# Patient Record
Sex: MALE | Race: WHITE | NOT HISPANIC OR LATINO | Employment: OTHER | URBAN - METROPOLITAN AREA
[De-identification: names, ages, dates, MRNs, and addresses within clinical notes are randomized per-mention and may not be internally consistent; named-entity substitution may affect disease eponyms.]

---

## 2017-03-21 ENCOUNTER — OFFICE VISIT (OUTPATIENT)
Dept: FAMILY MEDICINE | Facility: CLINIC | Age: 57
End: 2017-03-21
Payer: COMMERCIAL

## 2017-03-21 VITALS
WEIGHT: 164.7 LBS | OXYGEN SATURATION: 100 % | SYSTOLIC BLOOD PRESSURE: 164 MMHG | TEMPERATURE: 97.7 F | BODY MASS INDEX: 25.85 KG/M2 | HEART RATE: 70 BPM | HEIGHT: 67 IN | DIASTOLIC BLOOD PRESSURE: 100 MMHG

## 2017-03-21 DIAGNOSIS — J30.2 SEASONAL ALLERGIC RHINITIS, UNSPECIFIED ALLERGIC RHINITIS TRIGGER: ICD-10-CM

## 2017-03-21 DIAGNOSIS — I10 HTN, GOAL BELOW 140/90: ICD-10-CM

## 2017-03-21 DIAGNOSIS — Z12.11 SCREENING FOR COLON CANCER: ICD-10-CM

## 2017-03-21 DIAGNOSIS — Z00.01 ENCOUNTER FOR ROUTINE ADULT HEALTH EXAMINATION WITH ABNORMAL FINDINGS: Primary | ICD-10-CM

## 2017-03-21 DIAGNOSIS — Z13.6 CARDIOVASCULAR SCREENING; LDL GOAL LESS THAN 160: ICD-10-CM

## 2017-03-21 DIAGNOSIS — F41.0 ANXIETY ATTACK: ICD-10-CM

## 2017-03-21 DIAGNOSIS — Z90.5 SINGLE KIDNEY: ICD-10-CM

## 2017-03-21 LAB
ANION GAP SERPL CALCULATED.3IONS-SCNC: 8 MMOL/L (ref 3–14)
BUN SERPL-MCNC: 20 MG/DL (ref 7–30)
CALCIUM SERPL-MCNC: 9.3 MG/DL (ref 8.5–10.1)
CHLORIDE SERPL-SCNC: 104 MMOL/L (ref 94–109)
CHOLEST SERPL-MCNC: 175 MG/DL
CO2 SERPL-SCNC: 25 MMOL/L (ref 20–32)
CREAT SERPL-MCNC: 0.88 MG/DL (ref 0.66–1.25)
GFR SERPL CREATININE-BSD FRML MDRD: 90 ML/MIN/1.7M2
GLUCOSE SERPL-MCNC: 102 MG/DL (ref 70–99)
HDLC SERPL-MCNC: 68 MG/DL
LDLC SERPL CALC-MCNC: 95 MG/DL
NONHDLC SERPL-MCNC: 107 MG/DL
POTASSIUM SERPL-SCNC: 5 MMOL/L (ref 3.4–5.3)
SODIUM SERPL-SCNC: 137 MMOL/L (ref 133–144)
TRIGL SERPL-MCNC: 62 MG/DL

## 2017-03-21 PROCEDURE — 80048 BASIC METABOLIC PNL TOTAL CA: CPT | Performed by: PHYSICIAN ASSISTANT

## 2017-03-21 PROCEDURE — 99396 PREV VISIT EST AGE 40-64: CPT | Performed by: PHYSICIAN ASSISTANT

## 2017-03-21 PROCEDURE — 36415 COLL VENOUS BLD VENIPUNCTURE: CPT | Performed by: PHYSICIAN ASSISTANT

## 2017-03-21 PROCEDURE — 80061 LIPID PANEL: CPT | Performed by: PHYSICIAN ASSISTANT

## 2017-03-21 RX ORDER — TRAZODONE HYDROCHLORIDE 50 MG/1
50-200 TABLET, FILM COATED ORAL AT BEDTIME
Qty: 90 TABLET | COMMUNITY
Start: 2017-03-21 | End: 2017-09-05

## 2017-03-21 RX ORDER — LISINOPRIL 10 MG/1
10 TABLET ORAL DAILY
Qty: 90 TABLET | Refills: 0 | Status: SHIPPED | OUTPATIENT
Start: 2017-03-21 | End: 2017-05-02

## 2017-03-21 NOTE — PROGRESS NOTES
"  SUBJECTIVE:     CC: Suresh Powers is an 56 year old male who presents for preventative health visit.     Healthy Habits:    Do you get at least three servings of calcium containing foods daily (dairy, green leafy vegetables, etc.)? No    Amount of exercise or daily activities, outside of work: 3 hour(s) per day    Problems taking medications regularly No    Medication side effects: No    Have you had an eye exam in the past two years? yes    Do you see a dentist twice per year? no    Do you have sleep apnea, excessive snoring or daytime drowsiness?no    -Patient presents today for annual physical  -He had left his job, and lost his insurance, was not taking blood pressure medication  -He denies   -He is on trazodone for \"anxiety and depression and help with sleep\"      Today's PHQ-2 Score:   PHQ-2 ( 1999 [employer]) 3/21/2017   Q1: Little interest or pleasure in doing things 0   Q2: Feeling down, depressed or hopeless 0   PHQ-2 Score 0       Abuse: Current or Past(Physical, Sexual or Emotional)- No  Do you feel safe in your environment - Yes    Social History   Substance Use Topics     Smoking status: Current Every Day Smoker     Packs/day: 1.00     Years: 0.00     Smokeless tobacco: Not on file     Alcohol use No     The patient does not drink >3 drinks per day nor >7 drinks per week.    Last PSA:   PSA   Date Value Ref Range Status   12/19/2015 1.08 0 - 4 ug/L Final       Recent Labs   Lab Test  12/19/15   0907   CHOL  177   HDL  73   LDL  94   TRIG  52   NHDL  104       Reviewed orders with patient. Reviewed health maintenance and updated orders accordingly - Yes    Reviewed and updated as needed this visit by clinical staff  Tobacco  Allergies  Med Hx  Surg Hx  Fam Hx  Soc Hx        Reviewed and updated as needed this visit by Provider            ROS:  C: NEGATIVE for fever, chills, change in weight  I: NEGATIVE for worrisome rashes, moles or lesions  E: NEGATIVE for vision changes or irritation  ENT: " "persistent runny nose  R: NEGATIVE for significant cough or SOB  CV: uncontrolled blood pressure  GI: NEGATIVE for nausea, abdominal pain, heartburn, or change in bowel habits   male: negative for dysuria, hematuria, decreased urinary stream, erectile dysfunction, urethral discharge  M: NEGATIVE for significant arthralgias or myalgia  N: NEGATIVE for weakness, dizziness or paresthesias  P: NEGATIVE for changes in mood or affect    Problem list, Medication list, Allergies, and Medical/Social/Surgical histories reviewed in [facility] and updated as appropriate.  OBJECTIVE:     BP (!) 164/100  Pulse 70  Temp 97.7  F (36.5  C) (Oral)  Ht 5' 7\" (1.702 m)  Wt 164 lb 11.2 oz (74.7 kg)  SpO2 100%  BMI 25.8 kg/m2  EXAM:  GENERAL: healthy, alert and no distress  EYES: Eyes grossly normal to inspection, PERRL and conjunctivae and sclerae normal  HENT: normal cephalic/atraumatic, ear canals and TM's normal, nasal mucosa edematous , rhinorrhea white, oropharynx clear and oral mucous membranes moist  NECK: no adenopathy, no asymmetry, masses, or scars and thyroid normal to palpation  RESP: lungs clear to auscultation - no rales, rhonchi or wheezes  CV: regular rates and rhythm, normal S1 S2, no S3 or S4 and no murmur, click or rub  ABDOMEN: soft, nontender, no hepatosplenomegaly, no masses and bowel sounds normal   (female): normal female external genitalia, normal urethral meatus, vaginal mucosa, normal cervix/adnexa/uterus without masses or discharge  MS: no gross musculoskeletal defects noted, no edema  PSYCH: mentation appears normal, affect normal/bright    ASSESSMENT/PLAN:     1. Encounter for routine adult health examination with abnormal findings  57yo male for annual physical. Needs updating to HM and lab work. He feels well overall, now getting insurance back.   - LIPID REFLEX TO DIRECT LDL PANEL  - traZODone (DESYREL) 50 MG tablet; Take 1-4 tablets ( mg) by mouth At Bedtime  Dispense: 90 tablet    2. HTN, goal " "below 140/90  Uncontrolled. Has been off of medication for 6 months. Reviewed that this is especially important given single kidney. He will recheck a few times at our pharmacy over the next few weeks and in 3 months.   - Basic metabolic panel  - lisinopril (PRINIVIL/ZESTRIL) 10 MG tablet; Take 1 tablet (10 mg) by mouth daily  Dispense: 90 tablet; Refill: 0    3. Single kidney  Donated to sister (left) in 1990. Poor BP control. Updating labs.   - Basic metabolic panel  - lisinopril (PRINIVIL/ZESTRIL) 10 MG tablet; Take 1 tablet (10 mg) by mouth daily  Dispense: 90 tablet; Refill: 0    4. CARDIOVASCULAR SCREENING; LDL GOAL LESS THAN 160  - LIPID REFLEX TO DIRECT LDL PANEL    5. Anxiety attack  Sees outside psych    6. Seasonal allergic rhinitis, unspecified allergic rhinitis trigger  Recommend trial of otc antihistamines or nasal steroid.     7. Screening for colon cancer  - GASTROENTEROLOGY ADULT REF PROCEDURE ONLY    COUNSELING:  Reviewed preventive health counseling, as reflected in patient instructions       Regular exercise       Healthy diet/nutrition       Colon cancer screening       Prostate cancer screening         reports that he has quit smoking. He smoked 1.00 pack per day for 0.00 years. He quit smokeless tobacco use about 13 months ago.    Estimated body mass index is 22.21 kg/(m^2) as calculated from the following:    Height as of 3/24/16: 5' 8\" (1.727 m).    Weight as of 3/24/16: 146 lb 1.6 oz (66.3 kg).       Counseling Resources:  ATP IV Guidelines  Pooled Cohorts Equation Calculator  FRAX Risk Assessment  ICSI Preventive Guidelines  Dietary Guidelines for Americans, 2010  USDA's MyPlate  ASA Prophylaxis  Lung CA Screening    Sharif Mcdonald PA-C  [facility] [place]  "

## 2017-03-21 NOTE — NURSING NOTE
"Chief Complaint   Patient presents with     Physical       Initial BP (!) 164/100  Pulse 70  Temp 97.7  F (36.5  C) (Oral)  Ht 5' 7\" (1.702 m)  Wt 164 lb 11.2 oz (74.7 kg)  SpO2 100%  BMI 25.8 kg/m2 Estimated body mass index is 25.8 kg/(m^2) as calculated from the following:    Height as of this encounter: 5' 7\" (1.702 m).    Weight as of this encounter: 164 lb 11.2 oz (74.7 kg).  Medication Reconciliation: complete   Rajeev Goddard CMA        "

## 2017-03-21 NOTE — MR AVS SNAPSHOT
After Visit Summary   3/21/2017    Suresh Powers    MRN: [id number]           Patient Information     Date Of Birth          1960        Visit Information        Provider Department      3/21/2017 9:00 AM Sharif Mcdonald PA-C [facility]        Today's Diagnoses     Encounter for routine adult health examination with abnormal findings    -  1    HTN, goal below 140/90        Single kidney        CARDIOVASCULAR SCREENING; LDL GOAL LESS THAN 160        Anxiety attack        Seasonal allergic rhinitis, unspecified allergic rhinitis trigger          Care Instructions      Preventive Health Recommendations  Male Ages 50 - 64    Yearly exam:             See your health care provider every year in order to  o   Review health changes.   o   Discuss preventive care.    o   Review your medicines if your doctor has prescribed any.     Have a cholesterol test every 5 years, or more frequently if you are at risk for high cholesterol/heart disease.     Have a diabetes test (fasting glucose) every three years. If you are at risk for diabetes, you should have this test more often.     Have a colonoscopy at age 50, or have a yearly FIT test (stool test). These exams will check for colon cancer.      Talk with your health care provider about whether or not a prostate cancer screening test (PSA) is right for you.    You should be tested each year for STDs (sexually transmitted diseases), if you re at risk.     Shots: Get a flu shot each year. Get a tetanus shot every 10 years.     Nutrition:    Eat at least 5 servings of fruits and vegetables daily.     Eat whole-grain bread, whole-wheat pasta and brown rice instead of white grains and rice.     Talk to your provider about Calcium and Vitamin D.     Lifestyle    Exercise for at least 150 minutes a week (30 minutes a day, 5 days a week). This will help you control your weight and prevent disease.     Limit alcohol to one drink per day.     No  "smoking.     Wear sunscreen to prevent skin cancer.     See your dentist every six months for an exam and cleaning.     See your eye doctor every 1 to 2 years.          Follow-ups after your visit        Who to contact     If you have questions or need follow up information about today's clinic visit or your schedule please contact [facility] directly at [phone].  Normal or non-critical lab and imaging results will be communicated to you by MyChart, letter or phone within 4 business days after the clinic has received the results. If you do not hear from us within 7 days, please contact the clinic through [email or web address] or phone. If you have a critical or abnormal lab result, we will notify you by phone as soon as possible.  Submit refill requests through [employer] or call your pharmacy and they will forward the refill request to us. Please allow 3 business days for your refill to be completed.          Additional Information About Your Visit        [email or web address] Information     [employer] lets you send messages to your doctor, view your test results, renew your prescriptions, schedule appointments and more. To sign up, go to [email or web address] . Click on \"Log in\" on the left side of the screen, which will take you to the Welcome page. Then click on \"Sign up Now\" on the right side of the page.     You will be asked to enter the access code listed below, as well as some personal information. Please follow the directions to create your username and password.     Your access code is: [email or web address]  Expires: 2017  9:25 AM     Your access code will  in 90 days. If you need help or a new code, please call your [place] clinic or [phone].        Care EveryWhere ID     This is your Care EveryWhere ID. This could be used by other organizations to access your [place] medical records  [id number]        Your Vitals Were     Pulse Temperature Height Pulse Oximetry BMI (Body Mass Index)       70 97.7  F " "(36.5  C) (Oral) 5' 7\" (1.702 m) 100% 25.8 kg/m2        Blood Pressure from Last 3 Encounters:   03/21/17 (!) 164/100   03/24/16 103/55   03/16/16 134/66    Weight from Last 3 Encounters:   03/21/17 164 lb 11.2 oz (74.7 kg)   03/24/16 146 lb 1.6 oz (66.3 kg)   03/16/16 149 lb 4.8 oz (67.7 kg)              We Performed the Following     Basic metabolic panel     LIPID REFLEX TO DIRECT LDL PANEL          Today's Medication Changes          These changes are accurate as of: 3/21/17  9:28 AM.  If you have any questions, ask your nurse or doctor.               These medicines have changed or have updated prescriptions.        Dose/Directions    lisinopril 10 MG tablet   Commonly known as:  PRINIVIL/ZESTRIL   This may have changed:  Another medication with the same name was removed. Continue taking this medication, and follow the directions you see here.   Used for:  HTN, goal below 140/90, Single kidney   Changed by:  Sharif Mcdonald PA-C        Dose:  10 mg   Take 1 tablet (10 mg) by mouth daily   Quantity:  90 tablet   Refills:  0            Where to get your medicines      These medications were sent to [facility][id number] - [address] [address]     Phone:  [phone]     lisinopril 10 MG tablet                Primary Care Provider Office Phone # Fax #    [facility] [phone] [address] [facility] [address]        Thank you!     Thank you for choosing [facility]  for your care. Our goal is always to provide you with excellent care. Hearing back from our patients is one way we can continue to improve our services. Please take a few minutes to complete the written survey that you may receive in the mail after your visit with us. Thank you!             Your Updated Medication List - Protect others around you: Learn how to safely use, store and throw away your medicines at " www.disposemymeds.org.          This list is accurate as of: 3/21/17  9:28 AM.  Always use your most recent med list.                   Brand Name Dispense Instructions for use    lisinopril 10 MG tablet    PRINIVIL/ZESTRIL    90 tablet    Take 1 tablet (10 mg) by mouth daily       * TRAZODONE HCL PO      Take  mg by mouth nightly as needed       * traZODone 50 MG tablet    DESYREL    90 tablet    Take 1-4 tablets ( mg) by mouth At Bedtime       TYLENOL PO      Take 200 mg by mouth       * Notice:  This list has 2 medication(s) that are the same as other medications prescribed for you. Read the directions carefully, and ask your doctor or other care provider to review them with you.

## 2017-03-27 ENCOUNTER — TELEPHONE (OUTPATIENT)
Dept: PHARMACY | Facility: OTHER | Age: 57
End: 2017-03-27

## 2017-03-27 ENCOUNTER — ALLIED HEALTH/NURSE VISIT (OUTPATIENT)
Dept: FAMILY MEDICINE | Facility: CLINIC | Age: 57
End: 2017-03-27
Payer: COMMERCIAL

## 2017-03-27 ENCOUNTER — TELEPHONE (OUTPATIENT)
Dept: FAMILY MEDICINE | Facility: CLINIC | Age: 57
End: 2017-03-27

## 2017-03-27 VITALS — SYSTOLIC BLOOD PRESSURE: 152 MMHG | DIASTOLIC BLOOD PRESSURE: 98 MMHG

## 2017-03-27 DIAGNOSIS — Z01.30 BP CHECK: Primary | ICD-10-CM

## 2017-03-27 PROCEDURE — 99207 ZZC NO CHARGE NURSE ONLY: CPT | Performed by: PHYSICIAN ASSISTANT

## 2017-03-27 NOTE — MR AVS SNAPSHOT
After Visit Summary   3/27/2017    Suresh Powers    MRN: [id number]           Patient Information     Date Of Birth          1960        Visit Information        Provider Department      3/27/2017 11:13 AM Shraif Mcdonald PA-C [facility]        Today's Diagnoses     BP check    -  1       Follow-ups after your visit        Your next 10 appointments already scheduled     May 02, 2017  9:00 AM CDT   Office Visit with Justine Cantu [facility] ([facility])    [address] [facility] [address] [phone]           Bring a current list of meds and any records pertaining to this visit.  For Physicals, please bring immunization records and any forms needing to be filled out.  Please arrive 10 minutes early to complete paperwork.            Jun 20, 2017  8:00 AM CDT   LAB with  LAB   [facility] ([facility])    [address] [facility] [address] [phone]           Patient must bring picture ID.  Patient should be prepared to give a urine specimen  Please do not eat 10-12 hours before your appointment if you are coming in fasting for labs on lipids, cholesterol, or glucose (sugar).  Pregnant women should follow their Care Team instructions. Water with medications is okay. Do not drink coffee or other fluids.   If you have concerns about taking  your medications, please ask at office or if scheduling via [employer], send a message by clicking on Secure Messaging, Message Your Care Team.              Who to contact     If you have questions or need follow up information about today's clinic visit or your schedule please contact [facility] directly at [phone].  Normal or non-critical lab and imaging results will be communicated to you by MyChart, letter or phone within 4 business days after the clinic has received the results. If you do not hear from us  "within 7 days, please contact the clinic through [employer] or phone. If you have a critical or abnormal lab result, we will notify you by phone as soon as possible.  Submit refill requests through [employer] or call your pharmacy and they will forward the refill request to us. Please allow 3 business days for your refill to be completed.          Additional Information About Your Visit        [employer] Information     [employer] lets you send messages to your doctor, view your test results, renew your prescriptions, schedule appointments and more. To sign up, go to [email or web address] . Click on \"Log in\" on the left side of the screen, which will take you to the Welcome page. Then click on \"Sign up Now\" on the right side of the page.     You will be asked to enter the access code listed below, as well as some personal information. Please follow the directions to create your username and password.     Your access code is: [email or web address]  Expires: 2017  9:25 AM     Your access code will  in 90 days. If you need help or a new code, please call your [place] clinic or [phone].        Care EveryWhere ID     This is your Care EveryWhere ID. This could be used by other organizations to access your [place] medical records  [id number]         Blood Pressure from Last 3 Encounters:   17 (!) 152/98   17 (!) 164/100   16 103/55    Weight from Last 3 Encounters:   17 164 lb 11.2 oz (74.7 kg)   16 146 lb 1.6 oz (66.3 kg)   16 149 lb 4.8 oz (67.7 kg)              Today, you had the following     No orders found for display       Primary Care Provider Office Phone # Fax #    Sharif Mcdonald PA-C [phone]       [facility] [address]  [facility] [address]        Thank you!     Thank you for choosing [facility]  for your care. Our goal is always to provide you with excellent care. Hearing back from our patients is one way we can " continue to improve our services. Please take a few minutes to complete the written survey that you may receive in the mail after your visit with us. Thank you!             Your Updated Medication List - Protect others around you: Learn how to safely use, store and throw away your medicines at www.disposemymeds.org.          This list is accurate as of: 3/27/17 11:59 PM.  Always use your most recent med list.                   Brand Name Dispense Instructions for use    lisinopril 10 MG tablet    PRINIVIL/ZESTRIL    90 tablet    Take 1 tablet (10 mg) by mouth daily       traZODone 50 MG tablet    DESYREL    90 tablet    Take 1-4 tablets ( mg) by mouth At Bedtime       TYLENOL PO      Take 200 mg by mouth

## 2017-03-27 NOTE — TELEPHONE ENCOUNTER
MTM referral from: [facility]    MTM referral outreach attempt #1 on March 27, 2017 at 12:18 PM      Outcome: Left Message    Slime Mckinnon MTM Coordinator

## 2017-03-27 NOTE — Clinical Note
Please call patient and thank him for following up on BP. Lets have him move up to 20mg (2 tabs of lisinopril daily) and recheck at the pharmacy again in one week. If not controlled at that time will need to change medication.

## 2017-03-27 NOTE — Clinical Note
Routing message to PCP for review -BP checked at pharmacy and noted to be above goal. Recommended patient follow-up with PCP.

## 2017-03-27 NOTE — PROGRESS NOTES
Suresh Powers is enrolled/participating in the retail pharmacy Blood Pressure Goals Achievement Program (BPGAP).  Suresh Powers was evaluated at [facility] on March 27, 2017 at which time his blood pressure was:    BP Readings from Last 3 Encounters:   03/27/17 (!) 152/98   03/21/17 (!) 164/100   03/24/16 103/55     Reviewed lifestyle modifications for blood pressure control and reduction: including making healthy food choices, managing weight, getting regular exercise, smoking cessation, reducing alcohol consumption, monitoring blood pressure regularly.     Suresh Powers is not experiencing symptoms.    Follow-Up: BP is not at goal of < 140/90mmHg (patient 18+ years of age with or without diabetes), Recommended follow-up with PCP.  Routing to PCP for further review.    Completed by: Lucas    Thank You   Adam [place] [facility]

## 2017-04-07 ENCOUNTER — ALLIED HEALTH/NURSE VISIT (OUTPATIENT)
Dept: FAMILY MEDICINE | Facility: CLINIC | Age: 57
End: 2017-04-07
Payer: COMMERCIAL

## 2017-04-07 VITALS — SYSTOLIC BLOOD PRESSURE: 158 MMHG | DIASTOLIC BLOOD PRESSURE: 103 MMHG

## 2017-04-07 DIAGNOSIS — I10 HTN, GOAL BELOW 140/90: Primary | ICD-10-CM

## 2017-04-07 PROCEDURE — 99207 ZZC NO CHARGE NURSE ONLY: CPT | Performed by: PHYSICIAN ASSISTANT

## 2017-04-07 NOTE — MR AVS SNAPSHOT
After Visit Summary   4/7/2017    Suresh Powers    MRN: [id number]           Patient Information     Date Of Birth          1960        Visit Information        Provider Department      4/7/2017 11:32 AM Sharif Mcdonald PA-C [facility]        Today's Diagnoses     HTN, goal below 140/90    -  1       Follow-ups after your visit        Your next 10 appointments already scheduled     May 02, 2017  9:00 AM CDT   Office Visit with Justine Cantu [facility] ([facility])    [address] [facility] [address] [phone]           Bring a current list of meds and any records pertaining to this visit.  For Physicals, please bring immunization records and any forms needing to be filled out.  Please arrive 10 minutes early to complete paperwork.            Jun 20, 2017  8:00 AM CDT   LAB with  LAB   [facility] ([facility])    [address] [facility] [address] [phone]           Patient must bring picture ID.  Patient should be prepared to give a urine specimen  Please do not eat 10-12 hours before your appointment if you are coming in fasting for labs on lipids, cholesterol, or glucose (sugar).  Pregnant women should follow their Care Team instructions. Water with medications is okay. Do not drink coffee or other fluids.   If you have concerns about taking  your medications, please ask at office or if scheduling via [employer], send a message by clicking on Secure Messaging, Message Your Care Team.              Who to contact     If you have questions or need follow up information about today's clinic visit or your schedule please contact [facility] directly at [phone].  Normal or non-critical lab and imaging results will be communicated to you by MyChart, letter or phone within 4 business days after the clinic has received the results. If you do not  "hear from us within 7 days, please contact the clinic through [employer] or phone. If you have a critical or abnormal lab result, we will notify you by phone as soon as possible.  Submit refill requests through [employer] or call your pharmacy and they will forward the refill request to us. Please allow 3 business days for your refill to be completed.          Additional Information About Your Visit        [employer] Information     [employer] lets you send messages to your doctor, view your test results, renew your prescriptions, schedule appointments and more. To sign up, go to [email or web address] . Click on \"Log in\" on the left side of the screen, which will take you to the Welcome page. Then click on \"Sign up Now\" on the right side of the page.     You will be asked to enter the access code listed below, as well as some personal information. Please follow the directions to create your username and password.     Your access code is: [email or web address]  Expires: 2017  9:25 AM     Your access code will  in 90 days. If you need help or a new code, please call your [place] clinic or [phone].        Care EveryWhere ID     This is your Care EveryWhere ID. This could be used by other organizations to access your [place] medical records  [id number]         Blood Pressure from Last 3 Encounters:   17 (!) 158/103   17 (!) 152/98   17 (!) 164/100    Weight from Last 3 Encounters:   17 164 lb 11.2 oz (74.7 kg)   16 146 lb 1.6 oz (66.3 kg)   16 149 lb 4.8 oz (67.7 kg)              Today, you had the following     No orders found for display       Primary Care Provider Office Phone # Fax #    Sharif Mcdonald PA-C [phone]       [facility] [address]  [facility] [address]        Thank you!     Thank you for choosing [facility]  for your care. Our goal is always to provide you with excellent care. Hearing back from our patients " is one way we can continue to improve our services. Please take a few minutes to complete the written survey that you may receive in the mail after your visit with us. Thank you!             Your Updated Medication List - Protect others around you: Learn how to safely use, store and throw away your medicines at www.disposemymeds.org.          This list is accurate as of: 4/7/17 11:59 PM.  Always use your most recent med list.                   Brand Name Dispense Instructions for use    lisinopril 10 MG tablet    PRINIVIL/ZESTRIL    90 tablet    Take 1 tablet (10 mg) by mouth daily       traZODone 50 MG tablet    DESYREL    90 tablet    Take 1-4 tablets ( mg) by mouth At Bedtime       TYLENOL PO      Take 200 mg by mouth

## 2017-04-07 NOTE — PROGRESS NOTES
Suresh Powers is enrolled/participating in the retail pharmacy Blood Pressure Goals Achievement Program (BPGAP).  Suresh Powers was evaluated at [facility] on April 7, 2017 at which time his blood pressure was:    BP Readings from Last 3 Encounters:   04/07/17 (!) 158/103   03/27/17 (!) 152/98   03/21/17 (!) 164/100     Reviewed lifestyle modifications for blood pressure control and reduction: including making healthy food choices, managing weight, getting regular exercise, smoking cessation, reducing alcohol consumption, monitoring blood pressure regularly.     Suresh Powers is not experiencing symptoms.    Follow-Up: BP is not at goal of < 140/90mmHg (patient 18+ years of age with or without diabetes), Recommended follow-up with PCP.  Routing to PCP for further review.    Completed by: Janis Chiang, Jamse Hathaway Pharmacist    Patient already referred to MTM pharmacist

## 2017-04-10 ENCOUNTER — TELEPHONE (OUTPATIENT)
Dept: FAMILY MEDICINE | Facility: CLINIC | Age: 57
End: 2017-04-10

## 2017-04-10 DIAGNOSIS — I10 HTN, GOAL BELOW 140/90: Primary | ICD-10-CM

## 2017-04-10 RX ORDER — LISINOPRIL AND HYDROCHLOROTHIAZIDE 12.5; 2 MG/1; MG/1
1 TABLET ORAL DAILY
Qty: 30 TABLET | Refills: 0 | Status: SHIPPED | OUTPATIENT
Start: 2017-04-10 | End: 2017-05-02

## 2017-04-10 NOTE — TELEPHONE ENCOUNTER
Pt calls.  He was in to the pharmacy on 4/7/17 for his bp.      He is running low on his medication of lisinopril.  Right now he is taking 20 mg tabs of lisinopril.  When he got it filled initially, they would only fill it for 30 days and not 90.      Do you want to increase the dose since his bp is still running high?  Can we please send in a new prescription?       He denies headaches and dizziness.      Pt can be reached on his home phone, ok to jaqueline.      Will forward to Primo Mcdonald for advisal.

## 2017-04-20 ENCOUNTER — ALLIED HEALTH/NURSE VISIT (OUTPATIENT)
Dept: FAMILY MEDICINE | Facility: CLINIC | Age: 57
End: 2017-04-20
Payer: COMMERCIAL

## 2017-04-20 VITALS — SYSTOLIC BLOOD PRESSURE: 138 MMHG | DIASTOLIC BLOOD PRESSURE: 84 MMHG

## 2017-04-20 DIAGNOSIS — Z01.30 BP CHECK: Primary | ICD-10-CM

## 2017-04-20 PROCEDURE — 99207 ZZC NO CHARGE NURSE ONLY: CPT | Performed by: PHYSICIAN ASSISTANT

## 2017-04-20 NOTE — MR AVS SNAPSHOT
After Visit Summary   4/20/2017    Suresh Powers    MRN: [id number]           Patient Information     Date Of Birth          1960        Visit Information        Provider Department      4/20/2017 8:20 AM Sharif Mcdonald PA-C [facility]        Today's Diagnoses     BP check    -  1       Follow-ups after your visit        Your next 10 appointments already scheduled     May 02, 2017  9:00 AM CDT   Office Visit with Justine Cantu [facility] ([facility])    [address] [address] [phone]           Bring a current list of meds and any records pertaining to this visit.  For Physicals, please bring immunization records and any forms needing to be filled out.  Please arrive 10 minutes early to complete paperwork.            Jun 20, 2017  8:00 AM CDT   LAB with  LAB   [facility] ([facility])    [address] [address] [phone]           Patient must bring picture ID.  Patient should be prepared to give a urine specimen  Please do not eat 10-12 hours before your appointment if you are coming in fasting for labs on lipids, cholesterol, or glucose (sugar).  Pregnant women should follow their Care Team instructions. Water with medications is okay. Do not drink coffee or other fluids.   If you have concerns about taking  your medications, please ask at office or if scheduling via [employer], send a message by clicking on Secure Messaging, Message Your Care Team.              Who to contact     If you have questions or need follow up information about today's clinic visit or your schedule please contact [facility] directly at [phone].  Normal or non-critical lab and imaging results will be communicated to you by MyChart, letter or phone within 4 business days after the clinic has received the results. If you do not hear from us  "within 7 days, please contact the clinic through [employer] or phone. If you have a critical or abnormal lab result, we will notify you by phone as soon as possible.  Submit refill requests through [employer] or call your pharmacy and they will forward the refill request to us. Please allow 3 business days for your refill to be completed.          Additional Information About Your Visit        [employer] Information     [employer] lets you send messages to your doctor, view your test results, renew your prescriptions, schedule appointments and more. To sign up, go to [email or web address] . Click on \"Log in\" on the left side of the screen, which will take you to the Welcome page. Then click on \"Sign up Now\" on the right side of the page.     You will be asked to enter the access code listed below, as well as some personal information. Please follow the directions to create your username and password.     Your access code is: [email or web address]  Expires: 2017  9:25 AM     Your access code will  in 90 days. If you need help or a new code, please call your [facility] clinic or [phone].        Care EveryWhere ID     This is your Care EveryWhere ID. This could be used by other organizations to access your [facility] medical records  [id number]         Blood Pressure from Last 3 Encounters:   17 138/84   17 (!) 158/103   17 (!) 152/98    Weight from Last 3 Encounters:   17 164 lb 11.2 oz (74.7 kg)   16 146 lb 1.6 oz (66.3 kg)   16 149 lb 4.8 oz (67.7 kg)              Today, you had the following     No orders found for display       Primary Care Provider Office Phone # Fax #    Sharif Mcdonald PA-C [phone]       [facility] [address]  [facility] [address]        Thank you!     Thank you for choosing [facility]  for your care. Our goal is always to provide you with excellent care. Hearing back from our patients is one way we can " continue to improve our services. Please take a few minutes to complete the written survey that you may receive in the mail after your visit with us. Thank you!             Your Updated Medication List - Protect others around you: Learn how to safely use, store and throw away your medicines at www.disposemymeds.org.          This list is accurate as of: 4/20/17  8:22 AM.  Always use your most recent med list.                   Brand Name Dispense Instructions for use    lisinopril 10 MG tablet    PRINIVIL/ZESTRIL    90 tablet    Take 1 tablet (10 mg) by mouth daily       lisinopril-hydrochlorothiazide 20-12.5 MG per tablet    PRINZIDE/ZESTORETIC    30 tablet    Take 1 tablet by mouth daily       traZODone 50 MG tablet    DESYREL    90 tablet    Take 1-4 tablets ( mg) by mouth At Bedtime       TYLENOL PO      Take 200 mg by mouth

## 2017-04-20 NOTE — PROGRESS NOTES
Suresh Powers is enrolled/participating in the retail pharmacy Blood Pressure Goals Achievement Program (BPGAP).  Suresh Powers was evaluated at [facility] on April 20, 2017 at which time his blood pressure was:    BP Readings from Last 3 Encounters:   04/20/17 138/84   04/07/17 (!) 158/103   03/27/17 (!) 152/98     Reviewed lifestyle modifications for blood pressure control and reduction: including making healthy food choices, managing weight, getting regular exercise, smoking cessation, reducing alcohol consumption, monitoring blood pressure regularly.     Suresh Powers is not experiencing symptoms.    Follow-Up: BP is at goal of < 140/90mmHg (patient 18+ years of age with or without diabetes).  Recommended follow-up at pharmacy in 6 months.     Completed by: Lucas    Thank You   [facility]

## 2017-05-02 ENCOUNTER — OFFICE VISIT (OUTPATIENT)
Dept: PHARMACY | Facility: CLINIC | Age: 57
End: 2017-05-02
Payer: COMMERCIAL

## 2017-05-02 VITALS
DIASTOLIC BLOOD PRESSURE: 82 MMHG | SYSTOLIC BLOOD PRESSURE: 127 MMHG | WEIGHT: 166 LBS | HEART RATE: 74 BPM | BODY MASS INDEX: 26 KG/M2

## 2017-05-02 DIAGNOSIS — M79.671 PAIN IN BOTH FEET: ICD-10-CM

## 2017-05-02 DIAGNOSIS — F32.A DEPRESSION, UNSPECIFIED DEPRESSION TYPE: ICD-10-CM

## 2017-05-02 DIAGNOSIS — E63.9 NUTRITIONAL DEFICIENCY: ICD-10-CM

## 2017-05-02 DIAGNOSIS — F41.0 ANXIETY ATTACK: Primary | ICD-10-CM

## 2017-05-02 DIAGNOSIS — F17.200 TOBACCO USE DISORDER: ICD-10-CM

## 2017-05-02 DIAGNOSIS — M79.672 PAIN IN BOTH FEET: ICD-10-CM

## 2017-05-02 DIAGNOSIS — I10 HTN, GOAL BELOW 140/90: ICD-10-CM

## 2017-05-02 DIAGNOSIS — G47.9 SLEEP DISORDER: ICD-10-CM

## 2017-05-02 PROCEDURE — 99605 MTMS BY PHARM NP 15 MIN: CPT | Performed by: PHARMACIST

## 2017-05-02 PROCEDURE — 99607 MTMS BY PHARM ADDL 15 MIN: CPT | Performed by: PHARMACIST

## 2017-05-02 RX ORDER — GINSENG 100 MG
100 CAPSULE ORAL DAILY
COMMUNITY
End: 2023-01-20

## 2017-05-02 RX ORDER — LISINOPRIL AND HYDROCHLOROTHIAZIDE 12.5; 2 MG/1; MG/1
1 TABLET ORAL DAILY
Qty: 30 TABLET | Refills: 1 | Status: SHIPPED | OUTPATIENT
Start: 2017-05-02 | End: 2017-06-06

## 2017-05-02 RX ORDER — ESCITALOPRAM OXALATE 10 MG/1
10 TABLET ORAL DAILY
Qty: 30 TABLET | Refills: 1 | Status: SHIPPED | OUTPATIENT
Start: 2017-05-02 | End: 2017-06-06

## 2017-05-02 ASSESSMENT — ANXIETY QUESTIONNAIRES
7. FEELING AFRAID AS IF SOMETHING AWFUL MIGHT HAPPEN: SEVERAL DAYS
GAD7 TOTAL SCORE: 13
6. BECOMING EASILY ANNOYED OR IRRITABLE: NOT AT ALL
IF YOU CHECKED OFF ANY PROBLEMS ON THIS QUESTIONNAIRE, HOW DIFFICULT HAVE THESE PROBLEMS MADE IT FOR YOU TO DO YOUR WORK, TAKE CARE OF THINGS AT HOME, OR GET ALONG WITH OTHER PEOPLE: NOT DIFFICULT AT ALL
3. WORRYING TOO MUCH ABOUT DIFFERENT THINGS: NEARLY EVERY DAY
2. NOT BEING ABLE TO STOP OR CONTROL WORRYING: NEARLY EVERY DAY
1. FEELING NERVOUS, ANXIOUS, OR ON EDGE: NEARLY EVERY DAY
5. BEING SO RESTLESS THAT IT IS HARD TO SIT STILL: NOT AT ALL

## 2017-05-02 ASSESSMENT — PATIENT HEALTH QUESTIONNAIRE - PHQ9: 5. POOR APPETITE OR OVEREATING: NEARLY EVERY DAY
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
## 2017-05-02 NOTE — MR AVS SNAPSHOT
After Visit Summary   5/2/2017    Suresh Powers    MRN: [id number]           Patient Information     Date Of Birth          1960        Visit Information        Provider Department      5/2/2017 9:00 AM Justine Cantu, [facility] MTM        Today's Diagnoses     Anxiety attack    -  1    HTN, goal below 140/90        Depression, unspecified depression type          Care Instructions    Recommendations from today's MTM visit:                                                    MTM (medication therapy management) is a service provided by a clinical pharmacist designed to help you get the most of out of your medicines.     Tobacco cessation: use deep breathing and stop using nicotine in e-cigarette    Pain:  Can take Tylenol/acetaminophen 2 tablets up to three times a day    High Blood Pressure:  Great job!  Due to get lab appointment in the next 2 weeks to check kidneys.    Anxiety: Start escitalopram/Lexapro 10mg once daily in the morning.    Next MTM/pharmacist visit: 1  Month with Primo Leija in 3 months or as needed    To schedule another MTM appointment, please call the clinic directly or you may call the MTM scheduling line at [phone] or toll-free at [phone].     My Clinical Pharmacist's contact information:                                                      It was a pleasure seeing you today!  Please feel free to contact me with any questions or concerns you have.      Justine Cantu, PharmD [facility]  Medication Therapy Management Practitioner   #[phone]    You may receive a survey about the MTM services you received.  I would appreciate your feedback to help me serve you better in the future. Please fill it out and return it when you can. Your comments will be anonymous.      My healthcare goals:                                                      Goals for patients with hypertension (high blood pressure):  Your blood pressure should be  less than <140/90.   Today, yours was   BP Readings from Last 1 Encounters:   05/02/17 127/82   .  Things you can do to help lower your blood pressure:  1) Take your medications as directed  2) Regular exercise- Aim for at least 30 minutes of daily physical activity.  3) Weight loss if overweight- losing as little as 10 lbs. can reduce blood pressure  4) Avoid excess dietary sodium (salt).  You should not consume more than 1500mg of sodium per day.  Following this restriction can lower your blood pressure as much as adding another medication.  -Foods high in sodium include: ham, granados, deli meats, canned soups and vegetables, frozen meals, gravy, chips, cheeses, fast food, bread.  -1 teaspoon of table salt is about 2300mg of sodium.  5) Avoid alcohol- if you drink, please do so in moderation (no more than 1 drink per day for women or 2 drinks per day for men).  6) Quit smoking- Tobacco injures vessel walls and speeds up the process of hardening of the arteries, making it harder to control your blood pressure.  7) Look for ways to reduce stress in your life.  8)  Try and get plenty of sleep.  9) Consider the DASH diet.  This diet can lower your blood pressure as much as adding another medication if followed correctly.   a. The DASH diet is high in fruits and vegetables, whole grains, fat-free or low-fat milk products, fish and poultry, beans, seeds and nuts.  b. The DASH diet is low in salt, sodium, sugar, sweets, high-sugar drinks, red meat, saturated fat and trans fats.  c. For more information on the DASH diet, visit the National Heart, Lung and Blood [place] at http://www.nhlbi.nih.gov                  Follow-ups after your visit        Your next 10 appointments already scheduled     May 16, 2017  8:00 AM CDT   LAB with  LAB   [facility] ([facility]    [address] [address] [phone]           Patient must bring picture ID.  Patient should be  "prepared to give a urine specimen  Please do not eat 10-12 hours before your appointment if you are coming in fasting for labs on lipids, cholesterol, or glucose (sugar).  Pregnant women should follow their Care Team instructions. Water with medications is okay. Do not drink coffee or other fluids.   If you have concerns about taking  your medications, please ask at office or if scheduling via [employer], send a message by clicking on Secure Messaging, Message Your Care Team.            Jun 06, 2017  9:00 AM CDT   SHORT with Sharif Mcdonald PA-C   [facility] ([facility]    [address] [facility] [address] [phone]              Future tests that were ordered for you today     Open Future Orders        Priority Expected Expires Ordered    Basic metabolic panel Routine  7/31/2017 5/2/2017            Who to contact     If you have questions or need follow up information about today's clinic visit or your schedule please contact [facility] directly at [phone].  Normal or non-critical lab and imaging results will be communicated to you by MyChart, letter or phone within 4 business days after the clinic has received the results. If you do not hear from us within 7 days, please contact the clinic through [employer] or phone. If you have a critical or abnormal lab result, we will notify you by phone as soon as possible.  Submit refill requests through [employer] or call your pharmacy and they will forward the refill request to us. Please allow 3 business days for your refill to be completed.          Additional Information About Your Visit        [employer] Information     [employer] lets you send messages to your doctor, view your test results, renew your prescriptions, schedule appointments and more. To sign up, go to [email or web address] . Click on \"Log in\" on the left side of the screen, which will take you to the Welcome page. Then click on \"Sign up " "Now\" on the right side of the page.     You will be asked to enter the access code listed below, as well as some personal information. Please follow the directions to create your username and password.     Your access code is: [email or web address]  Expires: 2017  9:25 AM     Your access code will  in 90 days. If you need help or a new code, please call your [place] clinic or [phone].        Care EveryWhere ID     This is your Care EveryWhere ID. This could be used by other organizations to access your [place] medical records  [id number]        Your Vitals Were     Pulse BMI (Body Mass Index)                74 26 kg/m2           Blood Pressure from Last 3 Encounters:   17 127/82   17 138/84   17 (!) 158/103    Weight from Last 3 Encounters:   17 166 lb (75.3 kg)   17 164 lb 11.2 oz (74.7 kg)   16 146 lb 1.6 oz (66.3 kg)                 Today's Medication Changes          These changes are accurate as of: 17  9:47 AM.  If you have any questions, ask your nurse or doctor.               Start taking these medicines.        Dose/Directions    escitalopram 10 MG tablet   Commonly known as:  LEXAPRO   Used for:  Anxiety attack, Depression, unspecified depression type        Dose:  10 mg   Take 1 tablet (10 mg) by mouth daily   Quantity:  30 tablet   Refills:  1            Where to get your medicines      These medications were sent to [id number] [address] [address]     Phone:  [phone]     escitalopram 10 MG tablet    lisinopril-hydrochlorothiazide 20-12.5 MG per tablet                Primary Care Provider Office Phone # Fax #    Sharif Mcdonald PA-C [phone]       [facility] [address]  [facility] [address]        Thank you!     Thank you for choosing [facility]  for your care. Our goal is always to provide you with excellent care. Hearing " back from our patients is one way we can continue to improve our services. Please take a few minutes to complete the written survey that you may receive in the mail after your visit with us. Thank you!             Your Updated Medication List - Protect others around you: Learn how to safely use, store and throw away your medicines at www.disposemymeds.org.          This list is accurate as of: 5/2/17  9:47 AM.  Always use your most recent med list.                   Brand Name Dispense Instructions for use    escitalopram 10 MG tablet    LEXAPRO    30 tablet    Take 1 tablet (10 mg) by mouth daily       lisinopril-hydrochlorothiazide 20-12.5 MG per tablet    PRINZIDE/ZESTORETIC    30 tablet    Take 1 tablet by mouth daily       MULTIVITAMIN ADULT PO      Take 1 tablet by mouth daily       traZODone 50 MG tablet    DESYREL    90 tablet    Take 1-4 tablets ( mg) by mouth At Bedtime       TYLENOL PO      Take 1,000 mg by mouth every 8 hours as needed       zinc 50 MG Tabs      Take 100 mg by mouth daily

## 2017-05-02 NOTE — PATIENT INSTRUCTIONS
Recommendations from today's MTM visit:                                                    MTM (medication therapy management) is a service provided by a clinical pharmacist designed to help you get the most of out of your medicines.     Tobacco cessation: use deep breathing and stop using nicotine in e-cigarette    Pain:  Can take Tylenol/acetaminophen 2 tablets up to three times a day    High Blood Pressure:  Great job!  Due to get lab appointment in the next 2 weeks to check kidneys.    Anxiety: Start escitalopram/Lexapro 10mg once daily in the morning.    Next MTM/pharmacist visit: 1  Month with Primo Leija in 3 months or as needed    To schedule another MTM appointment, please call the clinic directly or you may call the MTM scheduling line at [phone] or toll-free at [phone].     My Clinical Pharmacist's contact information:                                                      It was a pleasure seeing you today!  Please feel free to contact me with any questions or concerns you have.      Justine Cantu, PharmD [facility]  Medication Therapy Management Practitioner   #[phone]    You may receive a survey about the MTM services you received.  I would appreciate your feedback to help me serve you better in the future. Please fill it out and return it when you can. Your comments will be anonymous.      My healthcare goals:                                                      Goals for patients with hypertension (high blood pressure):  Your blood pressure should be less than <140/90.   Today, yours was   BP Readings from Last 1 Encounters:   05/02/17 127/82   .  Things you can do to help lower your blood pressure:  1) Take your medications as directed  2) Regular exercise- Aim for at least 30 minutes of daily physical activity.  3) Weight loss if overweight- losing as little as 10 lbs. can reduce blood pressure  4) Avoid excess dietary sodium (salt).  You should not consume more than 1500mg of  sodium per day.  Following this restriction can lower your blood pressure as much as adding another medication.  -Foods high in sodium include: ham, granados, deli meats, canned soups and vegetables, frozen meals, gravy, chips, cheeses, fast food, bread.  -1 teaspoon of table salt is about 2300mg of sodium.  5) Avoid alcohol- if you drink, please do so in moderation (no more than 1 drink per day for women or 2 drinks per day for men).  6) Quit smoking- Tobacco injures vessel walls and speeds up the process of hardening of the arteries, making it harder to control your blood pressure.  7) Look for ways to reduce stress in your life.  8)  Try and get plenty of sleep.  9) Consider the DASH diet.  This diet can lower your blood pressure as much as adding another medication if followed correctly.   a. The DASH diet is high in fruits and vegetables, whole grains, fat-free or low-fat milk products, fish and poultry, beans, seeds and nuts.  b. The DASH diet is low in salt, sodium, sugar, sweets, high-sugar drinks, red meat, saturated fat and trans fats.  c. For more information on the DASH diet, visit the National Heart, Lung and [employer] [place] at http://www.nhlbi.nih.gov

## 2017-05-03 ASSESSMENT — ANXIETY QUESTIONNAIRES: GAD7 TOTAL SCORE: 13

## 2017-05-03 ASSESSMENT — PATIENT HEALTH QUESTIONNAIRE - PHQ9: SUM OF ALL RESPONSES TO PHQ QUESTIONS 1-9: 4

## 2017-05-06 ENCOUNTER — OFFICE VISIT (OUTPATIENT)
Dept: URGENT CARE | Facility: URGENT CARE | Age: 57
End: 2017-05-06
Payer: COMMERCIAL

## 2017-05-06 ENCOUNTER — RADIANT APPOINTMENT (OUTPATIENT)
Dept: GENERAL RADIOLOGY | Facility: CLINIC | Age: 57
End: 2017-05-06
Attending: INTERNAL MEDICINE
Payer: COMMERCIAL

## 2017-05-06 VITALS
SYSTOLIC BLOOD PRESSURE: 112 MMHG | TEMPERATURE: 98.7 F | DIASTOLIC BLOOD PRESSURE: 70 MMHG | OXYGEN SATURATION: 95 % | HEIGHT: 67 IN | HEART RATE: 100 BPM | WEIGHT: 165 LBS | BODY MASS INDEX: 25.9 KG/M2

## 2017-05-06 DIAGNOSIS — J18.9 PNEUMONIA OF LEFT LOWER LOBE DUE TO INFECTIOUS ORGANISM: Primary | ICD-10-CM

## 2017-05-06 DIAGNOSIS — R06.02 SHORTNESS OF BREATH: ICD-10-CM

## 2017-05-06 PROCEDURE — 71020 XR CHEST 2 VW: CPT

## 2017-05-06 PROCEDURE — 99214 OFFICE O/P EST MOD 30 MIN: CPT | Performed by: INTERNAL MEDICINE

## 2017-05-06 PROCEDURE — 93000 ELECTROCARDIOGRAM COMPLETE: CPT | Performed by: INTERNAL MEDICINE

## 2017-05-06 RX ORDER — IPRATROPIUM BROMIDE AND ALBUTEROL SULFATE 2.5; .5 MG/3ML; MG/3ML
1 SOLUTION RESPIRATORY (INHALATION) ONCE
Qty: 1 VIAL | Refills: 0
Start: 2017-05-06 | End: 2017-06-06

## 2017-05-06 RX ORDER — ALBUTEROL SULFATE 0.83 MG/ML
1 SOLUTION RESPIRATORY (INHALATION) EVERY 4 HOURS PRN
Qty: 25 VIAL | Refills: 1 | Status: SHIPPED | OUTPATIENT
Start: 2017-05-06 | End: 2017-05-24

## 2017-05-06 RX ORDER — CEFDINIR 300 MG/1
300 CAPSULE ORAL 2 TIMES DAILY
Qty: 20 CAPSULE | Refills: 0 | Status: SHIPPED | OUTPATIENT
Start: 2017-05-06 | End: 2017-06-06

## 2017-05-06 ASSESSMENT — ENCOUNTER SYMPTOMS
COUGH: 1
WHEEZING: 1
RHINORRHEA: 1
SORE THROAT: 1
SHORTNESS OF BREATH: 1
CHILLS: 1

## 2017-05-06 ASSESSMENT — COPD QUESTIONNAIRES: COPD: 0

## 2017-05-06 NOTE — PROGRESS NOTES
"Cough   This is a new problem. The current episode started yesterday. The problem occurs every few hours. The problem has been rapidly worsening. The cough is productive of sputum. There has been no fever. Associated symptoms include chills, rhinorrhea, sore throat (mild), shortness of breath and wheezing. Pertinent negatives include no chest pain. His past medical history does not include COPD or asthma.       Past Medical History:   Diagnosis Date     Anxiety     sees psych for trazodone     Foot pain      Hypertension      Mold exposure        Review of Systems   Constitutional: Positive for chills.   HENT: Positive for rhinorrhea and sore throat (mild).    Respiratory: Positive for cough, shortness of breath and wheezing.    Cardiovascular: Negative for chest pain.       /70 (BP Location: Right arm, Patient Position: Chair, Cuff Size: Adult Regular)  Pulse 100  Temp 98.7  F (37.1  C) (Oral)  Ht 5' 7\" (1.702 m)  Wt 165 lb (74.8 kg)  SpO2 95%  BMI 25.84 kg/m2      Physical Exam   Constitutional: He is oriented to person, place, and time. No distress.   HENT:   Mouth/Throat: Oropharynx is clear and moist. No oropharyngeal exudate.   Marked nasal congestion   Cardiovascular: Normal rate, regular rhythm and normal heart sounds.    Pulmonary/Chest: Effort normal. No respiratory distress. He has wheezes. He has no rales.   Lymphadenopathy:     He has no cervical adenopathy.   Neurological: He is alert and oriented to person, place, and time. GCS score is 15.   Skin: No rash noted.   Vitals reviewed.        ICD-10-CM    1. Pneumonia of left lower lobe due to infectious organism J18.9 cefdinir (OMNICEF) 300 MG capsule     albuterol (2.5 MG/3ML) 0.083% neb solution   2. Shortness of breath R06.02 XR Chest 2 Views     EKG 12-lead complete w/read - Clinics     ipratropium - albuterol 0.5 mg/2.5 mg/3 mL (DUONEB) 0.5-2.5 (3) MG/3ML neb solution     albuterol (2.5 MG/3ML) 0.083% neb solution       Patient " Instructions   Call doctor if your symptoms persist/worsens, or if you develop new symptoms or side effects from the medication/s.    Follow up with your regular doctor in 1 week.

## 2017-05-06 NOTE — NURSING NOTE
"Suresh Powers;   Chief Complaint   Patient presents with     Cough     onset yesterday, trouble breathing      Urgent Care     Initial /70 (BP Location: Right arm, Patient Position: Chair, Cuff Size: Adult Regular)  Pulse 100  Temp 98.7  F (37.1  C) (Oral)  Ht 5' 7\" (1.702 m)  Wt 165 lb (74.8 kg)  SpO2 95%  BMI 25.84 kg/m2 Estimated body mass index is 25.84 kg/(m^2) as calculated from the following:    Height as of this encounter: 5' 7\" (1.702 m).    Weight as of this encounter: 165 lb (74.8 kg)..  BP completed using cuff size regular.  Portia Wall R.N.  "

## 2017-05-06 NOTE — PATIENT INSTRUCTIONS
Call doctor if your symptoms persist/worsens, or if you develop new symptoms or side effects from the medication/s.    Follow up with your regular doctor in 1 week.

## 2017-05-06 NOTE — MR AVS SNAPSHOT
After Visit Summary   5/6/2017    Suresh Powers    MRN: [id number]           Patient Information     Date Of Birth          1960        Visit Information        Provider Department      5/6/2017 9:00 AM Luis Alberto Hernandez [facility]        Today's Diagnoses     Pneumonia of left lower lobe due to infectious organism    -  1    Shortness of breath          Care Instructions    Call doctor if your symptoms persist/worsens, or if you develop new symptoms or side effects from the medication/s.    Follow up with your regular doctor in 1 week.          Follow-ups after your visit        Your next 10 appointments already scheduled     May 16, 2017  8:00 AM CDT   LAB with  LAB   [facility]    [address] [address] [phone]           Patient must bring picture ID.  Patient should be prepared to give a urine specimen  Please do not eat 10-12 hours before your appointment if you are coming in fasting for labs on lipids, cholesterol, or glucose (sugar).  Pregnant women should follow their Care Team instructions. Water with medications is okay. Do not drink coffee or other fluids.   If you have concerns about taking  your medications, please ask at office or if scheduling via [employer], send a message by clicking on Secure Messaging, Message Your Care Team.            Jun 06, 2017  9:00 AM CDT   SHORT with Sharif Mcdonald PA-C   [facility]    [address] [facility] [address] [phone]              Who to contact     If you have questions or need follow up information about today's clinic visit or your schedule please contact [facility] directly at [phone].  Normal or non-critical lab and imaging results will be communicated to you by MyChart, letter or phone within 4 business days after the clinic has received the  "results. If you do not hear from us within 7 days, please contact the clinic through [employer] or phone. If you have a critical or abnormal lab result, we will notify you by phone as soon as possible.  Submit refill requests through [employer] or call your pharmacy and they will forward the refill request to us. Please allow 3 business days for your refill to be completed.          Additional Information About Your Visit        [employer] Information     [employer] lets you send messages to your doctor, view your test results, renew your prescriptions, schedule appointments and more. To sign up, go to [email or web address] . Click on \"Log in\" on the left side of the screen, which will take you to the Welcome page. Then click on \"Sign up Now\" on the right side of the page.     You will be asked to enter the access code listed below, as well as some personal information. Please follow the directions to create your username and password.     Your access code is: [email or web address]  Expires: 2017  9:25 AM     Your access code will  in 90 days. If you need help or a new code, please call your [facility] clinic or [phone].        Care EveryWhere ID     This is your Care EveryWhere ID. This could be used by other organizations to access your [facility] medical records  [id number]        Your Vitals Were     Pulse Temperature Height Pulse Oximetry BMI (Body Mass Index)       100 98.7  F (37.1  C) (Oral) 5' 7\" (1.702 m) 95% 25.84 kg/m2        Blood Pressure from Last 3 Encounters:   17 112/70   17 127/82   17 138/84    Weight from Last 3 Encounters:   17 165 lb (74.8 kg)   17 166 lb (75.3 kg)   17 164 lb 11.2 oz (74.7 kg)              We Performed the Following     EKG 12-lead complete w/read - Clinics          Today's Medication Changes          These changes are accurate as of: 17 10:30 AM.  If you have any questions, ask your nurse or doctor.               Start taking these " medicines.        Dose/Directions    albuterol (2.5 MG/3ML) 0.083% neb solution   Used for:  Pneumonia of left lower lobe due to infectious organism, Shortness of breath   Started by:  Luis Alberto Hernandez MD        Dose:  1 vial   Take 1 vial (2.5 mg) by nebulization every 4 hours as needed for shortness of breath / dyspnea or wheezing   Quantity:  25 vial   Refills:  1       cefdinir 300 MG capsule   Commonly known as:  OMNICEF   Used for:  Pneumonia of left lower lobe due to infectious organism   Started by:  Luis Alberto Hernandez MD        Dose:  300 mg   Take 1 capsule (300 mg) by mouth 2 times daily   Quantity:  20 capsule   Refills:  0       ipratropium - albuterol 0.5 mg/2.5 mg/3 mL 0.5-2.5 (3) MG/3ML neb solution   Commonly known as:  DUONEB   Used for:  Shortness of breath   Started by:  Luis Alberto Hernandez MD        Dose:  1 vial   Take 1 vial (3 mLs) by nebulization once for 1 dose In clinic now   Quantity:  1 vial   Refills:  0            Where to get your medicines      These medications were sent to [facility] [place] - [address]   [address]  [address], [place] [address]     Phone:  [phone]     albuterol (2.5 MG/3ML) 0.083% neb solution    cefdinir 300 MG capsule         Some of these will need a paper prescription and others can be bought over the counter.  Ask your nurse if you have questions.     You don't need a prescription for these medications     ipratropium - albuterol 0.5 mg/2.5 mg/3 mL 0.5-2.5 (3) MG/3ML neb solution                Primary Care Provider Office Phone # Fax #    Sharif Mcdonald PA-C [phone]       [facility] [address]  [facility] [address]        Thank you!     Thank you for choosing [facility]  for your care. Our goal is always to provide you with excellent care. Hearing back from our patients is one way we can continue  to improve our services. Please take a few minutes to complete the written survey that you may receive in the mail after your visit with us. Thank you!             Your Updated Medication List - Protect others around you: Learn how to safely use, store and throw away your medicines at www.disposemymeds.org.          This list is accurate as of: 5/6/17 10:30 AM.  Always use your most recent med list.                   Brand Name Dispense Instructions for use    albuterol (2.5 MG/3ML) 0.083% neb solution     25 vial    Take 1 vial (2.5 mg) by nebulization every 4 hours as needed for shortness of breath / dyspnea or wheezing       cefdinir 300 MG capsule    OMNICEF    20 capsule    Take 1 capsule (300 mg) by mouth 2 times daily       escitalopram 10 MG tablet    LEXAPRO    30 tablet    Take 1 tablet (10 mg) by mouth daily       ipratropium - albuterol 0.5 mg/2.5 mg/3 mL 0.5-2.5 (3) MG/3ML neb solution    DUONEB    1 vial    Take 1 vial (3 mLs) by nebulization once for 1 dose In clinic now       lisinopril-hydrochlorothiazide 20-12.5 MG per tablet    PRINZIDE/ZESTORETIC    30 tablet    Take 1 tablet by mouth daily       MULTIVITAMIN ADULT PO      Take 1 tablet by mouth daily       traZODone 50 MG tablet    DESYREL    90 tablet    Take 1-4 tablets ( mg) by mouth At Bedtime       TYLENOL PO      Take 1,000 mg by mouth every 8 hours as needed       VITAMIN C PO          zinc 50 MG Tabs      Take 100 mg by mouth daily

## 2017-05-10 DIAGNOSIS — R05.9 COUGH: Primary | ICD-10-CM

## 2017-05-10 RX ORDER — CODEINE PHOSPHATE AND GUAIFENESIN 10; 100 MG/5ML; MG/5ML
1 SOLUTION ORAL EVERY 4 HOURS PRN
Qty: 120 ML | Refills: 0 | Status: CANCELLED | OUTPATIENT
Start: 2017-05-10

## 2017-05-10 NOTE — TELEPHONE ENCOUNTER
Patient was seen at [facility] on Saturday and diagnosed with pneumonia.  Started on Cefdinir.  Is using a nebulizer every 4hrs with some relief.  Cough is occasionally productive.  States that overall he feels better, but the cough is increased today.  Has tried DayQuil and Robitussin, cough drops.  Has a follow-up appointment on Friday.  Requesting RX for cough prior to appointment.  Order pending.  Please call patient at [phone] (T) when this has been completed.  ELIAN Saavedra RN

## 2017-05-10 NOTE — TELEPHONE ENCOUNTER
Patient is calling back to ask about the cough medication.   He is advised of message below. He will continue with home   Remedies and over the counter medications. Appointment Friday.  Using nebulizer which seems to help.  Xochitl Richardson RN  Triage Nurse

## 2017-05-11 ENCOUNTER — OFFICE VISIT (OUTPATIENT)
Dept: FAMILY MEDICINE | Facility: CLINIC | Age: 57
End: 2017-05-11
Payer: COMMERCIAL

## 2017-05-11 VITALS
HEART RATE: 85 BPM | TEMPERATURE: 97.7 F | BODY MASS INDEX: 24.77 KG/M2 | DIASTOLIC BLOOD PRESSURE: 82 MMHG | OXYGEN SATURATION: 97 % | HEIGHT: 67 IN | WEIGHT: 157.8 LBS | SYSTOLIC BLOOD PRESSURE: 126 MMHG

## 2017-05-11 DIAGNOSIS — J18.9 CAP (COMMUNITY ACQUIRED PNEUMONIA): Primary | ICD-10-CM

## 2017-05-11 DIAGNOSIS — R09.89 RHONCHI: ICD-10-CM

## 2017-05-11 DIAGNOSIS — R05.9 COUGH: ICD-10-CM

## 2017-05-11 DIAGNOSIS — I10 HTN, GOAL BELOW 140/90: ICD-10-CM

## 2017-05-11 PROCEDURE — 99213 OFFICE O/P EST LOW 20 MIN: CPT | Performed by: PHYSICIAN ASSISTANT

## 2017-05-11 PROCEDURE — 80048 BASIC METABOLIC PNL TOTAL CA: CPT | Performed by: PHYSICIAN ASSISTANT

## 2017-05-11 PROCEDURE — 36415 COLL VENOUS BLD VENIPUNCTURE: CPT | Performed by: PHYSICIAN ASSISTANT

## 2017-05-11 RX ORDER — BENZONATATE 200 MG/1
200 CAPSULE ORAL 3 TIMES DAILY PRN
Qty: 21 CAPSULE | Refills: 0 | Status: SHIPPED | OUTPATIENT
Start: 2017-05-11 | End: 2017-06-06

## 2017-05-11 NOTE — MR AVS SNAPSHOT
After Visit Summary   5/11/2017    Suresh Powers    MRN: [id number]           Patient Information     Date Of Birth          1960        Visit Information        Provider Department      5/11/2017 1:00 PM Sharif Mcdonald PA-C [facility]        Today's Diagnoses     CAP (community acquired pneumonia)    -  1    Rhonchi        Cough        HTN, goal below 140/90           Follow-ups after your visit        Your next 10 appointments already scheduled     May 16, 2017  8:00 AM CDT   LAB with  LAB   [facility]    [address] [address] [phone]           Patient must bring picture ID.  Patient should be prepared to give a urine specimen  Please do not eat 10-12 hours before your appointment if you are coming in fasting for labs on lipids, cholesterol, or glucose (sugar).  Pregnant women should follow their Care Team instructions. Water with medications is okay. Do not drink coffee or other fluids.   If you have concerns about taking  your medications, please ask at office or if scheduling via [employer], send a message by clicking on Secure Messaging, Message Your Care Team.            Jun 06, 2017  9:00 AM CDT   SHORT with Sharif Mcdonald PA-C   [facility]    [address] [address] [phone]              Who to contact     If you have questions or need follow up information about today's clinic visit or your schedule please contact [facility] directly at [phone].  Normal or non-critical lab and imaging results will be communicated to you by MyChart, letter or phone within 4 business days after the clinic has received the results. If you do not hear from us within 7 days, please contact the clinic through MyChart or phone. If you have a critical or abnormal lab result, we will notify you by phone as soon as  "possible.  Submit refill requests through [employer] or call your pharmacy and they will forward the refill request to us. Please allow 3 business days for your refill to be completed.          Additional Information About Your Visit        [employer] Information     [employer] lets you send messages to your doctor, view your test results, renew your prescriptions, schedule appointments and more. To sign up, go to [email or web address] . Click on \"Log in\" on the left side of the screen, which will take you to the Welcome page. Then click on \"Sign up Now\" on the right side of the page.     You will be asked to enter the access code listed below, as well as some personal information. Please follow the directions to create your username and password.     Your access code is: [email or web address]  Expires: 2017  9:25 AM     Your access code will  in 90 days. If you need help or a new code, please call your [facility] clinic or [phone].        Care EveryWhere ID     This is your Care EveryWhere ID. This could be used by other organizations to access your [facility] medical records  [id number]        Your Vitals Were     Pulse Temperature Height Pulse Oximetry BMI (Body Mass Index)       85 97.7  F (36.5  C) (Oral) 5' 7\" (1.702 m) 97% 24.71 kg/m2        Blood Pressure from Last 3 Encounters:   17 126/82   17 112/70   17 127/82    Weight from Last 3 Encounters:   17 157 lb 12.8 oz (71.6 kg)   17 165 lb (74.8 kg)   17 166 lb (75.3 kg)              We Performed the Following     Basic metabolic panel          Today's Medication Changes          These changes are accurate as of: 17  1:26 PM.  If you have any questions, ask your nurse or doctor.               Start taking these medicines.        Dose/Directions    benzonatate 200 MG capsule   Commonly known as:  TESSALON   Used for:  Cough   Started by:  Sharif Mcdonald PA-C        Dose:  200 mg   Take 1 capsule (200 mg) by mouth " 3 times daily as needed for cough   Quantity:  21 capsule   Refills:  0            Where to get your medicines      These medications were sent to [id number] - [place], [place] - [address], [facility] [address]     Phone:  [phone]     benzonatate 200 MG capsule                Primary Care Provider Office Phone # Fax #    Sharif Mcdonald PA-C [phone]       [facility] [address]  [facility] [address]        Thank you!     Thank you for choosing [facility]  for your care. Our goal is always to provide you with excellent care. Hearing back from our patients is one way we can continue to improve our services. Please take a few minutes to complete the written survey that you may receive in the mail after your visit with us. Thank you!             Your Updated Medication List - Protect others around you: Learn how to safely use, store and throw away your medicines at www.disposemymeds.org.          This list is accurate as of: 5/11/17  1:26 PM.  Always use your most recent med list.                   Brand Name Dispense Instructions for use    albuterol (2.5 MG/3ML) 0.083% neb solution     25 vial    Take 1 vial (2.5 mg) by nebulization every 4 hours as needed for shortness of breath / dyspnea or wheezing       benzonatate 200 MG capsule    TESSALON    21 capsule    Take 1 capsule (200 mg) by mouth 3 times daily as needed for cough       cefdinir 300 MG capsule    OMNICEF    20 capsule    Take 1 capsule (300 mg) by mouth 2 times daily       escitalopram 10 MG tablet    LEXAPRO    30 tablet    Take 1 tablet (10 mg) by mouth daily       ipratropium - albuterol 0.5 mg/2.5 mg/3 mL 0.5-2.5 (3) MG/3ML neb solution    DUONEB    1 vial    Take 1 vial (3 mLs) by nebulization once for 1 dose In clinic now       lisinopril-hydrochlorothiazide 20-12.5 MG per tablet    PRINZIDE/ZESTORETIC    30 tablet    Take 1 tablet by mouth daily        MULTIVITAMIN ADULT PO      Take 1 tablet by mouth daily       traZODone 50 MG tablet    DESYREL    90 tablet    Take 1-4 tablets ( mg) by mouth At Bedtime       TYLENOL PO      Take 1,000 mg by mouth every 8 hours as needed       VITAMIN C PO          zinc 50 MG Tabs      Take 100 mg by mouth daily

## 2017-05-11 NOTE — NURSING NOTE
"Chief Complaint   Patient presents with     ER F/U       Initial /82  Pulse 85  Temp 97.7  F (36.5  C) (Oral)  Ht 5' 7\" (1.702 m)  Wt 157 lb 12.8 oz (71.6 kg)  SpO2 97%  BMI 24.71 kg/m2 Estimated body mass index is 24.71 kg/(m^2) as calculated from the following:    Height as of this encounter: 5' 7\" (1.702 m).    Weight as of this encounter: 157 lb 12.8 oz (71.6 kg).  Medication Reconciliation: complete   Rajeev Goddard CMA        "

## 2017-05-11 NOTE — PROGRESS NOTES
SUBJECTIVE:                                                    Suresh Powers is a 56 year old male who presents to clinic today for the following health issues:    ED/UC Followup:    Facility:  [facility]  Date of visit: 5/6/17  Reason for visit: Shortness of breathe  Current Status: Is feeling somewhat better, breathing better but any exertion  He gets lightheaded and is coughing more     -Patient presents for follow up to UC visit for pneumonia in the LLL  -He was started on cefdinir  -He is steadily improving, feeling better but still having some excessive cough  -denies fevers or chills, does feel run down a bit still   -too much exertion will cause him to feel a bit overwhelmed  -there is some mild shortness of breath  -coughing spasms will occur mostly with exertion  -using albuterol q 4-6 hours  -has not taken any other medications otc      Problem list and histories reviewed & adjusted, as indicated.  Additional history: as documented    Patient Active Problem List   Diagnosis     Exotropia     Benign essential hypertension     Tobacco use disorder     Sleep disorder     Unilateral inguinal hernia without obstruction or gangrene     Closed fracture of proximal end of left tibia     Fracture of left clavicle     Depression     Slow transit constipation     Urinary retention     Iron deficiency anemia     Clostridium difficile diarrhea     Anxiety attack     HTN, goal below 140/90     Single kidney     CARDIOVASCULAR SCREENING; LDL GOAL LESS THAN 160     Past Surgical History:   Procedure Laterality Date     foot crush injury       kidney donation       OPEN REDUCTION INTERNAL FIXATION CLAVICLE Left 2/4/2016    Procedure: OPEN REDUCTION INTERNAL FIXATION CLAVICLE;  Surgeon: Rakesh Hui MD;  Location:  OR     OPEN REDUCTION INTERNAL FIXATION TIBIAL PLATEAU Left 2/4/2016    Procedure: OPEN REDUCTION INTERNAL FIXATION TIBIAL PLATEAU;  Surgeon: Rakesh Hui MD;  Location:  OR       Social  "History   Substance Use Topics     Smoking status: Former Smoker     Packs/day: 1.00     Years: 0.00     Smokeless tobacco: Former User     Quit date: 2/2/2016     Alcohol use No     Family History   Problem Relation Age of Onset     Breast Cancer Mother      Type 2 Diabetes Mother      Skin Cancer Father      CEREBROVASCULAR DISEASE Father            Reviewed and updated as needed this visit by clinical staff  Tobacco  Allergies  Med Hx  Surg Hx  Fam Hx  Soc Hx      Reviewed and updated as needed this visit by Provider         ROS:  Constitutional, HEENT, cardiovascular, pulmonary, gi and gu systems are negative, except as otherwise noted.    OBJECTIVE:                                                    /82  Pulse 85  Temp 97.7  F (36.5  C) (Oral)  Ht 5' 7\" (1.702 m)  Wt 157 lb 12.8 oz (71.6 kg)  SpO2 97%  BMI 24.71 kg/m2  Body mass index is 24.71 kg/(m^2).  GENERAL: healthy, alert and no distress  EYES: Eyes grossly normal to inspection, PERRL and conjunctivae and sclerae normal  HENT: ear canals and TM's normal, nose and mouth without ulcers or lesions  RESP: expiratory wheeze in b/l lower lungs fields with excess rhonchi, mostly cleared with cough  CV: regular rates and rhythm  MS: no peripheral edema    Diagnostic Test Results:  none      ASSESSMENT/PLAN:                                                    1. CAP (community acquired pneumonia)  2. Rhonchi  3. Cough  Persistent symptoms since UC visit last Saturday, steadily improving. There are still rhonchi and wheeze to the lower bases today. Vitals look good. Satting well. I would like him to finish the current course of omnicef and will start rx below. Push fluids, can try mucolytic as well. Follow up with changes or if not improving but did review expected course of healing for a CAP. Patient understands and is agreeable to the care plan.  - benzonatate (TESSALON) 200 MG capsule; Take 1 capsule (200 mg) by mouth 3 times daily as needed for " cough  Dispense: 21 capsule; Refill: 0    Sharif Mcdonald PA-C  [facility]

## 2017-05-12 LAB
ANION GAP SERPL CALCULATED.3IONS-SCNC: 6 MMOL/L (ref 3–14)
BUN SERPL-MCNC: 28 MG/DL (ref 7–30)
CALCIUM SERPL-MCNC: 9.1 MG/DL (ref 8.5–10.1)
CHLORIDE SERPL-SCNC: 105 MMOL/L (ref 94–109)
CO2 SERPL-SCNC: 24 MMOL/L (ref 20–32)
CREAT SERPL-MCNC: 1.18 MG/DL (ref 0.66–1.25)
GFR SERPL CREATININE-BSD FRML MDRD: 64 ML/MIN/1.7M2
GLUCOSE SERPL-MCNC: 101 MG/DL (ref 70–99)
POTASSIUM SERPL-SCNC: 5.3 MMOL/L (ref 3.4–5.3)
SODIUM SERPL-SCNC: 135 MMOL/L (ref 133–144)

## 2017-05-24 DIAGNOSIS — J18.9 PNEUMONIA OF LEFT LOWER LOBE DUE TO INFECTIOUS ORGANISM: ICD-10-CM

## 2017-05-24 DIAGNOSIS — R06.02 SHORTNESS OF BREATH: ICD-10-CM

## 2017-05-24 NOTE — TELEPHONE ENCOUNTER
Albuteral 0.083% nebs  Last Written Prescription Date: 5/6/17  Last Fill Quantity: 25 vials, # refills: 1    Last Office Visit with G, [facility] or [facility] prescribing provider:  5/11/17   Future Office Visit:    Next 5 appointments (look out 90 days)     Jun 06, 2017  9:00 AM CDT   SHORT with Sharif Mcdonald PA-C   [facility] ([address] [address] [phone]                   Date of Last Asthma Action Plan Letter:   There are no preventive care reminders to display for this patient.   Asthma Control Test: No flowsheet data found.    Date of Last Spirometry Test:   No results found for this or any previous visit.      Liz Baca,   [facility]

## 2017-05-25 RX ORDER — ALBUTEROL SULFATE 0.83 MG/ML
1 SOLUTION RESPIRATORY (INHALATION) EVERY 4 HOURS PRN
Qty: 25 VIAL | Refills: 1 | Status: SHIPPED | OUTPATIENT
Start: 2017-05-25 | End: 2019-03-08

## 2017-05-26 ENCOUNTER — ALLIED HEALTH/NURSE VISIT (OUTPATIENT)
Dept: FAMILY MEDICINE | Facility: CLINIC | Age: 57
End: 2017-05-26
Payer: COMMERCIAL

## 2017-05-26 ENCOUNTER — TELEPHONE (OUTPATIENT)
Dept: FAMILY MEDICINE | Facility: CLINIC | Age: 57
End: 2017-05-26

## 2017-05-26 VITALS — SYSTOLIC BLOOD PRESSURE: 136 MMHG | DIASTOLIC BLOOD PRESSURE: 84 MMHG

## 2017-05-26 DIAGNOSIS — I10 BENIGN ESSENTIAL HYPERTENSION: Primary | ICD-10-CM

## 2017-05-26 PROCEDURE — 99207 ZZC NO CHARGE NURSE ONLY: CPT | Performed by: PHYSICIAN ASSISTANT

## 2017-05-26 NOTE — MR AVS SNAPSHOT
"              After Visit Summary   5/26/2017    Suresh Powers    MRN: [id number]           Patient Information     Date Of Birth          1960        Visit Information        Provider Department      5/26/2017 12:04 PM Sharif Mcdonald PA-C [facility]        Today's Diagnoses     Benign essential hypertension    -  1       Follow-ups after your visit        Your next 10 appointments already scheduled     Jun 06, 2017  9:00 AM CDT   SHORT with Sharif Mcdonald PA-C   [facility] ([facility]    [address] [facility] [address] [phone]              Who to contact     If you have questions or need follow up information about today's clinic visit or your schedule please contact [facility] directly at [phone].  Normal or non-critical lab and imaging results will be communicated to you by MyChart, letter or phone within 4 business days after the clinic has received the results. If you do not hear from us within 7 days, please contact the clinic through MyChart or phone. If you have a critical or abnormal lab result, we will notify you by phone as soon as possible.  Submit refill requests through [employer] or call your pharmacy and they will forward the refill request to us. Please allow 3 business days for your refill to be completed.          Additional Information About Your Visit        MyChart Information     [employer] lets you send messages to your doctor, view your test results, renew your prescriptions, schedule appointments and more. To sign up, go to [email or web address] . Click on \"Log in\" on the left side of the screen, which will take you to the Welcome page. Then click on \"Sign up Now\" on the right side of the page.     You will be asked to enter the access code listed below, as well as some personal information. Please follow the directions to create your username and password.     Your access code is: " [email or web address]  Expires: 2017  9:25 AM     Your access code will  in 90 days. If you need help or a new code, please call your [facility] or [phone].        Care EveryWhere ID     This is your Care EveryWhere ID. This could be used by other organizations to access your [place] medical records  [id number]         Blood Pressure from Last 3 Encounters:   17 136/84   17 126/82   17 112/70    Weight from Last 3 Encounters:   17 157 lb 12.8 oz (71.6 kg)   17 165 lb (74.8 kg)   17 166 lb (75.3 kg)              Today, you had the following     No orders found for display       Primary Care Provider Office Phone # Fax #    Sharif Mcdonald PA-C [phone]       [facility] [address]  [facility] [address]        Thank you!     Thank you for choosing [facility]  for your care. Our goal is always to provide you with excellent care. Hearing back from our patients is one way we can continue to improve our services. Please take a few minutes to complete the written survey that you may receive in the mail after your visit with us. Thank you!             Your Updated Medication List - Protect others around you: Learn how to safely use, store and throw away your medicines at www.disposemymeds.org.          This list is accurate as of: 17 12:05 PM.  Always use your most recent med list.                   Brand Name Dispense Instructions for use    albuterol (2.5 MG/3ML) 0.083% neb solution     25 vial    Take 1 vial (2.5 mg) by nebulization every 4 hours as needed for shortness of breath / dyspnea or wheezing       benzonatate 200 MG capsule    TESSALON    21 capsule    Take 1 capsule (200 mg) by mouth 3 times daily as needed for cough       cefdinir 300 MG capsule    OMNICEF    20 capsule    Take 1 capsule (300 mg) by mouth 2 times daily       escitalopram 10 MG tablet    LEXAPRO    30 tablet    Take 1 tablet (10  mg) by mouth daily       ipratropium - albuterol 0.5 mg/2.5 mg/3 mL 0.5-2.5 (3) MG/3ML neb solution    DUONEB    1 vial    Take 1 vial (3 mLs) by nebulization once for 1 dose In clinic now       lisinopril-hydrochlorothiazide 20-12.5 MG per tablet    PRINZIDE/ZESTORETIC    30 tablet    Take 1 tablet by mouth daily       MULTIVITAMIN ADULT PO      Take 1 tablet by mouth daily       traZODone 50 MG tablet    DESYREL    90 tablet    Take 1-4 tablets ( mg) by mouth At Bedtime       TYLENOL PO      Take 1,000 mg by mouth every 8 hours as needed       VITAMIN C PO          zinc 50 MG Tabs      Take 100 mg by mouth daily

## 2017-05-26 NOTE — LETTER
May 26, 2017      Suresh Powers  [address] [place] [address]        Dear Lidya Suresh Powers,    Our records show that you are currently due for colon cancer screening either with a colonoscopy or a stool test (FIT Test).   Please call our clinic at [phone] to have these orders placed and we can assist you in scheduling this appointment.    If you have already had this completed please contact our clinic so we can update your chart.     If you have further questions or concerns please feel free to contact us via [employer] or by calling our clinic at [phone].    Sincerely,     [facility]

## 2017-05-26 NOTE — TELEPHONE ENCOUNTER
Panel Management Review      Patient has the following on his problem list: None      Composite cancer screening  Chart review shows that this patient is due/due soon for the following Colonoscopy  Summary:    Patient is due/failing the following:   COLONOSCOPY    Action needed:   Patient needs office visit for colon.    Type of outreach:    Sent letter.    Questions for provider review:    None                                                                                                                                    Rajeev Goddard [facility]       Chart routed to Care Team .

## 2017-05-26 NOTE — PROGRESS NOTES
Suresh Powers is enrolled/participating in the retail pharmacy Blood Pressure Goals Achievement Program (BPGAP).  Suresh Powers was evaluated at [facility] on May 26, 2017 at which time his blood pressure was:    BP Readings from Last 3 Encounters:   05/26/17 136/84   05/11/17 126/82   05/06/17 112/70     Reviewed lifestyle modifications for blood pressure control and reduction: including making healthy food choices, managing weight, getting regular exercise, smoking cessation, reducing alcohol consumption, monitoring blood pressure regularly.     Suresh Powers is not experiencing symptoms.    Follow-Up: BP is at goal of < 140/90mmHg (patient 18+ years of age with or without diabetes).  Recommended follow-up at pharmacy in 6 months.     Recommendation to Provider:    Completed by: Batool Kenny, PharmD  [facility]

## 2017-06-06 ENCOUNTER — OFFICE VISIT (OUTPATIENT)
Dept: FAMILY MEDICINE | Facility: CLINIC | Age: 57
End: 2017-06-06
Payer: COMMERCIAL

## 2017-06-06 VITALS
HEART RATE: 66 BPM | TEMPERATURE: 97.4 F | HEIGHT: 67 IN | BODY MASS INDEX: 24.66 KG/M2 | WEIGHT: 157.1 LBS | DIASTOLIC BLOOD PRESSURE: 84 MMHG | SYSTOLIC BLOOD PRESSURE: 132 MMHG | OXYGEN SATURATION: 100 %

## 2017-06-06 DIAGNOSIS — I10 HTN, GOAL BELOW 140/90: ICD-10-CM

## 2017-06-06 DIAGNOSIS — Z12.11 SCREENING FOR COLON CANCER: Primary | ICD-10-CM

## 2017-06-06 DIAGNOSIS — Z23 NEED FOR VACCINATION: ICD-10-CM

## 2017-06-06 DIAGNOSIS — F41.0 ANXIETY ATTACK: ICD-10-CM

## 2017-06-06 DIAGNOSIS — F32.A DEPRESSION, UNSPECIFIED DEPRESSION TYPE: ICD-10-CM

## 2017-06-06 PROCEDURE — 90471 IMMUNIZATION ADMIN: CPT | Performed by: PHYSICIAN ASSISTANT

## 2017-06-06 PROCEDURE — 90715 TDAP VACCINE 7 YRS/> IM: CPT | Performed by: PHYSICIAN ASSISTANT

## 2017-06-06 PROCEDURE — 99214 OFFICE O/P EST MOD 30 MIN: CPT | Mod: 25 | Performed by: PHYSICIAN ASSISTANT

## 2017-06-06 RX ORDER — LISINOPRIL AND HYDROCHLOROTHIAZIDE 12.5; 2 MG/1; MG/1
1 TABLET ORAL DAILY
Qty: 90 TABLET | Refills: 1 | Status: SHIPPED | OUTPATIENT
Start: 2017-06-06 | End: 2017-09-05 | Stop reason: DRUGHIGH

## 2017-06-06 RX ORDER — ESCITALOPRAM OXALATE 20 MG/1
20 TABLET ORAL DAILY
Qty: 90 TABLET | Refills: 1 | Status: SHIPPED | OUTPATIENT
Start: 2017-06-06 | End: 2017-12-15

## 2017-06-06 NOTE — NURSING NOTE
Screening Questionnaire for Adult Immunization    Are you sick today?   No   Do you have allergies to medications, food, a vaccine component or latex?   No   Have you ever had a serious reaction after receiving a vaccination?   No   Do you have a long-term health problem with heart disease, lung disease, asthma, kidney disease, metabolic disease (e.g. diabetes), anemia, or other blood disorder?   No   Do you have cancer, leukemia, HIV/AIDS, or any other immune system problem?   No   In the past 3 months, have you taken medications that affect  your immune system, such as prednisone, other steroids, or anticancer drugs; drugs for the treatment of rheumatoid arthritis, Crohn s disease, or psoriasis; or have you had radiation treatments?   No   Have you had a seizure, or a brain or other nervous system problem?   No   During the past year, have you received a transfusion of blood or blood     products, or been given immune (gamma) globulin or antiviral drug?   No   For women: Are you pregnant or is there a chance you could become        pregnant during the next month?   No   Have you received any vaccinations in the past 4 weeks?   No     Immunization questionnaire answers were all negative.      MNVFC doesn't apply on this patient    Per orders of Primo Mcdonald, injection of Adacel given by Rajeev Goddard. Patient instructed to remain in clinic for 20 minutes afterwards, and to report any adverse reaction to me immediately.       Screening performed by Rajeev Goddard on 6/6/2017 at 9:37 AM.

## 2017-06-06 NOTE — PROGRESS NOTES
"  SUBJECTIVE:                                                    Suresh Powers is a 56 year old male who presents to clinic today for the following health issues:    Hypertension Follow-up      Outpatient blood pressures are being checked at pharmacy.  Results are 136/84.    Low Salt Diet: not monitoring salt       Amount of exercise or physical activity: 6-7 days/week for an average of 15-30 minutes    Problems taking medications regularly: No    Medication side effects: none    Diet: regular (no restrictions)    -Patient presents to follow up on HTN  -He has been taking medication regularly, tolerating without side effects  -denies any headaches or chest pain  -he is not monitoring at home  -continues to improve on diet; increased veggies, lower red meat  -not monitoring salt intake  -no true exercise      Medication Followup of escitalopram    Taking Medication as prescribed: yes    Side Effects:  None    Medication Helping Symptoms:  yes     -Patient also would like to review recent Rx onset with lexapro  -Taking this for anxiety, has had for years; as along as he can remember   -also notes some underlying \"ocd\"  -He started this back in early May   -Had been on zoloft previously, getting too many GI side effects   -has tried paxil, prozac also (had less SE on paxil)  -He is getting very rare side effects  -Notes he is feeling better, but curious on if he could go up    Problem list and histories reviewed & adjusted, as indicated.  Additional history: as documented    Patient Active Problem List   Diagnosis     Exotropia     Benign essential hypertension     Tobacco use disorder     Sleep disorder     Unilateral inguinal hernia without obstruction or gangrene     Closed fracture of proximal end of left tibia     Fracture of left clavicle     Depression     Slow transit constipation     Urinary retention     Iron deficiency anemia     Clostridium difficile diarrhea     Anxiety attack     HTN, goal below 140/90 " "    Single kidney     CARDIOVASCULAR SCREENING; LDL GOAL LESS THAN 160     Past Surgical History:   Procedure Laterality Date     foot crush injury       kidney donation       OPEN REDUCTION INTERNAL FIXATION CLAVICLE Left 2/4/2016    Procedure: OPEN REDUCTION INTERNAL FIXATION CLAVICLE;  Surgeon: Rakesh Hui MD;  Location:  OR     OPEN REDUCTION INTERNAL FIXATION TIBIAL PLATEAU Left 2/4/2016    Procedure: OPEN REDUCTION INTERNAL FIXATION TIBIAL PLATEAU;  Surgeon: Rakesh Hui MD;  Location:  OR       Social History   Substance Use Topics     Smoking status: Former Smoker     Packs/day: 1.00     Years: 0.00     Smokeless tobacco: Former User     Quit date: 2/2/2016     Alcohol use No     Family History   Problem Relation Age of Onset     Breast Cancer Mother      Type 2 Diabetes Mother      Skin Cancer Father      CEREBROVASCULAR DISEASE Father            Reviewed and updated as needed this visit by clinical staff  Tobacco  Allergies  Med Hx  Surg Hx  Fam Hx  Soc Hx      Reviewed and updated as needed this visit by Provider         ROS:  Constitutional, HEENT, cardiovascular, pulmonary, gi and gu systems are negative, except as otherwise noted.    OBJECTIVE:                                                    /84  Pulse 66  Temp 97.4  F (36.3  C) (Oral)  Ht 5' 7\" (1.702 m)  Wt 157 lb 1.6 oz (71.3 kg)  SpO2 100%  BMI 24.61 kg/m2  Body mass index is 24.61 kg/(m^2).  GENERAL: healthy, alert and no distress  RESP: lungs clear to auscultation - no rales, rhonchi or wheezes  CV: regular rates and rhythm, normal S1 S2, no S3 or S4 and no murmur, click or rub  PSYCH: mentation appears normal, affect normal/bright    Diagnostic Test Results:  FIT screen     ASSESSMENT/PLAN:                                                    1. Anxiety attack  2. Depression, unspecified depression type  Improved on 10mg, limited SE. Trial moving to 20mg. Follow up in 3-6 months.  - escitalopram (LEXAPRO) 20 MG " tablet; Take 1 tablet (20 mg) by mouth daily  Dispense: 90 tablet; Refill: 1    3. HTN, goal below 140/90  Controlled on 2nd and third checks. Refilling today. Follow up in 6 months.  - lisinopril-hydrochlorothiazide (PRINZIDE/ZESTORETIC) 20-12.5 MG per tablet; Take 1 tablet by mouth daily  Dispense: 90 tablet; Refill: 1    4. Screening for colon cancer  Overdue.   - Fecal colorectal cancer screen FIT; Future  5. Need for vaccination  TDap    Sharif Mcdonald PA-C  [facility]

## 2017-06-06 NOTE — MR AVS SNAPSHOT
"              After Visit Summary   6/6/2017    Suresh Powers    MRN: [id number]           Patient Information     Date Of Birth          1960        Visit Information        Provider Department      6/6/2017 9:00 AM Sharif Mcdonald PA-C [facility]        Today's Diagnoses     Screening for colon cancer    -  1    Anxiety attack        Depression, unspecified depression type        HTN, goal below 140/90           Follow-ups after your visit        Future tests that were ordered for you today     Open Future Orders        Priority Expected Expires Ordered    Fecal colorectal cancer screen FIT Routine 6/27/2017 8/29/2017 6/6/2017            Who to contact     If you have questions or need follow up information about today's clinic visit or your schedule please contact [facility] directly at [phone].  Normal or non-critical lab and imaging results will be communicated to you by MyChart, letter or phone within 4 business days after the clinic has received the results. If you do not hear from us within 7 days, please contact the clinic through MyChart or phone. If you have a critical or abnormal lab result, we will notify you by phone as soon as possible.  Submit refill requests through "[employer]" or call your pharmacy and they will forward the refill request to us. Please allow 3 business days for your refill to be completed.          Additional Information About Your Visit        MyChart Information     "[employer]" lets you send messages to your doctor, view your test results, renew your prescriptions, schedule appointments and more. To sign up, go to [email or web address]" . Click on \"Log in\" on the left side of the screen, which will take you to the Welcome page. Then click on \"Sign up Now\" on the right side of the page.     You will be asked to enter the access code listed below, as well as some personal information. Please follow the directions to create your username and " "password.     Your access code is: [email or web address]  Expires: 2017  9:25 AM     Your access code will  in 90 days. If you need help or a new code, please call your [facility] or [phone].        Care EveryWhere ID     This is your Care EveryWhere ID. This could be used by other organizations to access your [place] medical records  [id number]        Your Vitals Were     Pulse Temperature Height Pulse Oximetry BMI (Body Mass Index)       66 97.4  F (36.3  C) (Oral) 5' 7\" (1.702 m) 100% 24.61 kg/m2        Blood Pressure from Last 3 Encounters:   17 132/84   17 136/84   17 126/82    Weight from Last 3 Encounters:   17 157 lb 1.6 oz (71.3 kg)   17 157 lb 12.8 oz (71.6 kg)   17 165 lb (74.8 kg)                 Today's Medication Changes          These changes are accurate as of: 17  9:25 AM.  If you have any questions, ask your nurse or doctor.               These medicines have changed or have updated prescriptions.        Dose/Directions    escitalopram 20 MG tablet   Commonly known as:  LEXAPRO   This may have changed:    - medication strength  - how much to take   Used for:  Anxiety attack, Depression, unspecified depression type   Changed by:  Sharif Mcdonald PA-C        Dose:  20 mg   Take 1 tablet (20 mg) by mouth daily   Quantity:  90 tablet   Refills:  1         Stop taking these medicines if you haven't already. Please contact your care team if you have questions.     benzonatate 200 MG capsule   Commonly known as:  TESSALON   Stopped by:  Sharif Mcdonald PA-C           ipratropium - albuterol 0.5 mg/2.5 mg/3 mL 0.5-2.5 (3) MG/3ML neb solution   Commonly known as:  DUONEB   Stopped by:  Sharif Mcdonald PA-C                Where to get your medicines      These medications were sent to [id number] [address] [address]     Phone:  [phone]     escitalopram 20 MG tablet "    lisinopril-hydrochlorothiazide 20-12.5 MG per tablet                Primary Care Provider Office Phone # Fax #    Sharif Mcdonald PA-C [phone]       [facility] [address]  [facility] [address]        Thank you!     Thank you for choosing [facility]  for your care. Our goal is always to provide you with excellent care. Hearing back from our patients is one way we can continue to improve our services. Please take a few minutes to complete the written survey that you may receive in the mail after your visit with us. Thank you!             Your Updated Medication List - Protect others around you: Learn how to safely use, store and throw away your medicines at www.disposemymeds.org.          This list is accurate as of: 6/6/17  9:25 AM.  Always use your most recent med list.                   Brand Name Dispense Instructions for use    albuterol (2.5 MG/3ML) 0.083% neb solution     25 vial    Take 1 vial (2.5 mg) by nebulization every 4 hours as needed for shortness of breath / dyspnea or wheezing       escitalopram 20 MG tablet    LEXAPRO    90 tablet    Take 1 tablet (20 mg) by mouth daily       lisinopril-hydrochlorothiazide 20-12.5 MG per tablet    PRINZIDE/ZESTORETIC    90 tablet    Take 1 tablet by mouth daily       MULTIVITAMIN ADULT PO      Take 1 tablet by mouth daily       traZODone 50 MG tablet    DESYREL    90 tablet    Take 1-4 tablets ( mg) by mouth At Bedtime       TYLENOL PO      Take 1,000 mg by mouth every 8 hours as needed       VITAMIN C PO          zinc 50 MG Tabs      Take 100 mg by mouth daily

## 2017-06-06 NOTE — NURSING NOTE
"Chief Complaint   Patient presents with     Hypertension     Recheck Medication       Initial /88  Pulse 66  Temp 97.4  F (36.3  C) (Oral)  Ht 5' 7\" (1.702 m)  Wt 157 lb 1.6 oz (71.3 kg)  SpO2 100%  BMI 24.61 kg/m2 Estimated body mass index is 24.61 kg/(m^2) as calculated from the following:    Height as of this encounter: 5' 7\" (1.702 m).    Weight as of this encounter: 157 lb 1.6 oz (71.3 kg).  Medication Reconciliation: complete   Rajeev Goddard CMA        "

## 2017-06-08 ASSESSMENT — PATIENT HEALTH QUESTIONNAIRE - PHQ9: 5. POOR APPETITE OR OVEREATING: NEARLY EVERY DAY

## 2017-06-08 ASSESSMENT — ANXIETY QUESTIONNAIRES
5. BEING SO RESTLESS THAT IT IS HARD TO SIT STILL: NOT AT ALL
GAD7 TOTAL SCORE: 15
7. FEELING AFRAID AS IF SOMETHING AWFUL MIGHT HAPPEN: MORE THAN HALF THE DAYS
2. NOT BEING ABLE TO STOP OR CONTROL WORRYING: NEARLY EVERY DAY
6. BECOMING EASILY ANNOYED OR IRRITABLE: SEVERAL DAYS
1. FEELING NERVOUS, ANXIOUS, OR ON EDGE: NEARLY EVERY DAY
3. WORRYING TOO MUCH ABOUT DIFFERENT THINGS: NEARLY EVERY DAY

## 2017-06-09 ASSESSMENT — ANXIETY QUESTIONNAIRES: GAD7 TOTAL SCORE: 15

## 2017-06-09 ASSESSMENT — PATIENT HEALTH QUESTIONNAIRE - PHQ9: SUM OF ALL RESPONSES TO PHQ QUESTIONS 1-9: 5

## 2017-07-13 ENCOUNTER — OFFICE VISIT (OUTPATIENT)
Dept: FAMILY MEDICINE | Facility: CLINIC | Age: 57
End: 2017-07-13
Payer: COMMERCIAL

## 2017-07-13 VITALS
SYSTOLIC BLOOD PRESSURE: 120 MMHG | DIASTOLIC BLOOD PRESSURE: 78 MMHG | HEART RATE: 78 BPM | TEMPERATURE: 97.9 F | BODY MASS INDEX: 23.92 KG/M2 | HEIGHT: 67 IN | RESPIRATION RATE: 18 BRPM | WEIGHT: 152.4 LBS | OXYGEN SATURATION: 98 %

## 2017-07-13 DIAGNOSIS — J06.9 UPPER RESPIRATORY TRACT INFECTION, UNSPECIFIED TYPE: Primary | ICD-10-CM

## 2017-07-13 PROCEDURE — 94640 AIRWAY INHALATION TREATMENT: CPT | Performed by: NURSE PRACTITIONER

## 2017-07-13 PROCEDURE — 99213 OFFICE O/P EST LOW 20 MIN: CPT | Mod: 25 | Performed by: NURSE PRACTITIONER

## 2017-07-13 RX ORDER — FLUTICASONE PROPIONATE 50 MCG
2 SPRAY, SUSPENSION (ML) NASAL DAILY
Qty: 16 G | Refills: 3 | Status: SHIPPED | OUTPATIENT
Start: 2017-07-13 | End: 2017-09-05

## 2017-07-13 NOTE — PROGRESS NOTES
SUBJECTIVE:                                                    Suresh Powers is a 56 year old male who presents to clinic today for the following health issues:      RESPIRATORY SYMPTOMS      Duration: x3 days    Description  nasal congestion, rhinorrhea, cough, wheezing, fatigue/malaise, hoarse voice and has been having a hard time catching his breath.    Severity: moderate    Accompanying signs and symptoms: None    History (predisposing factors):  Has had pneumonia in the past.(5/6/17)    Precipitating or alleviating factors: None    Therapies tried and outcome:  rest and fluids nasal spray/wash - little relief.    + congestion.  Chest congestion.  No fever.  No GI symptoms.  Normal intake.  +wheezing.    No smoking.  No hx of allergies.  No known exposure.    Problem list and histories reviewed & adjusted, as indicated.  Additional history: as documented    Patient Active Problem List   Diagnosis     Exotropia     Benign essential hypertension     Tobacco use disorder     Sleep disorder     Unilateral inguinal hernia without obstruction or gangrene     Closed fracture of proximal end of left tibia     Fracture of left clavicle     Depression     Slow transit constipation     Urinary retention     Iron deficiency anemia     Clostridium difficile diarrhea     Anxiety attack     HTN, goal below 140/90     Single kidney     CARDIOVASCULAR SCREENING; LDL GOAL LESS THAN 160     Past Surgical History:   Procedure Laterality Date     foot crush injury       kidney donation       OPEN REDUCTION INTERNAL FIXATION CLAVICLE Left 2/4/2016    Procedure: OPEN REDUCTION INTERNAL FIXATION CLAVICLE;  Surgeon: Rakesh Hui MD;  Location:  OR     OPEN REDUCTION INTERNAL FIXATION TIBIAL PLATEAU Left 2/4/2016    Procedure: OPEN REDUCTION INTERNAL FIXATION TIBIAL PLATEAU;  Surgeon: Rakesh Hui MD;  Location:  OR       Social History   Substance Use Topics     Smoking status: Former Smoker     Packs/day: 1.00      "Years: 0.00     Smokeless tobacco: Former User     Quit date: 2/2/2016     Alcohol use No     Family History   Problem Relation Age of Onset     Breast Cancer Mother      Type 2 Diabetes Mother      Skin Cancer Father      CEREBROVASCULAR DISEASE Father            Reviewed and updated as needed this visit by clinical staff       Reviewed and updated as needed this visit by Provider         ROS:  SEE HPI.    OBJECTIVE:     /78 (BP Location: Right arm, Patient Position: Chair, Cuff Size: Adult Regular)  Pulse 78  Temp 97.9  F (36.6  C) (Oral)  Resp 18  Ht 5' 7\" (1.702 m)  Wt 152 lb 6.4 oz (69.1 kg)  SpO2 98%  BMI 23.87 kg/m2  Body mass index is 23.87 kg/(m^2).  GENERAL: healthy, alert and no distress  EYES: Eyes grossly normal to inspection  HENT: ear canals and TM's normal, nose and mouth without ulcers or lesions  NECK: no adenopathy, no asymmetry, masses, or scars and thyroid normal to palpation  RESP: Mildly coarse lung sounds R side lungs, upper lobe mild wheeze bilaterally.  Lung sounds clear following duoneb in clinic.  CV: regular rate and rhythm, normal S1 S2, no S3 or S4, no murmur, click or rub, no peripheral edema and peripheral pulses strong  PSYCH: mentation appears normal, affect normal/bright    Diagnostic Test Results:  none     ASSESSMENT/PLAN:   1. Upper respiratory tract infection, unspecified type  56 y.o. Male, recent onset URI symptoms.  Very congested.  VSS.  Discussed symptomatic care, use albuterol nebs at home.  Mucinex, fluids, humidification.  Return to clinic if symptoms persist or worsen.    Pt agrees with plan and verbalized understanding.  - fluticasone (FLONASE) 50 MCG/ACT spray; Spray 2 sprays into both nostrils daily  Dispense: 16 g; Refill: 3  - ALBUTEROL/IPRATROPIUM 3ML NEB - .001  - INHALATION/NEBULIZER TREATMENT, INITIAL    LUIS Connolly Ra, CNP  [facility] [place]  "

## 2017-07-13 NOTE — PATIENT INSTRUCTIONS
Use your albuterol nebs 3 times daily.    Start mucinex as well, avoid mucinex-D.    Start the nasal steroid to help with your congestion.    Steamy showers, rest, fluids.

## 2017-07-13 NOTE — NURSING NOTE
The following nebulizer treatment was given:     MEDICATION: Albuterol Sulfate 2.5mg/mL/Ipratropium Bromide 0.02%   : Green Clean  LOT #: [id number]  EXPIRATION DATE:  10/2017  NDC # 7365-0030-71  Tayla Trujillo MA

## 2017-07-13 NOTE — NURSING NOTE
"Chief Complaint   Patient presents with     URI       Initial /78 (BP Location: Right arm, Patient Position: Chair, Cuff Size: Adult Regular)  Pulse 78  Temp 97.9  F (36.6  C) (Oral)  Resp 18  Ht 5' 7\" (1.702 m)  Wt 152 lb 6.4 oz (69.1 kg)  SpO2 98%  BMI 23.87 kg/m2 Estimated body mass index is 23.87 kg/(m^2) as calculated from the following:    Height as of this encounter: 5' 7\" (1.702 m).    Weight as of this encounter: 152 lb 6.4 oz (69.1 kg).  Medication Reconciliation: complete   Fabien Morales Student MA  "

## 2017-07-13 NOTE — MR AVS SNAPSHOT
"              After Visit Summary   7/13/2017    Suresh Powers    MRN: [id number]           Patient Information     Date Of Birth          1960        Visit Information        Provider Department      7/13/2017 9:20 AM Carmen Martinez Ra, APRN CNP [facility]        Today's Diagnoses     Upper respiratory tract infection, unspecified type    -  1      Care Instructions    Use your albuterol nebs 3 times daily.    Start mucinex as well, avoid mucinex-D.    Start the nasal steroid to help with your congestion.    Steamy showers, rest, fluids.              Follow-ups after your visit        Who to contact     If you have questions or need follow up information about today's clinic visit or your schedule please contact [facility] directly at [phone].  Normal or non-critical lab and imaging results will be communicated to you by [email or web address], letter or phone within 4 business days after the clinic has received the results. If you do not hear from us within 7 days, please contact the clinic through [email or web address] or phone. If you have a critical or abnormal lab result, we will notify you by phone as soon as possible.  Submit refill requests through rVue or call your pharmacy and they will forward the refill request to us. Please allow 3 business days for your refill to be completed.          Additional Information About Your Visit        [email or web address] Information     rVue lets you send messages to your doctor, view your test results, renew your prescriptions, schedule appointments and more. To sign up, go to [email or web address] . Click on \"Log in\" on the left side of the screen, which will take you to the Welcome page. Then click on \"Sign up Now\" on the right side of the page.     You will be asked to enter the access code listed below, as well as some personal information. Please follow the directions to create your username and password.     Your access code is: [email or web address]  Expires: " "10/11/2017 10:25 AM     Your access code will  in 90 days. If you need help or a new code, please call your [place] clinic or [phone].        Care EveryWhere ID     This is your Care EveryWhere ID. This could be used by other organizations to access your [place] medical records  [id number]        Your Vitals Were     Pulse Temperature Respirations Height Pulse Oximetry BMI (Body Mass Index)    78 97.9  F (36.6  C) (Oral) 18 5' 7\" (1.702 m) 98% 23.87 kg/m2       Blood Pressure from Last 3 Encounters:   17 120/78   17 132/84   17 136/84    Weight from Last 3 Encounters:   17 152 lb 6.4 oz (69.1 kg)   17 157 lb 1.6 oz (71.3 kg)   17 157 lb 12.8 oz (71.6 kg)              Today, you had the following     No orders found for display         Today's Medication Changes          These changes are accurate as of: 17 10:25 AM.  If you have any questions, ask your nurse or doctor.               Start taking these medicines.        Dose/Directions    fluticasone 50 MCG/ACT spray   Commonly known as:  FLONASE   Used for:  Upper respiratory tract infection, unspecified type   Started by:  Carmen Martinez Ra, LUIS CNP        Dose:  2 spray   Spray 2 sprays into both nostrils daily   Quantity:  16 g   Refills:  3            Where to get your medicines      These medications were sent to [id number] [address] [address]     Phone:  [phone]     fluticasone 50 MCG/ACT spray                Primary Care Provider Office Phone # Fax #    Sharif Mcdonald PA-C [phone]       [facility] [address]  [facility] [address]        Equal Access to Services     [facility] SORIN AH: Hadii vinita tobias hadasho Soomaali, waaxda luqadaha, qaybta kaalmada adeegyada, tramaine hazel. [facility] [phone].    ATENCIÓN: Si jojola scott, tiene a regalado disposición servicios " pamella de asistencia lingüística. Sera arias [phone].    We comply with applicable federal civil rights laws and [place] laws. We do not discriminate on the basis of race, color, national origin, age, disability sex, sexual orientation or gender identity.            Thank you!     Thank you for choosing [facility] [place]  for your care. Our goal is always to provide you with excellent care. Hearing back from our patients is one way we can continue to improve our services. Please take a few minutes to complete the written survey that you may receive in the mail after your visit with us. Thank you!             Your Updated Medication List - Protect others around you: Learn how to safely use, store and throw away your medicines at www.disposemymeds.org.          This list is accurate as of: 7/13/17 10:25 AM.  Always use your most recent med list.                   Brand Name Dispense Instructions for use Diagnosis    albuterol (2.5 MG/3ML) 0.083% neb solution     25 vial    Take 1 vial (2.5 mg) by nebulization every 4 hours as needed for shortness of breath / dyspnea or wheezing    Pneumonia of left lower lobe due to infectious organism (H), Shortness of breath       escitalopram 20 MG tablet    LEXAPRO    90 tablet    Take 1 tablet (20 mg) by mouth daily    Anxiety attack, Depression, unspecified depression type       fluticasone 50 MCG/ACT spray    FLONASE    16 g    Spray 2 sprays into both nostrils daily    Upper respiratory tract infection, unspecified type       lisinopril-hydrochlorothiazide 20-12.5 MG per tablet    PRINZIDE/ZESTORETIC    90 tablet    Take 1 tablet by mouth daily    HTN, goal below 140/90       MULTIVITAMIN ADULT PO      Take 1 tablet by mouth daily        traZODone 50 MG tablet    DESYREL    90 tablet    Take 1-4 tablets ( mg) by mouth At Bedtime    Encounter for routine adult health examination with abnormal findings       TYLENOL PO      Take 1,000 mg by mouth every 8  hours as needed        VITAMIN C PO           zinc 50 MG Tabs      Take 100 mg by mouth daily

## 2017-07-25 ENCOUNTER — DOCUMENTATION ONLY (OUTPATIENT)
Dept: FAMILY MEDICINE | Facility: CLINIC | Age: 57
End: 2017-07-25

## 2017-07-25 NOTE — PROGRESS NOTES
Panel Management Review      Patient has the following on his problem list: None      Composite cancer screening  Chart review shows that this patient is due/due soon for the following Colonoscopy  Summary:    Patient is due/failing the following:   COLONOSCOPY    Action needed:   Patient needs office visit for colon.    Type of outreach:    Phone, left message for patient to call back.     Questions for provider review:    None                                                                                                                                    Rajeev Goddard [facility]       Chart routed to Care Team .

## 2017-08-20 PROCEDURE — 82274 ASSAY TEST FOR BLOOD FECAL: CPT | Performed by: PHYSICIAN ASSISTANT

## 2017-08-23 DIAGNOSIS — Z12.11 SCREENING FOR COLON CANCER: ICD-10-CM

## 2017-08-23 LAB — HEMOCCULT STL QL IA: NEGATIVE

## 2017-09-05 ENCOUNTER — OFFICE VISIT (OUTPATIENT)
Dept: PHARMACY | Facility: CLINIC | Age: 57
End: 2017-09-05
Payer: COMMERCIAL

## 2017-09-05 VITALS
BODY MASS INDEX: 24.67 KG/M2 | SYSTOLIC BLOOD PRESSURE: 148 MMHG | DIASTOLIC BLOOD PRESSURE: 84 MMHG | HEART RATE: 56 BPM | WEIGHT: 157.5 LBS

## 2017-09-05 DIAGNOSIS — I10 BENIGN ESSENTIAL HYPERTENSION: ICD-10-CM

## 2017-09-05 DIAGNOSIS — F41.0 ANXIETY ATTACK: ICD-10-CM

## 2017-09-05 DIAGNOSIS — M79.672 PAIN IN BOTH FEET: ICD-10-CM

## 2017-09-05 DIAGNOSIS — G47.9 SLEEP DISORDER: Primary | ICD-10-CM

## 2017-09-05 DIAGNOSIS — M79.671 PAIN IN BOTH FEET: ICD-10-CM

## 2017-09-05 DIAGNOSIS — J06.9 UPPER RESPIRATORY TRACT INFECTION, UNSPECIFIED TYPE: ICD-10-CM

## 2017-09-05 DIAGNOSIS — F32.A DEPRESSION, UNSPECIFIED DEPRESSION TYPE: ICD-10-CM

## 2017-09-05 PROCEDURE — 99607 MTMS BY PHARM ADDL 15 MIN: CPT | Performed by: PHARMACIST

## 2017-09-05 PROCEDURE — 99606 MTMS BY PHARM EST 15 MIN: CPT | Performed by: PHARMACIST

## 2017-09-05 RX ORDER — TRAZODONE HYDROCHLORIDE 50 MG/1
50-200 TABLET, FILM COATED ORAL AT BEDTIME
Qty: 90 TABLET | Refills: 5 | Status: SHIPPED | OUTPATIENT
Start: 2017-09-05 | End: 2018-05-10

## 2017-09-05 RX ORDER — LISINOPRIL AND HYDROCHLOROTHIAZIDE 20; 25 MG/1; MG/1
1 TABLET ORAL DAILY
Qty: 90 TABLET | Refills: 1 | Status: SHIPPED | OUTPATIENT
Start: 2017-09-05 | End: 2018-01-02 | Stop reason: DRUGHIGH

## 2017-09-05 RX ORDER — FLUTICASONE PROPIONATE 50 MCG
2 SPRAY, SUSPENSION (ML) NASAL DAILY
Qty: 16 G | Refills: 11 | Status: SHIPPED | OUTPATIENT
Start: 2017-09-05 | End: 2018-08-16

## 2017-09-05 ASSESSMENT — ANXIETY QUESTIONNAIRES
GAD7 TOTAL SCORE: 10
7. FEELING AFRAID AS IF SOMETHING AWFUL MIGHT HAPPEN: SEVERAL DAYS
5. BEING SO RESTLESS THAT IT IS HARD TO SIT STILL: NOT AT ALL
2. NOT BEING ABLE TO STOP OR CONTROL WORRYING: NEARLY EVERY DAY
IF YOU CHECKED OFF ANY PROBLEMS ON THIS QUESTIONNAIRE, HOW DIFFICULT HAVE THESE PROBLEMS MADE IT FOR YOU TO DO YOUR WORK, TAKE CARE OF THINGS AT HOME, OR GET ALONG WITH OTHER PEOPLE: SOMEWHAT DIFFICULT
3. WORRYING TOO MUCH ABOUT DIFFERENT THINGS: NEARLY EVERY DAY
6. BECOMING EASILY ANNOYED OR IRRITABLE: NOT AT ALL
1. FEELING NERVOUS, ANXIOUS, OR ON EDGE: MORE THAN HALF THE DAYS

## 2017-09-05 ASSESSMENT — PATIENT HEALTH QUESTIONNAIRE - PHQ9
5. POOR APPETITE OR OVEREATING: SEVERAL DAYS
SUM OF ALL RESPONSES TO PHQ QUESTIONS 1-9: 5

## 2017-09-05 NOTE — PROGRESS NOTES
SUBJECTIVE/OBJECTIVE:                Suresh Powers is a 57 year old male coming in for a follow-up visit for Medication Therapy Management.  He was referred to me from PATRICIA Wooten.       Chief Complaint: Follow up from our visit on 5/2/2017.  Trazodone refill  Personal Healthcare Goals: He would like better control of anxiety  Tobacco: using e-cigarette once per day without nicotine in it; he feels better with not smoking  Alcohol: not currently using    Medication Adherence: no issues reported    Hypertension: Current medications include lisinopril/HCTZ 20mg-12.5mg once daily.  Patient does not self-monitor BP.  Patient reports no current medication side effects.      Chronic pain: Current medication is acetaminophen 1000 mg every morning.  It really helps him in the morning for foot pain. No side effects.  He has history of being disabled from work accident.    Insomnia/Anxiety/Depression: Current medications include: escitalporam 20mg QD, trazodone 50-100mg HS, once in awhile takes up to 4 tablets or 200mg at night. He feels the trazodone is helpful to fall asleep and anxiety. Side effects: no side effects.  He feels the medication is helpful for symptoms but would like to add something else.  He has tried counseling in the past but he felt it was also with someone right out of school and not a good fit for him.  Failed medications: He was taking sertraline in the past but he had diarrhea and it was stopped. Failed Prozac and stopped Paxil not sure why stopped but worked. He was seeing psychiatry but lost to follow-up.     No side effects reported  PHQ-9 SCORE 5/2/2017 6/8/2017 9/5/2017   Total Score 4 5 5     SPIKE-7 SCORE 5/2/2017 6/8/2017 9/5/2017   Total Score 13 15 10     Allergic rhinitis: Current medications include fluticasone nasal spray 2 spray(s) once daily. Primary symptoms are nasal congestion, post-nasal drip and runny nose. Pt feels that current therapy is effective.     He generally does get the  influenza vaccine annually.    Current labs include:  BP Readings from Last 3 Encounters:   07/13/17 120/78   06/06/17 132/84   05/26/17 136/84     Today's Vitals: /84  Pulse 56  Wt 157 lb 8 oz (71.4 kg)  BMI 24.67 kg/m2    Lab Results   Component Value Date    CHOL 175 03/21/2017     Lab Results   Component Value Date    TRIG 62 03/21/2017     Lab Results   Component Value Date    HDL 68 03/21/2017     Lab Results   Component Value Date    LDL 95 03/21/2017       Liver Function Studies -   Recent Labs   Lab Test  12/19/15   0907   PROTTOTAL  7.6   ALBUMIN  3.9   BILITOTAL  0.5   ALKPHOS  117   AST  18   ALT  32       Last Basic Metabolic Panel:  Lab Results   Component Value Date     05/11/2017      Lab Results   Component Value Date    POTASSIUM 5.3 05/11/2017     Lab Results   Component Value Date    CHLORIDE 105 05/11/2017     Lab Results   Component Value Date    BUN 28 05/11/2017     Lab Results   Component Value Date    CR 1.18 05/11/2017     GFR Estimate   Date Value Ref Range Status   05/11/2017 64 >60 mL/min/1.7m2 Final     Comment:     Non  GFR Calc   03/21/2017 90 >60 mL/min/1.7m2 Final     Comment:     Non  GFR Calc   02/04/2016 >90  Non  GFR Calc   >60 mL/min/1.7m2 Final     GFR Estimate If Black   Date Value Ref Range Status   05/11/2017 77 >60 mL/min/1.7m2 Final     Comment:      GFR Calc   03/21/2017 >90   GFR Calc   >60 mL/min/1.7m2 Final   02/04/2016 >90   GFR Calc   >60 mL/min/1.7m2 Final       Most Recent Immunizations   Administered Date(s) Administered     TDAP Vaccine (Adacel) 06/06/2017       ASSESSMENT:              Current medications were reviewed today as discussed above.      Medication Adherence: no issues identified    Hypertension: Needs Improvement. Patient is not meeting BP goal of < 140/90mmHg.  Pt would benefit from the following changes - increasing the dose of  HCTZ    Chronic pain: stable    Insomnia/Anxiety/Depression: Needs improvement.  Pt continues to have anxiety symptoms although improved.  Discussed potential options of adding buspirone versus counseling.  He may do well with Lamonte Santos, suggested a trial of this first    Allergic rhinitis: Needs improvement.  Pt would benefit from decreasing medication -  fluticasone nasal spray 1 spray(s) once daily.    Vaccinations:  Needs improvement.  Pt would benefit from getting influenza vaccination this fall    PLAN:                  Refilled trazodone and fluticasone nasal spray, pt to try 1 puff each nostril.    Vaccines:  Pt to get influenza vaccine/flu shot this fall.    High Blood Pressure:  Increase hydrochlorothiazide in lisinopril/HCTZ    Anxiety/Depression:  Mental Health Referral given, pt to see Lamonte Santos  Future consideration:  Buspar/buspirone we can possibly add to escitalopram    Next MTM/pharmacist visit: 2 months, See Primo Mcdonald or Carmen Evans 1 month    I spent 40 minutes with this patient today  . All changes were made via collaborative practice agreement with Carmen Martinez Ra. A copy of the visit note was provided to the patient's primary care provider.     The patient was given a summary of these recommendations as an after visit summary.    Justine Cantu, PharmD [facility]  Medication Therapy Management Practitioner   #[phone]

## 2017-09-05 NOTE — PATIENT INSTRUCTIONS
Recommendations from today's MTM visit:                                                      Refilled trazodone and fluticasone nasal spray (can try 1 puff each nostril) for you today.    Get your influenza vaccine/flu shot this fall.    Anxiety/Depression:  See if you can see Jimbo Keyes at [facility]    Buspar/buspirone we can possibly add to escitalopram    Next MTM/pharmacist visit: 2 months, See Primo Mcdonald 1 months.    To schedule another MTM appointment, please call the clinic directly or you may call the MTM scheduling line at [phone] or toll-free at [phone].     My Clinical Pharmacist's contact information:                                                      It was a pleasure seeing you today!  Please feel free to contact me with any questions or concerns you have.      Justine Cantu, PharmD [facility]  Medication Therapy Management Practitioner   #[phone]    You may receive a survey about the MTM services you received.  I would appreciate your feedback to help me serve you better in the future. Please fill it out and return it when you can. Your comments will be anonymous.      My healthcare goals:

## 2017-09-05 NOTE — MR AVS SNAPSHOT
After Visit Summary   9/5/2017    Suresh Powers    MRN: [id number]           Patient Information     Date Of Birth          1960        Visit Information        Provider Department      9/5/2017 9:30 AM Justine Cantu, [facility] [place]        Today's Diagnoses     Sleep disorder    -  1    Depression, unspecified depression type        Anxiety attack        Upper respiratory tract infection, unspecified type        Benign essential hypertension          Care Instructions    Recommendations from today's MTM visit:                                                      Refilled trazodone and fluticasone nasal spray (can try 1 puff each nostril) for you today.    Get your influenza vaccine/flu shot this fall.    Anxiety/Depression:  See if you can see Jimbo Keyes at [facility]    Buspar/buspirone we can possibly add to escitalopram    Next MTM/pharmacist visit: 2 months, See Primo Mcdonald 1 months.    To schedule another MTM appointment, please call the clinic directly or you may call the MTM scheduling line at [phone] or toll-free at [phone].     My Clinical Pharmacist's contact information:                                                      It was a pleasure seeing you today!  Please feel free to contact me with any questions or concerns you have.      Justine Cantu, PharmD [facility]  Medication Therapy Management Practitioner   #[phone]    You may receive a survey about the MT services you received.  I would appreciate your feedback to help me serve you better in the future. Please fill it out and return it when you can. Your comments will be anonymous.      My healthcare goals:                                                                      Follow-ups after your visit        Additional Services     MENTAL HEALTH REFERRAL       Your provider has referred you to: [facility]: [facility]ing (Individual/Couples/Family) - [facility]  "[facility] [phone]   *Patient will be contacted by [facility]'s scheduling partner, [facility]), to schedule an appointment.  Patients may also call P to schedule.    All scheduling is subject to the client's specific insurance plan & benefits, provider/location availability, and provider clinical specialities.  Please arrive 15 minutes early for your first appointment and bring your completed paperwork.    Please be aware that coverage of these services is subject to the terms and limitations of your health insurance plan.  Call member services at your health plan with any benefit or coverage questions.                  Who to contact     If you have questions or need follow up information about today's clinic visit or your schedule please contact [facility] directly at [phone].  Normal or non-critical lab and imaging results will be communicated to you by MyChart, letter or phone within 4 business days after the clinic has received the results. If you do not hear from us within 7 days, please contact the clinic through MyChart or phone. If you have a critical or abnormal lab result, we will notify you by phone as soon as possible.  Submit refill requests through PhoneFusion or call your pharmacy and they will forward the refill request to us. Please allow 3 business days for your refill to be completed.          Additional Information About Your Visit        [facility] Information     PhoneFusion lets you send messages to your doctor, view your test results, renew your prescriptions, schedule appointments and more. To sign up, go to [email or web address] . Click on \"Log in\" on the left side of the screen, which will take you to the Welcome page. Then click on \"Sign up Now\" on the right side of the page.     You will be asked to enter the access code listed below, as well as some personal information. Please follow the directions to create your username and " password.     Your access code is: [email or web address]  Expires: 10/11/2017 10:25 AM     Your access code will  in 90 days. If you need help or a new code, please call your [facility] or [phone].        Care EveryWhere ID     This is your Care EveryWhere ID. This could be used by other organizations to access your [place] medical records  [id number]        Your Vitals Were     Pulse BMI (Body Mass Index)                56 24.67 kg/m2           Blood Pressure from Last 3 Encounters:   17 148/84   17 120/78   17 132/84    Weight from Last 3 Encounters:   17 157 lb 8 oz (71.4 kg)   17 152 lb 6.4 oz (69.1 kg)   17 157 lb 1.6 oz (71.3 kg)              We Performed the Following     MENTAL HEALTH REFERRAL          Today's Medication Changes          These changes are accurate as of: 17 10:11 AM.  If you have any questions, ask your nurse or doctor.               Start taking these medicines.        Dose/Directions    lisinopril-hydrochlorothiazide 20-25 MG per tablet   Commonly known as:  PRINZIDE/ZESTORETIC   Used for:  Benign essential hypertension   Replaces:  lisinopril-hydrochlorothiazide 20-12.5 MG per tablet        Dose:  1 tablet   Take 1 tablet by mouth daily   Quantity:  90 tablet   Refills:  1         Stop taking these medicines if you haven't already. Please contact your care team if you have questions.     lisinopril-hydrochlorothiazide 20-12.5 MG per tablet   Commonly known as:  PRINZIDE/ZESTORETIC   Replaced by:  lisinopril-hydrochlorothiazide 20-25 MG per tablet                Where to get your medicines      These medications were sent to [id number] [address] [address]     Phone:  [phone]     fluticasone 50 MCG/ACT spray    lisinopril-hydrochlorothiazide 20-25 MG per tablet    traZODone 50 MG tablet                Primary Care Provider Office Phone # Fax #    Sharif Mcdonald,  YAMILETH [phone]       [address]  [facility] [address]        Equal Access to Services     SANJANA ROWELL : Hadii vinita ku elaine Sorjali, waaxda luqadaha, qaybta kaalmada adethomas, tramaine hazel. So [facility] [phone].    ATENCIÓN: Si habla español, tiene a regalado disposición servicios gratuitos de asistencia lingüística. Llame al [phone].    We comply with applicable federal civil rights laws and [place] laws. We do not discriminate on the basis of race, color, national origin, age, disability sex, sexual orientation or gender identity.            Thank you!     Thank you for choosing [facility]  for your care. Our goal is always to provide you with excellent care. Hearing back from our patients is one way we can continue to improve our services. Please take a few minutes to complete the written survey that you may receive in the mail after your visit with us. Thank you!             Your Updated Medication List - Protect others around you: Learn how to safely use, store and throw away your medicines at www.disposemymeds.org.          This list is accurate as of: 9/5/17 10:11 AM.  Always use your most recent med list.                   Brand Name Dispense Instructions for use Diagnosis    albuterol (2.5 MG/3ML) 0.083% neb solution     25 vial    Take 1 vial (2.5 mg) by nebulization every 4 hours as needed for shortness of breath / dyspnea or wheezing    Pneumonia of left lower lobe due to infectious organism (H), Shortness of breath       escitalopram 20 MG tablet    LEXAPRO    90 tablet    Take 1 tablet (20 mg) by mouth daily    Anxiety attack, Depression, unspecified depression type       fluticasone 50 MCG/ACT spray    FLONASE    16 g    Spray 2 sprays into both nostrils daily    Upper respiratory tract infection, unspecified type       lisinopril-hydrochlorothiazide 20-25 MG per tablet    PRINZIDE/ZESTORETIC    90 tablet    Take 1 tablet by  mouth daily    Benign essential hypertension       MULTIVITAMIN ADULT PO      Take 1 tablet by mouth daily        traZODone 50 MG tablet    DESYREL    90 tablet    Take 1-4 tablets ( mg) by mouth At Bedtime    Sleep disorder       TYLENOL PO      Take 1,000 mg by mouth every 8 hours as needed        VITAMIN C PO      Take by mouth daily        zinc 50 MG Tabs      Take 100 mg by mouth daily

## 2017-09-06 ASSESSMENT — ANXIETY QUESTIONNAIRES: GAD7 TOTAL SCORE: 10

## 2017-10-04 ENCOUNTER — OFFICE VISIT (OUTPATIENT)
Dept: FAMILY MEDICINE | Facility: CLINIC | Age: 57
End: 2017-10-04
Payer: COMMERCIAL

## 2017-10-04 VITALS
DIASTOLIC BLOOD PRESSURE: 82 MMHG | SYSTOLIC BLOOD PRESSURE: 132 MMHG | BODY MASS INDEX: 25.09 KG/M2 | TEMPERATURE: 97.8 F | WEIGHT: 160.2 LBS | HEART RATE: 64 BPM | OXYGEN SATURATION: 100 %

## 2017-10-04 DIAGNOSIS — E87.5 HYPERKALEMIA: ICD-10-CM

## 2017-10-04 DIAGNOSIS — F33.0 MILD EPISODE OF RECURRENT MAJOR DEPRESSIVE DISORDER (H): ICD-10-CM

## 2017-10-04 DIAGNOSIS — I10 ESSENTIAL HYPERTENSION: Primary | ICD-10-CM

## 2017-10-04 PROCEDURE — 80048 BASIC METABOLIC PNL TOTAL CA: CPT | Performed by: NURSE PRACTITIONER

## 2017-10-04 PROCEDURE — 99213 OFFICE O/P EST LOW 20 MIN: CPT | Performed by: NURSE PRACTITIONER

## 2017-10-04 PROCEDURE — 36415 COLL VENOUS BLD VENIPUNCTURE: CPT | Performed by: NURSE PRACTITIONER

## 2017-10-04 ASSESSMENT — ANXIETY QUESTIONNAIRES
2. NOT BEING ABLE TO STOP OR CONTROL WORRYING: NEARLY EVERY DAY
6. BECOMING EASILY ANNOYED OR IRRITABLE: SEVERAL DAYS
IF YOU CHECKED OFF ANY PROBLEMS ON THIS QUESTIONNAIRE, HOW DIFFICULT HAVE THESE PROBLEMS MADE IT FOR YOU TO DO YOUR WORK, TAKE CARE OF THINGS AT HOME, OR GET ALONG WITH OTHER PEOPLE: SOMEWHAT DIFFICULT
7. FEELING AFRAID AS IF SOMETHING AWFUL MIGHT HAPPEN: NEARLY EVERY DAY
5. BEING SO RESTLESS THAT IT IS HARD TO SIT STILL: SEVERAL DAYS
1. FEELING NERVOUS, ANXIOUS, OR ON EDGE: NEARLY EVERY DAY
GAD7 TOTAL SCORE: 17
3. WORRYING TOO MUCH ABOUT DIFFERENT THINGS: NEARLY EVERY DAY

## 2017-10-04 ASSESSMENT — PATIENT HEALTH QUESTIONNAIRE - PHQ9
5. POOR APPETITE OR OVEREATING: NEARLY EVERY DAY
SUM OF ALL RESPONSES TO PHQ QUESTIONS 1-9: 5

## 2017-10-04 NOTE — MR AVS SNAPSHOT
After Visit Summary   10/4/2017    Suresh Powers    MRN: [id number]           Patient Information     Date Of Birth          1960        Visit Information        Provider Department      10/4/2017 9:00 AM Carmen Martinez Ra, APRN CNP [facility]        Today's Diagnoses     Depression    -  1    Essential hypertension          Care Instructions    Continue with the medications as you are now.  I will let you know your lab results.    Let me know if you need any further help regarding anxiety.          Follow-ups after your visit        Your next 10 appointments already scheduled     Oct 26, 2017 11:00 AM CDT   New Visit with Kelly Carpenter [facility])    [address] [facility] [address]   [phone]            Nov 02, 2017  9:00 AM CDT   Return Visit with Kelly Carpenter [facility]    [address] [facility] [address]   [phone]            Dec 05, 2017  9:00 AM CST   SHORT with Justine Cantu [facility] ([facility])    [address] [facility] [address] [phone]              Who to contact     If you have questions or need follow up information about today's clinic visit or your schedule please contact [facility] directly at [phone].  Normal or non-critical lab and imaging results will be communicated to you by MyChart, letter or phone within 4 business days after the clinic has received the results. If you do not hear from us within 7 days, please contact the clinic through MyChart or phone. If you have a critical or abnormal lab result, we will notify you by phone as soon as possible.  Submit refill requests through [employer] or call your pharmacy and they will forward the refill request to us. Please allow 3 business days for your refill to be completed.           "Additional Information About Your Visit        MyChart Information     Sell My Timeshare NOW lets you send messages to your doctor, view your test results, renew your prescriptions, schedule appointments and more. To sign up, go to [email or web address] . Click on \"Log in\" on the left side of the screen, which will take you to the Welcome page. Then click on \"Sign up Now\" on the right side of the page.     You will be asked to enter the access code listed below, as well as some personal information. Please follow the directions to create your username and password.     Your access code is: [email or web address]  Expires: 10/11/2017 10:25 AM     Your access code will  in 90 days. If you need help or a new code, please call your [facility] clinic or [phone].        Care EveryWhere ID     This is your Care EveryWhere ID. This could be used by other organizations to access your [place] medical records  [id number]        Your Vitals Were     Pulse Temperature Pulse Oximetry BMI (Body Mass Index)          64 97.8  F (36.6  C) (Oral) 100% 25.09 kg/m2         Blood Pressure from Last 3 Encounters:   10/04/17 132/82   17 148/84   17 120/78    Weight from Last 3 Encounters:   10/04/17 160 lb 3.2 oz (72.7 kg)   17 157 lb 8 oz (71.4 kg)   17 152 lb 6.4 oz (69.1 kg)              We Performed the Following     Basic metabolic panel     DEPRESSION ACTION PLAN (DAP)        Primary Care Provider Office Phone # Fax #    LUIS Connolly Ra [facility] [phone] [address] [facility] [address]        Equal Access to Services     SEJAL ROWELL : Hadsay Roger, wanorbertoda luanitha, qalazta kaalmada dar, tramaine hazel. So [facility] [phone].    ATENCIÓN: Si habla español, tiene a regalado disposición servicios gratuitos de asistencia lingüística. Llame al [phone].    We comply with applicable federal civil rights laws and [place] laws. We do not " discriminate on the basis of race, color, national origin, age, disability, sex, sexual orientation, or gender identity.            Thank you!     Thank you for choosing [facility]  for your care. Our goal is always to provide you with excellent care. Hearing back from our patients is one way we can continue to improve our services. Please take a few minutes to complete the written survey that you may receive in the mail after your visit with us. Thank you!             Your Updated Medication List - Protect others around you: Learn how to safely use, store and throw away your medicines at www.disposemymeds.org.          This list is accurate as of: 10/4/17  9:38 AM.  Always use your most recent med list.                   Brand Name Dispense Instructions for use Diagnosis    albuterol (2.5 MG/3ML) 0.083% neb solution     25 vial    Take 1 vial (2.5 mg) by nebulization every 4 hours as needed for shortness of breath / dyspnea or wheezing    Pneumonia of left lower lobe due to infectious organism (H), Shortness of breath       escitalopram 20 MG tablet    LEXAPRO    90 tablet    Take 1 tablet (20 mg) by mouth daily    Anxiety attack, Depression, unspecified depression type       fluticasone 50 MCG/ACT spray    FLONASE    16 g    Spray 2 sprays into both nostrils daily    Upper respiratory tract infection, unspecified type       lisinopril-hydrochlorothiazide 20-25 MG per tablet    PRINZIDE/ZESTORETIC    90 tablet    Take 1 tablet by mouth daily    Benign essential hypertension       MULTIVITAMIN ADULT PO      Take 1 tablet by mouth daily        traZODone 50 MG tablet    DESYREL    90 tablet    Take 1-4 tablets ( mg) by mouth At Bedtime    Sleep disorder       TYLENOL PO      Take 1,000 mg by mouth every 8 hours as needed        VITAMIN C PO      Take by mouth daily        zinc 50 MG Tabs      Take 100 mg by mouth daily

## 2017-10-04 NOTE — LETTER
My Depression Action Plan  Name: Suresh Powers   Date of Birth 1960  Date: 10/4/2017    My doctor: Carmen Martinez Ra   My clinic: [address] [address] [phone]          GREEN    ZONE   Good Control    What it looks like:     Things are going generally well. You have normal up s and down s. You may even feel depressed from time to time, but bad moods usually last less than a day.   What you need to do:  1. Continue to care for yourself (see self care plan)  2. Check your depression survival kit and update it as needed  3. Follow your physician s recommendations including any medication.  4. Do not stop taking medication unless you consult with your physician first.           YELLOW         ZONE Getting Worse    What it looks like:     Depression is starting to interfere with your life.     It may be hard to get out of bed; you may be starting to isolate yourself from others.    Symptoms of depression are starting to last most all day and this has happened for several days.     You may have suicidal thoughts but they are not constant.   What you need to do:     1. Call your care team, your response to treatment will improve if you keep your care team informed of your progress. Yellow periods are signs an adjustment may need to be made.     2. Continue your self-care, even if you have to fake it!    3. Talk to someone in your support network    4. Open up your depression survival kit           RED    ZONE Medical Alert - Get Help    What it looks like:     Depression is seriously interfering with your life.     You may experience these or other symptoms: You can t get out of bed most days, can t work or engage in other necessary activities, you have trouble taking care of basic hygiene, or basic responsibilities, thoughts of suicide or death that will not go away, self-injurious behavior.     What you need to do:  1. Call your care team and  request a same-day appointment. If they are not available (weekends or after hours) call your local crisis line, emergency room or 911.      Electronically signed by: Jesica Jett, October 4, 2017    Depression Self Care Plan / Survival Kit    Self-Care for Depression  Here s the deal. Your body and mind are really not as separate as most people think.  What you do and think affects how you feel and how you feel influences what you do and think. This means if you do things that people who feel good do, it will help you feel better.  Sometimes this is all it takes.  There is also a place for medication and therapy depending on how severe your depression is, so be sure to consult with your medical provider and/ or Behavioral Health Consultant if your symptoms are worsening or not improving.     In order to better manage my stress, I will:    Exercise  Get some form of exercise, every day. This will help reduce pain and release endorphins, the  feel good  chemicals in your brain. This is almost as good as taking antidepressants!  This is not the same as joining a gym and then never going! (they count on that by the way ) It can be as simple as just going for a walk or doing some gardening, anything that will get you moving.      Hygiene   Maintain good hygiene (Get out of bed in the morning, Make your bed, Brush your teeth, Take a shower, and Get dressed like you were going to work, even if you are unemployed).  If your clothes don't fit try to get ones that do.    Diet  I will strive to eat foods that are good for me, drink plenty of water, and avoid excessive sugar, caffeine, alcohol, and other mood-altering substances.  Some foods that are helpful in depression are: complex carbohydrates, B vitamins, flaxseed, fish or fish oil, fresh fruits and vegetables.    Psychotherapy  I agree to participate in Individual Therapy (if recommended).    Medication  If prescribed medications, I agree to take them.  Missing  doses can result in serious side effects.  I understand that drinking alcohol, or other illicit drug use, may cause potential side effects.  I will not stop my medication abruptly without first discussing it with my provider.    Staying Connected With Others  I will stay in touch with my friends, family members, and my primary care provider/team.    Use your imagination  Be creative.  We all have a creative side; it doesn t matter if it s oil painting, sand castles, or mud pies! This will also kick up the endorphins.    Witness Beauty  (AKA stop and smell the roses) Take a look outside, even in mid-winter. Notice colors, textures. Watch the squirrels and birds.     Service to others  Be of service to others.  There is always someone else in need.  By helping others we can  get out of ourselves  and remember the really important things.  This also provides opportunities for practicing all the other parts of the program.    Humor  Laugh and be silly!  Adjust your TV habits for less news and crime-drama and more comedy.    Control your stress  Try breathing deep, massage therapy, biofeedback, and meditation. Find time to relax each day.     My support system    Clinic Contact:  Phone number:    Contact 1:  Phone number:    Contact 2:  Phone number:    [facility]/:  Phone number:    Therapist:  Phone number:    Local crisis center:    Phone number:    Other community support:  Phone number:

## 2017-10-04 NOTE — PROGRESS NOTES
SUBJECTIVE:   Suresh Powers is a 57 year old male who presents to clinic today for the following health issues:      Hypertension Follow-up      Outpatient blood pressures are being checked at no current readings.  .    Low Salt Diet: no added salt        Amount of exercise or physical activity: None    Problems taking medications regularly: No    Medication side effects: none  Diet: regular (no restrictions)    Pt presents for follow up.  Recent visit with MTM, bp was elevated.  Increased strength of hctz.  He has tolerated the dose change well, no side effects.  He denies any chest pain, palpitations, sob.  He is not active due to previous injuries, foot pain with activity.    Reports increase in anxiety, has appt scheduled with therapist.  Hopeful that this will help as opposed to med change.    Problem list and histories reviewed & adjusted, as indicated.  Additional history: as documented    Patient Active Problem List   Diagnosis     Exotropia     Benign essential hypertension     Tobacco use disorder     Sleep disorder     Unilateral inguinal hernia without obstruction or gangrene     Closed fracture of proximal end of left tibia     Fracture of left clavicle     Depression     Slow transit constipation     Urinary retention     Iron deficiency anemia     Clostridium difficile diarrhea     Anxiety attack     HTN, goal below 140/90     Single kidney     CARDIOVASCULAR SCREENING; LDL GOAL LESS THAN 160     Past Surgical History:   Procedure Laterality Date     foot crush injury       kidney donation       OPEN REDUCTION INTERNAL FIXATION CLAVICLE Left 2/4/2016    Procedure: OPEN REDUCTION INTERNAL FIXATION CLAVICLE;  Surgeon: Rakesh Hui MD;  Location:  OR     OPEN REDUCTION INTERNAL FIXATION TIBIAL PLATEAU Left 2/4/2016    Procedure: OPEN REDUCTION INTERNAL FIXATION TIBIAL PLATEAU;  Surgeon: Rakesh Hui MD;  Location:  OR       Social History   Substance Use Topics     Smoking status:  Former Smoker     Packs/day: 1.00     Years: 0.00     Smokeless tobacco: Former User     Quit date: 2/2/2016     Alcohol use No     Family History   Problem Relation Age of Onset     Breast Cancer Mother      Type 2 Diabetes Mother      Skin Cancer Father      CEREBROVASCULAR DISEASE Father              Reviewed and updated as needed this visit by clinical staffTobacco  Allergies  Meds  Med Hx  Surg Hx  Fam Hx  Soc Hx      Reviewed and updated as needed this visit by Provider         ROS:  SEE HPI.    OBJECTIVE:     /82  Pulse 64  Temp 97.8  F (36.6  C) (Oral)  Wt 160 lb 3.2 oz (72.7 kg)  SpO2 100%  BMI 25.09 kg/m2  Body mass index is 25.09 kg/(m^2).  GENERAL: healthy, alert and no distress  RESP: lungs clear to auscultation - no rales, rhonchi or wheezes  CV: regular rate and rhythm, normal S1 S2, no S3 or S4, no murmur, click or rub, no peripheral edema and peripheral pulses strong  ABDOMEN: soft, nontender, no hepatosplenomegaly, no masses and bowel sounds normal  PSYCH: mentation appears normal, affect normal/bright    Diagnostic Test Results:  none     ASSESSMENT/PLAN:   1. Essential hypertension  Improved with med change.  Repeat bmp today.  Continue with current regimen.  - Basic metabolic panel    2. Depression  Starting counseling in addition to current med.  Update me if further intervention is needed.  Pt agrees with plan and verbalized understanding.    Carmen Martinez, APRN CNP  [facility]

## 2017-10-04 NOTE — PATIENT INSTRUCTIONS
Continue with the medications as you are now.  I will let you know your lab results.    Let me know if you need any further help regarding anxiety.

## 2017-10-05 LAB
ANION GAP SERPL CALCULATED.3IONS-SCNC: 9 MMOL/L (ref 3–14)
BUN SERPL-MCNC: 23 MG/DL (ref 7–30)
CALCIUM SERPL-MCNC: 9.2 MG/DL (ref 8.5–10.1)
CHLORIDE SERPL-SCNC: 102 MMOL/L (ref 94–109)
CO2 SERPL-SCNC: 23 MMOL/L (ref 20–32)
CREAT SERPL-MCNC: 0.93 MG/DL (ref 0.66–1.25)
GFR SERPL CREATININE-BSD FRML MDRD: 84 ML/MIN/1.7M2
GLUCOSE SERPL-MCNC: 92 MG/DL (ref 70–99)
POTASSIUM SERPL-SCNC: 5.5 MMOL/L (ref 3.4–5.3)
SODIUM SERPL-SCNC: 134 MMOL/L (ref 133–144)

## 2017-10-05 ASSESSMENT — ANXIETY QUESTIONNAIRES: GAD7 TOTAL SCORE: 17

## 2017-10-11 DIAGNOSIS — E87.5 HYPERKALEMIA: ICD-10-CM

## 2017-10-11 PROCEDURE — 36415 COLL VENOUS BLD VENIPUNCTURE: CPT | Performed by: NURSE PRACTITIONER

## 2017-10-11 PROCEDURE — 80048 BASIC METABOLIC PNL TOTAL CA: CPT | Performed by: NURSE PRACTITIONER

## 2017-10-12 LAB
ANION GAP SERPL CALCULATED.3IONS-SCNC: 9 MMOL/L (ref 3–14)
BUN SERPL-MCNC: 27 MG/DL (ref 7–30)
CALCIUM SERPL-MCNC: 9.2 MG/DL (ref 8.5–10.1)
CHLORIDE SERPL-SCNC: 104 MMOL/L (ref 94–109)
CO2 SERPL-SCNC: 23 MMOL/L (ref 20–32)
CREAT SERPL-MCNC: 1.16 MG/DL (ref 0.66–1.25)
GFR SERPL CREATININE-BSD FRML MDRD: 65 ML/MIN/1.7M2
GLUCOSE SERPL-MCNC: 109 MG/DL (ref 70–99)
POTASSIUM SERPL-SCNC: 5.2 MMOL/L (ref 3.4–5.3)
SODIUM SERPL-SCNC: 136 MMOL/L (ref 133–144)

## 2017-10-13 ENCOUNTER — TELEPHONE (OUTPATIENT)
Dept: FAMILY MEDICINE | Facility: CLINIC | Age: 57
End: 2017-10-13

## 2017-10-13 NOTE — TELEPHONE ENCOUNTER
Called patient to let him know.  Left message at his contact number.    Xochitl Richardson, RN  Triage Nurse

## 2017-10-13 NOTE — TELEPHONE ENCOUNTER
Patient is calling to ask how the labs this week turned out.  Appears K is down to normal at 5.2, he is notified.  Anything else he should do?    Xochitl Richardson, RN  Triage Nurse

## 2017-10-26 ENCOUNTER — OFFICE VISIT (OUTPATIENT)
Dept: PSYCHOLOGY | Facility: CLINIC | Age: 57
End: 2017-10-26
Attending: PHYSICIAN ASSISTANT
Payer: COMMERCIAL

## 2017-10-26 DIAGNOSIS — F33.0 MILD EPISODE OF RECURRENT MAJOR DEPRESSIVE DISORDER (H): Primary | ICD-10-CM

## 2017-10-26 DIAGNOSIS — F41.1 GENERALIZED ANXIETY DISORDER: ICD-10-CM

## 2017-10-26 DIAGNOSIS — F42.9 OCD (OBSESSIVE COMPULSIVE DISORDER): ICD-10-CM

## 2017-10-26 PROCEDURE — 90791 PSYCH DIAGNOSTIC EVALUATION: CPT | Performed by: PSYCHOLOGIST

## 2017-10-26 ASSESSMENT — ANXIETY QUESTIONNAIRES
3. WORRYING TOO MUCH ABOUT DIFFERENT THINGS: NEARLY EVERY DAY
GAD7 TOTAL SCORE: 17
5. BEING SO RESTLESS THAT IT IS HARD TO SIT STILL: NOT AT ALL
2. NOT BEING ABLE TO STOP OR CONTROL WORRYING: NEARLY EVERY DAY
7. FEELING AFRAID AS IF SOMETHING AWFUL MIGHT HAPPEN: NEARLY EVERY DAY
6. BECOMING EASILY ANNOYED OR IRRITABLE: MORE THAN HALF THE DAYS
1. FEELING NERVOUS, ANXIOUS, OR ON EDGE: NEARLY EVERY DAY

## 2017-10-26 ASSESSMENT — PATIENT HEALTH QUESTIONNAIRE - PHQ9
5. POOR APPETITE OR OVEREATING: NEARLY EVERY DAY
SUM OF ALL RESPONSES TO PHQ QUESTIONS 1-9: 13

## 2017-10-26 NOTE — MR AVS SNAPSHOT
"                  MRN:[id number]                      After Visit Summary   10/26/2017    Suresh Powers    MRN: [id number]           Visit Information        Provider Department      10/26/2017 11:00 AM Kelly Carpenter LP [facility] Generic      Your next 10 appointments already scheduled     Nov 02, 2017  9:00 AM CDT   Return Visit with Kelly Carpenter LP   [facility] ([facility])    [address]  [facility] [address]   [phone]            Dec 05, 2017  9:00 AM CST   SHORT with Justine Cantu [facility] ([facility])    [address] [facility] [address] [phone]              Care Instructions                                                 Suresh Powers     SAFETY PLAN:  Step 1: Warning signs / cues (Thoughts, images, mood, situation, behavior) that a crisis may be developing:    Thoughts: \"I can't do this anymore\" and \"I just want this (the anxiety , OCD) to end\"    Images: none    Thinking Processes: ruminations (can't stop thinking about my problems): ., racing thoughts, intrusive thoughts (bothersome, unwanted thoughts that come out of nowhere): . and highly critical and negative thoughts: . racing thoughts    Mood: worsening depression, hopelessness, helplessness and intense worry, agitation    Behaviors: isolating/withdrawing     Situations: relationship problems historicaly, feels ok now. Pain, financial stress, anniversary of deaths  Step 2: Coping strategies - Things I can do to take my mind off of my problems without contacting another person (relaxation technique, physical activity):    Distress Tolerance Strategies:  relaxation activities: ., listen to positive and upbeat music: ., watch a funny movie: . and pray    Physical Activities: going to [employer], talking with support system    Focus on helpful thoughts:  remind myself of what is important to me: " "family  Step 3: People and social settings that provide distraction:   Name:  Darlyn Bower  Phone: [phone]    Antonia Sher  [phone]       movie theater, coffee shop and Evangel[employer]   Step 4: Remind myself of people and things that are important to me and worth living for:  family      Step 5: When I am in crisis, I can ask these people to help me use my safety plan:   See #3  Step 6: Making the environment safe:     be around others  Step 7: Professionals or agencies I can contact during a crisis:    [facility] Daytime and After Hours Crisis Number: [phone]    Suicide Prevention Lifeline: 7-501-394-TALK (8207)    Text for Life: Text the word  LIFE  to 215[address].  Local Crisis Services:  [facility]    Call 911 or go to my nearest emergency department.   I helped develop this safety plan and agree to use it when needed.  I have been given a copy of this plan.      Client signature _________________________________________________________________  Today s date:  10/26/2017  Adapted from Safety Plan Template 2008 Mayra Telles and Trino Lorenz is reprinted with the express permission of the authors.  No portion of the Safety Plan Template may be reproduced without the express, written permission.  You can contact the authors at [email or web address] or [email or web address].               MyChart Information     Unight lets you send messages to your doctor, view your test results, renew your prescriptions, schedule appointments and more. To sign up, go to [email or web address] . Click on \"Log in\" on the left side of the screen, which will take you to the Welcome page. Then click on \"Sign up Now\" on the right side of the page.     You will be asked to enter the access code listed below, as well as some personal information. Please follow the directions to create your username and password.     Your access code is: [email or web address]  Expires: 2018 12:12 PM     Your access code will  in 90 " days. If you need help or a new code, please call your [facility] clinic or [phone].        Care EveryWhere ID     This is your Care EveryWhere ID. This could be used by other organizations to access your [facility] medical records  [id number]        Equal Access to Services     SANJANA ROWELL : Marylou Roger, waramos keating, qanathan kaalmaviet dodge, tramaine hazel. So [facility] [phone].    ATENCIÓN: Si habla español, tiene a regalado disposición servicios gratuitos de asistencia lingüística. Llame al [phone].    We comply with applicable federal civil rights laws and [place] laws. We do not discriminate on the basis of race, color, national origin, age, disability, sex, sexual orientation, or gender identity.

## 2017-10-26 NOTE — PROGRESS NOTES
"                                                                                                                                                                        Adult Intake Structured Interview  Standard Diagnostic Assessment      CLIENT'S NAME: Suresh Powers  MRN:   [id number]  :   1960  ACCT. NUMBER: [id number]  DATE OF SERVICE: 10/26/17      Identifying Information:  Client is a 57 year old, , single male. Client was referred for counseling by Justine Cantu at .  Client is currently unemployed;  has permanent partial disabilty. Client attended the session alone.       Client's Statement of Presenting Concern:  Client reports the reason for seeking therapy at this time as help with mood and OCD symptoms.  Client stated that his symptoms have resulted in the following functional impairments: management of the household and or completion of tasks and worry.      History of Presenting Concern:  Client reports that these problem(s) began: was having symptoms as early as young adulthood. Did counseling at the [facility]. Client has attempted to resolve these concerns in the past through medicaitons and counseling. Client reports that other professional(s) are involved in providing support / services. Has a health educator.      Social History:  Client reported he grew up in [place] and  [place], [place]. They were the second born of 2 children. This was an intact family and parents remained so until their respective deaths. Client reported that his childhood was \"good\". Client described his current relationships with family of origin as \"good\".    Client reported a history of 2 committed relationships or 1 marriage for that ended in divorce after 1 year. Client has been partnered / significant other for 2 years. Client reported having 1 child. Client identified some stable and meaningful social connections. Client reported that he has not been involved with the legal system.  Client's highest " education level was high school graduate. Client did not identify any learning problems. There are no ethnic, cultural or Zoroastrianism factors that may be relevant for therapy. Client identified his preferred language to be English. Client reported he does not need the assistance of an  or other support involved in therapy. Modifications will not be used to assist communication in therapy. Client did not serve in the .     Client reports family history includes Breast Cancer in his mother; CEREBROVASCULAR DISEASE in his father; Skin Cancer in his father; Type 2 Diabetes in his mother.    Mental Health History:  Client depression for mom, dad, sister, uncle daughter. mom with schizoaffective disorder.  Client previously received the following mental health diagnosis: Anxiety and Depression.  Client has received the following mental health services in the past: counseling and medication(s) from physician / PCP. (Dr. Stevan Hatch Hospitalizations: None.  Client is not currently receiving any mental health services.      Chemical Health History:  Client mom abused alcohol. Client has not received chemical dependency treatment in the past. Client is not currently receiving any chemical dependency treatment. Client reports no problems as a result of their drinking / drug use.      Client Reports:  Client denies using alcohol.  Client denies using tobacco.  Client denies using marijuana.  Client reports using caffeine 6 times per day and drinks 1 at a time. Client started using caffeine at age unknown.  Client denies using street drugs.  Client denies the non-medical use of prescription or over the counter drugs.    CAGE: None of the patient's responses to the CAGE screening were positive / Negative CAGE score   Based on the negative Cage-Aid score and clinical interview there  are not indications of drug or alcohol abuse.    Discussed the general effects of drugs and alcohol on health and well-being.  Therapist gave client printed information about the effects of chemical use on his health and well being.      Significant Losses / Trauma / Abuse / Neglect Issues:  Death of parents   Work accidents  Loss of physical health    Issues of possible neglect are not present.      Medical Issues:  Client has had a physical exam to rule out medical causes for current symptoms. Date of last physical exam was within the past year. Client was encouraged to follow up with PCP if symptoms were to develop. The client has a [facility] Primary Care Provider, who is named Carmen Martinez Ra.. The client reports not having a psychiatrist. Client reports the following current medical concerns: see below. The client reports the presence of chronic or episodic pain in the form of foot pain. The pain level is moderate and has a frequency of ongoing.. There are not significant nutritional concerns.     Client reports current meds as:   Current Outpatient Prescriptions   Medication Sig     traZODone (DESYREL) 50 MG tablet Take 1-4 tablets ( mg) by mouth At Bedtime     fluticasone (FLONASE) 50 MCG/ACT spray Spray 2 sprays into both nostrils daily     lisinopril-hydrochlorothiazide (PRINZIDE/ZESTORETIC) 20-25 MG per tablet Take 1 tablet by mouth daily     escitalopram (LEXAPRO) 20 MG tablet Take 1 tablet (20 mg) by mouth daily     albuterol (2.5 MG/3ML) 0.083% neb solution Take 1 vial (2.5 mg) by nebulization every 4 hours as needed for shortness of breath / dyspnea or wheezing     Ascorbic Acid (VITAMIN C PO) Take by mouth daily      zinc 50 MG TABS Take 100 mg by mouth daily     Multiple Vitamins-Minerals (MULTIVITAMIN ADULT PO) Take 1 tablet by mouth daily     Acetaminophen (TYLENOL PO) Take 1,000 mg by mouth every 8 hours as needed      No current facility-administered medications for this visit.        Client Allergies:  Allergies   Allergen Reactions     Sertraline Diarrhea         Medical History:  Past Medical History:    Diagnosis Date     Anxiety     sees psych for trazodone     Foot pain      Hypertension      Mold exposure          Medication Adherence:  Client reports taking prescribed medications as prescribed.    Client was provided recommendation to follow-up with prescribing physician.    Mental Status Assessment:  Appearance:   Appropriate   Eye Contact:   Good   Psychomotor Behavior: Normal   Attitude:   Cooperative   Orientation:   All  Speech   Rate / Production: Normal    Volume:  Normal   Mood:    Anxious  Depressed  Normal  Affect:    Appropriate   Thought Content:  Clear   Thought Form:  Coherent  Logical   Insight:    Good       Review of Symptoms:  Depression: Interest Guilt Energy Concentration Appetite Ruminations Irritability  Janet:  No symptoms  Psychosis: No symptoms  Anxiety: Worries  Panic:  No symptoms  Post Traumatic Stress Disorder: No symptoms  Obsessive Compulsive Disorder: Checking car functions, afraid to drive in winter  Eating Disorder: No symptoms  Oppositional Defiant Disorder: No symptoms  ADD / ADHD: No symptoms  Conduct Disorder: No symptoms      Safety Assessment:    History of Safety Concerns:   Client reported a history of suicidal ideation.  Onset: 1983 and 1998 each time took pills and alcohol after discourse  with a significant other. .  Client identified the following triggers to suicidal ideation: break up with significant others.  Client denied a history of homicidal ideation.    Client denied a history of self-injurious ideation and behaviors.    Client denied a history of personal safety concerns.    Client denied a history of assaultive behaviors.        Current Safety Concerns:  Client reports current suicidal ideation.  Onset: intermmittent, frequency: 1-3 times a week duration: passing, intensity: low.  Client denies intention to act on suicidal thoughts.  Client denies having a suicide plan.  .  Client identifies triggers to suicidal ideation as: worry about future.      "  Client denies current homicidal ideation and behaviors.  Client denies current self-injurious ideation and behaviors.    Client denies current concerns for personal safety.      Client reports there are no firearms in the house.     Plan for Safety and Risk Management:  Suresh Powers     SAFETY PLAN:  Step 1: Warning signs / cues (Thoughts, images, mood, situation, behavior) that a crisis may be developing:    Thoughts: \"I can't do this anymore\" and \"I just want this (the anxiety , OCD) to end\"    Images: none    Thinking Processes: ruminations (can't stop thinking about my problems): ., racing thoughts, intrusive thoughts (bothersome, unwanted thoughts that come out of nowhere): . and highly critical and negative thoughts: . racing thoughts    Mood: worsening depression, hopelessness, helplessness and intense worry, agitation    Behaviors: isolating/withdrawing     Situations: relationship problems historicaly, feels ok now. Pain, financial stress, anniversary of deaths  Step 2: Coping strategies - Things I can do to take my mind off of my problems without contacting another person (relaxation technique, physical activity):    Distress Tolerance Strategies:  relaxation activities: ., listen to positive and upbeat music: ., watch a funny movie: . and pray    Physical Activities: going to [employer], talking with support system    Focus on helpful thoughts:  remind myself of what is important to me: family  Step 3: People and social settings that provide distraction:   Name:  Darlyn Bower  Phone: [phone]    Antonia Sher  [phone]   movie theater, coffee shop and [employer]   Step 4: Remind myself of people and things that are important to me and worth living for:  family  Step 5: When I am in crisis, I can ask these people to help me use my safety plan:   See #3  Step 6: Making the environment safe:     be around others  Step 7: Professionals or agencies I can contact during a crisis:    [facility] " [facility] Daytime and After Hours Crisis Number: [phone]    Suicide Prevention Lifeline: 4-737-414-TALK (3638)    Text for Life: Text the word  LIFE  to 76[address].  Local Crisis Services:  [facility]    Call 911 or go to my nearest emergency department.   I helped develop this safety plan and agree to use it when needed.  I have been given a copy of this plan.      Client signature _________________________________________________________________  Today s date:  10/26/2017  Adapted from Safety Plan Template 2008 Mayra Telles and Trino Lorenz is reprinted with the express permission of the authors.  No portion of the Safety Plan Template may be reproduced without the express, written permission.  You can contact the authors at [email or web address] or [email or web address].          Client's Strengths and Limitations:  Client identified the following strengths or resources that will help him succeed in counseling: commitment to health and well being. Client identified the following supports: family. Things that may interfere with the client's success in counseling include: none.      Diagnostic Criteria:   - Restlessness or feeling keyed up or on edge.    - Being easily fatigued.    - Difficulty concentrating or mind going blank.    - Irritability.    - Muscle tension.    - Depressed mood. Note: In children and adolescents, can be irritable mood.     - Diminished interest or pleasure in all, or almost all, activities.    - Fatigue or loss of energy.    - Feelings of worthlessness or  guilt.    - Diminished ability to think or concentrate, or indecisiveness.     (1) recurrent and persistent thoughts, impulses, or images that are experienced, at some time during the disturbance, as intrusive and inappropriate and that cause marked anxiety or distress     (2) the thoughts, impulses, or images are not simply excessive worries about real-life problems     (3) the client attempts to ignore or suppress such thoughts,  impulses, or images, or to neutralize them with some other thought or action     (4) the client recognizes that the obsessional thoughts, impulses, or images are a product of his or her own mind (not imposed from without as in thought insertion)     (1) repetitive behaviors (e.g., hand washing, ordering, checking) or mental acts (e.g., praying, counting, repeating words silently) that the person feels driven to perform in response to an obsession, or according to rules that must be applied rigidly     (2) the behaviors or mental acts are aimed at preventing or reducing distress or preventing some dreaded event or situation; however, these behaviors or mental acts either are not connected in a realistic way with what they are designed to neutralize or prevent or are clearly excessive   At some point during the course of the disorder, the person has recognized that the obsessions or compulsions are excessive or unreasonable  The obsessions or compulsions cause marked distress, are time consuming (take more than 1 hour a day), or significantly interfere with the person's normal routine, occupational (or academic) functioning, or usual social activities or relationships.   The content of the obsessions or compulsions are not restricted to another Axis I Disorder (e.g., preoccupation with food in the presence of an Eating Disorders; hair pulling in the presence of Trichotillomania; concern with appearance in the presence of Body Dysmorphic Disorder; preoccupation with drugs in the presence of a Substance Use Disorder; preoccupation with having a serious illness in the presence of Hypochondriasis; preoccupation with sexual urges or fantasies in the presence of a Paraphilia; or guilty ruminations in the presence of Major Depressive Disorder).   The disturbance is not due to the direct physiological effects of a substance (e.g., a drug of abuse, a medication) or a general medical condition      Functional Status:  Client's  symptoms have caused and are causing reduced functional status in the following areas: daily life and worry tracts      DSM5 Diagnoses: (Sustained by DSM5 Criteria Listed Above)  Diagnoses: MDD, SPIKE, OCD  Psychosocial & Contextual Factors: unemployed  WHODAS 2.0 (12 item)            This questionnaire asks about difficulties due to health conditions. Health conditions  include  disease or illnesses, other health problems that may be short or long lasting,  injuries, mental health or emotional problems, and problems with alcohol or drugs.                     Think back over the past 30 days and answer these questions, thinking about how much  difficulty you had doing the following activities. For each question, please [place] only  one response.    S1 Standing for long periods such as 30 minutes? Severe =       4   S2 Taking care of household responsibilities? Moderate =   3   S3 Learning a new task, for example, learning how to get to a new place? Severe =       4   S4 How much of a problem do you have joining community activities (for example, festivals, [employer] or other activities) in the same way as anyone else can? Severe =       4   S5 How much have you been emotionally affected by your health problems? Severe =       4     In the past 30 days, how much difficulty did you have in:   S6 Concentrating on doing something for ten minutes? Severe =       4   S7 Walking a long distance such as a kilometer (or equivalent)? Severe =       4   S8 Washing your whole body? Moderate =   3   S9 Getting dressed? None =         1   S10 Dealing with people you do not know? Moderate =   3   S11 Maintaining a friendship? None =         1   S12 Your day to day work? Severe =       4     H1 Overall, in the past 30 days, how many days were these difficulties present? Record number of days 30   H2 In the past 30 days, for how many days were you totally unable to carry out your usual activities or work because of any health  condition? Record number of days  5   H3 In the past 30 days, not counting the days that you were totally unable, for how many days did you cut back or reduce your usual activities or work because of any health condition? Record number of days 25     Attendance Agreement:  Client has signed Attendance Agreement:Yes      Collaboration:  The client is receiving treatment / structured support from the following professional(s) / service and treatment. Collaboration will be initiated with: primary care physician.      Preliminary Treatment Plan:  The client reports no currently identified Restorationist, ethnic or cultural issues relevant to therapy.     services are not indicated.    Modifications to assist communication are not indicated.    The concerns identified by the client will be addressed in therapy.    Initial Treatment will focus on: Depressed Mood - .  Anxiety - ..OCD.    As a preliminary treatment goal, client will experience a reduction in depressed mood, will develop more effective coping skills to manage depressive symptoms, will develop healthy cognitive patterns and beliefs, will increase ability to function adaptively and will continue to take medications as prescribed / participate in supportive activities and services  and will experience a reduction in anxiety, will develop more effective coping skills to manage anxiety symptoms, will develop healthy cognitive patterns and beliefs and will increase ability to function adaptively.    The focus of initial interventions will be to alleviate anxiety, increase ability to function adaptively, increase coping skills, increase self esteem, teach CBT skills, teach DBT skills, teach mindfulness skills, teach problem-solving skills, teach relaxation strategies and teach stress mangement techniques.    Referral to another professional/service is not indicated at this time.    A Release of Information is not needed at this time.    Report to child /  adult protection services was NA.    Client will have access to their [facility]' medical record.    Kelly Carpenter LP  October 26, 2017

## 2017-10-26 NOTE — PATIENT INSTRUCTIONS
"Suresh Powers     SAFETY PLAN:  Step 1: Warning signs / cues (Thoughts, images, mood, situation, behavior) that a crisis may be developing:    Thoughts: \"I can't do this anymore\" and \"I just want this (the anxiety , OCD) to end\"    Images: none    Thinking Processes: ruminations (can't stop thinking about my problems): ., racing thoughts, intrusive thoughts (bothersome, unwanted thoughts that come out of nowhere): . and highly critical and negative thoughts: . racing thoughts    Mood: worsening depression, hopelessness, helplessness and intense worry, agitation    Behaviors: isolating/withdrawing     Situations: relationship problems historicaly, feels ok now. Pain, financial stress, anniversary of deaths  Step 2: Coping strategies - Things I can do to take my mind off of my problems without contacting another person (relaxation technique, physical activity):    Distress Tolerance Strategies:  relaxation activities: ., listen to positive and upbeat music: ., watch a funny movie: . and pray    Physical Activities: going to [employer], talking with support system    Focus on helpful thoughts:  remind myself of what is important to me: family  Step 3: People and social settings that provide distraction:   Name:  Darlyn Bower  Phone: [phone]    Antonia Sher  [phone]       movie theater, coffee shop and [employer]   Step 4: Remind myself of people and things that are important to me and worth living for:  family      Step 5: When I am in crisis, I can ask these people to help me use my safety plan:   See #3  Step 6: Making the environment safe:     be around others  Step 7: Professionals or agencies I can contact during a crisis:    [facility] Daytime and After Hours Crisis Number: 521-517-5130    Suicide Prevention Lifeline: 7-160-432-TALK (5363)    Text for Life: Text the word  LIFE  to 82263.  Local Crisis Services:  [facility]    Call 911 or go to my nearest " emergency department.   I helped develop this safety plan and agree to use it when needed.  I have been given a copy of this plan.      Client signature _________________________________________________________________  Today s date:  10/26/2017  Adapted from Safety Plan Template 2008 Mayra Telles and Trino Lorenz is reprinted with the express permission of the authors.  No portion of the Safety Plan Template may be reproduced without the express, written permission.  You can contact the authors at [email or web address] or [email or web address].

## 2017-10-27 ASSESSMENT — ANXIETY QUESTIONNAIRES: GAD7 TOTAL SCORE: 17

## 2017-11-02 ENCOUNTER — OFFICE VISIT (OUTPATIENT)
Dept: PSYCHOLOGY | Facility: CLINIC | Age: 57
End: 2017-11-02
Payer: COMMERCIAL

## 2017-11-02 DIAGNOSIS — F33.1 MODERATE EPISODE OF RECURRENT MAJOR DEPRESSIVE DISORDER (H): ICD-10-CM

## 2017-11-02 DIAGNOSIS — F41.1 GENERALIZED ANXIETY DISORDER: ICD-10-CM

## 2017-11-02 DIAGNOSIS — F42.9 OBSESSIVE-COMPULSIVE DISORDER, UNSPECIFIED TYPE: Primary | ICD-10-CM

## 2017-11-02 PROCEDURE — 90834 PSYTX W PT 45 MINUTES: CPT | Performed by: PSYCHOLOGIST

## 2017-11-02 ASSESSMENT — PATIENT HEALTH QUESTIONNAIRE - PHQ9
SUM OF ALL RESPONSES TO PHQ QUESTIONS 1-9: 15
5. POOR APPETITE OR OVEREATING: NEARLY EVERY DAY

## 2017-11-02 ASSESSMENT — ANXIETY QUESTIONNAIRES
7. FEELING AFRAID AS IF SOMETHING AWFUL MIGHT HAPPEN: NEARLY EVERY DAY
1. FEELING NERVOUS, ANXIOUS, OR ON EDGE: NEARLY EVERY DAY
GAD7 TOTAL SCORE: 17
5. BEING SO RESTLESS THAT IT IS HARD TO SIT STILL: NOT AT ALL
6. BECOMING EASILY ANNOYED OR IRRITABLE: MORE THAN HALF THE DAYS
2. NOT BEING ABLE TO STOP OR CONTROL WORRYING: NEARLY EVERY DAY
3. WORRYING TOO MUCH ABOUT DIFFERENT THINGS: NEARLY EVERY DAY

## 2017-11-02 NOTE — MR AVS SNAPSHOT
"                  MRN:[id number]                      After Visit Summary   2017    Suresh Powers    MRN: [id number]           Visit Information        Provider Department      2017 9:00 AM Kelly Carpenter LP [facility] [place] 2] Generic      Your next 10 appointments already scheduled     Dec 05, 2017  9:00 AM CST   SHORT with Justine Cantu [facility] ([facility])    [address] [place] [address]   [phone]              MyChart Information     [facility] lets you send messages to your doctor, view your test results, renew your prescriptions, schedule appointments and more. To sign up, go to [email or web address] . Click on \"Log in\" on the left side of the screen, which will take you to the Welcome page. Then click on \"Sign up Now\" on the right side of the page.     You will be asked to enter the access code listed below, as well as some personal information. Please follow the directions to create your username and password.     Your access code is: [email or web address]  Expires: 2018 12:12 PM     Your access code will  in 90 days. If you need help or a new code, please call your [place] clinic or [phone].        Care EveryWhere ID     This is your Care EveryWhere ID. This could be used by other organizations to access your [place] medical records  [id number]        Equal Access to Services     SANJANA ROWELL AH: Hadii vinita pereirao Somonisha, waaxda luqadaha, qaybta kaalmada adeegyada, tramaine hazel. So [facility] [phone].    ATENCIÓN: Si habla español, tiene a regalado disposición servicios gratuitos de asistencia lingüística. Llame al [phone].    We comply with applicable federal civil rights laws and [place] laws. We do not discriminate on the basis of race, color, national origin, age, disability, sex, sexual orientation, or gender identity.            "

## 2017-11-02 NOTE — PROGRESS NOTES
"                                             Progress Note    Client Name: Suresh Powers  Date: 11-02-17         Service Type: Individual      Session Start Time: 9:05  Session End Time: 9:55      Session Length: 45 min   Session #: 2     Attendees: Client    Treatment Plan Last Reviewed: today   (90  =  Feb 2018)  PHQ-9 / SPIKE-7 : see flow sheets     DATA      Progress Since Last Session (Related to Symptoms / Goals / Homework):   Symptoms: Stable but with anxiety , and depressed mood    Homework: Completed in session      Episode of Care Goals: Satisfactory progress - ACTION (Actively working towards change); Intervened by reinforcing change plan / affirming steps taken     Current / Ongoing Stressors and Concerns:   Had a break down at work year ago last September     Treatment Objective(s) Addressed in This Session:   use at least 4 coping skills for anxiety management in the next 16 weeks       Intervention:   Gathered additional hx.  Began to look at mood triggers.  Client able to share the story about his break down at work.        ASSESSMENT: Current Emotional / Mental Status (status of significant symptoms):   Risk status (Self / Other harm or suicidal ideation)   Client gathered addotoanl info   Client denies current or recent suicidal ideation or behaviors.   Client denies current or recent homicidal ideation or behaviors.   Client denies current or recent self injurious behavior or ideation.   Client denies other safety concerns.   A safety and risk management plan has not been developed at this time, however client was given the after-hours number / 911 should there be a change in any of these risk factors.   SAFETY PLAN:  Step 1: Warning signs / cues (Thoughts, images, mood, situation, behavior) that a crisis may be developing:    Thoughts: \"I can't do this anymore\" and \"I just want this (the anxiety , OCD) to end\"    Images: none    Thinking Processes: ruminations (can't stop thinking about my " problems): ., racing thoughts, intrusive thoughts (bothersome, unwanted thoughts that come out of nowhere): . and highly critical and negative thoughts: . racing thoughts    Mood: worsening depression, hopelessness, helplessness and intense worry, agitation    Behaviors: isolating/withdrawing     Situations: relationship problems historicaly, feels ok now. Pain, financial stress, anniversary of deaths  Step 2: Coping strategies - Things I can do to take my mind off of my problems without contacting another person (relaxation technique, physical activity):    Distress Tolerance Strategies:  relaxation activities: ., listen to positive and upbeat music: ., watch a funny movie: . and pray    Physical Activities: going to [employer], talking with support system    Focus on helpful thoughts:  remind myself of what is important to me: family  Step 3: People and social settings that provide distraction:   Name:  Darlyn Bower  Phone: [phone]    Antonia Sher  [phone]   movie theater, coffee shop and [employer]   Step 4: Remind myself of people and things that are important to me and worth living for:  family  Step 5: When I am in crisis, I can ask these people to help me use my safety plan:   See #3  Step 6: Making the environment safe:     be around others  Step 7: Professionals or agencies I can contact during a crisis:    [facility] Daytime and After Hours Crisis Number: 371-319-3685    Suicide Prevention Lifeline: 2-793-716-TALK (82)    Text for Life: Text the word  LIFE  to 84859.  Local Crisis Services:  [facility]    Call 911 or go to my nearest emergency department.   I helped develop this safety plan and agree to use it when needed.  I have been given a copy of this plan     Appearance:   Appropriate    Eye Contact:   Good    Psychomotor Behavior: Normal    Attitude:   Cooperative    Orientation:   All   Speech    Rate / Production: Normal     Volume:  Normal    Mood:    Anxious  Depressed  Irritable   Normal   Affect:    Appropriate    Thought Content:  Clear    Thought Form:  Coherent  Logical    Insight:    Good      Medication Review:   No changes to current psychiatric medication(s)     Medication Compliance:   Yes     Changes in Health Issues:   None reported     Chemical Use Review:   Substance Use: Chemical use reviewed, no active concerns identified      Tobacco Use: No current tobacco use.       Collateral Reports Completed:   Routed note to PCP as needed.    PLAN: (Client Tasks / Therapist Tasks / Other)    Use safety plan as needed.  look at one resource from [employer]AM Carpenetr [employer]                                                         ________________________________________________________________________    Treatment Plan    Client's Name: Suresh Powers  YOB: 1960    Date: 11-02-17    DSM-V Diagnoses: OCD, SPIKE , MDD    Psychosocial / Contextual Factors: unemployed  WHODAS:see dx    Referral / Collaboration:  Referral to another professional/service is not indicated at this time..    Anticipated number of session or this episode of care: 12        As a preliminary treatment goal, client will experience a reduction in depressed mood, will develop more effective coping skills to manage depressive symptoms, will develop healthy cognitive patterns and beliefs, will increase ability to function adaptively and will continue to take medications as prescribed / participate in supportive activities and services  and will experience a reduction in anxiety, will develop more effective coping skills to manage anxiety symptoms, will develop healthy cognitive patterns and beliefs and will increase ability to function adaptively.    The focus of initial interventions will be to alleviate anxiety, increase ability to function adaptively, increase coping skills, increase self esteem, teach CBT skills, teach DBT skills, teach mindfulness skills, teach problem-solving skills, teach relaxation  strategies and teach stress mangement techniques.        MeasurableTreatment Goal(s) related to diagnosis / functional impairment(s)  Goal 1: Client will be abl e to name core anxiety triggers.    I will know I've met my goal when .  moves towards recovery reduce worry by 50%.    Objective #A (Client Action)    Client will use at least 4 coping skills for anxiety management in the next 16 weeks.  Status: New - Date:  11-02-17.     Intervention(s)  Therapist will teach components / skill to address rumminaions.        Goal 2: Client will process loss, discover core becky and needs that bring meaning and empowernment    I will know I've met my goal when.  understand brain health and what happened to him ; have new skills so that it wont happen again.    Objective #A (Client Action)    Status: 11-02-17.       Intervention(s)  Therapist will psycho education, greif work, skills training for anixty reduction.      Client has reviewed and agreed to the above plan.      Kelly Carpenter LP  November 2, 2017

## 2017-11-03 ASSESSMENT — ANXIETY QUESTIONNAIRES: GAD7 TOTAL SCORE: 17

## 2017-12-05 ENCOUNTER — OFFICE VISIT (OUTPATIENT)
Dept: PHARMACY | Facility: CLINIC | Age: 57
End: 2017-12-05
Payer: COMMERCIAL

## 2017-12-05 VITALS
DIASTOLIC BLOOD PRESSURE: 77 MMHG | SYSTOLIC BLOOD PRESSURE: 135 MMHG | WEIGHT: 160.3 LBS | HEART RATE: 60 BPM | BODY MASS INDEX: 25.11 KG/M2

## 2017-12-05 DIAGNOSIS — G47.9 SLEEP DISORDER: ICD-10-CM

## 2017-12-05 DIAGNOSIS — Z71.85 VACCINE COUNSELING: ICD-10-CM

## 2017-12-05 DIAGNOSIS — F41.1 GENERALIZED ANXIETY DISORDER: ICD-10-CM

## 2017-12-05 DIAGNOSIS — I10 HTN, GOAL BELOW 140/90: ICD-10-CM

## 2017-12-05 DIAGNOSIS — F33.1 MODERATE EPISODE OF RECURRENT MAJOR DEPRESSIVE DISORDER (H): Primary | ICD-10-CM

## 2017-12-05 PROCEDURE — 99607 MTMS BY PHARM ADDL 15 MIN: CPT | Performed by: PHARMACIST

## 2017-12-05 PROCEDURE — 99606 MTMS BY PHARM EST 15 MIN: CPT | Performed by: PHARMACIST

## 2017-12-05 RX ORDER — BUSPIRONE HYDROCHLORIDE 5 MG/1
TABLET ORAL
Qty: 150 TABLET | Refills: 0 | Status: SHIPPED | OUTPATIENT
Start: 2017-12-05 | End: 2018-01-02 | Stop reason: DRUGHIGH

## 2017-12-05 RX ORDER — BUSPIRONE HYDROCHLORIDE 5 MG/1
TABLET ORAL
Qty: 150 TABLET | Refills: 0 | Status: SHIPPED | OUTPATIENT
Start: 2017-12-05 | End: 2017-12-05

## 2017-12-05 NOTE — MR AVS SNAPSHOT
After Visit Summary   12/5/2017    Suresh Powers    MRN: [id number]           Patient Information     Date Of Birth          1960        Visit Information        Provider Department      12/5/2017 9:00 AM Justine Cantu, [facility] MTM        Today's Diagnoses     Moderate episode of recurrent major depressive disorder (H)    -  1    Generalized anxiety disorder          Care Instructions    Recommendations from today's MTM visit:                                                      Anxiety/depression:    Start at 5 mg twice daily for 3 days, then 7.5 mg (1.5 tabs) twice daily for 3 days, then 10 mg (2 tabs) twice daily for 3 days, then 12.5 mg (2.5 tabs) twice daily for 3 days, then 15 mg (3 tabs) twice daily and stay at that dose      Get flu vaccine at pharmacy today    Next MTM/pharmacist visit: 1 month    To schedule another MTM appointment, please call the clinic directly or you may call the MTM scheduling line at [phone] or toll-free at [phone].     My Clinical Pharmacist's contact information:                                                      It was a pleasure seeing you today!  Please feel free to contact me with any questions or concerns you have.      Justine Cantu, PharmD [facility]  Medication Therapy Management Practitioner   #[phone]    You may receive a survey about the MT services you received.  I would appreciate your feedback to help me serve you better in the future. Please fill it out and return it when you can. Your comments will be anonymous.                    Follow-ups after your visit        Your next 10 appointments already scheduled     Dec 08, 2017 10:00 AM CST   Return Visit with Kelly Carpenter LP   [facility]    [address] [facility] [address]   [phone]            Dec 14, 2017  9:00 AM CST   Return Visit with Kelly Carpenter LP   [facility] ([facility])    [address]  [facility] [address]   [phone]            Dec 28, 2017  9:00 AM CST   Return Visit with Kelly Carpenter LP   [facility] ([facility])    [address]  [facility] [address]   [phone]            2018  9:00 AM CST   SHORT with Justine Cantu [facility] ([facility])    [address] [facility] [address] [phone]              Who to contact     If you have questions or need follow up information about today's clinic visit or your schedule please contact [facility] directly at [phone].  Normal or non-critical lab and imaging results will be communicated to you by MyChart, letter or phone within 4 business days after the clinic has received the results. If you do not hear from us within 7 days, please contact the clinic through MyChart or phone. If you have a critical or abnormal lab result, we will notify you by phone as soon as possible.  Submit refill requests through [employer] or call your pharmacy and they will forward the refill request to us. Please allow 3 business days for your refill to be completed.          Additional Information About Your Visit        MyChart Information     [employer] lets you send messages to your doctor, view your test results, renew your prescriptions, schedule appointments and more. To sign up, go to [email or web address] . Click on \"Log in\" on the left side of the screen, which will take you to the Welcome page. Then click on \"Sign up Now\" on the right side of the page.     You will be asked to enter the access code listed below, as well as some personal information. Please follow the directions to create your username and password.     Your access code is: [email or web address]  Expires: 2018 11:12 AM     Your access code will  in 90 days. If you need help or a new code, please " call your [place] clinic or [phone].        Care EveryWhere ID     This is your Care EveryWhere ID. This could be used by other organizations to access your [place] medical records  [id number]        Your Vitals Were     Pulse BMI (Body Mass Index)                60 25.11 kg/m2           Blood Pressure from Last 3 Encounters:   12/05/17 135/77   10/04/17 132/82   09/05/17 148/84    Weight from Last 3 Encounters:   12/05/17 160 lb 4.8 oz (72.7 kg)   10/04/17 160 lb 3.2 oz (72.7 kg)   09/05/17 157 lb 8 oz (71.4 kg)              Today, you had the following     No orders found for display         Today's Medication Changes          These changes are accurate as of: 12/5/17  9:38 AM.  If you have any questions, ask your nurse or doctor.               Start taking these medicines.        Dose/Directions    busPIRone 5 MG tablet   Commonly known as:  BUSPAR   Used for:  Moderate episode of recurrent major depressive disorder (H), Generalized anxiety disorder   Started by:  Justine Cantu [facility]        Start at 5 mg twice daily for 3 days, increase by 2.5mg every 3 days until you get to 15 mg (3 tabs) twice daily and stay at that dose   Quantity:  150 tablet   Refills:  0            Where to get your medicines      These medications were sent to [id number] [address] [address]     Phone:  [phone]     busPIRone 5 MG tablet                Primary Care Provider Office Phone # Fax #    LUIS Connolly [facility] [phone]       [address]  [facility] [address]        Equal Access to Services     [facility] SORIN AH: Hadii vinita pereirao Somonisha, waaxda luqadaha, qaybta kaalmada adeegyada, tramaine hazel. So [facility] [phone].    ATENCIÓN: Si habla español, tiene a regalado disposición servicios gratuitos de asistencia lingüística. Llame al [phone].    We comply with applicable federal civil rights  laws and [place] laws. We do not discriminate on the basis of race, color, national origin, age, disability, sex, sexual orientation, or gender identity.            Thank you!     Thank you for choosing [facility]  for your care. Our goal is always to provide you with excellent care. Hearing back from our patients is one way we can continue to improve our services. Please take a few minutes to complete the written survey that you may receive in the mail after your visit with us. Thank you!             Your Updated Medication List - Protect others around you: Learn how to safely use, store and throw away your medicines at www.disposemymeds.org.          This list is accurate as of: 12/5/17  9:38 AM.  Always use your most recent med list.                   Brand Name Dispense Instructions for use Diagnosis    albuterol (2.5 MG/3ML) 0.083% neb solution     25 vial    Take 1 vial (2.5 mg) by nebulization every 4 hours as needed for shortness of breath / dyspnea or wheezing    Pneumonia of left lower lobe due to infectious organism (H), Shortness of breath       busPIRone 5 MG tablet    BUSPAR    150 tablet    Start at 5 mg twice daily for 3 days, increase by 2.5mg every 3 days until you get to 15 mg (3 tabs) twice daily and stay at that dose    Moderate episode of recurrent major depressive disorder (H), Generalized anxiety disorder       escitalopram 20 MG tablet    LEXAPRO    90 tablet    Take 1 tablet (20 mg) by mouth daily    Anxiety attack, Depression, unspecified depression type       fluticasone 50 MCG/ACT spray    FLONASE    16 g    Spray 2 sprays into both nostrils daily    Upper respiratory tract infection, unspecified type       lisinopril-hydrochlorothiazide 20-25 MG per tablet    PRINZIDE/ZESTORETIC    90 tablet    Take 1 tablet by mouth daily    Benign essential hypertension       MULTIVITAMIN ADULT PO      Take 1 tablet by mouth daily        traZODone 50 MG tablet    DESYREL    90  tablet    Take 1-4 tablets ( mg) by mouth At Bedtime    Sleep disorder       TYLENOL PO      Take 1,000 mg by mouth every 8 hours as needed        VITAMIN C PO      Take by mouth daily        zinc 50 MG Tabs      Take 100 mg by mouth daily

## 2017-12-05 NOTE — PATIENT INSTRUCTIONS
Recommendations from today's MTM visit:                                                      Anxiety/depression:    Start buspirone/Buspar at 5 mg twice daily for 3 days, then 7.5 mg (1.5 tabs) twice daily for 3 days, then 10 mg (2 tabs) twice daily for 3 days, then 12.5 mg (2.5 tabs) twice daily for 3 days, then 15 mg (3 tabs) twice daily and stay at that dose    Get flu vaccine at pharmacy today    Next MTM/pharmacist visit: 1 month    To schedule another MTM appointment, please call the clinic directly or you may call the MTM scheduling line at [phone] or toll-free at [phone].     My Clinical Pharmacist's contact information:                                                      It was a pleasure seeing you today!  Please feel free to contact me with any questions or concerns you have.      Justine Cantu, PharmD [facility]  Medication Therapy Management Practitioner   #[phone]    You may receive a survey about the MTM services you received.  I would appreciate your feedback to help me serve you better in the future. Please fill it out and return it when you can. Your comments will be anonymous.

## 2017-12-05 NOTE — PROGRESS NOTES
SUBJECTIVE/OBJECTIVE:                Suresh Powers is a 57 year old male coming in for a follow-up visit for Medication Therapy Management.  He was referred to me from Carmen Martinez CNP.     Chief Complaint: Follow up from our visit on 9/5/2017.  Anxiety/Depression and HTN    Tobacco: No tobacco use  Alcohol: not currently using    Medication Adherence: no issues reported, he sets out medications the night before and this works well, he takes all medications in the AM      Hypertension: Current medications include lisinopril/HCTZ 20mg-25mg once daily.  Patient does not self-monitor BP.  Patient reports no current medication side effects.  He has one kidney, he donated one kidney.    Potassium   Date Value Ref Range Status   10/11/2017 5.2 3.4 - 5.3 mmol/L Final   ]    Insomnia/Anxiety/Depression: Current medications include: escitalporam 20mg QD, trazodone 50-150mg HS, usually 1-3 at night.  He feels the trazodone is helpful to fall asleep and anxiety. Side effects: no side effects.  Seeing Renetta Carpenter LP and finding this very helpful and a good fit. He is hoping this can help with his OCD.  He has 3 appointments with her this month, the first one this Friday.    He feels the medications are helpful for symptoms but would like to add something else.      Failed medications: He was taking sertraline in the past but he had diarrhea and it was stopped. Failed Prozac and stopped Paxil not sure why stopped but worked. He was seeing psychiatry but lost to follow-up.   No thoughts of suicide or harming himself  No changes in anxiety or depression since last month.  PHQ-9 SCORE 10/4/2017 10/26/2017 11/2/2017   Total Score 5 13 15     SPIKE-7 SCORE 10/4/2017 10/26/2017 11/2/2017   Total Score 17 17 17       Current labs include:  BP Readings from Last 3 Encounters:   10/04/17 132/82   09/05/17 148/84   07/13/17 120/78     Today's Vitals: /77  Pulse 60  Wt 160 lb 4.8 oz (72.7 kg)  BMI 25.11 kg/m2    Lab Results    Component Value Date    CHOL 175 03/21/2017     Lab Results   Component Value Date    TRIG 62 03/21/2017     Lab Results   Component Value Date    HDL 68 03/21/2017     Lab Results   Component Value Date    LDL 95 03/21/2017       Liver Function Studies -   Recent Labs   Lab Test  12/19/15   0907   PROTTOTAL  7.6   ALBUMIN  3.9   BILITOTAL  0.5   ALKPHOS  117   AST  18   ALT  32       Last Basic Metabolic Panel:  Lab Results   Component Value Date     10/11/2017      Lab Results   Component Value Date    POTASSIUM 5.2 10/11/2017     Lab Results   Component Value Date    CHLORIDE 104 10/11/2017     Lab Results   Component Value Date    BUN 27 10/11/2017     Lab Results   Component Value Date    CR 1.16 10/11/2017     GFR Estimate   Date Value Ref Range Status   10/11/2017 65 >60 mL/min/1.7m2 Final     Comment:     Non  GFR Calc   10/04/2017 84 >60 mL/min/1.7m2 Final     Comment:     Non  GFR Calc   05/11/2017 64 >60 mL/min/1.7m2 Final     Comment:     Non  GFR Calc     GFR Estimate If Black   Date Value Ref Range Status   10/11/2017 78 >60 mL/min/1.7m2 Final     Comment:      GFR Calc   10/04/2017 >90 >60 mL/min/1.7m2 Final     Comment:      GFR Calc   05/11/2017 77 >60 mL/min/1.7m2 Final     Comment:      GFR Calc     No results found for: TSH]    Most Recent Immunizations   Administered Date(s) Administered     TDAP Vaccine (Adacel) 06/06/2017       ASSESSMENT:              Current medications were reviewed today as discussed above.      Medication Adherence: no issues identified--discussed pt taking medications twice daily and he is okay with trying this    Hypertension: Stable. Patient is meeting BP goal of < 140/90mmHg.   Pt would benefit from the following changes - continued BP monitoring and discussed with new goals may need to aim for <130/80 and give consideration to increasing HCTZ or changing to  chlorthalidone next visit    Insomnia/Anxiety/Depression:  Needs improvement.  Pt would benefit from addition of buspirone for augmentation for depression and anxiety.  He would benefit from continue to see [facility].    Vaccinations: Needs improvement.  Pt would benefit from influenza vaccine per ACIP guidelines.     PLAN:                  Anxiety/depression:    Start buspirone/Buspar at 5 mg twice daily for 3 days, then 7.5 mg (1.5 tabs) twice daily for 3 days, then 10 mg (2 tabs) twice daily for 3 days, then 12.5 mg (2.5 tabs) twice daily for 3 days, then 15 mg (3 tabs) twice daily and stay at that dose    Pt to get flu vaccine at pharmacy today    Next MTM/pharmacist visit: 1 month    I spent 30 minutes with this patient today. All changes were made via collaborative practice agreement with Carmen Martinez Ra. A copy of the visit note was provided to the patient's primary care provider.     The patient was given a summary of these recommendations as an after visit summary.    Justine Cantu, PharmD [facility]  Medication Therapy Management Practitioner   #[phone]

## 2017-12-08 ENCOUNTER — OFFICE VISIT (OUTPATIENT)
Dept: PSYCHOLOGY | Facility: CLINIC | Age: 57
End: 2017-12-08
Payer: COMMERCIAL

## 2017-12-08 DIAGNOSIS — F33.1 MODERATE EPISODE OF RECURRENT MAJOR DEPRESSIVE DISORDER (H): Primary | ICD-10-CM

## 2017-12-08 DIAGNOSIS — F41.1 GENERALIZED ANXIETY DISORDER: ICD-10-CM

## 2017-12-08 DIAGNOSIS — F17.200 TOBACCO USE DISORDER: ICD-10-CM

## 2017-12-08 PROCEDURE — 90834 PSYTX W PT 45 MINUTES: CPT | Performed by: PSYCHOLOGIST

## 2017-12-08 ASSESSMENT — PATIENT HEALTH QUESTIONNAIRE - PHQ9
SUM OF ALL RESPONSES TO PHQ QUESTIONS 1-9: 12
5. POOR APPETITE OR OVEREATING: NEARLY EVERY DAY

## 2017-12-08 ASSESSMENT — ANXIETY QUESTIONNAIRES
7. FEELING AFRAID AS IF SOMETHING AWFUL MIGHT HAPPEN: NEARLY EVERY DAY
5. BEING SO RESTLESS THAT IT IS HARD TO SIT STILL: NOT AT ALL
2. NOT BEING ABLE TO STOP OR CONTROL WORRYING: NEARLY EVERY DAY
1. FEELING NERVOUS, ANXIOUS, OR ON EDGE: NEARLY EVERY DAY
6. BECOMING EASILY ANNOYED OR IRRITABLE: MORE THAN HALF THE DAYS
IF YOU CHECKED OFF ANY PROBLEMS ON THIS QUESTIONNAIRE, HOW DIFFICULT HAVE THESE PROBLEMS MADE IT FOR YOU TO DO YOUR WORK, TAKE CARE OF THINGS AT HOME, OR GET ALONG WITH OTHER PEOPLE: VERY DIFFICULT
3. WORRYING TOO MUCH ABOUT DIFFERENT THINGS: NEARLY EVERY DAY
GAD7 TOTAL SCORE: 17

## 2017-12-08 NOTE — MR AVS SNAPSHOT
"                  MRN:[id number]                      After Visit Summary   2017    Suresh Powers    MRN: [id number]           Visit Information        Provider Department      2017 10:00 AM Kelly Carpenter LP [facility] Generic      Your next 10 appointments already scheduled     Dec 14, 2017  9:00 AM CST   Return Visit with Kelly CarpenterLAURA   [facility] ([facility]    [address] [facility] [address]   [phone]            Dec 28, 2017  9:00 AM CST   Return Visit with Kelly CarpenterLAURA   [facility] ([facility])    [address] [facility] [address]   [phone]            2018  9:00 AM CST   SHORT with Justine Cantu, [facility] ([facility])    [address] [place] [address] [phone]              MyChart Information     [facility] lets you send messages to your doctor, view your test results, renew your prescriptions, schedule appointments and more. To sign up, go to [email or web address] . Click on \"Log in\" on the left side of the screen, which will take you to the Welcome page. Then click on \"Sign up Now\" on the right side of the page.     You will be asked to enter the access code listed below, as well as some personal information. Please follow the directions to create your username and password.     Your access code is: [email or web address]  Expires: 2018 11:12 AM     Your access code will  in 90 days. If you need help or a new code, please call your [place] clinic or [phone].        Care EveryWhere ID     This is your Care EveryWhere ID. This could be used by other organizations to access your [place] medical records  [id number]        Equal Access to Services     SANJANA ROWELL AH: Marylou Roger, wanorbertoda luqadaha, qaybta kaaldebora dodge, tramaine parra " elvira silva ah. So [facility] [place] [phone].    ATENCIÓN: Si habla español, tiene a regalado disposición servicios gratuitos de asistencia lingüística. Llame al [phone].    We comply with applicable federal civil rights laws and [place] laws. We do not discriminate on the basis of race, color, national origin, age, disability, sex, sexual orientation, or gender identity.

## 2017-12-08 NOTE — PROGRESS NOTES
Progress Note    Client Name: Suresh Powers  Date: 12-08-17         Service Type: Individual      Session Start Time: 10   Session End Time: 10:50 am      Session Length: 37-52 min   Session #: 2     Attendees: Client    Treatment Plan Last Reviewed:   PHQ-9 / SPIKE-7 : see flow sheets     DATA      Progress Since Last Session (Related to Symptoms / Goals / Homework):   Symptoms: Stable    Homework: Completed in session      Episode of Care Goals: Satisfactory progress - ACTION (Actively working towards change); Intervened by reinforcing change plan / affirming steps taken     Current / Ongoing Stressors and Concerns:   anxiety     Treatment Objective(s) Addressed in This Session:   learn and demonstrate 3 assertiveness skill(s)  Increase interest, engagement, and pleasure in doing things  Identify negative self-talk and behaviors: challenge core beliefs, myths, and actions  identify 4 strategies for managing Obsessive thoughts and improving mood  identify how the use of substances contributes to the avoidance of feeling associated with the loss       Intervention:   Gathered additional info   Explored daily routine. Showers , prays, (karely is the one thing that keeps me going)   Discussed interference techniques:  Auditory, saying the rosary.    Taught TIP skills    Has desire to do good and serve. Suggested reading.     ASSESSMENT: Current Emotional / Mental Status (status of significant symptoms):   Risk status (Self / Other harm or suicidal ideation)   Client denies current fears or concerns for personal safety.   Client denies current or recent suicidal ideation or behaviors.   Client denies current or recent homicidal ideation or behaviors.   Client denies current or recent self injurious behavior or ideation.   Client denies other safety concerns.   A safety and risk management plan has not been developed at this time, however client was given the after-hours number  "/ 911 should there be a change in any of these risk factors.     Appearance:   Appropriate    Eye Contact:   Good    Psychomotor Behavior: Normal    Attitude:   Cooperative    Orientation:   All   Speech    Rate / Production: Normal     Volume:  Normal    Mood:    Anxious  Depressed  Normal   Affect:    Appropriate  Worrisome    Thought Content:  Clear    Thought Form:  Coherent  Logical    Insight:    Good      Medication Review:   Changes to psychiatric medications, see updated Medication List in EPIC.      Medication Compliance:   Yes     Changes in Health Issues:   None reported     Chemical Use Review:   Substance Use: Chemical use reviewed, no active concerns identified      Tobacco Use: No current tobacco use.       Collateral Reports Completed:   Routed note to PCP    PLAN: (Client Tasks / Therapist Tasks / Other)  TIP skills  Rishi Saez book for career concerns      Kelly Carpenter, LAURA                                                         ________________________________________________________________________    Treatment Plan    Client's Name: Suresh Powers  YOB: 1960    Date: 12-08-17    DSM-V Diagnoses: SPIKE / MDD  Psychosocial / Contextual Factors: let job due to anxety  WHODAS: see dx    Referral / Collaboration:  Referral to another professional/service is not indicated at this time..    Anticipated number of session or this episode of care: 10      MeasurableTreatment Goal(s) related to diagnosis / functional impairment(s)  Goal 1: Client will employ coping skills daily to manage mood disruption (including mood disruption/ sadness realted to career disruption).    I will know I've met my goal when \" keep stable emotions and ability to function daily.    Objective #A (Client Action)   Client will report using skills daily including spiritual practices and accessing support.     Intervention(s)   Therapist will reinforce and teach 9-12 coping skills. Employ early intervention.     Goal " "2: Client will process loss and move towards articulating a strong self worth and identity.    I will know I've met my goal when \"  Client reports a better sense of self worth; has more good days than bad.   feeling useful at home and find daily meaning and ( needed) purpose in being with his family.   Client will identify how thoughts contributes to the feeling of powerlessness and sadness associated with the loss.         Client has reviewed and agreed to the above plan.      Kelly Carpenter LP  December 8, 2017  "

## 2017-12-09 ASSESSMENT — ANXIETY QUESTIONNAIRES: GAD7 TOTAL SCORE: 17

## 2017-12-14 ENCOUNTER — OFFICE VISIT (OUTPATIENT)
Dept: PSYCHOLOGY | Facility: CLINIC | Age: 57
End: 2017-12-14
Payer: COMMERCIAL

## 2017-12-14 DIAGNOSIS — F33.1 MODERATE EPISODE OF RECURRENT MAJOR DEPRESSIVE DISORDER (H): Primary | ICD-10-CM

## 2017-12-14 DIAGNOSIS — F41.1 GENERALIZED ANXIETY DISORDER: ICD-10-CM

## 2017-12-14 DIAGNOSIS — F42.9 OBSESSIVE-COMPULSIVE DISORDER, UNSPECIFIED TYPE: ICD-10-CM

## 2017-12-14 PROCEDURE — 90834 PSYTX W PT 45 MINUTES: CPT | Performed by: PSYCHOLOGIST

## 2017-12-14 NOTE — MR AVS SNAPSHOT
"                  MRN:[id number]                      After Visit Summary   2017    Suresh Powers    MRN: [id number]           Visit Information        Provider Department      2017 9:00 AM Kelly Carpenter LP [facility] Generic      Your next 10 appointments already scheduled     Dec 28, 2017  9:00 AM CST   Return Visit with Kelly Carpenter LP   [facility] ([facility]    [address]  [facility] [id number] [phone]            2018  9:00 AM CST   SHORT with Justine Cantu [facility] ([facility])    [address] [facility] [address] [phone]              MyChart Information     [employer] lets you send messages to your doctor, view your test results, renew your prescriptions, schedule appointments and more. To sign up, go to [email or web address] . Click on \"Log in\" on the left side of the screen, which will take you to the Welcome page. Then click on \"Sign up Now\" on the right side of the page.     You will be asked to enter the access code listed below, as well as some personal information. Please follow the directions to create your username and password.     Your access code is: [email or web address]  Expires: 2018 11:12 AM     Your access code will  in 90 days. If you need help or a new code, please call your [place] clinic or [phone].        Care EveryWhere ID     This is your Care EveryWhere ID. This could be used by other organizations to access your [place] medical records  [id number]        Equal Access to Services     SANJANA ROWELL : Hadii vinita Roger, waaxda lucristinaadaha, qaybta kaaldbeora dodge, tramaine hazel. So [facility] [phone].    ATENCIÓN: Si habla español, tiene a regalado disposición servicios gratuitos de asistencia lingüística. Llame al [phone].    We comply with applicable federal " civil rights laws and [place] laws. We do not discriminate on the basis of race, color, national origin, age, disability, sex, sexual orientation, or gender identity.

## 2017-12-14 NOTE — PROGRESS NOTES
"                                             Progress Note    Client Name: Suresh Powers  Date: 12-14-17         Service Type: Individual      Session Start Time: 9  Session End Time: 9 ;50 am      Session Length: 37-52 min   Session #: 4     Attendees: Client    Treatment Plan Last Reviewed:   PHQ-9 / SPIKE-7 : see flow sheets     DATA      Progress Since Last Session (Related to Symptoms / Goals / Homework):   Symptoms: Stable but with anxiety and OCD    Homework: Completed in session      Episode of Care Goals: Satisfactory progress - ACTION (Actively working towards change); Intervened by reinforcing change plan / affirming steps taken     Current / Ongoing Stressors and Concerns:   Anxiety, OCD     Treatment Objective(s) Addressed in This Session:   learn and demonstrate 3 assertiveness skill(s)  Increase interest, engagement, and pleasure in doing things  Identify negative self-talk and behaviors: challenge core beliefs, myths, and actions  identify 4 strategies for managing Obsessive thoughts and improving mood  identify how the use of substances contributes to the avoidance of feeling associated with the loss       Intervention:     Taught breath work. Client did well, got to an 8 of 10  onrelaxation scale    Reviewed triggers,   Lookes back to 5th grade.  \"My confidence and self esteem are just shot\".    Processed emotions     ASSESSMENT: Current Emotional / Mental Status (status of significant symptoms):   Risk status (Self / Other harm or suicidal ideation)   Client denies current fears or concerns for personal safety.   Client denies current or recent suicidal ideation or behaviors.   Client denies current or recent homicidal ideation or behaviors.   Client denies current or recent self injurious behavior or ideation.   Client denies other safety concerns.   A safety and risk management plan has not been developed at this time, however client was given the after-hours number / 911 should there be a change " "in any of these risk factors.     Appearance:   Appropriate    Eye Contact:   Good    Psychomotor Behavior: Normal    Attitude:   Cooperative    Orientation:   All   Speech    Rate / Production: Normal     Volume:  Normal    Mood:    Anxious  Depressed  Normal   Affect:    Appropriate  Worrisome    Thought Content:  Clear    Thought Form:  Coherent  Logical    Insight:    Good      Medication Review:   Changes to psychiatric medications, see updated Medication List in EPIC.      Medication Compliance:   Yes     Changes in Health Issues:   None reported     Chemical Use Review:   Substance Use: Chemical use reviewed, no active concerns identified      Tobacco Use: No current tobacco use.       Collateral Reports Completed:   Routed note to PCP    PLAN: (Client Tasks / Therapist Tasks / Other)    Use breath work  TIP skills  Rishi Saez book for career concerns      Kelly LAURA Carpenter                                                         ________________________________________________________________________    Treatment Plan    Client's Name: Suresh Powers  YOB: 1960    Date: 12-08-17    DSM-V Diagnoses: SPIKE / MDD  Psychosocial / Contextual Factors: let job due to anxety  WHODAS: see dx    Referral / Collaboration:  Referral to another professional/service is not indicated at this time..    Anticipated number of session or this episode of care: 10      MeasurableTreatment Goal(s) related to diagnosis / functional impairment(s)  Goal 1: Client will employ coping skills daily to manage mood disruption (including mood disruption/ sadness realted to career disruption).    I will know I've met my goal when \" keep stable emotions and ability to function daily.    Objective #A (Client Action)   Client will report using skills daily including spiritual practices and accessing support.     Intervention(s)   Therapist will reinforce and teach 9-12 coping skills. Employ early intervention.     Goal 2: Client " "will process loss and move towards articulating a strong self worth and identity.    I will know I've met my goal when \"  Client reports a better sense of self worth; has more good days than bad.   feeling useful at home and find daily meaning and ( needed) purpose in being with his family.   Client will identify how thoughts contributes to the feeling of powerlessness and sadness associated with the loss.         Client has reviewed and agreed to the above plan.      Kelly Carpenter LP  December 8, 2017  "

## 2017-12-15 DIAGNOSIS — F32.A DEPRESSION, UNSPECIFIED DEPRESSION TYPE: ICD-10-CM

## 2017-12-15 DIAGNOSIS — F41.0 ANXIETY ATTACK: ICD-10-CM

## 2017-12-15 RX ORDER — ESCITALOPRAM OXALATE 20 MG/1
TABLET ORAL
Qty: 90 TABLET | Refills: 1 | Status: SHIPPED | OUTPATIENT
Start: 2017-12-15 | End: 2018-01-02

## 2017-12-15 ASSESSMENT — ANXIETY QUESTIONNAIRES
GAD7 TOTAL SCORE: 16
IF YOU CHECKED OFF ANY PROBLEMS ON THIS QUESTIONNAIRE, HOW DIFFICULT HAVE THESE PROBLEMS MADE IT FOR YOU TO DO YOUR WORK, TAKE CARE OF THINGS AT HOME, OR GET ALONG WITH OTHER PEOPLE: EXTREMELY DIFFICULT
2. NOT BEING ABLE TO STOP OR CONTROL WORRYING: NEARLY EVERY DAY
5. BEING SO RESTLESS THAT IT IS HARD TO SIT STILL: NOT AT ALL
3. WORRYING TOO MUCH ABOUT DIFFERENT THINGS: NEARLY EVERY DAY
6. BECOMING EASILY ANNOYED OR IRRITABLE: SEVERAL DAYS
1. FEELING NERVOUS, ANXIOUS, OR ON EDGE: NEARLY EVERY DAY
7. FEELING AFRAID AS IF SOMETHING AWFUL MIGHT HAPPEN: NEARLY EVERY DAY

## 2017-12-15 ASSESSMENT — PATIENT HEALTH QUESTIONNAIRE - PHQ9
SUM OF ALL RESPONSES TO PHQ QUESTIONS 1-9: 9
5. POOR APPETITE OR OVEREATING: NEARLY EVERY DAY

## 2017-12-15 NOTE — TELEPHONE ENCOUNTER
Requested Prescriptions   Pending Prescriptions Disp Refills     escitalopram (LEXAPRO) 20 MG tablet [Pharmacy Med Name: Escitalopram Oxalate Oral Tablet 20 MG] 30 tablet 0     Sig: TAKE 1 TABLET BY MOUTH ONE TIME DAILY    SSRIs Protocol Failed    12/15/2017  2:29 PM       Failed - PHQ-9 score less than 5 in past 6 months    Please review PHQ-9 score.          Passed - Medication is NOT Bupropion    If the medication is Bupropion (Wellbutrin), and the patient is taking for smoking cessation; OK to refill.         Passed - Patient is age 18 or older       Passed - Recent (6 mo) or future visit with authorizing provider's specialty    Patient had office visit in the last 6 months or has a visit in the next 30 days with authorizing provider.  See chart review.             Updated questionnaires today, he is seeing Justine in January for a follow up, does want you as his primary, ok to refill today?  He does have anxiety, but feels improved with counseling and medication. Working his way up on the Buspar. Please sign if ok.  Xochitl Richardson RN  Triage Nurse

## 2017-12-16 ASSESSMENT — ANXIETY QUESTIONNAIRES: GAD7 TOTAL SCORE: 16

## 2017-12-28 ENCOUNTER — OFFICE VISIT (OUTPATIENT)
Dept: PSYCHOLOGY | Facility: CLINIC | Age: 57
End: 2017-12-28
Payer: COMMERCIAL

## 2017-12-28 DIAGNOSIS — F41.1 GENERALIZED ANXIETY DISORDER: ICD-10-CM

## 2017-12-28 DIAGNOSIS — F33.1 MODERATE EPISODE OF RECURRENT MAJOR DEPRESSIVE DISORDER (H): Primary | ICD-10-CM

## 2017-12-28 PROCEDURE — 90834 PSYTX W PT 45 MINUTES: CPT | Performed by: PSYCHOLOGIST

## 2017-12-28 NOTE — PROGRESS NOTES
"                                             Progress Note    Client Name: Suresh Powers  Date: 12-28-17         Service Type: Individual      Session Start Time: 9  Session End Time: 9:50 am      Session Length: 37-52 min   Session #: 5     Attendees: Client    Treatment Plan Last Reviewed:   PHQ-9 / SPIKE-7 : see flow sheets     DATA      Progress Since Last Session (Related to Symptoms / Goals / Homework):   Symptoms: Stable but with anxiety and OCD    Homework: Completed in session      Episode of Care Goals: Satisfactory progress - ACTION (Actively working towards change); Intervened by reinforcing change plan / affirming steps taken     Current / Ongoing Stressors and Concerns:   Anxiety, OCD     Treatment Objective(s) Addressed in This Session:   learn and demonstrate 3 assertiveness skill(s)  Increase interest, engagement, and pleasure in doing things  Identify negative self-talk and behaviors: challenge core beliefs, myths, and actions  identify 4 strategies for managing Obsessive thoughts and improving mood  identify how the use of (food) substances contributes to the avoidance of feeling associated with the loss       Intervention:  Client reports that he had some sadness over the holidays.    Reviewed losses: mom gone 13 + years, dad for 7 years.   Reviewed mothers anxiety patterns.    Finds the trazedone useful so he can sleep.  (7-8 hours)  Likes the new medication- \"I think its doing something, I like it\".    No side effects reported.     Has been scared to take new meds - due to past hx of side effects.      Introduced action steps around 2 fears.  Introduced 2 distraction skills 2 reinterpretation skills.   Processed emotions     ASSESSMENT: Current Emotional / Mental Status (status of significant symptoms):   Risk status (Self / Other harm or suicidal ideation)   Client denies current fears or concerns for personal safety.   Client denies current or recent suicidal ideation or behaviors.   Client " denies current or recent homicidal ideation or behaviors.   Client denies current or recent self injurious behavior or ideation.   Client denies other safety concerns.   A safety and risk management plan has not been developed at this time, however client was given the after-hours number / 911 should there be a change in any of these risk factors.     Appearance:   Appropriate    Eye Contact:   Good    Psychomotor Behavior: Normal    Attitude:   Cooperative    Orientation:   All   Speech    Rate / Production: Normal     Volume:  Normal    Mood:    Anxious  Depressed  Normal   Affect:    Appropriate  Worrisome    Thought Content:  Clear    Thought Form:  Coherent  Logical    Insight:    Good      Medication Review:   Changes to psychiatric medications, see updated Medication List in EPIC.      Medication Compliance:   Yes     Changes in Health Issues:   None reported     Chemical Use Review:   Substance Use: Chemical use reviewed, no active concerns identified      Tobacco Use: No current tobacco use.       Collateral Reports Completed:   Routed note to PCP    PLAN: (Client Tasks / Therapist Tasks / Other  2 distraction skills and 2 reinterpretation skills  past hw  Use breath work  TIP skills  Rishi Saez book for career concerns      Kelly Carpenter LP                                                         ________________________________________________________________________    Treatment Plan    Client's Name: Suresh Powers  YOB: 1960    Date: 12-08-17    DSM-V Diagnoses: SPIKE / MDD  Psychosocial / Contextual Factors: let job due to anxety  WHODAS: see dx    Referral / Collaboration:  Referral to another professional/service is not indicated at this time..    Anticipated number of session or this episode of care: 10      MeasurableTreatment Goal(s) related to diagnosis / functional impairment(s)  Goal 1: Client will employ coping skills daily to manage mood disruption (including mood disruption/  "sadness realted to career disruption).    I will know I've met my goal when \" keep stable emotions and ability to function daily.    Objective #A (Client Action)   Client will report using skills daily including spiritual practices and accessing support.     Intervention(s)   Therapist will reinforce and teach 9-12 coping skills. Employ early intervention.     Goal 2: Client will process loss and move towards articulating a strong self worth and identity.    I will know I've met my goal when \"  Client reports a better sense of self worth; has more good days than bad.   feeling useful at home and find daily meaning and ( needed) purpose in being with his family.   Client will identify how thoughts contributes to the feeling of powerlessness and sadness associated with the loss.         Client has reviewed and agreed to the above plan.      Kelly Carpenter LP  December 8, 2017  "

## 2017-12-28 NOTE — Clinical Note
"Likes the new medication- \"I  Think its doing something, I lie it\".   No side effects reported. "

## 2017-12-28 NOTE — MR AVS SNAPSHOT
"                  MRN:[id number]                      After Visit Summary   2017    Suresh Powers    MRN: [id number]           Visit Information        Provider Department      2017 9:00 AM Kelly Carpenter LP [facility] Generic      Your next 10 appointments already scheduled     2018  9:00 AM CST   SHORT with Justine Cantu, [facility] ([facility])    [address] [place] [address]   [phone]            2018 10:00 AM CST   Return Visit with Kelly Carpenter LP   [facility] ([facility])    [address] [place] [address] [phone]            Feb 15, 2018 10:00 AM CST   Return Visit with Kelly Carpenter LP   [facility] ([facility])    [address] [place] [address] [phone]            2018 10:00 AM CST   Return Visit with Kelly Carpenter LP   [facility] ([facility])    [address] [place] [address] [phone]              MyChart Information     Savedailyt lets you send messages to your doctor, view your test results, renew your prescriptions, schedule appointments and more. To sign up, go to [email or web address] . Click on \"Log in\" on the left side of the screen, which will take you to the Welcome page. Then click on \"Sign up Now\" on the right side of the page.     You will be asked to enter the access code listed below, as well as some personal information. Please follow the directions to create your username and password.     Your access code is: [email or web address]  Expires: 2018 11:12 AM     Your access code will  in 90 days. If you need help or a new code, please call your [place] clinic or [phone].        Care EveryWhere ID     This is your Care EveryWhere ID. This could be used by other organizations to access " your [place] medical records  [id number]        Equal Access to Services     SANJANA ROWELL : Marylou Roger, carmen keating, murray dodge, tramaine hazel. So [facility] [phone].    ATENCIÓN: Si habla español, tiene a regalado disposición servicios gratuitos de asistencia lingüística. Llame al [phone].    We comply with applicable federal civil rights laws and [place] laws. We do not discriminate on the basis of race, color, national origin, age, disability, sex, sexual orientation, or gender identity.

## 2018-01-02 ENCOUNTER — OFFICE VISIT (OUTPATIENT)
Dept: PHARMACY | Facility: CLINIC | Age: 58
End: 2018-01-02
Payer: COMMERCIAL

## 2018-01-02 VITALS
SYSTOLIC BLOOD PRESSURE: 152 MMHG | HEART RATE: 62 BPM | WEIGHT: 165.1 LBS | DIASTOLIC BLOOD PRESSURE: 94 MMHG | BODY MASS INDEX: 25.86 KG/M2

## 2018-01-02 DIAGNOSIS — F32.A DEPRESSION, UNSPECIFIED DEPRESSION TYPE: ICD-10-CM

## 2018-01-02 DIAGNOSIS — I10 BENIGN ESSENTIAL HYPERTENSION: ICD-10-CM

## 2018-01-02 DIAGNOSIS — F41.0 ANXIETY ATTACK: Primary | ICD-10-CM

## 2018-01-02 PROCEDURE — 99606 MTMS BY PHARM EST 15 MIN: CPT | Performed by: PHARMACIST

## 2018-01-02 PROCEDURE — 99607 MTMS BY PHARM ADDL 15 MIN: CPT | Performed by: PHARMACIST

## 2018-01-02 RX ORDER — BUSPIRONE HYDROCHLORIDE 15 MG/1
15 TABLET ORAL 2 TIMES DAILY
Qty: 60 TABLET | Refills: 2 | Status: SHIPPED | OUTPATIENT
Start: 2018-01-02 | End: 2018-02-27

## 2018-01-02 RX ORDER — ESCITALOPRAM OXALATE 20 MG/1
TABLET ORAL
Qty: 30 TABLET | Refills: 5 | Status: SHIPPED | OUTPATIENT
Start: 2018-01-02 | End: 2018-05-10

## 2018-01-02 RX ORDER — CHLORTHALIDONE 25 MG/1
25 TABLET ORAL DAILY
Qty: 30 TABLET | Refills: 5 | Status: SHIPPED | OUTPATIENT
Start: 2018-01-02 | End: 2018-05-10

## 2018-01-02 RX ORDER — LISINOPRIL 20 MG/1
20 TABLET ORAL DAILY
Qty: 30 TABLET | Refills: 5 | Status: SHIPPED | OUTPATIENT
Start: 2018-01-02 | End: 2018-02-06 | Stop reason: SINTOL

## 2018-01-02 ASSESSMENT — ANXIETY QUESTIONNAIRES
GAD7 TOTAL SCORE: 16
2. NOT BEING ABLE TO STOP OR CONTROL WORRYING: NEARLY EVERY DAY
1. FEELING NERVOUS, ANXIOUS, OR ON EDGE: NEARLY EVERY DAY
7. FEELING AFRAID AS IF SOMETHING AWFUL MIGHT HAPPEN: NEARLY EVERY DAY
3. WORRYING TOO MUCH ABOUT DIFFERENT THINGS: NEARLY EVERY DAY
5. BEING SO RESTLESS THAT IT IS HARD TO SIT STILL: NOT AT ALL
6. BECOMING EASILY ANNOYED OR IRRITABLE: SEVERAL DAYS
IF YOU CHECKED OFF ANY PROBLEMS ON THIS QUESTIONNAIRE, HOW DIFFICULT HAVE THESE PROBLEMS MADE IT FOR YOU TO DO YOUR WORK, TAKE CARE OF THINGS AT HOME, OR GET ALONG WITH OTHER PEOPLE: SOMEWHAT DIFFICULT

## 2018-01-02 ASSESSMENT — PATIENT HEALTH QUESTIONNAIRE - PHQ9
5. POOR APPETITE OR OVEREATING: NEARLY EVERY DAY
SUM OF ALL RESPONSES TO PHQ QUESTIONS 1-9: 10

## 2018-01-02 NOTE — MR AVS SNAPSHOT
After Visit Summary   1/2/2018    Suresh Powers    MRN: [id number]           Patient Information     Date Of Birth          1960        Visit Information        Provider Department      1/2/2018 9:00 AM Justine Cantu, [facility] MTM        Today's Diagnoses     Moderate episode of recurrent major depressive disorder (H)    -  1    Anxiety attack        Depression, unspecified depression type        Benign essential hypertension          Care Instructions    Recommendations from today's MTM visit:                                                      Anxiety/Depression:  Continue on escitalopram and buspirone.  Change to buspirone 15mg twice daily    High Blood Pressure:  1) Stop lisinopril/hydrochlorothiazide  2) Take lisinopril 20mg at bedtime  3) Take chlorthalidone 25mg in the morning.  4)  Get labs checked in 1 month    Next MTM/pharmacist visit: 1 month    To schedule another MTM appointment, please call the clinic directly or you may call the MTM scheduling line at [phone] or toll-free at [phone].     My Clinical Pharmacist's contact information:                                                      It was a pleasure seeing you today!  Please feel free to contact me with any questions or concerns you have.      Justine Cantu, PharmD [facility]  Medication Therapy Management Practitioner   #[phone]    You may receive a survey about the MTM services you received.  I would appreciate your feedback to help me serve you better in the future. Please fill it out and return it when you can. Your comments will be anonymous.                  Follow-ups after your visit        Your next 10 appointments already scheduled     Feb 02, 2018 10:00 AM CST   LAB with  LAB   [facility] ([facility])    [address] [facility] [address] [phone]           Please do not eat 10-12 hours before your appointment if you are  coming in fasting for labs on lipids, cholesterol, or glucose (sugar). This does not apply to pregnant women. Water, hot tea and black coffee (with nothing added) are okay. Do not drink other fluids, diet soda or chew gum.            Feb 06, 2018 11:00 AM CST   SHORT with Justine Cantu [facility] ([facility])    [address] [facility] [address]   [phone]            Feb 08, 2018 10:00 AM CST   Return Visit with Kelly Carpenter LP   [facility] ([facility])    [address] [address] [address]   [phone]            Feb 15, 2018 10:00 AM CST   Return Visit with Kelly Carpenter LP   [facility] ([facility])    [address] [address] [address]   [phone]            Feb 22, 2018 10:00 AM CST   Return Visit with Kelly Carpenter LP   [facility] ([facility])    [address] [address] [address]   [phone]              Future tests that were ordered for you today     Open Future Orders        Priority Expected Expires Ordered    Basic metabolic panel Routine  4/2/2018 1/2/2018            Who to contact     If you have questions or need follow up information about today's clinic visit or your schedule please contact [facility] directly at [phone].  Normal or non-critical lab and imaging results will be communicated to you by MyChart, letter or phone within 4 business days after the clinic has received the results. If you do not hear from us within 7 days, please contact the clinic through nothingGrinderhart or phone. If you have a critical or abnormal lab result, we will notify you by phone as soon as possible.  Submit refill requests through [employer] or call your pharmacy and they will forward the refill request to us. Please allow 3 business days for your refill to be completed.          Additional  "Information About Your Visit        MyChart Information     Check-Cap lets you send messages to your doctor, view your test results, renew your prescriptions, schedule appointments and more. To sign up, go to [email or web address] . Click on \"Log in\" on the left side of the screen, which will take you to the Welcome page. Then click on \"Sign up Now\" on the right side of the page.     You will be asked to enter the access code listed below, as well as some personal information. Please follow the directions to create your username and password.     Your access code is: [email or web address]  Expires: 2018 11:12 AM     Your access code will  in 90 days. If you need help or a new code, please call your [place] clinic or [phone].        Care EveryWhere ID     This is your Care EveryWhere ID. This could be used by other organizations to access your [place] medical records  [id number]        Your Vitals Were     Pulse BMI (Body Mass Index)                62 25.86 kg/m2           Blood Pressure from Last 3 Encounters:   18 (!) 152/94   17 135/77   10/04/17 132/82    Weight from Last 3 Encounters:   18 165 lb 1.6 oz (74.9 kg)   17 160 lb 4.8 oz (72.7 kg)   10/04/17 160 lb 3.2 oz (72.7 kg)                 Today's Medication Changes          These changes are accurate as of: 18  9:35 AM.  If you have any questions, ask your nurse or doctor.               Start taking these medicines.        Dose/Directions    chlorthalidone 25 MG tablet   Commonly known as:  HYGROTON   Used for:  Benign essential hypertension        Dose:  25 mg   Take 1 tablet (25 mg) by mouth daily   Quantity:  30 tablet   Refills:  5       lisinopril 20 MG tablet   Commonly known as:  PRINIVIL/ZESTRIL   Used for:  Benign essential hypertension        Dose:  20 mg   Take 1 tablet (20 mg) by mouth daily   Quantity:  30 tablet   Refills:  5         These medicines have changed or have updated prescriptions.        " Dose/Directions    busPIRone 15 MG tablet   Commonly known as:  BUSPAR   This may have changed:    - medication strength  - how much to take  - how to take this  - when to take this  - additional instructions   Used for:  Moderate episode of recurrent major depressive disorder (H), Anxiety attack, Depression, unspecified depression type        Dose:  15 mg   Take 1 tablet (15 mg) by mouth 2 times daily   Quantity:  60 tablet   Refills:  2       escitalopram 20 MG tablet   Commonly known as:  LEXAPRO   This may have changed:  See the new instructions.   Used for:  Anxiety attack, Depression, unspecified depression type        TAKE 1 TABLET BY MOUTH ONE TIME DAILY   Quantity:  30 tablet   Refills:  5         Stop taking these medicines if you haven't already. Please contact your care team if you have questions.     lisinopril-hydrochlorothiazide 20-25 MG per tablet   Commonly known as:  PRINZIDE/ZESTORETIC                Where to get your medicines      These medications were sent to [id number] [address] [address]     Phone:  [phone]     busPIRone 15 MG tablet    chlorthalidone 25 MG tablet    escitalopram 20 MG tablet    lisinopril 20 MG tablet                Primary Care Provider Office Phone # Fax #    Carmen Luis Alfredo Martinez, APRN [facility] [phone] [address]  [facility] [address]        Equal Access to Services     [facility] Hospital[facility] AH: Hadii vinita ku hadasho Soomaali, waaxda luqadaha, qaybta kaalmada adeegyada, waxmiranda krishnan haydavid silva . So [facility] [phone].    ATENCIÓN: Si habla español, tiene a regalado disposición servicios gratuitos de asistencia lingüística. Llame al [phone].    We comply with applicable federal civil rights laws and [place] laws. We do not discriminate on the basis of race, color, national origin, age, disability, sex, sexual orientation, or gender identity.            Thank you!     Thank  you for choosing [facility]  for your care. Our goal is always to provide you with excellent care. Hearing back from our patients is one way we can continue to improve our services. Please take a few minutes to complete the written survey that you may receive in the mail after your visit with us. Thank you!             Your Updated Medication List - Protect others around you: Learn how to safely use, store and throw away your medicines at www.disposemymeds.org.          This list is accurate as of: 1/2/18  9:35 AM.  Always use your most recent med list.                   Brand Name Dispense Instructions for use Diagnosis    albuterol (2.5 MG/3ML) 0.083% neb solution     25 vial    Take 1 vial (2.5 mg) by nebulization every 4 hours as needed for shortness of breath / dyspnea or wheezing    Pneumonia of left lower lobe due to infectious organism (H), Shortness of breath       busPIRone 15 MG tablet    BUSPAR    60 tablet    Take 1 tablet (15 mg) by mouth 2 times daily    Moderate episode of recurrent major depressive disorder (H), Anxiety attack, Depression, unspecified depression type       chlorthalidone 25 MG tablet    HYGROTON    30 tablet    Take 1 tablet (25 mg) by mouth daily    Benign essential hypertension       escitalopram 20 MG tablet    LEXAPRO    30 tablet    TAKE 1 TABLET BY MOUTH ONE TIME DAILY    Anxiety attack, Depression, unspecified depression type       fluticasone 50 MCG/ACT spray    FLONASE    16 g    Spray 2 sprays into both nostrils daily    Upper respiratory tract infection, unspecified type       lisinopril 20 MG tablet    PRINIVIL/ZESTRIL    30 tablet    Take 1 tablet (20 mg) by mouth daily    Benign essential hypertension       MULTIVITAMIN ADULT PO      Take 1 tablet by mouth daily        traZODone 50 MG tablet    DESYREL    90 tablet    Take 1-4 tablets ( mg) by mouth At Bedtime    Sleep disorder       TYLENOL PO      Take 1,000 mg by mouth every 8 hours as  needed        VITAMIN C PO      Take by mouth daily        zinc 50 MG Tabs      Take 100 mg by mouth daily

## 2018-01-02 NOTE — PATIENT INSTRUCTIONS
Recommendations from today's MTM visit:                                                      Anxiety/Depression:  Continue on escitalopram and buspirone.  Change to buspirone 15mg twice daily    High Blood Pressure:  1) Stop lisinopril/hydrochlorothiazide  2) Take lisinopril 20mg at bedtime  3) Take chlorthalidone 25mg in the morning.  4)  Get labs checked in 1 month    Next MTM/pharmacist visit: 1 month    To schedule another MTM appointment, please call the clinic directly or you may call the MTM scheduling line at [phone] or toll-free at [phone].     My Clinical Pharmacist's contact information:                                                      It was a pleasure seeing you today!  Please feel free to contact me with any questions or concerns you have.      Justine Cantu, PharmD [facility]  Medication Therapy Management Practitioner   #[phone]    You may receive a survey about the MTM services you received.  I would appreciate your feedback to help me serve you better in the future. Please fill it out and return it when you can. Your comments will be anonymous.

## 2018-01-02 NOTE — PROGRESS NOTES
SUBJECTIVE/OBJECTIVE:                Suresh Powers is a 57 year old male coming in for a follow-up visit for Medication Therapy Management.  He was referred to me from Dr. Martinez.       Chief Complaint: Follow up from our visit on 12/5/2017.  Question on Lexapro, he would like refill--pharmacy only gave 1 refill (should have been 5), and recheck on blood pressure    Tobacco: No tobacco use  Alcohol: not currently using    Medication Adherence: no issues reported    Hypertension: Current medications include lisinopril/HCTZ 20mg-25mg once daily.  Patient does not self-monitor BP.  Patient reports no current medication side effects.  He has one kidney, he donated one kidney.    Potassium   Date Value Ref Range Status   10/11/2017 5.2 3.4 - 5.3 mmol/L Final   ]    Insomnia/Anxiety/Depression: Current medications include: escitalporam 20mg QD, buspirone 15mg BID (he has been titrating up on this and on this current dose for 1 week), trazodone 50-150mg HS, usually 1-3 at night.  He feels the trazodone is helpful to fall asleep and anxiety. Side effects: no side effects.  Seeing psychology-Kelly Carpenter LP   Next appointment is February.  He finds the buspirone is helping.  He is concerned with OCD, wants to make sure he is on medications for this.  Failed medications: He was taking sertraline in the past but he had diarrhea and it was stopped. Failed Prozac and stopped Paxil not sure why stopped but worked, side effects to sertraline. He was seeing psychiatry but lost to follow-up.     PHQ-9 SCORE 12/8/2017 12/15/2017 1/2/2018   Total Score 12 9 10     SPIKE-7 SCORE 12/8/2017 12/15/2017 1/2/2018   Total Score 17 16 16       Current labs include:BP Readings from Last 3 Encounters:   12/05/17 135/77   10/04/17 132/82   09/05/17 148/84     Today's Vitals: BP (!) 166/98  Pulse 67  Wt 165 lb 1.6 oz (74.9 kg)  BMI 25.86 kg/m2  No results found for: A1C.  Lab Results   Component Value Date    CHOL 175 03/21/2017     Lab  Results   Component Value Date    TRIG 62 03/21/2017     Lab Results   Component Value Date    HDL 68 03/21/2017     Lab Results   Component Value Date    LDL 95 03/21/2017       Liver Function Studies -   Recent Labs   Lab Test  12/19/15   0907   PROTTOTAL  7.6   ALBUMIN  3.9   BILITOTAL  0.5   ALKPHOS  117   AST  18   ALT  32       Last Basic Metabolic Panel:  Lab Results   Component Value Date     10/11/2017      Lab Results   Component Value Date    POTASSIUM 5.2 10/11/2017     Lab Results   Component Value Date    CHLORIDE 104 10/11/2017     Lab Results   Component Value Date    BUN 27 10/11/2017     Lab Results   Component Value Date    CR 1.16 10/11/2017     GFR Estimate   Date Value Ref Range Status   10/11/2017 65 >60 mL/min/1.7m2 Final     Comment:     Non  GFR Calc   10/04/2017 84 >60 mL/min/1.7m2 Final     Comment:     Non  GFR Calc   05/11/2017 64 >60 mL/min/1.7m2 Final     Comment:     Non  GFR Calc     GFR Estimate If Black   Date Value Ref Range Status   10/11/2017 78 >60 mL/min/1.7m2 Final     Comment:      GFR Calc   10/04/2017 >90 >60 mL/min/1.7m2 Final     Comment:      GFR Calc   05/11/2017 77 >60 mL/min/1.7m2 Final     Comment:      GFR Calc     No results found for: TSH]    Most Recent Immunizations   Administered Date(s) Administered     Influenza Vaccine IM 3yrs+ 4 Valent IIV4 12/05/2017     TDAP Vaccine (Adacel) 06/06/2017       ASSESSMENT:              Current medications were reviewed today as discussed above.      Medication Adherence: no issues identified    Anxiety/Depression: Needs improvement.  Discussed excitalopram has off-label use for OCD.  Pt would benefit from being on buspirone longer for efficacy at current dose, did not seen indication for OCD on this.  Pt would benefit from continuing to see psychologist.  Discussed option of having him see psychiatrist.    Hypertension:  Needs Improvement. Patient is not meeting BP goal of < 130/80mmHg.  Pt would benefit from the following changes - change hydrochlorothiazide to chlorthalidone due to history of elevated potassium and may be more effective. Per chronotherapy trials, change lisinopril to evening administration.  Due to check BMP in 1 month.     PLAN:                  Anxiety/Depression:  Continue on escitalopram and buspirone.  Change to buspirone 15mg twice daily. Refills given.    High Blood Pressure:  1) Stop lisinopril/hydrochlorothiazide  2) Take lisinopril 20mg at bedtime  3) Take chlorthalidone 25mg in the morning.  4)  Get BMP checked in 1 month    Next MTM/pharmacist visit: 1 month    I spent 30 minutes with this patient today. All changes were made via collaborative practice agreement with Carmen Martinez Ra. A copy of the visit note was provided to the patient's primary care provider.     The patient was given a summary of these recommendations as an after visit summary.    Justine Cantu, PharmD [facility]  Medication Therapy Management Practitioner   #[phone]

## 2018-01-03 ASSESSMENT — ANXIETY QUESTIONNAIRES: GAD7 TOTAL SCORE: 16

## 2018-02-02 DIAGNOSIS — I10 BENIGN ESSENTIAL HYPERTENSION: ICD-10-CM

## 2018-02-02 PROCEDURE — 36415 COLL VENOUS BLD VENIPUNCTURE: CPT | Performed by: NURSE PRACTITIONER

## 2018-02-02 PROCEDURE — 80048 BASIC METABOLIC PNL TOTAL CA: CPT | Performed by: NURSE PRACTITIONER

## 2018-02-04 LAB
ANION GAP SERPL CALCULATED.3IONS-SCNC: 6 MMOL/L (ref 3–14)
BUN SERPL-MCNC: 31 MG/DL (ref 7–30)
CALCIUM SERPL-MCNC: 9 MG/DL (ref 8.5–10.1)
CHLORIDE SERPL-SCNC: 107 MMOL/L (ref 94–109)
CO2 SERPL-SCNC: 25 MMOL/L (ref 20–32)
CREAT SERPL-MCNC: 1.36 MG/DL (ref 0.66–1.25)
GFR SERPL CREATININE-BSD FRML MDRD: 54 ML/MIN/1.7M2
GLUCOSE SERPL-MCNC: 103 MG/DL (ref 70–99)
POTASSIUM SERPL-SCNC: 5.5 MMOL/L (ref 3.4–5.3)
SODIUM SERPL-SCNC: 138 MMOL/L (ref 133–144)

## 2018-02-06 ENCOUNTER — OFFICE VISIT (OUTPATIENT)
Dept: PHARMACY | Facility: CLINIC | Age: 58
End: 2018-02-06
Payer: COMMERCIAL

## 2018-02-06 VITALS
HEART RATE: 69 BPM | DIASTOLIC BLOOD PRESSURE: 82 MMHG | SYSTOLIC BLOOD PRESSURE: 124 MMHG | WEIGHT: 161.4 LBS | BODY MASS INDEX: 25.28 KG/M2

## 2018-02-06 DIAGNOSIS — I10 HTN, GOAL BELOW 140/90: Primary | ICD-10-CM

## 2018-02-06 DIAGNOSIS — F41.0 ANXIETY ATTACK: ICD-10-CM

## 2018-02-06 DIAGNOSIS — F32.A DEPRESSION, UNSPECIFIED DEPRESSION TYPE: ICD-10-CM

## 2018-02-06 DIAGNOSIS — G47.9 SLEEP DISORDER: ICD-10-CM

## 2018-02-06 PROCEDURE — 99607 MTMS BY PHARM ADDL 15 MIN: CPT | Performed by: PHARMACIST

## 2018-02-06 PROCEDURE — 99606 MTMS BY PHARM EST 15 MIN: CPT | Performed by: PHARMACIST

## 2018-02-06 RX ORDER — AMLODIPINE BESYLATE 5 MG/1
5 TABLET ORAL DAILY
Qty: 30 TABLET | Refills: 2 | Status: SHIPPED | OUTPATIENT
Start: 2018-02-06 | End: 2018-05-10

## 2018-02-06 NOTE — PROGRESS NOTES
SUBJECTIVE/OBJECTIVE:                Suresh Powers is a 57 year old male coming in for a follow-up visit for Medication Therapy Management.  He was referred to me from Carmen Martinez CNP.     Chief Complaint: Follow up from our visit on 1/2/2018.  HTN    Tobacco: No tobacco use  Alcohol: not currently using  Exercise:  Can not due to disability due to feet    Medication Adherence: no issues reported generally, missed one day of once of lisinopril.    Hypertension: Current medications include chlorthalidone 25mg QD and  lisinopril 20mg once daily at bedtime.  Patient does not self-monitor BP.  Patient reports no current medication side effects.  He has one kidney, he donated one kidney. He does not avoid salt, eats canned vegetables for example.    Potassium   Date Value Ref Range Status   10/11/2017 5.2 3.4 - 5.3 mmol/L Final   ]    Insomnia/Anxiety/Depression: Current medications include: escitalporam 20mg QD, buspirone 15mg BID, trazodone 50-150mg HS, usually 1-3 at night.  He feels the trazodone is helpful to fall asleep and anxiety. Side effects: no side effects.  Seeing psychology-Kelly Carpenter LP   Next appointment is February 2/8.  He wants to wait to do PHQ-9 and SPIKE scores then.  Piece of mind with disabilities, get got SS disability recently.  He finds the buspirone is helping.  He is concerned with OCD and will work with Kelly on this. Failed medications: He was taking sertraline in the past but he had diarrhea and it was stopped. Failed Prozac and stopped Paxil not sure why stopped but worked, side effects to sertraline. He was seeing psychiatry but lost to follow-up.     PHQ-9 SCORE 12/8/2017 12/15/2017 1/2/2018   Total Score 12 9 10     SPIKE-7 SCORE 12/8/2017 12/15/2017 1/2/2018   Total Score 17 16 16     Current labs include:  BP Readings from Last 3 Encounters:   01/02/18 (!) 152/94   12/05/17 135/77   10/04/17 132/82     Today's Vitals: /82  Pulse 69  Wt 161 lb 6.4 oz (73.2 kg)  BMI 25.28  kg/m2  Lab Results   Component Value Date    CHOL 175 03/21/2017     Lab Results   Component Value Date    TRIG 62 03/21/2017     Lab Results   Component Value Date    HDL 68 03/21/2017     Lab Results   Component Value Date    LDL 95 03/21/2017       Liver Function Studies -   Recent Labs   Lab Test  12/19/15   0907   PROTTOTAL  7.6   ALBUMIN  3.9   BILITOTAL  0.5   ALKPHOS  117   AST  18   ALT  32     Last Basic Metabolic Panel:  Lab Results   Component Value Date     02/02/2018      Lab Results   Component Value Date    POTASSIUM 5.5 02/02/2018     Lab Results   Component Value Date    CHLORIDE 107 02/02/2018     Lab Results   Component Value Date    BUN 31 02/02/2018     Lab Results   Component Value Date    CR 1.36 02/02/2018     GFR Estimate   Date Value Ref Range Status   02/02/2018 54 (L) >60 mL/min/1.7m2 Final     Comment:     Non  GFR Calc   10/11/2017 65 >60 mL/min/1.7m2 Final     Comment:     Non  GFR Calc   10/04/2017 84 >60 mL/min/1.7m2 Final     Comment:     Non  GFR Calc     GFR Estimate If Black   Date Value Ref Range Status   02/02/2018 65 >60 mL/min/1.7m2 Final     Comment:      GFR Calc   10/11/2017 78 >60 mL/min/1.7m2 Final     Comment:      GFR Calc   10/04/2017 >90 >60 mL/min/1.7m2 Final     Comment:      GFR Calc     No results found for: TSH]    Most Recent Immunizations   Administered Date(s) Administered     Influenza Vaccine IM 3yrs+ 4 Valent IIV4 12/05/2017     TDAP Vaccine (Adacel) 06/06/2017       ASSESSMENT:              Current medications were reviewed today as discussed above.      Medication Adherence: no issues identified    Hypertension: Needs Improvement. Patient is close to meeting BP goal of < 130/80mmHg.  Pt would benefit from the following changes - addition of amlodipine and discontinuation of lisinopril due to elevated potassium.    Insomnia/Anxiety/Depression: improving.   Pt would benefit from continuing to work with therapist, he has 3 appointments coming up in February.     PLAN:                  Stop the lisinopril.     Start amlodipine 5mg at night once daily.    Recheck BMP labs in 2-3 weeks.    Information given on salt and DASH diet.    Next MTM/pharmacist visit: 3 weeks    I spent 30 minutes with this patient today. All changes were made via collaborative practice agreement with Carmen Martinez Ra. A copy of the visit note was provided to the patient's primary care provider.     The patient was given a summary of these recommendations as an after visit summary.    Justine Cantu, PharmD [facility]  Medication Therapy Management Practitioner   #[phone]

## 2018-02-06 NOTE — PATIENT INSTRUCTIONS
Recommendations from today's MTM visit:                                                      Stop the lisinopril.  Making your potassium too high.    Start amlodipine 5mg at night once daily.    Next MTM/pharmacist visit: 3 weeks    To schedule another MTM appointment, please call the clinic directly or you may call the MTM scheduling line at [phone] or toll-free at [phone].     My Clinical Pharmacist's contact information:                                                      It was a pleasure seeing you today!  Please feel free to contact me with any questions or concerns you have.      Justine Cantu, PharmD [facility]  Medication Therapy Management Practitioner   #[phone]    You may receive a survey about the MTM services you received.  I would appreciate your feedback to help me serve you better in the future. Please fill it out and return it when you can. Your comments will be anonymous.      My healthcare goals:                                                        Goals for patients with hypertension (high blood pressure):  Your blood pressure should be less than <130/80.   Today, yours was   BP Readings from Last 1 Encounters:   02/06/18 124/82   .  Things you can do to help lower your blood pressure:  1) Take your medications as directed  2) Regular exercise- Aim for at least 30 minutes of daily physical activity.  3) Weight loss if overweight- losing as little as 10 lbs. can reduce blood pressure  4) Avoid excess dietary sodium (salt).  You should not consume more than 1500mg of sodium per day.  Following this restriction can lower your blood pressure as much as adding another medication.  -Foods high in sodium include: ham, granados, deli meats, canned soups and vegetables, frozen meals, gravy, chips, cheeses, fast food, bread.  -1 teaspoon of table salt is about 2300mg of sodium.  5) Avoid alcohol- if you drink, please do so in moderation (no more than 1 drink per day for women or 2 drinks  per day for men).  6) Quit smoking- Tobacco injures vessel walls and speeds up the process of hardening of the arteries, making it harder to control your blood pressure.  7) Look for ways to reduce stress in your life.  8)  Try and get plenty of sleep.  9) Consider the DASH diet.  This diet can lower your blood pressure as much as adding another medication if followed correctly.   a. The DASH diet is high in fruits and vegetables, whole grains, fat-free or low-fat milk products, fish and poultry, beans, seeds and nuts.  b. The DASH diet is low in salt, sodium, sugar, sweets, high-sugar drinks, red meat, saturated fat and trans fats.  c. For more information on the DASH diet, visit the National Heart, Lung and Blood [place] at http://www.nhlbi.nih.gov

## 2018-02-06 NOTE — MR AVS SNAPSHOT
After Visit Summary   2/6/2018    Suresh Powers    MRN: [id number]           Patient Information     Date Of Birth          1960        Visit Information        Provider Department      2/6/2018 11:00 AM Justine Cantu, [facility] MTM        Today's Diagnoses     HTN, goal below 140/90    -  1      Care Instructions    Recommendations from today's MTM visit:                                                      Stop the lisinopril.  Making your potassium too high.    Start amlodipine 5mg at night once daily.    Next MTM/pharmacist visit: **    To schedule another MTM appointment, please call the clinic directly or you may call the MTM scheduling line at [phone] or toll-free at [phone].     My Clinical Pharmacist's contact information:                                                      It was a pleasure seeing you today!  Please feel free to contact me with any questions or concerns you have.      Justine Cantu, PharmD [facility]  Medication Therapy Management Practitioner   #[phone]    You may receive a survey about the MTM services you received.  I would appreciate your feedback to help me serve you better in the future. Please fill it out and return it when you can. Your comments will be anonymous.      My healthcare goals:                                                        Goals for patients with hypertension (high blood pressure):  Your blood pressure should be less than <130/80.   Today, yours was   BP Readings from Last 1 Encounters:   02/06/18 124/82   .  Things you can do to help lower your blood pressure:  1) Take your medications as directed  2) Regular exercise- Aim for at least 30 minutes of daily physical activity.  3) Weight loss if overweight- losing as little as 10 lbs. can reduce blood pressure  4) Avoid excess dietary sodium (salt).  You should not consume more than 1500mg of sodium per day.  Following this restriction can lower your  blood pressure as much as adding another medication.  -Foods high in sodium include: ham, granados, deli meats, canned soups and vegetables, frozen meals, gravy, chips, cheeses, fast food, bread.  -1 teaspoon of table salt is about 2300mg of sodium.  5) Avoid alcohol- if you drink, please do so in moderation (no more than 1 drink per day for women or 2 drinks per day for men).  6) Quit smoking- Tobacco injures vessel walls and speeds up the process of hardening of the arteries, making it harder to control your blood pressure.  7) Look for ways to reduce stress in your life.  8)  Try and get plenty of sleep.  9) Consider the DASH diet.  This diet can lower your blood pressure as much as adding another medication if followed correctly.   a. The DASH diet is high in fruits and vegetables, whole grains, fat-free or low-fat milk products, fish and poultry, beans, seeds and nuts.  b. The DASH diet is low in salt, sodium, sugar, sweets, high-sugar drinks, red meat, saturated fat and trans fats.  c. For more information on the DASH diet, visit the National Heart, Lung and Blood [place] at http://www.nhlbi.nih.gov                  Follow-ups after your visit        Your next 10 appointments already scheduled     Feb 08, 2018 10:00 AM CST   Return Visit with Kelly Carpenter LP   [facility]    [address] [address]   [phone]            Feb 15, 2018 10:00 AM CST   Return Visit with Kelly Carpenter LP   [facility] ([facility])    [address] [address]   [phone]            Feb 22, 2018 10:00 AM CST   Return Visit with Kelly Carpenter LP   [facility] ([facility])    [address] [address]   [phone]            Feb 23, 2018  8:30 AM CST   LAB with [facility] ([facility])    [address]  "[facility] [address] [phone]           Please do not eat 10-12 hours before your appointment if you are coming in fasting for labs on lipids, cholesterol, or glucose (sugar). This does not apply to pregnant women. Water, hot tea and black coffee (with nothing added) are okay. Do not drink other fluids, diet soda or chew gum.            Feb 27, 2018  8:30 AM CST   SHORT with Justine Cantu [facility] ([facility])    [address] [address] [phone]              Future tests that were ordered for you today     Open Future Orders        Priority Expected Expires Ordered    Basic metabolic panel Routine  5/7/2018 2/6/2018            Who to contact     If you have questions or need follow up information about today's clinic visit or your schedule please contact [facility] directly at [phone].  Normal or non-critical lab and imaging results will be communicated to you by ProjectSpeakerhart, letter or phone within 4 business days after the clinic has received the results. If you do not hear from us within 7 days, please contact the clinic through YogiPlayt or phone. If you have a critical or abnormal lab result, we will notify you by phone as soon as possible.  Submit refill requests through in2apps or call your pharmacy and they will forward the refill request to us. Please allow 3 business days for your refill to be completed.          Additional Information About Your Visit        in2apps Information     in2apps lets you send messages to your doctor, view your test results, renew your prescriptions, schedule appointments and more. To sign up, go to [email or web address] . Click on \"Log in\" on the left side of the screen, which will take you to the Welcome page. Then click on \"Sign up Now\" on the right side of the page.     You will be asked to enter the access code listed below, as well as some personal " information. Please follow the directions to create your username and password.     Your access code is: [email or web address]  Expires: 2018 11:30 AM     Your access code will  in 90 days. If you need help or a new code, please call your [facility] clinic or [phone].        Care EveryWhere ID     This is your Care EveryWhere ID. This could be used by other organizations to access your [facility] medical records  [id number]        Your Vitals Were     Pulse BMI (Body Mass Index)                69 25.28 kg/m2           Blood Pressure from Last 3 Encounters:   18 124/82   18 (!) 152/94   17 135/77    Weight from Last 3 Encounters:   18 161 lb 6.4 oz (73.2 kg)   18 165 lb 1.6 oz (74.9 kg)   17 160 lb 4.8 oz (72.7 kg)                 Today's Medication Changes          These changes are accurate as of 18 11:30 AM.  If you have any questions, ask your nurse or doctor.               Start taking these medicines.        Dose/Directions    amLODIPine 5 MG tablet   Commonly known as:  NORVASC   Used for:  HTN, goal below 140/90        Dose:  5 mg   Take 1 tablet (5 mg) by mouth daily   Quantity:  30 tablet   Refills:  2         Stop taking these medicines if you haven't already. Please contact your care team if you have questions.     lisinopril 20 MG tablet   Commonly known as:  PRINIVIL/ZESTRIL                Where to get your medicines      These medications were sent to [facility] #[id number] [place] [address]     Phone:  [phone]     amLODIPine 5 MG tablet                Primary Care Provider Office Phone # Fax #    LUIS Connolly [facility] [phone] [address]  [facility] [address]        Equal Access to Services     [facility] SORIN AH: Hadii vinita tobias hadasho Soomaali, waaxda luqadaha, qaybta kaalmada adeegyada, tramaine hazel. [facility] [phone].    ATENCIÓN: Si  brian chavez, tiene a regalado disposición servicios gratuitos de asistencia lingüística. Sera arias [phone].    We comply with applicable federal civil rights laws and [place] laws. We do not discriminate on the basis of race, color, national origin, age, disability, sex, sexual orientation, or gender identity.            Thank you!     Thank you for choosing [facility]  for your care. Our goal is always to provide you with excellent care. Hearing back from our patients is one way we can continue to improve our services. Please take a few minutes to complete the written survey that you may receive in the mail after your visit with us. Thank you!             Your Updated Medication List - Protect others around you: Learn how to safely use, store and throw away your medicines at www.disposemymeds.org.          This list is accurate as of 2/6/18 11:30 AM.  Always use your most recent med list.                   Brand Name Dispense Instructions for use Diagnosis    albuterol (2.5 MG/3ML) 0.083% neb solution     25 vial    Take 1 vial (2.5 mg) by nebulization every 4 hours as needed for shortness of breath / dyspnea or wheezing    Pneumonia of left lower lobe due to infectious organism (H), Shortness of breath       amLODIPine 5 MG tablet    NORVASC    30 tablet    Take 1 tablet (5 mg) by mouth daily    HTN, goal below 140/90       busPIRone 15 MG tablet    BUSPAR    60 tablet    Take 1 tablet (15 mg) by mouth 2 times daily    Anxiety attack, Depression, unspecified depression type       chlorthalidone 25 MG tablet    HYGROTON    30 tablet    Take 1 tablet (25 mg) by mouth daily    Benign essential hypertension       escitalopram 20 MG tablet    LEXAPRO    30 tablet    TAKE 1 TABLET BY MOUTH ONE TIME DAILY    Anxiety attack, Depression, unspecified depression type       fluticasone 50 MCG/ACT spray    FLONASE    16 g    Spray 2 sprays into both nostrils daily    Upper respiratory tract infection,  unspecified type       MULTIVITAMIN ADULT PO      Take 1 tablet by mouth daily        traZODone 50 MG tablet    DESYREL    90 tablet    Take 1-4 tablets ( mg) by mouth At Bedtime    Sleep disorder       TYLENOL PO      Take 1,000 mg by mouth every 8 hours as needed        VITAMIN C PO      Take by mouth daily        zinc 50 MG Tabs      Take 100 mg by mouth daily

## 2018-02-08 ENCOUNTER — OFFICE VISIT (OUTPATIENT)
Dept: PSYCHOLOGY | Facility: CLINIC | Age: 58
End: 2018-02-08
Payer: COMMERCIAL

## 2018-02-08 DIAGNOSIS — F41.1 GENERALIZED ANXIETY DISORDER: Primary | ICD-10-CM

## 2018-02-08 DIAGNOSIS — F42.9 OBSESSIVE-COMPULSIVE DISORDER, UNSPECIFIED TYPE: ICD-10-CM

## 2018-02-08 DIAGNOSIS — F33.1 MODERATE EPISODE OF RECURRENT MAJOR DEPRESSIVE DISORDER (H): ICD-10-CM

## 2018-02-08 PROCEDURE — 90834 PSYTX W PT 45 MINUTES: CPT | Performed by: PSYCHOLOGIST

## 2018-02-08 ASSESSMENT — ANXIETY QUESTIONNAIRES
2. NOT BEING ABLE TO STOP OR CONTROL WORRYING: NEARLY EVERY DAY
GAD7 TOTAL SCORE: 17
3. WORRYING TOO MUCH ABOUT DIFFERENT THINGS: NEARLY EVERY DAY
5. BEING SO RESTLESS THAT IT IS HARD TO SIT STILL: NOT AT ALL
6. BECOMING EASILY ANNOYED OR IRRITABLE: MORE THAN HALF THE DAYS
1. FEELING NERVOUS, ANXIOUS, OR ON EDGE: NEARLY EVERY DAY
7. FEELING AFRAID AS IF SOMETHING AWFUL MIGHT HAPPEN: NEARLY EVERY DAY

## 2018-02-08 ASSESSMENT — PATIENT HEALTH QUESTIONNAIRE - PHQ9: 5. POOR APPETITE OR OVEREATING: NEARLY EVERY DAY

## 2018-02-08 NOTE — MR AVS SNAPSHOT
"                  MRN:[id number]                      After Visit Summary   2/8/2018    Suresh Powers    MRN: [id number]           Visit Information        Provider Department      2/8/2018 10:00 AM Kelly Carpenter LP [facility] Generic      Your next 10 appointments already scheduled     Feb 15, 2018 10:00 AM CST   Return Visit with Kelly LAURA Carpenter   [facility] ([facility])    [address] [facility] [address]   [phone]            Feb 22, 2018 10:00 AM CST   Return Visit with Kelly CarpenterLAURA   [facility] ([facility])    [address] [facility] [address]   [phone]            Feb 23, 2018  8:30 AM CST   LAB with  LAB   [facility]    [address] [facility] [address] [phone]           Please do not eat 10-12 hours before your appointment if you are coming in fasting for labs on lipids, cholesterol, or glucose (sugar). This does not apply to pregnant women. Water, hot tea and black coffee (with nothing added) are okay. Do not drink other fluids, diet soda or chew gum.            Feb 27, 2018  8:30 AM CST   SHORT with Justine Cantu, [facility] ([facility])    [address] [facility] [address] [phone]              MyChart Information     [employer] lets you send messages to your doctor, view your test results, renew your prescriptions, schedule appointments and more. To sign up, go to [email or web address] . Click on \"Log in\" on the left side of the screen, which will take you to the Welcome page. Then click on \"Sign up Now\" on the right side of the page.     You will be asked to enter the access code listed below, as well as some personal information. Please follow the directions to create your username and password.     Your access code is: " [email or web address]  Expires: 2018 11:30 AM     Your access code will  in 90 days. If you need help or a new code, please call your [facility] clinic or [phone].        Care EveryWhere ID     This is your Care EveryWhere ID. This could be used by other organizations to access your [facility] medical records  [id number]        Equal Access to Services     SANJANA ROWELL : Hadii vinita Roger, waaxda lucristinaadaha, qaybta kaalmada adethomas, tramaine silva . So [facility] [phone].    ATENCIÓN: Si habla español, tiene a regalado disposición servicios gratuitos de asistencia lingüística. Llame al [phone].    We comply with applicable federal civil rights laws and [place] laws. We do not discriminate on the basis of race, color, national origin, age, disability, sex, sexual orientation, or gender identity.

## 2018-02-08 NOTE — PROGRESS NOTES
"                                             Progress Note    Client Name: Suresh Powers  Date: 2-8-18         Service Type: Individual      Session Start Time: 10  Session End Time: 10:50 am      Session Length: 37-52 min   Session #: 6     Attendees: Client    Treatment Plan Last Reviewed: today  (90 days = April 2018)  PHQ-9 / SPIKE-7 : see flow sheets     DATA      Progress Since Last Session (Related to Symptoms / Goals / Homework):   Symptoms: Stable but with depression, anxiety , irritabilty and OCD.  Disrupted concentration, feeling bad about self  And fear that something awful might happen. Finds the trazedone useful so he can sleep.  (7-8 hours)    Homework: Completed in session      Episode of Care Goals: Satisfactory progress - ACTION (Actively working towards change); Intervened by reinforcing change plan / affirming steps taken     Current / Ongoing Stressors and Concerns:   Day to day anxiety,   OCD- ruminations about  the future /     losses: mom gone 13 + years, dad for 7 years-  mother had  anxiety.     Treatment Objective(s) Addressed in This Session:   learn and demonstrate self advocacy   Increase interest, engagement, and pleasure in doing things  Identify negative self-talk and behaviors: challenge core beliefs, myths, and actions  identify 4 strategies for managing obsessive thoughts and improving mood  identify how the use of (food) substances contributes to the avoidance of feeling associated with the loss       Intervention:  Client reports that he has had a med change.    Reports some good news in that he got approved for SSDI.   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  Did advocate for self, and [place] [facility]. Worried about affordable health care.  Shares that \"its been so helpful coming here\".     Is upset that he " has made mistakes.    Processed emotions     ASSESSMENT: Current Emotional / Mental Status (status of significant symptoms):   Risk status (Self / Other harm or suicidal ideation)   Client denies current fears or concerns for personal safety.   Client denies current or recent suicidal ideation or behaviors.   Client denies current or recent homicidal ideation or behaviors.   Client denies current or recent self injurious behavior or ideation.   Client denies other safety concerns.   A safety and risk management plan has not been developed at this time, however client was given the after-hours number / 911 should there be a change in any of these risk factors.     Appearance:   Appropriate    Eye Contact:   Good    Psychomotor Behavior: Normal    Attitude:   Cooperative    Orientation:   All   Speech    Rate / Production: Normal     Volume:  Normal    Mood:    Anxious  Depressed  Normal   Affect:    Appropriate  Worrisome    Thought Content:  Clear    Thought Form:  Coherent  Logical    Insight:    Good      Medication Review:   Changes to psychiatric medications, see updated Medication List in EPIC.      Medication Compliance:   Yes     Changes in Health Issues:   None reported     Chemical Use Review:   Substance Use: Chemical use reviewed, no active concerns identified      Tobacco Use: No current tobacco use.       Collateral Reports Completed:   Routed note to PCP as needed     PLAN: (Client Tasks / Therapist Tasks / Other  Advocate for self- ask questions from [facility]  And [place] [facility] ( sister as resource)  Conduct mindful activity at least once a day.  Past hw  2 distraction skills and 2 reinterpretation skills  past hw  Use breath work  TIP skills  Rishi Saez book for career concerns      Kelly Carpenter LP                                                         ________________________________________________________________________    Treatment Plan    Client's Name: Suresh Powers  YOB: 1960    Date:  "12-08-17    DSM-V Diagnoses: SPIKE / MDD  Psychosocial / Contextual Factors: left job due to anxiety  WHODAS: see dx    Referral / Collaboration:  Referral to another professional/service is not indicated at this time.    Anticipated number of session or this episode of care: 10      MeasurableTreatment Goal(s) related to diagnosis / functional impairment(s)  Goal 1: Client will employ coping skills daily to manage mood disruption (including mood disruption/ sadness realted to career disruption).    I will know I've met my goal when \" keep stable emotions and ability to function daily.    Objective #A (Client Action)   Client will report using skills daily including spiritual practices and accessing support.   Update 2-08-18   New medication. Plan is the do mindful activity daily, use CBT skills daily.    Intervention(s)   Therapist will reinforce and teach 9-12 coping skills. Employ early intervention.     Goal 2: Client will process loss and move towards articulating a strong self worth and identity.    I will know I've met my goal when \"  Client reports a better sense of self worth; has more good days than bad.   feeling useful at home and find daily meaning and ( needed) purpose in being with his family.   Client will identify how thoughts contributes to the feeling of powerlessness and sadness associated with the loss.   Update 2-08-18   New medication. Plan is the do mindful activity daily, use positive affirmations skills daily.      Client has reviewed and agreed to the above plan.      Kelly Carpenter LP  December 8, 2017  "

## 2018-02-09 ASSESSMENT — ANXIETY QUESTIONNAIRES: GAD7 TOTAL SCORE: 17

## 2018-02-09 ASSESSMENT — PATIENT HEALTH QUESTIONNAIRE - PHQ9: SUM OF ALL RESPONSES TO PHQ QUESTIONS 1-9: 12

## 2018-02-15 ENCOUNTER — OFFICE VISIT (OUTPATIENT)
Dept: PSYCHOLOGY | Facility: CLINIC | Age: 58
End: 2018-02-15
Payer: COMMERCIAL

## 2018-02-15 DIAGNOSIS — F41.1 GENERALIZED ANXIETY DISORDER: Primary | ICD-10-CM

## 2018-02-15 DIAGNOSIS — F42.9 OBSESSIVE-COMPULSIVE DISORDER, UNSPECIFIED TYPE: ICD-10-CM

## 2018-02-15 DIAGNOSIS — F33.1 MODERATE EPISODE OF RECURRENT MAJOR DEPRESSIVE DISORDER (H): ICD-10-CM

## 2018-02-15 PROCEDURE — 90834 PSYTX W PT 45 MINUTES: CPT | Performed by: PSYCHOLOGIST

## 2018-02-15 NOTE — MR AVS SNAPSHOT
"                  MRN:[id number]                      After Visit Summary   2/15/2018    Suresh Powers    MRN: [id number]           Visit Information        Provider Department      2/15/2018 10:00 AM Kelly Carpenter LP [facility] Generic      Your next 10 appointments already scheduled     Feb 22, 2018 10:00 AM CST   Return Visit with Kelly Carpenter LP   [facility] ([facility])    [address] [address]   [phone]            Feb 23, 2018  8:30 AM CST   LAB with  LAB   [facility] ([facility])    [address] [address] [phone]           Please do not eat 10-12 hours before your appointment if you are coming in fasting for labs on lipids, cholesterol, or glucose (sugar). This does not apply to pregnant women. Water, hot tea and black coffee (with nothing added) are okay. Do not drink other fluids, diet soda or chew gum.            Feb 27, 2018  8:30 AM CST   SHORT with Justine Cantu [facility] ([facility])    [address] [address] [phone]            Mar 16, 2018  1:00 PM CDT   Return Visit with Kelly Carpenter LP   [facility] ([facility])    [address] [facility] [address]   [phone]            Mar 29, 2018  9:00 AM CDT   Return Visit with Kelly Carpenter LP   [facility] ([facility])    [address] [facility] [address]   [phone]              [employer] Information     [employer] lets you send messages to your doctor, view your test results, renew your prescriptions, schedule appointments and more. To sign up, go to [email or web address] . Click on \"Log in\" on the left side of the screen, which will take you to the Welcome page. Then click on \"Sign up Now\" on the right side of the page. "     You will be asked to enter the access code listed below, as well as some personal information. Please follow the directions to create your username and password.     Your access code is: [email or web address]  Expires: 2018 11:30 AM     Your access code will  in 90 days. If you need help or a new code, please call your [place] clinic or [phone].        Care EveryWhere ID     This is your Care EveryWhere ID. This could be used by other organizations to access your [place] medical records  [id number]        Equal Access to Services     [facility] HospitalJARRED : Hadsay Roger, carmen keating, murray dodge, tramaine silva . So [facility] [phone].    ATENCIÓN: Si habla español, tiene a regalado disposición servicios gratuitos de asistencia lingüística. Llame al [phone].    We comply with applicable federal civil rights laws and [place] laws. We do not discriminate on the basis of race, color, national origin, age, disability, sex, sexual orientation, or gender identity.

## 2018-02-15 NOTE — PROGRESS NOTES
"                                             Progress Note    Client Name: Suresh Powers  Date: 2-15-18         Service Type: Individual      Session Start Time: 10:05  Session End Time: 10:55 am      Session Length: 37-52 min   Session #: 7     Attendees: Client    Treatment Plan Last Reviewed: today  (90 days = April 2018)  PHQ-9 / SPIKE-7 : see flow sheets     DATA      Progress Since Last Session (Related to Symptoms / Goals / Homework):   Symptoms: Stable but with depression, anxiety , irritabilty and OCD.  Disrupted concentration, feeling bad about self  And fear that something awful might happen. Finds the trazedone useful so he can sleep.  (7-8 hours)    Homework: Completed in session      Episode of Care Goals: Satisfactory progress - ACTION (Actively working towards change); Intervened by reinforcing change plan / affirming steps taken     Current / Ongoing Stressors and Concerns:  Day to day anxiety,  OCD- ruminations about  the future /   losses: mom gone 13 + years, dad for 7 years-  mother had  Anxiety.  (physical health 1998- cart collapsed on him at work , both feet crushed)   (2016 another accident at work - broke tibia and clavical)    Donated a kidney to his sister 1990.     Treatment Objective(s) Addressed in This Session:   learn and demonstrate self advocacy   Increase interest, engagement, and pleasure in doing things  Identify negative self-talk and behaviors: challenge core beliefs, myths, and actions  identify 4 strategies for managing obsessive thoughts and improving mood  identify how the use of (food) substances contributes to the avoidance of feeling associated with the loss       Intervention:    Client reports has been very worried about keeping health insurance.   Had a phone call - and has learned that he will be \"under the threshold\".  \"I feel more secure, my soul needed that\". Has a sense of peace.   Is letting go of some of the upset that he has made mistakes.    Read the Bible " daily. Is going to [employer] weekly.  Pain has been worse in the winter and with aging. Is using a cane.    Did watch [employer] movie  - discussed  differenet  kinds of brain lock.   Client reports less ruminations.    Processed emotions     ASSESSMENT: Current Emotional / Mental Status (status of significant symptoms):   Risk status (Self / Other harm or suicidal ideation)   Client denies current fears or concerns for personal safety.   Client denies current or recent suicidal ideation or behaviors.   Client denies current or recent homicidal ideation or behaviors.   Client denies current or recent self injurious behavior or ideation.   Client denies other safety concerns.   A safety and risk management plan has not been developed at this time, however client was given the after-hours number / 911 should there be a change in any of these risk factors.     Appearance:   Appropriate    Eye Contact:   Good    Psychomotor Behavior: Normal    Attitude:   Cooperative    Orientation:   All   Speech    Rate / Production: Normal     Volume:  Normal    Mood:    Anxious  Depressed  Normal   Affect:    Appropriate  Worrisome    Thought Content:  Clear    Thought Form:  Coherent  Logical    Insight:    Good      Medication Review:   Changes to psychiatric medications, see updated Medication List in EPIC.      Medication Compliance:   Yes     Changes in Health Issues:   None reported     Chemical Use Review:   Substance Use: Chemical use reviewed, no active concerns identified      Tobacco Use: No current tobacco use.       Collateral Reports Completed:   Routed note to PCP as needed     PLAN: (Client Tasks / Therapist Tasks / Other  Advocate for self- ask questions from [facility]  And [place] [facility] ( sister as resource)  Conduct mindful activity at least once a day.  Past hw  2 distraction skills and 2 reinterpretation skills  past hw  Use breath work  TIP skills  Rishi Saez book for career concerns      Kelly Carpenter LP                          "                                ________________________________________________________________________    Treatment Plan    Client's Name: Suresh Powers  YOB: 1960    Date: 12-08-17    DSM-V Diagnoses: SPIKE / MDD  Psychosocial / Contextual Factors: left job due to anxiety  WHODAS: see dx    Referral / Collaboration:  Referral to another professional/service is not indicated at this time.    Anticipated number of session or this episode of care: 10      MeasurableTreatment Goal(s) related to diagnosis / functional impairment(s)  Goal 1: Client will employ coping skills daily to manage mood disruption (including mood disruption/ sadness realted to career disruption).    I will know I've met my goal when \" keep stable emotions and ability to function daily.    Objective #A (Client Action)   Client will report using skills daily including spiritual practices and accessing support.   Update 2-08-18   New medication. Plan is the do mindful activity daily, use CBT skills daily.    Intervention(s)   Therapist will reinforce and teach 9-12 coping skills. Employ early intervention.     Goal 2: Client will process loss and move towards articulating a strong self worth and identity.    I will know I've met my goal when \"  Client reports a better sense of self worth; has more good days than bad.   feeling useful at home and find daily meaning and ( needed) purpose in being with his family.   Client will identify how thoughts contributes to the feeling of powerlessness and sadness associated with the loss.   Update 2-08-18   New medication. Plan is the do mindful activity daily, use positive affirmations skills daily.      Client has reviewed and agreed to the above plan.      Kelly Carpenter LP  December 8, 2017  "

## 2018-02-22 ENCOUNTER — OFFICE VISIT (OUTPATIENT)
Dept: PSYCHOLOGY | Facility: CLINIC | Age: 58
End: 2018-02-22
Payer: COMMERCIAL

## 2018-02-22 DIAGNOSIS — F33.1 MODERATE EPISODE OF RECURRENT MAJOR DEPRESSIVE DISORDER (H): ICD-10-CM

## 2018-02-22 DIAGNOSIS — F42.9 OBSESSIVE-COMPULSIVE DISORDER, UNSPECIFIED TYPE: ICD-10-CM

## 2018-02-22 DIAGNOSIS — F41.1 GENERALIZED ANXIETY DISORDER: Primary | ICD-10-CM

## 2018-02-22 PROCEDURE — 90834 PSYTX W PT 45 MINUTES: CPT | Performed by: PSYCHOLOGIST

## 2018-02-22 NOTE — MR AVS SNAPSHOT
"                  MRN:[id number]                      After Visit Summary   2/22/2018    Suresh Powers    MRN: [id number]           Visit Information        Provider Department      2/22/2018 10:00 AM Kelly Carpenter LP [facility] Generic      Your next 10 appointments already scheduled     Feb 23, 2018  9:00 AM CST   LAB with  LAB   [facility]    [address] [address]   [phone]           Please do not eat 10-12 hours before your appointment if you are coming in fasting for labs on lipids, cholesterol, or glucose (sugar). This does not apply to pregnant women. Water, hot tea and black coffee (with nothing added) are okay. Do not drink other fluids, diet soda or chew gum.            Feb 27, 2018  8:30 AM CST   SHORT with Justine Cantu, [facility] ([facility])    [address] [address] [phone]            Mar 16, 2018  1:00 PM CDT   Return Visit with Kelly Carpenter LP   [facility] ([facility])    [address] [address]   [phone]            Mar 29, 2018  9:00 AM CDT   Return Visit with Kelly Carpenter LP   [facility] ([facility])    [address] [address]   [phone]              MyChart Information     [employer] lets you send messages to your doctor, view your test results, renew your prescriptions, schedule appointments and more. To sign up, go to [email or web address] . Click on \"Log in\" on the left side of the screen, which will take you to the Welcome page. Then click on \"Sign up Now\" on the right side of the page.     You will be asked to enter the access code listed below, as well as some personal information. Please follow the directions to create your username and password.     Your access code is: " [email or web address]  Expires: 2018 11:30 AM     Your access code will  in 90 days. If you need help or a new code, please call your [place] clinic or [phone].        Care EveryWhere ID     This is your Care EveryWhere ID. This could be used by other organizations to access your [place] medical records  [id number]        Equal Access to Services     SANJANA ROWELL : Hadii vinita Roger, waaxda lucristinaadaha, qaybta kaalmada adethomas, tramaine silva . So [facility] [phone].    ATENCIÓN: Si habla español, tiene a regalado disposición servicios gratuitos de asistencia lingüística. Llame al [phone].    We comply with applicable federal civil rights laws and [place] laws. We do not discriminate on the basis of race, color, national origin, age, disability, sex, sexual orientation, or gender identity.

## 2018-02-23 DIAGNOSIS — I10 HTN, GOAL BELOW 140/90: ICD-10-CM

## 2018-02-23 PROCEDURE — 36415 COLL VENOUS BLD VENIPUNCTURE: CPT | Performed by: NURSE PRACTITIONER

## 2018-02-23 PROCEDURE — 80048 BASIC METABOLIC PNL TOTAL CA: CPT | Performed by: NURSE PRACTITIONER

## 2018-02-25 LAB
ANION GAP SERPL CALCULATED.3IONS-SCNC: 9 MMOL/L (ref 3–14)
BUN SERPL-MCNC: 22 MG/DL (ref 7–30)
CALCIUM SERPL-MCNC: 8.9 MG/DL (ref 8.5–10.1)
CHLORIDE SERPL-SCNC: 105 MMOL/L (ref 94–109)
CO2 SERPL-SCNC: 25 MMOL/L (ref 20–32)
CREAT SERPL-MCNC: 1.01 MG/DL (ref 0.66–1.25)
GFR SERPL CREATININE-BSD FRML MDRD: 76 ML/MIN/1.7M2
GLUCOSE SERPL-MCNC: 89 MG/DL (ref 70–99)
POTASSIUM SERPL-SCNC: 4.9 MMOL/L (ref 3.4–5.3)
SODIUM SERPL-SCNC: 139 MMOL/L (ref 133–144)

## 2018-02-27 ENCOUNTER — OFFICE VISIT (OUTPATIENT)
Dept: PHARMACY | Facility: CLINIC | Age: 58
End: 2018-02-27
Payer: COMMERCIAL

## 2018-02-27 VITALS
WEIGHT: 160.9 LBS | HEART RATE: 68 BPM | BODY MASS INDEX: 25.2 KG/M2 | DIASTOLIC BLOOD PRESSURE: 79 MMHG | SYSTOLIC BLOOD PRESSURE: 127 MMHG

## 2018-02-27 DIAGNOSIS — J30.2 SEASONAL ALLERGIC RHINITIS, UNSPECIFIED CHRONICITY, UNSPECIFIED TRIGGER: ICD-10-CM

## 2018-02-27 DIAGNOSIS — D50.9 IRON DEFICIENCY ANEMIA, UNSPECIFIED IRON DEFICIENCY ANEMIA TYPE: Primary | ICD-10-CM

## 2018-02-27 DIAGNOSIS — G47.9 SLEEP DISORDER: ICD-10-CM

## 2018-02-27 DIAGNOSIS — F32.A DEPRESSION, UNSPECIFIED DEPRESSION TYPE: ICD-10-CM

## 2018-02-27 DIAGNOSIS — R21 RASH OF HANDS: ICD-10-CM

## 2018-02-27 DIAGNOSIS — F41.0 ANXIETY ATTACK: ICD-10-CM

## 2018-02-27 DIAGNOSIS — I10 HTN, GOAL BELOW 140/90: ICD-10-CM

## 2018-02-27 DIAGNOSIS — R68.89 COLD FEELING: ICD-10-CM

## 2018-02-27 PROCEDURE — 99606 MTMS BY PHARM EST 15 MIN: CPT | Performed by: PHARMACIST

## 2018-02-27 PROCEDURE — 99607 MTMS BY PHARM ADDL 15 MIN: CPT | Performed by: PHARMACIST

## 2018-02-27 RX ORDER — BUSPIRONE HYDROCHLORIDE 15 MG/1
15 TABLET ORAL 2 TIMES DAILY
Qty: 60 TABLET | Refills: 1 | Status: SHIPPED | OUTPATIENT
Start: 2018-02-27 | End: 2018-05-10

## 2018-02-27 NOTE — PATIENT INSTRUCTIONS
Recommendations from today's MTM visit:                                                      Hand rash:  CeraVe or Eucerin cream--thicker cream or see Carmen for review of rash.    Great job on high blood pressure.    Thyroid added to next labs    Next MTM/pharmacist visit: See Carmen Martinez NP in April or as a physical with PATRICIA Wooten    To schedule another MTM appointment, please call the clinic directly or you may call the MTM scheduling line at [phone] or toll-free at [phone].     My Clinical Pharmacist's contact information:                                                      It was a pleasure seeing you today!  Please feel free to contact me with any questions or concerns you have.      Justine Cantu, PharmD [facility]  Medication Therapy Management Practitioner   #[phone]    You may receive a survey about the MTM services you received.  I would appreciate your feedback to help me serve you better in the future. Please fill it out and return it when you can. Your comments will be anonymous.

## 2018-02-27 NOTE — MR AVS SNAPSHOT
After Visit Summary   2/27/2018    Suresh Powers    MRN: [id number]           Patient Information     Date Of Birth          1960        Visit Information        Provider Department      2/27/2018 8:30 AM Justine Cantu, [facility] MTM        Today's Diagnoses     Iron deficiency anemia, unspecified iron deficiency anemia type    -  1    Anxiety attack        Depression, unspecified depression type        Cold feeling          Care Instructions    Recommendations from today's MTM visit:                                                      Hand rash:  CeraVe or Eucerin cream--thicker cream or see Carmen for review of rash.    Great job on high blood pressure.    Thyroid added to next labs    Next MTM/pharmacist visit: See Carmen Martinez NP in April or as a physical with PATRICIA Wooten    To schedule another MTM appointment, please call the clinic directly or you may call the MTM scheduling line at [phone] or toll-free at [phone].     My Clinical Pharmacist's contact information:                                                      It was a pleasure seeing you today!  Please feel free to contact me with any questions or concerns you have.      Justine Cantu, PharmD [facility]  Medication Therapy Management Practitioner   #[phone]    You may receive a survey about the MTM services you received.  I would appreciate your feedback to help me serve you better in the future. Please fill it out and return it when you can. Your comments will be anonymous.                    Follow-ups after your visit        Your next 10 appointments already scheduled     Mar 16, 2018  1:00 PM CDT   Return Visit with Kelly Carpenter LP   [facility] ([facility])    [address] [facility] [address]   [phone]            Mar 29, 2018  9:00 AM CDT   Return Visit with Kelly Carpenter LP   [facility] ([facility]  "[place]) [address]  [facility] [address] [phone]              Future tests that were ordered for you today     Open Future Orders        Priority Expected Expires Ordered    CBC with platelets Routine  2018            Who to contact     If you have questions or need follow up information about today's clinic visit or your schedule please contact [facility] directly at [phone].  Normal or non-critical lab and imaging results will be communicated to you by KAYAKhart, letter or phone within 4 business days after the clinic has received the results. If you do not hear from us within 7 days, please contact the clinic through NanoDynamicst or phone. If you have a critical or abnormal lab result, we will notify you by phone as soon as possible.  Submit refill requests through iLive or call your pharmacy and they will forward the refill request to us. Please allow 3 business days for your refill to be completed.          Additional Information About Your Visit        KAYAKharRebelle Bridal Information     iLive lets you send messages to your doctor, view your test results, renew your prescriptions, schedule appointments and more. To sign up, go to [email or web address] . Click on \"Log in\" on the left side of the screen, which will take you to the Welcome page. Then click on \"Sign up Now\" on the right side of the page.     You will be asked to enter the access code listed below, as well as some personal information. Please follow the directions to create your username and password.     Your access code is: [email or web address]  Expires: 2018 11:30 AM     Your access code will  in 90 days. If you need help or a new code, please call your [place] clinic or [phone].        Care EveryWhere ID     This is your Care EveryWhere ID. This could be used by other organizations to access your [place] medical records  [id number]        Your Vitals Were     Pulse BMI (Body Mass Index) "                68 25.2 kg/m2           Blood Pressure from Last 3 Encounters:   02/27/18 127/79   02/06/18 124/82   01/02/18 (!) 152/94    Weight from Last 3 Encounters:   02/27/18 160 lb 14.4 oz (73 kg)   02/06/18 161 lb 6.4 oz (73.2 kg)   01/02/18 165 lb 1.6 oz (74.9 kg)              We Performed the Following     TSH with free T4 reflex          Where to get your medicines      These medications were sent to [id number] - [place], [place] - [id number]  [address], [facility] [address]     Phone:  [phone]     busPIRone 15 MG tablet          Primary Care Provider Office Phone # Fax #    Carmen Lobato [facility] [phone] [address]  [facility] [address]        Equal Access to Services     [facility]: Hadii aad ku hadasho Soomaali, waaxda luqadaha, qaybta kaalmada adeegyada, waxay courtneyin haydavid silva . So [facility] [phone].    ATENCIÓN: Si habla español, tiene a regalado disposición servicios gratuitos de asistencia lingüística. Llame al [phone].    We comply with applicable federal civil rights laws and [place] laws. We do not discriminate on the basis of race, color, national origin, age, disability, sex, sexual orientation, or gender identity.            Thank you!     Thank you for choosing [facility]  for your care. Our goal is always to provide you with excellent care. Hearing back from our patients is one way we can continue to improve our services. Please take a few minutes to complete the written survey that you may receive in the mail after your visit with us. Thank you!             Your Updated Medication List - Protect others around you: Learn how to safely use, store and throw away your medicines at www.disposemymeds.org.          This list is accurate as of 2/27/18  9:01 AM.  Always use your most recent med list.                   Brand Name Dispense Instructions for use Diagnosis    albuterol (2.5 MG/3ML)  0.083% neb solution     25 vial    Take 1 vial (2.5 mg) by nebulization every 4 hours as needed for shortness of breath / dyspnea or wheezing    Pneumonia of left lower lobe due to infectious organism (H), Shortness of breath       amLODIPine 5 MG tablet    NORVASC    30 tablet    Take 1 tablet (5 mg) by mouth daily    HTN, goal below 140/90       busPIRone 15 MG tablet    BUSPAR    60 tablet    Take 1 tablet (15 mg) by mouth 2 times daily    Anxiety attack, Depression, unspecified depression type       chlorthalidone 25 MG tablet    HYGROTON    30 tablet    Take 1 tablet (25 mg) by mouth daily    Benign essential hypertension       escitalopram 20 MG tablet    LEXAPRO    30 tablet    TAKE 1 TABLET BY MOUTH ONE TIME DAILY    Anxiety attack, Depression, unspecified depression type       fluticasone 50 MCG/ACT spray    FLONASE    16 g    Spray 2 sprays into both nostrils daily    Upper respiratory tract infection, unspecified type       MULTIVITAMIN ADULT PO      Take 1 tablet by mouth daily        traZODone 50 MG tablet    DESYREL    90 tablet    Take 1-4 tablets ( mg) by mouth At Bedtime    Sleep disorder       TYLENOL PO      Take 1,000 mg by mouth every 8 hours as needed        VITAMIN C PO      Take 500 mg by mouth daily        zinc 50 MG Tabs      Take 100 mg by mouth daily

## 2018-02-27 NOTE — PROGRESS NOTES
SUBJECTIVE/OBJECTIVE:                Suresh Powers is a 57 year old male coming in for a follow-up visit for Medication Therapy Management.  He was referred to me from Carmen Martinez.     Chief Complaint: Follow up from our visit on 2/6/2018. 1) Hypertension 2) he would like to check his thyroid level    Tobacco: No tobacco use  Alcohol: not currently using    Medication Adherence/Access:  no issues reported    Hypertension: Current medications include chlorthalidone 25mg QD and amlodipine 5mg HS. He had elevated potassium with lisinoprilx2.  Patient does not self-monitor BP.  Patient reports no current medication side effects.  He has one kidney, he donated one kidney. He continues to not avoid salt, eats canned vegetables for example.    Potassium   Date Value Ref Range Status   10/11/2017 5.2 3.4 - 5.3 mmol/L Final   ]    Insomnia/Anxiety/Depression: Current medications include: escitalporam 20mg QD, buspirone 15mg BID, trazodone 50-150mg HS, usually 1-3 at night lately 2 per night.  He feels the trazodone is helpful to fall asleep and anxiety. Side effects: no side effects.  Seeing psychology-Kelly Carpenter LP and will continue to see her   Piece of mind with disabilities, get got SS disability recently and insurance for prescriptions worked out.  He finds the buspirone is helping.  He is concerned with OCD and will work with Kelly on this. Failed medications: He was taking sertraline in the past but he had diarrhea and it was stopped. Failed Prozac and stopped Paxil not sure why stopped but worked, side effects to sertraline. He was seeing psychiatry but lost to follow-up.     PHQ-9 SCORE 12/15/2017 1/2/2018 2/8/2018   Total Score 9 10 12     SPIKE-7 SCORE 12/15/2017 1/2/2018 2/8/2018   Total Score 16 16 17     Allergic rhinitis: Current medications include fluticasone nasal spray 2 spray(s) once daily. Primary symptoms are nasal congestion. Pt feels that current therapy is effective.     Hand rash:  He reports  using lotion for his hand rash and dry skin and hydrocortisone which is not helpful. He feels it is from washing his hands in dry weather, he has bleeding in his knuckles at times.  He has history of OCD    History of anemia:  Current medication is none. Pt is wondering about thyroid level since he is always cold and has dry skin. Patient is not having the following symptoms: hypothyroidism -  myalgias, muscle cramps, weakness, fatigue, weight gain and constipation.   Hemoglobin   Date Value Ref Range Status   03/11/2016 13.0 (A) 13.3 - 17.7 gm/dL Final   02/25/2016 10.8 (A) 13.3 - 17.7 gm/dL Final   ]    Current labs include:  Today's Vitals: /79  Pulse 68  Wt 160 lb 14.4 oz (73 kg)  BMI 25.2 kg/m2  BP Readings from Last 3 Encounters:   02/06/18 124/82   01/02/18 (!) 152/94   12/05/17 135/77     Lab Results   Component Value Date    CHOL 175 03/21/2017     Lab Results   Component Value Date    TRIG 62 03/21/2017     Lab Results   Component Value Date    HDL 68 03/21/2017     Lab Results   Component Value Date    LDL 95 03/21/2017       Liver Function Studies -   Recent Labs   Lab Test  12/19/15   0907   PROTTOTAL  7.6   ALBUMIN  3.9   BILITOTAL  0.5   ALKPHOS  117   AST  18   ALT  32         Last Basic Metabolic Panel:  Lab Results   Component Value Date     02/23/2018      Lab Results   Component Value Date    POTASSIUM 4.9 02/23/2018     Lab Results   Component Value Date    CHLORIDE 105 02/23/2018     Lab Results   Component Value Date    BUN 22 02/23/2018     Lab Results   Component Value Date    CR 1.01 02/23/2018     GFR Estimate   Date Value Ref Range Status   02/23/2018 76 >60 mL/min/1.7m2 Final     Comment:     Non  GFR Calc   02/02/2018 54 (L) >60 mL/min/1.7m2 Final     Comment:     Non  GFR Calc   10/11/2017 65 >60 mL/min/1.7m2 Final     Comment:     Non  GFR Calc     GFR Estimate If Black   Date Value Ref Range Status   02/23/2018 >90 >60  mL/min/1.7m2 Final     Comment:      GFR Calc   02/02/2018 65 >60 mL/min/1.7m2 Final     Comment:      GFR Calc   10/11/2017 78 >60 mL/min/1.7m2 Final     Comment:      GFR Calc       Most Recent Immunizations   Administered Date(s) Administered     Influenza Vaccine IM 3yrs+ 4 Valent IIV4 12/05/2017     TDAP Vaccine (Adacel) 06/06/2017       ASSESSMENT:              Current medications were reviewed today as discussed above.      Medication Adherence: excellent, no issues identified    Hypertension: Stable. Patient is meeting BP goal of < 130/80mmHg.    Insomnia/Anxiety/Depression: stable.  Pt would benefit from continuing to work with psychology-Kelly Carpenter LP.  He feels the medications are working.  He would benefit from Follow-up in a few months    Allergic rhinitis: stable    Hand rash:  Needs improvement.  Pt has rash and dry skin where he washes forearm and hands.  Suggest a thicker cream at night before bed.    History of anemia/cold feeling:  Needs improvement.  Pt request thyroid lab.  Will pend order for patient to have done a next physical if appropriate.  With history of anemia, will pend CBC for next physical.     PLAN:                  Hand rash:  CeraVe or Eucerin cream--thicker cream or pt to see Carmen for review of rash.    Hypertension:  Continue current blood pressure regimen, at goal!    Labs:  Placed CBC order for history of anemia.  Thyroid labs added to next labs if appropriate to be drawn at next physical (pt request).      Next MTM/pharmacist visit: 1 year or as needed.  See Carmen Martinez CNP in April or as a physical with PATRICIA Wooten (pt feels more comfortable with male to do physical)    I spent 30 minutes with this patient today. All changes were made via collaborative practice agreement with Carmen Martinez Ra. A copy of the visit note was provided to the patient's primary care provider.     The patient was given a summary of these  recommendations as an after visit summary.    Justine Cantu, PharmD [facility]  Medication Therapy Management Practitioner   #[phone]

## 2018-03-01 ENCOUNTER — OFFICE VISIT (OUTPATIENT)
Dept: FAMILY MEDICINE | Facility: CLINIC | Age: 58
End: 2018-03-01
Payer: COMMERCIAL

## 2018-03-01 VITALS
HEART RATE: 69 BPM | TEMPERATURE: 97.6 F | HEIGHT: 68 IN | WEIGHT: 159.7 LBS | RESPIRATION RATE: 19 BRPM | BODY MASS INDEX: 24.2 KG/M2 | OXYGEN SATURATION: 100 % | DIASTOLIC BLOOD PRESSURE: 82 MMHG | SYSTOLIC BLOOD PRESSURE: 123 MMHG

## 2018-03-01 DIAGNOSIS — R21 RASH AND NONSPECIFIC SKIN ERUPTION: Primary | ICD-10-CM

## 2018-03-01 PROCEDURE — 99213 OFFICE O/P EST LOW 20 MIN: CPT | Performed by: PHYSICIAN ASSISTANT

## 2018-03-01 NOTE — PATIENT INSTRUCTIONS
Let's have you stop taking amlodipine for the next week and follow up at that point.    You can try taking cetirizine, fexofenadine or loratadine for the itching.    Call us sooner with any significant change or question.

## 2018-03-01 NOTE — PROGRESS NOTES
"  SUBJECTIVE:   Suresh Powers is a 57 year old male who presents to clinic today for the following health issues:    Rash      Duration: 2-3 weeks     Description  Location: lower arms  Itching: moderate     Intensity:  moderate    Accompanying signs and symptoms: spreading up arms, red     History (similar episodes/previous evaluation): chapped hands in the winter    Precipitating or alleviating factors:  New exposures:  None  Recent travel: no      Therapies tried and outcome: Eucerin, not very effective     -Patient presents with a new 2-3 week hx of rash to the upper extremities  -It is itchy intermittently, and when itchy he notes it is \"very itchy\"  -he thinks this followed an episode of severely chapped hands  -denies any pain or fevers  -there is no rash anywhere else  -he has tried otc hydrocortisone, eucerin; not very helpful  -he denies any new soaps, shampoos, detergents, foods  -no recent travel  -did start amlodipine right around the time of the onset, otherwise no other new medications      Problem list and histories reviewed & adjusted, as indicated.  Additional history: as documented    Patient Active Problem List   Diagnosis     Exotropia     Benign essential hypertension     Tobacco use disorder     Sleep disorder     Unilateral inguinal hernia without obstruction or gangrene     Closed fracture of proximal end of left tibia     Fracture of left clavicle     Depression     Slow transit constipation     Urinary retention     Iron deficiency anemia     Clostridium difficile diarrhea     Anxiety attack     HTN, goal below 140/90     Single kidney     CARDIOVASCULAR SCREENING; LDL GOAL LESS THAN 160     OCD (obsessive compulsive disorder)     Generalized anxiety disorder     Moderate episode of recurrent major depressive disorder (H)     Past Surgical History:   Procedure Laterality Date     foot crush injury       kidney donation       OPEN REDUCTION INTERNAL FIXATION CLAVICLE Left 2/4/2016    " "Procedure: OPEN REDUCTION INTERNAL FIXATION CLAVICLE;  Surgeon: Rakesh Hui MD;  Location:  OR     OPEN REDUCTION INTERNAL FIXATION TIBIAL PLATEAU Left 2/4/2016    Procedure: OPEN REDUCTION INTERNAL FIXATION TIBIAL PLATEAU;  Surgeon: Rakesh Hui MD;  Location:  OR       Social History   Substance Use Topics     Smoking status: Former Smoker     Packs/day: 1.00     Years: 0.00     Smokeless tobacco: Former User     Quit date: 2/2/2016     Alcohol use No     Family History   Problem Relation Age of Onset     Breast Cancer Mother      Type 2 Diabetes Mother      Skin Cancer Father      CEREBROVASCULAR DISEASE Father            Reviewed and updated as needed this visit by clinical staff       Reviewed and updated as needed this visit by Provider         ROS:  Constitutional, HEENT, cardiovascular, pulmonary, gi and gu systems are negative, except as otherwise noted.    OBJECTIVE:     /82 (BP Location: Right arm, Patient Position: Chair, Cuff Size: Adult Regular)  Pulse 69  Temp 97.6  F (36.4  C) (Tympanic)  Resp 19  Ht 5' 7.5\" (1.715 m)  Wt 159 lb 11.2 oz (72.4 kg)  SpO2 100%  BMI 24.64 kg/m2  Body mass index is 24.64 kg/(m^2).  GENERAL: healthy, alert and no distress  MS: mildly edematous distal upper extremities bilaterally  SKIN: the distal forearms/wrist bilaterally appear slightly puffy, and erythematous with distinct border. There are just a few scattered papules. The skin is not warm and there is no bleeding or discharge. Radial pulses are intact. The rash has moved to the mid-forearm    Diagnostic Test Results:  none     ASSESSMENT/PLAN:   1. Rash and nonspecific skin eruption  This does not appear infectious, rather more allergic. No close contacts with similar symptoms. There is very little new exposure risk but the rash did start soon after the onset of his amlodipine. Today we'll have him stop the amlodipine, begin antihistamine for symptomatic control and follow up in one week " for bp and rash check with primary care provider. Sooner if worsening or questions.    Sharif Mcdonald PA-C  [facility]

## 2018-03-01 NOTE — MR AVS SNAPSHOT
After Visit Summary   3/1/2018    Suresh Powers    MRN: [id number]           Patient Information     Date Of Birth          1960        Visit Information        Provider Department      3/1/2018 10:00 AM Sharif Mcdonald PA-C [facility]        Today's Diagnoses     Rash and nonspecific skin eruption    -  1      Care Instructions    Let's have you stop taking amlodipine for the next week and follow up at that point.    You can try taking cetirizine, fexofenadine or loratadine for the itching.    Call us sooner with any significant change or question.          Follow-ups after your visit        Your next 10 appointments already scheduled     Mar 16, 2018  1:00 PM CDT   Return Visit with Kelly Carpenter LP   [facility])    [id number] [place] [address] [phone]            Mar 29, 2018  9:00 AM CDT   Return Visit with Kelly Carpenter LP   [facility]    [address] [facility] [address] [phone]              Who to contact     If you have questions or need follow up information about today's clinic visit or your schedule please contact [facility] directly at [phone].  Normal or non-critical lab and imaging results will be communicated to you by Kim[employer]Â®hart, letter or phone within 4 business days after the clinic has received the results. If you do not hear from us within 7 days, please contact the clinic through MyChart or phone. If you have a critical or abnormal lab result, we will notify you by phone as soon as possible.  Submit refill requests through Newsblur or call your pharmacy and they will forward the refill request to us. Please allow 3 business days for your refill to be completed.          Additional Information About Your Visit        KimLink Auto DetailingÂ®harTimetric Information     Newsblur lets you send messages to your doctor, view your test  "results, renew your prescriptions, schedule appointments and more. To sign up, go to [email or web address] . Click on \"Log in\" on the left side of the screen, which will take you to the Welcome page. Then click on \"Sign up Now\" on the right side of the page.     You will be asked to enter the access code listed below, as well as some personal information. Please follow the directions to create your username and password.     Your access code is: [email or web address]  Expires: 2018 11:30 AM     Your access code will  in 90 days. If you need help or a new code, please call your [place] clinic or [phone].        Care EveryWhere ID     This is your Care EveryWhere ID. This could be used by other organizations to access your [place] medical records  [id number]        Your Vitals Were     Pulse Temperature Respirations Height Pulse Oximetry BMI (Body Mass Index)    69 97.6  F (36.4  C) (Tympanic) 19 5' 7.5\" (1.715 m) 100% 24.64 kg/m2       Blood Pressure from Last 3 Encounters:   18 123/82   18 127/79   18 124/82    Weight from Last 3 Encounters:   18 159 lb 11.2 oz (72.4 kg)   18 160 lb 14.4 oz (73 kg)   18 161 lb 6.4 oz (73.2 kg)              Today, you had the following     No orders found for display       Primary Care Provider Office Phone # Fax #    LUIS Connolly Ra [facility] [phone] [address] [facility] [address]        Equal Access to Services     SEJAL ROWELL AH: Hadii vinita Roger, waaxda luqadaha, qaybta kaalmada fidelyada, tramaine hazel. So [facility] [phone].    ATENCIÓN: Si habla español, tiene a regalado disposición servicios gratuitos de asistencia lingüística. Llame al [phone].    We comply with applicable federal civil rights laws and [place] laws. We do not discriminate on the basis of race, color, national origin, age, disability, sex, sexual orientation, or gender identity.          "   Thank you!     Thank you for choosing [facility]  for your care. Our goal is always to provide you with excellent care. Hearing back from our patients is one way we can continue to improve our services. Please take a few minutes to complete the written survey that you may receive in the mail after your visit with us. Thank you!             Your Updated Medication List - Protect others around you: Learn how to safely use, store and throw away your medicines at www.disposemymeds.org.          This list is accurate as of 3/1/18 10:27 AM.  Always use your most recent med list.                   Brand Name Dispense Instructions for use Diagnosis    albuterol (2.5 MG/3ML) 0.083% neb solution     25 vial    Take 1 vial (2.5 mg) by nebulization every 4 hours as needed for shortness of breath / dyspnea or wheezing    Pneumonia of left lower lobe due to infectious organism (H), Shortness of breath       amLODIPine 5 MG tablet    NORVASC    30 tablet    Take 1 tablet (5 mg) by mouth daily    HTN, goal below 140/90       busPIRone 15 MG tablet    BUSPAR    60 tablet    Take 1 tablet (15 mg) by mouth 2 times daily    Anxiety attack, Depression, unspecified depression type       chlorthalidone 25 MG tablet    HYGROTON    30 tablet    Take 1 tablet (25 mg) by mouth daily    Benign essential hypertension       escitalopram 20 MG tablet    LEXAPRO    30 tablet    TAKE 1 TABLET BY MOUTH ONE TIME DAILY    Anxiety attack, Depression, unspecified depression type       fluticasone 50 MCG/ACT spray    FLONASE    16 g    Spray 2 sprays into both nostrils daily    Upper respiratory tract infection, unspecified type       MULTIVITAMIN ADULT PO      Take 1 tablet by mouth daily        traZODone 50 MG tablet    DESYREL    90 tablet    Take 1-4 tablets ( mg) by mouth At Bedtime    Sleep disorder       TYLENOL PO      Take 1,000 mg by mouth every 8 hours as needed        VITAMIN C PO      Take 500 mg by mouth daily         zinc 50 MG Tabs      Take 100 mg by mouth daily

## 2018-03-09 ENCOUNTER — OFFICE VISIT (OUTPATIENT)
Dept: FAMILY MEDICINE | Facility: CLINIC | Age: 58
End: 2018-03-09
Payer: COMMERCIAL

## 2018-03-09 VITALS
DIASTOLIC BLOOD PRESSURE: 86 MMHG | TEMPERATURE: 98 F | SYSTOLIC BLOOD PRESSURE: 138 MMHG | BODY MASS INDEX: 24.37 KG/M2 | HEART RATE: 70 BPM | OXYGEN SATURATION: 99 % | WEIGHT: 157.9 LBS

## 2018-03-09 DIAGNOSIS — R21 RASH: Primary | ICD-10-CM

## 2018-03-09 PROCEDURE — 99213 OFFICE O/P EST LOW 20 MIN: CPT | Performed by: NURSE PRACTITIONER

## 2018-03-09 RX ORDER — HYDROCORTISONE VALERATE 2 MG/G
OINTMENT TOPICAL
Qty: 45 G | Refills: 0 | Status: SHIPPED | OUTPATIENT
Start: 2018-03-09 | End: 2018-08-16

## 2018-03-09 NOTE — PATIENT INSTRUCTIONS
Restart your blood pressure medication.  For about 1 week start the chlorthalidone, if doing well then add the amlodipine.    Start using the ointment I sent over for you 2 times daily.  You can use this with the eucerin.    If symptoms are not improving please follow up and let me know.  We will need to have you seen by dermatology.

## 2018-03-09 NOTE — MR AVS SNAPSHOT
After Visit Summary   3/9/2018    Suresh Powers    MRN: [id number]           Patient Information     Date Of Birth          1960        Visit Information        Provider Department      3/9/2018 9:00 AM Carmen Martinez Ra, APRN CNP [facility]        Today's Diagnoses     Rash    -  1      Care Instructions    Restart your blood pressure medication.  For about 1 week start the chlorthalidone, if doing well then add the amlodipine.    Start using the ointment I sent over for you 2 times daily.  You can use this with the eucerin.    If symptoms are not improving please follow up and let me know.  We will need to have you seen by dermatology.          Follow-ups after your visit        Your next 10 appointments already scheduled     Mar 16, 2018  1:00 PM CDT   Return Visit with Kelly Carpenter LP   [facility])    [id number] [place] [address] [phone]            Mar 29, 2018  9:00 AM CDT   Return Visit with Kelly Carpenter LP   [facility]    [address] [facility] [address] [phone]              Who to contact     If you have questions or need follow up information about today's clinic visit or your schedule please contact [facility] directly at [phone].  Normal or non-critical lab and imaging results will be communicated to you by MyChart, letter or phone within 4 business days after the clinic has received the results. If you do not hear from us within 7 days, please contact the clinic through MyChart or phone. If you have a critical or abnormal lab result, we will notify you by phone as soon as possible.  Submit refill requests through [employer] or call your pharmacy and they will forward the refill request to us. Please allow 3 business days for your refill to be completed.          Additional Information About Your Visit       "  MyChart Information     [employer] lets you send messages to your doctor, view your test results, renew your prescriptions, schedule appointments and more. To sign up, go to [email or web address] . Click on \"Log in\" on the left side of the screen, which will take you to the Welcome page. Then click on \"Sign up Now\" on the right side of the page.     You will be asked to enter the access code listed below, as well as some personal information. Please follow the directions to create your username and password.     Your access code is: [email or web address]  Expires: 2018 11:30 AM     Your access code will  in 90 days. If you need help or a new code, please call your [place] clinic or [phone].        Care EveryWhere ID     This is your Care EveryWhere ID. This could be used by other organizations to access your [place] medical records  [id number]        Your Vitals Were     Pulse Temperature Pulse Oximetry BMI (Body Mass Index)          70 98  F (36.7  C) (Oral) 99% 24.37 kg/m2         Blood Pressure from Last 3 Encounters:   18 138/86   18 123/82   18 127/79    Weight from Last 3 Encounters:   18 157 lb 14.4 oz (71.6 kg)   18 159 lb 11.2 oz (72.4 kg)   18 160 lb 14.4 oz (73 kg)              Today, you had the following     No orders found for display         Today's Medication Changes          These changes are accurate as of 3/9/18  9:44 AM.  If you have any questions, ask your nurse or doctor.               Start taking these medicines.        Dose/Directions    hydrocortisone valerate 0.2 % ointment   Commonly known as:  WEST-HANY   Used for:  Rash   Started by:  Carmen Martinez Ra, LUIS CNP        Apply sparingly to affected area three times daily for 14 days.   Quantity:  45 g   Refills:  0            Where to get your medicines      These medications were sent to [id number], [address] [address]     " Phone:  [phone]     hydrocortisone valerate 0.2 % ointment                Primary Care Provider Office Phone # Fax #    LUIS Connolly Ra [facility] [phone] [address]  [facility] [address]        Equal Access to Services     SANJANA ROWELL : Hadii aad ku hadasho Soomaali, waaxda luqadaha, qaybta kaalmada adeegyada, waxay idiin hayaan adeeg khjennsh lava . So [facility] [phone].    ATENCIÓN: Si habla español, tiene a regalado disposición servicios gratuitos de asistencia lingüística. Yayoame al [phone].    We comply with applicable federal civil rights laws and [place] laws. We do not discriminate on the basis of race, color, national origin, age, disability, sex, sexual orientation, or gender identity.            Thank you!     Thank you for choosing [facility]  for your care. Our goal is always to provide you with excellent care. Hearing back from our patients is one way we can continue to improve our services. Please take a few minutes to complete the written survey that you may receive in the mail after your visit with us. Thank you!             Your Updated Medication List - Protect others around you: Learn how to safely use, store and throw away your medicines at www.disposemymeds.org.          This list is accurate as of 3/9/18  9:44 AM.  Always use your most recent med list.                   Brand Name Dispense Instructions for use Diagnosis    albuterol (2.5 MG/3ML) 0.083% neb solution     25 vial    Take 1 vial (2.5 mg) by nebulization every 4 hours as needed for shortness of breath / dyspnea or wheezing    Pneumonia of left lower lobe due to infectious organism (H), Shortness of breath       amLODIPine 5 MG tablet    NORVASC    30 tablet    Take 1 tablet (5 mg) by mouth daily    HTN, goal below 140/90       busPIRone 15 MG tablet    BUSPAR    60 tablet    Take 1 tablet (15 mg) by mouth 2 times daily    Anxiety attack, Depression, unspecified depression  type       chlorthalidone 25 MG tablet    HYGROTON    30 tablet    Take 1 tablet (25 mg) by mouth daily    Benign essential hypertension       escitalopram 20 MG tablet    LEXAPRO    30 tablet    TAKE 1 TABLET BY MOUTH ONE TIME DAILY    Anxiety attack, Depression, unspecified depression type       fluticasone 50 MCG/ACT spray    FLONASE    16 g    Spray 2 sprays into both nostrils daily    Upper respiratory tract infection, unspecified type       hydrocortisone valerate 0.2 % ointment    WEST-HANY    45 g    Apply sparingly to affected area three times daily for 14 days.    Rash       MULTIVITAMIN ADULT PO      Take 1 tablet by mouth daily        traZODone 50 MG tablet    DESYREL    90 tablet    Take 1-4 tablets ( mg) by mouth At Bedtime    Sleep disorder       TYLENOL PO      Take 1,000 mg by mouth every 8 hours as needed        VITAMIN C PO      Take 500 mg by mouth daily        zinc 50 MG Tabs      Take 100 mg by mouth daily

## 2018-03-09 NOTE — PROGRESS NOTES
SUBJECTIVE:   Suresh Powers is a 57 year old male who presents to clinic today for the following health issues:      Rash follow up      Duration: 1 month approximately    Description  Location: [place]  Itching: moderate    Intensity:  moderate    Accompanying signs and symptoms: None    History (similar episodes/previous evaluation): None    Precipitating or alleviating factors:  New exposures:  None  Recent travel: no      Therapies tried and outcome: Eucerin cream without relief    Pt presents with requests for follow up.  Symptoms x1 month.  Visit 3/1/18.  Concern for possible allergic reaction causing the rash.  Stopped chlorthalidone and amlodipine at that time.  Does not feel that this affected the rash.  Has been applying eucerin to hands and forearms.  Maybe a very slight improvement was seen.  Rash has not spread.  No worsening.  Intermittently pruritic, mild.  Otherwise feels well.  No fevers.  No n/v.  Weight is stable.  No cough, respiratory symptoms.  No GI symptoms.    Problem list and histories reviewed & adjusted, as indicated.  Additional history: as documented    Patient Active Problem List   Diagnosis     Exotropia     Benign essential hypertension     Tobacco use disorder     Sleep disorder     Unilateral inguinal hernia without obstruction or gangrene     Closed fracture of proximal end of left tibia     Fracture of left clavicle     Depression     Slow transit constipation     Urinary retention     Iron deficiency anemia     Clostridium difficile diarrhea     Anxiety attack     HTN, goal below 140/90     Single kidney     CARDIOVASCULAR SCREENING; LDL GOAL LESS THAN 160     OCD (obsessive compulsive disorder)     Generalized anxiety disorder     Moderate episode of recurrent major depressive disorder (H)     Past Surgical History:   Procedure Laterality Date     foot crush injury       kidney donation       OPEN REDUCTION INTERNAL FIXATION CLAVICLE Left 2/4/2016    Procedure: OPEN REDUCTION  INTERNAL FIXATION CLAVICLE;  Surgeon: Rakesh Hui MD;  Location:  OR     OPEN REDUCTION INTERNAL FIXATION TIBIAL PLATEAU Left 2/4/2016    Procedure: OPEN REDUCTION INTERNAL FIXATION TIBIAL PLATEAU;  Surgeon: Rakesh Hui MD;  Location:  OR       Social History   Substance Use Topics     Smoking status: Former Smoker     Packs/day: 1.00     Years: 0.00     Smokeless tobacco: Former User     Quit date: 2/2/2016     Alcohol use No     Family History   Problem Relation Age of Onset     Breast Cancer Mother      Type 2 Diabetes Mother      Skin Cancer Father      CEREBROVASCULAR DISEASE Father            Reviewed and updated as needed this visit by clinical staff  Tobacco  Allergies  Meds  Med Hx  Surg Hx  Fam Hx  Soc Hx      Reviewed and updated as needed this visit by Provider         ROS:  SEE HPI.    OBJECTIVE:     /86  Pulse 70  Temp 98  F (36.7  C) (Oral)  Wt 157 lb 14.4 oz (71.6 kg)  SpO2 99%  BMI 24.37 kg/m2  Body mass index is 24.37 kg/(m^2).  GENERAL: healthy, alert and no distress  RESP: lungs clear to auscultation - no rales, rhonchi or wheezes  CV: regular rates and rhythm and normal S1 S2, no S3 or S4  SKIN: Hands scaly, dry.  Forearms with scattered erythematous papules, no pustules.    PSYCH: mentation appears normal, affect normal/bright    Diagnostic Test Results:  none     ASSESSMENT/PLAN:   1. Rash  Discussed options with pt.  We can trial a steroid to see if that helps with the rash.  He will restart his bp meds one at a time watching carefully for reaction.  He will let me know early next week if symptoms do not start improving.  If not, he will need to see derm.  He has no additional symptoms today that are concerning for underlying cause.  Pt agrees with plan and verbalized understanding.  - hydrocortisone valerate (WEST-HANY) 0.2 % ointment; Apply sparingly to affected area three times daily for 14 days.  Dispense: 45 g; Refill: 0    LUIS Connolly Ra  [facility] [place]

## 2018-03-16 ENCOUNTER — OFFICE VISIT (OUTPATIENT)
Dept: PSYCHOLOGY | Facility: CLINIC | Age: 58
End: 2018-03-16
Payer: COMMERCIAL

## 2018-03-16 DIAGNOSIS — F41.1 GENERALIZED ANXIETY DISORDER: ICD-10-CM

## 2018-03-16 DIAGNOSIS — F33.1 MODERATE EPISODE OF RECURRENT MAJOR DEPRESSIVE DISORDER (H): Primary | ICD-10-CM

## 2018-03-16 PROCEDURE — 90834 PSYTX W PT 45 MINUTES: CPT | Performed by: PSYCHOLOGIST

## 2018-03-16 ASSESSMENT — ANXIETY QUESTIONNAIRES
7. FEELING AFRAID AS IF SOMETHING AWFUL MIGHT HAPPEN: NEARLY EVERY DAY
2. NOT BEING ABLE TO STOP OR CONTROL WORRYING: NEARLY EVERY DAY
3. WORRYING TOO MUCH ABOUT DIFFERENT THINGS: NEARLY EVERY DAY
GAD7 TOTAL SCORE: 16
6. BECOMING EASILY ANNOYED OR IRRITABLE: SEVERAL DAYS
5. BEING SO RESTLESS THAT IT IS HARD TO SIT STILL: NOT AT ALL
1. FEELING NERVOUS, ANXIOUS, OR ON EDGE: NEARLY EVERY DAY

## 2018-03-16 ASSESSMENT — PATIENT HEALTH QUESTIONNAIRE - PHQ9: 5. POOR APPETITE OR OVEREATING: NEARLY EVERY DAY

## 2018-03-16 NOTE — MR AVS SNAPSHOT
"                  MRN:[id number]                      After Visit Summary   3/16/2018    Suresh Powers    MRN: [id number]           Visit Information        Provider Department      3/16/2018 1:00 PM Pauline KellyLAURA [facility] Generic      Your next 10 appointments already scheduled     Mar 29, 2018  9:00 AM CDT   Return Visit with Kelly Carpenter LAURA   [facility] ([facility])    [address]  [facility] [address] [phone]            2018  1:30 PM CDT   Return Visit with Kelly Carpenter LP   [facility] ([facility])    [address] [facility] [address] [phone]              MyChart Information     [employer] lets you send messages to your doctor, view your test results, renew your prescriptions, schedule appointments and more. To sign up, go to [email or web address] . Click on \"Log in\" on the left side of the screen, which will take you to the Welcome page. Then click on \"Sign up Now\" on the right side of the page.     You will be asked to enter the access code listed below, as well as some personal information. Please follow the directions to create your username and password.     Your access code is: [email or web address]  Expires: 2018 12:30 PM     Your access code will  in 90 days. If you need help or a new code, please call your [place] clinic or [phone].        Care EveryWhere ID     This is your Care EveryWhere ID. This could be used by other organizations to access your [place] medical records  [id number]        Equal Access to Services     SANJANA ROWELL : Hadii vinita henry Somonisha, waaxda luqadaha, qaybta kaalmada tramaine oddge. So [facility] [phone].    ATENCIÓN: Si habla español, tiene a regalado disposición servicios gratuitos de asistencia lingüística. Llame al [phone].    We comply with applicable federal civil " rights laws and [place] laws. We do not discriminate on the basis of race, color, national origin, age, disability, sex, sexual orientation, or gender identity.

## 2018-03-16 NOTE — PROGRESS NOTES
"                                             Progress Note    Client Name: Suresh Powers  Date: 3-16-18         Service Type: Individual      Session Start Time: 1:05  Session End Time: 1:55 am      Session Length: 38-52 min   Session #: 9     Attendees: Client    Treatment Plan Last Reviewed: today  (90 days = April 2018)  PHQ-9 / SPIKE-7 : see flow sheets     DATA      Progress Since Last Session (Related to Symptoms / Goals / Homework):   Symptoms: Stable but with depression, anxiety , irritabilty and OCD.  Disrupted concentration, feeling bad about self  And fear that something awful might happen. Finds the trazedone useful so he can sleep.  (7-8 hours)   Pain has been worse in the winter and with aging. Is using a cane.    Homework: Completed in session      Episode of Care Goals: Satisfactory progress - ACTION (Actively working towards change); Intervened by reinforcing change plan / affirming steps taken.  Read the Bible daily. Is going to [employer] weekly. Does feel better after, mass and Bible study, or devotional book- \"does feel God\"       Current / Ongoing Stressors and Concerns:  Daily anxiety, with OCD tendencies  OCD- ruminations about  the future /   losses: mom gone 13 + years, dad for 7 years-  mother had  Anxiety.  (physical health 1998- cart collapsed on him at work , both feet crushed)   (2016 another accident at work - broke tibia and clavical)    Donated a kidney to his sister 1990.     Treatment Objective(s) Addressed in This Session:   learn and demonstrate self advocacy   Increase interest, engagement, and pleasure in doing things  Identify negative self-talk and behaviors: challenge core beliefs, myths, and actions  identify 4 strategies for managing obsessive thoughts and improving mood  identify how the use of (food) substances contributes to the avoidance of feeling associated with the loss    explored meaning and purpose / volunteering / contribution to community ideas. "      Intervention:    Is feel better over all.  Did receive his 1st  monthly deposit. The financial fears are settling done; this is real.    Looked at extended family stories. Mother  Dx of schizo affective disorder and had ECT. Her brother was at a facility in [place].  Dad passed in 2010. Sister inpatient for depression after his death. ( her partner had passed within those few years as well)  Sister that  he  gave his kidney to- Has had SI historically. Shares the conversation he had with her.  Processed fears and emotions.     ASSESSMENT: Current Emotional / Mental Status (status of significant symptoms):   Risk status (Self / Other harm or suicidal ideation)   Client denies current fears or concerns for personal safety.   Client denies current or recent suicidal ideation or behaviors.   Client denies current or recent homicidal ideation or behaviors.   Client denies current or recent self injurious behavior or ideation.   Client denies other safety concerns.   A safety and risk management plan has not been developed at this time, however client was given the after-hours number / 911 should there be a change in any of these risk factors.     Appearance:   Appropriate    Eye Contact:   Good    Psychomotor Behavior: Normal    Attitude:   Cooperative    Orientation:   All   Speech    Rate / Production: Normal     Volume:  Normal    Mood:    Anxious  Depressed  Normal   Affect:    Appropriate  Worrisome    Thought Content:  Clear    Thought Form:  Coherent  Logical    Insight:    Good      Medication Review:   Changes to psychiatric medications, see updated Medication List in EPIC.      Medication Compliance:   Yes     Changes in Health Issues:   None reported     Chemical Use Review:   Substance Use: Chemical use reviewed, no active concerns identified      Tobacco Use: No current tobacco use.       Collateral Reports Completed:   Routed note to PCP as needed     PLAN: (Client Tasks / Therapist Tasks / Other  Use  "brandy totter exercise as discussed to help balance fears  Past hw  Review session notes and the threshold continuum (fears)   Consider volunteering at a \"sunshine club\"  written encouragement.  Past hw  Advocate for self- ask questions from [facility]  And [place] [facility] ( sister as resource)  Conduct mindful activity at least once a day.  Past hw  2 distraction skills and 2 reinterpretation skills  past hw  Use breath work  TIP skills  Rishi Saez book for career concerns      Kelly Carpenter LP                                                         ________________________________________________________________________    Treatment Plan    Client's Name: Suresh Powers  YOB: 1960    Date: 12-08-17    DSM-V Diagnoses: SPIKE / MDD  Psychosocial / Contextual Factors: left job due to anxiety  WHODAS: see dx    Referral / Collaboration:  Referral to another professional/service is not indicated at this time.    Anticipated number of session or this episode of care: 10      MeasurableTreatment Goal(s) related to diagnosis / functional impairment(s)  Goal 1: Client will employ coping skills daily to manage mood disruption (including mood disruption/ sadness realted to career disruption).    I will know I've met my goal when \" keep stable emotions and ability to function daily.    Objective #A (Client Action)   Client will report using skills daily including spiritual practices and accessing support.   Update 2-08-18   New medication. Plan is the do mindful activity daily, use CBT skills daily.    Intervention(s)   Therapist will reinforce and teach 9-12 coping skills. Employ early intervention.     Goal 2: Client will process loss and move towards articulating a strong self worth and identity.    I will know I've met my goal when \"  Client reports a better sense of self worth; has more good days than bad.   feeling useful at home and find daily meaning and ( needed) purpose in being with his family.   Client will identify " how thoughts contributes to the feeling of powerlessness and sadness associated with the loss.   Update 2-08-18   New medication. Plan is the do mindful activity daily, use positive affirmations skills daily.      Client has reviewed and agreed to the above plan.      Kelly Carpenter LP  December 8, 2017

## 2018-03-17 ASSESSMENT — PATIENT HEALTH QUESTIONNAIRE - PHQ9: SUM OF ALL RESPONSES TO PHQ QUESTIONS 1-9: 8

## 2018-03-17 ASSESSMENT — ANXIETY QUESTIONNAIRES: GAD7 TOTAL SCORE: 16

## 2018-03-29 ENCOUNTER — OFFICE VISIT (OUTPATIENT)
Dept: PSYCHOLOGY | Facility: CLINIC | Age: 58
End: 2018-03-29
Payer: COMMERCIAL

## 2018-03-29 DIAGNOSIS — F33.1 MODERATE EPISODE OF RECURRENT MAJOR DEPRESSIVE DISORDER (H): ICD-10-CM

## 2018-03-29 DIAGNOSIS — F41.1 GENERALIZED ANXIETY DISORDER: Primary | ICD-10-CM

## 2018-03-29 DIAGNOSIS — F42.9 OBSESSIVE-COMPULSIVE DISORDER, UNSPECIFIED TYPE: ICD-10-CM

## 2018-03-29 PROCEDURE — 90834 PSYTX W PT 45 MINUTES: CPT | Performed by: PSYCHOLOGIST

## 2018-03-29 ASSESSMENT — ANXIETY QUESTIONNAIRES
2. NOT BEING ABLE TO STOP OR CONTROL WORRYING: NEARLY EVERY DAY
GAD7 TOTAL SCORE: 16
6. BECOMING EASILY ANNOYED OR IRRITABLE: SEVERAL DAYS
5. BEING SO RESTLESS THAT IT IS HARD TO SIT STILL: NOT AT ALL
7. FEELING AFRAID AS IF SOMETHING AWFUL MIGHT HAPPEN: NEARLY EVERY DAY
1. FEELING NERVOUS, ANXIOUS, OR ON EDGE: NEARLY EVERY DAY
IF YOU CHECKED OFF ANY PROBLEMS ON THIS QUESTIONNAIRE, HOW DIFFICULT HAVE THESE PROBLEMS MADE IT FOR YOU TO DO YOUR WORK, TAKE CARE OF THINGS AT HOME, OR GET ALONG WITH OTHER PEOPLE: SOMEWHAT DIFFICULT
3. WORRYING TOO MUCH ABOUT DIFFERENT THINGS: NEARLY EVERY DAY

## 2018-03-29 ASSESSMENT — PATIENT HEALTH QUESTIONNAIRE - PHQ9: 5. POOR APPETITE OR OVEREATING: NEARLY EVERY DAY

## 2018-03-29 NOTE — MR AVS SNAPSHOT
"                  MRN:[id number]                      After Visit Summary   3/29/2018    Suresh Powers    MRN: [id number]           Visit Information        Provider Department      3/29/2018 9:00 AM Kelly Carpenter LP [facility] Generic      Your next 10 appointments already scheduled     2018  1:30 PM CDT   Return Visit with Kelly CarpenterLAURA   [facility] ([place] [address] [facility] [address]   [phone]            May 01, 2018 12:30 PM CDT   Return Visit with Kelly Carpenter LAURA   [facility] ([address] [address]   [phone]            May 15, 2018 10:30 AM CDT   Return Visit with Kelly Carpenter LAURA   [facility] ([address] [address]   [phone]              MyChart Information     [employer] lets you send messages to your doctor, view your test results, renew your prescriptions, schedule appointments and more. To sign up, go to [email or web address] . Click on \"Log in\" on the left side of the screen, which will take you to the Welcome page. Then click on \"Sign up Now\" on the right side of the page.     You will be asked to enter the access code listed below, as well as some personal information. Please follow the directions to create your username and password.     Your access code is: [email or web address]  Expires: 2018 12:30 PM     Your access code will  in 90 days. If you need help or a new code, please call your [place] clinic or [phone].        Care EveryWhere ID     This is your Care EveryWhere ID. This could be used by other organizations to access your [place] medical records  [id number]        Equal Access to Services     SANJANA ROWELL AH: Marylou Roger, carmen keating, qaybdirk dodge, tramaine felix " la'david ah. So [facility] [phone].    ATENCIÓN: Si habla español, tiene a regalado disposición servicios gratuitos de asistencia lingüística. Llame al [phone].    We comply with applicable federal civil rights laws and [place] laws. We do not discriminate on the basis of race, color, national origin, age, disability, sex, sexual orientation, or gender identity.

## 2018-03-29 NOTE — PROGRESS NOTES
"                                             Progress Note    Client Name: Suresh Powers  Date: 3-29-18         Service Type: Individual      Session Start Time: 9:00  Session End Time: 9:50 am      Session Length: 38-52 min   Session #: 10     Attendees: Client    Treatment Plan Last Reviewed: today  (90 days = April 2018)  PHQ-9 / SPIKE-7 : see flow sheets     DATA      Progress Since Last Session (Related to Symptoms / Goals / Homework):   Symptoms: Stable but with depression, anxiety , irritabilty and OCD.  Disrupted concentration, feeling bad about self  And fear that something awful might happen. Finds the trazedone useful so he can sleep.  (7-8 hours)   Pain has been worse in the winter and with aging. Is using a cane.    Homework: Completed in session      Episode of Care Goals: Satisfactory progress - ACTION (Actively working towards change); Intervened by reinforcing change plan / affirming steps taken.  Read the Bible daily. Is going to [employer] weekly. Does feel better after, mass and Bible study, or devotional book- \"does feel God\"       Current / Ongoing Stressors and Concerns:  Daily anxiety, with OCD tendencies  OCD- ruminations about  the future /   losses: mom gone 13 + years, dad for 7 years-  mother had  Anxiety.  (physical health 1998- cart collapsed on him at work , both feet crushed)   (2016 another accident at work - broke tibia and clavical)    Donated a kidney to his sister 1990.  Mother  Dx of schizo affective disorder and had ECT. Her brother was at a facility in [place].  Dad passed in 2010. Sister inpatient for depression after his death.     Treatment Objective(s) Addressed in This Session:   learn and demonstrate self advocacy   Increase interest, engagement, and pleasure in doing things  Identify negative self-talk and behaviors: challenge core beliefs, myths, and actions  identify 4 strategies for managing obsessive thoughts and improving mood  identify how the use of (food) substances " "contributes to the avoidance of feeling associated with the loss    explored meaning and purpose / volunteering / contribution to community ideas.      Intervention:    Fears something bad would happen to his daughter , sister or friend Darlyn.  tries to use his karely, tries to be happy for today.    Explored his karely. Worked with his language\"  \"God has a plan for everything\".    Added an intention:  \"I will find a way to handle it\".     Action step-  Breath work and intentional statement.  Discussed Pain vs suffering.  How to help ease the suffering. Client has many (8)ideas.     Processed fears and emotions.     ASSESSMENT: Current Emotional / Mental Status (status of significant symptoms):   Risk status (Self / Other harm or suicidal ideation)   Client denies current fears or concerns for personal safety.   Client denies current or recent suicidal ideation or behaviors.   Client denies current or recent homicidal ideation or behaviors.   Client denies current or recent self injurious behavior or ideation.   Client denies other safety concerns.   A safety and risk management plan has not been developed at this time, however client was given the after-hours number / 911 should there be a change in any of these risk factors.     Appearance:   Appropriate    Eye Contact:   Good    Psychomotor Behavior: Normal    Attitude:   Cooperative    Orientation:   All   Speech    Rate / Production: Normal     Volume:  Normal    Mood:    Anxious  Depressed  Normal   Affect:    Appropriate  Worrisome    Thought Content:  Clear    Thought Form:  Coherent  Logical    Insight:    Good      Medication Review:   Changes to psychiatric medications, see updated Medication List in EPIC.      Medication Compliance:   Yes     Changes in Health Issues:   None reported     Chemical Use Review:   Substance Use: Chemical use reviewed, no active concerns identified      Tobacco Use: No current tobacco use.       Collateral Reports " "Completed:   Routed note to PCP as needed     PLAN: (Client Tasks / Therapist Tasks / Other  Return to using breath wok. Add Intentions  Past hw  Use brandy totter exercise as discussed to help balance fears  Past hw  Review session notes and the threshold continuum (fears)   Consider volunteering at a \"sunshine club\"  written encouragement.  Past hw  Advocate for self- ask questions from [facility]  [facility] [place] [facility] ( sister as resource)  Conduct mindful activity at least once a day.  Past hw  2 distraction skills and 2 reinterpretation skills  past hw  Use breath work  TIP skills  Rishi Saez book for career concerns      Kelly Carpenter LP                                                         ________________________________________________________________________    Treatment Plan    Client's Name: Suresh Powers  YOB: 1960    Date: 12-08-17    DSM-V Diagnoses: SPIKE / MDD  Psychosocial / Contextual Factors: left job due to anxiety  WHODAS: see dx    Referral / Collaboration:  Referral to another professional/service is not indicated at this time.    Anticipated number of session or this episode of care: 10      MeasurableTreatment Goal(s) related to diagnosis / functional impairment(s)  Goal 1: Client will employ coping skills daily to manage mood disruption (including mood disruption/ sadness realted to career disruption).    I will know I've met my goal when \" keep stable emotions and ability to function daily.    Objective #A (Client Action)   Client will report using skills daily including spiritual practices and accessing support.   Update 2-08-18   New medication. Plan is the do mindful activity daily, use CBT skills daily.    Intervention(s)   Therapist will reinforce and teach 9-12 coping skills. Employ early intervention.     Goal 2: Client will process loss and move towards articulating a strong self worth and identity.    I will know I've met my goal when \"  Client reports a better sense of self " worth; has more good days than bad.   feeling useful at home and find daily meaning and ( needed) purpose in being with his family.   Client will identify how thoughts contributes to the feeling of powerlessness and sadness associated with the loss.   Update 2-08-18   New medication. Plan is the do mindful activity daily, use positive affirmations skills daily.      Client has reviewed and agreed to the above plan.      Kelly Carpenter LP  December 8, 2017

## 2018-03-30 ASSESSMENT — PATIENT HEALTH QUESTIONNAIRE - PHQ9: SUM OF ALL RESPONSES TO PHQ QUESTIONS 1-9: 9

## 2018-03-30 ASSESSMENT — ANXIETY QUESTIONNAIRES: GAD7 TOTAL SCORE: 16

## 2018-04-16 ENCOUNTER — OFFICE VISIT (OUTPATIENT)
Dept: PSYCHOLOGY | Facility: CLINIC | Age: 58
End: 2018-04-16
Payer: COMMERCIAL

## 2018-04-16 DIAGNOSIS — F33.1 MODERATE EPISODE OF RECURRENT MAJOR DEPRESSIVE DISORDER (H): Primary | ICD-10-CM

## 2018-04-16 DIAGNOSIS — F42.9 OBSESSIVE-COMPULSIVE DISORDER, UNSPECIFIED TYPE: ICD-10-CM

## 2018-04-16 DIAGNOSIS — F41.1 GENERALIZED ANXIETY DISORDER: ICD-10-CM

## 2018-04-16 PROCEDURE — 90834 PSYTX W PT 45 MINUTES: CPT | Performed by: PSYCHOLOGIST

## 2018-04-16 ASSESSMENT — ANXIETY QUESTIONNAIRES
6. BECOMING EASILY ANNOYED OR IRRITABLE: SEVERAL DAYS
5. BEING SO RESTLESS THAT IT IS HARD TO SIT STILL: NOT AT ALL
7. FEELING AFRAID AS IF SOMETHING AWFUL MIGHT HAPPEN: MORE THAN HALF THE DAYS
1. FEELING NERVOUS, ANXIOUS, OR ON EDGE: NEARLY EVERY DAY
GAD7 TOTAL SCORE: 15
2. NOT BEING ABLE TO STOP OR CONTROL WORRYING: NEARLY EVERY DAY
3. WORRYING TOO MUCH ABOUT DIFFERENT THINGS: NEARLY EVERY DAY

## 2018-04-16 ASSESSMENT — PATIENT HEALTH QUESTIONNAIRE - PHQ9: 5. POOR APPETITE OR OVEREATING: NEARLY EVERY DAY

## 2018-04-16 NOTE — MR AVS SNAPSHOT
After Visit Summary   4/16/2018    Suresh Powers    MRN: [id number]           Patient Information     Date Of Birth          1960        Visit Information        Provider Department      4/16/2018 1:30 PM Kelly Carpenter LP [facility]      Today's Diagnoses     Moderate episode of recurrent major depressive disorder (H)    -  1    Generalized anxiety disorder        Obsessive-compulsive disorder, unspecified type           Follow-ups after your visit        Your next 10 appointments already scheduled     May 01, 2018 12:30 PM CDT   Return Visit with Kelly Carpenter LP   [address] [address]   [phone]            May 15, 2018 10:30 AM CDT   Return Visit with Kelly Carpenter LP   [facility] ([address] [id number] [phone]            Jun 04, 2018 11:30 AM CDT   Return Visit with Kelly Carpenter LP   [facility] ([address] [id number] [phone]              Who to contact     If you have questions or need follow up information about today's clinic visit or your schedule please contact [facility] directly at [phone].  Normal or non-critical lab and imaging results will be communicated to you by MyChart, letter or phone within 4 business days after the clinic has received the results. If you do not hear from us within 7 days, please contact the clinic through MyChart or phone. If you have a critical or abnormal lab result, we will notify you by phone as soon as possible.  Submit refill requests through [employer] or call your pharmacy and they will forward the refill request to us. Please allow 3 business days for your refill to be completed.          Additional Information About Your Visit       "  MyChart Information     [employer] lets you send messages to your doctor, view your test results, renew your prescriptions, schedule appointments and more. To sign up, go to [email or web address] . Click on \"Log in\" on the left side of the screen, which will take you to the Welcome page. Then click on \"Sign up Now\" on the right side of the page.     You will be asked to enter the access code listed below, as well as some personal information. Please follow the directions to create your username and password.     Your access code is: [email or web address]  Expires: 2018 12:30 PM     Your access code will  in 90 days. If you need help or a new code, please call your [place] clinic or [phone].        Care EveryWhere ID     This is your Care EveryWhere ID. This could be used by other organizations to access your [place] medical records  [id number]         Blood Pressure from Last 3 Encounters:   18 138/86   18 123/82   18 127/79    Weight from Last 3 Encounters:   18 71.6 kg (157 lb 14.4 oz)   18 72.4 kg (159 lb 11.2 oz)   18 73 kg (160 lb 14.4 oz)              Today, you had the following     No orders found for display       Primary Care Provider Office Phone # Fax #    Carmen LUIS Bragg [facility] [phone]       [address] [facility] [address]        Equal Access to Services     SEJAL ROWELL : Hadii aad ku hadasho Soomaali, waaxda luqadaha, qaybta kaalmada adeegyada, tramaine silva . So [facility] [phone].    ATENCIÓN: Si habla español, tiene a regalado disposición servicios gratuitos de asistencia lingüística. Llame al [phone].    We comply with applicable federal civil rights laws and [place] laws. We do not discriminate on the basis of race, color, national origin, age, disability, sex, sexual orientation, or gender identity.            Thank you!     Thank you for choosing [facility]  for " your care. Our goal is always to provide you with excellent care. Hearing back from our patients is one way we can continue to improve our services. Please take a few minutes to complete the written survey that you may receive in the mail after your visit with us. Thank you!             Your Updated Medication List - Protect others around you: Learn how to safely use, store and throw away your medicines at www.disposemymeds.org.          This list is accurate as of 4/16/18  2:26 PM.  Always use your most recent med list.                   Brand Name Dispense Instructions for use Diagnosis    albuterol (2.5 MG/3ML) 0.083% neb solution     25 vial    Take 1 vial (2.5 mg) by nebulization every 4 hours as needed for shortness of breath / dyspnea or wheezing    Pneumonia of left lower lobe due to infectious organism (H), Shortness of breath       amLODIPine 5 MG tablet    NORVASC    30 tablet    Take 1 tablet (5 mg) by mouth daily    HTN, goal below 140/90       busPIRone 15 MG tablet    BUSPAR    60 tablet    Take 1 tablet (15 mg) by mouth 2 times daily    Anxiety attack, Depression, unspecified depression type       chlorthalidone 25 MG tablet    HYGROTON    30 tablet    Take 1 tablet (25 mg) by mouth daily    Benign essential hypertension       escitalopram 20 MG tablet    LEXAPRO    30 tablet    TAKE 1 TABLET BY MOUTH ONE TIME DAILY    Anxiety attack, Depression, unspecified depression type       fluticasone 50 MCG/ACT spray    FLONASE    16 g    Spray 2 sprays into both nostrils daily    Upper respiratory tract infection, unspecified type       hydrocortisone valerate 0.2 % ointment    WEST-HANY    45 g    Apply sparingly to affected area three times daily for 14 days.    Rash       MULTIVITAMIN ADULT PO      Take 1 tablet by mouth daily        traZODone 50 MG tablet    DESYREL    90 tablet    Take 1-4 tablets ( mg) by mouth At Bedtime    Sleep disorder       TYLENOL PO      Take 1,000 mg by mouth every 8  hours as needed        VITAMIN C PO      Take 500 mg by mouth daily        zinc 50 MG Tabs      Take 100 mg by mouth daily

## 2018-04-16 NOTE — PROGRESS NOTES
"                                             Progress Note    Client Name: Suresh Powers  Date: 4-16-18         Service Type: Individual      Session Start Time: 1:40  Session End Time: 2:30 pm      Session Length: 38-52 min   Session #: 11     Attendees: Client    Treatment Plan Last Reviewed: today  (90 days = April 2018)  PHQ-9 / SPIKE-7 : see flow sheets     DATA      Progress Since Last Session (Related to Symptoms / Goals / Homework):   Symptoms: Stable but with depression, anxiety , irritabilty and OCD.  Disrupted concentration, feeling bad about self  And fear that something awful might happen. Finds the trazedone useful so he can sleep.  (7-8 hours)   Pain has been worse in the winter and with aging. Is using a cane.    Homework: Completed in session      Episode of Care Goals: Satisfactory progress - ACTION (Actively working towards change); Intervened by reinforcing change plan / affirming steps taken.  Read the Bible daily. Is going to [employer] weekly. Does feel better after, mass and Bible study, or devotional book- \"does feel God\"       Current / Ongoing Stressors and Concerns:  Daily anxiety, with OCD tendencies  OCD- ruminations about  the future /   losses: mom gone 13 + years, dad for 7 years-  mother had  Anxiety.  (physical health 1998- cart collapsed on him at work , both feet crushed)   (2016 another accident at work - broke tibia and clavical)    Donated a kidney to his sister 1990.  Mother  Dx of schizo affective disorder and had ECT. Her brother was at a facility in [place].  Dad passed in 2010. Sister inpatient for depression after his death.     Treatment Objective(s) Addressed in This Session:   learn and demonstrate self advocacy   Increase interest, engagement, and pleasure in doing things  Identify negative self-talk and behaviors: challenge core beliefs, myths, and actions  identify 4 strategies for managing obsessive thoughts and improving mood  identify how the use of (food) substances " "contributes to the avoidance of feeling associated with the loss    explored meaning and purpose / volunteering / contribution to community ideas.      Intervention:    Reviewed homework around re- languaging:  God has a plan for everything\".    Added an intention:  \"I will find a way to handle it\".       Concerns today around step mom sarah Hinds who is 92 years old.  Worried that she did answer her phone- he cant get info- ( hippa).  Has some solace that she has shared that she isn't afraid to die and has a God connection.     Explored the nature of the worry pattern, and having no info for a period of time.  3 techniques offered.   Explored the nature of attachment / letting go and who will be there for him when he dies.    Processed fears and emotions.     ASSESSMENT: Current Emotional / Mental Status (status of significant symptoms):   Risk status (Self / Other harm or suicidal ideation)   Client denies current fears or concerns for personal safety.   Client denies current or recent suicidal ideation or behaviors.   Client denies current or recent homicidal ideation or behaviors.   Client denies current or recent self injurious behavior or ideation.   Client denies other safety concerns.   A safety and risk management plan has not been developed at this time, however client was given the after-hours number / 911 should there be a change in any of these risk factors.     Appearance:   Appropriate    Eye Contact:   Good    Psychomotor Behavior: Normal    Attitude:   Cooperative    Orientation:   All   Speech    Rate / Production: Normal     Volume:  Normal    Mood:    Anxious  Depressed  Normal   Affect:    Appropriate  Worrisome    Thought Content:  Clear    Thought Form:  Coherent  Logical    Insight:    Good      Medication Review:   Changes to psychiatric medications, see updated Medication List in EPIC.      Medication Compliance:   Yes     Changes in Health Issues:   None reported     Chemical Use " "Review:   Substance Use: Chemical use reviewed, no active concerns identified      Tobacco Use: No current tobacco use.       Collateral Reports Completed:   Routed note to PCP as needed     PLAN: (Client Tasks / Therapist Tasks / Other  Use new internal language around  judging self and behaviors  Past hw  Return to using breath wok. Add Intentions  Past hw  Use brandy totter exercise as discussed to help balance fears  Past hw  Review session notes and the threshold continuum (fears)   Consider volunteering at a \"sunshine club\"  written encouragement.  Past hw  Advocate for self- ask questions from [facility]  [facility] [place] [facility] ( sister as resource)  Conduct mindful activity at least once a day.  Past hw  2 distraction skills and 2 reinterpretation skills  past hw  Use breath work  TIP skills  Rishi Saez book for career concerns      Kelly Carpenter LP                                                         ________________________________________________________________________    Treatment Plan    Client's Name: Suresh Powers  YOB: 1960    Date: 12-08-17    DSM-V Diagnoses: SPIKE / MDD  Psychosocial / Contextual Factors: left job due to anxiety  WHODAS: see dx    Referral / Collaboration:  Referral to another professional/service is not indicated at this time.    Anticipated number of session or this episode of care: 10      MeasurableTreatment Goal(s) related to diagnosis / functional impairment(s)  Goal 1: Client will employ coping skills daily to manage mood disruption (including mood disruption/ sadness realted to career disruption).    I will know I've met my goal when \" keep stable emotions and ability to function daily.    Objective #A (Client Action)   Client will report using skills daily including spiritual practices and accessing support.   Update 2-08-18   New medication. Plan is the do mindful activity daily, use CBT skills daily.    Intervention(s)   Therapist will reinforce and teach 9-12 coping " "skills. Employ early intervention.     Goal 2: Client will process loss and move towards articulating a strong self worth and identity.    I will know I've met my goal when \"  Client reports a better sense of self worth; has more good days than bad.   feeling useful at home and find daily meaning and ( needed) purpose in being with his family.   Client will identify how thoughts contributes to the feeling of powerlessness and sadness associated with the loss.   Update 2-08-18   New medication. Plan is the do mindful activity daily, use positive affirmations skills daily.      Client has reviewed and agreed to the above plan.      Kelly Carpenter LP  December 8, 2017  "

## 2018-04-17 ASSESSMENT — ANXIETY QUESTIONNAIRES: GAD7 TOTAL SCORE: 15

## 2018-04-17 ASSESSMENT — PATIENT HEALTH QUESTIONNAIRE - PHQ9: SUM OF ALL RESPONSES TO PHQ QUESTIONS 1-9: 7

## 2018-05-01 ENCOUNTER — OFFICE VISIT (OUTPATIENT)
Dept: PSYCHOLOGY | Facility: CLINIC | Age: 58
End: 2018-05-01
Payer: COMMERCIAL

## 2018-05-01 DIAGNOSIS — G47.9 SLEEP DISORDER: ICD-10-CM

## 2018-05-01 DIAGNOSIS — F41.1 GENERALIZED ANXIETY DISORDER: ICD-10-CM

## 2018-05-01 DIAGNOSIS — F42.9 OBSESSIVE-COMPULSIVE DISORDER, UNSPECIFIED TYPE: ICD-10-CM

## 2018-05-01 DIAGNOSIS — F33.1 MODERATE EPISODE OF RECURRENT MAJOR DEPRESSIVE DISORDER (H): Primary | ICD-10-CM

## 2018-05-01 PROCEDURE — 90834 PSYTX W PT 45 MINUTES: CPT | Performed by: PSYCHOLOGIST

## 2018-05-01 ASSESSMENT — ANXIETY QUESTIONNAIRES
5. BEING SO RESTLESS THAT IT IS HARD TO SIT STILL: NOT AT ALL
3. WORRYING TOO MUCH ABOUT DIFFERENT THINGS: NEARLY EVERY DAY
GAD7 TOTAL SCORE: 14
6. BECOMING EASILY ANNOYED OR IRRITABLE: SEVERAL DAYS
1. FEELING NERVOUS, ANXIOUS, OR ON EDGE: NEARLY EVERY DAY
2. NOT BEING ABLE TO STOP OR CONTROL WORRYING: NEARLY EVERY DAY
7. FEELING AFRAID AS IF SOMETHING AWFUL MIGHT HAPPEN: SEVERAL DAYS

## 2018-05-01 ASSESSMENT — PATIENT HEALTH QUESTIONNAIRE - PHQ9: 5. POOR APPETITE OR OVEREATING: NEARLY EVERY DAY

## 2018-05-01 NOTE — Clinical Note
I met with Suresh today. He is requesting a referral to psychiatry to see if a different med could give more relief for his OCD.  What do you think? Could you initiate that  ( I believe it needs to come from a PCP) Kelly

## 2018-05-01 NOTE — PROGRESS NOTES
"                                             Progress Note    Client Name: Suresh Powers  Date: 5-1-18         Service Type: Individual      Session Start Time: 12:35  Session End Time: 1:25 pm      Session Length: 38-52 min   Session #: 12     Attendees: Client    Treatment Plan Last Reviewed: today  (90 days = April 2018)  PHQ-9 / SPIKE-7 : see flow sheets     DATA      Progress Since Last Session (Related to Symptoms / Goals / Homework):   Symptoms: Stable but with depression, anxiety , irritabilty and OCD.  Disrupted concentration, feeling bad about self  And fear that something awful might happen. Finds the trazedone useful so he can sleep.  (7-8 hours)   Pain has been worse in the winter and with aging. Is using a cane.    Homework: Completed in session      Episode of Care Goals: Satisfactory progress - ACTION (Actively working towards change); Intervened by reinforcing change plan / affirming steps taken.  Read the Bible daily. Is going to [place] weekly. Does feel better after, mass and Bible study, or devotional book- \"does feel God\"       Current / Ongoing Stressors and Concerns:  Daily anxiety, with OCD tendencies  OCD- ruminations about  the future /   losses: mom gone 13 + years, dad for 7 years-  mother had  Anxiety.  (physical health 1998- cart collapsed on him at work , both feet crushed)   (2016 another accident at work - broke tibia and clavical)    Donated a kidney to his sister 1990.  Mother  Dx of schizo affective disorder and had ECT. Her brother was at a facility in [place].  Dad passed in 2010. Sister inpatient for depression after his death.     Treatment Objective(s) Addressed in This Session:   Address OCD  behaviors  learn and demonstrate self advocacy   Increase interest, engagement, and pleasure in doing things  Identify negative self-talk and behaviors: challenge core beliefs, myths, and actions  identify 4 strategies for managing obsessive thoughts and improving mood  identify how the use " "of (food) substances contributes to the avoidance of feeling associated with the loss    explored meaning and purpose / volunteering / contribution to community ideas.      Intervention:    Reviewed OCD resources:     \"Stop Obsessing\" by Kingsley Miller  \"Talking back to OCD \" by Estuardo Downing     Explored postponing, changing rituals, slowing down rituals. Rating distress.     1) Pushes cancel button at [employer] multiple times.  Waits to be alone and no one is behind me.  Won't use a credit card. Uses only a check card.     2) Checking behavior for his car: 15-20 min ritual  Low tires, trunk closed, dome lite off, windows up. Seat belt is straight down. Has to set the key down carefully.    3) does backward and forward with dates. (sequences    4) had to check his work ( would drive back there 30 min away  )    5) washes hands - thinks about germs    Discussed behavioral and log work- would prefer to have a medication change to that he has a chance at success with the behavioral intervention.    Processed fears and emotions.     ASSESSMENT: Current Emotional / Mental Status (status of significant symptoms):   Risk status (Self / Other harm or suicidal ideation)   Client denies current fears or concerns for personal safety.   Client denies current or recent suicidal ideation or behaviors.   Client denies current or recent homicidal ideation or behaviors.   Client denies current or recent self injurious behavior or ideation.   Client denies other safety concerns.   A safety and risk management plan has not been developed at this time, however client was given the after-hours number / 911 should there be a change in any of these risk factors.     Appearance:   Appropriate    Eye Contact:   Good    Psychomotor Behavior: Normal    Attitude:   Cooperative    Orientation:   All   Speech    Rate / Production: Normal     Volume:  Normal    Mood:    Anxious  Depressed  Normal   Affect:    Appropriate  Worrisome    Thought " "Content:  Clear    Thought Form:  Coherent  Logical    Insight:    Good      Medication Review:   Changes to psychiatric medications, see updated Medication List in EPIC.      Medication Compliance:   Yes     Changes in Health Issues:   None reported     Chemical Use Review:   Substance Use: Chemical use reviewed, no active concerns identified      Tobacco Use: No current tobacco use.       Collateral Reports Completed:   Routed note to PCP as needed     PLAN: (Client Tasks / Therapist Tasks / Other  Make an appointment with PCP   Get the  STOP Obsessing.  Pas hw  Use new internal language around  judging self and behaviors  Past hw  Return to using breath wok. Add Intentions  Past hw  Use brandy totter exercise as discussed to help balance fears  Past hw  Review session notes and the threshold continuum (fears)   Consider volunteering at a \"sunshine club\"  written encouragement.  Past hw  Advocate for self- ask questions from [facility] [place] [facility] ( sister as resource)  Conduct mindful activity at least once a day.  Past hw  2 distraction skills and 2 reinterpretation skills  past hw  Use breath work  TIP skills  Rishi Saez book for career concerns      Kelly Carpenter LP                                                         ________________________________________________________________________    Treatment Plan    Client's Name: Suresh Powers  YOB: 1960    Date: 12-08-17    DSM-V Diagnoses: SPIKE / MDD  Psychosocial / Contextual Factors: left job due to anxiety  WHODAS: see dx    Referral / Collaboration:  Referral to another professional/service is not indicated at this time.    Anticipated number of session or this episode of care: 10      MeasurableTreatment Goal(s) related to diagnosis / functional impairment(s)  Goal 1: Client will employ coping skills daily to manage mood disruption (including mood disruption/ sadness realted to career disruption).    I will know I've met my goal when \" keep " "stable emotions and ability to function daily.    Objective #A (Client Action)   Client will report using skills daily including spiritual practices and accessing support.   Update 2-08-18   New medication. Plan is the do mindful activity daily, use CBT skills daily.    Intervention(s)   Therapist will reinforce and teach 9-12 coping skills. Employ early intervention.     Goal 2: Client will process loss and move towards articulating a strong self worth and identity.    I will know I've met my goal when \"  Client reports a better sense of self worth; has more good days than bad.   feeling useful at home and find daily meaning and ( needed) purpose in being with his family.   Client will identify how thoughts contributes to the feeling of powerlessness and sadness associated with the loss.   Update 2-08-18   New medication. Plan is the do mindful activity daily, use positive affirmations skills daily.      Client has reviewed and agreed to the above plan.      Kelly Carpenter LP  December 8, 2017  "

## 2018-05-01 NOTE — MR AVS SNAPSHOT
After Visit Summary   5/1/2018    Suresh Powers    MRN: [id number]           Patient Information     Date Of Birth          1960        Visit Information        Provider Department      5/1/2018 12:30 PM Kelly Carpenter LP [facility]      Today's Diagnoses     Moderate episode of recurrent major depressive disorder (H)    -  1    Obsessive-compulsive disorder, unspecified type        Generalized anxiety disorder        Sleep disorder           Follow-ups after your visit        Your next 10 appointments already scheduled     May 15, 2018 10:30 AM CDT   Return Visit with Kelly Carpenter LP   [address] [address]   [phone]            Jun 04, 2018 11:30 AM CDT   Return Visit with Kelly Carpenter LP   [facility] ([address] [id number] [phone]            Jun 26, 2018 10:30 AM CDT   Return Visit with Kelly Carpenter LP   [address] [id number] [phone]              Who to contact     If you have questions or need follow up information about today's clinic visit or your schedule please contact [facility] directly at [phone].  Normal or non-critical lab and imaging results will be communicated to you by MyChart, letter or phone within 4 business days after the clinic has received the results. If you do not hear from us within 7 days, please contact the clinic through MyChart or phone. If you have a critical or abnormal lab result, we will notify you by phone as soon as possible.  Submit refill requests through [employer] or call your pharmacy and they will forward the refill request to us. Please allow 3 business days for your refill to be completed.          Additional Information About  "Your Visit        MyChart Information     An[employer] lets you send messages to your doctor, view your test results, renew your prescriptions, schedule appointments and more. To sign up, go to [email or web address] . Click on \"Log in\" on the left side of the screen, which will take you to the Welcome page. Then click on \"Sign up Now\" on the right side of the page.     You will be asked to enter the access code listed below, as well as some personal information. Please follow the directions to create your username and password.     Your access code is: [email or web address]  Expires: 2018 12:30 PM     Your access code will  in 90 days. If you need help or a new code, please call your [place] clinic or [phone].        Care EveryWhere ID     This is your Care EveryWhere ID. This could be used by other organizations to access your [place] medical records  [id number]         Blood Pressure from Last 3 Encounters:   18 138/86   18 123/82   18 127/79    Weight from Last 3 Encounters:   18 71.6 kg (157 lb 14.4 oz)   18 72.4 kg (159 lb 11.2 oz)   18 73 kg (160 lb 14.4 oz)              Today, you had the following     No orders found for display       Primary Care Provider Office Phone # Fax #    Carmen LUIS [facility] [phone]       [address] [facility] [address]        Equal Access to Services     SEJAL ROWELL : Hadii vinita ku hadasho Soomaali, waaxda luqadaha, qaybta kaalmada adeegyada, tramaine silva . So [facility] [phone].    ATENCIÓN: Si habla español, tiene a regalado disposición servicios gratuitos de asistencia lingüística. Llame al [phone].    We comply with applicable federal civil rights laws and [place] laws. We do not discriminate on the basis of race, color, national origin, age, disability, sex, sexual orientation, or gender identity.            Thank you!     Thank you for choosing [facility]  for your care. Our goal is always to provide you with excellent care. Hearing back from our patients is one way we can continue to improve our services. Please take a few minutes to complete the written survey that you may receive in the mail after your visit with us. Thank you!             Your Updated Medication List - Protect others around you: Learn how to safely use, store and throw away your medicines at www.disposemymeds.org.          This list is accurate as of 5/1/18  1:28 PM.  Always use your most recent med list.                   Brand Name Dispense Instructions for use Diagnosis    albuterol (2.5 MG/3ML) 0.083% neb solution     25 vial    Take 1 vial (2.5 mg) by nebulization every 4 hours as needed for shortness of breath / dyspnea or wheezing    Pneumonia of left lower lobe due to infectious organism (H), Shortness of breath       amLODIPine 5 MG tablet    NORVASC    30 tablet    Take 1 tablet (5 mg) by mouth daily    HTN, goal below 140/90       busPIRone 15 MG tablet    BUSPAR    60 tablet    Take 1 tablet (15 mg) by mouth 2 times daily    Anxiety attack, Depression, unspecified depression type       chlorthalidone 25 MG tablet    HYGROTON    30 tablet    Take 1 tablet (25 mg) by mouth daily    Benign essential hypertension       escitalopram 20 MG tablet    LEXAPRO    30 tablet    TAKE 1 TABLET BY MOUTH ONE TIME DAILY    Anxiety attack, Depression, unspecified depression type       fluticasone 50 MCG/ACT spray    FLONASE    16 g    Spray 2 sprays into both nostrils daily    Upper respiratory tract infection, unspecified type       hydrocortisone valerate 0.2 % ointment    WEST-HANY    45 g    Apply sparingly to affected area three times daily for 14 days.    Rash       MULTIVITAMIN ADULT PO      Take 1 tablet by mouth daily        traZODone 50 MG tablet    DESYREL    90 tablet    Take 1-4 tablets ( mg) by mouth At Bedtime    Sleep disorder       TYLENOL PO      Take 1,000 mg by  mouth every 8 hours as needed        VITAMIN C PO      Take 500 mg by mouth daily        zinc 50 MG Tabs      Take 100 mg by mouth daily

## 2018-05-02 ASSESSMENT — PATIENT HEALTH QUESTIONNAIRE - PHQ9: SUM OF ALL RESPONSES TO PHQ QUESTIONS 1-9: 7

## 2018-05-02 ASSESSMENT — ANXIETY QUESTIONNAIRES: GAD7 TOTAL SCORE: 14

## 2018-05-05 ENCOUNTER — OFFICE VISIT (OUTPATIENT)
Dept: URGENT CARE | Facility: URGENT CARE | Age: 58
End: 2018-05-05
Payer: COMMERCIAL

## 2018-05-05 ENCOUNTER — RADIANT APPOINTMENT (OUTPATIENT)
Dept: GENERAL RADIOLOGY | Facility: CLINIC | Age: 58
End: 2018-05-05
Attending: PHYSICIAN ASSISTANT
Payer: COMMERCIAL

## 2018-05-05 VITALS
BODY MASS INDEX: 24.94 KG/M2 | WEIGHT: 161.6 LBS | RESPIRATION RATE: 20 BRPM | TEMPERATURE: 98.2 F | DIASTOLIC BLOOD PRESSURE: 80 MMHG | HEART RATE: 70 BPM | SYSTOLIC BLOOD PRESSURE: 134 MMHG | OXYGEN SATURATION: 97 %

## 2018-05-05 DIAGNOSIS — R06.2 WHEEZING: ICD-10-CM

## 2018-05-05 DIAGNOSIS — R06.2 WHEEZING: Primary | ICD-10-CM

## 2018-05-05 PROCEDURE — 71046 X-RAY EXAM CHEST 2 VIEWS: CPT

## 2018-05-05 PROCEDURE — 99214 OFFICE O/P EST MOD 30 MIN: CPT | Mod: 25 | Performed by: PHYSICIAN ASSISTANT

## 2018-05-05 PROCEDURE — 94640 AIRWAY INHALATION TREATMENT: CPT | Performed by: PHYSICIAN ASSISTANT

## 2018-05-05 RX ORDER — PREDNISONE 20 MG/1
40 TABLET ORAL DAILY
Qty: 10 TABLET | Refills: 0 | Status: SHIPPED | OUTPATIENT
Start: 2018-05-05 | End: 2018-05-10

## 2018-05-05 RX ORDER — ALBUTEROL SULFATE 90 UG/1
2 AEROSOL, METERED RESPIRATORY (INHALATION) EVERY 4 HOURS PRN
Qty: 1 INHALER | Refills: 1 | Status: SHIPPED | OUTPATIENT
Start: 2018-05-05 | End: 2019-03-08

## 2018-05-05 RX ORDER — IPRATROPIUM BROMIDE AND ALBUTEROL SULFATE 2.5; .5 MG/3ML; MG/3ML
1 SOLUTION RESPIRATORY (INHALATION) ONCE
Qty: 3 ML | Refills: 0
Start: 2018-05-05 | End: 2018-08-16

## 2018-05-05 RX ORDER — ALBUTEROL SULFATE 0.83 MG/ML
1 SOLUTION RESPIRATORY (INHALATION) EVERY 4 HOURS PRN
Qty: 50 VIAL | Refills: 1 | Status: SHIPPED | OUTPATIENT
Start: 2018-05-05 | End: 2019-03-08

## 2018-05-05 NOTE — MR AVS SNAPSHOT
After Visit Summary   5/5/2018    Suresh Powers    MRN: [id number]           Patient Information     Date Of Birth          1960        Visit Information        Provider Department      5/5/2018 10:10 AM Candelaria Hernandez PA-C [facility]        Today's Diagnoses     Wheezing    -  1       Follow-ups after your visit        Your next 10 appointments already scheduled     May 10, 2018 10:20 AM CDT   Office Visit with LUIS Connolly Ra, CNP   [facility] ([facility]    [address] [facility] [address] [phone]           Bring a current list of meds and any records pertaining to this visit. For Physicals, please bring immunization records and any forms needing to be filled out. Please arrive 10 minutes early to complete paperwork.            May 15, 2018 10:30 AM CDT   Return Visit with Kelly Carpenter LP   [address] [address] [phone]            Jun 04, 2018 11:30 AM CDT   Return Visit with Kelly Carpenter LP   [facility] ([address] [address] [phone]            Jun 26, 2018 10:30 AM CDT   Return Visit with Kelly Carpenter LP   [address] [id number] [phone]              Who to contact     If you have questions or need follow up information about today's clinic visit or your schedule please contact [facility] directly at [phone].  Normal or non-critical lab and imaging results will be communicated to you by MyChart, letter or phone within 4 business days after the clinic has received the results. If you do not hear from us within 7 days, please contact the clinic through MyChart or phone. If you have a critical or abnormal lab result, we will notify  "you by phone as soon as possible.  Submit refill requests through [employer] or call your pharmacy and they will forward the refill request to us. Please allow 3 business days for your refill to be completed.          Additional Information About Your Visit        [employer] Information     Easy Eye lets you send messages to your doctor, view your test results, renew your prescriptions, schedule appointments and more. To sign up, go to [email or web address] . Click on \"Log in\" on the left side of the screen, which will take you to the Welcome page. Then click on \"Sign up Now\" on the right side of the page.     You will be asked to enter the access code listed below, as well as some personal information. Please follow the directions to create your username and password.     Your access code is: [email or web address]  Expires: 2018 12:30 PM     Your access code will  in 90 days. If you need help or a new code, please call your [place] clinic or [phone].        Care EveryWhere ID     This is your Care EveryWhere ID. This could be used by other organizations to access your [place] medical records  [id number]        Your Vitals Were     Pulse Temperature Respirations Pulse Oximetry BMI (Body Mass Index)       70 98.2  F (36.8  C) (Oral) 20 97% 24.94 kg/m2        Blood Pressure from Last 3 Encounters:   18 134/80   18 138/86   18 123/82    Weight from Last 3 Encounters:   18 161 lb 9.6 oz (73.3 kg)   18 157 lb 14.4 oz (71.6 kg)   18 159 lb 11.2 oz (72.4 kg)              We Performed the Following     INHALATION/NEBULIZER TREATMENT, INITIAL          Today's Medication Changes          These changes are accurate as of 18  7:41 PM.  If you have any questions, ask your nurse or doctor.               Start taking these medicines.        Dose/Directions    ipratropium - albuterol 0.5 mg/2.5 mg/3 mL 0.5-2.5 (3) MG/3ML neb solution   Commonly known as:  DUONEB   Used for:  Wheezing "   Started by:  Candelaria Hernandez PA-C        Dose:  1 vial   Take 1 vial (3 mLs) by nebulization once for 1 dose   Quantity:  3 mL   Refills:  0       predniSONE 20 MG tablet   Commonly known as:  DELTASONE   Used for:  Wheezing   Started by:  Candelaria Hernandez PA-C        Dose:  40 mg   Take 2 tablets (40 mg) by mouth daily for 5 days   Quantity:  10 tablet   Refills:  0         These medicines have changed or have updated prescriptions.        Dose/Directions    * albuterol (2.5 MG/3ML) 0.083% neb solution   This may have changed:  Another medication with the same name was added. Make sure you understand how and when to take each.   Used for:  Pneumonia of left lower lobe due to infectious organism (H), Shortness of breath   Changed by:  Candelaria Hernandez PA-C        Dose:  1 vial   Take 1 vial (2.5 mg) by nebulization every 4 hours as needed for shortness of breath / dyspnea or wheezing   Quantity:  25 vial   Refills:  1       * albuterol (2.5 MG/3ML) 0.083% neb solution   This may have changed:  You were already taking a medication with the same name, and this prescription was added. Make sure you understand how and when to take each.   Used for:  Wheezing   Changed by:  Candelaria Hernandez PA-C        Dose:  1 vial   Take 1 vial (2.5 mg) by nebulization every 4 hours as needed for shortness of breath / dyspnea or wheezing   Quantity:  50 vial   Refills:  1       * albuterol 108 (90 Base) MCG/ACT Inhaler   Commonly known as:  PROAIR HFA/PROVENTIL HFA/VENTOLIN HFA   This may have changed:  You were already taking a medication with the same name, and this prescription was added. Make sure you understand how and when to take each.   Used for:  Wheezing   Changed by:  Candelaria Hernandez PA-C        Dose:  2 puff   Inhale 2 puffs into the lungs every 4 hours as needed   Quantity:  1 Inhaler   Refills:  1       * Notice:  This list has 3 medication(s) that are the same as other medications  prescribed for you. Read the directions carefully, and ask your doctor or other care provider to review them with you.         Where to get your medicines      These medications were sent to [id number] - [place], [place] - [address] [address] [address] [place] [address]     Phone:  [phone]     albuterol (2.5 MG/3ML) 0.083% neb solution    albuterol 108 (90 Base) MCG/ACT Inhaler    predniSONE 20 MG tablet         Some of these will need a paper prescription and others can be bought over the counter.  Ask your nurse if you have questions.     You don't need a prescription for these medications     ipratropium - albuterol 0.5 mg/2.5 mg/3 mL 0.5-2.5 (3) MG/3ML neb solution                Primary Care Provider Office Phone # Fax #    Carmen Martinez, LUIS [facility] [phone] [address]  [facility] [address]        Equal Access to Services     SANJANA ROWELL : Hadii aad ku hadasho Soomaali, waaxda luqadaha, qaybta kaalmada adeegyada, waxay idiin hayaan fidel silva . So [facility] [phone].    ATENCIÓN: Si habla español, tiene a regalado disposición servicios gratuitos de asistencia lingüística. Llame al [phone].    We comply with applicable federal civil rights laws and [place] laws. We do not discriminate on the basis of race, color, national origin, age, disability, sex, sexual orientation, or gender identity.            Thank you!     Thank you for choosing [facility]  for your care. Our goal is always to provide you with excellent care. Hearing back from our patients is one way we can continue to improve our services. Please take a few minutes to complete the written survey that you may receive in the mail after your visit with us. Thank you!             Your Updated Medication List - Protect others around you: Learn how to safely use, store and throw away your medicines at www.disposemymeds.org.          This list is accurate as of 5/5/18  7:41  PM.  Always use your most recent med list.                   Brand Name Dispense Instructions for use Diagnosis    * albuterol (2.5 MG/3ML) 0.083% neb solution     25 vial    Take 1 vial (2.5 mg) by nebulization every 4 hours as needed for shortness of breath / dyspnea or wheezing    Pneumonia of left lower lobe due to infectious organism (H), Shortness of breath       * albuterol (2.5 MG/3ML) 0.083% neb solution     50 vial    Take 1 vial (2.5 mg) by nebulization every 4 hours as needed for shortness of breath / dyspnea or wheezing    Wheezing       * albuterol 108 (90 Base) MCG/ACT Inhaler    PROAIR HFA/PROVENTIL HFA/VENTOLIN HFA    1 Inhaler    Inhale 2 puffs into the lungs every 4 hours as needed    Wheezing       amLODIPine 5 MG tablet    NORVASC    30 tablet    Take 1 tablet (5 mg) by mouth daily    HTN, goal below 140/90       busPIRone 15 MG tablet    BUSPAR    60 tablet    Take 1 tablet (15 mg) by mouth 2 times daily    Anxiety attack, Depression, unspecified depression type       chlorthalidone 25 MG tablet    HYGROTON    30 tablet    Take 1 tablet (25 mg) by mouth daily    Benign essential hypertension       escitalopram 20 MG tablet    LEXAPRO    30 tablet    TAKE 1 TABLET BY MOUTH ONE TIME DAILY    Anxiety attack, Depression, unspecified depression type       fluticasone 50 MCG/ACT spray    FLONASE    16 g    Spray 2 sprays into both nostrils daily    Upper respiratory tract infection, unspecified type       hydrocortisone valerate 0.2 % ointment    WEST-HANY    45 g    Apply sparingly to affected area three times daily for 14 days.    Rash       ipratropium - albuterol 0.5 mg/2.5 mg/3 mL 0.5-2.5 (3) MG/3ML neb solution    DUONEB    3 mL    Take 1 vial (3 mLs) by nebulization once for 1 dose    Wheezing       MULTIVITAMIN ADULT PO      Take 1 tablet by mouth daily        predniSONE 20 MG tablet    DELTASONE    10 tablet    Take 2 tablets (40 mg) by mouth daily for 5 days    Wheezing       traZODone 50  MG tablet    DESYREL    90 tablet    Take 1-4 tablets ( mg) by mouth At Bedtime    Sleep disorder       TYLENOL PO      Take 1,000 mg by mouth every 8 hours as needed        VITAMIN C PO      Take 500 mg by mouth daily        zinc 50 MG Tabs      Take 100 mg by mouth daily        * Notice:  This list has 3 medication(s) that are the same as other medications prescribed for you. Read the directions carefully, and ask your doctor or other care provider to review them with you.

## 2018-05-06 NOTE — PROGRESS NOTES
SUBJECTIVE:  Suresh Powers is a 57 year old male who presents to the clinic today with a chief complaint of cough  and wheezing. for 1 day(s).  His cough is described as persistent, nonproductive and wheezing.    The patient's symptoms are moderate and worsening.  Associated symptoms include very mild cold sx. The patient's symptoms are exacerbated by no particular triggers  Patient has been using Muconex  to improve symptoms.  Has neb machine at home but no solution. Hx of pneumonia and wants chest x-ray    Past Medical History:   Diagnosis Date     Anxiety     sees psych for trazodone     Foot pain      Hypertension      Mold exposure        Current Outpatient Prescriptions   Medication Sig Dispense Refill     Acetaminophen (TYLENOL PO) Take 1,000 mg by mouth every 8 hours as needed        albuterol (2.5 MG/3ML) 0.083% neb solution Take 1 vial (2.5 mg) by nebulization every 4 hours as needed for shortness of breath / dyspnea or wheezing 50 vial 1     albuterol (PROAIR HFA/PROVENTIL HFA/VENTOLIN HFA) 108 (90 Base) MCG/ACT Inhaler Inhale 2 puffs into the lungs every 4 hours as needed 1 Inhaler 1     amLODIPine (NORVASC) 5 MG tablet Take 1 tablet (5 mg) by mouth daily 30 tablet 2     Ascorbic Acid (VITAMIN C PO) Take 500 mg by mouth daily        busPIRone (BUSPAR) 15 MG tablet Take 1 tablet (15 mg) by mouth 2 times daily 60 tablet 1     chlorthalidone (HYGROTON) 25 MG tablet Take 1 tablet (25 mg) by mouth daily 30 tablet 5     escitalopram (LEXAPRO) 20 MG tablet TAKE 1 TABLET BY MOUTH ONE TIME DAILY 30 tablet 5     fluticasone (FLONASE) 50 MCG/ACT spray Spray 2 sprays into both nostrils daily 16 g 11     ipratropium - albuterol 0.5 mg/2.5 mg/3 mL (DUONEB) 0.5-2.5 (3) MG/3ML neb solution Take 1 vial (3 mLs) by nebulization once for 1 dose 3 mL 0     Multiple Vitamins-Minerals (MULTIVITAMIN ADULT PO) Take 1 tablet by mouth daily       predniSONE (DELTASONE) 20 MG tablet Take 2 tablets (40 mg) by mouth daily for 5 days  10 tablet 0     traZODone (DESYREL) 50 MG tablet Take 1-4 tablets ( mg) by mouth At Bedtime 90 tablet 5     zinc 50 MG TABS Take 100 mg by mouth daily       albuterol (2.5 MG/3ML) 0.083% neb solution Take 1 vial (2.5 mg) by nebulization every 4 hours as needed for shortness of breath / dyspnea or wheezing (Patient not taking: Reported on 5/5/2018) 25 vial 1     hydrocortisone valerate (WEST-HANY) 0.2 % ointment Apply sparingly to affected area three times daily for 14 days. (Patient not taking: Reported on 5/5/2018) 45 g 0       Social History   Substance Use Topics     Smoking status: Former Smoker     Packs/day: 1.00     Years: 0.00     Smokeless tobacco: Former User     Quit date: 2/2/2016     Alcohol use No       ROS  Review of systems negative except as stated above.    OBJECTIVE:  /80  Pulse 70  Temp 98.2  F (36.8  C) (Oral)  Resp 20  Wt 161 lb 9.6 oz (73.3 kg)  SpO2 97%  BMI 24.94 kg/m2  GENERAL APPEARANCE: healthy, alert and no distress  EYES: EOMI,  PERRL, conjunctiva clear  HENT: ear canals and TM's normal.  Nose and mouth without ulcers, erythema or lesions  NECK: supple, nontender, no lymphadenopathy  RESP: rhonchi throughout and expiratory wheezes throughout  CV: regular rates and rhythm, normal S1 S2, no murmur noted  SKIN: no suspicious lesions or rashes    NEB:  Duoneb in the clinic with some improvement of lung sounds.  Patient states that feels better.    Chest x-ray - no infiltrate noted.     assessment/plan:  (R06.2) Wheezing  (primary encounter diagnosis)  Comment:   Plan: ipratropium - albuterol 0.5 mg/2.5 mg/3 mL         (DUONEB) 0.5-2.5 (3) MG/3ML neb solution,         INHALATION/NEBULIZER TREATMENT, INITIAL, XR         Chest 2 Views, predniSONE (DELTASONE) 20 MG         tablet, albuterol (2.5 MG/3ML) 0.083% neb         solution, albuterol (PROAIR HFA/PROVENTIL         HFA/VENTOLIN HFA) 108 (90 Base) MCG/ACT Inhaler          Neb solution  For at home and inhaler given PRN .   Burst of Prednisone given.  Chest x-ray clear and no signs of infection.  RTC as needed if sx worsen/.

## 2018-05-10 ENCOUNTER — OFFICE VISIT (OUTPATIENT)
Dept: FAMILY MEDICINE | Facility: CLINIC | Age: 58
End: 2018-05-10
Payer: COMMERCIAL

## 2018-05-10 VITALS
BODY MASS INDEX: 25.57 KG/M2 | HEART RATE: 64 BPM | OXYGEN SATURATION: 99 % | RESPIRATION RATE: 18 BRPM | DIASTOLIC BLOOD PRESSURE: 80 MMHG | TEMPERATURE: 97.5 F | WEIGHT: 165.7 LBS | SYSTOLIC BLOOD PRESSURE: 136 MMHG

## 2018-05-10 DIAGNOSIS — I10 BENIGN ESSENTIAL HYPERTENSION: ICD-10-CM

## 2018-05-10 DIAGNOSIS — R06.2 WHEEZING: Primary | ICD-10-CM

## 2018-05-10 DIAGNOSIS — I10 HTN, GOAL BELOW 140/90: ICD-10-CM

## 2018-05-10 DIAGNOSIS — F41.0 ANXIETY ATTACK: ICD-10-CM

## 2018-05-10 DIAGNOSIS — F42.9 OBSESSIVE-COMPULSIVE DISORDER, UNSPECIFIED TYPE: ICD-10-CM

## 2018-05-10 DIAGNOSIS — G47.9 SLEEP DISORDER: ICD-10-CM

## 2018-05-10 DIAGNOSIS — F32.A DEPRESSION, UNSPECIFIED DEPRESSION TYPE: ICD-10-CM

## 2018-05-10 PROCEDURE — 99214 OFFICE O/P EST MOD 30 MIN: CPT | Performed by: NURSE PRACTITIONER

## 2018-05-10 RX ORDER — PREDNISONE 20 MG/1
20 TABLET ORAL DAILY
Qty: 5 TABLET | Refills: 0 | Status: SHIPPED | OUTPATIENT
Start: 2018-05-10 | End: 2018-08-16

## 2018-05-10 RX ORDER — BENZONATATE 200 MG/1
200 CAPSULE ORAL 3 TIMES DAILY PRN
Qty: 21 CAPSULE | Refills: 0 | Status: SHIPPED | OUTPATIENT
Start: 2018-05-10 | End: 2018-08-16

## 2018-05-10 RX ORDER — CHLORTHALIDONE 25 MG/1
25 TABLET ORAL DAILY
Qty: 30 TABLET | Refills: 5 | Status: SHIPPED | OUTPATIENT
Start: 2018-05-10 | End: 2018-08-16

## 2018-05-10 RX ORDER — ESCITALOPRAM OXALATE 20 MG/1
TABLET ORAL
Qty: 30 TABLET | Refills: 5 | Status: SHIPPED | OUTPATIENT
Start: 2018-05-10 | End: 2018-08-16

## 2018-05-10 RX ORDER — AMLODIPINE BESYLATE 5 MG/1
5 TABLET ORAL DAILY
Qty: 30 TABLET | Refills: 3 | Status: SHIPPED | OUTPATIENT
Start: 2018-05-10 | End: 2018-08-16

## 2018-05-10 RX ORDER — TRAZODONE HYDROCHLORIDE 50 MG/1
50-200 TABLET, FILM COATED ORAL AT BEDTIME
Qty: 90 TABLET | Refills: 5 | Status: ON HOLD | OUTPATIENT
Start: 2018-05-10 | End: 2022-09-06

## 2018-05-10 RX ORDER — BUSPIRONE HYDROCHLORIDE 15 MG/1
15 TABLET ORAL 2 TIMES DAILY
Qty: 60 TABLET | Refills: 1 | Status: SHIPPED | OUTPATIENT
Start: 2018-05-10 | End: 2018-08-04

## 2018-05-10 NOTE — PATIENT INSTRUCTIONS
Schedule with psychiatry as we discussed.    Use the prednisone once daily for the next 5 days.  You can use the tessalon as well for the cough.    See me in about 3 months for a recheck.

## 2018-05-10 NOTE — MR AVS SNAPSHOT
After Visit Summary   5/10/2018    Suresh Powers    MRN: [id number]           Patient Information     Date Of Birth          1960        Visit Information        Provider Department      5/10/2018 10:20 AM Carmen Martinez Ra, APRN [facility]        Today's Diagnoses     Wheezing    -  1    Obsessive-compulsive disorder, unspecified type        HTN, goal below 140/90        Anxiety attack        Depression, unspecified depression type        Benign essential hypertension        Sleep disorder          Care Instructions    Schedule with psychiatry as we discussed.    Use the prednisone once daily for the next 5 days.  You can use the tessalon as well for the cough.    See me in about 3 months for a recheck.          Follow-ups after your visit        Additional Services     MENTAL HEALTH REFERRAL  - Adult; Psychiatry and Medication Management; Psychiatry; [facility]: [facility] [phone].  Medication management & future refills will be returned to G PCP upon completion of evaluation; We ra...       All scheduling is subject to the client's specific insurance plan & benefits, provider/location availability, and provider clinical specialities.  Please arrive 15 minutes early for your first appointment and bring your completed paperwork.    Please be aware that coverage of these services is subject to the terms and limitations of your health insurance plan.  Call member services at your health plan with any benefit or coverage questions.                            Follow-up notes from your care team     Return in about 3 months (around 8/10/2018) for follow up.      Your next 10 appointments already scheduled     May 15, 2018 10:30 AM CDT   Return Visit with Kelly Carpenter LP   [facility] ([facility])    [address] [place] [address] [phone]            Jun 04, 2018 11:30 AM CDT   Return Visit with  "Kelly Carpenter LP   [facility] ([facility])     [facility] [address]   [phone]            2018 10:30 AM CDT   Return Visit with Kelly GuzamnLAURA agarwal   [facility] ([place] [address] [facility] [address]   [phone]            Aug 17, 2018 10:00 AM CDT   SHORT with LUIS Connolly Ra, CNP   [facility] ([facility])    [address] [facility] [address] [phone]              Who to contact     If you have questions or need follow up information about today's clinic visit or your schedule please contact [facility] directly at [phone].  Normal or non-critical lab and imaging results will be communicated to you by MyChart, letter or phone within 4 business days after the clinic has received the results. If you do not hear from us within 7 days, please contact the clinic through MyChart or phone. If you have a critical or abnormal lab result, we will notify you by phone as soon as possible.  Submit refill requests through [employer] or call your pharmacy and they will forward the refill request to us. Please allow 3 business days for your refill to be completed.          Additional Information About Your Visit        MyChart Information     [employer] lets you send messages to your doctor, view your test results, renew your prescriptions, schedule appointments and more. To sign up, go to [email or web address] . Click on \"Log in\" on the left side of the screen, which will take you to the Welcome page. Then click on \"Sign up Now\" on the right side of the page.     You will be asked to enter the access code listed below, as well as some personal information. Please follow the directions to create your username and password.     Your access code is: [email or web address]  Expires: 2018 10:50 AM     Your access code will  in 90 days. " If you need help or a new code, please call your [facility] clinic or [phone].        Care EveryWhere ID     This is your Care EveryWhere ID. This could be used by other organizations to access your [facility] medical records  [id number]        Your Vitals Were     Pulse Temperature Respirations Pulse Oximetry BMI (Body Mass Index)       64 97.5  F (36.4  C) (Oral) 18 99% 25.57 kg/m2        Blood Pressure from Last 3 Encounters:   05/10/18 136/80   05/05/18 134/80   03/09/18 138/86    Weight from Last 3 Encounters:   05/10/18 165 lb 11.2 oz (75.2 kg)   05/05/18 161 lb 9.6 oz (73.3 kg)   03/09/18 157 lb 14.4 oz (71.6 kg)              We Performed the Following     MENTAL HEALTH REFERRAL  - Adult; Psychiatry and Medication Management; Psychiatry; [facility]: [facility] Psychiatry Service [phone].  Medication management & future refills will be returned to G PCP upon completion of evaluation; We ra...          Today's Medication Changes          These changes are accurate as of 5/10/18 10:56 AM.  If you have any questions, ask your nurse or doctor.               Start taking these medicines.        Dose/Directions    benzonatate 200 MG capsule   Commonly known as:  TESSALON   Used for:  Wheezing   Started by:  Carmen Martinez Ra, APRN CNP        Dose:  200 mg   Take 1 capsule (200 mg) by mouth 3 times daily as needed for cough   Quantity:  21 capsule   Refills:  0         These medicines have changed or have updated prescriptions.        Dose/Directions    * predniSONE 20 MG tablet   Commonly known as:  DELTASONE   This may have changed:  Another medication with the same name was added. Make sure you understand how and when to take each.   Used for:  Wheezing   Changed by:  Carmen Martinez Ra, APRN CNP        Dose:  40 mg   Take 2 tablets (40 mg) by mouth daily for 5 days   Quantity:  10 tablet   Refills:  0       * predniSONE 20 MG tablet   Commonly known as:  DELTASONE   This may have changed:  You  were already taking a medication with the same name, and this prescription was added. Make sure you understand how and when to take each.   Used for:  Wheezing   Changed by:  Carmen Martinez Ra, APRN CNP        Dose:  20 mg   Take 1 tablet (20 mg) by mouth daily   Quantity:  5 tablet   Refills:  0       * Notice:  This list has 2 medication(s) that are the same as other medications prescribed for you. Read the directions carefully, and ask your doctor or other care provider to review them with you.         Where to get your medicines      These medications were sent to [id number] - [place], [place] - [address], [facility] [address]     Phone:  [phone]     amLODIPine 5 MG tablet    benzonatate 200 MG capsule    busPIRone 15 MG tablet    chlorthalidone 25 MG tablet    escitalopram 20 MG tablet    predniSONE 20 MG tablet    traZODone 50 MG tablet                Primary Care Provider Office Phone # Fax #    LUIS Connolly Ra, [phone]       [address]  [facility] [address]        Equal Access to Services     [facility] AH: Hadii aad ku hadasho Soomaali, waaxda luqadaha, qaybta kaalmada adeegyada, waxay idiin haydavid silva . So [facility] [phone].    ATENCIÓN: Si habla español, tiene a regalado disposición servicios gratuitos de asistencia lingüística. Llame al [phone].    We comply with applicable federal civil rights laws and [place] laws. We do not discriminate on the basis of race, color, national origin, age, disability, sex, sexual orientation, or gender identity.            Thank you!     Thank you for choosing [facility]  for your care. Our goal is always to provide you with excellent care. Hearing back from our patients is one way we can continue to improve our services. Please take a few minutes to complete the written survey that you may receive in the mail after your visit with us. Thank you!              Your Updated Medication List - Protect others around you: Learn how to safely use, store and throw away your medicines at www.disposemymeds.org.          This list is accurate as of 5/10/18 10:56 AM.  Always use your most recent med list.                   Brand Name Dispense Instructions for use Diagnosis    * albuterol (2.5 MG/3ML) 0.083% neb solution     25 vial    Take 1 vial (2.5 mg) by nebulization every 4 hours as needed for shortness of breath / dyspnea or wheezing    Pneumonia of left lower lobe due to infectious organism (H), Shortness of breath       * albuterol (2.5 MG/3ML) 0.083% neb solution     50 vial    Take 1 vial (2.5 mg) by nebulization every 4 hours as needed for shortness of breath / dyspnea or wheezing    Wheezing       * albuterol 108 (90 Base) MCG/ACT Inhaler    PROAIR HFA/PROVENTIL HFA/VENTOLIN HFA    1 Inhaler    Inhale 2 puffs into the lungs every 4 hours as needed    Wheezing       amLODIPine 5 MG tablet    NORVASC    30 tablet    Take 1 tablet (5 mg) by mouth daily    HTN, goal below 140/90       benzonatate 200 MG capsule    TESSALON    21 capsule    Take 1 capsule (200 mg) by mouth 3 times daily as needed for cough    Wheezing       busPIRone 15 MG tablet    BUSPAR    60 tablet    Take 1 tablet (15 mg) by mouth 2 times daily    Anxiety attack, Depression, unspecified depression type       chlorthalidone 25 MG tablet    HYGROTON    30 tablet    Take 1 tablet (25 mg) by mouth daily    Benign essential hypertension       escitalopram 20 MG tablet    LEXAPRO    30 tablet    TAKE 1 TABLET BY MOUTH ONE TIME DAILY    Anxiety attack, Depression, unspecified depression type       fluticasone 50 MCG/ACT spray    FLONASE    16 g    Spray 2 sprays into both nostrils daily    Upper respiratory tract infection, unspecified type       hydrocortisone valerate 0.2 % ointment    WEST-HANY    45 g    Apply sparingly to affected area three times daily for 14 days.    Rash       ipratropium - albuterol  0.5 mg/2.5 mg/3 mL 0.5-2.5 (3) MG/3ML neb solution    DUONEB    3 mL    Take 1 vial (3 mLs) by nebulization once for 1 dose    Wheezing       MULTIVITAMIN ADULT PO      Take 1 tablet by mouth daily        * predniSONE 20 MG tablet    DELTASONE    10 tablet    Take 2 tablets (40 mg) by mouth daily for 5 days    Wheezing       * predniSONE 20 MG tablet    DELTASONE    5 tablet    Take 1 tablet (20 mg) by mouth daily    Wheezing       traZODone 50 MG tablet    DESYREL    90 tablet    Take 1-4 tablets ( mg) by mouth At Bedtime    Sleep disorder       TYLENOL PO      Take 1,000 mg by mouth every 8 hours as needed        VITAMIN C PO      Take 500 mg by mouth daily        zinc 50 MG Tabs      Take 100 mg by mouth daily        * Notice:  This list has 5 medication(s) that are the same as other medications prescribed for you. Read the directions carefully, and ask your doctor or other care provider to review them with you.

## 2018-05-10 NOTE — PROGRESS NOTES
SUBJECTIVE:   Suresh Powers is a 57 year old male who presents to clinic today for the following health issues:      RESPIRATORY SYMPTOMS      Duration: x 1 week    Description  cough and wheezing    Severity: moderate    Accompanying signs and symptoms: None    History (predisposing factors):  none    Precipitating or alleviating factors: None    Therapies tried and outcome:  rest and fluids, albuterol, prednisone    Patient went to [facility] urgent care on Sat.  He was given neb treatments to use at home and 5 days of prednisone.  Last dose of prednisone yesterday.  He feels this was very helpful although he feels symptoms are slightly worsening without any more prednisone.  Normal cxr.  No fever.  No GI symptoms.  Staying hydrated.    Patient would like to discuss medications regarding OCD.  Continues with therapist.  He has had ocd since he was a child.  Tried paxil, zoloft, and prozac in the past.  He feels lexapro and trazodone and buspar help with other mood symptoms but is wondering about other options for ocd.  He previously saw psychiatry when needed, wonders about seeing them again.    Problem list and histories reviewed & adjusted, as indicated.  Additional history: as documented    Patient Active Problem List   Diagnosis     Exotropia     Benign essential hypertension     Tobacco use disorder     Sleep disorder     Unilateral inguinal hernia without obstruction or gangrene     Closed fracture of proximal end of left tibia     Fracture of left clavicle     Depression     Slow transit constipation     Urinary retention     Iron deficiency anemia     Clostridium difficile diarrhea     Anxiety attack     HTN, goal below 140/90     Single kidney     CARDIOVASCULAR SCREENING; LDL GOAL LESS THAN 160     OCD (obsessive compulsive disorder)     Generalized anxiety disorder     Moderate episode of recurrent major depressive disorder (H)     Past Surgical History:   Procedure Laterality Date     foot crush injury        kidney donation       OPEN REDUCTION INTERNAL FIXATION CLAVICLE Left 2/4/2016    Procedure: OPEN REDUCTION INTERNAL FIXATION CLAVICLE;  Surgeon: Rakesh Hui MD;  Location:  OR     OPEN REDUCTION INTERNAL FIXATION TIBIAL PLATEAU Left 2/4/2016    Procedure: OPEN REDUCTION INTERNAL FIXATION TIBIAL PLATEAU;  Surgeon: Rakesh Hui MD;  Location:  OR       Social History   Substance Use Topics     Smoking status: Former Smoker     Packs/day: 1.00     Years: 0.00     Smokeless tobacco: Former User     Quit date: 2/2/2016     Alcohol use No     Family History   Problem Relation Age of Onset     Breast Cancer Mother      Type 2 Diabetes Mother      Skin Cancer Father      CEREBROVASCULAR DISEASE Father            Reviewed and updated as needed this visit by clinical staff       Reviewed and updated as needed this visit by Provider         ROS:  SEE HPI.    OBJECTIVE:     /80  Pulse 64  Temp 97.5  F (36.4  C) (Oral)  Resp 18  Wt 165 lb 11.2 oz (75.2 kg)  SpO2 99%  BMI 25.57 kg/m2  Body mass index is 25.57 kg/(m^2).  GENERAL: healthy, alert and no distress  EYES: Eyes grossly normal to inspection  HENT: ear canals and TM's normal, nose and mouth without ulcers or lesions  NECK: no adenopathy, no asymmetry, masses, or scars and thyroid normal to palpation  RESP: lungs clear to auscultation - no rales, rhonchi or wheezes  CV: regular rates and rhythm, normal S1 S2, no S3 or S4 and no murmur, click or rub  PSYCH: mentation appears normal, affect normal/bright    Diagnostic Test Results:  none     ASSESSMENT/PLAN:   1. Wheezing  Improved with prednisone burst.  Will add 5 additional days at lower dose.  Also, has had good results with tessalon in the past.  Sent prescription.    - predniSONE (DELTASONE) 20 MG tablet; Take 1 tablet (20 mg) by mouth daily  Dispense: 5 tablet; Refill: 0  - benzonatate (TESSALON) 200 MG capsule; Take 1 capsule (200 mg) by mouth 3 times daily as needed for cough   Dispense: 21 capsule; Refill: 0    2. Obsessive-compulsive disorder, unspecified type  Discussed options.  Schedule with psych.  After he is stable can return here for med refills.  - MENTAL HEALTH REFERRAL  - Adult; Psychiatry and Medication Management; Psychiatry; [facility]: [facility] Psychiatry Service [phone].  Medication management & future refills will be returned to [facility] PCP upon completion of evaluation; We ra...    3. HTN, goal below 140/90  Stable.  Refilled.  - amLODIPine (NORVASC) 5 MG tablet; Take 1 tablet (5 mg) by mouth daily  Dispense: 30 tablet; Refill: 3    4. Anxiety attack  Stable.  Refilled.  - busPIRone (BUSPAR) 15 MG tablet; Take 1 tablet (15 mg) by mouth 2 times daily  Dispense: 60 tablet; Refill: 1  - escitalopram (LEXAPRO) 20 MG tablet; TAKE 1 TABLET BY MOUTH ONE TIME DAILY  Dispense: 30 tablet; Refill: 5    5. Depression, unspecified depression type  Stable.  Refilled.  - busPIRone (BUSPAR) 15 MG tablet; Take 1 tablet (15 mg) by mouth 2 times daily  Dispense: 60 tablet; Refill: 1  - escitalopram (LEXAPRO) 20 MG tablet; TAKE 1 TABLET BY MOUTH ONE TIME DAILY  Dispense: 30 tablet; Refill: 5    6. Benign essential hypertension  Stable.  Refilled.  - chlorthalidone (HYGROTON) 25 MG tablet; Take 1 tablet (25 mg) by mouth daily  Dispense: 30 tablet; Refill: 5    7. Sleep disorder  Stable, refilled.  - traZODone (DESYREL) 50 MG tablet; Take 1-4 tablets ( mg) by mouth At Bedtime  Dispense: 90 tablet; Refill: 5    LUIS Connolly Ra [facility]

## 2018-05-15 ENCOUNTER — OFFICE VISIT (OUTPATIENT)
Dept: PSYCHOLOGY | Facility: CLINIC | Age: 58
End: 2018-05-15
Payer: COMMERCIAL

## 2018-05-15 DIAGNOSIS — F42.9 OBSESSIVE-COMPULSIVE DISORDER, UNSPECIFIED TYPE: Primary | ICD-10-CM

## 2018-05-15 DIAGNOSIS — F41.1 GENERALIZED ANXIETY DISORDER: ICD-10-CM

## 2018-05-15 DIAGNOSIS — F33.1 MODERATE EPISODE OF RECURRENT MAJOR DEPRESSIVE DISORDER (H): ICD-10-CM

## 2018-05-15 PROCEDURE — 90834 PSYTX W PT 45 MINUTES: CPT | Performed by: PSYCHOLOGIST

## 2018-05-15 NOTE — MR AVS SNAPSHOT
After Visit Summary   5/15/2018    Suresh Powers    MRN: [id number]           Patient Information     Date Of Birth          1960        Visit Information        Provider Department      5/15/2018 10:30 AM Kelly Carpenter LP [facility] Generic      Today's Diagnoses     Obsessive-compulsive disorder, unspecified type    -  1    Moderate episode of recurrent major depressive disorder (H)        Generalized anxiety disorder           Follow-ups after your visit        Your next 10 appointments already scheduled     Jun 04, 2018 11:30 AM CDT   Return Visit with Kelly Carpenter LP   [facility] ([address] [address]   [phone]            Jun 26, 2018 10:30 AM CDT   Return Visit with Kelly Carpenter LP   [facility] ([address] [address] [phone]            Jul 19, 2018 10:00 AM CDT   Return Visit with Kelly Carpenter LP   [facility] ([facility])    [address] [facility] [address] [phone]            Aug 17, 2018 10:00 AM CDT   SHORT with LUIS Connolly Ra, CNP   [facility]    [address] [address] [phone]              Who to contact     If you have questions or need follow up information about today's clinic visit or your schedule please contact [facility] directly at [phone].  Normal or non-critical lab and imaging results will be communicated to you by MyChart, letter or phone within 4 business days after the clinic has received the results. If you do not hear from us within 7 days, please contact the clinic through MyChart or phone. If you have a critical or abnormal lab result, we will notify you by phone as soon as possible.  Submit refill requests  "through [facility] or call your pharmacy and they will forward the refill request to us. Please allow 3 business days for your refill to be completed.          Additional Information About Your Visit        [employer] Information     [facility] lets you send messages to your doctor, view your test results, renew your prescriptions, schedule appointments and more. To sign up, go to [email or web address] . Click on \"Log in\" on the left side of the screen, which will take you to the Welcome page. Then click on \"Sign up Now\" on the right side of the page.     You will be asked to enter the access code listed below, as well as some personal information. Please follow the directions to create your username and password.     Your access code is: [email or web address]  Expires: 2018 10:50 AM     Your access code will  in 90 days. If you need help or a new code, please call your [place] clinic or [phone].        Care EveryWhere ID     This is your Care EveryWhere ID. This could be used by other organizations to access your [place] medical records  [id number]         Blood Pressure from Last 3 Encounters:   05/10/18 136/80   18 134/80   18 138/86    Weight from Last 3 Encounters:   05/10/18 75.2 kg (165 lb 11.2 oz)   18 73.3 kg (161 lb 9.6 oz)   18 71.6 kg (157 lb 14.4 oz)              Today, you had the following     No orders found for display       Primary Care Provider Office Phone # Fax #    LUIS Connolly Ra [facility] [phone] [address] [address] [facility] [address]        Equal Access to Services     [facility] SORIN AH: Hadii vinita Roger, waaxda luqadaha, qaybta kaalmada adeaidayada, tramaine hazel. So [facility] [phone].    ATENCIÓN: Si habla español, tiene a regalado disposición servicios gratuitos de asistencia lingüística. Llame al [phone].    We comply with applicable federal civil rights laws and [place] laws. We do not discriminate " on the basis of race, color, national origin, age, disability, sex, sexual orientation, or gender identity.            Thank you!     Thank you for choosing [facility]  for your care. Our goal is always to provide you with excellent care. Hearing back from our patients is one way we can continue to improve our services. Please take a few minutes to complete the written survey that you may receive in the mail after your visit with us. Thank you!             Your Updated Medication List - Protect others around you: Learn how to safely use, store and throw away your medicines at www.disposemymeds.org.          This list is accurate as of 5/15/18 11:30 AM.  Always use your most recent med list.                   Brand Name Dispense Instructions for use Diagnosis    * albuterol (2.5 MG/3ML) 0.083% neb solution     25 vial    Take 1 vial (2.5 mg) by nebulization every 4 hours as needed for shortness of breath / dyspnea or wheezing    Pneumonia of left lower lobe due to infectious organism (H), Shortness of breath       * albuterol (2.5 MG/3ML) 0.083% neb solution     50 vial    Take 1 vial (2.5 mg) by nebulization every 4 hours as needed for shortness of breath / dyspnea or wheezing    Wheezing       * albuterol 108 (90 Base) MCG/ACT Inhaler    PROAIR HFA/PROVENTIL HFA/VENTOLIN HFA    1 Inhaler    Inhale 2 puffs into the lungs every 4 hours as needed    Wheezing       amLODIPine 5 MG tablet    NORVASC    30 tablet    Take 1 tablet (5 mg) by mouth daily    HTN, goal below 140/90       benzonatate 200 MG capsule    TESSALON    21 capsule    Take 1 capsule (200 mg) by mouth 3 times daily as needed for cough    Wheezing       busPIRone 15 MG tablet    BUSPAR    60 tablet    Take 1 tablet (15 mg) by mouth 2 times daily    Anxiety attack, Depression, unspecified depression type       chlorthalidone 25 MG tablet    HYGROTON    30 tablet    Take 1 tablet (25 mg) by mouth daily    Benign essential  hypertension       escitalopram 20 MG tablet    LEXAPRO    30 tablet    TAKE 1 TABLET BY MOUTH ONE TIME DAILY    Anxiety attack, Depression, unspecified depression type       fluticasone 50 MCG/ACT spray    FLONASE    16 g    Spray 2 sprays into both nostrils daily    Upper respiratory tract infection, unspecified type       hydrocortisone valerate 0.2 % ointment    WEST-HANY    45 g    Apply sparingly to affected area three times daily for 14 days.    Rash       ipratropium - albuterol 0.5 mg/2.5 mg/3 mL 0.5-2.5 (3) MG/3ML neb solution    DUONEB    3 mL    Take 1 vial (3 mLs) by nebulization once for 1 dose    Wheezing       MULTIVITAMIN ADULT PO      Take 1 tablet by mouth daily        predniSONE 20 MG tablet    DELTASONE    5 tablet    Take 1 tablet (20 mg) by mouth daily    Wheezing       traZODone 50 MG tablet    DESYREL    90 tablet    Take 1-4 tablets ( mg) by mouth At Bedtime    Sleep disorder       TYLENOL PO      Take 1,000 mg by mouth every 8 hours as needed        VITAMIN C PO      Take 500 mg by mouth daily        zinc 50 MG Tabs      Take 100 mg by mouth daily        * Notice:  This list has 3 medication(s) that are the same as other medications prescribed for you. Read the directions carefully, and ask your doctor or other care provider to review them with you.

## 2018-05-15 NOTE — PROGRESS NOTES
"                                             Progress Note    Client Name: Suresh Powers  Date: 5-15-18         Service Type: Individual      Session Start Time: 10:35  Session End Time: 11:25 pm      Session Length: 38-52 min   Session #: 13     Attendees: Client    Treatment Plan Last Reviewed: today  (90 days = April 2018)  PHQ-9 / SPIKE-7 : see flow sheets     DATA      Progress Since Last Session (Related to Symptoms / Goals / Homework):   Symptoms: Stable but with depression, anxiety , irritabilty and OCD.  Disrupted concentration, feeling bad about self  And fear that something awful might happen. Finds the trazedone useful so he can sleep.  (7-8 hours)  Pain for his feet can get up to a 7, just standing.    Homework: Completed in session   Discussed behavioral and log work- would prefer to have a medication change to that he has a chance at success with the behavioral intervention.      Episode of Care Goals: Satisfactory progress - ACTION (Actively working towards change); Intervened by reinforcing change plan / affirming steps taken.  Read the Bible daily. Is going to [employer] weekly. Does feel better after, [employer] and Bible study, or devotional book- \"does feel God\"       Current / Ongoing Stressors and Concerns:  Daily anxiety, with OCD tendencies  OCD- ruminations about  the future /   losses: mom gone 13 + years, dad for 7 years-  mother had  Anxiety.  (physical health 1998- cart collapsed on him at work , both feet crushed)   (2016 another accident at work - broke tibia and clavical)    Donated a kidney to his sister 1990.  Mother  Dx of schizo affective disorder and had ECT. Her brother was at a facility in [place].  Dad passed in 2010. Sister inpatient for depression after his death.     Treatment Objective(s) Addressed in This Session:   Address OCD  behaviors  learn and demonstrate self advocacy   Increase interest, engagement, and pleasure in doing things  Identify negative self-talk and behaviors: " "challenge core beliefs, myths, and actions  identify 4 strategies for managing obsessive thoughts and improving mood  identify how the use of (food) substances contributes to the avoidance of feeling associated with the loss    explored meaning and purpose / volunteering / contribution to community ideas.      Intervention:    Updated [place] plan.    \"I  think I am feeling better about myself\". Not worrying about failing so much.   Overall less negative self-talk. Biggest challenge is the OCD.     Did see his PCP. Has a referral but has not heard back so this writer advocated with a call.  Found out that [facility] is not taking new referrals  Advocated wit ha call to [facility]  To identify options near by.     Explored behaviors that help with improving mood.    Processed  emotions.     ASSESSMENT: Current Emotional / Mental Status (status of significant symptoms):   Risk status (Self / Other harm or suicidal ideation)   Client denies current fears or concerns for personal safety.   Client denies current or recent suicidal ideation or behaviors.   Client denies current or recent homicidal ideation or behaviors.   Client denies current or recent self injurious behavior or ideation.   Client denies other safety concerns.   A safety and risk management plan has not been developed at this time, however client was given the after-hours number / 911 should there be a change in any of these risk factors.     Appearance:   Appropriate    Eye Contact:   Good    Psychomotor Behavior: Normal    Attitude:   Cooperative    Orientation:   All   Speech    Rate / Production: Normal     Volume:  Normal    Mood:    Anxious  Depressed  Normal   Affect:    Appropriate  Worrisome    Thought Content:  Clear    Thought Form:  Coherent  Logical    Insight:    Good      Medication Review:   Changes to psychiatric medications, see updated Medication List in EPIC.      Medication Compliance:   Yes     Changes in Health Issues:   None " "reported     Chemical Use Review:   Substance Use: Chemical use reviewed, no active concerns identified      Tobacco Use: No current tobacco use.       Collateral Reports Completed:   Routed note to PCP as needed     PLAN: (Client Tasks / Therapist Tasks / Other  Make an appointment with PCP   Get the  STOP Obsessing.  Pas hw  Use new internal language around  judging self and behaviors  Past hw  Return to using breath wok. Add Intentions  Past hw  Use brandy totter exercise as discussed to help balance fears  Past hw  Review session notes and the threshold continuum (fears)   Consider volunteering at a \"sunshine club\"  written encouragement.  Past hw  Advocate for self- ask questions from [facility]  [facility] [place] [facility] ( sister as resource)  Conduct mindful activity at least once a day.  Past hw  2 distraction skills and 2 reinterpretation skills  past hw  Use breath work  TIP skills  Rishi Saez book for career concerns      Kelly Carpenter LP                                                         ________________________________________________________________________    Treatment Plan    Client's Name: Suresh Powers  YOB: 1960    Date: 12-08-17    DSM-V Diagnoses: SPIKE / MDD  Psychosocial / Contextual Factors: left job due to anxiety  WHODAS: see dx    Referral / Collaboration:  Referral to another professional/service is not indicated at this time.    Anticipated number of session or this episode of care: 10      MeasurableTreatment Goal(s) related to diagnosis / functional impairment(s)  Goal 1: Client will employ coping skills daily to manage mood disruption (including mood disruption/ sadness realted to career disruption).    I will know I've met my goal when \" keep stable emotions and ability to function daily.    Objective #A (Client Action)   Client will report using skills daily including spiritual practices and accessing support.   Update 2-08-18   New medication. Plan is the do mindful activity daily, " "use CBT skills daily.  Update 5-15 18  Getting to [employer], reading Bible, finding meaning - (ex helping  visiting 89 year old, shahnaz Santizo ) . Reading , attend to nutrition.   Intervention(s)   Therapist will reinforce and teach 9-12 coping skills. Employ early intervention.     Goal 2: Client will process loss and move towards articulating a strong self worth and identity.    I will know I've met my goal when \"  Client reports a better sense of self worth; has more good days than bad.   feeling useful at home and find daily meaning and ( needed) purpose in being with his family.   Client will identify how thoughts contributes to the feeling of powerlessness and sadness associated with the loss.   Update 2-08-18   New medication. Plan is the do mindful activity daily, use positive affirmations skills daily.  Update 5-15 18  Getting to [employer], reading Bible, finding meaning - (ex helping  visiting 89 year old, shahnaz Santizo ) . Reading , attend to nutrition.     Client has reviewed and agreed to the above plan.      Kelly Carpenter LP  December 8, 2017  "

## 2018-05-24 ENCOUNTER — ALLIED HEALTH/NURSE VISIT (OUTPATIENT)
Dept: FAMILY MEDICINE | Facility: CLINIC | Age: 58
End: 2018-05-24
Payer: COMMERCIAL

## 2018-05-24 VITALS — SYSTOLIC BLOOD PRESSURE: 128 MMHG | DIASTOLIC BLOOD PRESSURE: 72 MMHG

## 2018-05-24 DIAGNOSIS — Z01.30 BP CHECK: Primary | ICD-10-CM

## 2018-05-24 PROCEDURE — 99207 ZZC NO CHARGE NURSE ONLY: CPT | Performed by: NURSE PRACTITIONER

## 2018-05-24 NOTE — MR AVS SNAPSHOT
After Visit Summary   5/24/2018    Suresh Powers    MRN: [id number]           Patient Information     Date Of Birth          1960        Visit Information        Provider Department      5/24/2018 10:41 AM Carmen Martinez Ra, APRN CNP [facility]        Today's Diagnoses     BP check    -  1       Follow-ups after your visit        Your next 10 appointments already scheduled     Jun 04, 2018 11:30 AM CDT   Return Visit with Kelly Carpenter LP   [facility] ([address] [address]   [phone]            Jun 26, 2018 10:30 AM CDT   Return Visit with Kelly Carpenter LP   [facility] ([address] [address] [phone]            Jul 19, 2018 10:00 AM CDT   Return Visit with Kelly Carpenter LP   [facility] ([facility])    [address]  [facility] [address] [phone]            Aug 17, 2018 10:00 AM CDT   SHORT with LUIS Connolly Ra, CNP   [facility] ([facility])    [address] [facility] [address] [phone]              Who to contact     If you have questions or need follow up information about today's clinic visit or your schedule please contact [facility] directly at [phone].  Normal or non-critical lab and imaging results will be communicated to you by MyChart, letter or phone within 4 business days after the clinic has received the results. If you do not hear from us within 7 days, please contact the clinic through MyChart or phone. If you have a critical or abnormal lab result, we will notify you by phone as soon as possible.  Submit refill requests through [employer] or call your pharmacy and they will forward the refill request to us. Please allow 3 business days for your refill to be completed.          Additional  "Information About Your Visit        MyChart Information     [employer] lets you send messages to your doctor, view your test results, renew your prescriptions, schedule appointments and more. To sign up, go to [email or web address] . Click on \"Log in\" on the left side of the screen, which will take you to the Welcome page. Then click on \"Sign up Now\" on the right side of the page.     You will be asked to enter the access code listed below, as well as some personal information. Please follow the directions to create your username and password.     Your access code is: [email or web address]  Expires: 2018 10:50 AM     Your access code will  in 90 days. If you need help or a new code, please call your [place] clinic or [phone].        Care EveryWhere ID     This is your Care EveryWhere ID. This could be used by other organizations to access your [place] medical records  [id number]         Blood Pressure from Last 3 Encounters:   18 128/72   05/10/18 136/80   18 134/80    Weight from Last 3 Encounters:   05/10/18 165 lb 11.2 oz (75.2 kg)   18 161 lb 9.6 oz (73.3 kg)   18 157 lb 14.4 oz (71.6 kg)              Today, you had the following     No orders found for display       Primary Care Provider Office Phone # Fax #    Carmen LUIS [facility] [phone]       [address] [facility] [address]        Equal Access to Services     [facility]: Hadii aad ku hadasho Soomaali, waaxda luqadaha, qaybta kaalmada adeegbeverly, tramaine silva . So [facility] [phone].    ATENCIÓN: Si habla español, tiene a regalado disposición servicios gratuitos de asistencia lingüística. Llame al [phone].    We comply with applicable federal civil rights laws and [place] laws. We do not discriminate on the basis of race, color, national origin, age, disability, sex, sexual orientation, or gender identity.            Thank you!     Thank you for choosing [facility]  for your care. Our goal is always to provide you with excellent care. Hearing back from our patients is one way we can continue to improve our services. Please take a few minutes to complete the written survey that you may receive in the mail after your visit with us. Thank you!             Your Updated Medication List - Protect others around you: Learn how to safely use, store and throw away your medicines at www.disposemymeds.org.          This list is accurate as of 5/24/18 10:43 AM.  Always use your most recent med list.                   Brand Name Dispense Instructions for use Diagnosis    * albuterol (2.5 MG/3ML) 0.083% neb solution     25 vial    Take 1 vial (2.5 mg) by nebulization every 4 hours as needed for shortness of breath / dyspnea or wheezing    Pneumonia of left lower lobe due to infectious organism (H), Shortness of breath       * albuterol (2.5 MG/3ML) 0.083% neb solution     50 vial    Take 1 vial (2.5 mg) by nebulization every 4 hours as needed for shortness of breath / dyspnea or wheezing    Wheezing       * albuterol 108 (90 Base) MCG/ACT Inhaler    PROAIR HFA/PROVENTIL HFA/VENTOLIN HFA    1 Inhaler    Inhale 2 puffs into the lungs every 4 hours as needed    Wheezing       amLODIPine 5 MG tablet    NORVASC    30 tablet    Take 1 tablet (5 mg) by mouth daily    HTN, goal below 140/90       benzonatate 200 MG capsule    TESSALON    21 capsule    Take 1 capsule (200 mg) by mouth 3 times daily as needed for cough    Wheezing       busPIRone 15 MG tablet    BUSPAR    60 tablet    Take 1 tablet (15 mg) by mouth 2 times daily    Anxiety attack, Depression, unspecified depression type       chlorthalidone 25 MG tablet    HYGROTON    30 tablet    Take 1 tablet (25 mg) by mouth daily    Benign essential hypertension       escitalopram 20 MG tablet    LEXAPRO    30 tablet    TAKE 1 TABLET BY MOUTH ONE TIME DAILY    Anxiety attack, Depression, unspecified depression type        fluticasone 50 MCG/ACT spray    FLONASE    16 g    Spray 2 sprays into both nostrils daily    Upper respiratory tract infection, unspecified type       hydrocortisone valerate 0.2 % ointment    WEST-HANY    45 g    Apply sparingly to affected area three times daily for 14 days.    Rash       ipratropium - albuterol 0.5 mg/2.5 mg/3 mL 0.5-2.5 (3) MG/3ML neb solution    DUONEB    3 mL    Take 1 vial (3 mLs) by nebulization once for 1 dose    Wheezing       MULTIVITAMIN ADULT PO      Take 1 tablet by mouth daily        predniSONE 20 MG tablet    DELTASONE    5 tablet    Take 1 tablet (20 mg) by mouth daily    Wheezing       traZODone 50 MG tablet    DESYREL    90 tablet    Take 1-4 tablets ( mg) by mouth At Bedtime    Sleep disorder       TYLENOL PO      Take 1,000 mg by mouth every 8 hours as needed        VITAMIN C PO      Take 500 mg by mouth daily        zinc 50 MG Tabs      Take 100 mg by mouth daily        * Notice:  This list has 3 medication(s) that are the same as other medications prescribed for you. Read the directions carefully, and ask your doctor or other care provider to review them with you.

## 2018-06-04 ENCOUNTER — OFFICE VISIT (OUTPATIENT)
Dept: PSYCHOLOGY | Facility: CLINIC | Age: 58
End: 2018-06-04
Payer: COMMERCIAL

## 2018-06-04 DIAGNOSIS — F41.1 GENERALIZED ANXIETY DISORDER: ICD-10-CM

## 2018-06-04 DIAGNOSIS — F42.9 OBSESSIVE-COMPULSIVE DISORDER, UNSPECIFIED TYPE: ICD-10-CM

## 2018-06-04 DIAGNOSIS — F33.1 MODERATE EPISODE OF RECURRENT MAJOR DEPRESSIVE DISORDER (H): Primary | ICD-10-CM

## 2018-06-04 PROCEDURE — 90834 PSYTX W PT 45 MINUTES: CPT | Performed by: PSYCHOLOGIST

## 2018-06-04 ASSESSMENT — ANXIETY QUESTIONNAIRES
7. FEELING AFRAID AS IF SOMETHING AWFUL MIGHT HAPPEN: MORE THAN HALF THE DAYS
3. WORRYING TOO MUCH ABOUT DIFFERENT THINGS: NEARLY EVERY DAY
6. BECOMING EASILY ANNOYED OR IRRITABLE: SEVERAL DAYS
1. FEELING NERVOUS, ANXIOUS, OR ON EDGE: NEARLY EVERY DAY
GAD7 TOTAL SCORE: 15
5. BEING SO RESTLESS THAT IT IS HARD TO SIT STILL: NOT AT ALL
2. NOT BEING ABLE TO STOP OR CONTROL WORRYING: NEARLY EVERY DAY

## 2018-06-04 ASSESSMENT — PATIENT HEALTH QUESTIONNAIRE - PHQ9: 5. POOR APPETITE OR OVEREATING: NEARLY EVERY DAY

## 2018-06-04 NOTE — MR AVS SNAPSHOT
After Visit Summary   6/4/2018    Suresh Powers    MRN: [id number]           Patient Information     Date Of Birth          1960        Visit Information        Provider Department      6/4/2018 11:30 AM Kelly Carpenter LP [facility] Generic      Today's Diagnoses     Moderate episode of recurrent major depressive disorder (H)    -  1    Generalized anxiety disorder        Obsessive-compulsive disorder, unspecified type           Follow-ups after your visit        Your next 10 appointments already scheduled     Jun 26, 2018 10:30 AM CDT   Return Visit with Kelly Carpenter LP   [facility] ([address] [address]   [phone]            Jul 19, 2018 10:00 AM CDT   Return Visit with Kelly Carpenter LP   [facility] ([facility])    [address] [facility] [address] [phone]            Aug 07, 2018 10:30 AM CDT   Return Visit with Kelly Carpenter LP   [facility] ([address] [address]   [phone]            Aug 17, 2018 10:00 AM CDT   SHORT with LUIS Connolly Ra, CNP   [facility] ([facility])    [address] [facility] [address] [phone]              Who to contact     If you have questions or need follow up information about today's clinic visit or your schedule please contact [facility] directly at [phone].  Normal or non-critical lab and imaging results will be communicated to you by MyChart, letter or phone within 4 business days after the clinic has received the results. If you do not hear from us within 7 days, please contact the clinic through MyChart or phone. If you have a critical or abnormal lab result, we will notify you by phone as soon as possible.  Submit refill requests  "through [employer] or call your pharmacy and they will forward the refill request to us. Please allow 3 business days for your refill to be completed.          Additional Information About Your Visit        [employer] Information     uberVU lets you send messages to your doctor, view your test results, renew your prescriptions, schedule appointments and more. To sign up, go to [email or web address] . Click on \"Log in\" on the left side of the screen, which will take you to the Welcome page. Then click on \"Sign up Now\" on the right side of the page.     You will be asked to enter the access code listed below, as well as some personal information. Please follow the directions to create your username and password.     Your access code is: [email or web address]  Expires: 2018 10:50 AM     Your access code will  in 90 days. If you need help or a new code, please call your [facility] clinic or [phone].        Care EveryWhere ID     This is your Care EveryWhere ID. This could be used by other organizations to access your [facility] medical records  [id number]         Blood Pressure from Last 3 Encounters:   18 128/72   05/10/18 136/80   18 134/80    Weight from Last 3 Encounters:   05/10/18 75.2 kg (165 lb 11.2 oz)   18 73.3 kg (161 lb 9.6 oz)   18 71.6 kg (157 lb 14.4 oz)              Today, you had the following     No orders found for display       Primary Care Provider Office Phone # Fax #    LUIS Connolly Ra [facility] [phone] [address] [facility] [address]        Equal Access to Services     [facility] SORIN AH: Hadii vinita Roger, waaxda luqadaha, qaybta kaalmada adeaidayada, tramaine hazel. So [facility] [phone].    ATENCIÓN: Si habla español, tiene a regalado disposición servicios gratuitos de asistencia lingüística. Llame al [phone].    We comply with applicable federal civil rights laws and [place] laws. We do not discriminate " on the basis of race, color, national origin, age, disability, sex, sexual orientation, or gender identity.            Thank you!     Thank you for choosing [facility]  for your care. Our goal is always to provide you with excellent care. Hearing back from our patients is one way we can continue to improve our services. Please take a few minutes to complete the written survey that you may receive in the mail after your visit with us. Thank you!             Your Updated Medication List - Protect others around you: Learn how to safely use, store and throw away your medicines at www.disposemymeds.org.          This list is accurate as of 6/4/18 12:22 PM.  Always use your most recent med list.                   Brand Name Dispense Instructions for use Diagnosis    * albuterol (2.5 MG/3ML) 0.083% neb solution     25 vial    Take 1 vial (2.5 mg) by nebulization every 4 hours as needed for shortness of breath / dyspnea or wheezing    Pneumonia of left lower lobe due to infectious organism (H), Shortness of breath       * albuterol (2.5 MG/3ML) 0.083% neb solution     50 vial    Take 1 vial (2.5 mg) by nebulization every 4 hours as needed for shortness of breath / dyspnea or wheezing    Wheezing       * albuterol 108 (90 Base) MCG/ACT Inhaler    PROAIR HFA/PROVENTIL HFA/VENTOLIN HFA    1 Inhaler    Inhale 2 puffs into the lungs every 4 hours as needed    Wheezing       amLODIPine 5 MG tablet    NORVASC    30 tablet    Take 1 tablet (5 mg) by mouth daily    HTN, goal below 140/90       benzonatate 200 MG capsule    TESSALON    21 capsule    Take 1 capsule (200 mg) by mouth 3 times daily as needed for cough    Wheezing       busPIRone 15 MG tablet    BUSPAR    60 tablet    Take 1 tablet (15 mg) by mouth 2 times daily    Anxiety attack, Depression, unspecified depression type       chlorthalidone 25 MG tablet    HYGROTON    30 tablet    Take 1 tablet (25 mg) by mouth daily    Benign essential  hypertension       escitalopram 20 MG tablet    LEXAPRO    30 tablet    TAKE 1 TABLET BY MOUTH ONE TIME DAILY    Anxiety attack, Depression, unspecified depression type       fluticasone 50 MCG/ACT spray    FLONASE    16 g    Spray 2 sprays into both nostrils daily    Upper respiratory tract infection, unspecified type       hydrocortisone valerate 0.2 % ointment    WEST-HANY    45 g    Apply sparingly to affected area three times daily for 14 days.    Rash       ipratropium - albuterol 0.5 mg/2.5 mg/3 mL 0.5-2.5 (3) MG/3ML neb solution    DUONEB    3 mL    Take 1 vial (3 mLs) by nebulization once for 1 dose    Wheezing       MULTIVITAMIN ADULT PO      Take 1 tablet by mouth daily        predniSONE 20 MG tablet    DELTASONE    5 tablet    Take 1 tablet (20 mg) by mouth daily    Wheezing       traZODone 50 MG tablet    DESYREL    90 tablet    Take 1-4 tablets ( mg) by mouth At Bedtime    Sleep disorder       TYLENOL PO      Take 1,000 mg by mouth every 8 hours as needed        VITAMIN C PO      Take 500 mg by mouth daily        zinc 50 MG Tabs      Take 100 mg by mouth daily        * Notice:  This list has 3 medication(s) that are the same as other medications prescribed for you. Read the directions carefully, and ask your doctor or other care provider to review them with you.

## 2018-06-04 NOTE — PROGRESS NOTES
"                                             Progress Note    Client Name: Suresh Powers  Date: 6-04-18         Service Type: Individual      Session Start Time: 11:30  Session End Time: 12:20 pm      Session Length: 38-52 min   Session #: 14     Attendees: Client    Treatment Plan Last Reviewed: today  (90 days = April 2018)  PHQ-9 / SPIKE-7 : see flow sheets     DATA      Progress Since Last Session (Related to Symptoms / Goals / Homework):   Symptoms: Stable but with depression, anxiety , irritabilty and OCD.  Disrupted concentration, feeling bad about self  And fear that something awful might happen. Finds the trazedone useful so he can sleep.  (7-8 hours)  Pain for his feet can get up to a 7, just standing.    Homework: Completed in session   Discussed behavioral and log work- would prefer to have a medication change to that he has a chance at success with the behavioral intervention.      Episode of Care Goals: Satisfactory progress - ACTION (Actively working towards change); Intervened by reinforcing change plan / affirming steps taken.  Read the Bible daily. Is going to [employer] weekly. Does feel better after, [employer] and Bible study, or devotional book- \"does feel God\"       Current / Ongoing Stressors and Concerns:  \"Biggest challenge is the OCD\"   Daily anxiety, with OCD tendencies  OCD- ruminations about  the future   losses: mom gone 13 + years, dad for 7 years-  mother had  Anxiety.  (physical health 1998- cart collapsed on him at work , both feet crushed)   (2016 another accident at work - broke tibia and clavical)    Donated a kidney to his sister 1990.  Mother  Dx of schizo affective disorder and had ECT. Her brother was at a facility in [place].  Dad passed in 2010. Sister inpatient for depression after his death.     Treatment Objective(s) Addressed in This Session:   Address OCD  behaviors  learn and demonstrate self advocacy   Increase interest, engagement, and pleasure in doing things  Identify negative " "self-talk and behaviors: challenge core beliefs, myths, and actions  identify 4 strategies for managing obsessive thoughts and improving mood    explored meaning and purpose / volunteering / contribution to community ideas.      Intervention:    Client did  Stop by by [facility] in [place] and made  an appt for the 20th of this month with   a PA.  Alana Mcclelland RN, CNP    Reviewed anxiety hx. 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   Shares that he never saw himself as supervisory material.   Narrative work around the end of his work / career.    Processed  emotions.     ASSESSMENT: Current Emotional / Mental Status (status of significant symptoms):   Risk status (Self / Other harm or suicidal ideation)   Client denies current fears or concerns for personal safety.   Client denies current or recent suicidal ideation or behaviors.   Client denies current or recent homicidal ideation or behaviors.   Client denies current or recent self injurious behavior or ideation.   Client denies other safety concerns.   A safety and risk management plan has not been developed at this time, however client was given the after-hours number / 911 should there be a change in any of these risk factors.     Appearance:   Appropriate    Eye Contact:   Good    Psychomotor Behavior: Normal    Attitude:   Cooperative    Orientation:   All   Speech    Rate / Production: Normal     Volume:  Normal    Mood:    Anxious  Depressed  Normal   Affect:    Appropriate  Worrisome    Thought Content:  Clear    Thought Form:  Coherent  Logical    Insight:    Good      Medication Review:   Changes to psychiatric medications, see updated Medication List in EPIC.      Medication Compliance:   Yes     Changes in Health Issues:   None reported     Chemical Use Review:   Substance Use: Chemical use reviewed, no active concerns identified      Tobacco Use: No current tobacco use.       Collateral Reports " "Completed:   Routed note to PCP as needed     PLAN: (Client Tasks / Therapist Tasks / Other    Past hw  Make an appointment with PCP   Get the  STOP Obsessing.  Pas hw  Use new internal language around  judging self and behaviors  Past hw  Return to using breath wok. Add Intentions  Past hw  Use brandy totter exercise as discussed to help balance fears  Past hw  Review session notes and the threshold continuum (fears)   Consider volunteering at a \"sunshine club\"  written encouragement.  Past hw  Advocate for self- ask questions from [facility]  [facility] [place] [facility] ( sister as resource)  Conduct mindful activity at least once a day.  Past hw  2 distraction skills and 2 reinterpretation skills  past hw  Use breath work  TIP skills  Rishi Saez book for career concerns      Kelly Carpenter LP                                                         ________________________________________________________________________    Treatment Plan    Client's Name: Suresh Powers  YOB: 1960    Date: 12-08-17    DSM-V Diagnoses: SPIKE / MDD  Psychosocial / Contextual Factors: left job due to anxiety  WHODAS: see dx    Referral / Collaboration:  Referral to another professional/service is not indicated at this time.    Anticipated number of session or this episode of care: 10      MeasurableTreatment Goal(s) related to diagnosis / functional impairment(s)  Goal 1: Client will employ coping skills daily to manage mood disruption (including mood disruption/ sadness realted to career disruption).    I will know I've met my goal when \" keep stable emotions and ability to function daily.    Objective #A (Client Action)   Client will report using skills daily including spiritual practices and accessing support.   Update 2-08-18   New medication. Plan is the do mindful activity daily, use CBT skills daily.  Update 5-15 18  Getting to Religious, reading Bible, finding meaning - (ex helping  visiting 89 year old, shahnaz Santizo ) . Reading , attend to " "nutrition.   Intervention(s)   Therapist will reinforce and teach 9-12 coping skills. Employ early intervention.     Goal 2: Client will process loss and move towards articulating a strong self worth and identity.    I will know I've met my goal when \"  Client reports a better sense of self worth; has more good days than bad.   feeling useful at home and find daily meaning and ( needed) purpose in being with his family.   Client will identify how thoughts contributes to the feeling of powerlessness and sadness associated with the loss.   Update 2-08-18   New medication. Plan is the do mindful activity daily, use positive affirmations skills daily.  Update 5-15 18  Getting to Baptism, reading Bible, finding meaning - (ex helping  visiting 89 year old, hectorileana Darlyn ) . Reading , attend to nutrition.     Client has reviewed and agreed to the above plan.      Kelly Carpenter LP  December 8, 2017  "

## 2018-06-05 ASSESSMENT — PATIENT HEALTH QUESTIONNAIRE - PHQ9: SUM OF ALL RESPONSES TO PHQ QUESTIONS 1-9: 6

## 2018-06-05 ASSESSMENT — ANXIETY QUESTIONNAIRES: GAD7 TOTAL SCORE: 15

## 2018-06-20 ENCOUNTER — TRANSFERRED RECORDS (OUTPATIENT)
Dept: HEALTH INFORMATION MANAGEMENT | Facility: CLINIC | Age: 58
End: 2018-06-20

## 2018-06-26 ENCOUNTER — OFFICE VISIT (OUTPATIENT)
Dept: PSYCHOLOGY | Facility: CLINIC | Age: 58
End: 2018-06-26
Payer: COMMERCIAL

## 2018-06-26 DIAGNOSIS — F33.1 MODERATE EPISODE OF RECURRENT MAJOR DEPRESSIVE DISORDER (H): ICD-10-CM

## 2018-06-26 DIAGNOSIS — F42.9 OBSESSIVE-COMPULSIVE DISORDER, UNSPECIFIED TYPE: ICD-10-CM

## 2018-06-26 DIAGNOSIS — F41.1 GENERALIZED ANXIETY DISORDER: Primary | ICD-10-CM

## 2018-06-26 PROCEDURE — 90834 PSYTX W PT 45 MINUTES: CPT | Performed by: PSYCHOLOGIST

## 2018-06-26 NOTE — MR AVS SNAPSHOT
After Visit Summary   6/26/2018    Suresh Powers    MRN: [id number]           Patient Information     Date Of Birth          1960        Visit Information        Provider Department      6/26/2018 10:30 AM Kelly Carpenter LP [facility] Generic      Today's Diagnoses     Generalized anxiety disorder    -  1    Obsessive-compulsive disorder, unspecified type        Moderate episode of recurrent major depressive disorder (H)           Follow-ups after your visit        Your next 10 appointments already scheduled     Jul 19, 2018 10:00 AM CDT   Return Visit with Kelly Carpenter LP   [facility] ([facility])    [address] [facility] [address] [phone]            Aug 07, 2018 10:30 AM CDT   Return Visit with Kelly Carpenter LP   [facility] ([address] [address] [phone]            Aug 17, 2018 10:00 AM CDT   SHORT with LUIS Connolly Ra, CNP   [facility] ([facility])    [id number] [place] [address] [phone]            Aug 21, 2018 10:30 AM CDT   Return Visit with Kelly Carpenter LP   [facility] ([facility]    [address] [place] [address] [phone]              Who to contact     If you have questions or need follow up information about today's clinic visit or your schedule please contact [facility] directly at [phone].  Normal or non-critical lab and imaging results will be communicated to you by MyChart, letter or phone within 4 business days after the clinic has received the results. If you do not hear from us within 7 days, please contact the clinic through MyChart or phone. If you have a critical or abnormal lab result, we will notify you by phone as soon as possible.  Submit refill requests  through [employer] or call your pharmacy and they will forward the refill request to us. Please allow 3 business days for your refill to be completed.          Additional Information About Your Visit        Care EveryWhere ID     This is your Care EveryWhere ID. This could be used by other organizations to access your [facility] medical records  [id number]         Blood Pressure from Last 3 Encounters:   05/24/18 128/72   05/10/18 136/80   05/05/18 134/80    Weight from Last 3 Encounters:   05/10/18 75.2 kg (165 lb 11.2 oz)   05/05/18 73.3 kg (161 lb 9.6 oz)   03/09/18 71.6 kg (157 lb 14.4 oz)              Today, you had the following     No orders found for display       Primary Care Provider Office Phone # Fax #    Carmen Lobato Juan APRBOOKER [facility] [phone] [address] [facility] [address]        Equal Access to Services     SEJAL ROWELL : Hadii aad ku hadasho Soomaali, waaxda luqadaha, qaybta kaalmada adeegyada, waxay courtneyin hayrockn fidel freirearaharinder silva . So [facility] [phone].    ATENCIÓN: Si habla español, tiene a regalado disposición servicios gratuitos de asistencia lingüística. Llame al [phone].    We comply with applicable federal civil rights laws and [place] laws. We do not discriminate on the basis of race, color, national origin, age, disability, sex, sexual orientation, or gender identity.            Thank you!     Thank you for choosing [facility]  for your care. Our goal is always to provide you with excellent care. Hearing back from our patients is one way we can continue to improve our services. Please take a few minutes to complete the written survey that you may receive in the mail after your visit with us. Thank you!             Your Updated Medication List - Protect others around you: Learn how to safely use, store and throw away your medicines at www.disposemymeds.org.          This list is accurate as of 6/26/18 11:21 AM.  Always use your most  recent med list.                   Brand Name Dispense Instructions for use Diagnosis    * albuterol (2.5 MG/3ML) 0.083% neb solution     25 vial    Take 1 vial (2.5 mg) by nebulization every 4 hours as needed for shortness of breath / dyspnea or wheezing    Pneumonia of left lower lobe due to infectious organism (H), Shortness of breath       * albuterol (2.5 MG/3ML) 0.083% neb solution     50 vial    Take 1 vial (2.5 mg) by nebulization every 4 hours as needed for shortness of breath / dyspnea or wheezing    Wheezing       * albuterol 108 (90 Base) MCG/ACT Inhaler    PROAIR HFA/PROVENTIL HFA/VENTOLIN HFA    1 Inhaler    Inhale 2 puffs into the lungs every 4 hours as needed    Wheezing       amLODIPine 5 MG tablet    NORVASC    30 tablet    Take 1 tablet (5 mg) by mouth daily    HTN, goal below 140/90       benzonatate 200 MG capsule    TESSALON    21 capsule    Take 1 capsule (200 mg) by mouth 3 times daily as needed for cough    Wheezing       busPIRone 15 MG tablet    BUSPAR    60 tablet    Take 1 tablet (15 mg) by mouth 2 times daily    Anxiety attack, Depression, unspecified depression type       chlorthalidone 25 MG tablet    HYGROTON    30 tablet    Take 1 tablet (25 mg) by mouth daily    Benign essential hypertension       escitalopram 20 MG tablet    LEXAPRO    30 tablet    TAKE 1 TABLET BY MOUTH ONE TIME DAILY    Anxiety attack, Depression, unspecified depression type       fluticasone 50 MCG/ACT spray    FLONASE    16 g    Spray 2 sprays into both nostrils daily    Upper respiratory tract infection, unspecified type       hydrocortisone valerate 0.2 % ointment    WEST-HANY    45 g    Apply sparingly to affected area three times daily for 14 days.    Rash       ipratropium - albuterol 0.5 mg/2.5 mg/3 mL 0.5-2.5 (3) MG/3ML neb solution    DUONEB    3 mL    Take 1 vial (3 mLs) by nebulization once for 1 dose    Wheezing       MULTIVITAMIN ADULT PO      Take 1 tablet by mouth daily        predniSONE 20 MG  tablet    DELTASONE    5 tablet    Take 1 tablet (20 mg) by mouth daily    Wheezing       traZODone 50 MG tablet    DESYREL    90 tablet    Take 1-4 tablets ( mg) by mouth At Bedtime    Sleep disorder       TYLENOL PO      Take 1,000 mg by mouth every 8 hours as needed        VITAMIN C PO      Take 500 mg by mouth daily        zinc 50 MG Tabs      Take 100 mg by mouth daily        * Notice:  This list has 3 medication(s) that are the same as other medications prescribed for you. Read the directions carefully, and ask your doctor or other care provider to review them with you.

## 2018-06-26 NOTE — PROGRESS NOTES
"                                             Progress Note    Client Name: Suresh Powers  Date: 6-26-18         Service Type: Individual      Session Start Time: 10:30  Session End Time: 11:20 pm      Session Length: 38-52 min   Session #: 15     Attendees: Client    Treatment Plan Last Reviewed: today  (90 days = 8- 2018)  PHQ-9 / SPIKE-7 : see flow sheets     DATA      Progress Since Last Session (Related to Symptoms / Goals / Homework):   Symptoms: Stable but with depression, anxiety , irritabilty and OCD.  Disrupted concentration, feeling bad about self  And fear that something awful might happen. Finds the trazedone useful so he can sleep.  (7-8 hours)  Pain for his feet can get up to a 7, just standing.    Homework: Completed in session   Discussed behavioral and log work- would prefer to have a medication change to that he has a chance at success with the behavioral intervention.      Episode of Care Goals: Satisfactory progress - ACTION (Actively working towards change); Intervened by reinforcing change plan / affirming steps taken.  Read the Bible daily. Is going to [employer] weekly. Does feel better after, [employer] and Bible study, or devotional book- \"does feel God\"       Current / Ongoing Stressors and Concerns:  \"Biggest challenge is the OCD\"   Daily anxiety, with OCD tendencies  OCD- ruminations about  the future   losses: mom gone 13 + years, dad for 7 years-  mother had  Anxiety.  (physical health 1998- cart collapsed on him at work , both feet crushed)   (2016 another accident at work - broke tibia and clavical)    Donated a kidney to his sister 1990.  Mother  Dx of schizo affective disorder and had ECT. Her brother was at a facility in [place].  Dad passed in 2010. Sister inpatient for depression after his death.     Treatment Objective(s) Addressed in This Session:   Address OCD  behaviors  learn and demonstrate self advocacy   Increase interest, engagement, and pleasure in doing things  Identify negative " self-talk and behaviors: challenge core beliefs, myths, and actions  identify 4 strategies for managing obsessive thoughts and improving mood    explored meaning and purpose / volunteering / contribution to community ideas.      Intervention:    Client did  Stop by by [facility] in [place].  Alana Mcclelland RN, CNP  Did start on 25 mg Lamotrigine and will titrate to 100 mg. Has a follow up in 5 weeks.  Is hopeful that this could work.      Noticed that he has slight change with concentration and energy  to the good.  Has been informed about the side effects (Ponce Lopez Syndrome).    Reviewed stressors. Nervous about his daughters wedding in December (180 people) .  Especially the speech part.   Narrative work around his marriage that lasted 1 year ( reports that his wife had bi-polar) .     Continued narrative work around  the end of his work / career. Discussed the hurt and betrayal feelings.    Processed  emotions.     ASSESSMENT: Current Emotional / Mental Status (status of significant symptoms):   Risk status (Self / Other harm or suicidal ideation)   Client denies current fears or concerns for personal safety.   Client denies current or recent suicidal ideation or behaviors.   Client denies current or recent homicidal ideation or behaviors.   Client denies current or recent self injurious behavior or ideation.   Client denies other safety concerns.   A safety and risk management plan has not been developed at this time, however client was given the after-hours number / 911 should there be a change in any of these risk factors.     Appearance:   Appropriate    Eye Contact:   Good    Psychomotor Behavior: Normal    Attitude:   Cooperative    Orientation:   All   Speech    Rate / Production: Normal     Volume:  Normal    Mood:    Anxious  Depressed  Normal   Affect:    Appropriate  Worrisome    Thought Content:  Clear    Thought Form:  Coherent  Logical    Insight:    Good      Medication  "Review:   Changes to psychiatric medications, see updated Medication List in EPIC.      Medication Compliance:   Yes     Changes in Health Issues:   None reported     Chemical Use Review:   Substance Use: Chemical use reviewed, no active concerns identified      Tobacco Use: No current tobacco use.       Collateral Reports Completed:   Routed note to PCP as needed     PLAN: (Client Tasks / Therapist Tasks / Other  Monitor symptoms and side effects  Past hw  Make an appointment with PCP   Get the  STOP Obsessing.  Pas hw  Use new internal language around  judging self and behaviors  Past hw  Return to using breath wok. Add Intentions  Past hw  Use brandy totter exercise as discussed to help balance fears  Past hw  Review session notes and the threshold continuum (fears)   Consider volunteering at a \"sunshine club\"  written encouragement.  Past hw  Advocate for self- ask questions from [facility]  And [place] [facility] ( sister as resource)  Conduct mindful activity at least once a day.  Past hw  2 distraction skills and 2 reinterpretation skills  past hw  Use breath work  TIP skills  Rishi Saez book for career concerns      Kelly Carpenter LP                                                         ________________________________________________________________________    Treatment Plan    Client's Name: Suresh Powers  YOB: 1960    Date: 12-08-17    DSM-V Diagnoses: SPIKE / MDD  Psychosocial / Contextual Factors: left job due to anxiety  WHODAS: see dx    Referral / Collaboration:  Referral to another professional/service is not indicated at this time.    Anticipated number of session or this episode of care: 10      MeasurableTreatment Goal(s) related to diagnosis / functional impairment(s)  Goal 1: Client will employ coping skills daily to manage mood disruption (including mood disruption/ sadness realted to career disruption).    I will know I've met my goal when \" keep stable emotions and ability to function daily.  " "  Objective #A (Client Action)   Client will report using skills daily including spiritual practices and accessing support.   Update 2-08-18   New medication. Plan is the do mindful activity daily, use CBT skills daily.  Update 5-15 18  Getting to Conf[employer]ian[employer], reading Bible, finding meaning - (ex helping  visiting 89 year old, shahnaz Santizo ) . Reading , attend to nutrition.   Intervention(s)   Therapist will reinforce and teach 9-12 coping skills. Employ early intervention.     Goal 2: Client will process loss and move towards articulating a strong self worth and identity.    I will know I've met my goal when \"  Client reports a better sense of self worth; has more good days than bad.   feeling useful at home and find daily meaning and ( needed) purpose in being with his family.   Client will identify how thoughts contributes to the feeling of powerlessness and sadness associated with the loss.   Update 2-08-18   New medication. Plan is the do mindful activity daily, use positive affirmations skills daily.  Update 5-15 18  Getting to Confucianism, reading Bible, finding meaning - (ex helping  visiting 89 year old, shahnaz Santizo ) . Reading , attend to nutrition.     Client has reviewed and agreed to the above plan.      Kelly Carpenter LP  December 8, 2017  "

## 2018-06-29 RX ORDER — LAMOTRIGINE 25 MG/1
TABLET ORAL
Refills: 0 | COMMUNITY
Start: 2018-06-20 | End: 2018-08-16

## 2018-06-29 RX ORDER — LAMOTRIGINE 100 MG/1
TABLET ORAL
Refills: 1 | COMMUNITY
Start: 2018-06-20 | End: 2019-09-13 | Stop reason: DRUGHIGH

## 2018-07-19 ENCOUNTER — OFFICE VISIT (OUTPATIENT)
Dept: PSYCHOLOGY | Facility: CLINIC | Age: 58
End: 2018-07-19
Payer: COMMERCIAL

## 2018-07-19 DIAGNOSIS — F33.1 MODERATE EPISODE OF RECURRENT MAJOR DEPRESSIVE DISORDER (H): ICD-10-CM

## 2018-07-19 DIAGNOSIS — F42.9 OBSESSIVE-COMPULSIVE DISORDER, UNSPECIFIED TYPE: ICD-10-CM

## 2018-07-19 DIAGNOSIS — F41.1 GENERALIZED ANXIETY DISORDER: Primary | ICD-10-CM

## 2018-07-19 PROCEDURE — 90834 PSYTX W PT 45 MINUTES: CPT | Performed by: PSYCHOLOGIST

## 2018-07-19 ASSESSMENT — PATIENT HEALTH QUESTIONNAIRE - PHQ9: 5. POOR APPETITE OR OVEREATING: MORE THAN HALF THE DAYS

## 2018-07-19 ASSESSMENT — ANXIETY QUESTIONNAIRES
5. BEING SO RESTLESS THAT IT IS HARD TO SIT STILL: NOT AT ALL
7. FEELING AFRAID AS IF SOMETHING AWFUL MIGHT HAPPEN: NEARLY EVERY DAY
1. FEELING NERVOUS, ANXIOUS, OR ON EDGE: NEARLY EVERY DAY
6. BECOMING EASILY ANNOYED OR IRRITABLE: SEVERAL DAYS
3. WORRYING TOO MUCH ABOUT DIFFERENT THINGS: NEARLY EVERY DAY
GAD7 TOTAL SCORE: 15
2. NOT BEING ABLE TO STOP OR CONTROL WORRYING: NEARLY EVERY DAY
IF YOU CHECKED OFF ANY PROBLEMS ON THIS QUESTIONNAIRE, HOW DIFFICULT HAVE THESE PROBLEMS MADE IT FOR YOU TO DO YOUR WORK, TAKE CARE OF THINGS AT HOME, OR GET ALONG WITH OTHER PEOPLE: SOMEWHAT DIFFICULT

## 2018-07-19 NOTE — MR AVS SNAPSHOT
MRN:[id number]                      After Visit Summary   7/19/2018    Suresh Powers    MRN: [id number]           Visit Information        Provider Department      7/19/2018 10:00 AM Kelly Carpenter LP [facility] Generic      Your next 10 appointments already scheduled     Aug 07, 2018 10:30 AM CDT   Return Visit with Kelly Carpenter LP   [facility] ([place] [address] [facility] [address]   [phone]            Aug 17, 2018 10:00 AM CDT   [facility] LUIS Connolly Ra, C[facility]    [address] [facility] [id number] [phone]            Aug 21, 2018 10:30 AM CDT   Return Visit with Kelly Carpenter LP   [facility] ([address] [address]   [phone]            Sep 11, 2018 11:30 AM CDT   Return Visit with Kelly Carpenter LP   [facility] ([address] [address]   [phone]              Care EveryWhere ID     This is your Care EveryWhere ID. This could be used by other organizations to access your [facility] medical records  [id number]        Equal Access to Services     SANJANA ROWELL AH: Hadii aad ku hadasho Soomaali, waaxda luqadaha, qaybta kaalmada adeegyada, waxmiranda hazel. So [facility] [phone].    ATENCIÓN: Si habla español, tiene a regalado disposición servicios gratuitos de asistencia lingüística. Llame al [phone].    We comply with applicable federal civil rights laws and [place] laws. We do not discriminate on the basis of race, color, national origin, age, disability, sex, sexual orientation, or gender identity.

## 2018-07-19 NOTE — PROGRESS NOTES
"                                             Progress Note    Client Name: Suresh Powers  Date: 7-19-18         Service Type: Individual      Session Start Time: 10:00  Session End Time: 10:50 pm      Session Length: 38-52 min   Session #: 16     Attendees: Client    Treatment Plan Last Reviewed: today  (90 days = 8- 2018)  PHQ-9 / SPIKE-7 : see flow sheets     DATA      Progress Since Last Session (Related to Symptoms / Goals / Homework):   Symptoms: Stable but with  Depression 1-3 days,  Daily anxiety , irritabilty and OCD.     Disrupted concentration, feeling bad about self more than have the days  Daily fear that something awful might happen. Finds the trazedone useful so he can sleep.  (7-8 hours)  Pain for his feet can get up to a 7, just standing.    Homework: Completed in session   Discussed behavioral and log work- would prefer to have a medication change to that he has a chance at success with the behavioral intervention.      Episode of Care Goals: Satisfactory progress - ACTION (Actively working towards change); Intervened by reinforcing change plan / affirming steps taken.  Read the Bible daily. Is going to [employer] weekly. Does feel better after, [employer] and Bible study, or devotional book- \"does feel God\"       Current / Ongoing Stressors and Concerns:  \"Biggest challenge is the OCD\"   Daily anxiety, with OCD tendencies  OCD- ruminations about  the future   losses: mom gone 13 + years, dad for 7 years-  mother had  Anxiety.  (physical health 1998- cart collapsed on him at work , both feet crushed)   (2016 another accident at work - broke tibia and clavical)    Donated a kidney to his sister 1990.  Mother  Dx of schizo affective disorder and had ECT. Her brother was at a facility in [place].  Dad passed in 2010. Sister inpatient for depression after his death.     Treatment Objective(s) Addressed in This Session:   Address OCD  behaviors  learn and demonstrate self advocacy   Increase interest, engagement, and " "pleasure in doing things  Identify negative self-talk and behaviors: challenge core beliefs, myths, and actions  identify 4 strategies for managing obsessive thoughts and improving mood    explored meaning and purpose / volunteering / contribution to community ideas.      Intervention:    Client reports med compliance for 3 weeks now. Started  100 mg last nite.   Happy that there are no side effects.   \"seems to be helping\". \"Things don't  get me quit so affected\" more laid back.     Narrative work around core relationship transitions in his life.  Processed  emotions.     ASSESSMENT: Current Emotional / Mental Status (status of significant symptoms):   Risk status (Self / Other harm or suicidal ideation)   Client denies current fears or concerns for personal safety.   Client denies current or recent suicidal ideation or behaviors.   Client denies current or recent homicidal ideation or behaviors.   Client denies current or recent self injurious behavior or ideation.   Client denies other safety concerns.   A safety and risk management plan has not been developed at this time, however client was given the after-hours number / 911 should there be a change in any of these risk factors.     Appearance:   Appropriate    Eye Contact:   Good    Psychomotor Behavior: Normal    Attitude:   Cooperative    Orientation:   All   Speech    Rate / Production: Normal     Volume:  Normal    Mood:    Anxious  Depressed  Normal   Affect:    Appropriate  Worrisome    Thought Content:  Clear    Thought Form:  Coherent  Logical    Insight:    Good      Medication Review:   Changes to psychiatric medications, see updated Medication List in [facility].     [facility] in [place].  Alana Mcclelland RN, CNP     Medication Compliance:   Yes     Changes in Health Issues:   None reported     Chemical Use Review:   Substance Use: Chemical use reviewed, no active concerns identified      Tobacco Use: No current tobacco use.       Collateral " "Reports Completed:   Routed note to PCP as needed     PLAN: (Client Tasks / Therapist Tasks / Other  Monitor symptoms and side effects  Past hw  Make an appointment with PCP   Get the  STOP Obsessing.  Pas hw  Use new internal language around  judging self and behaviors  Past hw  Return to using breath wok. Add Intentions  Past hw  Use brandy totter exercise as discussed to help balance fears  Past hw  Review session notes and the threshold continuum (fears)   Consider volunteering at a \"sunshine club\"  written encouragement.  Past hw  Advocate for self- ask questions from [facility]  And [place] [facility] ( sister as resource)  Conduct mindful activity at least once a day.  Past hw  2 distraction skills and 2 reinterpretation skills  past hw  Use breath work  TIP skills  Rishi Saez book for career concerns      Kelly Carpenter LP                                                         ________________________________________________________________________    Treatment Plan    Client's Name: Suresh Powers  YOB: 1960    Date: 12-08-17    DSM-V Diagnoses: SPIKE / MDD  Psychosocial / Contextual Factors: left job due to anxiety  WHODAS: see dx    Referral / Collaboration:  Referral to another professional/service is not indicated at this time.    Anticipated number of session or this episode of care: 10      MeasurableTreatment Goal(s) related to diagnosis / functional impairment(s)  Goal 1: Client will employ coping skills daily to manage mood disruption (including mood disruption/ sadness realted to career disruption).    I will know I've met my goal when \" keep stable emotions and ability to function daily.    Objective #A (Client Action)   Client will report using skills daily including spiritual practices and accessing support.   Update 2-08-18   New medication. Plan is the do mindful activity daily, use CBT skills daily.  Update 5-15 18  Getting to Adventism, reading Bible, finding meaning - (ex helping  visiting 89 year " "old, shahnaz Santizo ) . Reading , attend to nutrition.   Intervention(s)   Therapist will reinforce and teach 9-12 coping skills. Employ early intervention.     Goal 2: Client will process loss and move towards articulating a strong self worth and identity.    I will know I've met my goal when \"  Client reports a better sense of self worth; has more good days than bad.   feeling useful at home and find daily meaning and ( needed) purpose in being with his family.   Client will identify how thoughts contributes to the feeling of powerlessness and sadness associated with the loss.   Update 2-08-18   New medication. Plan is the do mindful activity daily, use positive affirmations skills daily.  Update 5-15 18  Getting to [employer], reading Bible, finding meaning - (ex helping  visiting 89 year old, shahnaz Santizo ) . Reading , attend to nutrition.     Client has reviewed and agreed to the above plan.      Kelly Carpenter LP  December 8, 2017  "

## 2018-07-20 ASSESSMENT — PATIENT HEALTH QUESTIONNAIRE - PHQ9: SUM OF ALL RESPONSES TO PHQ QUESTIONS 1-9: 8

## 2018-07-20 ASSESSMENT — ANXIETY QUESTIONNAIRES: GAD7 TOTAL SCORE: 15

## 2018-08-04 DIAGNOSIS — F32.A DEPRESSION, UNSPECIFIED DEPRESSION TYPE: ICD-10-CM

## 2018-08-04 DIAGNOSIS — F41.0 ANXIETY ATTACK: ICD-10-CM

## 2018-08-04 NOTE — TELEPHONE ENCOUNTER
"Requested Prescriptions   Pending Prescriptions Disp Refills     busPIRone (BUSPAR) 15 MG tablet [Pharmacy Med Name: BusPIRone HCl Oral Tablet 15 MG]  Last Written Prescription Date:  5/10/18  Last Fill Quantity: 60,  # refills: 1   Last office visit: 5/10/2018 with prescribing provider:  5/10/18   Future Office Visit:   Next 5 appointments (look out 90 days)     Aug 07, 2018 10:30 AM CDT   Return Visit with Kelly Carpenter LP   [address] [address]   [phone]            Aug 16, 2018 10:20 AM CDT   SHORT with LUIS Connolly Ra, CNP   [address] [address]   [phone]            Aug 21, 2018 10:30 AM CDT   Return Visit with Kelly Carpenter LP   [address] [address]   [phone]            Sep 11, 2018 11:30 AM CDT   Return Visit with Kelly Carpenter LP   [facility] ([place] [address] [facility] [address]   [phone]                  60 tablet 0     Sig: Take 1 tablet (15 mg) by mouth 2 times daily    Atypical Antidepressants Protocol Failed    8/4/2018 11:05 AM       Failed - Patient has PHQ-9 score less than 5 in past 6 months.    Please review last PHQ-9 score.          Passed - Patient is age 18 or older       Passed - Recent (6 mo) or future (30 days) visit within the authorizing provider's specialty    Patient had office visit in the last 6 months or has a visit in the next 30 days with authorizing provider or within the authorizing provider's specialty.  See \"Patient Info\" tab in inbasket, or \"Choose Columns\" in Meds & Orders section of the refill encounter.              "

## 2018-08-06 RX ORDER — BUSPIRONE HYDROCHLORIDE 15 MG/1
TABLET ORAL
Qty: 60 TABLET | Refills: 0 | Status: SHIPPED | OUTPATIENT
Start: 2018-08-06 | End: 2021-06-17

## 2018-08-06 NOTE — TELEPHONE ENCOUNTER
I did refill this but he is now seeing psych through [facility].  They have made some med changes and should be managing all of this for the time being.  Once stabilized on med regimen I can take over again.  This prevents any incorrect prescribing because of med changes from different providers.  JAIME.

## 2018-08-06 NOTE — TELEPHONE ENCOUNTER
Patient calling that he is completely out and needs today.   Routing refill request to provider for review/approval because:  PHQ 9 and SPIKE 7 out of range for RN protocol    Jerri Jorgensen, RN

## 2018-08-06 NOTE — TELEPHONE ENCOUNTER
Called patient to notify of below. Patient not sure how long he will be going to [facility], but understands and will f/u with them until ready to sign off and come back to [facility] for the psych meds.     Susan CARRION, Triage RN

## 2018-08-07 ENCOUNTER — OFFICE VISIT (OUTPATIENT)
Dept: PSYCHOLOGY | Facility: CLINIC | Age: 58
End: 2018-08-07
Payer: COMMERCIAL

## 2018-08-07 DIAGNOSIS — F41.1 GENERALIZED ANXIETY DISORDER: ICD-10-CM

## 2018-08-07 DIAGNOSIS — F33.1 MODERATE EPISODE OF RECURRENT MAJOR DEPRESSIVE DISORDER (H): Primary | ICD-10-CM

## 2018-08-07 DIAGNOSIS — F42.9 OBSESSIVE-COMPULSIVE DISORDER, UNSPECIFIED TYPE: ICD-10-CM

## 2018-08-07 PROCEDURE — 90834 PSYTX W PT 45 MINUTES: CPT | Performed by: PSYCHOLOGIST

## 2018-08-07 ASSESSMENT — ANXIETY QUESTIONNAIRES
GAD7 TOTAL SCORE: 12
1. FEELING NERVOUS, ANXIOUS, OR ON EDGE: NEARLY EVERY DAY
7. FEELING AFRAID AS IF SOMETHING AWFUL MIGHT HAPPEN: SEVERAL DAYS
3. WORRYING TOO MUCH ABOUT DIFFERENT THINGS: NEARLY EVERY DAY
2. NOT BEING ABLE TO STOP OR CONTROL WORRYING: NEARLY EVERY DAY
6. BECOMING EASILY ANNOYED OR IRRITABLE: NOT AT ALL
5. BEING SO RESTLESS THAT IT IS HARD TO SIT STILL: NOT AT ALL

## 2018-08-07 ASSESSMENT — PATIENT HEALTH QUESTIONNAIRE - PHQ9: 5. POOR APPETITE OR OVEREATING: MORE THAN HALF THE DAYS

## 2018-08-07 NOTE — MR AVS SNAPSHOT
After Visit Summary   8/7/2018    Suresh Powers    MRN: [id number]           Patient Information     Date Of Birth          1960        Visit Information        Provider Department      8/7/2018 10:30 AM Kelly Carpenter LP [facility]      Today's Diagnoses     Moderate episode of recurrent major depressive disorder (H)    -  1    Generalized anxiety disorder        Obsessive-compulsive disorder, unspecified type           Follow-ups after your visit        Your next 10 appointments already scheduled     Aug 16, 2018 10:20 AM CDT   [facility]    [address] [facility] [address] [phone]            Aug 21, 2018 10:30 AM CDT   Return Visit with Kelly Carpenter LP   [address] [id number] [phone]            Sep 11, 2018 11:30 AM CDT   Return Visit with Kelly Carpenter LP   [address] [address] [phone]            Sep 25, 2018 10:30 AM CDT   Return Visit with Kelly Carpenter LP   [address] [address] [phone]              Who to contact     If you have questions or need follow up information about today's clinic visit or your schedule please contact [facility] directly at [phone].  Normal or non-critical lab and imaging results will be communicated to you by MyChart, letter or phone within 4 business days after the clinic has received the results. If you do not hear from us within 7 days, please contact the clinic through MyChart or phone. If you have a critical or abnormal lab result, we will notify you by phone as soon as possible.  Submit refill requests  through [employer] or call your pharmacy and they will forward the refill request to us. Please allow 3 business days for your refill to be completed.          Additional Information About Your Visit        Care EveryWhere ID     This is your Care EveryWhere ID. This could be used by other organizations to access your [place] medical records  [id number]         Blood Pressure from Last 3 Encounters:   05/24/18 128/72   05/10/18 136/80   05/05/18 134/80    Weight from Last 3 Encounters:   05/10/18 75.2 kg (165 lb 11.2 oz)   05/05/18 73.3 kg (161 lb 9.6 oz)   03/09/18 71.6 kg (157 lb 14.4 oz)              Today, you had the following     No orders found for display       Primary Care Provider Office Phone # Fax #    Carmen Lobato Juan APRBOOKER [facility] [phone] [address] [facility] [address]        Equal Access to Services     SEJAL ROWELL : Hadii aad ku hadasho Soomaali, waaxda luqadaha, qaybta kaalmada adeegyada, waxay courtneyin hayrockn fidel rfeirearaharinder silva . So [facility] [phone].    ATENCIÓN: Si habla español, tiene a regalado disposición servicios gratuitos de asistencia lingüística. Llame al [phone].    We comply with applicable federal civil rights laws and [place] laws. We do not discriminate on the basis of race, color, national origin, age, disability, sex, sexual orientation, or gender identity.            Thank you!     Thank you for choosing [facility]  for your care. Our goal is always to provide you with excellent care. Hearing back from our patients is one way we can continue to improve our services. Please take a few minutes to complete the written survey that you may receive in the mail after your visit with us. Thank you!             Your Updated Medication List - Protect others around you: Learn how to safely use, store and throw away your medicines at www.disposemymeds.org.          This list is accurate as of 8/7/18 11:32 AM.  Always use your most  recent med list.                   Brand Name Dispense Instructions for use Diagnosis    * albuterol (2.5 MG/3ML) 0.083% neb solution     25 vial    Take 1 vial (2.5 mg) by nebulization every 4 hours as needed for shortness of breath / dyspnea or wheezing    Pneumonia of left lower lobe due to infectious organism (H), Shortness of breath       * albuterol (2.5 MG/3ML) 0.083% neb solution     50 vial    Take 1 vial (2.5 mg) by nebulization every 4 hours as needed for shortness of breath / dyspnea or wheezing    Wheezing       * albuterol 108 (90 Base) MCG/ACT Inhaler    PROAIR HFA/PROVENTIL HFA/VENTOLIN HFA    1 Inhaler    Inhale 2 puffs into the lungs every 4 hours as needed    Wheezing       amLODIPine 5 MG tablet    NORVASC    30 tablet    Take 1 tablet (5 mg) by mouth daily    HTN, goal below 140/90       benzonatate 200 MG capsule    TESSALON    21 capsule    Take 1 capsule (200 mg) by mouth 3 times daily as needed for cough    Wheezing       busPIRone 15 MG tablet    BUSPAR    60 tablet    Take 1 tablet (15 mg) by mouth 2 times daily    Anxiety attack, Depression, unspecified depression type       chlorthalidone 25 MG tablet    HYGROTON    30 tablet    Take 1 tablet (25 mg) by mouth daily    Benign essential hypertension       escitalopram 20 MG tablet    LEXAPRO    30 tablet    TAKE 1 TABLET BY MOUTH ONE TIME DAILY    Anxiety attack, Depression, unspecified depression type       fluticasone 50 MCG/ACT spray    FLONASE    16 g    Spray 2 sprays into both nostrils daily    Upper respiratory tract infection, unspecified type       hydrocortisone valerate 0.2 % ointment    WEST-HANY    45 g    Apply sparingly to affected area three times daily for 14 days.    Rash       ipratropium - albuterol 0.5 mg/2.5 mg/3 mL 0.5-2.5 (3) MG/3ML neb solution    DUONEB    3 mL    Take 1 vial (3 mLs) by nebulization once for 1 dose    Wheezing       * lamoTRIgine 25 MG tablet    LaMICtal          * lamoTRIgine 100 MG tablet     LaMICtal          MULTIVITAMIN ADULT PO      Take 1 tablet by mouth daily        predniSONE 20 MG tablet    DELTASONE    5 tablet    Take 1 tablet (20 mg) by mouth daily    Wheezing       traZODone 50 MG tablet    DESYREL    90 tablet    Take 1-4 tablets ( mg) by mouth At Bedtime    Sleep disorder       TYLENOL PO      Take 1,000 mg by mouth every 8 hours as needed        VITAMIN C PO      Take 500 mg by mouth daily        zinc 50 MG Tabs      Take 100 mg by mouth daily        * Notice:  This list has 5 medication(s) that are the same as other medications prescribed for you. Read the directions carefully, and ask your doctor or other care provider to review them with you.

## 2018-08-07 NOTE — PROGRESS NOTES
"                                             Progress Note    Client Name: Suresh Powers  Date: 8-07-18         Service Type: Individual      Session Start Time: 10:40  Session End Time: 11:30 pm      Session Length: 38-52 min   Session #: 17     Attendees: Client    Treatment Plan Last Reviewed: today  (90 days = 8- 2018)  PHQ-9 / SPIKE-7 : see flow sheets     DATA      Progress Since Last Session (Related to Symptoms / Goals / Homework):   Symptoms: Stable but with  Depression 1-3 days,  Daily anxiety , irritabilty and OCD.     Disrupted concentration, feeling bad about self more than have the days  Daily fear that something awful might happen. Finds the trazedone useful so he can sleep.  (7-8 hours)  Pain for his feet can get up to a 7, just standing.    Homework: Completed in session   Discussed behavioral and log work- would prefer to have a medication change to that he has a chance at success with the behavioral intervention.      Episode of Care Goals: Satisfactory progress - ACTION (Actively working towards change); Intervened by reinforcing change plan / affirming steps taken.  Read the Bible daily. Is going to [email or web address] weekly. Does feel better after, [employer] and Bible study, or devotional book- \"does feel God\"       Current / Ongoing Stressors and Concerns:  \"Biggest challenge is the OCD\"   Daily anxiety, with OCD tendencies  OCD- ruminations about  the future   losses: mom gone 13 + years, dad for 7 years-  mother had  Anxiety.  (physical health 1998- cart collapsed on him at work , both feet crushed)   (2016 another accident at work - broke tibia and clavical)    Donated a kidney to his sister 1990.  Mother  Dx of schizo affective disorder and had ECT. Her brother was at a facility in [place].  Dad passed in 2010. Sister inpatient for depression after his death.     Treatment Objective(s) Addressed in This Session:   Address OCD  behaviors  learn and demonstrate self advocacy   Increase interest, engagement, and " "pleasure in doing things  Identify negative self-talk and behaviors: challenge core beliefs, myths, and actions  identify 4 strategies for managing obsessive thoughts and improving mood    explored meaning and purpose / volunteering / contribution to community ideas.      Intervention:    Client reports an increase in his dose of his mood stabilizer.  Lexapro is now at 1/2 dose.  Ongoing progress-   Not getting upset about things that don't go well.  \"I like taking it\". Feels greatful.    Narrative work around his daughter wedding and having to give a speech.  Problem solving around this.  Processed  emotions.     ASSESSMENT: Current Emotional / Mental Status (status of significant symptoms):   Risk status (Self / Other harm or suicidal ideation)   Client denies current fears or concerns for personal safety.   Client denies current or recent suicidal ideation or behaviors.   Client denies current or recent homicidal ideation or behaviors.   Client denies current or recent self injurious behavior or ideation.   Client denies other safety concerns.   A safety and risk management plan has not been developed at this time, however client was given the after-hours number / 911 should there be a change in any of these risk factors.     Appearance:   Appropriate    Eye Contact:   Good    Psychomotor Behavior: Normal    Attitude:   Cooperative    Orientation:   All   Speech    Rate / Production: Normal     Volume:  Normal    Mood:    Anxious  Depressed  Normal   Affect:    Appropriate  Worrisome    Thought Content:  Clear    Thought Form:  Coherent  Logical    Insight:    Good      Medication Review:   Changes to psychiatric medications, see updated Medication List in [facility].     [facility] in [place].  Alana Mcclelland RN, CNP     Medication Compliance:   Yes     Changes in Health Issues:   None reported     Chemical Use Review:   Substance Use: Chemical use reviewed, no active concerns identified      Tobacco Use: No " "current tobacco use.       Collateral Reports Completed:   Routed note to PCP as needed     PLAN: (Client Tasks / Therapist Tasks / Other  Problem solving around - preparing for speech at daughters wedding  Past hw  Monitor symptoms and side effects  Past hw  Make an appointment with PCP   Get the  STOP Obsessing.  Pas hw  Use new internal language around  judging self and behaviors  Past hw  Return to using breath wok. Add Intentions  Past hw  Use brandy totter exercise as discussed to help balance fears  Past hw  Review session notes and the threshold continuum (fears)   Consider volunteering at a \"sunshine club\"  written encouragement.  Past hw  Advocate for self- ask questions from [facility]  [facility] [place] [facility] ( sister as resource)  Conduct mindful activity at least once a day.  Past hw  2 distraction skills and 2 reinterpretation skills  past hw  Use breath work  TIP skills  Rishi Saez book for career concerns      Kelly Carpenter LP                                                         ________________________________________________________________________    Treatment Plan    Client's Name: Suresh Powers  YOB: 1960    Date: 12-08-17    DSM-V Diagnoses: SPIKE / MDD  Psychosocial / Contextual Factors: left job due to anxiety  WHODAS: see dx    Referral / Collaboration:  Referral to another professional/service is not indicated at this time.    Anticipated number of session or this episode of care: 10      MeasurableTreatment Goal(s) related to diagnosis / functional impairment(s)  Goal 1: Client will employ coping skills daily to manage mood disruption (including mood disruption/ sadness realted to career disruption).    I will know I've met my goal when \" keep stable emotions and ability to function daily.    Objective #A (Client Action)   Client will report using skills daily including spiritual practices and accessing support.   Update 2-08-18   New medication. Plan is the do mindful activity daily, use " "CBT skills daily.  Update 5-15 18  Getting to [employer], reading Bible, finding meaning - (ex helping  visiting 89 year old, shahnaz Santizo ) . Reading , attend to nutrition.   Update 8-7-18  Take meds everyday. Enact problem solving early in any given situation. Self care- Tenriism / read / connect socially.  Intervention(s)   Therapist will reinforce and teach 9-12 coping skills. Employ early intervention.     Goal 2: Client will process loss and move towards articulating a strong self worth and identity.    I will know I've met my goal when \"  Client reports a better sense of self worth; has more good days than bad.   feeling useful at home and find daily meaning and ( needed) purpose in being with his family.   Client will identify how thoughts contributes to the feeling of powerlessness and sadness associated with the loss.   Update 2-08-18   New medication. Plan is the do mindful activity daily, use positive affirmations skills daily.  Update 5-15 18  Getting to [employer], reading Bible, finding meaning - (ex helping  visiting 89 year old, shahnaz Santizo ) . Reading , attend to nutrition.   Update 8-7-18  Take meds everyday. Enact problem solving early in any given situation. Self care- Tenriism / read / connect socially.    Client has reviewed and agreed to the above plan.      Kelly Carpenter LP  December 8, 2017  "

## 2018-08-08 ASSESSMENT — PATIENT HEALTH QUESTIONNAIRE - PHQ9: SUM OF ALL RESPONSES TO PHQ QUESTIONS 1-9: 6

## 2018-08-08 ASSESSMENT — ANXIETY QUESTIONNAIRES: GAD7 TOTAL SCORE: 12

## 2018-08-16 ENCOUNTER — OFFICE VISIT (OUTPATIENT)
Dept: FAMILY MEDICINE | Facility: CLINIC | Age: 58
End: 2018-08-16
Payer: COMMERCIAL

## 2018-08-16 VITALS
WEIGHT: 167.1 LBS | TEMPERATURE: 96.9 F | OXYGEN SATURATION: 100 % | BODY MASS INDEX: 25.79 KG/M2 | RESPIRATION RATE: 14 BRPM | SYSTOLIC BLOOD PRESSURE: 136 MMHG | HEART RATE: 72 BPM | DIASTOLIC BLOOD PRESSURE: 96 MMHG

## 2018-08-16 DIAGNOSIS — F32.A DEPRESSION, UNSPECIFIED DEPRESSION TYPE: ICD-10-CM

## 2018-08-16 DIAGNOSIS — Z12.11 SPECIAL SCREENING FOR MALIGNANT NEOPLASMS, COLON: ICD-10-CM

## 2018-08-16 DIAGNOSIS — F41.0 ANXIETY ATTACK: ICD-10-CM

## 2018-08-16 DIAGNOSIS — I10 HTN, GOAL BELOW 140/90: Primary | ICD-10-CM

## 2018-08-16 DIAGNOSIS — J34.89 RHINORRHEA: ICD-10-CM

## 2018-08-16 PROCEDURE — 99214 OFFICE O/P EST MOD 30 MIN: CPT | Performed by: NURSE PRACTITIONER

## 2018-08-16 RX ORDER — CHLORTHALIDONE 25 MG/1
25 TABLET ORAL DAILY
Qty: 30 TABLET | Refills: 6 | Status: SHIPPED | OUTPATIENT
Start: 2018-08-16 | End: 2019-03-08

## 2018-08-16 RX ORDER — ESCITALOPRAM OXALATE 10 MG/1
TABLET ORAL
COMMUNITY
Start: 2018-08-16 | End: 2021-11-17

## 2018-08-16 RX ORDER — FLUTICASONE PROPIONATE 50 MCG
2 SPRAY, SUSPENSION (ML) NASAL DAILY
Qty: 16 G | Refills: 11 | Status: SHIPPED | OUTPATIENT
Start: 2018-08-16 | End: 2019-09-13

## 2018-08-16 RX ORDER — AMLODIPINE BESYLATE 10 MG/1
10 TABLET ORAL DAILY
Qty: 30 TABLET | Refills: 1 | Status: SHIPPED | OUTPATIENT
Start: 2018-08-16 | End: 2018-10-29

## 2018-08-16 NOTE — MR AVS SNAPSHOT
After Visit Summary   8/16/2018    Suresh Powers    MRN: [id number]           Patient Information     Date Of Birth          1960        Visit Information        Provider Department      8/16/2018 10:20 AM Carmen Martinez Ra, APRN [facility]        Today's Diagnoses     Special screening for malignant neoplasms, colon    -  1    HTN, goal below 140/90        Anxiety attack        Depression, unspecified depression type        Upper respiratory tract infection, unspecified type        Benign essential hypertension          Care Instructions    Increase the dose of your amlodipine to 10mg daily.  You can take this all at the same time.  The new tablets I sent in for you are 10mg tablets so you only need to take 1 daily.  Recheck your blood pressure with the nurse in about 2 weeks.            Follow-ups after your visit        Follow-up notes from your care team     Return in about 2 weeks (around 8/30/2018) for BP Recheck.      Your next 10 appointments already scheduled     Aug 21, 2018 10:30 AM CDT   Return Visit with Kelly Carpenter LP   [place] [address] [facility] [address]   [phone]            Sep 11, 2018 11:30 AM CDT   Return Visit with Kelly Carpenter LP   [place] [address] [facility] [address]   [phone]            Sep 25, 2018 10:30 AM CDT   Return Visit with Kelly Carpenter LP   [place] [address] [facility] [address]   [phone]              Future tests that were ordered for you today     Open Future Orders        Priority Expected Expires Ordered    Fecal colorectal cancer screen (FIT) Routine 9/6/2018 11/8/2018 8/16/2018            Who to contact     If you have questions or need follow up information about today's clinic visit or your schedule please contact  [facility] directly at [phone].  Normal or non-critical lab and imaging results will be communicated to you by MyChart, letter or phone within 4 business days after the clinic has received the results. If you do not hear from us within 7 days, please contact the clinic through MyChart or phone. If you have a critical or abnormal lab result, we will notify you by phone as soon as possible.  Submit refill requests through [employer] or call your pharmacy and they will forward the refill request to us. Please allow 3 business days for your refill to be completed.          Additional Information About Your Visit        Care EveryWhere ID     This is your Care EveryWhere ID. This could be used by other organizations to access your [place] medical records  [id number]        Your Vitals Were     Pulse Temperature Respirations Pulse Oximetry BMI (Body Mass Index)       72 96.9  F (36.1  C) (Tympanic) 14 100% 25.79 kg/m2        Blood Pressure from Last 3 Encounters:   08/16/18 (!) 136/96   05/24/18 128/72   05/10/18 136/80    Weight from Last 3 Encounters:   08/16/18 167 lb 1.6 oz (75.8 kg)   05/10/18 165 lb 11.2 oz (75.2 kg)   05/05/18 161 lb 9.6 oz (73.3 kg)                 Today's Medication Changes          These changes are accurate as of 8/16/18 10:52 AM.  If you have any questions, ask your nurse or doctor.               These medicines have changed or have updated prescriptions.        Dose/Directions    amLODIPine 10 MG tablet   Commonly known as:  NORVASC   This may have changed:    - medication strength  - how much to take   Used for:  HTN, goal below 140/90   Changed by:  Carmen Martinez Ra, APRN CNP        Dose:  10 mg   Take 1 tablet (10 mg) by mouth daily   Quantity:  30 tablet   Refills:  1       escitalopram 10 MG tablet   Commonly known as:  LEXAPRO   This may have changed:  medication strength   Used for:  Anxiety attack, Depression, unspecified depression type   Changed by:   Carmen Martinez Ra, APRN CNP        TAKE 1 TABLET BY MOUTH ONE TIME DAILY   Refills:  0       lamoTRIgine 100 MG tablet   Commonly known as:  LaMICtal   This may have changed:  Another medication with the same name was removed. Continue taking this medication, and follow the directions you see here.   Changed by:  Carmen Martinez Ra, APRN CNP        Refills:  1         Stop taking these medicines if you haven't already. Please contact your care team if you have questions.     benzonatate 200 MG capsule   Commonly known as:  TESSALON   Stopped by:  Carmen Martinez Ra, APRN CNP           hydrocortisone valerate 0.2 % ointment   Commonly known as:  WEST-HANY   Stopped by:  Carmen Martinez Ra, APRN CNP           ipratropium - albuterol 0.5 mg/2.5 mg/3 mL 0.5-2.5 (3) MG/3ML neb solution   Commonly known as:  DUONEB   Stopped by:  Carmen Martinez Ra, APRN CNP           predniSONE 20 MG tablet   Commonly known as:  DELTASONE   Stopped by:  Carmen Martinez Ra, APRN CNP                Where to get your medicines      These medications were sent to [id number] - [place] [id number]     Phone:  [phone]     amLODIPine 10 MG tablet    chlorthalidone 25 MG tablet    fluticasone 50 MCG/ACT spray                Primary Care Provider Office Phone # Fax #    LUIS Connolly Ra, [phone] [address]  [facility] [address]        Equal Access to Services     [facility] HospitalJARRED : Hadii aad ku hadasho Soomaali, waaxda luqadaha, qaybta kaalmada adeegyada, waxay idiin hayaan fidel silva . So [facility] [phone].    ATENCIÓN: Si jojola scott, tiene a regalado disposición servicios gratuitos de asistencia lingüística. Llame al [phone].    We comply with applicable federal civil rights laws and [place] laws. We do not discriminate on the basis of race, color, national origin, age, disability, sex, sexual orientation, or gender  identity.            Thank you!     Thank you for choosing [facility]  for your [facility]. Our goal is always to provide you with excellent care. Hearing back from our patients is one way we can continue to improve our services. Please take a few minutes to complete the written survey that you may receive in the mail after your visit with us. Thank you!             Your Updated Medication List - Protect others around you: Learn how to safely use, store and throw away your medicines at www.disposemymeds.org.          This list is accurate as of 8/16/18 10:52 AM.  Always use your most recent med list.                   Brand Name Dispense Instructions for use Diagnosis    * albuterol (2.5 MG/3ML) 0.083% neb solution     25 vial    Take 1 vial (2.5 mg) by nebulization every 4 hours as needed for shortness of breath / dyspnea or wheezing    Pneumonia of left lower lobe due to infectious organism (H), Shortness of breath       * albuterol (2.5 MG/3ML) 0.083% neb solution     50 vial    Take 1 vial (2.5 mg) by nebulization every 4 hours as needed for shortness of breath / dyspnea or wheezing    Wheezing       * albuterol 108 (90 Base) MCG/ACT inhaler    PROAIR HFA/PROVENTIL HFA/VENTOLIN HFA    1 Inhaler    Inhale 2 puffs into the lungs every 4 hours as needed    Wheezing       amLODIPine 10 MG tablet    NORVASC    30 tablet    Take 1 tablet (10 mg) by mouth daily    HTN, goal below 140/90       busPIRone 15 MG tablet    BUSPAR    60 tablet    Take 1 tablet (15 mg) by mouth 2 times daily    Anxiety attack, Depression, unspecified depression type       chlorthalidone 25 MG tablet    HYGROTON    30 tablet    Take 1 tablet (25 mg) by mouth daily    Benign essential hypertension       escitalopram 10 MG tablet    LEXAPRO     TAKE 1 TABLET BY MOUTH ONE TIME DAILY    Anxiety attack, Depression, unspecified depression type       fluticasone 50 MCG/ACT spray    FLONASE    16 g    Spray 2 sprays into both nostrils daily     Upper respiratory tract infection, unspecified type       lamoTRIgine 100 MG tablet    LaMICtal          MULTIVITAMIN ADULT PO      Take 1 tablet by mouth daily        traZODone 50 MG tablet    DESYREL    90 tablet    Take 1-4 tablets ( mg) by mouth At Bedtime    Sleep disorder       TYLENOL PO      Take 1,000 mg by mouth every 8 hours as needed        VITAMIN C PO      Take 500 mg by mouth daily        zinc 50 MG Tabs      Take 100 mg by mouth daily        * Notice:  This list has 3 medication(s) that are the same as other medications prescribed for you. Read the directions carefully, and ask your doctor or other care provider to review them with you.

## 2018-08-16 NOTE — PATIENT INSTRUCTIONS
Increase the dose of your amlodipine to 10mg daily.  You can take this all at the same time.  The new tablets I sent in for you are 10mg tablets so you only need to take 1 daily.  Recheck your blood pressure with the nurse in about 2 weeks.

## 2018-08-16 NOTE — PROGRESS NOTES
SUBJECTIVE:   Suresh Powers is a 58 year old male who presents to clinic today for the following health issues:      Hypertension Follow-up      Outpatient blood pressures are being checked at store.  Results are 140/90.    Low Salt Diet: no added salt      Amount of exercise or physical activity: 4-5 days/week for an average of 15-30 minutes    Problems taking medications regularly: No    Medication side effects: none    Diet: regular (no restrictions)    Pt presents for bp follow up.  Has been monitoring at home and bp does run higher/borderline.  He denies any chest pain, sob.  His activity level is unchanged.  He is compliant with his medications.    Continues to work with [employer].  Decrease in lexapro dosing and started lamictal with good results.      Problem list and histories reviewed & adjusted, as indicated.  Additional history: as documented    Patient Active Problem List   Diagnosis     Exotropia     Benign essential hypertension     Tobacco use disorder     Sleep disorder     Unilateral inguinal hernia without obstruction or gangrene     Closed fracture of proximal end of left tibia     Fracture of left clavicle     Depression     Slow transit constipation     Urinary retention     Iron deficiency anemia     Clostridium difficile diarrhea     Anxiety attack     HTN, goal below 140/90     Single kidney     CARDIOVASCULAR SCREENING; LDL GOAL LESS THAN 160     OCD (obsessive compulsive disorder)     Generalized anxiety disorder     Moderate episode of recurrent major depressive disorder (H)     Past Surgical History:   Procedure Laterality Date     foot crush injury       kidney donation       OPEN REDUCTION INTERNAL FIXATION CLAVICLE Left 2/4/2016    Procedure: OPEN REDUCTION INTERNAL FIXATION CLAVICLE;  Surgeon: Rakesh Hui MD;  Location:  OR     OPEN REDUCTION INTERNAL FIXATION TIBIAL PLATEAU Left 2/4/2016    Procedure: OPEN REDUCTION INTERNAL FIXATION TIBIAL PLATEAU;  Surgeon:  Rakesh Hui MD;  Location:  OR       Social History   Substance Use Topics     Smoking status: Former Smoker     Packs/day: 1.00     Years: 0.00     Smokeless tobacco: Former User     Quit date: 2/2/2016     Alcohol use No     Family History   Problem Relation Age of Onset     Breast Cancer Mother      Type 2 Diabetes Mother      Skin Cancer Father      Cerebrovascular Disease Father            Reviewed and updated as needed this visit by clinical staff       Reviewed and updated as needed this visit by Provider         ROS:  SEE HPI.    OBJECTIVE:     BP (!) 136/96 (BP Location: Right arm, Patient Position: Chair, Cuff Size: Adult Regular)  Pulse 72  Temp 96.9  F (36.1  C) (Tympanic)  Resp 14  Wt 167 lb 1.6 oz (75.8 kg)  SpO2 100%  BMI 25.79 kg/m2  Body mass index is 25.79 kg/(m^2).  GENERAL: healthy, alert and no distress  RESP: lungs clear to auscultation - no rales, rhonchi or wheezes  CV: regular rates and rhythm, normal S1 S2, no S3 or S4 and no murmur, click or rub  ABDOMEN: soft, nontender, no hepatosplenomegaly, no masses and bowel sounds normal  PSYCH: mentation appears normal, affect normal/bright    Diagnostic Test Results:  none     ASSESSMENT/PLAN:   1. HTN, goal below 140/90  Above goal, asymptomatic.  Refilled and increased dose of amlodipine.  Recheck in pharmacy in 2 weeks.  Pt agrees with plan and verbalized understanding.  - amLODIPine (NORVASC) 10 MG tablet; Take 1 tablet (10 mg) by mouth daily  Dispense: 30 tablet; Refill: 1  - chlorthalidone (HYGROTON) 25 MG tablet; Take 1 tablet (25 mg) by mouth daily  Dispense: 30 tablet; Refill: 6    2. Depression, unspecified depression type  Continue care with psych until stabilized, then return for refills here.  - escitalopram (LEXAPRO) 10 MG tablet; TAKE 1 TABLET BY MOUTH ONE TIME DAILY    3. Anxiety attack  See above.  - escitalopram (LEXAPRO) 10 MG tablet; TAKE 1 TABLET BY MOUTH ONE TIME DAILY    4. Rhinorrhea  Refilled.  - fluticasone  (FLONASE) 50 MCG/ACT spray; Spray 2 sprays into both nostrils daily  Dispense: 16 g; Refill: 11    5. Special screening for malignant neoplasms, colon  - Fecal colorectal cancer screen (FIT); Future    LUIS Connolly Ra [facility]

## 2018-08-21 ENCOUNTER — OFFICE VISIT (OUTPATIENT)
Dept: PSYCHOLOGY | Facility: CLINIC | Age: 58
End: 2018-08-21
Payer: COMMERCIAL

## 2018-08-21 DIAGNOSIS — F41.1 GENERALIZED ANXIETY DISORDER: ICD-10-CM

## 2018-08-21 DIAGNOSIS — F33.1 MODERATE EPISODE OF RECURRENT MAJOR DEPRESSIVE DISORDER (H): ICD-10-CM

## 2018-08-21 DIAGNOSIS — F42.9 OBSESSIVE-COMPULSIVE DISORDER, UNSPECIFIED TYPE: Primary | ICD-10-CM

## 2018-08-21 PROCEDURE — 90834 PSYTX W PT 45 MINUTES: CPT | Performed by: PSYCHOLOGIST

## 2018-08-21 NOTE — PROGRESS NOTES
"                                             Progress Note    Client Name: Suresh Powers  Date: 8-21-18         Service Type: Individual      Session Start Time: 10:40  Session End Time: 11:30 pm      Session Length: 38-52 min   Session #: 18     Attendees: Client    Treatment Plan Last Reviewed: today  (90 days = 8- 2018)  PHQ-9 / SPIKE-7 : see flow sheets     DATA      Progress Since Last Session (Related to Symptoms / Goals / Homework):   Symptoms: Stable but with  Depression 1-3 days,  Daily anxiety , irritabilty and OCD.     Disrupted concentration, feeling bad about self more than have the days  Daily fear that something awful might happen. Finds the trazedone useful so he can sleep.  (7-8 hours)  Pain for his feet can get up to a 7, just standing.    Homework: Completed in session   Discussed behavioral and log work- would prefer to have a medication change to that he has a chance at success with the behavioral intervention.      Episode of Care Goals: Satisfactory progress - ACTION (Actively working towards change); Intervened by reinforcing change plan / affirming steps taken.  Read the Bible daily. Is going to [employer] weekly. Does feel better after, [facility] and Bible study, or devotional book- \"does feel God\"       Current / Ongoing Stressors and Concerns:  \"Biggest challenge is the OCD\"   Daily anxiety, with OCD tendencies  OCD- ruminations about  the future   losses: mom gone 13 + years, dad for 7 years-  mother had  Anxiety.  (physical health 1998- cart collapsed on him at work , both feet crushed)   (2016 another accident at work - broke tibia and clavical)    Donated a kidney to his sister 1990.  Mother  Dx of schizo affective disorder and had ECT. Her brother was at a facility in [place].  Dad passed in 2010. Sister inpatient for depression after his death.     Treatment Objective(s) Addressed in This Session:   Address OCD  behaviors  learn and demonstrate self advocacy   Increase interest, engagement, and " pleasure in doing things  Identify negative self-talk and behaviors: challenge core beliefs, myths, and actions  identify 4 strategies for managing obsessive thoughts and improving mood    explored meaning and purpose / volunteering / contribution to community ideas.      Intervention:    Client reports still having a base of anxiety (anxiety every day) 14 on the SPIKE. PHQ9 is 6.  States that he is open to an increase and will bee seeing his provider soon.  Has been ruminating about his daughters wedding-  Discussed strategies about facing the task of having to give a speech.  Exercise - in chosing thoughts of huma.  Processed  emotions.     ASSESSMENT: Current Emotional / Mental Status (status of significant symptoms):   Risk status (Self / Other harm or suicidal ideation)   Client denies current fears or concerns for personal safety.   Client denies current or recent suicidal ideation or behaviors.   Client denies current or recent homicidal ideation or behaviors.   Client denies current or recent self injurious behavior or ideation.   Client denies other safety concerns.   A safety and risk management plan has not been developed at this time, however client was given the after-hours number / 911 should there be a change in any of these risk factors.     Appearance:   Appropriate    Eye Contact:   Good    Psychomotor Behavior: Normal    Attitude:   Cooperative    Orientation:   All   Speech    Rate / Production: Normal     Volume:  Normal    Mood:    Anxious  Depressed  Normal   Affect:    Appropriate  Worrisome    Thought Content:  Clear    Thought Form:  Coherent  Logical    Insight:    Good      Medication Review:   Changes to psychiatric medications, see updated Medication List in [facility].     [facility] in [place].  Alana Mcclelland RN, CNP     Medication Compliance:   Yes     Changes in Health Issues:   None reported     Chemical Use Review:   Substance Use: Chemical use reviewed, no active concerns  "identified      Tobacco Use: No current tobacco use.       Collateral Reports Completed:   Routed note to PCP as needed     PLAN: (Client Tasks / Therapist Tasks / Other  Problem solving around - preparing for speech at daughters wedding  Past hw  Monitor symptoms and side effects  Past hw  Make an appointment with PCP   Get the  STOP Obsessing.  Pas hw  Use new internal language around  judging self and behaviors  Past hw  Return to using breath wok. Add Intentions  Past hw  Use brandy totter exercise as discussed to help balance fears  Past hw  Review session notes and the threshold continuum (fears)   Consider volunteering at a \"sunshine club\"  written encouragement.  Past hw  Advocate for self- ask questions from [facility]  And [place] [facility] ( sister as resource)  Conduct mindful activity at least once a day.  Past hw  2 distraction skills and 2 reinterpretation skills  past hw  Use breath work  TIP skills  Rishi Saez book for career concerns      Kelly Carpenter LP                                                         ________________________________________________________________________    Treatment Plan    Client's Name: Suresh Powers  YOB: 1960    Date: 12-08-17    DSM-V Diagnoses: SPIKE / MDD  Psychosocial / Contextual Factors: left job due to anxiety  WHODAS: see dx    Referral / Collaboration:  Referral to another professional/service is not indicated at this time.    Anticipated number of session or this episode of care: 10      MeasurableTreatment Goal(s) related to diagnosis / functional impairment(s)  Goal 1: Client will employ coping skills daily to manage mood disruption (including mood disruption/ sadness realted to career disruption).    I will know I've met my goal when \" keep stable emotions and ability to function daily.    Objective #A (Client Action)   Client will report using skills daily including spiritual practices and accessing support.   Update 2-08-18   New medication. Plan is the " "do mindful activity daily, use CBT skills daily.  Update 5-15 18  Getting to Spiritism, reading Bible, finding meaning - (ex helping  visiting 89 year old, shahnaz Santizo ) . Reading , attend to nutrition.   Update 8-7-18  Take meds everyday. Enact problem solving early in any given situation. Self care- Spiritism / read / connect socially.  Intervention(s)   Therapist will reinforce and teach 9-12 coping skills. Employ early intervention.     Goal 2: Client will process loss and move towards articulating a strong self worth and identity.    I will know I've met my goal when \"  Client reports a better sense of self worth; has more good days than bad.   feeling useful at home and find daily meaning and ( needed) purpose in being with his family.   Client will identify how thoughts contributes to the feeling of powerlessness and sadness associated with the loss.   Update 2-08-18   New medication. Plan is the do mindful activity daily, use positive affirmations skills daily.  Update 5-15 18  Getting to Spiritism, reading Bible, finding meaning - (ex helping  visiting 89 year old, shahnaz Santizo ) . Reading , attend to nutrition.   Update 8-7-18  Take meds everyday. Enact problem solving early in any given situation. Self care- Spiritism / read / connect socially.    Client has reviewed and agreed to the above plan.      Kelly Carpenter LP  December 8, 2017  "

## 2018-08-30 ENCOUNTER — ALLIED HEALTH/NURSE VISIT (OUTPATIENT)
Dept: FAMILY MEDICINE | Facility: CLINIC | Age: 58
End: 2018-08-30
Payer: COMMERCIAL

## 2018-08-30 VITALS — SYSTOLIC BLOOD PRESSURE: 138 MMHG | DIASTOLIC BLOOD PRESSURE: 84 MMHG

## 2018-08-30 DIAGNOSIS — Z01.30 BP CHECK: Primary | ICD-10-CM

## 2018-08-30 PROCEDURE — 99207 ZZC NO CHARGE NURSE ONLY: CPT | Performed by: NURSE PRACTITIONER

## 2018-08-30 NOTE — PROGRESS NOTES
Suresh Powers is enrolled/participating in the retail pharmacy Blood Pressure Goals Achievement Program (BPGAP).  Suresh Powers was evaluated at [facility] on August 30, 2018 at which time his blood pressure was:    BP Readings from Last 3 Encounters:   08/29/18 138/84   08/16/18 (!) 136/96   05/24/18 128/72     Reviewed lifestyle modifications for blood pressure control and reduction: including making healthy food choices, managing weight, getting regular exercise, smoking cessation, reducing alcohol consumption, monitoring blood pressure regularly.     Suresh Powers is not experiencing symptoms.    Follow-Up: BP is at goal of < 140/90mmHg (patient 18+ years of age with or without diabetes).  Recommended follow-up at pharmacy in 6 months.     Recommendation to Provider: none    Suresh Powers was evaluated for enrollment into the PGEN study today.    Patient eligible for enrollment:  No  Patient interested in enrollment:  No    Completed by: Lucas Mcknight    Thank You   Adam Anderson  [facility]

## 2018-08-30 NOTE — MR AVS SNAPSHOT
After Visit Summary   8/30/2018    Suresh Powers    MRN: [id number]           Patient Information     Date Of Birth          1960        Visit Information        Provider Department      8/30/2018 8:49 AM Carmen Martinez Ra, APRN CNP [facility]        Today's Diagnoses     BP check    -  1       Follow-ups after your visit        Your next 10 appointments already scheduled     Sep 11, 2018 11:30 AM CDT   Return Visit with Kelly Carpenter LP   [facility] ([address] [address]   [phone]            Sep 25, 2018 10:30 AM CDT   Return Visit with Kelly Carpenter LP   [facility] ([address] [address]   [phone]            Oct 15, 2018  9:30 AM CDT   Return Visit with Kelly Carpenter LP   [facility] ([address] [address]   [phone]            Feb 21, 2019 10:20 AM CST   Office Visit with LUIS Connolly Ra, CNP   [facility]    [address] [address] [phone]           Bring a current list of meds and any records pertaining to this visit. For Physicals, please bring immunization records and any forms needing to be filled out. Please arrive 10 minutes early to complete paperwork.              Who to contact     If you have questions or need follow up information about today's clinic visit or your schedule please contact [facility] directly at [phone].  Normal or non-critical lab and imaging results will be communicated to you by MyChart, letter or phone within 4 business days after the clinic has received the results. If you do not hear from us within 7 days, please contact the clinic through MyChart or phone. If you have a critical or abnormal lab result, we will  notify you by phone as soon as possible.  Submit refill requests through [facility] or call your pharmacy and they will forward the refill request to us. Please allow 3 business days for your refill to be completed.          Additional Information About Your Visit        Care EveryWhere ID     This is your Care EveryWhere ID. This could be used by other organizations to access your [place] medical records  [id number]         Blood Pressure from Last 3 Encounters:   08/29/18 138/84   08/16/18 (!) 136/96   05/24/18 128/72    Weight from Last 3 Encounters:   08/16/18 167 lb 1.6 oz (75.8 kg)   05/10/18 165 lb 11.2 oz (75.2 kg)   05/05/18 161 lb 9.6 oz (73.3 kg)              Today, you had the following     No orders found for display       Primary Care Provider Office Phone # Fax #    Carmen LUIS [facility] [phone] [address] [facility] [address]        Equal Access to Services     [facility] : Hadii aad ku hadasho Soomaali, waaxda luqadaha, qaybta kaalmada adeegyada, waxay idiin hayaan fidel kharaharinder silva . So [facility] [phone].    ATENCIÓN: Si habla español, tiene a regalado disposición servicios gratuitos de asistencia lingüística. [facility] [phone].    We comply with applicable federal civil rights laws and [place] laws. We do not discriminate on the basis of race, color, national origin, age, disability, sex, sexual orientation, or gender identity.            Thank you!     Thank you for choosing [facility]  for your care. Our goal is always to provide you with excellent care. Hearing back from our patients is one way we can continue to improve our services. Please take a few minutes to complete the written survey that you may receive in the mail after your visit with us. Thank you!             Your Updated Medication List - Protect others around you: Learn how to safely use, store and throw away your medicines at www.disposemymeds.org.          This list is  accurate as of 8/30/18  8:54 AM.  Always use your most recent med list.                   Brand Name Dispense Instructions for use Diagnosis    * albuterol (2.5 MG/3ML) 0.083% neb solution     25 vial    Take 1 vial (2.5 mg) by nebulization every 4 hours as needed for shortness of breath / dyspnea or wheezing    Pneumonia of left lower lobe due to infectious organism (H), Shortness of breath       * albuterol (2.5 MG/3ML) 0.083% neb solution     50 vial    Take 1 vial (2.5 mg) by nebulization every 4 hours as needed for shortness of breath / dyspnea or wheezing    Wheezing       * albuterol 108 (90 Base) MCG/ACT inhaler    PROAIR HFA/PROVENTIL HFA/VENTOLIN HFA    1 Inhaler    Inhale 2 puffs into the lungs every 4 hours as needed    Wheezing       amLODIPine 10 MG tablet    NORVASC    30 tablet    Take 1 tablet (10 mg) by mouth daily    HTN, goal below 140/90       busPIRone 15 MG tablet    BUSPAR    60 tablet    Take 1 tablet (15 mg) by mouth 2 times daily    Anxiety attack, Depression, unspecified depression type       chlorthalidone 25 MG tablet    HYGROTON    30 tablet    Take 1 tablet (25 mg) by mouth daily    HTN, goal below 140/90       escitalopram 10 MG tablet    LEXAPRO     TAKE 1 TABLET BY MOUTH ONE TIME DAILY    Anxiety attack, Depression, unspecified depression type       fluticasone 50 MCG/ACT spray    FLONASE    16 g    Spray 2 sprays into both nostrils daily    Rhinorrhea       lamoTRIgine 100 MG tablet    LaMICtal          MULTIVITAMIN ADULT PO      Take 1 tablet by mouth daily        traZODone 50 MG tablet    DESYREL    90 tablet    Take 1-4 tablets ( mg) by mouth At Bedtime    Sleep disorder       TYLENOL PO      Take 1,000 mg by mouth every 8 hours as needed        VITAMIN C PO      Take 500 mg by mouth daily        zinc 50 MG Tabs      Take 100 mg by mouth daily        * Notice:  This list has 3 medication(s) that are the same as other medications prescribed for you. Read the directions  carefully, and ask your doctor or other care provider to review them with you.

## 2018-09-11 ENCOUNTER — OFFICE VISIT (OUTPATIENT)
Dept: PSYCHOLOGY | Facility: CLINIC | Age: 58
End: 2018-09-11
Payer: COMMERCIAL

## 2018-09-11 DIAGNOSIS — F33.1 MODERATE EPISODE OF RECURRENT MAJOR DEPRESSIVE DISORDER (H): ICD-10-CM

## 2018-09-11 DIAGNOSIS — F42.9 OBSESSIVE-COMPULSIVE DISORDER, UNSPECIFIED TYPE: Primary | ICD-10-CM

## 2018-09-11 DIAGNOSIS — F41.1 GENERALIZED ANXIETY DISORDER: ICD-10-CM

## 2018-09-11 PROCEDURE — 90834 PSYTX W PT 45 MINUTES: CPT | Performed by: PSYCHOLOGIST

## 2018-09-11 ASSESSMENT — ANXIETY QUESTIONNAIRES
3. WORRYING TOO MUCH ABOUT DIFFERENT THINGS: NEARLY EVERY DAY
6. BECOMING EASILY ANNOYED OR IRRITABLE: SEVERAL DAYS
5. BEING SO RESTLESS THAT IT IS HARD TO SIT STILL: NOT AT ALL
GAD7 TOTAL SCORE: 16
1. FEELING NERVOUS, ANXIOUS, OR ON EDGE: NEARLY EVERY DAY
7. FEELING AFRAID AS IF SOMETHING AWFUL MIGHT HAPPEN: NEARLY EVERY DAY
2. NOT BEING ABLE TO STOP OR CONTROL WORRYING: NEARLY EVERY DAY

## 2018-09-11 ASSESSMENT — PATIENT HEALTH QUESTIONNAIRE - PHQ9: 5. POOR APPETITE OR OVEREATING: NEARLY EVERY DAY

## 2018-09-11 NOTE — MR AVS SNAPSHOT
After Visit Summary   9/11/2018    Suresh Powers    MRN: [id number]           Patient Information     Date Of Birth          1960        Visit Information        Provider Department      9/11/2018 11:30 AM Kelly Carpenter LP [facility] Generic      Today's Diagnoses     Obsessive-compulsive disorder, unspecified type    -  1    Generalized anxiety disorder        Moderate episode of recurrent major depressive disorder (H)           Follow-ups after your visit        Your next 10 appointments already scheduled     Sep 25, 2018 10:30 AM CDT   Return Visit with Kelly Carpenter LP   [facility] ([address] [address]   [phone]            Oct 15, 2018 11:30 AM CDT   Return Visit with Kelly Carpenter LP   [facility] ([address] [address]   [phone]            Nov 08, 2018 10:00 AM CST   Return Visit with Kelly Carpenter LP   [facility] ([facility])    [address] [place] [address]   [phone]            Feb 21, 2019 10:20 AM CST   Office Visit with LUIS Connolly Ra, CNP   [facility] ([facility])    [address] [facility] [address] [phone]           Bring a current list of meds and any records pertaining to this visit. For Physicals, please bring immunization records and any forms needing to be filled out. Please arrive 10 minutes early to complete paperwork.              Who to contact     If you have questions or need follow up information about today's clinic visit or your schedule please contact [facility] directly at [phone].  Normal or non-critical lab and imaging results will be communicated to you by MyChart, letter or phone within 4 business days after the clinic has received  the results. If you do not hear from us within 7 days, please contact the clinic through [employer] or phone. If you have a critical or abnormal lab result, we will notify you by phone as soon as possible.  Submit refill requests through [employer] or call your pharmacy and they will forward the refill request to us. Please allow 3 business days for your refill to be completed.          Additional Information About Your Visit        Care EveryWhere ID     This is your Care EveryWhere ID. This could be used by other organizations to access your [place] medical records  [id number]         Blood Pressure from Last 3 Encounters:   08/29/18 138/84   08/16/18 (!) 136/96   05/24/18 128/72    Weight from Last 3 Encounters:   08/16/18 75.8 kg (167 lb 1.6 oz)   05/10/18 75.2 kg (165 lb 11.2 oz)   05/05/18 73.3 kg (161 lb 9.6 oz)              Today, you had the following     No orders found for display       Primary Care Provider Office Phone # Fax #    Carmen LUIS Bragg [facility] [phone]       [address] [facility] [address]        Equal Access to Services     [facility] : Hadii aad ku hadasho Soomaali, waaxda luqadaha, qaybta kaalmada adeegyada, tramaine hazel. So [facility] [phone].    ATENCIÓN: Si habla español, tiene a regalado disposición servicios gratuitos de asistencia lingüística. Sera al [phone].    We comply with applicable federal civil rights laws and [place] laws. We do not discriminate on the basis of race, color, national origin, age, disability, sex, sexual orientation, or gender identity.            Thank you!     Thank you for choosing [facility]  for your care. Our goal is always to provide you with excellent care. Hearing back from our patients is one way we can continue to improve our services. Please take a few minutes to complete the written survey that you may receive in the mail after your visit with us. Thank you!              Your Updated Medication List - Protect others around you: Learn how to safely use, store and throw away your medicines at www.disposemymeds.org.          This list is accurate as of 9/11/18 12:43 PM.  Always use your most recent med list.                   Brand Name Dispense Instructions for use Diagnosis    * albuterol (2.5 MG/3ML) 0.083% neb solution     25 vial    Take 1 vial (2.5 mg) by nebulization every 4 hours as needed for shortness of breath / dyspnea or wheezing    Pneumonia of left lower lobe due to infectious organism (H), Shortness of breath       * albuterol (2.5 MG/3ML) 0.083% neb solution     50 vial    Take 1 vial (2.5 mg) by nebulization every 4 hours as needed for shortness of breath / dyspnea or wheezing    Wheezing       * albuterol 108 (90 Base) MCG/ACT inhaler    PROAIR HFA/PROVENTIL HFA/VENTOLIN HFA    1 Inhaler    Inhale 2 puffs into the lungs every 4 hours as needed    Wheezing       amLODIPine 10 MG tablet    NORVASC    30 tablet    Take 1 tablet (10 mg) by mouth daily    HTN, goal below 140/90       busPIRone 15 MG tablet    BUSPAR    60 tablet    Take 1 tablet (15 mg) by mouth 2 times daily    Anxiety attack, Depression, unspecified depression type       chlorthalidone 25 MG tablet    HYGROTON    30 tablet    Take 1 tablet (25 mg) by mouth daily    HTN, goal below 140/90       escitalopram 10 MG tablet    LEXAPRO     TAKE 1 TABLET BY MOUTH ONE TIME DAILY    Anxiety attack, Depression, unspecified depression type       fluticasone 50 MCG/ACT spray    FLONASE    16 g    Spray 2 sprays into both nostrils daily    Rhinorrhea       lamoTRIgine 100 MG tablet    LaMICtal          MULTIVITAMIN ADULT PO      Take 1 tablet by mouth daily        traZODone 50 MG tablet    DESYREL    90 tablet    Take 1-4 tablets ( mg) by mouth At Bedtime    Sleep disorder       TYLENOL PO      Take 1,000 mg by mouth every 8 hours as needed        VITAMIN C PO      Take 500 mg by mouth daily        zinc  50 MG Tabs      Take 100 mg by mouth daily        * Notice:  This list has 3 medication(s) that are the same as other medications prescribed for you. Read the directions carefully, and ask your doctor or other care provider to review them with you.

## 2018-09-11 NOTE — PROGRESS NOTES
"                                             Progress Note    Client Name: Suresh Powers  Date: 9-11-18         Service Type: Individual      Session Start Time: 11:25  Session End Time: 12:10 pm      Session Length: 38-52 min   Session #: 19     Attendees: Client    Treatment Plan Last Reviewed: today  (90 days = 11- 2018)  PHQ-9 / SPIKE-7 : see flow sheets     DATA      Progress Since Last Session (Related to Symptoms / Goals / Homework):   Symptoms: Stable but with  Depression 1-3 days,  Daily anxiety, afriad something awful might happen and OCD.     Disrupted concentration, feeling bad about self more than have the days  Daily fear that something awful might happen. Finds the trazedone useful so he can sleep.  (7-8 hours)  Pain for his feet can get up to a 7, just standing.    Homework: Completed in session   Discussed behavioral and log work- would prefer to have a medication change to that he has a chance at success with the behavioral intervention.      Episode of Care Goals: Satisfactory progress - ACTION (Actively working towards change); Intervened by reinforcing change plan / affirming steps taken.  Read the Bible daily. Is going to [employer] weekly. Does feel better after, [employer] and Bible study, or devotional book- \"does feel God\"       Current / Ongoing Stressors and Concerns:  \"Biggest challenge is the OCD\"   Daily anxiety, with OCD tendencies  OCD- ruminations about  the future   losses: mom gone 13 + years, dad for 7 years-  mother had  Anxiety.  (physical health 1998- cart collapsed on him at work , both feet crushed)   (2016 another accident at work - broke tibia and clavical)    Donated a kidney to his sister 1990.  Mother  Dx of schizo affective disorder and had ECT. Her brother was at a facility in [place].  Dad passed in 2010. Sister inpatient for depression after his death.     Treatment Objective(s) Addressed in This Session:   Address OCD  behaviors  learn and demonstrate self advocacy   Increase " "interest, engagement, and pleasure in doing things  Identify negative self-talk and behaviors: challenge core beliefs, myths, and actions  identify 4 strategies for managing obsessive thoughts and improving mood    explored meaning and purpose / volunteering / contribution to community ideas.      Intervention:    Reviewed exercise - in chosing thoughts of huma.  Is able to give examples:  Viewing \"[employer]\", enjoys that.   Following sports.  Client reports spending time with his family.  Volunteer work.  Explored quality of life.   Some life review; \"I'm a shadow of who used to be\".  Just started face book with family.  Behavior activation around daily choices.  Processed  emotions.     ASSESSMENT: Current Emotional / Mental Status (status of significant symptoms):   Risk status (Self / Other harm or suicidal ideation)   Client denies current fears or concerns for personal safety.   Client denies current or recent suicidal ideation or behaviors.   Client denies current or recent homicidal ideation or behaviors.   Client denies current or recent self injurious behavior or ideation.   Client denies other safety concerns.   A safety and risk management plan has not been developed at this time, however client was given the after-hours number / 911 should there be a change in any of these risk factors.     Appearance:   Appropriate    Eye Contact:   Good    Psychomotor Behavior: Normal    Attitude:   Cooperative    Orientation:   All   Speech    Rate / Production: Normal     Volume:  Normal    Mood:    Anxious  Depressed  Normal   Affect:    Appropriate  Worrisome    Thought Content:  Clear    Thought Form:  Coherent  Logical    Insight:    Good      Medication Review:   Changes to psychiatric medications, see updated Medication List in [facility].     [facility] in [place].  Alana Mcclelland RN, CNP     Medication Compliance:   Yes     Changes in Health Issues:   None reported     Chemical Use Review:   Substance " "Use: Chemical use reviewed, no active concerns identified      Tobacco Use: No current tobacco use.       Collateral Reports Completed:   Routed note to PCP as needed     PLAN: (Client Tasks / Therapist Tasks / Other  Behavior activation  Past hw  Problem solving around - preparing for speech at daughters wedding  Past hw  Monitor symptoms and side effects  Past hw  Make an appointment with PCP   Get the  STOP Obsessing.  Pas hw  Use new internal language around  judging self and behaviors  Past hw  Return to using breath wok. Add Intentions  Past hw  Use brandy totter exercise as discussed to help balance fears  Past hw  Review session notes and the threshold continuum (fears)   Consider volunteering at a \"sunshine club\"  written encouragement.  Past hw  Advocate for self- ask questions from [facility]  And [place] [facility] ( sister as resource)  Conduct mindful activity at least once a day.  Past hw  2 distraction skills and 2 reinterpretation skills  past hw  Use breath work  TIP skills  Rishi Saez book for career concerns      Kelly Carpenter LP                                                         ________________________________________________________________________    Treatment Plan    Client's Name: Suresh Powers  YOB: 1960    Date: 12-08-17    DSM-V Diagnoses: SPIKE / MDD  Psychosocial / Contextual Factors: left job due to anxiety  WHODAS: see dx    Referral / Collaboration:  Referral to another professional/service is not indicated at this time.    Anticipated number of session or this episode of care: 10      MeasurableTreatment Goal(s) related to diagnosis / functional impairment(s)  Goal 1: Client will employ coping skills daily to manage mood disruption (including mood disruption/ sadness realted to career disruption).    I will know I've met my goal when \" keep stable emotions and ability to function daily.    Objective #A (Client Action)   Client will report using skills daily including spiritual " "practices and accessing support.   Update 2-08-18   New medication. Plan is the do mindful activity daily, use CBT skills daily.  Update 5-15 18  Getting to Confucianism, reading Bible, finding meaning - (ex helping  visiting 89 year old, shahnaz Santizo ) . Reading , attend to nutrition.   Update 8-7-18  Take meds everyday. Enact problem solving early in any given situation. Self care- Confucianism / read / connect socially.  Intervention(s)   Therapist will reinforce and teach 9-12 coping skills. Employ early intervention.     Goal 2: Client will process loss and move towards articulating a strong self worth and identity.    I will know I've met my goal when \"  Client reports a better sense of self worth; has more good days than bad.   feeling useful at home and find daily meaning and ( needed) purpose in being with his family.   Client will identify how thoughts contributes to the feeling of powerlessness and sadness associated with the loss.   Update 2-08-18   New medication. Plan is the do mindful activity daily, use positive affirmations skills daily.  Update 5-15 18  Getting to Confucianism, reading Bible, finding meaning - (ex helping  visiting 89 year old, shahnaz Santizo ) . Reading , attend to nutrition.   Update 8-7-18  Take meds everyday. Enact problem solving early in any given situation. Self care- Confucianism / read / connect socially.    Client has reviewed and agreed to the above plan.      Kelly Carpenter LP  December 8, 2017  "

## 2018-09-12 ASSESSMENT — ANXIETY QUESTIONNAIRES: GAD7 TOTAL SCORE: 16

## 2018-09-12 ASSESSMENT — PATIENT HEALTH QUESTIONNAIRE - PHQ9: SUM OF ALL RESPONSES TO PHQ QUESTIONS 1-9: 8

## 2018-09-25 ENCOUNTER — OFFICE VISIT (OUTPATIENT)
Dept: PSYCHOLOGY | Facility: CLINIC | Age: 58
End: 2018-09-25
Payer: COMMERCIAL

## 2018-09-25 DIAGNOSIS — F42.9 OBSESSIVE-COMPULSIVE DISORDER, UNSPECIFIED TYPE: ICD-10-CM

## 2018-09-25 DIAGNOSIS — F41.1 GENERALIZED ANXIETY DISORDER: ICD-10-CM

## 2018-09-25 DIAGNOSIS — F33.1 MODERATE EPISODE OF RECURRENT MAJOR DEPRESSIVE DISORDER (H): Primary | ICD-10-CM

## 2018-09-25 PROCEDURE — 90834 PSYTX W PT 45 MINUTES: CPT | Performed by: PSYCHOLOGIST

## 2018-09-25 NOTE — MR AVS SNAPSHOT
After Visit Summary   9/25/2018    Suresh Powers    MRN: [id number]           Patient Information     Date Of Birth          1960        Visit Information        Provider Department      9/25/2018 10:30 AM Kelly Carpenter LP [facility] Generic      Today's Diagnoses     Moderate episode of recurrent major depressive disorder (H)    -  1    Generalized anxiety disorder        Obsessive-compulsive disorder, unspecified type           Follow-ups after your visit        Your next 10 appointments already scheduled     Oct 15, 2018 11:30 AM CDT   Return Visit with Kelly Carpenter LP   [facility] ([address] [address]   [phone]            Nov 08, 2018 10:00 AM CST   Return Visit with Kelly Carpenter LP   [facility] ([facility])    [address] [facility] [address]   [phone]            Nov 19, 2018 10:30 AM CST   Return Visit with Kelly Carpenter LP   [facility] ([facility]    [address] [place] [address]   [phone]            Feb 21, 2019 10:20 AM CST   Office Visit with LUIS Connolly Ra, CNP   [facility]    [address] [facility] [address] [phone]           Bring a current list of meds and any records pertaining to this visit. For Physicals, please bring immunization records and any forms needing to be filled out. Please arrive 10 minutes early to complete paperwork.              Who to contact     If you have questions or need follow up information about today's clinic visit or your schedule please contact [facility] directly at [phone].  Normal or non-critical lab and imaging results will be communicated to you by MyChart, letter or phone within 4 business days after the clinic has received  "the results. If you do not hear from us within 7 days, please contact the clinic through [employer] or phone. If you have a critical or abnormal lab result, we will notify you by phone as soon as possible.  Submit refill requests through [employer] or call your pharmacy and they will forward the refill request to us. Please allow 3 business days for your refill to be completed.          Additional Information About Your Visit        [employer] Information     [employer] lets you send messages to your doctor, view your test results, renew your prescriptions, schedule appointments and more. To sign up, go to [email or web address] . Click on \"Log in\" on the left side of the screen, which will take you to the Welcome page. Then click on \"Sign up Now\" on the right side of the page.     You will be asked to enter the access code listed below, as well as some personal information. Please follow the directions to create your username and password.     Your access code is: [email or web address]  Expires: 2018  3:23 PM     Your access code will  in 90 days. If you need help or a new code, please call your [place] clinic or [phone].        Care EveryWhere ID     This is your Care EveryWhere ID. This could be used by other organizations to access your [place] medical records  [id number]         Blood Pressure from Last 3 Encounters:   18 138/84   18 (!) 136/96   18 128/72    Weight from Last 3 Encounters:   18 75.8 kg (167 lb 1.6 oz)   05/10/18 75.2 kg (165 lb 11.2 oz)   18 73.3 kg (161 lb 9.6 oz)              Today, you had the following     No orders found for display       Primary Care Provider Office Phone # Fax #    LUIS Connolly Ra [facility] [phone] [address]  Pending sale to [facility] [address]        Equal Access to Services     SANJANA ROWELL AH: Hadii vinita henry Somonisha, waaxda luqadaha, qaybta kaaldebora dodge, tramaine eddyaan ah. So [facility] " [phone].    ATENCIÓN: Si brian chavez, tiene a regalado disposición servicios gratuitos de asistencia lingüística. Sera arias [phone].    We comply with applicable federal civil rights laws and [place] laws. We do not discriminate on the basis of race, color, national origin, age, disability, sex, sexual orientation, or gender identity.            Thank you!     Thank you for choosing [facility]  for your care. Our goal is always to provide you with excellent care. Hearing back from our patients is one way we can continue to improve our services. Please take a few minutes to complete the written survey that you may receive in the mail after your visit with us. Thank you!             Your Updated Medication List - Protect others around you: Learn how to safely use, store and throw away your medicines at www.disposemymeds.org.          This list is accurate as of 9/25/18  3:23 PM.  Always use your most recent med list.                   Brand Name Dispense Instructions for use Diagnosis    * albuterol (2.5 MG/3ML) 0.083% neb solution     25 vial    Take 1 vial (2.5 mg) by nebulization every 4 hours as needed for shortness of breath / dyspnea or wheezing    Pneumonia of left lower lobe due to infectious organism (H), Shortness of breath       * albuterol (2.5 MG/3ML) 0.083% neb solution     50 vial    Take 1 vial (2.5 mg) by nebulization every 4 hours as needed for shortness of breath / dyspnea or wheezing    Wheezing       * albuterol 108 (90 Base) MCG/ACT inhaler    PROAIR HFA/PROVENTIL HFA/VENTOLIN HFA    1 Inhaler    Inhale 2 puffs into the lungs every 4 hours as needed    Wheezing       amLODIPine 10 MG tablet    NORVASC    30 tablet    Take 1 tablet (10 mg) by mouth daily    HTN, goal below 140/90       busPIRone 15 MG tablet    BUSPAR    60 tablet    Take 1 tablet (15 mg) by mouth 2 times daily    Anxiety attack, Depression, unspecified depression type       chlorthalidone 25 MG  tablet    HYGROTON    30 tablet    Take 1 tablet (25 mg) by mouth daily    HTN, goal below 140/90       escitalopram 10 MG tablet    LEXAPRO     TAKE 1 TABLET BY MOUTH ONE TIME DAILY    Anxiety attack, Depression, unspecified depression type       fluticasone 50 MCG/ACT spray    FLONASE    16 g    Spray 2 sprays into both nostrils daily    Rhinorrhea       lamoTRIgine 100 MG tablet    LaMICtal          MULTIVITAMIN ADULT PO      Take 1 tablet by mouth daily        traZODone 50 MG tablet    DESYREL    90 tablet    Take 1-4 tablets ( mg) by mouth At Bedtime    Sleep disorder       TYLENOL PO      Take 1,000 mg by mouth every 8 hours as needed        VITAMIN C PO      Take 500 mg by mouth daily        zinc 50 MG Tabs      Take 100 mg by mouth daily        * Notice:  This list has 3 medication(s) that are the same as other medications prescribed for you. Read the directions carefully, and ask your doctor or other care provider to review them with you.

## 2018-09-25 NOTE — PROGRESS NOTES
"                                             Progress Note    Client Name: Suresh Powers  Date: 9-25-18         Service Type: Individual      Session Start Time: 9:35  Session End Time: 10:25 pm      Session Length: 38-52 min   Session #: 20     Attendees: Client    Treatment Plan Last Reviewed: today  (90 days = 11- 2018)  PHQ-9 / SPIKE-7 : see flow sheets     DATA      Progress Since Last Session (Related to Symptoms / Goals / Homework):   Symptoms: Stable but with depression for 1-3 days,  Daily anxiety, afriad something awful might happen and OCD.     Disrupted concentration, feeling bad about self more than have the days  Daily fear that something awful might happen. Finds the trazedone useful so he can sleep.  (7-8 hours)   Pain for his feet can get up to a 7, just standing.    Homework: Completed in session   Discussed behavioral and log work- would prefer to have a medication change to that he has a chance at success with the behavioral intervention.      Episode of Care Goals: Satisfactory progress - ACTION (Actively working towards change); Intervened by reinforcing change plan / affirming steps taken.  Read the Bible daily. Is going to [employer] weekly. Does feel better after, [employer] and Bible study, or devotional book- \"does feel God\"       Current / Ongoing Stressors and Concerns:  \"Biggest challenge is the OCD\"   Daily anxiety, with OCD tendencies  OCD- ruminations about  the future   losses: mom gone 13 + years, dad for 7 years-  mother had  Anxiety.  (physical health 1998- cart collapsed on him at work , both feet crushed)   (2016 another accident at work - broke tibia and clavical)    Donated a kidney to his sister 1990.  Mother  Dx of schizo affective disorder and had ECT. Her brother was at a facility in [place].  Dad passed in 2010. Sister inpatient for depression after his death.     Treatment Objective(s) Addressed in This Session:   Address OCD  behaviors  learn and demonstrate self advocacy   Increase " "interest, engagement, and pleasure in doing things  Identify negative self-talk and behaviors: challenge core beliefs, myths, and actions  identify 4 strategies for managing obsessive thoughts and improving mood    explored meaning and purpose / volunteering / contribution to community ideas.      Intervention:  Client brought in list of medication.    Reviewed homework.  Able to chose \"huma\" in a few moments.  Tended to gratitude statements.  Feels clear and good when he is in the pursuit of helping others.  Client able to give examples of behavior activation around daily choices.  Is using skills - would like more consistently.    Re-visited: \"I'm a shadow of who used to be\".      Constructed continuum of checking for his car. Set goal around change.  Explored \"trickery \" of disaster vs discomfort        ASSESSMENT: Current Emotional / Mental Status (status of significant symptoms):   Risk status (Self / Other harm or suicidal ideation)   Client denies current fears or concerns for personal safety.   Client denies current or recent suicidal ideation or behaviors.   Client denies current or recent homicidal ideation or behaviors.   Client denies current or recent self injurious behavior or ideation.   Client denies other safety concerns.   A safety and risk management plan has not been developed at this time, however client was given the after-hours number / 911 should there be a change in any of these risk factors.     Appearance:   Appropriate    Eye Contact:   Good    Psychomotor Behavior: Normal    Attitude:   Cooperative    Orientation:   All   Speech    Rate / Production: Normal     Volume:  Normal    Mood:    Anxious  Depressed  Normal   Affect:    Appropriate  Worrisome    Thought Content:  Clear    Thought Form:  Coherent  Logical    Insight:    Good      Medication Review:   Changes to psychiatric medications, see updated Medication List in [facility].     [facility] in [place].  Alana Mcclelland RN, " "CNP     Medication Compliance:   Yes     Changes in Health Issues:   None reported     Chemical Use Review:   Substance Use: Chemical use reviewed, no active concerns identified      Tobacco Use: No current tobacco use.       Collateral Reports Completed:   Routed note to PCP as needed     PLAN: (Client Tasks / Therapist Tasks / Other  Client to do additional work with cont inum- and change 1 part of the ritual.   Behavior activation  Past hw  Problem solving around - preparing for speech at daughters wedding  Past hw  Monitor symptoms and side effects  Past hw  Make an appointment with PCP   Get the  STOP Obsessing.  Pas hw  Use new internal language around  judging self and behaviors  Past hw  Return to using breath wok. Add Intentions  Past hw  Use brandy totter exercise as discussed to help balance fears  Past hw  Review session notes and the threshold continuum (fears)   Consider volunteering at a \"sunshine club\"  written encouragement.  Past hw  Advocate for self- ask questions from [facility]  And [place] [facility] ( sister as resource)  Conduct mindful activity at least once a day.  Past hw  2 distraction skills and 2 reinterpretation skills  past hw  Use breath work  TIP skills  Rishi Saez book for career concerns      Kelly Carpenter LP                                                         ________________________________________________________________________    Treatment Plan    Client's Name: Suresh Powers  YOB: 1960    Date: 12-08-17    DSM-V Diagnoses: SPIKE / MDD  Psychosocial / Contextual Factors: left job due to anxiety  WHODAS: see dx    Referral / Collaboration:  Referral to another professional/service is not indicated at this time.    Anticipated number of session or this episode of care: 10      MeasurableTreatment Goal(s) related to diagnosis / functional impairment(s)  Goal 1: Client will employ coping skills daily to manage mood disruption (including mood disruption/ sadness realted to career " "disruption).    I will know I've met my goal when \" keep stable emotions and ability to function daily.    Objective #A (Client Action)   Client will report using skills daily including spiritual practices and accessing support.   Update 2-08-18   New medication. Plan is the do mindful activity daily, use CBT skills daily.  Update 5-15 18  Getting to [employer], reading Bible, finding meaning - (ex helping  visiting 89 year old, shahnaz Santizo ) . Reading , attend to nutrition.   Update 8-7-18  Take meds everyday. Enact problem solving early in any given situation. Self care- Mandaen / read / connect socially.  Intervention(s)   Therapist will reinforce and teach 9-12 coping skills. Employ early intervention.     Goal 2: Client will process loss and move towards articulating a strong self worth and identity.    I will know I've met my goal when \"  Client reports a better sense of self worth; has more good days than bad.   feeling useful at home and find daily meaning and ( needed) purpose in being with his family.   Client will identify how thoughts contributes to the feeling of powerlessness and sadness associated with the loss.   Update 2-08-18   New medication. Plan is the do mindful activity daily, use positive affirmations skills daily.  Update 5-15 18  Getting to [employer]andaen, reading Bible, finding meaning - (ex helping  visiting 89 year old, shahnaz Santizo ) . Reading , attend to nutrition.   Update 8-7-18  Take meds everyday. Enact problem solving early in any given situation. Self care- Mandaen / read / connect socially.    Client has reviewed and agreed to the above plan.      Kelly Carpenter, LAURA  December 8, 2017  "

## 2018-10-15 ENCOUNTER — OFFICE VISIT (OUTPATIENT)
Dept: PSYCHOLOGY | Facility: CLINIC | Age: 58
End: 2018-10-15
Payer: COMMERCIAL

## 2018-10-15 DIAGNOSIS — F41.1 GENERALIZED ANXIETY DISORDER: ICD-10-CM

## 2018-10-15 DIAGNOSIS — F42.9 OBSESSIVE-COMPULSIVE DISORDER, UNSPECIFIED TYPE: Primary | ICD-10-CM

## 2018-10-15 DIAGNOSIS — F33.1 MODERATE EPISODE OF RECURRENT MAJOR DEPRESSIVE DISORDER (H): ICD-10-CM

## 2018-10-15 PROCEDURE — 90834 PSYTX W PT 45 MINUTES: CPT | Performed by: PSYCHOLOGIST

## 2018-10-15 NOTE — MR AVS SNAPSHOT
After Visit Summary   10/15/2018    Suresh Powers    MRN: [id number]           Patient Information     Date Of Birth          1960        Visit Information        Provider Department      10/15/2018 11:30 AM Kelly Carpenter LP [facility]      Today's Diagnoses     Obsessive-compulsive disorder, unspecified type    -  1    Generalized anxiety disorder        Moderate episode of recurrent major depressive disorder (H)           Follow-ups after your visit        Your next 10 appointments already scheduled     Nov 08, 2018 10:00 AM CST   Return Visit with Kelly Carpenter LP   [facility] ([facility])    [address] [facility] [address]   [phone]            Nov 19, 2018 10:30 AM CST   Return Visit with Kelly Carpenter LP   [facility]    [address] [place] [id number] [phone]            Dec 14, 2018 11:00 AM CST   Return Visit with Kelly Carpenter LP   [facility] ([facility])    [address] [facility] [address]   [phone]            Feb 21, 2019 10:20 AM CST   Office Visit with LUIS Connolly Ra, CNP   [facility]    [address] [place] [id number] [phone]           Bring a current list of meds and any records pertaining to this visit. For Physicals, please bring immunization records and any forms needing to be filled out. Please arrive 10 minutes early to complete paperwork.              Who to contact     If you have questions or need follow up information about today's clinic visit or your schedule please contact [facility] directly at [phone].  Normal or non-critical lab and imaging results will be communicated to you by MyChart, letter or phone within 4 business days after the clinic has  "received the results. If you do not hear from us within 7 days, please contact the clinic through App[employer] or phone. If you have a critical or abnormal lab result, we will notify you by phone as soon as possible.  Submit refill requests through Appcore or call your pharmacy and they will forward the refill request to us. Please allow 3 business days for your refill to be completed.          Additional Information About Your Visit        Appcore Information     Appcore lets you send messages to your doctor, view your test results, renew your prescriptions, schedule appointments and more. To sign up, go to [email or web address] . Click on \"Log in\" on the left side of the screen, which will take you to the Welcome page. Then click on \"Sign up Now\" on the right side of the page.     You will be asked to enter the access code listed below, as well as some personal information. Please follow the directions to create your username and password.     Your access code is: [email or web address]  Expires: 2018  3:23 PM     Your access code will  in 90 days. If you need help or a new code, please call your [facility] clinic or [phone].        Care EveryWhere ID     This is your Care EveryWhere ID. This could be used by other organizations to access your [facility] medical records  [id number]         Blood Pressure from Last 3 Encounters:   18 138/84   18 (!) 136/96   18 128/72    Weight from Last 3 Encounters:   18 75.8 kg (167 lb 1.6 oz)   05/10/18 75.2 kg (165 lb 11.2 oz)   18 73.3 kg (161 lb 9.6 oz)              Today, you had the following     No orders found for display       Primary Care Provider Office Phone # Fax #    LUIS Connolly Ra, [phone] [address]  [facility] [address]        Equal Access to Services     SANJANA ROWELL AH: Marylou Roger, wanorbertoda zuri, qaybta tramaine contreras'aan ah. " So [facility] [phone].    ATENCIÓN: Si jojola scott, tiene a regalado disposición servicios gratuitos de asistencia lingüística. Sera arias [phone].    We comply with applicable federal civil rights laws and [place] laws. We do not discriminate on the basis of race, color, national origin, age, disability, sex, sexual orientation, or gender identity.            Thank you!     Thank you for choosing [facility]  for your care. Our goal is always to provide you with excellent care. Hearing back from our patients is one way we can continue to improve our services. Please take a few minutes to complete the written survey that you may receive in the mail after your visit with us. Thank you!             Your Updated Medication List - Protect others around you: Learn how to safely use, store and throw away your medicines at www.disposemymeds.org.          This list is accurate as of 10/15/18 12:34 PM.  Always use your most recent med list.                   Brand Name Dispense Instructions for use Diagnosis    * albuterol (2.5 MG/3ML) 0.083% neb solution     25 vial    Take 1 vial (2.5 mg) by nebulization every 4 hours as needed for shortness of breath / dyspnea or wheezing    Pneumonia of left lower lobe due to infectious organism (H), Shortness of breath       * albuterol (2.5 MG/3ML) 0.083% neb solution     50 vial    Take 1 vial (2.5 mg) by nebulization every 4 hours as needed for shortness of breath / dyspnea or wheezing    Wheezing       * albuterol 108 (90 Base) MCG/ACT inhaler    PROAIR HFA/PROVENTIL HFA/VENTOLIN HFA    1 Inhaler    Inhale 2 puffs into the lungs every 4 hours as needed    Wheezing       amLODIPine 10 MG tablet    NORVASC    30 tablet    Take 1 tablet (10 mg) by mouth daily    HTN, goal below 140/90       busPIRone 15 MG tablet    BUSPAR    60 tablet    Take 1 tablet (15 mg) by mouth 2 times daily    Anxiety attack, Depression, unspecified depression type        chlorthalidone 25 MG tablet    HYGROTON    30 tablet    Take 1 tablet (25 mg) by mouth daily    HTN, goal below 140/90       escitalopram 10 MG tablet    LEXAPRO     TAKE 1 TABLET BY MOUTH ONE TIME DAILY    Anxiety attack, Depression, unspecified depression type       fluticasone 50 MCG/ACT spray    FLONASE    16 g    Spray 2 sprays into both nostrils daily    Rhinorrhea       lamoTRIgine 100 MG tablet    LaMICtal          MULTIVITAMIN ADULT PO      Take 1 tablet by mouth daily        traZODone 50 MG tablet    DESYREL    90 tablet    Take 1-4 tablets ( mg) by mouth At Bedtime    Sleep disorder       TYLENOL PO      Take 1,000 mg by mouth every 8 hours as needed        VITAMIN C PO      Take 500 mg by mouth daily        zinc 50 MG Tabs      Take 100 mg by mouth daily        * Notice:  This list has 3 medication(s) that are the same as other medications prescribed for you. Read the directions carefully, and ask your doctor or other care provider to review them with you.

## 2018-10-15 NOTE — PROGRESS NOTES
"                                             Progress Note    Client Name: Suresh Powers  Date: 10-15-18         Service Type: Individual      Session Start Time:  11:35  Session End Time: 12:25 pm      Session Length: 38-52 min   Session #: 21     Attendees: Client    Treatment Plan Last Reviewed: today  (90 days = 11- 2018)  PHQ-9 / SPIKE-7 : see flow sheets     DATA      Progress Since Last Session (Related to Symptoms / Goals / Homework):   Symptoms: Stable but with daily anxiety, afriad something awful might happen and OCD.     Disrupted concentration, feeling bad about self more than have the days  Daily fear that something awful might happen. Finds the trazedone useful so he can sleep.  (7-8 hours)   Pain for his feet can get up to a 7, just standing.    Homework: Completed in session   Discussed behavioral and log work- would prefer to have a medication change to that he has a chance at success with the behavioral intervention.      Episode of Care Goals: Satisfactory progress - ACTION (Actively working towards change); Intervened by reinforcing change plan / affirming steps taken.  Read the Bible daily. Is going to [employer] weekly. Does feel better after, [employer] and Bible study, or devotional book- \"does feel God\"       Current / Ongoing Stressors and Concerns:  Daughters wedding is 1st week in  Dec.  \"Biggest challenge is the OCD\"   Daily anxiety, with OCD tendencies  OCD- ruminations about  the future   losses: mom gone 13 + years, dad for 7 years-  mother had  Anxiety.  (physical health 1998- cart collapsed on him at work , both feet crushed)   (2016 another accident at work - broke tibia and clavical)    Donated a kidney to his sister 1990.  Mother  Dx of schizo affective disorder and had ECT. Her brother was at a facility in [place].  Dad passed in 2010. Sister inpatient for depression after his death.     Treatment Objective(s) Addressed in This Session:   Address OCD  behaviors  learn and demonstrate self " advocacy   Increase interest, engagement, and pleasure in doing things  Identify negative self-talk and behaviors: challenge core beliefs, myths, and actions  identify 4 strategies for managing obsessive thoughts and improving mood    explored meaning and purpose / volunteering / contribution to community ideas.      Intervention:      Reviewed fall-  Hx of loss anniversary.    Mom (68)  , dad   (76)   and formers wife's death (choked on food at a resturant) .  Mom had ECT . Had a dx of schizo affective disorder.  Processed emotions.      Increased anxiety -  Daughters wedding is 1st week in  Dec.  Did walk though fears.  Set 3 small goals to manage that role and event.   Practiced scripts and created a continum of anxiety triggers.  Worst fear- speech and father / daughter dance.        ASSESSMENT: Current Emotional / Mental Status (status of significant symptoms):   Risk status (Self / Other harm or suicidal ideation)   Client denies current fears or concerns for personal safety.   Client denies current or recent suicidal ideation or behaviors.   Client denies current or recent homicidal ideation or behaviors.   Client denies current or recent self injurious behavior or ideation.   Client denies other safety concerns.   A safety and risk management plan has not been developed at this time, however client was given the after-hours number / 911 should there be a change in any of these risk factors.     Appearance:   Appropriate    Eye Contact:   Good    Psychomotor Behavior: Normal    Attitude:   Cooperative    Orientation:   All   Speech    Rate / Production: Normal     Volume:  Normal    Mood:    Anxious  Depressed  Normal   Affect:    Appropriate  Worrisome    Thought Content:  Clear    Thought Form:  Coherent  Logical    Insight:    Good      Medication Review:   Changes to psychiatric medications, see updated Medication List in [facility].     [facility] in [place].  Alana Mcclelland RN, CNP     Medication  "Compliance:   Yes     Changes in Health Issues:   None reported     Chemical Use Review:   Substance Use: Chemical use reviewed, no active concerns identified      Tobacco Use: No current tobacco use.       Collateral Reports Completed:   Routed note to PCP as needed     PLAN: (Client Tasks / Therapist Tasks / Other  Review continuum.   Prevention steps- foot ware, hydrate, practice speech.   Past hw  Client to do additional work with cont inum- and change 1 part of the ritual.   Behavior activation  Past hw  Problem solving around - preparing for speech at daughters wedding  Past hw  Monitor symptoms and side effects  Past hw  Make an appointment with PCP   Get the  STOP Obsessing.  Pas hw  Use new internal language around  judging self and behaviors  Past hw  Return to using breath wok. Add Intentions  Past hw  Use brandy totter exercise as discussed to help balance fears  Past hw  Review session notes and the threshold continuum (fears)   Consider volunteering at a \"sunshine club\"  written encouragement.  Past hw  Advocate for self- ask questions from [facility]  And [place] [facility] ( sister as resource)  Conduct mindful activity at least once a day.  Past hw  2 distraction skills and 2 reinterpretation skills  past hw  Use breath work  TIP skills  Rishi Saez book for career concerns      Kelly Carpenter LP                                                         ________________________________________________________________________    Treatment Plan    Client's Name: Suresh Powers  YOB: 1960    Date: 12-08-17    DSM-V Diagnoses: SPIKE / MDD  Psychosocial / Contextual Factors: left job due to anxiety  WHODAS: see dx    Referral / Collaboration:  Referral to another professional/service is not indicated at this time.    Anticipated number of session or this episode of care: 10      MeasurableTreatment Goal(s) related to diagnosis / functional impairment(s)  Goal 1: Client will employ coping skills daily to manage " "mood disruption (including mood disruption/ sadness realted to career disruption).    I will know I've met my goal when \" keep stable emotions and ability to function daily.    Objective #A (Client Action)   Client will report using skills daily including spiritual practices and accessing support.   Update 2-08-18   New medication. Plan is the do mindful activity daily, use CBT skills daily.  Update 5-15 18  Getting to Religious, reading Bible, finding meaning - (ex helping  visiting 89 year old, shahnaz Santizo ) . Reading , attend to nutrition.   Update 8-7-18  Take meds everyday. Enact problem solving early in any given situation. Self care- Religious / read / connect socially.  Intervention(s)   Therapist will reinforce and teach 9-12 coping skills. Employ early intervention.     Goal 2: Client will process loss and move towards articulating a strong self worth and identity.    I will know I've met my goal when \"  Client reports a better sense of self worth; has more good days than bad.   feeling useful at home and find daily meaning and ( needed) purpose in being with his family.   Client will identify how thoughts contributes to the feeling of powerlessness and sadness associated with the loss.   Update 2-08-18   New medication. Plan is the do mindful activity daily, use positive affirmations skills daily.  Update 5-15 18  Getting to Religious, reading Bible, finding meaning - (ex helping  visiting 89 year old, shahnaz Santizo ) . Reading , attend to nutrition.   Update 8-7-18  Take meds everyday. Enact problem solving early in any given situation. Self care- Religious / read / connect socially.    Client has reviewed and agreed to the above plan.      Kelly Carpenter LP  December 8, 2017  "

## 2018-10-25 ENCOUNTER — TELEPHONE (OUTPATIENT)
Dept: FAMILY MEDICINE | Facility: CLINIC | Age: 58
End: 2018-10-25

## 2018-10-25 NOTE — TELEPHONE ENCOUNTER
Panel Management Review      Patient has the following on his problem list: None      Composite cancer screening  Chart review shows that this patient is due/due soon for the following Fecal Colorectal (FIT)  Summary:    Patient is due/failing the following:   FIT    Action needed:   Patient need to send FIT in    Type of outreach:    Phone, spoke to patient.  Patient states he will do FIT test    Questions for provider review:    None                                                                                                                                    Justine Pompa CMA       Chart routed to Closed .

## 2018-10-29 DIAGNOSIS — I10 HTN, GOAL BELOW 140/90: ICD-10-CM

## 2018-10-29 NOTE — TELEPHONE ENCOUNTER
"Requested Prescriptions   Pending Prescriptions Disp Refills     amLODIPine (NORVASC) 10 MG tablet [Pharmacy Med Name: AmLODIPine Besylate Oral Tablet 10 MG]  Last Written Prescription Date:  8/16/18  Last Fill Quantity: 30,  # refills: 1   Last office visit: 8/16/2018 with prescribing provider:  Carmen Martinez Ra, APRN CNP   Future Office Visit:   Next 5 appointments (look out 90 days)     Nov 08, 2018 10:00 AM CST   Return Visit with Kelly Carpenter LP   [facility]    [id number] [place] [id number] [phone]            Nov 19, 2018 10:30 AM CST   Return Visit with Kelly Carpenter LP   [address] [address] [phone]            Dec 14, 2018 11:00 AM CST   Return Visit with Kelly Carpenter LP   [facility] ([facility]    [address] [place] [address] [phone]                  30 tablet 0     Sig: Take 1 tablet (10 mg) by mouth daily    Calcium Channel Blockers Protocol  Passed    10/29/2018 10:09 AM       Passed - Blood pressure under 140/90 in past 12 months    BP Readings from Last 3 Encounters:   08/29/18 138/84   08/16/18 (!) 136/96   05/24/18 128/72                Passed - Recent (12 mo) or future (30 days) visit within the authorizing provider's specialty    Patient had office visit in the last 12 months or has a visit in the next 30 days with authorizing provider or within the authorizing provider's specialty.  See \"Patient Info\" tab in inbasket, or \"Choose Columns\" in Meds & Orders section of the refill encounter.             Passed - Patient is age 18 or older       Passed - Normal serum creatinine on file in past 12 months    Recent Labs   Lab Test  02/23/18   0924   CR  1.01               "

## 2018-10-30 RX ORDER — AMLODIPINE BESYLATE 10 MG/1
TABLET ORAL
Qty: 90 TABLET | Refills: 1 | Status: SHIPPED | OUTPATIENT
Start: 2018-10-30 | End: 2019-03-08

## 2018-10-30 NOTE — TELEPHONE ENCOUNTER
Pt calling in needing refill. Do you want labs w/ dosage change or are you ok with refilling? Pt had Cr in 2/2018- normal. Pt is out of medication.   Candace VILLALPANDO RN

## 2018-11-08 ENCOUNTER — OFFICE VISIT (OUTPATIENT)
Dept: PSYCHOLOGY | Facility: CLINIC | Age: 58
End: 2018-11-08
Payer: COMMERCIAL

## 2018-11-08 DIAGNOSIS — F42.9 OBSESSIVE-COMPULSIVE DISORDER, UNSPECIFIED TYPE: Primary | ICD-10-CM

## 2018-11-08 DIAGNOSIS — F41.1 GENERALIZED ANXIETY DISORDER: ICD-10-CM

## 2018-11-08 DIAGNOSIS — F33.0 MILD EPISODE OF RECURRENT MAJOR DEPRESSIVE DISORDER (H): ICD-10-CM

## 2018-11-08 DIAGNOSIS — F33.1 MODERATE EPISODE OF RECURRENT MAJOR DEPRESSIVE DISORDER (H): ICD-10-CM

## 2018-11-08 PROCEDURE — 90834 PSYTX W PT 45 MINUTES: CPT | Performed by: PSYCHOLOGIST

## 2018-11-08 ASSESSMENT — ANXIETY QUESTIONNAIRES
2. NOT BEING ABLE TO STOP OR CONTROL WORRYING: NEARLY EVERY DAY
1. FEELING NERVOUS, ANXIOUS, OR ON EDGE: NEARLY EVERY DAY
7. FEELING AFRAID AS IF SOMETHING AWFUL MIGHT HAPPEN: NEARLY EVERY DAY
5. BEING SO RESTLESS THAT IT IS HARD TO SIT STILL: NOT AT ALL
3. WORRYING TOO MUCH ABOUT DIFFERENT THINGS: NEARLY EVERY DAY
GAD7 TOTAL SCORE: 16
6. BECOMING EASILY ANNOYED OR IRRITABLE: SEVERAL DAYS
IF YOU CHECKED OFF ANY PROBLEMS ON THIS QUESTIONNAIRE, HOW DIFFICULT HAVE THESE PROBLEMS MADE IT FOR YOU TO DO YOUR WORK, TAKE CARE OF THINGS AT HOME, OR GET ALONG WITH OTHER PEOPLE: SOMEWHAT DIFFICULT

## 2018-11-08 ASSESSMENT — PATIENT HEALTH QUESTIONNAIRE - PHQ9
SUM OF ALL RESPONSES TO PHQ QUESTIONS 1-9: 7
5. POOR APPETITE OR OVEREATING: NEARLY EVERY DAY

## 2018-11-08 NOTE — PROGRESS NOTES
"                                             Progress Note    Client Name: Suresh Powers  Date: 11-08-18         Service Type: Individual      Session Start Time:  10:05  Session End Time: 11:00     Session Length: 38-52 min   Session #: 22     Attendees: Client    Treatment Plan Last Reviewed: today  (90 days = 11- 2018)  PHQ-9 / SPIKE-7 : see flow sheets     DATA      Progress Since Last Session (Related to Symptoms / Goals / Homework):   Symptoms: Stable but with daily anxiety, afriad something awful might happen and OCD triats.     Disrupted concentration  Ex ( praying or reading) , feeling bad about self    fear that something awful might happen. Finds the trazedone useful so he can sleep.  (7-8 hours)   Pain for his feet can get up to a 7, just standing.    Homework: Completed in session         Episode of Care Goals: Satisfactory progress - ACTION (Actively working towards change); Intervened by reinforcing change plan / affirming steps taken.  Read the Bible daily. Is going to [employer] weekly. Does feel better after, mass and Bible study, or devotional book- \"does feel God\"  Loss: Mom (68)  , dad   (76)   and formers wife's death (choked on food at a resturant) .  Mom had ECT ; had a dx of schizo affective disorder.     Current / Ongoing Stressors and Concerns:  Daughters wedding is 1st week in  Dec.  \"Biggest challenge is the OCD\"   Daily anxiety, with OCD tendencies  OCD- ruminations about  the future   losses: mom gone 13 + years, dad for 7 years-  mother had  Anxiety.  (physical health 1998- cart collapsed on him at work , both feet crushed)   (2016 another accident at work - broke tibia and clavical)    Donated a kidney to his sister 1990.  Mother  Dx of schizo affective disorder and had ECT. Her brother was at a facility in [place].  Dad passed in 2010. Sister inpatient for depression after his death.     Treatment Objective(s) Addressed in This Session:   Address anxiety about wedding  Address OCD  " behaviors  learn and demonstrate self advocacy   Increase interest, engagement, and pleasure in doing things  Identify negative self-talk and behaviors: challenge core beliefs, myths, and actions  identify 4 strategies for managing obsessive thoughts and improving mood    explored meaning and purpose / volunteering / contribution to community ideas.      Intervention:    Increased anxiety as the wedding draws nearer.  Explored fear continuum.  Identified contingency plans       Decided that bio mom and he will walk her down the isle  even though the are no longer together.   Explored the free floating anxiety.    Almost slipped on the ice this week.  In the past that was one of his worst accident.  Explored what works for safety.   Discussed an action step to add in; client agrees to research it.       ASSESSMENT: Current Emotional / Mental Status (status of significant symptoms):   Risk status (Self / Other harm or suicidal ideation)   Client denies current fears or concerns for personal safety.   Client denies current or recent suicidal ideation or behaviors.   Client denies current or recent homicidal ideation or behaviors.   Client denies current or recent self injurious behavior or ideation.   Client denies other safety concerns.   A safety and risk management plan has not been developed at this time, however client was given the after-hours number / 911 should there be a change in any of these risk factors.     Appearance:   Appropriate    Eye Contact:   Good    Psychomotor Behavior: Normal    Attitude:   Cooperative    Orientation:   All   Speech    Rate / Production: Normal     Volume:  Normal    Mood:    Anxious  Depressed  Normal   Affect:    Appropriate  Worrisome    Thought Content:  Clear    Thought Form:  Coherent  Logical    Insight:    Good      Medication Review:   Changes to psychiatric medications, see updated Medication List in [facility].     [facility] in [place].  Alana Mcclelland RN,  "CNP     Medication Compliance:   Yes     Changes in Health Issues:   None reported     Chemical Use Review:   Substance Use: Chemical use reviewed, no active concerns identified      Tobacco Use: No current tobacco use.       Collateral Reports Completed:   Routed note to PCP as needed     PLAN: (Client Tasks / Therapist Tasks / Other  Review continuum.   Prevention steps- foot ware, hydrate, practice speech.   Past hw  Client to do additional work with cont inum- and change 1 part of the ritual.   Behavior activation  Past hw  Problem solving around - preparing for speech at daughters wedding  Past hw  Monitor symptoms and side effects  Past hw  Make an appointment with PCP   Get the  STOP Obsessing.  Pas hw  Use new internal language around  judging self and behaviors  Past hw  Return to using breath wok. Add Intentions  Past hw  Use brandy totter exercise as discussed to help balance fears  Past hw  Review session notes and the threshold continuum (fears)   Consider volunteering at a \"sunshine club\"  written encouragement.  Past hw  Advocate for self- ask questions from [facility]  And [place] [facility] ( sister as resource)  Conduct mindful activity at least once a day.  Past hw  2 distraction skills and 2 reinterpretation skills  past hw  Use breath work  TIP skills  Rishi Saez book for career concerns      Kelly Carpenter LP                                                         ________________________________________________________________________    Treatment Plan    Client's Name: Suresh Powers  YOB: 1960    Date: 12-08-17    DSM-V Diagnoses: SPIKE / MDD  Psychosocial / Contextual Factors: left job due to anxiety  WHODAS: see dx    Referral / Collaboration:  Referral to another professional/service is not indicated at this time.    Anticipated number of session or this episode of care: 10      MeasurableTreatment Goal(s) related to diagnosis / functional impairment(s)  Goal 1: Client will employ coping skills " "daily to manage mood disruption (including mood disruption/ sadness realted to career disruption).    I will know I've met my goal when \" keep stable emotions and ability to function daily.    Objective #A (Client Action)   Client will report using skills daily including spiritual practices and accessing support.   Update 2-08-18   New medication. Plan is the do mindful activity daily, use CBT skills daily.  Update 5-15 18  Getting to [employer], reading Bible, finding meaning - (ex helping  visiting 89 year old, shahnaz Santizo ) . Reading , attend to nutrition.   Update 8-7-18  Take meds everyday. Enact problem solving early in any given situation. Self care- Nond[employer]ational / read / connect socially.  Intervention(s)   Therapist will reinforce and teach 9-12 coping skills. Employ early intervention.     Goal 2: Client will process loss and move towards articulating a strong self worth and identity.    I will know I've met my goal when \"  Client reports a better sense of self worth; has more good days than bad.   feeling useful at home and find daily meaning and ( needed) purpose in being with his family.   Client will identify how thoughts contributes to the feeling of powerlessness and sadness associated with the loss.   Update 2-08-18   New medication. Plan is the do mindful activity daily, use positive affirmations skills daily.  Update 5-15 18  Getting to Nondenominational, reading Bible, finding meaning - (ex helping  visiting 89 year old, shahnaz Santizo ) . Reading , attend to nutrition.   Update 8-7-18  Take meds everyday. Enact problem solving early in any given situation. Self care- Nondenominational / read / connect socially.    Client has reviewed and agreed to the above plan.      Kelly Carpenter LP  December 8, 2017:25 pm      Session Length: 38-52 min   Session #: 22     Attendees: Client    Treatment Plan Last Reviewed: today  (90 days = 11- 2018)  PHQ-9 / SPIKE-7 : see flow sheets     DATA      Progress Since Last Session (Related to Symptoms " "/ Goals / Homework):   Symptoms: Stable but with daily anxiety, afriad something awful might happen and OCD.     Disrupted concentration, feeling bad about self more than have the days  Daily fear that something awful might happen. Finds the trazedone useful so he can sleep.  (7-8 hours)   Pain for his feet can get up to a 7, just standing.    Homework: Completed in session   Discussed behavioral and log work- would prefer to have a medication change to that he has a chance at success with the behavioral intervention.      Episode of Care Goals: Satisfactory progress - ACTION (Actively working towards change); Intervened by reinforcing change plan / affirming steps taken.  Read the Bible daily. Is going to Mass weekly. Does feel better after, mass and Bible study, or devotional book- \"does feel God\"       Current / Ongoing Stressors and Concerns:  Daughters wedding is 1st week in  Dec.  \"Biggest challenge is the OCD\"   Daily anxiety, with OCD tendencies  OCD- ruminations about  the future   losses: mom gone 13 + years, dad for 7 years-  mother had  Anxiety.  (physical health 1998- cart collapsed on him at work , both feet crushed)   (2016 another accident at work - broke tibia and clavical)    Donated a kidney to his sister 1990.  Mother  Dx of schizo affective disorder and had ECT. Her brother was at a facility in [place].  Dad passed in 2010. Sister inpatient for depression after his death.     Treatment Objective(s) Addressed in This Session:   Address OCD  behaviors  learn and demonstrate self advocacy   Increase interest, engagement, and pleasure in doing things  Identify negative self-talk and behaviors: challenge core beliefs, myths, and actions  identify 4 strategies for managing obsessive thoughts and improving mood    explored meaning and purpose / volunteering / contribution to community ideas.      Intervention:    Client reports     Reviewed fall-  Hx of loss anniversary.    Mom (68)  , dad   (76)   and " formers wife's death (choked on food at a resturant) .  Mom had ECT . Had a dx of schizo affective disorder.  Processed emotions.      Increased anxiety -  Daughters wedding is 1st week in  Dec.  Did walk though fears.  Set 3 small goals to manage that role and event.   Practiced scripts and created a continum of anxiety triggers.  Worst fear- speech and father / daughter dance.        ASSESSMENT: Current Emotional / Mental Status (status of significant symptoms):   Risk status (Self / Other harm or suicidal ideation)   Client denies current fears or concerns for personal safety.   Client denies current or recent suicidal ideation or behaviors.   Client denies current or recent homicidal ideation or behaviors.   Client denies current or recent self injurious behavior or ideation.   Client denies other safety concerns.   A safety and risk management plan has not been developed at this time, however client was given the after-hours number / 911 should there be a change in any of these risk factors.     Appearance:   Appropriate    Eye Contact:   Good    Psychomotor Behavior: Normal    Attitude:   Cooperative    Orientation:   All   Speech    Rate / Production: Normal     Volume:  Normal    Mood:    Anxious  Depressed  Normal   Affect:    Appropriate  Worrisome    Thought Content:  Clear    Thought Form:  Coherent  Logical    Insight:    Good      Medication Review:   Changes to psychiatric medications, see updated Medication List in [facility].     [facility] in [place].  Alana Mcclelland RN, CNP     Medication Compliance:   Yes     Changes in Health Issues:   None reported     Chemical Use Review:   Substance Use: Chemical use reviewed, no active concerns identified      Tobacco Use: No current tobacco use.       Collateral Reports Completed:   Routed note to PCP as needed     PLAN: (Client Tasks / Therapist Tasks / Other  Review continuum.   Prevention steps- foot ware, hydrate, practice speech.   Past hw  Client  "to do additional work with cont inum- and change 1 part of the ritual.   Behavior activation  Past hw  Problem solving around - preparing for speech at daughters wedding  Past hw  Monitor symptoms and side effects  Past hw  Make an appointment with PCP   Get the  STOP Obsessing.  Pas hw  Use new internal language around  judging self and behaviors  Past hw  Return to using breath wok. Add Intentions  Past hw  Use brandy totter exercise as discussed to help balance fears  Past hw  Review session notes and the threshold continuum (fears)   Consider volunteering at a \"sunshine club\"  written encouragement.  Past hw  Advocate for self- ask questions from [facility]  And [place] [facility] ( sister as resource)  Conduct mindful activity at least once a day.  Past hw  2 distraction skills and 2 reinterpretation skills  past hw  Use breath work  TIP skills  Rishi Saez book for career concerns      Kelly Carpenter LP                                                         ________________________________________________________________________    Treatment Plan    Client's Name: Suresh Powers  YOB: 1960    Date: 12-08-17    DSM-V Diagnoses: SPIKE / MDD  Psychosocial / Contextual Factors: left job due to anxiety  WHODAS: see dx    Referral / Collaboration:  Referral to another professional/service is not indicated at this time.    Anticipated number of session or this episode of care: 10      MeasurableTreatment Goal(s) related to diagnosis / functional impairment(s)  Goal 1: Client will employ coping skills daily to manage mood disruption (including mood disruption/ sadness realted to career disruption).    I will know I've met my goal when \" keep stable emotions and ability to function daily.    Objective #A (Client Action)   Client will report using skills daily including spiritual practices and accessing support.   Update 2-08-18   New medication. Plan is the do mindful activity daily, use CBT skills daily.  Update 5-15 18  " "Getting to [employer], reading Bible, finding meaning - (ex helping  visiting 89 year old, shahnaz Santizo ) . Reading , attend to nutrition.   Update 8-7-18  Take meds everyday. Enact problem solving early in any given situation. Self care- [employer] / read / connect socially.  Intervention(s)   Therapist will reinforce and teach 9-12 coping skills. Employ early intervention.     Goal 2: Client will process loss and move towards articulating a strong self worth and identity.    I will know I've met my goal when \"  Client reports a better sense of self worth; has more good days than bad.   feeling useful at home and find daily meaning and ( needed) purpose in being with his family.   Client will identify how thoughts contributes to the feeling of powerlessness and sadness associated with the loss.   Update 2-08-18   New medication. Plan is the do mindful activity daily, use positive affirmations skills daily.  Update 5-15 18  Getting to [employer], reading Bible, finding meaning - (ex helping  visiting 89 year old, shahnaz Santizo ) . Reading , attend to nutrition.   Update 8-7-18  Take meds everyday. Enact problem solving early in any given situation. Self care- Roman [employer] / read / connect socially.    Client has reviewed and agreed to the above plan.      Kelly Carpenter, LAURA  December 8, 2017  "

## 2018-11-08 NOTE — MR AVS SNAPSHOT
"                  MRN:[id number]                      After Visit Summary   11/8/2018    Suresh Powers    MRN: [id number]           Visit Information        Provider Department      11/8/2018 10:00 AM Kelly Carpenter LP [facility]      Your next 10 appointments already scheduled     Nov 19, 2018 10:30 AM CST   Return Visit with Kelly Carpenter LP   [facility]    [address] [place] [id number] [phone]            Dec 14, 2018 11:00 AM CST   Return Visit with Kelly Carpenter LP   [facility] ([facility])    [address] [facility] [address] [phone]            Jan 11, 2019 11:00 AM CST   Return Visit with Kelly Carpenter LP   [facility] ([facility])    [address] [place] [id number] [phone]            Feb 21, 2019 10:20 AM CST   Office Visit with LUIS Connolly Ra [facility] ([facility])    [address] [place] [address]   [phone]           Bring a current list of meds and any records pertaining to this visit. For Physicals, please bring immunization records and any forms needing to be filled out. Please arrive 10 minutes early to complete paperwork.              [employer] Information     [employer] lets you send messages to your doctor, view your test results, renew your prescriptions, schedule appointments and more. To sign up, go to [email or web address] . Click on \"Log in\" on the left side of the screen, which will take you to the Welcome page. Then click on \"Sign up Now\" on the right side of the page.     You will be asked to enter the access code listed below, as well as some personal information. Please follow the directions to create your username and password.     Your access code is: [email or web address]  Expires: 12/24/2018  2:23 PM     Your " access code will  in 90 days. If you need help or a new code, please call your [facility] clinic or [phone].        Care EveryWhere ID     This is your Care EveryWhere ID. This could be used by other organizations to access your [facility] medical records  [id number]        Equal Access to Services     SANJANA ROWELL : Marylou henry Somonisha, waaxda luqadaha, qaybta kaalmada dar, tramaine hazel. So [facility] [phone].    ATENCIÓN: Si habla español, tiene a regalado disposición servicios gratuitos de asistencia lingüística. Llame al [phone].    We comply with applicable federal civil rights laws and [place] laws. We do not discriminate on the basis of race, color, national origin, age, disability, sex, sexual orientation, or gender identity.

## 2018-11-09 ASSESSMENT — ANXIETY QUESTIONNAIRES: GAD7 TOTAL SCORE: 16

## 2018-11-19 ENCOUNTER — OFFICE VISIT (OUTPATIENT)
Dept: PSYCHOLOGY | Facility: CLINIC | Age: 58
End: 2018-11-19
Payer: COMMERCIAL

## 2018-11-19 DIAGNOSIS — F33.1 MODERATE EPISODE OF RECURRENT MAJOR DEPRESSIVE DISORDER (H): ICD-10-CM

## 2018-11-19 DIAGNOSIS — F42.9 OBSESSIVE-COMPULSIVE DISORDER, UNSPECIFIED TYPE: ICD-10-CM

## 2018-11-19 DIAGNOSIS — F41.1 GENERALIZED ANXIETY DISORDER: Primary | ICD-10-CM

## 2018-11-19 PROCEDURE — 90834 PSYTX W PT 45 MINUTES: CPT | Performed by: PSYCHOLOGIST

## 2018-11-19 ASSESSMENT — ANXIETY QUESTIONNAIRES
2. NOT BEING ABLE TO STOP OR CONTROL WORRYING: NEARLY EVERY DAY
6. BECOMING EASILY ANNOYED OR IRRITABLE: SEVERAL DAYS
3. WORRYING TOO MUCH ABOUT DIFFERENT THINGS: NEARLY EVERY DAY
1. FEELING NERVOUS, ANXIOUS, OR ON EDGE: NEARLY EVERY DAY
5. BEING SO RESTLESS THAT IT IS HARD TO SIT STILL: NOT AT ALL
GAD7 TOTAL SCORE: 16
7. FEELING AFRAID AS IF SOMETHING AWFUL MIGHT HAPPEN: NEARLY EVERY DAY

## 2018-11-19 ASSESSMENT — PATIENT HEALTH QUESTIONNAIRE - PHQ9
5. POOR APPETITE OR OVEREATING: NEARLY EVERY DAY
SUM OF ALL RESPONSES TO PHQ QUESTIONS 1-9: 9

## 2018-11-19 NOTE — PROGRESS NOTES
"                                             Progress Note    Client Name: Suresh Powers  Date: 11-19-18         Service Type: Individual      Session Start Time:  10:10  Session End Time: 11:00     Session Length: 38-52 min   Session #: 23     Attendees: Client    Treatment Plan Last Reviewed: today  (90 days = 11- 2018)  PHQ-9 / SPIKE-7 : see flow sheets     DATA      Progress Since Last Session (Related to Symptoms / Goals / Homework):   Symptoms: Stable but with daily anxiety, afriad something awful might happen and OCD triats.     Disrupted concentration  Ex ( praying or reading) , feeling bad about self    fear that something awful might happen. Finds the trazedone useful so he can sleep.  (7-8 hours)   Pain for his feet can get up to a 7, just standing.    Homework: Completed in session         Episode of Care Goals: Satisfactory progress - ACTION (Actively working towards change); Intervened by reinforcing change plan / affirming steps taken.  Read the Bible daily. Is going to [place] weekly. Does feel better after, mass and Bible study, or devotional book- \"does feel God\"  Loss: Mom (68)  , dad   (76)   and formers wife's death (choked on food at a resturant) .  Mom had ECT ; had a dx of schizo affective disorder.     Current / Ongoing Stressors and Concerns:  Daughters wedding is 1st week in  Dec.  \"Biggest challenge is the OCD\"   Daily anxiety, with OCD tendencies  OCD- ruminations about  the future   losses: mom gone 13 + years, dad for 7 years-  mother had  Anxiety.  (physical health 1998- cart collapsed on him at work , both feet crushed)   (2016 another accident at work - broke tibia and clavical)    Donated a kidney to his sister 1990.  Mother  Dx of schizo affective disorder and had ECT. Her brother was at a facility in [place].  Dad passed in 2010. Sister inpatient for depression after his death.     Treatment Objective(s) Addressed in This Session:   Address anxiety about wedding  Address OCD  " "behaviors  learn and demonstrate self advocacy   Increase interest, engagement, and pleasure in doing things  Identify negative self-talk and behaviors: challenge core beliefs, myths, and actions  identify 4 strategies for managing obsessive thoughts and improving mood    explored meaning and purpose / volunteering / contribution to community ideas.      Intervention:    Worry is a 7 on a 10 scale today  Practiced distraction skills.   Imagery around seeing the guests as \"for him\".  Narrative work around culmination, \"1st\" on time developmental stages   and life events.    Problem -   Solution  Too much stimulation.......... did identify a private room  Not remembering name.......found 2 phrases   Not knowing what to say.....practiced several phrases  Embarrassing daughter ......  Discussion around negative thoughts and shame.        ASSESSMENT: Current Emotional / Mental Status (status of significant symptoms):   Risk status (Self / Other harm or suicidal ideation)   Client denies current fears or concerns for personal safety.   Client denies current or recent suicidal ideation or behaviors.   Client denies current or recent homicidal ideation or behaviors.   Client denies current or recent self injurious behavior or ideation.   Client denies other safety concerns.   A safety and risk management plan has not been developed at this time, however client was given the after-hours number / 911 should there be a change in any of these risk factors.     Appearance:   Appropriate    Eye Contact:   Good    Psychomotor Behavior: Normal    Attitude:   Cooperative    Orientation:   All   Speech    Rate / Production: Normal     Volume:  Normal    Mood:    Anxious  Depressed  Normal   Affect:    Appropriate  Worrisome    Thought Content:  Clear    Thought Form:  Coherent  Logical    Insight:    Good      Medication Review:   Changes to psychiatric medications, see updated Medication List in [facility].     [facility] in [place] " "[place].  Alana Mcclelland, RN, CNP     Medication Compliance:   Yes     Changes in Health Issues:   None reported     Chemical Use Review:   Substance Use: Chemical use reviewed, no active concerns identified      Tobacco Use: No current tobacco use.       Collateral Reports Completed:   Routed note to PCP as needed     PLAN: (Client Tasks / Therapist Tasks / Other  Practice responses   Past hw  Review continuum.   Prevention steps- foot ware, hydrate, practice speech.   Past hw  Client to do additional work with cont inum- and change 1 part of the ritual.   Behavior activation  Past hw  Problem solving around - preparing for speech at daughters wedding  Past hw  Monitor symptoms and side effects  Past hw  Make an appointment with PCP   Get the  STOP Obsessing.  Pas hw  Use new internal language around  judging self and behaviors  Past hw  Return to using breath wok. Add Intentions  Past hw  Use brandy totter exercise as discussed to help balance fears  Past hw  Review session notes and the threshold continuum (fears)   Consider volunteering at a \"sunshine club\"  written encouragement.  Past hw  Advocate for self- ask questions from [facility]  [facility] [place] [facility] ( sister as resource)  Conduct mindful activity at least once a day.  Past hw  2 distraction skills and 2 reinterpretation skills  past hw  Use breath work  TIP skills  Rishi Saez book for career concerns      Kelly Carpenter LP                                                         ________________________________________________________________________    Treatment Plan    Client's Name: Suresh Powers  YOB: 1960    Date: 12-08-17    DSM-V Diagnoses: SPIKE / MDD  Psychosocial / Contextual Factors: left job due to anxiety  WHODAS: see dx    Referral / Collaboration:  Referral to another professional/service is not indicated at this time.    Anticipated number of session or this episode of care: 10      MeasurableTreatment Goal(s) related to diagnosis / " "functional impairment(s)  Goal 1: Client will employ coping skills daily to manage mood disruption (including mood disruption/ sadness realted to career disruption).    I will know I've met my goal when \" keep stable emotions and ability to function daily.    Objective #A (Client Action)   Client will report using skills daily including spiritual practices and accessing support.   Update 2-08-18   New medication. Plan is the do mindful activity daily, use CBT skills daily.  Update 5-15 18  Getting to Pentecost[employer], reading Bible, finding meaning - (ex helping  visiting 89 year old, shahnaz Santizo ) . Reading , attend to nutrition.   Update 8-7-18  Take meds everyday. Enact problem solving early in any given situation. Self care- Pentecostal / read / connect socially.  Update 11-19-18  Intervention(s)   Therapist will reinforce and teach 9-12 coping skills. Employ early intervention.     Goal 2: Client will process loss and move towards articulating a strong self worth and identity.    I will know I've met my goal when \"  Client reports a better sense of self worth; has more good days than bad.   feeling useful at home and find daily meaning and ( needed) purpose in being with his family.   Client will identify how thoughts contributes to the feeling of powerlessness and sadness associated with the loss.   Update 2-08-18   New medication. Plan is the do mindful activity daily, use positive affirmations skills daily.  Update 5-15 18  Getting to Pentecostal, reading Bible, finding meaning - (ex helping  visiting 89 year old, shahnaz Santizo ) . Reading , attend to nutrition.   Update 8-7-18  Take meds everyday. Enact problem solving early in any given situation. Self care- Pentecostal / read / connect socially.  Update 11-19-18    Client has reviewed and agreed to the above plan.      Kelly Carpenter LP  December 8, 2017:25 pm        "

## 2018-11-19 NOTE — MR AVS SNAPSHOT
"                  MRN:[id number]                      After Visit Summary   11/19/2018    Suresh Powers    MRN: [id number]           Visit Information        Provider Department      11/19/2018 10:30 AM Kelly Carpenter LP [facility]      Your next 10 appointments already scheduled     Dec 14, 2018 11:00 AM CST   Return Visit with Kelly Carpenter LP   [facility] ([facility])    [id number] [place] [id number]   [phone]            Jan 11, 2019 11:00 AM CST   Return Visit with Kelly Carpenter LP   [facility] ([facility])    [id number] [place] [address]   [phone]            Feb 08, 2019 10:00 AM CST   Return Visit with Kelly Carpenter LP   [facility] ([facility])    [id number] [place] [address]   [phone]            Feb 21, 2019 10:20 AM CST   Office Visit with LUIS Connolly Ra [facility] ([facility])    [address] [place], [place] [id number]   [phone]           Bring a current list of meds and any records pertaining to this visit. For Physicals, please bring immunization records and any forms needing to be filled out. Please arrive 10 minutes early to complete paperwork.              [employer] Information     [employer] lets you send messages to your doctor, view your test results, renew your prescriptions, schedule appointments and more. To sign up, go to [email or web address] . Click on \"Log in\" on the left side of the screen, which will take you to the Welcome page. Then click on \"Sign up Now\" on the right side of the page.     You will be asked to enter the access code listed below, as well as some personal information. Please follow the directions to create your username and password.     Your access code is: [email or web address]  Expires: 2/17/2019 10:23 AM     Your access " code will  in 90 days. If you need help or a new code, please call your [facility] clinic or [phone].        Care EveryWhere ID     This is your Care EveryWhere ID. This could be used by other organizations to access your [facility] medical records  [id number]        Equal Access to Services     SANJANA ROWELL : Marylou henry Somonisha, waaxda luqadaha, qaybta kaalmada dar, tramaine hazel. So [facility] [phone].    ATENCIÓN: Si habla español, tiene a regalado disposición servicios gratuitos de asistencia lingüística. Llame al [phone].    We comply with applicable federal civil rights laws and [place] laws. We do not discriminate on the basis of race, color, national origin, age, disability, sex, sexual orientation, or gender identity.

## 2018-11-20 ASSESSMENT — ANXIETY QUESTIONNAIRES: GAD7 TOTAL SCORE: 16

## 2018-12-17 ENCOUNTER — OFFICE VISIT (OUTPATIENT)
Dept: PSYCHOLOGY | Facility: CLINIC | Age: 58
End: 2018-12-17
Payer: COMMERCIAL

## 2018-12-17 DIAGNOSIS — F33.1 MODERATE EPISODE OF RECURRENT MAJOR DEPRESSIVE DISORDER (H): ICD-10-CM

## 2018-12-17 DIAGNOSIS — F41.1 GENERALIZED ANXIETY DISORDER: Primary | ICD-10-CM

## 2018-12-17 DIAGNOSIS — F42.9 OBSESSIVE-COMPULSIVE DISORDER, UNSPECIFIED TYPE: ICD-10-CM

## 2018-12-17 PROCEDURE — 90834 PSYTX W PT 45 MINUTES: CPT | Performed by: PSYCHOLOGIST

## 2018-12-17 NOTE — PROGRESS NOTES
"                                             Progress Note    Client Name: Suresh Powers  Date: 12-17-18         Service Type: Individual      Session Start Time:  10:25  Session End Time: 11:30     Session Length: 38-52 min   Session #: 24     Attendees: Client    Treatment Plan Last Reviewed: today  (90 days = 2- 2019)  PHQ-9 / SPIKE-7 : see flow sheets     DATA      Progress Since Last Session (Related to Symptoms / Goals / Homework):   Symptoms: Stable  Finds the trazedone useful so he can sleep.  (7-8 hours)   Pain for his feet can get up to a 7, just standing.    Homework: Completed in session         Episode of Care Goals: Satisfactory progress - ACTION (Actively working towards change); Intervened by reinforcing change plan / affirming steps taken.  Read the Bible daily. Is going to [employer] weekly. Does feel better after, mass and Bible study, or devotional book- \"does feel God\"  Loss: Mom (68)  , dad   (76)   and formers wife's death (choked on food at a resturant) .  Mom had ECT ; had a dx of schizo affective disorder.     Current / Ongoing Stressors and Concerns:    \"Biggest challenge is the OCD\"   Daily anxiety, with OCD tendencies     Treatment Objective(s) Addressed in This Session:     Address OCD  behaviors  Identify negative self-talk and behaviors: challenge core beliefs, myths, and actions  identify 4 strategies for managing obsessive thoughts and improving mood         Intervention:    Client reports a successful experience at his   daughters wedding. Reviewed several skills used.  Explored being present.      Concern today is about his partner.  Share that she has a medical   condition that is stable- but is fatal in 3-15 years. Wonders how to manage since   She presents with lots of fear and trepidation and in past has been considered by family an hypochondriac.    Discussed 3 part plan to manage his own distress while giving care.       ASSESSMENT: Current Emotional / Mental Status (status of " significant symptoms):   Risk status (Self / Other harm or suicidal ideation)   Client denies current fears or concerns for personal safety.   Client denies current or recent suicidal ideation or behaviors.   Client denies current or recent homicidal ideation or behaviors.   Client denies current or recent self injurious behavior or ideation.   Client denies other safety concerns.   A safety and risk management plan has not been developed at this time, however client was given the after-hours number / 911 should there be a change in any of these risk factors.     Appearance:   Appropriate    Eye Contact:   Good    Psychomotor Behavior: Normal    Attitude:   Cooperative    Orientation:   All   Speech    Rate / Production: Normal     Volume:  Normal    Mood:    Anxious  Depressed  Normal   Affect:    Appropriate  Worrisome    Thought Content:  Clear    Thought Form:  Coherent  Logical    Insight:    Good      Medication Review:   Changes to psychiatric medications, see updated Medication List in [facility].     [facility] in [place].  Alana Mcclelland RN, CNP     Medication Compliance:   Yes     Changes in Health Issues:   None reported     Chemical Use Review:   Substance Use: Chemical use reviewed, no active concerns identified      Tobacco Use: No current tobacco use.       Collateral Reports Completed:   Routed note to PCP as needed     PLAN: (Client Tasks / Therapist Tasks / Other  Care giver response set and boundary work   Past hw  Practice responses   Past hw  Review continuum.   Prevention steps- foot ware, hydrate, practice speech.   Past hw  Client to do additional work with cont inum- and change 1 part of the ritual.   Behavior activation  Past hw  Problem solving around - preparing for speech at daughters wedding  Past hw  Monitor symptoms and side effects  Past hw  Make an appointment with PCP   Get the  STOP Obsessing.        Kelly Carpenter LP                                                      "    ________________________________________________________________________    Treatment Plan    Client's Name: Suresh Powers  YOB: 1960    Date: 12-08-17    DSM-V Diagnoses: SPIKE / MDD  Psychosocial / Contextual Factors: left job due to anxiety  WHODAS: see dx    Referral / Collaboration:  Referral to another professional/service is not indicated at this time.    Anticipated number of session or this episode of care: 10      MeasurableTreatment Goal(s) related to diagnosis / functional impairment(s)  Goal 1: Client will employ coping skills daily to manage mood disruption (including mood disruption/ sadness realted to career disruption).    I will know I've met my goal when \" keep stable emotions and ability to function daily.    Objective #A (Client Action)   Client will report using skills daily including spiritual practices and accessing support.   Update 2-08-18   New medication. Plan is the do mindful activity daily, use CBT skills daily.  Update 5-15 18  Getting to [employer], reading Bible, finding meaning - (ex helping  visiting 89 year old, shahnaz Santizo ) . Reading , attend to nutrition.   Update 8-7-18  Take meds everyday. Enact problem solving early in any given situation. Self care- Mandaen / read / connect socially.  Update 11-19-18 has success with meeting recent social challenges. Attention turns to managing OCD and partners anxiety.  Intervention(s)   Therapist will reinforce and teach 9-12 coping skills. Employ early intervention.     Goal 2: Client will process loss and move towards articulating a strong self worth and identity.    I will know I've met my goal when \"  Client reports a better sense of self worth; has more good days than bad.   feeling useful at home and find daily meaning and ( needed) purpose in being with his family.   Client will identify how thoughts contributes to the feeling of powerlessness and sadness associated with the loss.   Update 2-08-18   New medication. Plan " is the do mindful activity daily, use positive affirmations skills daily.  Update 5-15 18  Getting to [employer], reading Bible, finding meaning - (ex helping  visiting 89 year old, shahnaz Santizo ) . Reading , attend to nutrition.   Update 8-7-18  Take meds everyday. Enact problem solving early in any given situation. Self care- Amish / read / connect socially.  Update 11-19-18 has success with meeting recent social challenges. Attention turns to managing OCD  and partners anxiety.    Client has reviewed and agreed to the above plan.      Kelly Carpenter LP  December 8, 2017:25 pm

## 2019-02-14 ENCOUNTER — TELEPHONE (OUTPATIENT)
Dept: FAMILY MEDICINE | Facility: CLINIC | Age: 59
End: 2019-02-14

## 2019-02-14 NOTE — TELEPHONE ENCOUNTER
Panel Management Review      Patient has the following on his problem list: None      Composite cancer screening  Chart review shows that this patient is due/due soon for the following Fecal Colorectal (FIT)  Summary:    Patient is due/failing the following:   FIT    Action needed:   Patient needs to do FIT     Type of outreach:    Phone, spoke to patient.  Patient states he will hold off on FIT test    Questions for provider review:    None                                                                                                                                    Justine Pompa CMA       Chart routed to closed .

## 2019-03-08 ENCOUNTER — OFFICE VISIT (OUTPATIENT)
Dept: FAMILY MEDICINE | Facility: CLINIC | Age: 59
End: 2019-03-08
Payer: COMMERCIAL

## 2019-03-08 VITALS
HEIGHT: 67 IN | OXYGEN SATURATION: 100 % | SYSTOLIC BLOOD PRESSURE: 136 MMHG | WEIGHT: 164.4 LBS | HEART RATE: 70 BPM | RESPIRATION RATE: 18 BRPM | DIASTOLIC BLOOD PRESSURE: 78 MMHG | BODY MASS INDEX: 25.8 KG/M2 | TEMPERATURE: 97.9 F

## 2019-03-08 DIAGNOSIS — I10 HTN, GOAL BELOW 140/90: Primary | ICD-10-CM

## 2019-03-08 DIAGNOSIS — F33.1 MODERATE EPISODE OF RECURRENT MAJOR DEPRESSIVE DISORDER (H): ICD-10-CM

## 2019-03-08 LAB
ANION GAP SERPL CALCULATED.3IONS-SCNC: 6 MMOL/L (ref 3–14)
BUN SERPL-MCNC: 14 MG/DL (ref 7–30)
CALCIUM SERPL-MCNC: 7.4 MG/DL (ref 8.5–10.1)
CHLORIDE SERPL-SCNC: 103 MMOL/L (ref 94–109)
CO2 SERPL-SCNC: 26 MMOL/L (ref 20–32)
CREAT SERPL-MCNC: 0.83 MG/DL (ref 0.66–1.25)
GFR SERPL CREATININE-BSD FRML MDRD: >90 ML/MIN/{1.73_M2}
GLUCOSE SERPL-MCNC: 95 MG/DL (ref 70–99)
POTASSIUM SERPL-SCNC: 4.2 MMOL/L (ref 3.4–5.3)
SODIUM SERPL-SCNC: 135 MMOL/L (ref 133–144)

## 2019-03-08 PROCEDURE — 99213 OFFICE O/P EST LOW 20 MIN: CPT | Performed by: NURSE PRACTITIONER

## 2019-03-08 PROCEDURE — 36415 COLL VENOUS BLD VENIPUNCTURE: CPT | Performed by: NURSE PRACTITIONER

## 2019-03-08 PROCEDURE — 80048 BASIC METABOLIC PNL TOTAL CA: CPT | Performed by: NURSE PRACTITIONER

## 2019-03-08 RX ORDER — AMLODIPINE BESYLATE 10 MG/1
10 TABLET ORAL DAILY
Qty: 90 TABLET | Refills: 1 | Status: SHIPPED | OUTPATIENT
Start: 2019-03-08 | End: 2019-09-13

## 2019-03-08 RX ORDER — CHLORTHALIDONE 25 MG/1
25 TABLET ORAL DAILY
Qty: 90 TABLET | Refills: 1 | Status: SHIPPED | OUTPATIENT
Start: 2019-03-08 | End: 2019-09-13

## 2019-03-08 ASSESSMENT — MIFFLIN-ST. JEOR: SCORE: 1528.3

## 2019-03-08 NOTE — LETTER
March 11, 2019      Suresh Powers  [address] [facility] [address]        Hi Suresh,     The results of your recent blood work looked good.  Please let me know if you have any questions or concerns.     Thank you,     JERSEY Cnonolly     Resulted Orders   Basic metabolic panel   Result Value Ref Range    Sodium 135 133 - 144 mmol/L    Potassium 4.2 3.4 - 5.3 mmol/L    Chloride 103 94 - 109 mmol/L    Carbon Dioxide 26 20 - 32 mmol/L    Anion Gap 6 3 - 14 mmol/L    Glucose 95 70 - 99 mg/dL    Urea Nitrogen 14 7 - 30 mg/dL    Creatinine 0.83 0.66 - 1.25 mg/dL    GFR Estimate >90 >60 mL/min/[1.73_m2]      Comment:      Non  GFR Calc  Starting 12/18/2018, serum creatinine based estimated GFR (eGFR) will be   calculated using the Chronic Kidney Disease Epidemiology Collaboration   (CKD-EPI) equation.      GFR Estimate If Black >90 >60 mL/min/[1.73_m2]      Comment:       GFR Calc  Starting 12/18/2018, serum creatinine based estimated GFR (eGFR) will be   calculated using the Chronic Kidney Disease Epidemiology Collaboration   (CKD-EPI) equation.      Calcium 7.4 (L) 8.5 - 10.1 mg/dL       If you have any questions or concerns, please call the clinic at the number listed above.       Sincerely,        LUIS Connolly Ra CNP

## 2019-03-08 NOTE — PROGRESS NOTES
SUBJECTIVE:   Suresh Powers is a 58 year old male who presents to clinic today for the following health issues:      Medication Followup of Amlodipine, [place]    Taking Medication as prescribed: yes    Side Effects:  None    Medication Helping Symptoms:  yes     No chest pain, sob.  He is not physically active due to the pain in his legs and feet but he is able to climb stairs without cardiac symptoms.  Tolerates medications well.  No side effects.    Continues with psychiatry.  Mood has stabilized with addition of lamictal.    Problem list and histories reviewed & adjusted, as indicated.  Additional history: as documented    Patient Active Problem List   Diagnosis     Exotropia     Benign essential hypertension     Tobacco use disorder     Sleep disorder     Unilateral inguinal hernia without obstruction or gangrene     Closed fracture of proximal end of left tibia     Fracture of left clavicle     Depression     Slow transit constipation     Urinary retention     Iron deficiency anemia     Clostridium difficile diarrhea     Anxiety attack     HTN, goal below 140/90     Single kidney     CARDIOVASCULAR SCREENING; LDL GOAL LESS THAN 160     OCD (obsessive compulsive disorder)     Generalized anxiety disorder     Moderate episode of recurrent major depressive disorder (H)     Past Surgical History:   Procedure Laterality Date     foot crush injury       kidney donation       OPEN REDUCTION INTERNAL FIXATION CLAVICLE Left 2/4/2016    Procedure: OPEN REDUCTION INTERNAL FIXATION CLAVICLE;  Surgeon: Rakesh Hui MD;  Location:  OR     OPEN REDUCTION INTERNAL FIXATION TIBIAL PLATEAU Left 2/4/2016    Procedure: OPEN REDUCTION INTERNAL FIXATION TIBIAL PLATEAU;  Surgeon: Rakesh Hui MD;  Location:  OR       Social History     Tobacco Use     Smoking status: Former Smoker     Packs/day: 1.00     Years: 0.00     Pack years: 0.00     Smokeless tobacco: Former User     Quit date: 2/2/2016   Substance Use  "Topics     Alcohol use: No     Family History   Problem Relation Age of Onset     Breast Cancer Mother      Diabetes Type 2  Mother      Skin Cancer Father      Cerebrovascular Disease Father            Reviewed and updated as needed this visit by clinical staff       Reviewed and updated as needed this visit by Provider         ROS:  SEE HPI.    OBJECTIVE:     /78 (BP Location: Right arm, Patient Position: Chair, Cuff Size: Adult Regular)   Pulse 70   Temp 97.9  F (36.6  C) (Tympanic)   Resp 18   Ht 1.708 m (5' 7.25\")   Wt 74.6 kg (164 lb 6.4 oz)   SpO2 100%   BMI 25.56 kg/m    Body mass index is 25.56 kg/m .  GENERAL: healthy, alert and no distress  RESP: lungs clear to auscultation - no rales, rhonchi or wheezes  CV: regular rate and rhythm, normal S1 S2, no S3 or S4, no murmur, click or rub, no peripheral edema and peripheral pulses strong  ABDOMEN: soft, nontender, no hepatosplenomegaly, no masses and bowel sounds normal  PSYCH: mentation appears normal, affect normal/bright    Diagnostic Test Results:  none     ASSESSMENT/PLAN:   1. HTN, goal below 140/90  Asymptomatic.  Refilled.  BMP today.  - Basic metabolic panel  - chlorthalidone (HYGROTON) 25 MG tablet; Take 1 tablet (25 mg) by mouth daily  Dispense: 90 tablet; Refill: 1  - amLODIPine (NORVASC) 10 MG tablet; Take 1 tablet (10 mg) by mouth daily  Dispense: 90 tablet; Refill: 1    2. Moderate episode of recurrent major depressive disorder (H)  Stable, continue with psychiatry.    LUIS Connolly Ra [facility]  "

## 2019-04-23 ENCOUNTER — OFFICE VISIT (OUTPATIENT)
Dept: PSYCHOLOGY | Facility: CLINIC | Age: 59
End: 2019-04-23
Payer: COMMERCIAL

## 2019-04-23 DIAGNOSIS — F41.1 GENERALIZED ANXIETY DISORDER: Primary | ICD-10-CM

## 2019-04-23 DIAGNOSIS — F33.1 MODERATE EPISODE OF RECURRENT MAJOR DEPRESSIVE DISORDER (H): ICD-10-CM

## 2019-04-23 DIAGNOSIS — F42.9 OBSESSIVE-COMPULSIVE DISORDER, UNSPECIFIED TYPE: ICD-10-CM

## 2019-04-23 PROCEDURE — 90834 PSYTX W PT 45 MINUTES: CPT | Performed by: PSYCHOLOGIST

## 2019-04-23 ASSESSMENT — ANXIETY QUESTIONNAIRES
2. NOT BEING ABLE TO STOP OR CONTROL WORRYING: NEARLY EVERY DAY
IF YOU CHECKED OFF ANY PROBLEMS ON THIS QUESTIONNAIRE, HOW DIFFICULT HAVE THESE PROBLEMS MADE IT FOR YOU TO DO YOUR WORK, TAKE CARE OF THINGS AT HOME, OR GET ALONG WITH OTHER PEOPLE: VERY DIFFICULT
5. BEING SO RESTLESS THAT IT IS HARD TO SIT STILL: NOT AT ALL
6. BECOMING EASILY ANNOYED OR IRRITABLE: MORE THAN HALF THE DAYS
1. FEELING NERVOUS, ANXIOUS, OR ON EDGE: NEARLY EVERY DAY
3. WORRYING TOO MUCH ABOUT DIFFERENT THINGS: NEARLY EVERY DAY
GAD7 TOTAL SCORE: 16
7. FEELING AFRAID AS IF SOMETHING AWFUL MIGHT HAPPEN: MORE THAN HALF THE DAYS

## 2019-04-23 ASSESSMENT — PATIENT HEALTH QUESTIONNAIRE - PHQ9
5. POOR APPETITE OR OVEREATING: NEARLY EVERY DAY
SUM OF ALL RESPONSES TO PHQ QUESTIONS 1-9: 13

## 2019-04-23 NOTE — PROGRESS NOTES
"                                             Progress Note    Client Name: Surseh Powers  Date: 4-23-19         Service Type: Individual      Session Start Time:  10:30  Session End Time: 11:25     Session Length: 38-52 min   Session #: 25     Attendees: Client    Treatment Plan Last Reviewed: today  (90 days = 7- 2019)  PHQ-9 / SPIKE-7 : see flow sheets    CGI= 5/3 on 4-23-19     DATA      Progress Since Last Session (Related to Symptoms / Goals / Homework):  Symptoms: Stable    Homework: Completed in session         Episode of Care Goals: Satisfactory progress - ACTION (Actively working towards change); Intervened by reinforcing change plan / affirming steps taken.       Current / Ongoing Stressors and Concerns:   anxiety,  OCD      Treatment Objective(s) Addressed in This Session:     Address OCD  behaviors  Identify negative self-talk and behaviors: challenge core beliefs, myths, and actions  identify 4 strategies for managing obsessive thoughts and improving mood         Intervention:    Client returns after a long winter ; does not like to drive in snow.    Client shares that he has had some insurance changes.  Discussed needs and frequency of visits; he would lie to come monthly.     Shares that his concentration is poor- can only read one chapter in his Bible.  Used to read a whole paper; thinks in part that it cause he is pre-ocupied.   Discussed  nuero psych testing.    Also concerned about forgetting :  Denies any family history of Alzheimers or dementia   Set things down and forget. ( but was like that at work too. Vs a medication side effect.  )  Current medications are helping him feel more even; \" I'm letting things role off my back\" if I screwed up.         Processed an recent  interpersonal stressor.  Said something - thought he was in a private moment- and got called out on it.   as a people pleaser - it really torte him up. ( guilt and rumination)  Explored talking backs to the mechanism.  Explored " respect , trust and who is in this [place].        ASSESSMENT: Current Emotional / Mental Status (status of significant symptoms):   Risk status (Self / Other harm or suicidal ideation)   Client denies current fears or concerns for personal safety.   Client denies current or recent suicidal ideation or behaviors.   Client denies current or recent homicidal ideation or behaviors.   Client denies current or recent self injurious behavior or ideation.   Client denies other safety concerns.   A safety and risk management plan has not been developed at this time, however client was given the after-hours number / 911 should there be a change in any of these risk factors.     Appearance:   Appropriate    Eye Contact:   Good    Psychomotor Behavior: Normal    Attitude:   Cooperative    Orientation:   All   Speech    Rate / Production: Normal     Volume:  Normal    Mood:    Anxious  Depressed  Normal   Affect:    Appropriate  Worrisome    Thought Content:  Clear    Thought Form:  Coherent  Logical    Insight:    Good      Medication Review:   Changes to psychiatric medications, see updated Medication List in [facility].     [facility] in [place].  Alana Mcclelland RN, CNP.  lamictal     Medication Compliance:   Yes     Changes in Health Issues:   None reported     Chemical Use Review:   Substance Use: Chemical use reviewed, no active concerns identified      Tobacco Use: No current tobacco use.       Collateral Reports Completed:   Routed note to PCP as needed     PLAN: (Client Tasks / Therapist Tasks / Other  Attend to respect , trust and who is in this [place].    Talk back to the mechanism  Use TIP  Talk with provider about increased dosage  call insurance to look into coverage for nueropsych testing - memory baseline      Kelly Carpenter LP                                                       ________________________________________________________________________    Treatment Plan    Client's Name: Suresh CHANO Powers  Date Of  "Birth: 1960        DSM-V Diagnoses: SPIKE / MDD  Psychosocial / Contextual Factors: chronic pain, career disruption  WHODAS: see dx    Referral / Collaboration:  Referral to another professional/service is not indicated at this time.    Anticipated number of session or this episode of care: monthly      MeasurableTreatment Goal(s) related to diagnosis / functional impairment(s)  Goal 1: Client will employ coping skills daily to manage pleasing , and anxiety   I will know I've met my goal when \" keep stable emotions vs triggering and rumination\"      Address OCD  behaviors  Identify negative self-talk and behaviors: challenge core beliefs, myths, and actions  identify 4 strategies for managing obsessive thoughts and improving mood                  Objective #A (Client Action)   Client will report using skills daily including spiritual practices and accessing support.     Getting to [employer], reading Bible, attend to nutrition.     Take meds everyday.   Intervention(s)   Therapist will reinforce and teach  coping skills.    Goal 2: Client will process loss and move towards articulating a strong self worth and identity.    I will know I've met my goal when \"  Client reports a better sense of self worth; has more good days than bad.   Begin a gratitude journal.    Client has reviewed and agreed to the above plan.      Kelly Carpenter LP          "

## 2019-04-24 ASSESSMENT — ANXIETY QUESTIONNAIRES: GAD7 TOTAL SCORE: 16

## 2019-06-04 ENCOUNTER — TELEPHONE (OUTPATIENT)
Dept: PHARMACY | Facility: CLINIC | Age: 59
End: 2019-06-04

## 2019-06-04 NOTE — TELEPHONE ENCOUNTER
This patient is due for MTM follow-up. Spoke to pt and he is busy this week. He took the MTM scheduling line and will call back to make a phone f/up appointment as he's normally a [facility] pt.     Arlene Colindres, PharmD  Medication Therapy Management Resident, [facility]  Pager: [phone]

## 2019-07-12 ENCOUNTER — OFFICE VISIT (OUTPATIENT)
Dept: PSYCHOLOGY | Facility: CLINIC | Age: 59
End: 2019-07-12
Payer: COMMERCIAL

## 2019-07-12 DIAGNOSIS — F42.9 OBSESSIVE-COMPULSIVE DISORDER, UNSPECIFIED TYPE: ICD-10-CM

## 2019-07-12 DIAGNOSIS — F41.1 GENERALIZED ANXIETY DISORDER: Primary | ICD-10-CM

## 2019-07-12 DIAGNOSIS — F33.1 MODERATE EPISODE OF RECURRENT MAJOR DEPRESSIVE DISORDER (H): ICD-10-CM

## 2019-07-12 PROCEDURE — 90834 PSYTX W PT 45 MINUTES: CPT | Performed by: PSYCHOLOGIST

## 2019-07-12 ASSESSMENT — ANXIETY QUESTIONNAIRES
5. BEING SO RESTLESS THAT IT IS HARD TO SIT STILL: NOT AT ALL
1. FEELING NERVOUS, ANXIOUS, OR ON EDGE: NEARLY EVERY DAY
6. BECOMING EASILY ANNOYED OR IRRITABLE: MORE THAN HALF THE DAYS
IF YOU CHECKED OFF ANY PROBLEMS ON THIS QUESTIONNAIRE, HOW DIFFICULT HAVE THESE PROBLEMS MADE IT FOR YOU TO DO YOUR WORK, TAKE CARE OF THINGS AT HOME, OR GET ALONG WITH OTHER PEOPLE: VERY DIFFICULT
GAD7 TOTAL SCORE: 17
2. NOT BEING ABLE TO STOP OR CONTROL WORRYING: NEARLY EVERY DAY
3. WORRYING TOO MUCH ABOUT DIFFERENT THINGS: NEARLY EVERY DAY
7. FEELING AFRAID AS IF SOMETHING AWFUL MIGHT HAPPEN: NEARLY EVERY DAY

## 2019-07-12 ASSESSMENT — PATIENT HEALTH QUESTIONNAIRE - PHQ9
5. POOR APPETITE OR OVEREATING: NEARLY EVERY DAY
SUM OF ALL RESPONSES TO PHQ QUESTIONS 1-9: 13

## 2019-07-12 NOTE — PROGRESS NOTES
Progress Note    Client Name: Suresh Powers  Date: 7-12-19         Service Type: Individual      Session Start Time:  10:05  Session End Time: 10:55     Session Length: 38-52 min   Session #: 2 in 2019,  26 overall     Attendees: Client    Treatment Plan Last Reviewed: today  (90 days = 10- 2019)  PHQ-9 / SPIKE-7 : see flow sheets    CGI= 5/3 on 4-23-19  CGI= 4/3 on 7-12-19     DATA      Progress Since Last Session (Related to Symptoms / Goals / Homework):  Symptoms: Stable    Homework: Completed in session         Episode of Care Goals: Satisfactory progress - ACTION (Actively working towards change); Intervened by reinforcing change plan / affirming steps taken.       Current / Ongoing Stressors and Concerns:    Family stress, anxiety   OCD     Treatment Objective(s) Addressed in This Session:   Support for major life changes         Intervention:    Client reports that he is preparing to move out of the family home;  Has stormy there for 4 years most recently ( and has an on again off again relationship with his ex who has bi-polar depression)   Reports lots of family members are also in transition and moving in.  Shares conflict was increasing for some time .  Did reach out to friends for support and problem solving.  Will be living on his own in an apartment August 1st.  Processed emotions.          ASSESSMENT: Current Emotional / Mental Status (status of significant symptoms):   Risk status (Self / Other harm or suicidal ideation)   Client denies current fears or concerns for personal safety.   Client denies current or recent suicidal ideation or behaviors.   Client denies current or recent homicidal ideation or behaviors.   Client denies current or recent self injurious behavior or ideation.   Client denies other safety concerns.   A safety and risk management plan has not been developed at this time, however client was given the after-hours number / 911 should there be  a change in any of these risk factors.     Appearance:   Appropriate    Eye Contact:   Good    Psychomotor Behavior: Normal    Attitude:   Cooperative    Orientation:   All   Speech    Rate / Production: Normal     Volume:  Normal    Mood:    Anxious  Depressed  Normal   Affect:    Appropriate  Worrisome    Thought Content:  Clear    Thought Form:  Coherent  Logical    Insight:    Good      Medication Review:   Changes to psychiatric medications, see updated Medication List in [facility].     [facility] in [place].  Alana Mcclelland RN, CNP.  lamictal     Medication Compliance:   Yes     Changes in Health Issues:   None reported     Chemical Use Review:   Substance Use: Chemical use reviewed, no active concerns identified      Tobacco Use: No current tobacco use.       Collateral Reports Completed:   Routed note to PCP as needed     PLAN: (Client Tasks / Therapist Tasks / Other  Establish daily routine at new home  Reach out for support frequently  Protect back health- ask for help  Past hw  Attend to respect , trust and who is in this [place].    Talk back to the mechanism  Use TIP  Talk with provider about increased dosage  call insurance to look into coverage for nueropsych testing - memory baseline      Kelly Carpenter LP                                                       ________________________________________________________________________    Treatment Plan    Client's Name: Suresh Powers  YOB: 1960        DSM-V Diagnoses: SPIKE / MDD  Psychosocial / Contextual Factors: chronic pain, career disruption  WHODAS: see dx    Referral / Collaboration:  Referral to another professional/service is not indicated at this time.    Anticipated number of session or this episode of care: monthly      UPDATE 7-12-19      MeasurableTreatment Goal(s) related to diagnosis / functional impairment(s)  Goal 1: Client will employ coping skills daily to manage his upcoming /life change/ moving    Objective #A (Client  Action)    7-12-19 on track- continue to maintain these behaviors,  Client will report using skills daily including spiritual practices and accessing support.    Getting to [employer], reading Bible, attend to nutrition. WIill take meds daily and repot any side effects.   Intervention(s)   Therapist will reinforce daily goal setting and teach ongoing coping skills as needs change.      Client has reviewed and agreed to the above plan.      Kelly Carpenter LP

## 2019-07-13 ASSESSMENT — ANXIETY QUESTIONNAIRES: GAD7 TOTAL SCORE: 17

## 2019-09-06 NOTE — TELEPHONE ENCOUNTER
Called pt to schedule f/up appt. He goes to [facility] now and states he does not need a pharmacist MTM visit at this time. We will stop reaching out to the patient at this time. Please let us know if we can assist in this patient's care in the future.    Arlene Colindres, PharmD  Medication Therapy Management Provider, [facility]  Pager: [phone]

## 2019-09-13 ENCOUNTER — OFFICE VISIT (OUTPATIENT)
Dept: INTERNAL MEDICINE | Facility: CLINIC | Age: 59
End: 2019-09-13
Payer: COMMERCIAL

## 2019-09-13 VITALS
SYSTOLIC BLOOD PRESSURE: 132 MMHG | WEIGHT: 168 LBS | HEART RATE: 77 BPM | BODY MASS INDEX: 26.12 KG/M2 | TEMPERATURE: 98.2 F | OXYGEN SATURATION: 98 % | RESPIRATION RATE: 16 BRPM | DIASTOLIC BLOOD PRESSURE: 74 MMHG

## 2019-09-13 DIAGNOSIS — Z12.11 SPECIAL SCREENING FOR MALIGNANT NEOPLASMS, COLON: ICD-10-CM

## 2019-09-13 DIAGNOSIS — Z12.5 SCREENING FOR PROSTATE CANCER: ICD-10-CM

## 2019-09-13 DIAGNOSIS — E83.51 HYPOCALCEMIA: ICD-10-CM

## 2019-09-13 DIAGNOSIS — J30.89 NON-SEASONAL ALLERGIC RHINITIS, UNSPECIFIED TRIGGER: ICD-10-CM

## 2019-09-13 DIAGNOSIS — I10 BENIGN ESSENTIAL HYPERTENSION: ICD-10-CM

## 2019-09-13 DIAGNOSIS — F33.1 MODERATE EPISODE OF RECURRENT MAJOR DEPRESSIVE DISORDER (H): ICD-10-CM

## 2019-09-13 DIAGNOSIS — Z23 NEED FOR PROPHYLACTIC VACCINATION AND INOCULATION AGAINST INFLUENZA: ICD-10-CM

## 2019-09-13 LAB
ALBUMIN SERPL-MCNC: 4 G/DL (ref 3.4–5)
ALP SERPL-CCNC: 115 U/L (ref 40–150)
ALT SERPL W P-5'-P-CCNC: 32 U/L (ref 0–70)
ANION GAP SERPL CALCULATED.3IONS-SCNC: 4 MMOL/L (ref 3–14)
AST SERPL W P-5'-P-CCNC: 26 U/L (ref 0–45)
BILIRUB SERPL-MCNC: 0.6 MG/DL (ref 0.2–1.3)
BUN SERPL-MCNC: 17 MG/DL (ref 7–30)
CALCIUM SERPL-MCNC: 9.1 MG/DL (ref 8.5–10.1)
CHLORIDE SERPL-SCNC: 98 MMOL/L (ref 94–109)
CO2 SERPL-SCNC: 29 MMOL/L (ref 20–32)
CREAT SERPL-MCNC: 0.86 MG/DL (ref 0.66–1.25)
GFR SERPL CREATININE-BSD FRML MDRD: >90 ML/MIN/{1.73_M2}
GLUCOSE SERPL-MCNC: 110 MG/DL (ref 70–99)
MAGNESIUM SERPL-MCNC: 2.5 MG/DL (ref 1.6–2.3)
POTASSIUM SERPL-SCNC: 4 MMOL/L (ref 3.4–5.3)
PROT SERPL-MCNC: 8 G/DL (ref 6.8–8.8)
PSA SERPL-ACNC: 1.38 UG/L (ref 0–4)
SODIUM SERPL-SCNC: 131 MMOL/L (ref 133–144)

## 2019-09-13 PROCEDURE — 90682 RIV4 VACC RECOMBINANT DNA IM: CPT | Performed by: INTERNAL MEDICINE

## 2019-09-13 PROCEDURE — G0103 PSA SCREENING: HCPCS | Performed by: INTERNAL MEDICINE

## 2019-09-13 PROCEDURE — 99214 OFFICE O/P EST MOD 30 MIN: CPT | Mod: 25 | Performed by: INTERNAL MEDICINE

## 2019-09-13 PROCEDURE — 36415 COLL VENOUS BLD VENIPUNCTURE: CPT | Performed by: INTERNAL MEDICINE

## 2019-09-13 PROCEDURE — 83735 ASSAY OF MAGNESIUM: CPT | Performed by: INTERNAL MEDICINE

## 2019-09-13 PROCEDURE — 82306 VITAMIN D 25 HYDROXY: CPT | Performed by: INTERNAL MEDICINE

## 2019-09-13 PROCEDURE — 83970 ASSAY OF PARATHORMONE: CPT | Performed by: INTERNAL MEDICINE

## 2019-09-13 PROCEDURE — 80053 COMPREHEN METABOLIC PANEL: CPT | Performed by: INTERNAL MEDICINE

## 2019-09-13 PROCEDURE — G0008 ADMIN INFLUENZA VIRUS VAC: HCPCS | Performed by: INTERNAL MEDICINE

## 2019-09-13 RX ORDER — FLUTICASONE PROPIONATE 50 MCG
2 SPRAY, SUSPENSION (ML) NASAL DAILY
Qty: 48 G | Refills: 3 | Status: SHIPPED | OUTPATIENT
Start: 2019-09-13 | End: 2020-11-03

## 2019-09-13 RX ORDER — AMLODIPINE BESYLATE 10 MG/1
10 TABLET ORAL DAILY
Qty: 90 TABLET | Refills: 3 | Status: SHIPPED | OUTPATIENT
Start: 2019-09-13 | End: 2020-03-14

## 2019-09-13 RX ORDER — CHLORTHALIDONE 25 MG/1
25 TABLET ORAL DAILY
Qty: 90 TABLET | Refills: 3 | Status: SHIPPED | OUTPATIENT
Start: 2019-09-13 | End: 2020-03-14

## 2019-09-13 RX ORDER — LAMOTRIGINE 200 MG/1
200 TABLET ORAL AT BEDTIME
COMMUNITY
Start: 2019-09-13

## 2019-09-13 ASSESSMENT — ANXIETY QUESTIONNAIRES
2. NOT BEING ABLE TO STOP OR CONTROL WORRYING: NEARLY EVERY DAY
IF YOU CHECKED OFF ANY PROBLEMS ON THIS QUESTIONNAIRE, HOW DIFFICULT HAVE THESE PROBLEMS MADE IT FOR YOU TO DO YOUR WORK, TAKE CARE OF THINGS AT HOME, OR GET ALONG WITH OTHER PEOPLE: SOMEWHAT DIFFICULT
1. FEELING NERVOUS, ANXIOUS, OR ON EDGE: NEARLY EVERY DAY
GAD7 TOTAL SCORE: 16
7. FEELING AFRAID AS IF SOMETHING AWFUL MIGHT HAPPEN: NEARLY EVERY DAY
5. BEING SO RESTLESS THAT IT IS HARD TO SIT STILL: NOT AT ALL
3. WORRYING TOO MUCH ABOUT DIFFERENT THINGS: NEARLY EVERY DAY
6. BECOMING EASILY ANNOYED OR IRRITABLE: SEVERAL DAYS

## 2019-09-13 ASSESSMENT — PATIENT HEALTH QUESTIONNAIRE - PHQ9
5. POOR APPETITE OR OVEREATING: NEARLY EVERY DAY
SUM OF ALL RESPONSES TO PHQ QUESTIONS 1-9: 7

## 2019-09-13 NOTE — PATIENT INSTRUCTIONS
Continue current meds  Prescriptions refilled.    Labs today as ordered including FIT stool test  Vaccinations: Influenza vaccine  Schedule an eye appointment  Follow-up with psychiatry as scheduled  See me in 6 months for follow-up or earlier as needed

## 2019-09-13 NOTE — PROGRESS NOTES
Subjective     Suresh Powers is a 59 year old male who presents to clinic today for the following health issues:    HPI   Chief Complaint   Patient presents with     Establish Care     Patient was under the care of Dr. Carmen Martinez at [facility]     Pt's past medical history, family history, habits, medications and allergies were reviewed with the patient today.  See snap shot for  HCM status. Most recent lab results reviewed with pt. Problem list and histories reviewed & adjusted, as indicated.  Additional history as below:    On longterm disability  due to   injury bilateral  ankle/feet 1998    Occ walking due to injury limitation. Walks for 10 min  and then sits. No calf pains  Denies chest pain, shortness of breath, abdominal pain, headache,   or side effects with medications. Has chronic reduced acuity right eye. Last eye exam 3 years   Chronic anxiety/depression, followed by psychiatry. Sees Alana Mcclelland at [facility] in [place] for psych med management and therapy with separate counselor (had been seeing  therapist in past)   On Chlorthalidone and Amlodipine for blood pressure . 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 Hx hyperkalemia with  Lisinopril   Has done FIT stool test 2 years ago. Declines colonoscopy  Allergic rhinitis well controlled with use of fluticasone    Component      Latest Ref Rng & Units 3/8/2019   Sodium      133 - 144 mmol/L 135   Potassium      3.4 - 5.3 mmol/L 4.2   Chloride      94 - 109 mmol/L 103   Carbon Dioxide      20 - 32 mmol/L 26   Anion Gap      3 - 14 mmol/L 6   Glucose      70 - 99 mg/dL 95   Urea Nitrogen      7 - 30 mg/dL 14   Creatinine      0.66 - 1.25 mg/dL 0.83   GFR Estimate      >60 mL/min/1.73:m2 >90   GFR Estimate If Black      >60 mL/min/1.73:m2 >90   Calcium      8.5 - 10.1 mg/dL 7.4 (L)        Additional ROS:   Constitutional, HEENT, Cardiovascular, Pulmonary, GI and , Neuro, MSK and Psych review of systems/symptoms are otherwise negative or  "unchanged from previous, except as noted above.      OBJECTIVE:  /74   Pulse 77   Temp 98.2  F (36.8  C) (Temporal)   Resp 16   Wt 76.2 kg (168 lb)   SpO2 98%   BMI 26.12 kg/m     Estimated body mass index is 26.12 kg/m  as calculated from the following:    Height as of 3/8/19: 1.708 m (5' 7.25\").    Weight as of this encounter: 76.2 kg (168 lb).   HEENT: Nasal mucosa minimally edematous.  Sinuses nontender.  No nasal drainage  Neck: no adenopathy. Thyroid normal to palpation. No bruits  Pulm: Lungs clear to auscultation   CV: Regular rates and rhythm  GI: Soft, nontender, Normal active bowel sounds, No hepatosplenomegaly or masses palpable  Ext: Peripheral pulses intact. No edema. Mild tenderness to bilateral ankle ROM (chronic per pt)  Neuro: Normal strength and tone, sensory exam grossly normal  Gen: Anxious affect      Assessment/Plan: (See plan discussion below for further details)  1. Benign essential hypertension  Controlled.  Continue current medication.  Labs ordered  - Comprehensive metabolic panel  - chlorthalidone (HYGROTON) 25 MG tablet; Take 1 tablet (25 mg) by mouth daily  Dispense: 90 tablet; Refill: 3  - amLODIPine (NORVASC) 10 MG tablet; Take 1 tablet (10 mg) by mouth daily  Dispense: 90 tablet; Refill: 3    2. Hypocalcemia  Most recent calcium labs from previous clinic low.  Patient denies muscle function abnormalities.  Labs as ordered  - Comprehensive metabolic panel  - Parathyroid Hormone Intact  - Vitamin D Deficiency  - Magnesium    3. Non-seasonal allergic rhinitis, unspecified trigger  Controlled.  Continue her medication  - fluticasone (FLONASE) 50 MCG/ACT nasal spray; Spray 2 sprays into both nostrils daily  Dispense: 48 g; Refill: 3     4. Moderate episode of recurrent major depressive disorder (H)   PHQ=7.  No suicidal ideation.  Managed by psychiatry     5. Screening for prostate cancer   PSA ordered for intermittent monitoring after discussion with pt and pt preference. " Not doing annual PSA per USPTF recs  - Prostate spec antigen screen    6. Special screening for malignant neoplasms, colon  Declines  colonoscopy despite MD recommendation for the procedure. FIT test ordered. Reviewed the reduced sensitivity of the FIT test for colon cancer screening compared to colonoscopy and pt states understanding of this. Patient to inform physician in the future if willing to undergo future colonoscopy.   - Fecal colorectal cancer screen (FIT); Future    7. Need for prophylactic vaccination and inoculation against influenza  - INFLUENZA QUAD, RECOMBINANT, P-FREE (RIV4) (FLUBLOCK) [97252]  - Vaccine Administration, Initial [88459]    Plan discussion:   Continue current meds  Prescriptions refilled.    Labs today as ordered including FIT stool test  Vaccinations: Influenza vaccine  Schedule an eye appointment  Follow-up with psychiatry as scheduled  See me in 6 months for follow-up or earlier as needed       Brad Thurston MD  Internal Medicine Department  [facility]    (Chart documentation was completed, in part, with [employer] voice-recognition software. Even though reviewed, some grammatical, spelling, and word errors may remain.)

## 2019-09-14 LAB — PTH-INTACT SERPL-MCNC: 37 PG/ML (ref 18–80)

## 2019-09-14 ASSESSMENT — ANXIETY QUESTIONNAIRES: GAD7 TOTAL SCORE: 16

## 2019-09-18 LAB — DEPRECATED CALCIDIOL+CALCIFEROL SERPL-MC: 46 UG/L (ref 20–75)

## 2019-09-19 ENCOUNTER — TRANSFERRED RECORDS (OUTPATIENT)
Dept: HEALTH INFORMATION MANAGEMENT | Facility: CLINIC | Age: 59
End: 2019-09-19

## 2019-09-20 ENCOUNTER — TELEPHONE (OUTPATIENT)
Dept: INTERNAL MEDICINE | Facility: CLINIC | Age: 59
End: 2019-09-20

## 2019-09-20 NOTE — LETTER
September 30, 2019      Suresh Powers  [address] [facility]  [address]  [place] [address]        Dear Suresh,     Here are your recent lab results from 9/13/19.   Labs are either normal, or within normal limits.        See me in 6 months (around 3/13/2020) for follow-up  or earlier as needed. Call [phone] to schedule appt.    Brad Thurston MD  Internal Medicine Department  [facility]

## 2019-09-20 NOTE — TELEPHONE ENCOUNTER
Dr. Thurston,     Pt would like you to review his lab results from 9/13, summarize results and recommendations.   And then please MAIL lab results & lab letter with detailed summary to pt's home address.  (informed pt that most of his labs are WNL) and just want PCP to review for final results.     FYI---  --Pt is going to hold off on FIT test screening for now.   --Pt see's psych at [facility] in [place].  LOV was yesterday 9/19, with psychiatrist: Alana Mcclelland RN, CNP.      LAB RESULTS

## 2020-03-09 NOTE — Clinical Note
I met with Suresh today. He is requesting a referral to psychiatry to see if a different med could give more relief for his OCD.  What do you think? Could you initiate that  ( I believe it needs to come from a PCP) Kelly LOV with you 3/6/20. Please review her msg below regarding athlete's foot and advise. Thanks.

## 2020-03-13 ENCOUNTER — OFFICE VISIT (OUTPATIENT)
Dept: INTERNAL MEDICINE | Facility: CLINIC | Age: 60
End: 2020-03-13
Payer: COMMERCIAL

## 2020-03-13 VITALS
BODY MASS INDEX: 30.01 KG/M2 | WEIGHT: 198 LBS | SYSTOLIC BLOOD PRESSURE: 128 MMHG | TEMPERATURE: 98.3 F | HEIGHT: 68 IN | DIASTOLIC BLOOD PRESSURE: 80 MMHG | OXYGEN SATURATION: 97 %

## 2020-03-13 DIAGNOSIS — I10 BENIGN ESSENTIAL HYPERTENSION: Primary | ICD-10-CM

## 2020-03-13 DIAGNOSIS — Z12.11 SPECIAL SCREENING FOR MALIGNANT NEOPLASMS, COLON: ICD-10-CM

## 2020-03-13 DIAGNOSIS — Z11.59 NEED FOR HEPATITIS C SCREENING TEST: ICD-10-CM

## 2020-03-13 DIAGNOSIS — E87.1 HYPONATREMIA: ICD-10-CM

## 2020-03-13 DIAGNOSIS — R63.5 WEIGHT GAIN: ICD-10-CM

## 2020-03-13 DIAGNOSIS — Z13.6 CARDIOVASCULAR SCREENING; LDL GOAL LESS THAN 100: ICD-10-CM

## 2020-03-13 DIAGNOSIS — R73.9 BLOOD GLUCOSE ELEVATED: ICD-10-CM

## 2020-03-13 LAB
ANION GAP SERPL CALCULATED.3IONS-SCNC: 6 MMOL/L (ref 3–14)
BUN SERPL-MCNC: 17 MG/DL (ref 7–30)
CALCIUM SERPL-MCNC: 8.9 MG/DL (ref 8.5–10.1)
CHLORIDE SERPL-SCNC: 98 MMOL/L (ref 94–109)
CHOLEST SERPL-MCNC: 240 MG/DL
CO2 SERPL-SCNC: 28 MMOL/L (ref 20–32)
CREAT SERPL-MCNC: 0.81 MG/DL (ref 0.66–1.25)
GFR SERPL CREATININE-BSD FRML MDRD: >90 ML/MIN/{1.73_M2}
GLUCOSE SERPL-MCNC: 118 MG/DL (ref 70–99)
HDLC SERPL-MCNC: 141 MG/DL
LDLC SERPL CALC-MCNC: 90 MG/DL
NONHDLC SERPL-MCNC: 99 MG/DL
POTASSIUM SERPL-SCNC: 3.9 MMOL/L (ref 3.4–5.3)
SODIUM SERPL-SCNC: 132 MMOL/L (ref 133–144)
TRIGL SERPL-MCNC: 45 MG/DL
TSH SERPL DL<=0.005 MIU/L-ACNC: 0.93 MU/L (ref 0.4–4)

## 2020-03-13 PROCEDURE — 80061 LIPID PANEL: CPT | Performed by: INTERNAL MEDICINE

## 2020-03-13 PROCEDURE — 99214 OFFICE O/P EST MOD 30 MIN: CPT | Performed by: INTERNAL MEDICINE

## 2020-03-13 PROCEDURE — 84443 ASSAY THYROID STIM HORMONE: CPT | Performed by: INTERNAL MEDICINE

## 2020-03-13 PROCEDURE — 86803 HEPATITIS C AB TEST: CPT | Performed by: INTERNAL MEDICINE

## 2020-03-13 PROCEDURE — 80048 BASIC METABOLIC PNL TOTAL CA: CPT | Performed by: INTERNAL MEDICINE

## 2020-03-13 PROCEDURE — 36415 COLL VENOUS BLD VENIPUNCTURE: CPT | Performed by: INTERNAL MEDICINE

## 2020-03-13 RX ORDER — GABAPENTIN 100 MG/1
100 CAPSULE ORAL 2 TIMES DAILY
COMMUNITY
Start: 2020-02-04 | End: 2021-11-17

## 2020-03-13 RX ORDER — AMITRIPTYLINE HYDROCHLORIDE 10 MG/1
10 TABLET ORAL AT BEDTIME
COMMUNITY
Start: 2020-02-04 | End: 2021-11-17

## 2020-03-13 ASSESSMENT — MIFFLIN-ST. JEOR: SCORE: 1679.68

## 2020-03-13 NOTE — PROGRESS NOTES
"Subjective     Suresh Powers is a 59 year old male who presents to clinic today for the following health issues:    HPI   Hypertension Follow-up      Do you check your blood pressure regularly outside of the clinic? No     Are you following a low salt diet? Yes    Are your blood pressures ever more than 140 on the top number (systolic) OR more   than 90 on the bottom number (diastolic), for example 140/90? Yes      How many servings of fruits and vegetables do you eat daily?  4 or more    On average, how many sweetened beverages do you drink each day (Examples: soda, juice, sweet tea, etc.  Do NOT count diet or artificially sweetened beverages)?   0    How many days per week do you exercise enough to make your heart beat faster? 3 or less    How many minutes a day do you exercise enough to make your heart beat faster? 9 or less    How many days per week do you miss taking your medication? 0    Pt's past medical history, family history, habits, medications and allergies were reviewed with the patient today.  See snap shot for  HCM status. Most recent lab results reviewed with pt. Problem list and histories reviewed & adjusted, as indicated.  Additional history as below:    Denies chest pain, shortness of breath, abdominal pain, headache, vision changes or side effects with medications.  Followed by [facility] for mental health in [place] now. Appt Feb 2020 and  will follow-up in August. Stable anxiety control per pt  Pt declines colonoscopy. Willing to do FIT test  Patient has gained 30 pounds in 6 months.  Not following diet at this time. No exercise     Additional ROS:   Constitutional, HEENT, Cardiovascular, Pulmonary, GI and , Neuro, MSK and Psych review of systems/symptoms are otherwise negative or unchanged from previous, except as noted above.      OBJECTIVE:  /80   Temp 98.3  F (36.8  C) (Oral)   Ht 5' 7.5\" (1.715 m)   Wt 198 lb (89.8 kg)   SpO2 97%   BMI 30.55 kg/m     Estimated body mass " "index is 30.55 kg/m  as calculated from the following:    Height as of this encounter: 5' 7.5\" (1.715 m).    Weight as of this encounter: 198 lb (89.8 kg).     Neck: no adenopathy. Thyroid normal to palpation. No bruits  Pulm: Lungs clear to auscultation   CV: Regular rates and rhythm  GI: Soft, mildly obese, nontender, Normal active bowel sounds, No hepatosplenomegaly or masses palpable  Ext: Peripheral pulses intact. No edema.  Neuro: Normal strength and tone, sensory exam grossly normal  Gen: Mild anxious affect    Assessment/Plan: (See plan discussion below for further details)  1. Benign essential hypertension  Controlled.  Continue current medication.  Labs ordered  - Basic metabolic panel  - amLODIPine (NORVASC) 10 MG tablet; Take 1 tablet (10 mg) by mouth daily  Dispense: 90 tablet; Refill: 3  - chlorthalidone (HYGROTON) 25 MG tablet; Take 1 tablet (25 mg) by mouth daily  Dispense: 90 tablet; Refill: 3    2. Weight gain  Poor diet and no exercise.  Also at risk for weight gain with SSRI.  See counseling below.  Labs ordered  - TSH with free T4 reflex    3. Hyponatremia  On previous lab.  Mild.  Possibly related to chlorthalidone versus SSRI.  Lab is ordered for recheck.  Currently asymptomatic  - Basic metabolic panel    4. Special screening for malignant neoplasms, colon  .Declines  colonoscopy despite MD recommendation for the procedure. FIT test ordered. Reviewed the reduced sensitivity of the FIT test for colon cancer screening compared to colonoscopy and pt states understanding of this. Patient to inform physician in the future if willing to undergo future colonoscopy.    Lab previously ordered    5. Need for hepatitis C screening test  Pt meets criteria for hepatitis C screening based on birth year 1945-65. Discussed pro/cons of Hep C screening/treatment and recs of USPTF. Pt agreeable to screening  - Hepatitis C antibody    6. CARDIOVASCULAR SCREENING; LDL GOAL LESS THAN 100  - Lipid panel reflex to " direct LDL Fasting        Plan discussion:   Continue current meds  Labs today as ordered including FIT stool test. Return it in the next week  Follow up in 6 months for follow-up re: blood pressure . Earlier as needed  Reduce calorie/carbohydrate (sugar, bread, potato, pasta, rice, alcohol etc)  intake in diet.  Increase color on your plate with fruits and vegetables. Increase  frequency of walking or other aerobic exercise as able (goal is daily)  Check with insurance or speak with your pharmacist re: Shingrix vaccine coverage for shingles prevention.  This is a 2 shot series done 2-6 months apart. May get in pharmacy or clinic with nurse-only appointment  Follow-up with [facility] for mental health management as scheduled       Brad Thurston MD  Internal Medicine Department  [facility]    (Chart documentation was completed, in part, with [employer] voice-recognition software. Even though reviewed, some grammatical, spelling, and word errors may remain.)

## 2020-03-13 NOTE — LETTER
[facility]  [address] [address]  [phone]      3/18/2020       Suresh Powers  [address]  [address]  [facility] [address]        Dear Suresh,  Here are your most recent lab results. Unless commented on below, mild variation of results  outside the normal range are not clinically signicant.    Resulted Orders   Basic metabolic panel   Result Value Ref Range    Sodium 132 (L) 133 - 144 mmol/L    Potassium 3.9 3.4 - 5.3 mmol/L    Chloride 98 94 - 109 mmol/L    Carbon Dioxide 28 20 - 32 mmol/L    Anion Gap 6 3 - 14 mmol/L    Glucose 118 (H) 70 - 99 mg/dL    Urea Nitrogen 17 7 - 30 mg/dL    Creatinine 0.81 0.66 - 1.25 mg/dL    GFR Estimate >90 >60 mL/min/[1.73_m2]      Comment:      Non  GFR Calc  Starting 12/18/2018, serum creatinine based estimated GFR (eGFR) will be   calculated using the Chronic Kidney Disease Epidemiology Collaboration   (CKD-EPI) equation.      GFR Estimate If Black >90 >60 mL/min/[1.73_m2]      Comment:       GFR Calc  Starting 12/18/2018, serum creatinine based estimated GFR (eGFR) will be   calculated using the Chronic Kidney Disease Epidemiology Collaboration   (CKD-EPI) equation.      Calcium 8.9 8.5 - 10.1 mg/dL   Hepatitis C antibody   Result Value Ref Range    Hepatitis C Antibody Nonreactive NR^Nonreactive      Comment:      Assay performance characteristics have not been established for newborns,   infants, and children     Lipid panel reflex to direct LDL Fasting   Result Value Ref Range    Cholesterol 240 (H) <200 mg/dL      Comment:      Desirable:       <200 mg/dl    Triglycerides 45 <150 mg/dL    HDL Cholesterol 141 >39 mg/dL    LDL Cholesterol Calculated 90 <100 mg/dL      Comment:      Desirable:       <100 mg/dl    Non HDL Cholesterol 99 <130 mg/dL   TSH with free T4 reflex   Result Value Ref Range    TSH 0.93 0.40 - 4.00 mU/L       HDL (good) Cholesterol, LDL (bad) Cholesterol, Non-HDL  "(bad) cholesterol, Triglycerides, Kidney function, Potassium and Thyroid lab results were normal.  The elevated total cholesterol is due to the large amount of good HDL and therefore not clinically relevant.  Sodium lab result was minimally low and improved compared to previous  Glucose/Sugar lab result was abnormally high.  The elevated glucose was not to the point of defining diabetes but indicates the presence of insulin resistance which is a precursor to diabetes.  No evidence of hepatitis C infection.  Reduce carbohydrate (sugars, starchy foods such as bread, rice, potato, pasta, etc) intake  in your diet and increase the amount of color on your plate with fruits, vegetables and lean meats.  Increase frequency of walking or other exercise  Continue current medication.  Please contact your pharmacy if you need further refills of your medication.  Call our appointment desk at [phone] or use [employer] to schedule a \"lab only\" to have your A1C and Basic Metabolic Panel checked fasting in 3 months.    If you have further questions/concerns regarding the results, I would ask that you bring them to your next follow-up appointment with me and I would be happy to review them with you further.      Sincerely,      Brad Thurston MD  Internal Medicine      "

## 2020-03-13 NOTE — PATIENT INSTRUCTIONS
Continue current meds  Labs today as ordered including FIT stool test. Return it in the next week  Follow up in 6 months for follow-up re: blood pressure . Earlier as needed  Reduce calorie/carbohydrate (sugar, bread, potato, pasta, rice, alcohol etc)  intake in diet.  Increase color on your plate with fruits and vegetables. Increase  frequency of walking or other aerobic exercise as able (goal is daily)  Check with insurance or speak with your pharmacist re: Shingrix vaccine coverage for shingles prevention.  This is a 2 shot series done 2-6 months apart. May get in pharmacy or clinic with nurse-only appointment  Follow-up with Birdie for mental health management as scheduled

## 2020-03-13 NOTE — LETTER
March 19, 2020      Suresh Powers  [address]  [address]  [facility] [address]        Dear ,    We are writing to inform you of your test results.        Resulted Orders   Basic metabolic panel   Result Value Ref Range    Sodium 132 (L) 133 - 144 mmol/L    Potassium 3.9 3.4 - 5.3 mmol/L    Chloride 98 94 - 109 mmol/L    Carbon Dioxide 28 20 - 32 mmol/L    Anion Gap 6 3 - 14 mmol/L    Glucose 118 (H) 70 - 99 mg/dL    Urea Nitrogen 17 7 - 30 mg/dL    Creatinine 0.81 0.66 - 1.25 mg/dL    GFR Estimate >90 >60 mL/min/[1.73_m2]      Comment:      Non  GFR Calc  Starting 12/18/2018, serum creatinine based estimated GFR (eGFR) will be   calculated using the Chronic Kidney Disease Epidemiology Collaboration   (CKD-EPI) equation.      GFR Estimate If Black >90 >60 mL/min/[1.73_m2]      Comment:       GFR Calc  Starting 12/18/2018, serum creatinine based estimated GFR (eGFR) will be   calculated using the Chronic Kidney Disease Epidemiology Collaboration   (CKD-EPI) equation.      Calcium 8.9 8.5 - 10.1 mg/dL   Hepatitis C antibody   Result Value Ref Range    Hepatitis C Antibody Nonreactive NR^Nonreactive      Comment:      Assay performance characteristics have not been established for newborns,   infants, and children     Lipid panel reflex to direct LDL Fasting   Result Value Ref Range    Cholesterol 240 (H) <200 mg/dL      Comment:      Desirable:       <200 mg/dl    Triglycerides 45 <150 mg/dL    HDL Cholesterol 141 >39 mg/dL    LDL Cholesterol Calculated 90 <100 mg/dL      Comment:      Desirable:       <100 mg/dl    Non HDL Cholesterol 99 <130 mg/dL   TSH with free T4 reflex   Result Value Ref Range    TSH 0.93 0.40 - 4.00 mU/L       If you have any questions or concerns, please call the clinic at the number listed above.       Sincerely,        Brad Thurston MD

## 2020-03-14 PROBLEM — E87.1 HYPONATREMIA: Status: ACTIVE | Noted: 2020-03-14

## 2020-03-14 RX ORDER — CHLORTHALIDONE 25 MG/1
25 TABLET ORAL DAILY
Qty: 90 TABLET | Refills: 3 | Status: SHIPPED | OUTPATIENT
Start: 2020-03-14 | End: 2021-06-15

## 2020-03-14 RX ORDER — AMLODIPINE BESYLATE 10 MG/1
10 TABLET ORAL DAILY
Qty: 90 TABLET | Refills: 3 | Status: SHIPPED | OUTPATIENT
Start: 2020-03-14 | End: 2021-06-15

## 2020-03-16 LAB — HCV AB SERPL QL IA: NONREACTIVE

## 2020-06-10 ENCOUNTER — NURSE TRIAGE (OUTPATIENT)
Dept: NURSING | Facility: CLINIC | Age: 60
End: 2020-06-10

## 2020-06-11 ENCOUNTER — ANCILLARY PROCEDURE (OUTPATIENT)
Dept: GENERAL RADIOLOGY | Facility: CLINIC | Age: 60
End: 2020-06-11
Attending: INTERNAL MEDICINE
Payer: COMMERCIAL

## 2020-06-11 ENCOUNTER — OFFICE VISIT (OUTPATIENT)
Dept: INTERNAL MEDICINE | Facility: CLINIC | Age: 60
End: 2020-06-11
Payer: COMMERCIAL

## 2020-06-11 VITALS
OXYGEN SATURATION: 98 % | BODY MASS INDEX: 30.31 KG/M2 | SYSTOLIC BLOOD PRESSURE: 132 MMHG | WEIGHT: 200 LBS | HEIGHT: 68 IN | HEART RATE: 83 BPM | DIASTOLIC BLOOD PRESSURE: 84 MMHG | TEMPERATURE: 97.7 F

## 2020-06-11 DIAGNOSIS — M79.671 RIGHT FOOT PAIN: ICD-10-CM

## 2020-06-11 DIAGNOSIS — M79.671 RIGHT FOOT PAIN: Primary | ICD-10-CM

## 2020-06-11 DIAGNOSIS — M25.561 ACUTE PAIN OF RIGHT KNEE: ICD-10-CM

## 2020-06-11 PROCEDURE — 99214 OFFICE O/P EST MOD 30 MIN: CPT | Performed by: INTERNAL MEDICINE

## 2020-06-11 PROCEDURE — 73630 X-RAY EXAM OF FOOT: CPT | Mod: RT

## 2020-06-11 RX ORDER — NAPROXEN 500 MG/1
500 TABLET ORAL 2 TIMES DAILY WITH MEALS
Qty: 14 TABLET | Refills: 0 | Status: SHIPPED | OUTPATIENT
Start: 2020-06-11 | End: 2020-06-11

## 2020-06-11 RX ORDER — NAPROXEN 500 MG/1
500 TABLET ORAL 2 TIMES DAILY WITH MEALS
Qty: 14 TABLET | Refills: 0 | Status: SHIPPED | OUTPATIENT
Start: 2020-06-11 | End: 2020-10-13

## 2020-06-11 ASSESSMENT — MIFFLIN-ST. JEOR: SCORE: 1688.75

## 2020-06-11 NOTE — TELEPHONE ENCOUNTER
"Suresh reports that he fell earlier, now has right knee and right foot pain. He states he heard something \"pop\" and thinks he twisted his knee. Knee and foot does not appear crooked or deformed. Unable to bear weight, unable to walk - \"I can barely walk.\"     He is using a cane. Reports that there is no pain when he lays down, but is \"bad when standing up.\"    Per protocol, advised ED. Care advice reviewed. Patient refuses to go to ED and prefers clinic visit in AM. Advised to call back with further questions/concerns.     COVID 19 Nurse Triage Plan/Patient Instructions    Please be aware that novel coronavirus (COVID-19) may be circulating in the community. If you develop symptoms such as fever, cough, or SOB or if you have concerns about the presence of another infection including coronavirus (COVID-19), please contact your health care provider or visit [email or web address].     Disposition/Instructions    Patient to schedule an In Person Visit with provider. Reference Visit Selection Guide. (6/11/20)    Thank you for taking steps to prevent the spread of this virus.  o Limit your contact with others.  o Wear a simple mask to cover your cough.  o Wash your hands well and often.    Resources    [facility]: About COVID-19: [email or web address]    CDC: What to Do If You're Sick: www.cdc.gov/coronavirus/2019-ncov/about/steps-when-sick.html    CDC: Ending Home Isolation: www.cdc.gov/coronavirus/2019-ncov/hcp/disposition-in-home-patients.html     CDC: Caring for Someone: www.cdc.gov/coronavirus/2019-ncov/if-you-are-sick/care-for-someone.html     [facility]: Interim Guidance for Hospital Discharge to Home: www.health.[facility].mn.us/diseases/coronavirus/hcp/hospdischarge.pdf    [facility] clinical trials (COVID-19 research studies): [email or web address]-trials     Below are the COVID-19 hotlines at the [place] Department of Health ([facility]). Interpreters are available.   o For health questions: " Call 481-550-3887 or 1-853.125.2317 (7 a.m. to 7 p.m.)  o For questions about schools and childcare: Call 149-048-3243 or 1-130.230.1829 (7 a.m. to 7 p.m.)       Elba Junior RN/[place] Nurse Advisor      Reason for Disposition    Can't stand (bear weight) or walk    Additional Information    Negative: Serious injury with multiple fractures    Negative: [1] Major bleeding (e.g., actively dripping or spurting) AND [2] can't be stopped    Negative: Amputation    Negative: Looks like a dislocated joint (very crooked or deformed)    Negative: Sounds like a life-threatening emergency to the triager    Negative: Bullet wound, stabbed by knife, or other serious penetrating wound    Negative: Skin is split open or gaping (or length > 1/2 inch or 12 mm)    Negative: [1] Bleeding AND [2] won't stop after 10 minutes of direct pressure (using correct technique)    Negative: [1] Dirt in the wound AND [2] not removed with 15 minutes of scrubbing    Protocols used: FOOT AND ANKLE INJURY-A-AH

## 2020-06-11 NOTE — PROGRESS NOTES
Subjective     Suresh Powers is a 59 year old male who presents to clinic today for the following health issues:    HPI   Musculoskeletal problem/pain      Duration: x1 day    Description  Location: RT knee, ankle and foot     Intensity:  severe    Accompanying signs and symptoms: sharp/shooting pain, swelling      History  Previous similar problem: no   Previous evaluation:  none    Precipitating or alleviating factors:  Trauma or overuse: YES- was kneeling down and when standing up lost balance and fell back and RT leg was stuck behind   Aggravating factors include: standing, walking and climbing stairs    Therapies tried and outcome: tylenol - didn't help with pain     States he has been limping since fall.  Is able to use his can and ambulate but his pain has foot and knee.              Reviewed and updated as needed this visit by Provider         Review of Systems   Constitutional, HEENT, cardiovascular, pulmonary, gi and gu systems are negative, except as otherwise noted.      Objective    There were no vitals taken for this visit.  There is no height or weight on file to calculate BMI.  Physical Exam   GENERAL APPEARANCE: alert, no distress and over weight  MS: The right knee is without any bruising.  There is no warmth or no obvious effusion.  Has full extension though there is some mild pain.  The right ankle-No tenderness over the Achilles or medial or lateral malleolus there is dorsal foot  Swelling ecchymoses and tenderness noted on the right.  No obvious deformities are noted.    X-ray: See report no midfoot acute fractures over the area of tenderness        none         Assessment & Plan     1. Right foot pain  **No obvious new fracture correlates with area of tenderness.  Recommend off loading the foot is much as possible using ice and limited NSAIDs.  If swelling and discomfort do not improve follow-up with sports medicine.  - XR Foot Right G/E 3 Views; Future  - naproxen (NAPROSYN) 500 MG tablet;  "Take 1 tablet (500 mg) by mouth 2 times daily (with meals)  Dispense: 14 tablet; Refill: 0    2. Acute pain of right knee  Nothing significant on exam.  Likely  ligamentous strain.   - Orthopedic & Spine  Referral; Future  - naproxen (NAPROSYN) 500 MG tablet; Take 1 tablet (500 mg) by mouth 2 times daily (with meals)  Dispense: 14 tablet; Refill: 0     BMI:   Estimated body mass index is 30.86 kg/m  as calculated from the following:    Height as of this encounter: 1.715 m (5' 7.5\").    Weight as of this encounter: 90.7 kg (200 lb).               No follow-ups on file.    Benny Griffin MD  [facility]      "

## 2020-08-31 ENCOUNTER — TELEPHONE (OUTPATIENT)
Dept: INTERNAL MEDICINE | Facility: CLINIC | Age: 60
End: 2020-08-31

## 2020-08-31 NOTE — TELEPHONE ENCOUNTER
Reason for Call:  Other call back    Detailed comments: The patient called and stated that he needs to talk a nurse about an issue. He was asked to go into further detail but he stated that it was personal and did not want to disclose any more information. He would like a call back to discuss this.     Phone Number Patient can be reached at: Cell number on file:    Telephone Information:   Mobile [phone]       Best Time: Any    Can we leave a detailed message on this number? YES    Call taken on 8/31/2020 at 10:05 AM by Marina Tapia

## 2020-08-31 NOTE — TELEPHONE ENCOUNTER
Spoke with patient. He is scheduled to see Dr. Thurston but would like to cancel. Feels fine but really needs another 6 months of medication refills.    Chart reviewed, per LOV follow up. Patient advised to keep appointment. Expressed understanding. No further questions at this time.    Martha KEVINN, RN, PHN

## 2020-10-13 ENCOUNTER — OFFICE VISIT (OUTPATIENT)
Dept: INTERNAL MEDICINE | Facility: CLINIC | Age: 60
End: 2020-10-13
Payer: COMMERCIAL

## 2020-10-13 VITALS
WEIGHT: 197 LBS | BODY MASS INDEX: 30.4 KG/M2 | OXYGEN SATURATION: 96 % | SYSTOLIC BLOOD PRESSURE: 128 MMHG | DIASTOLIC BLOOD PRESSURE: 72 MMHG | TEMPERATURE: 97.7 F | HEART RATE: 92 BPM | RESPIRATION RATE: 16 BRPM

## 2020-10-13 DIAGNOSIS — E87.1 HYPONATREMIA: ICD-10-CM

## 2020-10-13 DIAGNOSIS — I10 BENIGN ESSENTIAL HYPERTENSION: ICD-10-CM

## 2020-10-13 DIAGNOSIS — Z12.11 SPECIAL SCREENING FOR MALIGNANT NEOPLASMS, COLON: ICD-10-CM

## 2020-10-13 DIAGNOSIS — F41.1 GENERALIZED ANXIETY DISORDER: ICD-10-CM

## 2020-10-13 DIAGNOSIS — R73.9 BLOOD GLUCOSE ELEVATED: ICD-10-CM

## 2020-10-13 LAB
ANION GAP SERPL CALCULATED.3IONS-SCNC: 5 MMOL/L (ref 3–14)
BUN SERPL-MCNC: 14 MG/DL (ref 7–30)
CALCIUM SERPL-MCNC: 9.1 MG/DL (ref 8.5–10.1)
CHLORIDE SERPL-SCNC: 87 MMOL/L (ref 94–109)
CO2 SERPL-SCNC: 31 MMOL/L (ref 20–32)
CREAT SERPL-MCNC: 0.73 MG/DL (ref 0.66–1.25)
GFR SERPL CREATININE-BSD FRML MDRD: >90 ML/MIN/{1.73_M2}
GLUCOSE SERPL-MCNC: 118 MG/DL (ref 70–99)
HBA1C MFR BLD: 5 % (ref 0–5.6)
POTASSIUM SERPL-SCNC: 3.4 MMOL/L (ref 3.4–5.3)
SODIUM SERPL-SCNC: 123 MMOL/L (ref 133–144)

## 2020-10-13 PROCEDURE — 83036 HEMOGLOBIN GLYCOSYLATED A1C: CPT | Performed by: INTERNAL MEDICINE

## 2020-10-13 PROCEDURE — 99214 OFFICE O/P EST MOD 30 MIN: CPT | Performed by: INTERNAL MEDICINE

## 2020-10-13 PROCEDURE — 80048 BASIC METABOLIC PNL TOTAL CA: CPT | Performed by: INTERNAL MEDICINE

## 2020-10-13 ASSESSMENT — PATIENT HEALTH QUESTIONNAIRE - PHQ9: SUM OF ALL RESPONSES TO PHQ QUESTIONS 1-9: 3

## 2020-10-13 NOTE — PATIENT INSTRUCTIONS
Continue current meds. Call pharmacy when refills needed  Labs today as ordered in FIT stool test. Return to the lab in the next 1 week  Follow up in 6 months. Earlier as needed  Reduce calorie/carbohydrate (sugar, bread, potato, pasta, rice, alcohol etc)  intake in diet.  Increase color on your plate with fruits and vegetables. Increase  frequency of walking or other aerobic exercise as able (goal is daily)  Check with insurance or speak with your pharmacist re: Shingrix vaccine coverage for shingles prevention.  This is a 2 shot series done 2-6 months apart   Follow-up with Birdie (Xochitl Kaufman) per her previous instructions

## 2020-10-13 NOTE — PROGRESS NOTES
Subjective     Suresh Powers is a 60 year old male who presents to clinic today for the following health issues:    HPI         Hypertension Follow-up      Do you check your blood pressure regularly outside of the clinic? No     Are you following a low salt diet? Yes    Are your blood pressures ever more than 140 on the top number (systolic) OR more   than 90 on the bottom number (diastolic), for example 140/90? Unknown      How many servings of fruits and vegetables do you eat daily?  4 or more    On average, how many sweetened beverages do you drink each day (Examples: soda, juice, sweet tea, etc.  Do NOT count diet or artificially sweetened beverages)?   0    How many days per week do you exercise enough to make your heart beat faster? 3 or less    How many minutes a day do you exercise enough to make your heart beat faster? 9 or less    How many days per week do you miss taking your medication? 0    Pt's past medical history, family history, habits, medications and allergies were reviewed with the patient today.  See snap shot for  HCM status. Most recent lab results reviewed with pt. Problem list and histories reviewed & adjusted, as indicated.  Additional history as below:     Denies chest pain, shortness of breath, abdominal pain, headache, vision changes or side effects with medications.   Due for colon cancer screening. Declines colonoscopy. Never returned p[revious FIT test   States anxiety stable with meds. Followed by Xochitl Kaufman at [facility]. Last seen approx Sept 2020    Component      Latest Ref Rng & Units 3/13/2020   Sodium      133 - 144 mmol/L 132 (L)   Potassium      3.4 - 5.3 mmol/L 3.9   Chloride      94 - 109 mmol/L 98   Carbon Dioxide      20 - 32 mmol/L 28   Anion Gap      3 - 14 mmol/L 6   Glucose      70 - 99 mg/dL 118 (H)   Urea Nitrogen      7 - 30 mg/dL 17   Creatinine      0.66 - 1.25 mg/dL 0.81   GFR Estimate      >60 mL/min/1.73:m2 >90   GFR Estimate If Black      >60 mL/min/1.73:m2  ">90   Calcium      8.5 - 10.1 mg/dL 8.9   Cholesterol      <200 mg/dL 240 (H)   Triglycerides      <150 mg/dL 45   HDL Cholesterol      >39 mg/dL 141   LDL Cholesterol Calculated      <100 mg/dL 90   Non HDL Cholesterol      <130 mg/dL 99   Hepatitis C Antibody      NR:Nonreactive Nonreactive   TSH      0.40 - 4.00 mU/L 0.93       Additional ROS:   Constitutional, HEENT, Cardiovascular, Pulmonary, GI and , Neuro, MSK and Psych review of systems/symptoms are otherwise negative or unchanged from previous, except as noted above.      OBJECTIVE:  /72   Pulse 92   Temp 97.7  F (36.5  C) (Temporal)   Resp 16   Wt 89.4 kg (197 lb)   SpO2 96%   BMI 30.40 kg/m     Estimated body mass index is 30.4 kg/m  as calculated from the following:    Height as of 6/11/20: 1.715 m (5' 7.5\").    Weight as of this encounter: 89.4 kg (197 lb).     Neck: no adenopathy. Thyroid normal to palpation. No bruits  Pulm: Lungs clear to auscultation   CV: Regular rates and rhythm  GI: Soft, mldly obese, nontender, Normal active bowel sounds, No hepatosplenomegaly or masses palpable  Ext: Peripheral pulses intact. No edema.  Neuro: Normal strength and tone, sensory exam grossly normal  Gen: Slight anxious affect. Normal dress and thought content       Assessment/Plan: (See plan discussion below for further details)  1. Benign essential hypertension  Controlled.  Continue current medication.  Labs ordered  - Basic metabolic panel    2. Hyponatremia  Likely related to chlorthalidone.  Continue current medication for now as long as sodium stable.  Lab is ordered  - Basic metabolic panel    3. Blood glucose elevated  Weight gain of about 20 pounds in the last year no weight down slightly since last visit.  Also regarding calorie/carbohydrate reduction.  Labs as ordered  - Basic metabolic panel  - Hemoglobin A1c    4. Generalized anxiety disorder  Stable per patient.  Continue management per [facility]    5. Special screening for " malignant neoplasms, colon  Declines  colonoscopy despite MD recommendation for the procedure. FIT test ordered. Reviewed the reduced sensitivity of the FIT test for colon cancer screening compared to colonoscopy and pt states understanding of this. Patient to inform physician in the future if willing to undergo future colonoscopy.   - Fecal colorectal cancer screen (FIT); Future    Plan discussion:   Continue current meds. Call pharmacy when refills needed  Labs today as ordered in FIT stool test. Return to the lab in the next 1 week  Follow up in 6 months. Earlier as needed  Reduce calorie/carbohydrate (sugar, bread, potato, pasta, rice, alcohol etc)  intake in diet.  Increase color on your plate with fruits and vegetables. Increase  frequency of walking or other aerobic exercise as able (goal is daily)  Check with insurance or speak with your pharmacist re: Shingrix vaccine coverage for shingles prevention.  This is a 2 shot series done 2-6 months apart   Follow-up with Birdie (Xochitl Kaufman) per her previous instructions       Brad Thurston MD  Internal Medicine Department  [facility]    (Chart documentation was completed, in part, with [employer]ango voice-recognition software. Even though reviewed, some grammatical, spelling, and word errors may remain.)

## 2020-11-02 DIAGNOSIS — J30.89 NON-SEASONAL ALLERGIC RHINITIS, UNSPECIFIED TRIGGER: ICD-10-CM

## 2020-11-03 RX ORDER — FLUTICASONE PROPIONATE 50 MCG
SPRAY, SUSPENSION (ML) NASAL
Qty: 48 G | Refills: 2 | Status: SHIPPED | OUTPATIENT
Start: 2020-11-03 | End: 2021-06-15

## 2021-03-24 ENCOUNTER — APPOINTMENT (OUTPATIENT)
Dept: MRI IMAGING | Facility: CLINIC | Age: 61
End: 2021-03-24
Attending: NURSE PRACTITIONER
Payer: COMMERCIAL

## 2021-03-24 ENCOUNTER — APPOINTMENT (OUTPATIENT)
Dept: CT IMAGING | Facility: CLINIC | Age: 61
End: 2021-03-24
Attending: NURSE PRACTITIONER
Payer: COMMERCIAL

## 2021-03-24 ENCOUNTER — HOSPITAL ENCOUNTER (EMERGENCY)
Facility: CLINIC | Age: 61
Discharge: HOME | End: 2021-03-24
Attending: NURSE PRACTITIONER | Admitting: NURSE PRACTITIONER
Payer: COMMERCIAL

## 2021-03-24 VITALS
HEART RATE: 86 BPM | WEIGHT: 185 LBS | OXYGEN SATURATION: 94 % | DIASTOLIC BLOOD PRESSURE: 92 MMHG | HEIGHT: 67 IN | RESPIRATION RATE: 16 BRPM | SYSTOLIC BLOOD PRESSURE: 137 MMHG | BODY MASS INDEX: 29.03 KG/M2 | TEMPERATURE: 98.6 F

## 2021-03-24 DIAGNOSIS — H53.8 BLURRED VISION: ICD-10-CM

## 2021-03-24 DIAGNOSIS — I44.0 FIRST DEGREE AV BLOCK: ICD-10-CM

## 2021-03-24 DIAGNOSIS — E87.1 HYPONATREMIA: ICD-10-CM

## 2021-03-24 LAB
ANION GAP SERPL CALCULATED.3IONS-SCNC: 8 MMOL/L (ref 3–14)
BASOPHILS # BLD AUTO: 0.1 10E9/L (ref 0–0.2)
BASOPHILS NFR BLD AUTO: 0.6 %
BUN SERPL-MCNC: 11 MG/DL (ref 7–30)
CALCIUM SERPL-MCNC: 8.8 MG/DL (ref 8.5–10.1)
CHLORIDE SERPL-SCNC: 91 MMOL/L (ref 94–109)
CO2 SERPL-SCNC: 27 MMOL/L (ref 20–32)
CREAT SERPL-MCNC: 0.84 MG/DL (ref 0.66–1.25)
CRP SERPL-MCNC: 3.7 MG/L (ref 0–8)
DIFFERENTIAL METHOD BLD: ABNORMAL
EOSINOPHIL # BLD AUTO: 0.1 10E9/L (ref 0–0.7)
EOSINOPHIL NFR BLD AUTO: 1.4 %
ERYTHROCYTE [DISTWIDTH] IN BLOOD BY AUTOMATED COUNT: 14.1 % (ref 10–15)
ERYTHROCYTE [SEDIMENTATION RATE] IN BLOOD BY WESTERGREN METHOD: 17 MM/H (ref 0–20)
GFR SERPL CREATININE-BSD FRML MDRD: >90 ML/MIN/{1.73_M2}
GLUCOSE SERPL-MCNC: 117 MG/DL (ref 70–99)
HCT VFR BLD AUTO: 38.4 % (ref 40–53)
HGB BLD-MCNC: 13.7 G/DL (ref 13.3–17.7)
IMM GRANULOCYTES # BLD: 0 10E9/L (ref 0–0.4)
IMM GRANULOCYTES NFR BLD: 0.5 %
INTERPRETATION ECG - MUSE: NORMAL
LYMPHOCYTES # BLD AUTO: 1.3 10E9/L (ref 0.8–5.3)
LYMPHOCYTES NFR BLD AUTO: 15 %
MCH RBC QN AUTO: 32.5 PG (ref 26.5–33)
MCHC RBC AUTO-ENTMCNC: 35.7 G/DL (ref 31.5–36.5)
MCV RBC AUTO: 91 FL (ref 78–100)
MONOCYTES # BLD AUTO: 1 10E9/L (ref 0–1.3)
MONOCYTES NFR BLD AUTO: 11 %
NEUTROPHILS # BLD AUTO: 6.3 10E9/L (ref 1.6–8.3)
NEUTROPHILS NFR BLD AUTO: 71.5 %
NRBC # BLD AUTO: 0 10*3/UL
NRBC BLD AUTO-RTO: 0 /100
PLATELET # BLD AUTO: 314 10E9/L (ref 150–450)
POTASSIUM SERPL-SCNC: 3.2 MMOL/L (ref 3.4–5.3)
RBC # BLD AUTO: 4.22 10E12/L (ref 4.4–5.9)
SODIUM SERPL-SCNC: 126 MMOL/L (ref 133–144)
TROPONIN I SERPL-MCNC: <0.015 UG/L (ref 0–0.04)
WBC # BLD AUTO: 8.7 10E9/L (ref 4–11)

## 2021-03-24 PROCEDURE — 86140 C-REACTIVE PROTEIN: CPT | Performed by: NURSE PRACTITIONER

## 2021-03-24 PROCEDURE — 80048 BASIC METABOLIC PNL TOTAL CA: CPT | Performed by: NURSE PRACTITIONER

## 2021-03-24 PROCEDURE — 255N000002 HC RX 255 OP 636: Performed by: NURSE PRACTITIONER

## 2021-03-24 PROCEDURE — 99285 EMERGENCY DEPT VISIT HI MDM: CPT | Mod: 25

## 2021-03-24 PROCEDURE — 93005 ELECTROCARDIOGRAM TRACING: CPT

## 2021-03-24 PROCEDURE — 250N000011 HC RX IP 250 OP 636: Performed by: NURSE PRACTITIONER

## 2021-03-24 PROCEDURE — 250N000009 HC RX 250: Performed by: NURSE PRACTITIONER

## 2021-03-24 PROCEDURE — A9585 GADOBUTROL INJECTION: HCPCS | Performed by: NURSE PRACTITIONER

## 2021-03-24 PROCEDURE — 70496 CT ANGIOGRAPHY HEAD: CPT

## 2021-03-24 PROCEDURE — 84484 ASSAY OF TROPONIN QUANT: CPT | Performed by: NURSE PRACTITIONER

## 2021-03-24 PROCEDURE — 85652 RBC SED RATE AUTOMATED: CPT | Performed by: NURSE PRACTITIONER

## 2021-03-24 PROCEDURE — 85025 COMPLETE CBC W/AUTO DIFF WBC: CPT | Performed by: NURSE PRACTITIONER

## 2021-03-24 PROCEDURE — 70553 MRI BRAIN STEM W/O & W/DYE: CPT

## 2021-03-24 PROCEDURE — 250N000013 HC RX MED GY IP 250 OP 250 PS 637: Performed by: NURSE PRACTITIONER

## 2021-03-24 RX ORDER — GADOBUTROL 604.72 MG/ML
8 INJECTION INTRAVENOUS ONCE
Status: COMPLETED | OUTPATIENT
Start: 2021-03-24 | End: 2021-03-24

## 2021-03-24 RX ORDER — SODIUM CHLORIDE 5 %
OINTMENT (GRAM) OPHTHALMIC (EYE) DAILY
Qty: 3.5 G | Refills: 0 | Status: SHIPPED | OUTPATIENT
Start: 2021-03-24 | End: 2021-03-31

## 2021-03-24 RX ORDER — CARBOXYMETHYLCELLULOSE SODIUM 10 MG/ML
1 GEL OPHTHALMIC 3 TIMES DAILY PRN
Qty: 1 EACH | Refills: 0 | Status: ON HOLD | OUTPATIENT
Start: 2021-03-24 | End: 2022-09-04

## 2021-03-24 RX ORDER — PROPARACAINE HYDROCHLORIDE 5 MG/ML
1 SOLUTION/ DROPS OPHTHALMIC ONCE
Status: DISCONTINUED | OUTPATIENT
Start: 2021-03-24 | End: 2021-03-24 | Stop reason: HOSPADM

## 2021-03-24 RX ORDER — IOPAMIDOL 755 MG/ML
500 INJECTION, SOLUTION INTRAVASCULAR ONCE
Status: DISCONTINUED | OUTPATIENT
Start: 2021-03-24 | End: 2021-03-24

## 2021-03-24 RX ORDER — IOPAMIDOL 755 MG/ML
70 INJECTION, SOLUTION INTRAVASCULAR ONCE
Status: COMPLETED | OUTPATIENT
Start: 2021-03-24 | End: 2021-03-24

## 2021-03-24 RX ORDER — ASPIRIN 325 MG
325 TABLET ORAL ONCE
Status: COMPLETED | OUTPATIENT
Start: 2021-03-24 | End: 2021-03-24

## 2021-03-24 RX ADMIN — ASPIRIN 325 MG ORAL TABLET 325 MG: 325 PILL ORAL at 12:08

## 2021-03-24 RX ADMIN — SODIUM CHLORIDE 90 ML: 9 INJECTION, SOLUTION INTRAVENOUS at 13:41

## 2021-03-24 RX ADMIN — IOPAMIDOL 70 ML: 755 INJECTION, SOLUTION INTRAVENOUS at 13:40

## 2021-03-24 RX ADMIN — GADOBUTROL 8 ML: 604.72 INJECTION INTRAVENOUS at 12:15

## 2021-03-24 ASSESSMENT — ENCOUNTER SYMPTOMS
FACIAL ASYMMETRY: 0
HEADACHES: 0
NUMBNESS: 0
FEVER: 0
WEAKNESS: 1
EYE PAIN: 1
EYE DISCHARGE: 0

## 2021-03-24 ASSESSMENT — VISUAL ACUITY
OD: 20/150
OS: 20/100

## 2021-03-24 ASSESSMENT — MIFFLIN-ST. JEOR: SCORE: 1607.78

## 2021-03-24 NOTE — ED PROVIDER NOTES
History   Chief Complaint:  Eye Problem       HPI   Suresh Powers is a 60 year old male not on blood thinners with history of hypertension who presents with eye problem. The patient states that for the last five mornings he has had a hard time opening his left eye due to weakness.  He notes after a few hours he is able to open his left eye and has no weakness the rest of the day.  He denies any mattering or discharge from the eye.  He says then this morning when he woke up around 0730 his vision out of his left eye was blurry, which has not happened the last few days. He has not taken any medications for his symptoms. He states that the blurriness is all from his left eye and not in any specific spot. He says he does feel he has to work a bit more to close his eye, and he does experience some pain when squeezing his eye shut. He does note that five years ago he poked his left eye with glasses and has had floaters since then, but none of that is changed with today's presentation. He denies fever, headache, loss of balance, facial droop, extremity numbness or weakness, or double vision.    Review of Systems   Constitutional: Negative for fever.   Eyes: Positive for pain and visual disturbance. Negative for discharge.   Musculoskeletal: Negative for gait problem.   Neurological: Positive for weakness (left eye). Negative for facial asymmetry, numbness and headaches.   All other systems reviewed and are negative.      Allergies:  Lisinopril  Sertraline      Medications:  Amitriptyline  Amlodipine  Buspirone  Hygroton  Lexapro  Gabapentin  Lamictal  Trazodone  Zinc      Past Medical History:    Anxiety  Clostridium difficile diarrhea  Hypertension  Shingles  Major depressive disorder  OCD  Single kidney (donated left to sister)  Iron deficiency anemia  Sleep disorder  Inguinal hernia       Past Surgical History:    Foot crush injury  Kidney donation  Open reduction internal fixation clavicle, left  Open reduction  "internal fixation tibial plateau, left       Family History:    Breast cancer  Diabetes Type II  Skin cancer  Cerebrovascular disease      Social History:  Unaccompanied to ED.    Physical Exam     Patient Vitals for the past 24 hrs:   BP Temp Temp src Pulse Resp SpO2 Height Weight   03/24/21 1430 (!) 137/92 -- -- 86 -- 94 % -- --   03/24/21 1400 (!) 140/88 -- -- 90 -- 98 % -- --   03/24/21 1330 (!) 141/96 -- -- 88 -- 98 % -- --   03/24/21 1043 (!) 142/86 98.6  F (37  C) Oral 105 16 96 % 1.702 m (5' 7\") 83.9 kg (185 lb)       Physical Exam  Nursing notes reviewed. Vitals reviewed.  General: Alert. Well kept.  Eyes:  Visual acuity: right eye - 20/150; left eye - 20/100.  IOP: right eye - 12; left eye - 13  right eye: conjunctiva non-injected, non-icteric.  left eye: Normal conjunctiva. No subconjunctival hemorrhage. No scleral icterus. No foreign body noted on upper eyelid eversion. On slit lamp exam - anterior chamber clear, no hyphema. no foreign body noted in cornea or conjunctiva. Negative Cole sign. No corneal ulcer.  Linear uptake of fluorescein from 8:00 towards the medial pupil but does not cover the central pupil.  No dendrites.   Proptosis-absent  Ptosis-absent  Anisocoria- absent  Neck/Throat: Moist mucous membranes. Normal voice.  Cardiac: Regular rhythm. Normal heart sounds.  Pulmonary: Clear and equal breath sounds bilaterally.   Musculoskeletal: Normal gross range of motion of all 4 extremities.   Neurological:  Mental: alert and oriented x 4, follows commands, no aphasia or dysarthria.   Cranial Nerves:  CN II: visual acuity - able to accurately count fingers with each eye. Visual fields intact, but notes generalized blurred vision in all left visual fields.   CN III, IV, VI: EOM intact, pupils equal and reactive  CN V: facial sensation nl  CN VII: face symmetric, no facial droop  CN VIII: hearing normal  CN IX: palate elevation symmetric, uvula at midline  CN XI SCM normal, shoulder shrug nl  CN " XII: tongue midline  Motor: Strength: 5/5 in all major groups of all extremities. Normal tone. No abnormal movements. No pronator drift b/l.  Sensory: temperature, light touch intact. Romberg: negative.  Coordination: FNF and heel-shin tests intact b/l.   Gait:  Normal.  Skin: Warm and dry. Normal appearance of visualized exposed skin without rashes or petechiae.  Psych: Affect normal. Good eye contact.    Emergency Department Course   ECG  ECG taken at 1142, ECG read at 1147  Sinus rhythm with 1st degree AV block   Change of 1st degree AV block as compared to prior, dated 5/6/17.  Rate 86 bpm. ND interval 214 ms. QRS duration 66 ms. QT/QTc 376/449 ms. P-R-T axes 68 43 20.       Imaging:  CTA Head Neck with Contrast  CTA Head: No evidence of large vessel occlusion or high-grade  stenosis.   CTA Neck: No evidence of large vessel occlusion or high-grade  stenosis.     CELINE FERRER MD  Reading per radiology    MR Brain w/o & w Contrast  1. Scattered nonspecific white matter T2 hyperintensities.  Differential diagnosis includes chronic small vessel ischemic change,  demyelinating disease, or sequela of old insults.  2. Complete opacification of the right maxillary sinus.    CELINE FERRER MD  Reading per radiology    Laboratory:  CBC: WBC 8.7, HGB 13.7,    BMP:  (L), Potassium 3.2 (L), Chloride 91 (L), Glucose 117 (H) o/w WNL (Creatinine 0.84)     Troponin (Collected 1158): <0.015  Erythrocyte sedimentation rate: 17  CRP: 3.7    Emergency Department Course:    Reviewed:  I reviewed nursing notes, vitals and past medical history    Assessments:  1105 I obtained history and examined the patient as noted above.   1155 I rechecked the patient.  1204 I rechecked the patient.  1512 I rechecked the patient and explained findings. I also performed an eye exam as noted above.  1556 I rechecked the patient and explained plan for discharge.    Consults:   1119 I consulted Dr. Omar Bundy of stroke neurology  regarding the patient's presentation.  He will follow-up on imaging.  6714 I consulted Dr. Littlejohn of ophthalmology from [facility].    Interventions:  1208 Aspirin 325 mg PO   Proparacaine 0.5% ophthalmic solution 1 drop Left Eye   Ful-Hannah 1600 mg 1 strip Left Eye    Disposition:  The patient was discharged to home.     Impression & Plan   Medical Decision Making:  Suresh Powers is a 60 year old male not on blood thinners with history of hypertension who presents with eye problem.  On examination the patient has no neurologic deficit with the exception of blurred vision in all fields of the left eye.  There is no loss of the nasolabial fold and has normal forehead movement bilateral making Bell's palsy unlikely.  He has no other neurologic deficit and no eyelid weakness and was symptoms greater than 24 hours code stroke was not called.  I did discuss the patient's presentation with stroke neurology who recommended CTA of the head and neck and MRI of the brain.  They recommended with normal imaging that he had follow-up with ophthalmology as history is not consistent with stroke.  Imaging was obtained and shows no vascular occlusion or stenosis and no acute stroke, bleed, aneurysm.  Symptoms not consistent with temporal arteritis with a normal ESR and CRP.  Slit lamp reveals a mild linear uptake of fluorescein from 8:00 towards the pupil but does not cover the central pupil and he has normal intraocular pressure.  Cardiac cause also unlikely with normal troponin and EKG nonischemic.  He does have a persistent hyponatremia at 126 which is improved from his baseline.  No other acute lab abnormality.  I then spoke with ophthalmology who notes symptoms are most consistent with basement membrane dystrophy.  He recommended hypertonic saline ointment and lubricant eyedrops for 1 week and follow-up in their clinic within the week.  Patient is in agreement with this plan and all questions were answered.  He  was  given follow-up information for [facility].  He will also follow-up with his primary care regarding nonspecific white matter T2 hyper intensities noted on MRI and possible need for neurology follow-up and to recheck his electrolytes and discussed new first-degree AV block.  Patient return to the emergency department with any focal numbness or weakness, headache, worsening symptoms, concerns.      Diagnosis:    ICD-10-CM    1. Blurred vision  H53.8    2. Hyponatremia  E87.1    3. First degree AV block  I44.0        Discharge Medications:  New Prescriptions    CARBOXYMETHYLCELLULOSE PF (REFRESH LIQUIGEL) 1 % OPHTHALMIC GEL    Place 1 drop Into the left eye 3 times daily as needed for dry eyes    SODIUM CHLORIDE (PATRICIA 128) 5 % OPHTHALMIC OINTMENT    Place Into the left eye daily for 7 days       Scribe Disclosure:  Candace ARREOLA, am serving as a scribe at 11:05 AM on 3/24/2021 to document services personally performed by Rosy Porter DNP based on my observations and the provider's statements to me.          Rosy Porter, CNP  03/24/21 9632

## 2021-03-24 NOTE — PROGRESS NOTES
[facility]    Stroke Telephone Note    I was called by Dr. Rosy Porter on 03/24/21 at 1118 regarding patient Suresh Powers, who is a 60 year old gentleman with history of hypertension, anxiety, and sleep disorder presenting to the ED for evaluation of eyelid weakness and blurry vision. For the past four mornings, he has woken with inability to open his left eye. This has resolved each of the past three mornings, however, this morning he noted blurry vision involving his left eye. He specifically denied diplopia, but rather the visual acuity was below his baseline. He denied symptoms in the right eye. According to his ED provider, there are no pupillary asymmetries, no lower facial weakness, no dysarthria, no limb weakness, and no sensory changes. There are no ipsilateral autonomic changes There may be a slight adduction deficit in the left eye. He is not reporting eye or head pain.      TPA Treatment   Not given due to stroke not suspected vs minor resolving symptoms.    Endovascular Treatment  low clinical suspicion of LVO given symptomatology    Impression  #eyelid weakness, blurry vision   Mr Powers is presenting with eyelid weakness and monocular blurry vision. We discussed imaging options and ultimately recommended MRI of his brain with thin cuts through the cranial nerves and brainstem in addition to angiography of the head and neck to rule out carotid disease that may be causing a Denise syndrome. We have low suspicion of stroke explaining these symptoms given the transient nature over the past four days. He should have an eye examination by optometry or ophthalmology given the monocular nature of his blurry vision. We will follow-up imaging with further recommendations if indicated.    Recommendations  - MRI brain with thin cuts through cranial nerve 7 and brainstem; contrast enhanced study preferred  - CTA head/neck (or MRA head/neck) with contrast  - ophthalmology or optometry  consult for formal eye assessment    My recommendations are based on the information provided over the phone by Suresh Powers's in-person providers. They are not intended to replace the clinical judgment of his in-person providers. I was not requested to personally see or examine the patient at this time.    The Stroke/Neurocritical Care Staff is Dr. Celestin.    Omar Bundy DO  Neurocritical Care Fellow

## 2021-03-24 NOTE — ED TRIAGE NOTES
Past 4-5 days pt having hard time opening left eye when awakening. This morning was the worst. C/o having blurred vision ever since opening eye at 0730. Unsure if there's been discharge in his eye. C/o eye initially burning but is resolved now. No HA. No difficulty walking or balance issues. No unilateral weakness. No facial droop. Normal speech.

## 2021-04-23 ENCOUNTER — IMMUNIZATION (OUTPATIENT)
Dept: NURSING | Facility: CLINIC | Age: 61
End: 2021-04-23
Payer: COMMERCIAL

## 2021-04-23 PROCEDURE — 0001A PR COVID VAC PFIZER DIL RECON 30 MCG/0.3 ML IM: CPT

## 2021-04-23 PROCEDURE — 91300 PR COVID VAC PFIZER DIL RECON 30 MCG/0.3 ML IM: CPT

## 2021-05-14 ENCOUNTER — IMMUNIZATION (OUTPATIENT)
Dept: NURSING | Facility: CLINIC | Age: 61
End: 2021-05-14
Attending: INTERNAL MEDICINE
Payer: COMMERCIAL

## 2021-05-14 PROCEDURE — 91300 PR COVID VAC PFIZER DIL RECON 30 MCG/0.3 ML IM: CPT

## 2021-05-14 PROCEDURE — 0002A PR COVID VAC PFIZER DIL RECON 30 MCG/0.3 ML IM: CPT

## 2021-05-15 ENCOUNTER — HEALTH MAINTENANCE LETTER (OUTPATIENT)
Age: 61
End: 2021-05-15

## 2021-06-15 ENCOUNTER — OFFICE VISIT (OUTPATIENT)
Dept: INTERNAL MEDICINE | Facility: CLINIC | Age: 61
End: 2021-06-15
Payer: COMMERCIAL

## 2021-06-15 VITALS
DIASTOLIC BLOOD PRESSURE: 80 MMHG | RESPIRATION RATE: 16 BRPM | WEIGHT: 190 LBS | HEIGHT: 67 IN | TEMPERATURE: 97.5 F | SYSTOLIC BLOOD PRESSURE: 126 MMHG | BODY MASS INDEX: 29.82 KG/M2 | HEART RATE: 76 BPM | OXYGEN SATURATION: 98 %

## 2021-06-15 DIAGNOSIS — J30.89 NON-SEASONAL ALLERGIC RHINITIS, UNSPECIFIED TRIGGER: ICD-10-CM

## 2021-06-15 DIAGNOSIS — F32.A DEPRESSION, UNSPECIFIED DEPRESSION TYPE: ICD-10-CM

## 2021-06-15 DIAGNOSIS — F41.0 ANXIETY ATTACK: ICD-10-CM

## 2021-06-15 DIAGNOSIS — Z13.6 CARDIOVASCULAR SCREENING; LDL GOAL LESS THAN 100: ICD-10-CM

## 2021-06-15 DIAGNOSIS — F33.1 MODERATE EPISODE OF RECURRENT MAJOR DEPRESSIVE DISORDER (H): ICD-10-CM

## 2021-06-15 DIAGNOSIS — F41.1 GENERALIZED ANXIETY DISORDER: ICD-10-CM

## 2021-06-15 DIAGNOSIS — H26.9 CATARACT OF BOTH EYES, UNSPECIFIED CATARACT TYPE: ICD-10-CM

## 2021-06-15 DIAGNOSIS — T14.8XXA BRUISING: ICD-10-CM

## 2021-06-15 DIAGNOSIS — I10 BENIGN ESSENTIAL HYPERTENSION: ICD-10-CM

## 2021-06-15 DIAGNOSIS — E87.1 HYPONATREMIA: ICD-10-CM

## 2021-06-15 DIAGNOSIS — Z12.11 SPECIAL SCREENING FOR MALIGNANT NEOPLASMS, COLON: ICD-10-CM

## 2021-06-15 LAB
ALBUMIN SERPL-MCNC: 3.3 G/DL (ref 3.4–5)
ALP SERPL-CCNC: 132 U/L (ref 40–150)
ALT SERPL W P-5'-P-CCNC: 42 U/L (ref 0–70)
ANION GAP SERPL CALCULATED.3IONS-SCNC: 5 MMOL/L (ref 3–14)
APTT PPP: 29 SEC (ref 22–37)
AST SERPL W P-5'-P-CCNC: 37 U/L (ref 0–45)
BILIRUB SERPL-MCNC: 0.6 MG/DL (ref 0.2–1.3)
BUN SERPL-MCNC: 9 MG/DL (ref 7–30)
CALCIUM SERPL-MCNC: 8.7 MG/DL (ref 8.5–10.1)
CHLORIDE SERPL-SCNC: 87 MMOL/L (ref 94–109)
CHOLEST SERPL-MCNC: 224 MG/DL
CO2 SERPL-SCNC: 33 MMOL/L (ref 20–32)
CREAT SERPL-MCNC: 0.81 MG/DL (ref 0.66–1.25)
ERYTHROCYTE [DISTWIDTH] IN BLOOD BY AUTOMATED COUNT: 13.2 % (ref 10–15)
GFR SERPL CREATININE-BSD FRML MDRD: >90 ML/MIN/{1.73_M2}
GLUCOSE SERPL-MCNC: 110 MG/DL (ref 70–99)
HCT VFR BLD AUTO: 39.8 % (ref 40–53)
HDLC SERPL-MCNC: 171 MG/DL
HGB BLD-MCNC: 14.2 G/DL (ref 13.3–17.7)
INR PPP: 0.87 (ref 0.86–1.14)
LDLC SERPL CALC-MCNC: 43 MG/DL
MCH RBC QN AUTO: 33.3 PG (ref 26.5–33)
MCHC RBC AUTO-ENTMCNC: 35.7 G/DL (ref 31.5–36.5)
MCV RBC AUTO: 93 FL (ref 78–100)
NONHDLC SERPL-MCNC: 48 MG/DL
PLATELET # BLD AUTO: 327 10E9/L (ref 150–450)
POTASSIUM SERPL-SCNC: 3.8 MMOL/L (ref 3.4–5.3)
PROT SERPL-MCNC: 7.9 G/DL (ref 6.8–8.8)
RBC # BLD AUTO: 4.27 10E12/L (ref 4.4–5.9)
SODIUM SERPL-SCNC: 125 MMOL/L (ref 133–144)
TRIGL SERPL-MCNC: 31 MG/DL
TSH SERPL DL<=0.005 MIU/L-ACNC: 1.04 MU/L (ref 0.4–4)
WBC # BLD AUTO: 6.6 10E9/L (ref 4–11)

## 2021-06-15 PROCEDURE — 99000 SPECIMEN HANDLING OFFICE-LAB: CPT | Performed by: INTERNAL MEDICINE

## 2021-06-15 PROCEDURE — 80061 LIPID PANEL: CPT | Performed by: INTERNAL MEDICINE

## 2021-06-15 PROCEDURE — 85027 COMPLETE CBC AUTOMATED: CPT | Performed by: INTERNAL MEDICINE

## 2021-06-15 PROCEDURE — 83935 ASSAY OF URINE OSMOLALITY: CPT | Mod: 90 | Performed by: INTERNAL MEDICINE

## 2021-06-15 PROCEDURE — 84300 ASSAY OF URINE SODIUM: CPT | Performed by: INTERNAL MEDICINE

## 2021-06-15 PROCEDURE — 84443 ASSAY THYROID STIM HORMONE: CPT | Performed by: INTERNAL MEDICINE

## 2021-06-15 PROCEDURE — 80053 COMPREHEN METABOLIC PANEL: CPT | Performed by: INTERNAL MEDICINE

## 2021-06-15 PROCEDURE — 36415 COLL VENOUS BLD VENIPUNCTURE: CPT | Performed by: INTERNAL MEDICINE

## 2021-06-15 PROCEDURE — 99214 OFFICE O/P EST MOD 30 MIN: CPT | Performed by: INTERNAL MEDICINE

## 2021-06-15 PROCEDURE — 85610 PROTHROMBIN TIME: CPT | Performed by: INTERNAL MEDICINE

## 2021-06-15 PROCEDURE — 85730 THROMBOPLASTIN TIME PARTIAL: CPT | Performed by: INTERNAL MEDICINE

## 2021-06-15 RX ORDER — FLUTICASONE PROPIONATE 50 MCG
SPRAY, SUSPENSION (ML) NASAL
Qty: 48 G | Refills: 3 | Status: SHIPPED | OUTPATIENT
Start: 2021-06-15 | End: 2023-02-22

## 2021-06-15 RX ORDER — AMLODIPINE BESYLATE 10 MG/1
10 TABLET ORAL DAILY
Qty: 90 TABLET | Refills: 3 | Status: SHIPPED | OUTPATIENT
Start: 2021-06-15 | End: 2022-06-16

## 2021-06-15 ASSESSMENT — ANXIETY QUESTIONNAIRES
5. BEING SO RESTLESS THAT IT IS HARD TO SIT STILL: NOT AT ALL
6. BECOMING EASILY ANNOYED OR IRRITABLE: NOT AT ALL
1. FEELING NERVOUS, ANXIOUS, OR ON EDGE: SEVERAL DAYS
IF YOU CHECKED OFF ANY PROBLEMS ON THIS QUESTIONNAIRE, HOW DIFFICULT HAVE THESE PROBLEMS MADE IT FOR YOU TO DO YOUR WORK, TAKE CARE OF THINGS AT HOME, OR GET ALONG WITH OTHER PEOPLE: NOT DIFFICULT AT ALL
2. NOT BEING ABLE TO STOP OR CONTROL WORRYING: SEVERAL DAYS
3. WORRYING TOO MUCH ABOUT DIFFERENT THINGS: SEVERAL DAYS
7. FEELING AFRAID AS IF SOMETHING AWFUL MIGHT HAPPEN: NOT AT ALL
GAD7 TOTAL SCORE: 3

## 2021-06-15 ASSESSMENT — PATIENT HEALTH QUESTIONNAIRE - PHQ9
5. POOR APPETITE OR OVEREATING: NOT AT ALL
SUM OF ALL RESPONSES TO PHQ QUESTIONS 1-9: 2

## 2021-06-15 ASSESSMENT — MIFFLIN-ST. JEOR: SCORE: 1630.46

## 2021-06-15 NOTE — PROGRESS NOTES
ASSESSMENT:   1. Benign essential hypertension  Blood pressure control but possible hyponatremia side effects from chlorthalidone.  Patient to stop chlorthalidone.  Labs today as ordered.   Will get blood pressure rechecked through the pharmacy in few weeks  - Comprehensive metabolic panel  - amLODIPine (NORVASC) 10 MG tablet; Take 1 tablet (10 mg) by mouth daily  Dispense: 90 tablet; Refill: 3    2. Hyponatremia  Possibly related to chlorthalidone versus mental health medications versus other.  Labs as ordered.  Findings consistent with diuretic use, will have patient remain off of chlorthalidone.  Recheck labs in a couple weeks  - Comprehensive metabolic panel  - Sodium random urine  - Osmolality urine  - TSH with free T4 reflex    3. Bruising  Skin exam with minimal ecchymosis.  Labs as ordered  - CBC with platelets  - INR  - Partial thromboplastin time    4. Moderate episode of recurrent major depressive disorder (H)  Controlled.  Continue management per psychiatry    5. Generalized anxiety disorder  Controlled.  Continue regimen per psychiatry    6. CARDIOVASCULAR SCREENING; LDL GOAL LESS THAN 100  - Lipid panel reflex to direct LDL Fasting    7. Non-seasonal allergic rhinitis, unspecified trigger  Controlled.  Continue medication  - fluticasone (FLONASE) 50 MCG/ACT nasal spray; use 2 sprays in each nostril daily.  Dispense: 48 g; Refill: 3    8. Cataract of both eyes, unspecified cataract type  Symptoms mild.  Patient states he is seeing okay currently.  Patient to follow-up with ophthalmology in 1 year per their previous instructions or earlier as needed    9. Special screening for malignant neoplasms, colon  Declines  colonoscopy despite MD recommendation for the procedure. FIT test ordered. Reviewed the reduced sensitivity of the FIT test for colon cancer screening compared to colonoscopy and pt states understanding of this. Patient to inform physician in the future if willing to undergo future  colonoscopy.   - Fecal colorectal cancer screen (FIT); Future      PLAN:   Labs as ordered   Stop Chlorthalidone for now  Continue other meds. Refills done    FIT stool test kit today in lab and return to the lab  in the next 1 week  Check with insurance or speak with your pharmacist re: Shingrix vaccine coverage for shingles prevention.  This is a 2 shot series done 2-6 months apart  Call  [phone] or use [email or web address] to schedule a future lab appointment  non-fasting in 2 weeks.   Have your blood pressure rechecked at the [facility] pharmacy when you come to the clinic for your lab appointment and ask the pharmacist to send your blood pressure result to be through your medical chart  Follow-up with psychiatry per their previous instructions      (Chart documentation was completed, in part, with [employer] voice-recognition software. Even though reviewed, some grammatical, spelling, and word errors may remain.)    Brad Thurston MD  Internal Medicine Department  [facility]      Alberto Prince is a 60 year old who presents for the following health issues     HPI     Hypertension Follow-up      Do you check your blood pressure regularly outside of the clinic? No     Are you following a low salt diet? Yes    Are your blood pressures ever more than 140 on the top number (systolic) OR more   than 90 on the bottom number (diastolic), for example 140/90? Unknown     Most recent lab results reviewed with pt.      ER notes from  March 2021 reviewed.  was seen by [facility] after the ER visit for detailed eye exam and was told has bilateral cataracts. Was recommended  to hold off on surgery for now. Has been using occ artificial tears and vision OK now per pt . Was told to f/u 1 year later. Denies chest pain, shortness of breath, abdominal pain, headache, or side effects with medications.    Followed by Xochitl Kaufman at [place] for mental health. Seen every 6 mos and  "next appt August. Mood \"OK\" per pt.  PHQ = 2 and SPIKE = 3.  Patient still hesitant to undergo colonoscopy screening.  Had a FIT stool test done few years ago.   Has not turned in a follow-up FIT stool test from last order.  Denies any obvious blood in the stools.  History of hyponatremia.  On chlorthalidone as part of his blood pressure regimen.  Non-smoker now.  Feels like he bruises easier than normal.  Denies NSAIDs now or aspirin.  Allergic rhinitis symptoms well controlled with use of Flonase    Additional ROS:   Constitutional, HEENT, Cardiovascular, Pulmonary, GI and , Neuro, MSK and Psych review of systems/symptoms are otherwise negative or unchanged from previous, except as noted above.      OBJECTIVE:  /80   Pulse 76   Temp 97.5  F (36.4  C) (Temporal)   Resp 16   Ht 1.702 m (5' 7\")   Wt 86.2 kg (190 lb)   SpO2 98%   BMI 29.76 kg/m     Estimated body mass index is 29.76 kg/m  as calculated from the following:    Height as of this encounter: 1.702 m (5' 7\").    Weight as of this encounter: 86.2 kg (190 lb).  Eye: PERRL, EOMI, bilateral cataract seen with nondilated eye exam   HENT: ear canals and TM's normal and nose and mouth without ulcers or lesions.  Nasal mucosa minimally edematous.  Sinuses nontender to palpation  Neck: no adenopathy. Thyroid normal to palpation. No bruits  Pulm: Lungs clear to auscultation   CV: Regular rates and rhythm  GI: Soft, nontender, Normal active bowel sounds, No hepatosplenomegaly or masses palpable  Ext: Peripheral pulses intact. No edema.  Gen: Mildly anxious affect.  Normal dress, thought content and eye contact  Skin: Few small scattered ecchymoses on forearms              "

## 2021-06-15 NOTE — PATIENT INSTRUCTIONS
Labs as ordered   Stop Chlorthalidone for now  Continue other meds. Refills done    FIT stool test kit today in lab and return to the lab  in the next 1 week  Check with insurance or speak with your pharmacist re: Shingrix vaccine coverage for shingles prevention.  This is a 2 shot series done 2-6 months apart  Call  [phone] or use [employer] to schedule a future lab appointment  non-fasting in 2 weeks.   Have your blood pressure rechecked at the [facility] pharmacy when you come to the clinic for your lab appointment and ask the pharmacist to send your blood pressure result to be through your medical chart  Follow-up with psychiatry per their previous instructions

## 2021-06-16 ENCOUNTER — APPOINTMENT (OUTPATIENT)
Dept: LAB | Facility: CLINIC | Age: 61
End: 2021-06-16
Payer: COMMERCIAL

## 2021-06-16 PROBLEM — H26.9 CATARACT OF BOTH EYES, UNSPECIFIED CATARACT TYPE: Status: ACTIVE | Noted: 2021-06-16

## 2021-06-16 LAB
OSMOLALITY UR: 350 MMOL/KG (ref 100–1200)
SODIUM UR-SCNC: 54 MMOL/L

## 2021-06-16 ASSESSMENT — ANXIETY QUESTIONNAIRES: GAD7 TOTAL SCORE: 3

## 2021-06-17 RX ORDER — BUSPIRONE HYDROCHLORIDE 15 MG/1
15 TABLET ORAL 2 TIMES DAILY
Status: ON HOLD | COMMUNITY
Start: 2021-06-17 | End: 2022-09-04

## 2021-09-04 ENCOUNTER — HEALTH MAINTENANCE LETTER (OUTPATIENT)
Age: 61
End: 2021-09-04

## 2021-11-17 ENCOUNTER — APPOINTMENT (OUTPATIENT)
Dept: GENERAL RADIOLOGY | Facility: CLINIC | Age: 61
End: 2021-11-17
Attending: EMERGENCY MEDICINE
Payer: COMMERCIAL

## 2021-11-17 ENCOUNTER — HOSPITAL ENCOUNTER (EMERGENCY)
Facility: CLINIC | Age: 61
Discharge: HOME | End: 2021-11-17
Attending: EMERGENCY MEDICINE | Admitting: EMERGENCY MEDICINE
Payer: COMMERCIAL

## 2021-11-17 VITALS
HEART RATE: 88 BPM | BODY MASS INDEX: 28.98 KG/M2 | OXYGEN SATURATION: 96 % | WEIGHT: 185 LBS | DIASTOLIC BLOOD PRESSURE: 80 MMHG | RESPIRATION RATE: 20 BRPM | TEMPERATURE: 97.8 F | SYSTOLIC BLOOD PRESSURE: 138 MMHG
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
## 2021-11-17 DIAGNOSIS — M79.662 PAIN OF LEFT LOWER LEG: ICD-10-CM

## 2021-11-17 LAB — SARS-COV-2 RNA RESP QL NAA+PROBE: NEGATIVE

## 2021-11-17 PROCEDURE — 250N000013 HC RX MED GY IP 250 OP 250 PS 637: Performed by: EMERGENCY MEDICINE

## 2021-11-17 PROCEDURE — 99285 EMERGENCY DEPT VISIT HI MDM: CPT

## 2021-11-17 PROCEDURE — 73590 X-RAY EXAM OF LOWER LEG: CPT | Mod: LT

## 2021-11-17 PROCEDURE — 73630 X-RAY EXAM OF FOOT: CPT | Mod: LT

## 2021-11-17 PROCEDURE — 87635 SARS-COV-2 COVID-19 AMP PRB: CPT | Performed by: EMERGENCY MEDICINE

## 2021-11-17 PROCEDURE — 73610 X-RAY EXAM OF ANKLE: CPT | Mod: LT

## 2021-11-17 PROCEDURE — C9803 HOPD COVID-19 SPEC COLLECT: HCPCS

## 2021-11-17 RX ORDER — ACETAMINOPHEN 500 MG
1000 TABLET ORAL ONCE
Status: COMPLETED | OUTPATIENT
Start: 2021-11-17 | End: 2021-11-17

## 2021-11-17 RX ORDER — OXYCODONE HYDROCHLORIDE 5 MG/1
10 TABLET ORAL ONCE
Status: COMPLETED | OUTPATIENT
Start: 2021-11-17 | End: 2021-11-17

## 2021-11-17 RX ORDER — ACETAMINOPHEN 500 MG
1000 TABLET ORAL ONCE
Status: DISCONTINUED | OUTPATIENT
Start: 2021-11-17 | End: 2021-11-17

## 2021-11-17 RX ORDER — ASCORBIC ACID 500 MG
500 TABLET ORAL DAILY
COMMUNITY
End: 2023-02-22

## 2021-11-17 RX ORDER — ESCITALOPRAM OXALATE 20 MG/1
20 TABLET ORAL DAILY
COMMUNITY

## 2021-11-17 RX ADMIN — ACETAMINOPHEN 1000 MG: 500 TABLET, FILM COATED ORAL at 06:44

## 2021-11-17 RX ADMIN — OXYCODONE HYDROCHLORIDE 10 MG: 5 TABLET ORAL at 07:22

## 2021-11-17 ASSESSMENT — ENCOUNTER SYMPTOMS
VOMITING: 0
ARTHRALGIAS: 1

## 2021-11-17 NOTE — ED PROVIDER NOTES
History     Chief Complaint:  Fall    The history is provided by the patient.      Suresh Powers is a 61 year old male with history of hypertension, anxiety who presents after a fall which occurred approximately 7 hours ago. He states that he was drinking alcohol at a friends house and lost his balance when trying to pick something up off of the floor from a standing position. He fell forward to the ground on his knees, and he did not hit his head. He reports that he remembers the events of the fall and was not drinking heavily. Suresh was ambulatory immediately after the fall but states that he has not been able to walk since that time. He does mention that he has poor balance and uses a cane to ambulate at baseline. He has not vomited since the fall. Here in the ED, he is complaining of pain in his bilateral lower legs and ankles, left greater than right.    Review of Systems   Gastrointestinal: Negative for vomiting.   Musculoskeletal: Positive for arthralgias.   All other systems reviewed and are negative.    Allergies:  Lisinopril  Sertraline    Medications:  Elavil  Norvasc  Buspar  Lexapro  Flonase  Neurontin  Lamictal  Desyrel    Past Medical History:     Anxiety  Clostridium difficile diarrhea  Hypertension  Shingles  Sleep disorder  Unilateral inguinal hernia  Obsessive compulsive disorder  Hyponatremia  Cataract of both eyes   Single kidney      Past Surgical History:    ORIF clavicle, left  ORIF tibial plateau, left     Family History:    Mother: breast cancer, diabetes mellitus  Father: skin cancer, cerebrovascular disease    Social History:  The patient arrived to the emergency department via EMS.  Presents to the ED alone.  Patient lives alone.    Physical Exam     Patient Vitals for the past 24 hrs:   BP Temp Temp src Pulse Resp SpO2 Weight   11/17/21 1256 138/80 -- -- 88 20 96 % --   11/17/21 1030 -- -- -- -- -- 95 % --   11/17/21 1000 (!) 143/82 -- -- -- -- 94 % --   11/17/21 0526 (!) 148/104 97.8   F (36.6  C) Oral 94 16 94 % --   11/17/21 0525 -- -- -- -- -- -- 83.9 kg (185 lb)     Physical Exam  Vitals: reviewed by me  General: Pt seen on [facility], cooperative, and alert to conversation  Eyes: Tracking well, clear conjunctiva BL  ENT: MMM, midline trachea.   Lungs: No tachypnea, no accessory muscle use. No respiratory distress.   CV: Rate as above  Abd: Soft, non tender, no guarding, no rebound. Non distended  MSK: no joint effusion.  No evidence of trauma  Bilateral lower extremities with no obvious signs of swelling or bony deformities.  Left lower extremity with distractible pain to left ankle and left knee.  Able to bear weight with walker, extremities are warm and well perfused and neurovascularly intact.  Skin: No rash  Neuro: Clear speech and no facial droop.  Psych: Not RIS, no e/o AH/VH      Emergency Department Course     Imaging:  XR Tibia & Fibula Left 2 Views   Final Result   IMPRESSION:    1. No visualized acute fracture or malalignment of the left tibia, fibula, ankle or foot.   2. A cortical plate and surgical screws are present in the proximal left tibia. No evidence of hardware failure.   3. Old healed fracture of the distal metadiaphysis of the left fifth metatarsal bone.       Foot XR, G/E 3 views, left   Final Result   IMPRESSION:    1. No visualized acute fracture or malalignment of the left tibia, fibula, ankle or foot.   2. A cortical plate and surgical screws are present in the proximal left tibia. No evidence of hardware failure.   3. Old healed fracture of the distal metadiaphysis of the left fifth metatarsal bone.       XR Ankle Left G/E 3 Views   Final Result   IMPRESSION:    1. No visualized acute fracture or malalignment of the left tibia, fibula, ankle or foot.   2. A cortical plate and surgical screws are present in the proximal left tibia. No evidence of hardware failure.   3. Old healed fracture of the distal metadiaphysis of the left fifth metatarsal  bone.       Report per radiology    Emergency Department Course:  Reviewed:  I reviewed nursing notes, vitals, past medical history and Care Everywhere    Assessments:  0614 I obtained history and examined the patient as noted above.   1219 I rechecked the patient and explained findings.    Interventions:  0644 Tylenol 1 g PO  0722 Roxicodone 10 mg PO    Disposition:  The patient was discharged to home.     Impression & Plan     Medical Decision Making:  Suresh Powers is a pleasant 61 year old male who presents to the emergency department with appears to be left lower extremity pain after a fall.  I see minimal evidence of trauma only on his exam, and thankfully the x-ray of his left leg is negative.  Eventually after pain medication he is able to walk and ambulate with a walker and put weight on his leg, but he tells me he still does not feel comfortable going home.  He tells me that he lives alone and is afraid that he would fall.  He is willing to go to a TCU however, and Bea, our care coordinator is attempting to place the patient directly from the ER given the current bed crisis.  Patient himself is okay with this plan, he will be monitored very carefully.  I do not see any evidence of an occult fracture, and I think that a TCU with rehab for the next several days could be very helpful to him.    12:26 PM  Once patient was informed he would have to self-pay as he does not have a qualifying need unfortunately, he tells me he feels comfortable going home and that his pain is improved.  He has a friend will come pick him up.  We talked about him following up with his regular doctor in the week ahead.  He has been noted to ambulate well several times, he is asking to go home with a walker, and I am doing my best to make this happen as well, although he may have to use his cane as he does at baseline.  Patient is okay with this plan, he feels as all of his questions have been answered, and knows to follow with his  regular doctor in the week ahead.    Diagnosis:    ICD-10-CM    1. Fall, initial encounter  W19.XXXA    2. Pain of left lower leg  M79.662      Scribe Disclosure:  We, Lola Meza & Mohini Tapia, am serving as a scribe at 5:35 AM on 11/17/2021 to document services personally performed by Todd Tellez MD based on my observations and the provider's statements to me.     Todd Tellez MD  11/17/21 5180

## 2021-11-17 NOTE — PHARMACY-ADMISSION MEDICATION HISTORY
Pharmacy Medication History  Admission medication history interview status for the 11/17/2021  admission is complete. See EPIC admission navigator for prior to admission medications     Location of Interview: Patient room  Medication history sources: Patient and Surescripts    Significant changes made to the medication list:  Removed Amitriptyline and Gabapentin     In the past week, patient estimated taking medication this percent of the time: greater than 90%    Additional medication history information:       Medication reconciliation completed by provider prior to medication history? No    Time spent in this activity: 15 min    Prior to Admission medications    Medication Sig Last Dose Taking? Auth Provider   amLODIPine (NORVASC) 10 MG tablet Take 1 tablet (10 mg) by mouth daily 11/15/2021 at pm Yes Brad Thurston MD   busPIRone (BUSPAR) 15 MG tablet Take 15 mg by mouth 2 times daily  11/16/2021 at Unknown time Yes Brad Thurston MD   escitalopram (LEXAPRO) 20 MG tablet Take 20 mg by mouth daily 11/16/2021 at am Yes Unknown, Entered By History   fluticasone (FLONASE) 50 MCG/ACT nasal spray use 2 sprays in each nostril daily.  Patient taking differently: Spray 2 sprays into both nostrils daily as needed use 2 sprays in each nostril daily. Past Week at Unknown time Yes Brad Thurston MD   lamoTRIgine (LAMICTAL) 150 MG tablet Take 200 mg by mouth At Bedtime 2 tab daily 11/15/2021 at Unknown time Yes Brad Thurston MD   Multiple Vitamins-Minerals (MULTIVITAMIN ADULT PO) Take 1 tablet by mouth daily 11/16/2021 at am Yes Reported, Patient   polyethylene glycol-propylene glycol (SYSTANE ULTRA) 0.4-0.3 % SOLN ophthalmic solution Place 1 drop Into the left eye 4 times daily 11/16/2021 at Unknown time Yes Unknown, Entered By History   vitamin C (ASCORBIC ACID) 500 MG tablet Take 500 mg by mouth daily 11/16/2021 at am Yes Unknown, Entered By History   zinc 50 MG TABS Take 100 mg by mouth daily 11/16/2021 at Unknown time Yes  Reported, Patient   carboxymethylcellulose PF (REFRESH LIQUIGEL) 1 % ophthalmic gel Place 1 drop Into the left eye 3 times daily as needed for dry eyes  Patient not taking: Reported on 11/17/2021 Not Taking at Unknown time  Rosy Porter, CNP   traZODone (DESYREL) 50 MG tablet Take 1-4 tablets ( mg) by mouth At Bedtime  Patient taking differently: Take 100-200 mg by mouth At Bedtime  11/15/2021  Carmen Martinez Ra, APRN CNP       The information provided in this note is only as accurate as the sources available at the time of update(s)

## 2021-11-17 NOTE — ED NOTES
Bed: ED20  Expected date: 11/17/21  Expected time: 5:20 AM  Means of arrival: Ambulance  Comments:  HEMS 439 61M fall evaluation

## 2021-11-17 NOTE — ED NOTES
"I was asked to assist this patient with a TCU placement.  This patient lives alone in an apt and walks with a cane.  He had a fall and now his good leg hurts.  He doesn't feel safe going home.  I sent out some referrals to [facility] and [facility].  Other TCU's didn't have beds.  He was declined as he doesn't qualify.  This patient was up walking with a walker in the ER.  This patient does agree to return home.  I gave him halfway information and [facility] resources.  I had a long discussion with this patient re: getting into a TCU.  He doesn't want to private pay for the stay.  This patient was able to call a friend to get him home.  This patient has a sister in the area who has health issues herself.  He has a daughter in [place] who is  and has kids.  This patient states he wouldn't move to be closer to her because she might not stay there and he is a \"city jalen\".  I re-inforced him moving into an [facility].  Now that this patient agrees to return home he is also insisting on getting a walker to take with him.  I did mention to him there might be a bill for it and he agreed.  The ER staff is aware of the plan for this patient to discharge home.  I have completed this consult.  "

## 2021-11-17 NOTE — ED TRIAGE NOTES
pt had a couple drinks and had fall around midnight. denies hitting head or loc. c/o left lower leg pain and swelling

## 2021-11-17 NOTE — DISCHARGE INSTRUCTIONS
As we discussed, your x-rays are unremarkable.  Please come back to the ER immediately with any worsening symptoms or if you have any future falls.  Please be very careful at home, and see your regular doctor within the next week to go over additional options.

## 2021-11-18 ENCOUNTER — PATIENT OUTREACH (OUTPATIENT)
Dept: INTERNAL MEDICINE | Facility: CLINIC | Age: 61
End: 2021-11-18
Payer: COMMERCIAL

## 2021-11-18 NOTE — TELEPHONE ENCOUNTER
What type of discharge? Emergency Department  Risk of Hospital admission or ED visit: 45%  Is a TCM episode required? Yes  When should the patient follow up with PCP? 7 days of discharge.    Moise Epps RN  [facility]

## 2021-11-23 NOTE — TELEPHONE ENCOUNTER
"  ED for acute condition Discharge Protocol    \"Hi, my name is Meaghan Davis RN, a registered nurse, and I am calling from [facility].  I am calling to follow up and see how things are going for you after your recent emergency visit.\"    Tell me how you are doing now that you are home?\" use cam boot and walker, trying to stay off as much as possible.      Discharge Instructions    \"Let's review your discharge instructions.  What is/are the follow-up recommendations?  Pt. Response: follow up PCP    \"Has an appointment with your primary care provider been scheduled?\"  No (not needed)- patient declined.     Medications    \"Tell me what changed about your medicines when you discharged?\"    No changes in medications.     \"What questions do you have about your medications?\"   None        Call Summary    \"What questions or concerns do you have about your recent visit and your follow-up care?\"     none    \"If you have questions or things don't continue to improve, we encourage you contact us through the main clinic number (give number).  Even if the clinic is not open, triage nurses are available 24/7 to help you.     We would like you to know that our clinic has extended hours (provide information).  We also have urgent care (provide details on closest location and hours/contact info)\"    \"Thank you for your time and take care!\"    Meaghan Davis RN                  "

## 2022-06-11 ENCOUNTER — HEALTH MAINTENANCE LETTER (OUTPATIENT)
Age: 62
End: 2022-06-11

## 2022-06-15 DIAGNOSIS — I10 BENIGN ESSENTIAL HYPERTENSION: ICD-10-CM

## 2022-06-16 RX ORDER — AMLODIPINE BESYLATE 10 MG/1
10 TABLET ORAL DAILY
Qty: 90 TABLET | Refills: 0 | Status: SHIPPED | OUTPATIENT
Start: 2022-06-16 | End: 2022-10-09

## 2022-06-16 NOTE — TELEPHONE ENCOUNTER
Medication is being filled for 1 time refill only due to:  Patient needs to be seen because it has been more than one year since last visit.     Appointments in Next Year    Jul 28, 2022 11:00 AM  (Arrive by 10:40 AM)  Provider Visit with LUIS Acosta CNP  [facility] ([facility] ) [phone]

## 2022-08-16 NOTE — PROGRESS NOTES
"                                             Progress Note    Client Name: Suresh Powers  Date: 2-22-18         Service Type: Individual      Session Start Time: 10:05  Session End Time: 10:55 am      Session Length: 37-52 min   Session #: 8     Attendees: Client    Treatment Plan Last Reviewed: today  (90 days = April 2018)  PHQ-9 / SPIKE-7 : see flow sheets     DATA      Progress Since Last Session (Related to Symptoms / Goals / Homework):   Symptoms: Stable but with depression, anxiety , irritabilty and OCD.  Disrupted concentration, feeling bad about self  And fear that something awful might happen. Finds the trazedone useful so he can sleep.  (7-8 hours)   Pain has been worse in the winter and with aging. Is using a cane.    Homework: Completed in session      Episode of Care Goals: Satisfactory progress - ACTION (Actively working towards change); Intervened by reinforcing change plan / affirming steps taken.  Read the Bible daily. Is going to [employer] weekly.     Current / Ongoing Stressors and Concerns:  Daily anxiety, with OCD tendencies  OCD- ruminations about  the future /   losses: mom gone 13 + years, dad for 7 years-  mother had  Anxiety.  (physical health 1998- cart collapsed on him at work , both feet crushed)   (2016 another accident at work - broke tibia and clavical)    Donated a kidney to his sister 1990.     Treatment Objective(s) Addressed in This Session:   learn and demonstrate self advocacy   Increase interest, engagement, and pleasure in doing things  Identify negative self-talk and behaviors: challenge core beliefs, myths, and actions  identify 4 strategies for managing obsessive thoughts and improving mood  identify how the use of (food) substances contributes to the avoidance of feeling associated with the loss       Intervention:    Does feel better after, [employer] and Bible study, or devotional book- \"does feel God\"  Explored feeling bad about self.  \"I know I can't be perfect. \"    Shares that " "things come hard for him; \"I don't have gifts... or aptitude\".  Thinks this is where to low sense of self comes from.  Used [employer] multiple intelligence to help identify interpersonal and interpersonal skills   and gifts.  Explored meaning and purpose / volunteering / contribution to community ideas.    Discussed the \"pure O\" / rigidity. Cant change plans even with a snowstorm coming.  Processed emotions     ASSESSMENT: Current Emotional / Mental Status (status of significant symptoms):   Risk status (Self / Other harm or suicidal ideation)   Client denies current fears or concerns for personal safety.   Client denies current or recent suicidal ideation or behaviors.   Client denies current or recent homicidal ideation or behaviors.   Client denies current or recent self injurious behavior or ideation.   Client denies other safety concerns.   A safety and risk management plan has not been developed at this time, however client was given the after-hours number / 911 should there be a change in any of these risk factors.     Appearance:   Appropriate    Eye Contact:   Good    Psychomotor Behavior: Normal    Attitude:   Cooperative    Orientation:   All   Speech    Rate / Production: Normal     Volume:  Normal    Mood:    Anxious  Depressed  Normal   Affect:    Appropriate  Worrisome    Thought Content:  Clear    Thought Form:  Coherent  Logical    Insight:    Good      Medication Review:   Changes to psychiatric medications, see updated Medication List in EPIC.      Medication Compliance:   Yes     Changes in Health Issues:   None reported     Chemical Use Review:   Substance Use: Chemical use reviewed, no active concerns identified      Tobacco Use: No current tobacco use.       Collateral Reports Completed:   Routed note to PCP as needed     PLAN: (Client Tasks / Therapist Tasks / Other  Review session notes and the threshold continuum (fears)   Consider volunteering at a \"sunshine club\"  written " "encouragement.  Past hw  Advocate for self- ask questions from [facility]  And [place] [facility] ( sister as resource)  Conduct mindful activity at least once a day.  Past hw  2 distraction skills and 2 reinterpretation skills  past hw  Use breath work  TIP skills  Rishi Saez book for career concerns      Kelly Carpenter LP                                                         ________________________________________________________________________    Treatment Plan    Client's Name: Suresh Powers  YOB: 1960    Date: 12-08-17    DSM-V Diagnoses: SPIKE / MDD  Psychosocial / Contextual Factors: left job due to anxiety  WHODAS: see dx    Referral / Collaboration:  Referral to another professional/service is not indicated at this time.    Anticipated number of session or this episode of care: 10      MeasurableTreatment Goal(s) related to diagnosis / functional impairment(s)  Goal 1: Client will employ coping skills daily to manage mood disruption (including mood disruption/ sadness realted to career disruption).    I will know I've met my goal when \" keep stable emotions and ability to function daily.    Objective #A (Client Action)   Client will report using skills daily including spiritual practices and accessing support.   Update 2-08-18   New medication. Plan is the do mindful activity daily, use CBT skills daily.    Intervention(s)   Therapist will reinforce and teach 9-12 coping skills. Employ early intervention.     Goal 2: Client will process loss and move towards articulating a strong self worth and identity.    I will know I've met my goal when \"  Client reports a better sense of self worth; has more good days than bad.   feeling useful at home and find daily meaning and ( needed) purpose in being with his family.   Client will identify how thoughts contributes to the feeling of powerlessness and sadness associated with the loss.   Update 2-08-18   New medication. Plan is the do mindful activity daily, use " positive affirmations skills daily.      Client has reviewed and agreed to the above plan.      Kelly Carpenter LP  December 8, 2017   [facility] Counseling: I discussed with the patient the risks of ixekizumab including but not limited to immunosuppression, serious infections, worsening of inflammatory bowel disease and drug reactions.  The patient understands that monitoring is required including a PPD at baseline and must alert us or the primary physician if symptoms of infection or other concerning signs are noted.

## 2022-09-03 ENCOUNTER — HOSPITAL ENCOUNTER (INPATIENT)
Facility: CLINIC | Age: 62
LOS: 3 days | Discharge: NURSING, REHAB OR LONG-TERM CARE | DRG: 481 | End: 2022-09-06
Attending: EMERGENCY MEDICINE | Admitting: INTERNAL MEDICINE
Payer: COMMERCIAL

## 2022-09-03 ENCOUNTER — APPOINTMENT (OUTPATIENT)
Dept: GENERAL RADIOLOGY | Facility: CLINIC | Age: 62
DRG: 481 | End: 2022-09-03
Attending: EMERGENCY MEDICINE
Payer: COMMERCIAL

## 2022-09-03 DIAGNOSIS — S72.002A CLOSED FRACTURE OF LEFT HIP, INITIAL ENCOUNTER (H): ICD-10-CM

## 2022-09-03 DIAGNOSIS — K59.00 CONSTIPATION, UNSPECIFIED CONSTIPATION TYPE: ICD-10-CM

## 2022-09-03 DIAGNOSIS — Z98.890 POSTOPERATIVE STATE: Primary | ICD-10-CM

## 2022-09-03 DIAGNOSIS — F10.920 ALCOHOLIC INTOXICATION WITHOUT COMPLICATION (H): ICD-10-CM

## 2022-09-03 DIAGNOSIS — R33.9 URINARY RETENTION: ICD-10-CM

## 2022-09-03 DIAGNOSIS — G47.9 SLEEP DISORDER: ICD-10-CM

## 2022-09-03 LAB
ANION GAP SERPL CALCULATED.3IONS-SCNC: 10 MMOL/L (ref 3–14)
BASOPHILS # BLD AUTO: 0 10E3/UL (ref 0–0.2)
BASOPHILS NFR BLD AUTO: 0 %
BUN SERPL-MCNC: 7 MG/DL (ref 7–30)
CALCIUM SERPL-MCNC: 7.8 MG/DL (ref 8.5–10.1)
CHLORIDE BLD-SCNC: 96 MMOL/L (ref 94–109)
CO2 SERPL-SCNC: 23 MMOL/L (ref 20–32)
CREAT SERPL-MCNC: 0.6 MG/DL (ref 0.66–1.25)
EOSINOPHIL # BLD AUTO: 0.1 10E3/UL (ref 0–0.7)
EOSINOPHIL NFR BLD AUTO: 1 %
ERYTHROCYTE [DISTWIDTH] IN BLOOD BY AUTOMATED COUNT: 15.5 % (ref 10–15)
ETHANOL SERPL-MCNC: 0.21 G/DL
GFR SERPL CREATININE-BSD FRML MDRD: >90 ML/MIN/1.73M2
GLUCOSE BLD-MCNC: 121 MG/DL (ref 70–99)
HCT VFR BLD AUTO: 31.6 % (ref 40–53)
HGB BLD-MCNC: 10.6 G/DL (ref 13.3–17.7)
IMM GRANULOCYTES # BLD: 0 10E3/UL
IMM GRANULOCYTES NFR BLD: 0 %
LYMPHOCYTES # BLD AUTO: 1.3 10E3/UL (ref 0.8–5.3)
LYMPHOCYTES NFR BLD AUTO: 12 %
MCH RBC QN AUTO: 28.8 PG (ref 26.5–33)
MCHC RBC AUTO-ENTMCNC: 33.5 G/DL (ref 31.5–36.5)
MCV RBC AUTO: 86 FL (ref 78–100)
MONOCYTES # BLD AUTO: 1.2 10E3/UL (ref 0–1.3)
MONOCYTES NFR BLD AUTO: 11 %
NEUTROPHILS # BLD AUTO: 8 10E3/UL (ref 1.6–8.3)
NEUTROPHILS NFR BLD AUTO: 76 %
NRBC # BLD AUTO: 0 10E3/UL
NRBC BLD AUTO-RTO: 0 /100
PLATELET # BLD AUTO: 329 10E3/UL (ref 150–450)
POTASSIUM BLD-SCNC: 3.4 MMOL/L (ref 3.4–5.3)
RBC # BLD AUTO: 3.68 10E6/UL (ref 4.4–5.9)
SODIUM SERPL-SCNC: 129 MMOL/L (ref 133–144)
WBC # BLD AUTO: 10.7 10E3/UL (ref 4–11)

## 2022-09-03 PROCEDURE — 80053 COMPREHEN METABOLIC PANEL: CPT | Performed by: EMERGENCY MEDICINE

## 2022-09-03 PROCEDURE — 99285 EMERGENCY DEPT VISIT HI MDM: CPT | Mod: 25

## 2022-09-03 PROCEDURE — 82248 BILIRUBIN DIRECT: CPT | Performed by: INTERNAL MEDICINE

## 2022-09-03 PROCEDURE — 83550 IRON BINDING TEST: CPT | Performed by: INTERNAL MEDICINE

## 2022-09-03 PROCEDURE — 36415 COLL VENOUS BLD VENIPUNCTURE: CPT | Performed by: EMERGENCY MEDICINE

## 2022-09-03 PROCEDURE — 82607 VITAMIN B-12: CPT | Performed by: INTERNAL MEDICINE

## 2022-09-03 PROCEDURE — 82077 ASSAY SPEC XCP UR&BREATH IA: CPT | Performed by: EMERGENCY MEDICINE

## 2022-09-03 PROCEDURE — 85025 COMPLETE CBC W/AUTO DIFF WBC: CPT | Performed by: EMERGENCY MEDICINE

## 2022-09-03 PROCEDURE — 83880 ASSAY OF NATRIURETIC PEPTIDE: CPT | Performed by: INTERNAL MEDICINE

## 2022-09-03 PROCEDURE — 73502 X-RAY EXAM HIP UNI 2-3 VIEWS: CPT

## 2022-09-03 PROCEDURE — 82728 ASSAY OF FERRITIN: CPT | Performed by: INTERNAL MEDICINE

## 2022-09-03 PROCEDURE — C9803 HOPD COVID-19 SPEC COLLECT: HCPCS

## 2022-09-03 PROCEDURE — 99222 1ST HOSP IP/OBS MODERATE 55: CPT | Mod: AI | Performed by: INTERNAL MEDICINE

## 2022-09-03 PROCEDURE — HZ2ZZZZ DETOXIFICATION SERVICES FOR SUBSTANCE ABUSE TREATMENT: ICD-10-PCS | Performed by: INTERNAL MEDICINE

## 2022-09-03 PROCEDURE — 120N000001 HC R&B MED SURG/OB

## 2022-09-03 ASSESSMENT — ACTIVITIES OF DAILY LIVING (ADL): ADLS_ACUITY_SCORE: 35

## 2022-09-03 ASSESSMENT — ENCOUNTER SYMPTOMS
ARTHRALGIAS: 1
NUMBNESS: 0

## 2022-09-04 ENCOUNTER — ANESTHESIA EVENT (OUTPATIENT)
Dept: SURGERY | Facility: CLINIC | Age: 62
DRG: 481 | End: 2022-09-04
Payer: COMMERCIAL

## 2022-09-04 ENCOUNTER — ANESTHESIA (OUTPATIENT)
Dept: SURGERY | Facility: CLINIC | Age: 62
DRG: 481 | End: 2022-09-04
Payer: COMMERCIAL

## 2022-09-04 ENCOUNTER — APPOINTMENT (OUTPATIENT)
Dept: GENERAL RADIOLOGY | Facility: CLINIC | Age: 62
DRG: 481 | End: 2022-09-04
Attending: ORTHOPAEDIC SURGERY
Payer: COMMERCIAL

## 2022-09-04 LAB
ALBUMIN SERPL-MCNC: 2.6 G/DL (ref 3.4–5)
ALP SERPL-CCNC: 134 U/L (ref 40–150)
ALT SERPL W P-5'-P-CCNC: 44 U/L (ref 0–70)
ANION GAP SERPL CALCULATED.3IONS-SCNC: 8 MMOL/L (ref 3–14)
AST SERPL W P-5'-P-CCNC: 44 U/L (ref 0–45)
BILIRUB DIRECT SERPL-MCNC: 0.1 MG/DL (ref 0–0.2)
BILIRUB SERPL-MCNC: 0.2 MG/DL (ref 0.2–1.3)
BUN SERPL-MCNC: 10 MG/DL (ref 7–30)
CALCIUM SERPL-MCNC: 7.7 MG/DL (ref 8.5–10.1)
CHLORIDE BLD-SCNC: 98 MMOL/L (ref 94–109)
CO2 SERPL-SCNC: 24 MMOL/L (ref 20–32)
CREAT SERPL-MCNC: 0.51 MG/DL (ref 0.66–1.25)
ERYTHROCYTE [DISTWIDTH] IN BLOOD BY AUTOMATED COUNT: 15.6 % (ref 10–15)
FERRITIN SERPL-MCNC: 18 NG/ML (ref 26–388)
GFR SERPL CREATININE-BSD FRML MDRD: >90 ML/MIN/1.73M2
GLUCOSE BLD-MCNC: 227 MG/DL (ref 70–99)
GLUCOSE BLDC GLUCOMTR-MCNC: 138 MG/DL (ref 70–99)
GLUCOSE BLDC GLUCOMTR-MCNC: 250 MG/DL (ref 70–99)
HCT VFR BLD AUTO: 25.3 % (ref 40–53)
HGB BLD-MCNC: 8.5 G/DL (ref 13.3–17.7)
IRON SATN MFR SERPL: 9 % (ref 15–46)
IRON SERPL-MCNC: 31 UG/DL (ref 35–180)
MAGNESIUM SERPL-MCNC: 1.5 MG/DL (ref 1.6–2.3)
MCH RBC QN AUTO: 28.7 PG (ref 26.5–33)
MCHC RBC AUTO-ENTMCNC: 33.6 G/DL (ref 31.5–36.5)
MCV RBC AUTO: 86 FL (ref 78–100)
NT-PROBNP SERPL-MCNC: 308 PG/ML (ref 0–900)
OSMOLALITY UR: 521 MMOL/KG (ref 100–1200)
PLATELET # BLD AUTO: 304 10E3/UL (ref 150–450)
POTASSIUM BLD-SCNC: 4.2 MMOL/L (ref 3.4–5.3)
PROT SERPL-MCNC: 6.4 G/DL (ref 6.8–8.8)
RBC # BLD AUTO: 2.96 10E6/UL (ref 4.4–5.9)
SARS-COV-2 RNA RESP QL NAA+PROBE: NEGATIVE
SODIUM SERPL-SCNC: 130 MMOL/L (ref 133–144)
SODIUM UR-SCNC: 132 MMOL/L
TIBC SERPL-MCNC: 336 UG/DL (ref 240–430)
VIT B12 SERPL-MCNC: 364 PG/ML (ref 232–1245)
WBC # BLD AUTO: 13.2 10E3/UL (ref 4–11)

## 2022-09-04 PROCEDURE — 99233 SBSQ HOSP IP/OBS HIGH 50: CPT | Performed by: INTERNAL MEDICINE

## 2022-09-04 PROCEDURE — 258N000003 HC RX IP 258 OP 636: Performed by: ANESTHESIOLOGY

## 2022-09-04 PROCEDURE — 250N000009 HC RX 250: Performed by: ORTHOPAEDIC SURGERY

## 2022-09-04 PROCEDURE — 250N000009 HC RX 250: Performed by: ANESTHESIOLOGY

## 2022-09-04 PROCEDURE — C1713 ANCHOR/SCREW BN/BN,TIS/BN: HCPCS | Performed by: ORTHOPAEDIC SURGERY

## 2022-09-04 PROCEDURE — 250N000013 HC RX MED GY IP 250 OP 250 PS 637: Performed by: ORTHOPAEDIC SURGERY

## 2022-09-04 PROCEDURE — 258N000003 HC RX IP 258 OP 636: Performed by: EMERGENCY MEDICINE

## 2022-09-04 PROCEDURE — 250N000011 HC RX IP 250 OP 636: Performed by: ORTHOPAEDIC SURGERY

## 2022-09-04 PROCEDURE — 258N000003 HC RX IP 258 OP 636: Performed by: NURSE ANESTHETIST, CERTIFIED REGISTERED

## 2022-09-04 PROCEDURE — U0003 INFECTIOUS AGENT DETECTION BY NUCLEIC ACID (DNA OR RNA); SEVERE ACUTE RESPIRATORY SYNDROME CORONAVIRUS 2 (SARS-COV-2) (CORONAVIRUS DISEASE [COVID-19]), AMPLIFIED PROBE TECHNIQUE, MAKING USE OF HIGH THROUGHPUT TECHNOLOGIES AS DESCRIBED BY CMS-2020-01-R: HCPCS | Performed by: EMERGENCY MEDICINE

## 2022-09-04 PROCEDURE — 250N000009 HC RX 250: Performed by: NURSE ANESTHETIST, CERTIFIED REGISTERED

## 2022-09-04 PROCEDURE — 0QS736Z REPOSITION LEFT UPPER FEMUR WITH INTRAMEDULLARY INTERNAL FIXATION DEVICE, PERCUTANEOUS APPROACH: ICD-10-PCS | Performed by: ORTHOPAEDIC SURGERY

## 2022-09-04 PROCEDURE — 85027 COMPLETE CBC AUTOMATED: CPT | Performed by: ORTHOPAEDIC SURGERY

## 2022-09-04 PROCEDURE — 370N000017 HC ANESTHESIA TECHNICAL FEE, PER MIN: Performed by: ORTHOPAEDIC SURGERY

## 2022-09-04 PROCEDURE — 250N000013 HC RX MED GY IP 250 OP 250 PS 637: Performed by: INTERNAL MEDICINE

## 2022-09-04 PROCEDURE — 250N000009 HC RX 250: Performed by: EMERGENCY MEDICINE

## 2022-09-04 PROCEDURE — 250N000011 HC RX IP 250 OP 636: Performed by: NURSE ANESTHETIST, CERTIFIED REGISTERED

## 2022-09-04 PROCEDURE — 258N000003 HC RX IP 258 OP 636: Performed by: INTERNAL MEDICINE

## 2022-09-04 PROCEDURE — 250N000025 HC SEVOFLURANE, PER MIN: Performed by: ORTHOPAEDIC SURGERY

## 2022-09-04 PROCEDURE — 272N000001 HC OR GENERAL SUPPLY STERILE: Performed by: ORTHOPAEDIC SURGERY

## 2022-09-04 PROCEDURE — 36415 COLL VENOUS BLD VENIPUNCTURE: CPT | Performed by: ORTHOPAEDIC SURGERY

## 2022-09-04 PROCEDURE — 83735 ASSAY OF MAGNESIUM: CPT | Performed by: INTERNAL MEDICINE

## 2022-09-04 PROCEDURE — 120N000001 HC R&B MED SURG/OB

## 2022-09-04 PROCEDURE — 258N000003 HC RX IP 258 OP 636: Performed by: ORTHOPAEDIC SURGERY

## 2022-09-04 PROCEDURE — 83935 ASSAY OF URINE OSMOLALITY: CPT | Performed by: INTERNAL MEDICINE

## 2022-09-04 PROCEDURE — C1769 GUIDE WIRE: HCPCS | Performed by: ORTHOPAEDIC SURGERY

## 2022-09-04 PROCEDURE — 360N000084 HC SURGERY LEVEL 4 W/ FLUORO, PER MIN: Performed by: ORTHOPAEDIC SURGERY

## 2022-09-04 PROCEDURE — 82746 ASSAY OF FOLIC ACID SERUM: CPT | Performed by: ORTHOPAEDIC SURGERY

## 2022-09-04 PROCEDURE — 250N000011 HC RX IP 250 OP 636: Performed by: ANESTHESIOLOGY

## 2022-09-04 PROCEDURE — 84300 ASSAY OF URINE SODIUM: CPT | Performed by: INTERNAL MEDICINE

## 2022-09-04 PROCEDURE — 250N000011 HC RX IP 250 OP 636: Performed by: INTERNAL MEDICINE

## 2022-09-04 PROCEDURE — 710N000009 HC RECOVERY PHASE 1, LEVEL 1, PER MIN: Performed by: ORTHOPAEDIC SURGERY

## 2022-09-04 PROCEDURE — 250N000011 HC RX IP 250 OP 636: Performed by: EMERGENCY MEDICINE

## 2022-09-04 PROCEDURE — 999N000141 HC STATISTIC PRE-PROCEDURE NURSING ASSESSMENT: Performed by: ORTHOPAEDIC SURGERY

## 2022-09-04 PROCEDURE — 999N000179 XR SURGERY CARM FLUORO LESS THAN 5 MIN W STILLS

## 2022-09-04 PROCEDURE — 80048 BASIC METABOLIC PNL TOTAL CA: CPT | Performed by: ORTHOPAEDIC SURGERY

## 2022-09-04 DEVICE — IMP SCR SYN TFNA FENESTRATED LAG 95MM 04.038.195S: Type: IMPLANTABLE DEVICE | Site: HIP | Status: FUNCTIONAL

## 2022-09-04 DEVICE — IMP NAIL SYN CAN FEM PROX TFNA 11X170MM 125D 04.037.112S: Type: IMPLANTABLE DEVICE | Site: HIP | Status: FUNCTIONAL

## 2022-09-04 DEVICE — IMP SCR SYN 5.0 TI LOCK T25 STARDRIVE 38MM 04.005.528S: Type: IMPLANTABLE DEVICE | Site: HIP | Status: FUNCTIONAL

## 2022-09-04 RX ORDER — LIDOCAINE HYDROCHLORIDE 20 MG/ML
INJECTION, SOLUTION INFILTRATION; PERINEURAL PRN
Status: DISCONTINUED | OUTPATIENT
Start: 2022-09-04 | End: 2022-09-04

## 2022-09-04 RX ORDER — ESCITALOPRAM OXALATE 10 MG/1
20 TABLET ORAL DAILY
Status: DISCONTINUED | OUTPATIENT
Start: 2022-09-04 | End: 2022-09-06 | Stop reason: HOSPADM

## 2022-09-04 RX ORDER — POLYETHYLENE GLYCOL 3350 17 G/17G
17 POWDER, FOR SOLUTION ORAL DAILY
Status: DISCONTINUED | OUTPATIENT
Start: 2022-09-05 | End: 2022-09-06

## 2022-09-04 RX ORDER — NEOSTIGMINE METHYLSULFATE 1 MG/ML
VIAL (ML) INJECTION PRN
Status: DISCONTINUED | OUTPATIENT
Start: 2022-09-04 | End: 2022-09-04

## 2022-09-04 RX ORDER — MULTIVITAMIN,THERAPEUTIC
1 TABLET ORAL DAILY
Status: DISCONTINUED | OUTPATIENT
Start: 2022-09-04 | End: 2022-09-06 | Stop reason: HOSPADM

## 2022-09-04 RX ORDER — ONDANSETRON 2 MG/ML
INJECTION INTRAMUSCULAR; INTRAVENOUS PRN
Status: DISCONTINUED | OUTPATIENT
Start: 2022-09-04 | End: 2022-09-04

## 2022-09-04 RX ORDER — LAMOTRIGINE 100 MG/1
100 TABLET ORAL AT BEDTIME
Status: DISCONTINUED | OUTPATIENT
Start: 2022-09-04 | End: 2022-09-06 | Stop reason: HOSPADM

## 2022-09-04 RX ORDER — OXYCODONE HYDROCHLORIDE 5 MG/1
5 TABLET ORAL EVERY 4 HOURS PRN
Status: DISCONTINUED | OUTPATIENT
Start: 2022-09-04 | End: 2022-09-04

## 2022-09-04 RX ORDER — HYDROMORPHONE HCL IN WATER/PF 6 MG/30 ML
0.4 PATIENT CONTROLLED ANALGESIA SYRINGE INTRAVENOUS EVERY 5 MIN PRN
Status: DISCONTINUED | OUTPATIENT
Start: 2022-09-04 | End: 2022-09-04

## 2022-09-04 RX ORDER — BISACODYL 10 MG
10 SUPPOSITORY, RECTAL RECTAL DAILY PRN
Status: DISCONTINUED | OUTPATIENT
Start: 2022-09-04 | End: 2022-09-06 | Stop reason: HOSPADM

## 2022-09-04 RX ORDER — SODIUM CHLORIDE, SODIUM LACTATE, POTASSIUM CHLORIDE, CALCIUM CHLORIDE 600; 310; 30; 20 MG/100ML; MG/100ML; MG/100ML; MG/100ML
INJECTION, SOLUTION INTRAVENOUS CONTINUOUS
Status: DISCONTINUED | OUTPATIENT
Start: 2022-09-04 | End: 2022-09-04

## 2022-09-04 RX ORDER — HYDROMORPHONE HCL IN WATER/PF 6 MG/30 ML
0.4 PATIENT CONTROLLED ANALGESIA SYRINGE INTRAVENOUS
Status: DISCONTINUED | OUTPATIENT
Start: 2022-09-04 | End: 2022-09-06 | Stop reason: HOSPADM

## 2022-09-04 RX ORDER — NALOXONE HYDROCHLORIDE 0.4 MG/ML
0.4 INJECTION, SOLUTION INTRAMUSCULAR; INTRAVENOUS; SUBCUTANEOUS
Status: DISCONTINUED | OUTPATIENT
Start: 2022-09-04 | End: 2022-09-06 | Stop reason: HOSPADM

## 2022-09-04 RX ORDER — AMOXICILLIN 250 MG
2 CAPSULE ORAL 2 TIMES DAILY PRN
Status: DISCONTINUED | OUTPATIENT
Start: 2022-09-04 | End: 2022-09-06 | Stop reason: HOSPADM

## 2022-09-04 RX ORDER — TRANEXAMIC ACID 10 MG/ML
1 INJECTION, SOLUTION INTRAVENOUS ONCE
Status: COMPLETED | OUTPATIENT
Start: 2022-09-04 | End: 2022-09-04

## 2022-09-04 RX ORDER — LIDOCAINE 40 MG/G
CREAM TOPICAL
Status: DISCONTINUED | OUTPATIENT
Start: 2022-09-04 | End: 2022-09-04

## 2022-09-04 RX ORDER — CEFAZOLIN SODIUM/WATER 2 G/20 ML
2 SYRINGE (ML) INTRAVENOUS
Status: COMPLETED | OUTPATIENT
Start: 2022-09-04 | End: 2022-09-04

## 2022-09-04 RX ORDER — OXYCODONE HYDROCHLORIDE 5 MG/1
10 TABLET ORAL EVERY 4 HOURS PRN
Status: DISCONTINUED | OUTPATIENT
Start: 2022-09-04 | End: 2022-09-06 | Stop reason: HOSPADM

## 2022-09-04 RX ORDER — ONDANSETRON 4 MG/1
4 TABLET, ORALLY DISINTEGRATING ORAL EVERY 30 MIN PRN
Status: DISCONTINUED | OUTPATIENT
Start: 2022-09-04 | End: 2022-09-04

## 2022-09-04 RX ORDER — ONDANSETRON 4 MG/1
4 TABLET, ORALLY DISINTEGRATING ORAL EVERY 6 HOURS PRN
Status: DISCONTINUED | OUTPATIENT
Start: 2022-09-04 | End: 2022-09-04

## 2022-09-04 RX ORDER — ONDANSETRON 2 MG/ML
4 INJECTION INTRAMUSCULAR; INTRAVENOUS EVERY 6 HOURS PRN
Status: DISCONTINUED | OUTPATIENT
Start: 2022-09-04 | End: 2022-09-06 | Stop reason: HOSPADM

## 2022-09-04 RX ORDER — ACETAMINOPHEN 325 MG/1
650 TABLET ORAL EVERY 4 HOURS PRN
Status: DISCONTINUED | OUTPATIENT
Start: 2022-09-07 | End: 2022-09-06 | Stop reason: HOSPADM

## 2022-09-04 RX ORDER — HYDROMORPHONE HYDROCHLORIDE 1 MG/ML
.3-.5 INJECTION, SOLUTION INTRAMUSCULAR; INTRAVENOUS; SUBCUTANEOUS
Status: DISCONTINUED | OUTPATIENT
Start: 2022-09-04 | End: 2022-09-04

## 2022-09-04 RX ORDER — PROPOFOL 10 MG/ML
INJECTION, EMULSION INTRAVENOUS PRN
Status: DISCONTINUED | OUTPATIENT
Start: 2022-09-04 | End: 2022-09-04

## 2022-09-04 RX ORDER — ACETAMINOPHEN 650 MG/1
650 SUPPOSITORY RECTAL EVERY 6 HOURS PRN
Status: DISCONTINUED | OUTPATIENT
Start: 2022-09-04 | End: 2022-09-06 | Stop reason: HOSPADM

## 2022-09-04 RX ORDER — AMOXICILLIN 250 MG
1 CAPSULE ORAL 2 TIMES DAILY
Status: DISCONTINUED | OUTPATIENT
Start: 2022-09-04 | End: 2022-09-06

## 2022-09-04 RX ORDER — PROCHLORPERAZINE MALEATE 10 MG
10 TABLET ORAL EVERY 6 HOURS PRN
Status: DISCONTINUED | OUTPATIENT
Start: 2022-09-04 | End: 2022-09-06 | Stop reason: HOSPADM

## 2022-09-04 RX ORDER — FENTANYL CITRATE 0.05 MG/ML
50 INJECTION, SOLUTION INTRAMUSCULAR; INTRAVENOUS
Status: DISCONTINUED | OUTPATIENT
Start: 2022-09-04 | End: 2022-09-04

## 2022-09-04 RX ORDER — AMOXICILLIN 250 MG
1 CAPSULE ORAL 2 TIMES DAILY PRN
Status: DISCONTINUED | OUTPATIENT
Start: 2022-09-04 | End: 2022-09-06 | Stop reason: HOSPADM

## 2022-09-04 RX ORDER — CEFAZOLIN SODIUM/WATER 2 G/20 ML
2 SYRINGE (ML) INTRAVENOUS SEE ADMIN INSTRUCTIONS
Status: DISCONTINUED | OUTPATIENT
Start: 2022-09-04 | End: 2022-09-04

## 2022-09-04 RX ORDER — NALOXONE HYDROCHLORIDE 0.4 MG/ML
0.2 INJECTION, SOLUTION INTRAMUSCULAR; INTRAVENOUS; SUBCUTANEOUS
Status: DISCONTINUED | OUTPATIENT
Start: 2022-09-04 | End: 2022-09-06 | Stop reason: HOSPADM

## 2022-09-04 RX ORDER — DIPHENHYDRAMINE HCL 25 MG
25 CAPSULE ORAL EVERY 6 HOURS PRN
Status: DISCONTINUED | OUTPATIENT
Start: 2022-09-04 | End: 2022-09-06 | Stop reason: HOSPADM

## 2022-09-04 RX ORDER — SODIUM CHLORIDE, SODIUM LACTATE, POTASSIUM CHLORIDE, CALCIUM CHLORIDE 600; 310; 30; 20 MG/100ML; MG/100ML; MG/100ML; MG/100ML
INJECTION, SOLUTION INTRAVENOUS CONTINUOUS
Status: DISCONTINUED | OUTPATIENT
Start: 2022-09-04 | End: 2022-09-05

## 2022-09-04 RX ORDER — ONDANSETRON 4 MG/1
4 TABLET, ORALLY DISINTEGRATING ORAL EVERY 6 HOURS PRN
Status: DISCONTINUED | OUTPATIENT
Start: 2022-09-04 | End: 2022-09-06 | Stop reason: HOSPADM

## 2022-09-04 RX ORDER — HYDROMORPHONE HCL IN WATER/PF 6 MG/30 ML
.4-.6 PATIENT CONTROLLED ANALGESIA SYRINGE INTRAVENOUS
Status: DISCONTINUED | OUTPATIENT
Start: 2022-09-04 | End: 2022-09-04

## 2022-09-04 RX ORDER — ACETAMINOPHEN 325 MG/1
650 TABLET ORAL EVERY 6 HOURS PRN
Status: DISCONTINUED | OUTPATIENT
Start: 2022-09-04 | End: 2022-09-04

## 2022-09-04 RX ORDER — SODIUM CHLORIDE 9 MG/ML
INJECTION, SOLUTION INTRAVENOUS CONTINUOUS
Status: DISCONTINUED | OUTPATIENT
Start: 2022-09-04 | End: 2022-09-04

## 2022-09-04 RX ORDER — OXYCODONE HYDROCHLORIDE 5 MG/1
5 TABLET ORAL EVERY 4 HOURS PRN
Status: DISCONTINUED | OUTPATIENT
Start: 2022-09-04 | End: 2022-09-06 | Stop reason: HOSPADM

## 2022-09-04 RX ORDER — BUPIVACAINE HYDROCHLORIDE AND EPINEPHRINE 5; 5 MG/ML; UG/ML
INJECTION, SOLUTION PERINEURAL PRN
Status: DISCONTINUED | OUTPATIENT
Start: 2022-09-04 | End: 2022-09-04 | Stop reason: HOSPADM

## 2022-09-04 RX ORDER — TRAZODONE HYDROCHLORIDE 100 MG/1
100 TABLET ORAL AT BEDTIME
Status: DISCONTINUED | OUTPATIENT
Start: 2022-09-04 | End: 2022-09-06 | Stop reason: HOSPADM

## 2022-09-04 RX ORDER — DEXAMETHASONE SODIUM PHOSPHATE 4 MG/ML
INJECTION, SOLUTION INTRA-ARTICULAR; INTRALESIONAL; INTRAMUSCULAR; INTRAVENOUS; SOFT TISSUE PRN
Status: DISCONTINUED | OUTPATIENT
Start: 2022-09-04 | End: 2022-09-04

## 2022-09-04 RX ORDER — GLYCOPYRROLATE 0.2 MG/ML
INJECTION, SOLUTION INTRAMUSCULAR; INTRAVENOUS PRN
Status: DISCONTINUED | OUTPATIENT
Start: 2022-09-04 | End: 2022-09-04

## 2022-09-04 RX ORDER — EPHEDRINE SULFATE 50 MG/ML
INJECTION, SOLUTION INTRAMUSCULAR; INTRAVENOUS; SUBCUTANEOUS PRN
Status: DISCONTINUED | OUTPATIENT
Start: 2022-09-04 | End: 2022-09-04

## 2022-09-04 RX ORDER — IBUPROFEN 600 MG/1
600 TABLET, FILM COATED ORAL EVERY 6 HOURS PRN
Status: DISCONTINUED | OUTPATIENT
Start: 2022-09-04 | End: 2022-09-06 | Stop reason: HOSPADM

## 2022-09-04 RX ORDER — LIDOCAINE 40 MG/G
CREAM TOPICAL
Status: DISCONTINUED | OUTPATIENT
Start: 2022-09-04 | End: 2022-09-06 | Stop reason: HOSPADM

## 2022-09-04 RX ORDER — FOLIC ACID 1 MG/1
1 TABLET ORAL DAILY
Status: DISCONTINUED | OUTPATIENT
Start: 2022-09-04 | End: 2022-09-05

## 2022-09-04 RX ORDER — ONDANSETRON 2 MG/ML
4 INJECTION INTRAMUSCULAR; INTRAVENOUS EVERY 30 MIN PRN
Status: DISCONTINUED | OUTPATIENT
Start: 2022-09-04 | End: 2022-09-04

## 2022-09-04 RX ORDER — ONDANSETRON 2 MG/ML
4 INJECTION INTRAMUSCULAR; INTRAVENOUS EVERY 6 HOURS PRN
Status: DISCONTINUED | OUTPATIENT
Start: 2022-09-04 | End: 2022-09-04

## 2022-09-04 RX ORDER — MAGNESIUM HYDROXIDE 1200 MG/15ML
LIQUID ORAL PRN
Status: DISCONTINUED | OUTPATIENT
Start: 2022-09-04 | End: 2022-09-04 | Stop reason: HOSPADM

## 2022-09-04 RX ORDER — ACETAMINOPHEN 325 MG/1
975 TABLET ORAL EVERY 8 HOURS
Status: DISCONTINUED | OUTPATIENT
Start: 2022-09-04 | End: 2022-09-06 | Stop reason: HOSPADM

## 2022-09-04 RX ORDER — LAMOTRIGINE 100 MG/1
200 TABLET ORAL AT BEDTIME
Status: DISCONTINUED | OUTPATIENT
Start: 2022-09-04 | End: 2022-09-04

## 2022-09-04 RX ORDER — POLYETHYLENE GLYCOL 3350 17 G/17G
17 POWDER, FOR SOLUTION ORAL DAILY PRN
Status: DISCONTINUED | OUTPATIENT
Start: 2022-09-04 | End: 2022-09-06 | Stop reason: HOSPADM

## 2022-09-04 RX ORDER — CEFAZOLIN SODIUM 2 G/100ML
2 INJECTION, SOLUTION INTRAVENOUS EVERY 8 HOURS
Status: COMPLETED | OUTPATIENT
Start: 2022-09-04 | End: 2022-09-05

## 2022-09-04 RX ORDER — HYDROXYZINE HYDROCHLORIDE 25 MG/1
25 TABLET, FILM COATED ORAL EVERY 6 HOURS PRN
Status: DISCONTINUED | OUTPATIENT
Start: 2022-09-04 | End: 2022-09-06 | Stop reason: HOSPADM

## 2022-09-04 RX ORDER — FENTANYL CITRATE 0.05 MG/ML
50 INJECTION, SOLUTION INTRAMUSCULAR; INTRAVENOUS EVERY 5 MIN PRN
Status: DISCONTINUED | OUTPATIENT
Start: 2022-09-04 | End: 2022-09-04

## 2022-09-04 RX ORDER — HYDROMORPHONE HCL IN WATER/PF 6 MG/30 ML
0.2 PATIENT CONTROLLED ANALGESIA SYRINGE INTRAVENOUS
Status: DISCONTINUED | OUTPATIENT
Start: 2022-09-04 | End: 2022-09-06 | Stop reason: HOSPADM

## 2022-09-04 RX ORDER — FENTANYL CITRATE 50 UG/ML
INJECTION, SOLUTION INTRAMUSCULAR; INTRAVENOUS PRN
Status: DISCONTINUED | OUTPATIENT
Start: 2022-09-04 | End: 2022-09-04

## 2022-09-04 RX ADMIN — OXYCODONE HYDROCHLORIDE 10 MG: 5 TABLET ORAL at 20:05

## 2022-09-04 RX ADMIN — DEXAMETHASONE SODIUM PHOSPHATE 4 MG: 4 INJECTION, SOLUTION INTRA-ARTICULAR; INTRALESIONAL; INTRAMUSCULAR; INTRAVENOUS; SOFT TISSUE at 12:12

## 2022-09-04 RX ADMIN — PROPOFOL 200 MG: 10 INJECTION, EMULSION INTRAVENOUS at 11:52

## 2022-09-04 RX ADMIN — FOLIC ACID: 5 INJECTION, SOLUTION INTRAMUSCULAR; INTRAVENOUS; SUBCUTANEOUS at 00:18

## 2022-09-04 RX ADMIN — Medication 10 MG: at 12:16

## 2022-09-04 RX ADMIN — CEFAZOLIN SODIUM 2 G: 2 INJECTION, SOLUTION INTRAVENOUS at 20:06

## 2022-09-04 RX ADMIN — PHENYLEPHRINE HYDROCHLORIDE 100 MCG: 10 INJECTION INTRAVENOUS at 12:13

## 2022-09-04 RX ADMIN — TRANEXAMIC ACID 1 G: 10 INJECTION, SOLUTION INTRAVENOUS at 12:44

## 2022-09-04 RX ADMIN — ASPIRIN 325 MG: 325 TABLET, COATED ORAL at 14:33

## 2022-09-04 RX ADMIN — FENTANYL CITRATE 100 MCG: 50 INJECTION, SOLUTION INTRAMUSCULAR; INTRAVENOUS at 11:52

## 2022-09-04 RX ADMIN — FOLIC ACID 1 MG: 1 TABLET ORAL at 16:36

## 2022-09-04 RX ADMIN — PHENYLEPHRINE HYDROCHLORIDE 200 MCG: 10 INJECTION INTRAVENOUS at 12:10

## 2022-09-04 RX ADMIN — ONDANSETRON 4 MG: 2 INJECTION INTRAMUSCULAR; INTRAVENOUS at 12:13

## 2022-09-04 RX ADMIN — SENNOSIDES AND DOCUSATE SODIUM 1 TABLET: 50; 8.6 TABLET ORAL at 20:06

## 2022-09-04 RX ADMIN — FENTANYL CITRATE 50 MCG: 50 INJECTION, SOLUTION INTRAMUSCULAR; INTRAVENOUS at 10:45

## 2022-09-04 RX ADMIN — PHENYLEPHRINE HYDROCHLORIDE 100 MCG: 10 INJECTION INTRAVENOUS at 11:52

## 2022-09-04 RX ADMIN — MIDAZOLAM HYDROCHLORIDE 1 MG: 1 INJECTION, SOLUTION INTRAMUSCULAR; INTRAVENOUS at 10:45

## 2022-09-04 RX ADMIN — ACETAMINOPHEN 975 MG: 325 TABLET ORAL at 14:33

## 2022-09-04 RX ADMIN — LAMOTRIGINE 100 MG: 100 TABLET ORAL at 22:07

## 2022-09-04 RX ADMIN — GLYCOPYRROLATE 0.6 MG: 0.2 INJECTION, SOLUTION INTRAMUSCULAR; INTRAVENOUS at 12:47

## 2022-09-04 RX ADMIN — HYDROMORPHONE HYDROCHLORIDE 0.4 MG: 0.2 INJECTION, SOLUTION INTRAMUSCULAR; INTRAVENOUS; SUBCUTANEOUS at 16:35

## 2022-09-04 RX ADMIN — ACETAMINOPHEN 975 MG: 325 TABLET ORAL at 22:07

## 2022-09-04 RX ADMIN — MIDAZOLAM 1 MG: 1 INJECTION INTRAMUSCULAR; INTRAVENOUS at 11:48

## 2022-09-04 RX ADMIN — TRANEXAMIC ACID 1 G: 10 INJECTION, SOLUTION INTRAVENOUS at 11:00

## 2022-09-04 RX ADMIN — Medication 15 ML: at 14:40

## 2022-09-04 RX ADMIN — Medication 5 MG: at 12:28

## 2022-09-04 RX ADMIN — Medication 2 G: at 12:00

## 2022-09-04 RX ADMIN — SODIUM CHLORIDE, PRESERVATIVE FREE: 5 INJECTION INTRAVENOUS at 02:39

## 2022-09-04 RX ADMIN — HYDROMORPHONE HYDROCHLORIDE 0.5 MG: 1 INJECTION, SOLUTION INTRAMUSCULAR; INTRAVENOUS; SUBCUTANEOUS at 08:08

## 2022-09-04 RX ADMIN — SODIUM CHLORIDE, POTASSIUM CHLORIDE, SODIUM LACTATE AND CALCIUM CHLORIDE: 600; 310; 30; 20 INJECTION, SOLUTION INTRAVENOUS at 11:48

## 2022-09-04 RX ADMIN — LIDOCAINE HYDROCHLORIDE 80 MG: 20 INJECTION, SOLUTION INFILTRATION; PERINEURAL at 11:52

## 2022-09-04 RX ADMIN — NEOSTIGMINE METHYLSULFATE 4 MG: 1 INJECTION, SOLUTION INTRAVENOUS at 12:47

## 2022-09-04 RX ADMIN — HYDROMORPHONE HYDROCHLORIDE 0.5 MG: 1 INJECTION, SOLUTION INTRAMUSCULAR; INTRAVENOUS; SUBCUTANEOUS at 02:40

## 2022-09-04 RX ADMIN — PHENYLEPHRINE HYDROCHLORIDE 300 MCG: 10 INJECTION INTRAVENOUS at 11:58

## 2022-09-04 RX ADMIN — TRAZODONE HYDROCHLORIDE 100 MG: 100 TABLET ORAL at 22:08

## 2022-09-04 RX ADMIN — THIAMINE HCL TAB 100 MG 100 MG: 100 TAB at 16:36

## 2022-09-04 RX ADMIN — Medication 10 MG: at 12:22

## 2022-09-04 RX ADMIN — SODIUM CHLORIDE, POTASSIUM CHLORIDE, SODIUM LACTATE AND CALCIUM CHLORIDE: 600; 310; 30; 20 INJECTION, SOLUTION INTRAVENOUS at 14:23

## 2022-09-04 RX ADMIN — PHENYLEPHRINE HYDROCHLORIDE 300 MCG: 10 INJECTION INTRAVENOUS at 12:04

## 2022-09-04 RX ADMIN — THERA TABS 1 TABLET: TAB at 16:36

## 2022-09-04 RX ADMIN — ESCITALOPRAM OXALATE 20 MG: 10 TABLET ORAL at 22:08

## 2022-09-04 RX ADMIN — ROCURONIUM BROMIDE 50 MG: 50 INJECTION, SOLUTION INTRAVENOUS at 11:52

## 2022-09-04 RX ADMIN — MIDAZOLAM HYDROCHLORIDE 1 MG: 1 INJECTION, SOLUTION INTRAMUSCULAR; INTRAVENOUS at 10:50

## 2022-09-04 ASSESSMENT — ACTIVITIES OF DAILY LIVING (ADL)
ADLS_ACUITY_SCORE: 24
ADLS_ACUITY_SCORE: 22
ADLS_ACUITY_SCORE: 26
ADLS_ACUITY_SCORE: 22
ADLS_ACUITY_SCORE: 22
ADLS_ACUITY_SCORE: 26
ADLS_ACUITY_SCORE: 22
ADLS_ACUITY_SCORE: 35
ADLS_ACUITY_SCORE: 22

## 2022-09-04 ASSESSMENT — LIFESTYLE VARIABLES: TOBACCO_USE: 0

## 2022-09-04 NOTE — ANESTHESIA CARE TRANSFER NOTE
Patient: Suresh Powers    Procedure: Procedure(s):  Open reduction internal fixation of left hip fracture       Diagnosis: Closed fracture of left hip, initial encounter (H) [S72.002A]  Diagnosis Additional Information: No value filed.    Anesthesia Type:   General     Note:    Oropharynx: oropharynx clear of all foreign objects and spontaneously breathing  Level of Consciousness: awake  Oxygen Supplementation: nasal cannula  Level of Supplemental Oxygen (L/min / FiO2): 2  Independent Airway: airway patency satisfactory and stable  Dentition: dentition unchanged  Vital Signs Stable: post-procedure vital signs reviewed and stable  Report to RN Given: handoff report given  Patient transferred to: PACU    Handoff Report: Identifed the Patient, Identified the Reponsible Provider, Reviewed the pertinent medical history, Discussed the surgical course, Reviewed Intra-OP anesthesia mangement and issues during anesthesia, Set expectations for post-procedure period and Allowed opportunity for questions and acknowledgement of understanding      Vitals:  Vitals Value Taken Time   /77 09/04/22 1300   Temp     Pulse 112 09/04/22 1300   Resp     SpO2 100 % 09/04/22 1300   Vitals shown include unvalidated device data.    Electronically Signed By: LUIS Conteh CRNA  September 4, 2022  1:01 PM

## 2022-09-04 NOTE — OP NOTE
.[facility]  Operative Note  Cephalomedullary Nailing      Suresh Powers MRN# [id number]   YOB: 1960  Procedure Date:  9/4/2022  Age: 62 year old     PREOPERATIVE DIAGNOSIS:     1.  Displaced left peritrochanteric femur fracture.   2.  Osteoporosis.      POSTOPERATIVE DIAGNOSES:     1.  Displaced left peritrochanteric femur fracture.   2.  Osteoporosis.      PROCEDURE PERFORMED:  left hip cephalomedullary nailing, femur fracture.      SURGEON:  Huang Cochran MD     ASSISTANT: Ayde Otoole PA-C who was critical for positioning patient, helping obtain reduction, retraction during exposure and implant placement, as well as closure.     ANESTHESIA:  General.      ESTIMATED BLOOD LOSS:   100 mL      IMPLANTS USED:  Synthes TFN advanced nail 11 mm x 170 mm x 125 degrees     FINDINGS:  Suresh Powers is a 62 year old-year-old male who did do some ambulation prior to this with above-named injury.  A long discussion had regarding the nature of hip fractures, risks related to that and surgery discussed, included but not limited to bleeding, infection, damage to surrounding neurovascular structures, malunion, delayed union, nonunion, need for additional surgeries, blood clots that go to the heart, lung or brain, anesthetic complications or even death.  Discussed hip fracture mortality rates.  The patient and the family wished to proceed and consent signed.      DESCRIPTION OF PROCEDURE:  The patient identified in the preoperative holding area per hospital policy and the correct op site marked. The patient was then transferred to the operating room and underwent  general anesthesia. They were then placed onto the [place] table legs positioned in a scissor position.  All bony prominences well padded.  Slight traction and internal rotation reduced the fracture. The patient was then prepped and draped in normal sterile fashion. A timeout was performed.  All in the room agreed.         A small incision  was made just proximal to greater trochanter and dissection was carried down to the greater trochanter, placed a guide pin confirming position AP and lateral views.  Opening reamer was then used in the proximal femur.  Placed the preoperatively templated cephalomedullary nail.  We then placed the guide wire into the center-center position in the femoral head and reamed, measured and placed the screw.  Checked AP, lateral and 30-degree arcs in between to ensure safe position, as well as appropriate reduction.  The fracture site was then compressed.  The interference screw was then locked proximally with a half turn off.  The distal interlock screw was then drilled and placed.  Final x-rays showed appropriate reduction, safe position of the implants. Thoroughly irrigated wounds, closed deep layers 2-0 Vicryl in subcutaneous, and staples.  Sterile dressings were then applied.      POSTOPERATIVE PLAN:   1.  Weightbearing as tolerated with a walker x6 weeks.   2.  Deep venous thrombosis prophylaxis with aspirin enteric-coated 325 mg daily x6 weeks.   3.  Ancef x 24 hours  4.  Osteoporosis workup including calcium, vitamin D supplementation and followup with primary care doctor for additional treatment.   5.  Physical therapy.   6.  Pain control.  Minimize narcotics.      POSTOPERATIVE PLAN:  The patient will have staples removed in 2 weeks. If they are in a rehab facility, the patient can be seen by our nurse practitioner Heather Gill. They will then be seen at 6-8 weeks postoperatively in my clinic

## 2022-09-04 NOTE — H&P
"[facility]    History and Physical  Hospitalist       Date of Admission:  9/3/2022  Date of Service (when I saw the patient): 09/03/22    Assessment & Plan   Suresh Powers is a 62 year old male who presents with fall with hip pain    L femur fracture  States disabled from work injuries, \"balance is terrible!\". Drinking and watching football with friends when fell of a bar stool. Developed L hip pain with movement. No dizziness/ lightheadedness, no cp/palpitations. In ED AFVSS. Na as below, other labs stable. XR L hip with fracture L femur. Denies cardiac hx.   - NPO, IV fluids  - orthopedic surgery consult  - check EKG  - prn analgesics  - continue vasquez given immobilization    Hyponatremia  Edema  Hx hyponatremia, baseline recent upper 120s-low 130s. Had been on chorthalidone and this was stopped. Na 129 on presentation   - check urine studies  - IV fluids   - monitor  - check BNP    Anemia  Hgb at 10.6, MCV 86. Baseline hgb previously normal.   - check stool guaiac  - monitor for now  - check iron, B12/ folate    Hypertension  - resume amlodipine 10 mg daily with rec    MDD  SPIKE  [needs rec- buspirone 15 mg BID, escitalopram 20 mg daily, lamotrigine 200 mg daily, trazodone 100-200 mg at HS prn]  - resume meds with rec    Alcohol use  Drinking this evening. Etoh on arrival at 0.21. denies daily use but states drinks \"a couple of beers\" occasionally. Banana bag given in ED.   - oral thiamine/ folate  - CIWA  - check LFTs  - start diazepam if needed    COVID-19 pending    DVT Prophylaxis: Pneumatic Compression Devices  Code Status: Full Code    Disposition: Expected discharge in 3-5 days     Evangelista Sierra MD, MD  [phone] (P)  Text Page     Primary Care Physician   Brad Thurston    Chief Complaint   Fall with L hip pain    History is obtained from the patient and medical records    History of Present Illness   Suresh Powers is a 62 year old male who presents with hip pain after fall. "  He reports that he is disabled secondary to work accidents.  He states he has had a severe crush during an industrial accident and also had a fall in 2016 where he broke his leg.  He states his balance is terrible and needs to walk with a cane.  Evening of presentation he was over at a friend's house watching football and drinking.  He was sitting on high barstool when he was going to get up to go to the bathroom.  The next thing he knew he was on the ground with left hip pain.  He denies any dizziness or lightheadedness.  Denies any chest pain or palpitations.  He thinks his balance was the issue.  He denies other trauma.  He currently denies chest pain or shortness of breath.  Denies abdominal pain.  He has no nausea or vomiting.  He does have pain in his left leg.  He denies any cardiac history.    Past Medical History    I have reviewed this patient's medical history and updated it with pertinent information if needed.   Past Medical History:   Diagnosis Date     Anxiety     sees psych for trazodone     Clostridium difficile diarrhea 3/9/2016     Foot pain      Hypertension      Mold exposure      Shingles 1984       Past Surgical History   I have reviewed this patient's surgical history and updated it with pertinent information if needed.  Past Surgical History:   Procedure Laterality Date     foot crush injury       kidney donation       OPEN REDUCTION INTERNAL FIXATION CLAVICLE Left 2/4/2016    Procedure: OPEN REDUCTION INTERNAL FIXATION CLAVICLE;  Surgeon: Rakesh Hui MD;  Location:  OR     OPEN REDUCTION INTERNAL FIXATION TIBIAL PLATEAU Left 2/4/2016    Procedure: OPEN REDUCTION INTERNAL FIXATION TIBIAL PLATEAU;  Surgeon: Rakesh Hui MD;  Location:  OR       Prior to Admission Medications   Prior to Admission Medications   Prescriptions Last Dose Informant Patient Reported? Taking?   Multiple Vitamins-Minerals (MULTIVITAMIN ADULT PO)   Yes No   Sig: Take 1 tablet by mouth daily    amLODIPine (NORVASC) 10 MG tablet   No No   Sig: Take 1 tablet (10 mg) by mouth daily   busPIRone (BUSPAR) 15 MG tablet   Yes No   Sig: Take 15 mg by mouth 2 times daily    carboxymethylcellulose PF (REFRESH LIQUIGEL) 1 % ophthalmic gel   No No   Sig: Place 1 drop Into the left eye 3 times daily as needed for dry eyes   Patient not taking: Reported on 11/17/2021   escitalopram (LEXAPRO) 20 MG tablet   Yes No   Sig: Take 20 mg by mouth daily   fluticasone (FLONASE) 50 MCG/ACT nasal spray   No No   Sig: use 2 sprays in each nostril daily.   Patient taking differently: Spray 2 sprays into both nostrils daily as needed use 2 sprays in each nostril daily.   lamoTRIgine (LAMICTAL) 150 MG tablet   Yes No   Sig: Take 200 mg by mouth At Bedtime 2 tab daily   polyethylene glycol-propylene glycol (SYSTANE ULTRA) 0.4-0.3 % SOLN ophthalmic solution   Yes No   Sig: Place 1 drop Into the left eye 4 times daily   traZODone (DESYREL) 50 MG tablet   No No   Sig: Take 1-4 tablets ( mg) by mouth At Bedtime   Patient taking differently: Take 100-200 mg by mouth At Bedtime    vitamin C (ASCORBIC ACID) 500 MG tablet   Yes No   Sig: Take 500 mg by mouth daily   zinc 50 MG TABS   Yes No   Sig: Take 100 mg by mouth daily      Facility-Administered Medications: None     Allergies   Allergies   Allergen Reactions     Lisinopril Other (See Comments)     Elevated potassium     Sertraline Diarrhea       Social History   I have reviewed this patient's social history and updated it with pertinent information if needed. Suresh Powers  reports that he has quit smoking. He smoked 1.00 pack per day for 0.00 years. He quit smokeless tobacco use about 6 years ago. He reports current alcohol use. He reports that he does not use drugs.    Family History   I have reviewed this patient's family history and updated it with pertinent information if needed.   Family History   Problem Relation Age of Onset     Breast Cancer Mother      Diabetes Type 2   Mother      Skin Cancer Father      Cerebrovascular Disease Father        Review of Systems   The 10 point Review of Systems is negative other than noted in the HPI or here.     Physical Exam         Pulse: 98   Resp: 18 SpO2: 98 % O2 Device: None (Room air)    Vital Signs with Ranges  0 lbs 0 oz    Constitutional: alert, oriented and in no acute distress, intoxicated but pleasant  Eyes: EOMI, PERRL  HEENT: OP clear  Respiratory: CTA B without w/c  Cardiovascular: RRR no murmur. 2+ edema.  GI: soft, nontender, nondistended, BS present  Lymph/Hematologic: no cervical LAD  Genitourinary: deferred  Skin: no rashes or lesions grossly  Musculoskeletal: no deformities or arthritis  Neurologic: CN II-XII, DURANT  Psychiatric: mood and affect wnl    Data   Data reviewed today:  I personally reviewed the XR  image(s) showing L femoral fracture.  Recent Labs   Lab 09/03/22  2305   WBC 10.7   HGB 10.6*   MCV 86      *   POTASSIUM 3.4   CHLORIDE 96   CO2 23   BUN 7   CR 0.60*   ANIONGAP 10   NIMISHA 7.8*   *       Recent Results (from the past 24 hour(s))   XR Pelvis w Hip Left 1 View    Narrative    EXAM: PELVIS AND LEFT HIP 2 VIEWS  LOCATION: [facility]  DATE/TIME: 9/3/2022 10:57 PM    INDICATION: Fall. Left hip pain.  COMPARISON: None.      Impression    IMPRESSION:   1. Acute comminuted transverse fracture of the proximal left femur, extending from the region of the lesser trochanter medially and into the subtrochanteric region of the proximal femur laterally. There is moderate to marked varus angulation about the   fracture.  2. No other visualized acute fractures of the pelvis.  3. Diffuse osteopenia.

## 2022-09-04 NOTE — CARE PLAN
Final Anesthesia Post-op Assessment    Patient: Gen Mendoza  Procedure(s) Performed: CIRCUMCISION,RELEASE OF CHORDEE,REPAIR OF PENILE TORSION,MEATOPLASTY  Anesthesia type: General    Vitals Value Taken Time   Temp 36.4 °C (97.5 °F) 11/02/22 1110   Pulse 142 11/02/22 1110   Resp 37 11/02/22 1110   SpO2 99 % 11/02/22 1110   /68 11/02/22 1113         Patient Location: PACU Phase 1  Post-op Vital Signs:stable  Level of Consciousness: awake and alert  Respiratory Status: spontaneous ventilation  Cardiovascular stable  Hydration: euvolemic  Pain Management: adequately controlled  Handoff: Handoff to receiving clinician was performed and questions were answered  Vomiting: none  Nausea: None  Airway Patency:patent  Post-op Assessment: no complications and patient tolerated procedure well with no complications      There were no known complications for this encounter.    Notified provider about indwelling vasquez catheter discussed removal or continued need.    Did provider choose to remove indwelling vasquez catheter?no    Provider's vasquez indication for keeping indwelling vasquez catheter: bedrest due to fracture    Is there an order for indwelling vasquez catheter? yes    *If there is a plan to keep vasquez catheter in place at discharge daily notification with provider is not necessary.

## 2022-09-04 NOTE — ED PROVIDER NOTES
History   Chief Complaint:  Hip Pain and Fall       The history is provided by the patient and the EMS personnel.      Suresh Powers is a 62 year old male with history of hypertension  who presents via EMS with hip pain following a fall. EMS reports that the patient has been drinking with friends in their basement while watching football when he fell off his bar stool and landed on his bottom. They report slight pain to his left hip but deny head injury. They report that the patient is not anticoagulated and had stable vitals en route.     In the ED, the patient reports pain to his left hip that occurs with movement. He denies numbness or paresthesias to his left foot. He confirms use of alcohol tonight. He also reports that he is disabled at baseline, noting terrible balance, and states that he uses a cane to walk.     Review of Systems   Musculoskeletal: Positive for arthralgias (left hip).   Neurological: Negative for numbness.        (-) paresthesias   All other systems reviewed and are negative.      Allergies:  Lisinopril  Sertraline    Medications:  Amlodipine   Buspirone   Escitalopram   Fluticasone   Lamotrigine   Trazodone   Zinc     Past Medical History:     Hypertension   Sleep disorder   Unilateral inguinal hernia without obstruction or gangrene   Single kidney   OCD  SPIKE  Depression   Hyponatremia   Cataract of both eyes    Past Surgical History:    Kidney donation   Open reduction internal fixation clavicle   Open reduction internal fixation tibial plateau     Family History:    Breast cancer  Type 2 diabetes mellitus   Skin cancer   Cerebrovascular disease    Social History:  The patient presents to the ED alone.   The patient was watching football tonight.   PCP: Brad Thurston       Physical Exam     Patient Vitals for the past 24 hrs:   Pulse Resp SpO2   09/03/22 2223 98 18 98 %       Physical Exam  General/Appearance: appears stated age, appears comfortable, strong odor of ETOH with slurred  speech  Eyes: EOMI, no scleral injection, no icterus  ENT: MMM  Neck: supple, nl ROM, no stiffness  Cardiovascular: RRR, nl S1S2, no m/r/g, 2+ pulses in all 4 extremities, cap refill <2sec  Respiratory: CTAB, good air movement throughout, no wheezes/rhonchi/rales, no increased WOB, no retractions  GI: abd soft, non-distended, nttp,  no HSM, no rebound, no guarding, nl BS  MSK: LLE shortened and rotated with focal ttp to L lateral hip, able to wiggle toes without trouble  Skin: warm and well-perfused, no rash, no edema, no ecchymosis, nl turgor  Neuro: GCS 15, alert and oriented, no gross focal neuro deficits  Psych: interacts appropriately  Heme: no petechia, no purpura, no active bleeding        Emergency Department Course     Imaging:  XR Pelvis w Hip Left 1 View   Final Result   IMPRESSION:    1. Acute comminuted transverse fracture of the proximal left femur, extending from the region of the lesser trochanter medially and into the subtrochanteric region of the proximal femur laterally. There is moderate to marked varus angulation about the    fracture.   2. No other visualized acute fractures of the pelvis.   3. Diffuse osteopenia.         Report per radiology    Laboratory:  Labs Ordered and Resulted from Time of ED Arrival to Time of ED Departure   BASIC METABOLIC PANEL - Abnormal       Result Value    Sodium 129 (*)     Potassium 3.4      Chloride 96      Carbon Dioxide (CO2) 23      Anion Gap 10      Urea Nitrogen 7      Creatinine 0.60 (*)     Calcium 7.8 (*)     Glucose 121 (*)     GFR Estimate >90     ETHYL ALCOHOL LEVEL - Abnormal    Alcohol ethyl 0.21 (*)    CBC WITH PLATELETS AND DIFFERENTIAL - Abnormal    WBC Count 10.7      RBC Count 3.68 (*)     Hemoglobin 10.6 (*)     Hematocrit 31.6 (*)     MCV 86      MCH 28.8      MCHC 33.5      RDW 15.5 (*)     Platelet Count 329      % Neutrophils 76      % Lymphocytes 12      % Monocytes 11      % Eosinophils 1      % Basophils 0      % Immature Granulocytes  0      NRBCs per 100 WBC 0      Absolute Neutrophils 8.0      Absolute Lymphocytes 1.3      Absolute Monocytes 1.2      Absolute Eosinophils 0.1      Absolute Basophils 0.0      Absolute Immature Granulocytes 0.0      Absolute NRBCs 0.0     COVID-19 VIRUS (CORONAVIRUS) BY PCR        Emergency Department Course:     Reviewed:  I reviewed nursing notes, vitals and past medical history    Assessments:  2221 I obtained history and examined the patient as noted above.   2322 I rechecked the patient and explained findings.     Consults:  2354 I consulted with Dr. Sierra, Hospitalist, regarding the patient's history and presentation in the ED today.     Interventions:    Medications   sodium chloride 0.9 % 1,000 mL with Infuvite Adult 10 mL, thiamine 100 mg, folic acid 1 mg infusion (has no administration in time range)       Disposition:  The patient was admitted to the hospital under the care of Dr. Sierra.     Impression & Plan     Medical Decision Making:  This patient is a 62-year-old male, clinically intoxicated, not on blood thinners who presents after he slid off of the stool today, landing on his left hip.  He is comfortable appearing when at rest but states he has pain only when attempting to move the left hip.  The left lower extremity is shortened and rotated, consistent with fracture versus dislocation.  X-ray confirmed comminuted fracture in the proximal left femur.  This will require surgical fixation in the morning.  In the meantime he will be admitted to the hospitalist service.  Diagnosis:    ICD-10-CM    1. Closed fracture of left hip, initial encounter (H)  S72.002A    2. Alcoholic intoxication without complication (H)  F10.920        Scribe Disclosure:  I, DARIN CABRERA, am serving as a scribe at 10:21 PM on 9/3/2022 to document services personally performed by Natalia Chavez MD based on my observations and the provider's statements to me.            Natalia Chavez,  MD  09/04/22 0000

## 2022-09-04 NOTE — PLAN OF CARE
Goal Outcome Evaluation:  Pt left floor approx 1030 to the OR for a L ORIF, Pt returned to floor post surgery approx 1415. Pt denies pain while lying still, but rates pain currently at 5 post surgery. Pt aox4, VSS, CMS intact, has vasquez currently, not out of bed yet, pain to be managed with dilaudid, will advance diet as tolerated.

## 2022-09-04 NOTE — CONSULTS
[facility]    Orthopedics Consultation    Date of Admission:  9/3/2022    Assessment & Plan   Suresh Powers is a 62 year old male who was admitted on 9/3/2022. I was asked to see the patient for left hip pain.  He has a left displaced intertrochanteric femur fracture and osteoporosis.    We will bring him back to the operating room for a left hip cephalomedullary nailing today.  He has been n.p.o. since midnight.  He will be on bedrest until surgery.  His hemoglobin is 10.6, we will monitor this intraoperatively and postoperatively.  He does not take anticoagulants.    Huang Cochran MD    Code Status    Full Code    Reason for Consult   Reason for consult: Left hip pain    Primary Care Physician   Brad Thurston    History of Present Illness   Suresh Powers is a 62 year old male who presents with a chief complaint of left hip pain.  He was watching football yesterday and fell off of a barstool well being intoxicated.  He immediately had left hip pain and was unable to bear weight.  He was seen in the emergency department and was found to have a left proximal femur fracture.  He was admitted to the hospitalist service.  He complains of pain in the left hip and any movement of the left hip causes substantial pain.  Leaving it still improves his pain.  He denies any new numbness or tingling in his left lower extremity.  He uses a cane at baseline and states that his balance is terrible.  He states that he is disabled from work related injuries.    MEDS:   No current outpatient medications on file.       PAST MEDICAL HISTORY:   Past Medical History:   Diagnosis Date     Anxiety     sees psych for trazodone     Clostridium difficile diarrhea 3/9/2016     Foot pain      Hypertension      Mold exposure      Shingles 1984       PAST SURGICAL HISTORY:   Past Surgical History:   Procedure Laterality Date     foot crush injury       kidney donation       OPEN REDUCTION INTERNAL FIXATION CLAVICLE  Left 2/4/2016    Procedure: OPEN REDUCTION INTERNAL FIXATION CLAVICLE;  Surgeon: Rakesh Hui MD;  Location:  OR     OPEN REDUCTION INTERNAL FIXATION TIBIAL PLATEAU Left 2/4/2016    Procedure: OPEN REDUCTION INTERNAL FIXATION TIBIAL PLATEAU;  Surgeon: Rakesh Hui MD;  Location:  OR       FAMILY HISTORY:   Family History   Problem Relation Age of Onset     Breast Cancer Mother      Diabetes Type 2  Mother      Skin Cancer Father      Cerebrovascular Disease Father        SOCIAL HISTORY:   Social History     Tobacco Use     Smoking status: Former Smoker     Packs/day: 1.00     Years: 0.00     Pack years: 0.00     Smokeless tobacco: Former User     Quit date: 2/2/2016   Substance Use Topics     Alcohol use: Yes     Comment: states 8 beerrs all day over past 8 hours       ALLERGIES:    Allergies   Allergen Reactions     Lisinopril Other (See Comments)     Elevated potassium     Sertraline Diarrhea       ROS:  10 point ROS neg other than the symptoms noted above in the HPI.    Physical Exam   Temp: 100  F (37.8  C) Temp src: Oral BP: 111/79 Pulse: 99   Resp: 20 SpO2: 95 % O2 Device: None (Room air)    Vital Signs with Ranges  Temp:  [98.4  F (36.9  C)-100  F (37.8  C)] 100  F (37.8  C)  Pulse:  [] 99  Resp:  [16-20] 20  BP: (109-138)/(66-93) 111/79  SpO2:  [94 %-98 %] 95 %  190 lbs 0 oz    Constitutional: Pleasant, alert, appropriate, following commands.  HEENT: Head atraumatic normocephalic. Pupils equal round and reactive to light.  Respiratory: Unlabored breathing no audible wheeze  Cardiovascular: Regular rate and rhythm  GI: Abdomen soft nontender nondistended.  Lymph/Hematologic: No lymphadenopathy in areas examined  Genitourinary:  No vasquez  Skin: No rashes, no cyanosis, no edema.  Musculoskeletal: Focused examination of the left lower extremity demonstrates tenderness palpation of the greater trochanter.  He has pain with logroll.  He has good capillary refill less than 2 seconds.  He is  able to flex and extend at the toes and the ankle.  He has normal sensation distally.  He has a negative pelvic compression test.  Neurologic: normal without focal findings, mental status, speech normal, alert and oriented x iii, DARNELL    Data   Results for orders placed or performed during the hospital encounter of 09/03/22 (from the past 24 hour(s))   XR Pelvis w Hip Left 1 View    Narrative    EXAM: PELVIS AND LEFT HIP 2 VIEWS  LOCATION: [facility]  DATE/TIME: 9/3/2022 10:57 PM    INDICATION: Fall. Left hip pain.  COMPARISON: None.      Impression    IMPRESSION:   1. Acute comminuted transverse fracture of the proximal left femur, extending from the region of the lesser trochanter medially and into the subtrochanteric region of the proximal femur laterally. There is moderate to marked varus angulation about the   fracture.  2. No other visualized acute fractures of the pelvis.  3. Diffuse osteopenia.    CBC with platelets differential    Narrative    The following orders were created for panel order CBC with platelets differential.  Procedure                               Abnormality         Status                     ---------                               -----------         ------                     CBC with platelets and d...[[id number]]  Abnormal            Final result                 Please view results for these tests on the individual orders.   Basic metabolic panel   Result Value Ref Range    Sodium 129 (L) 133 - 144 mmol/L    Potassium 3.4 3.4 - 5.3 mmol/L    Chloride 96 94 - 109 mmol/L    Carbon Dioxide (CO2) 23 20 - 32 mmol/L    Anion Gap 10 3 - 14 mmol/L    Urea Nitrogen 7 7 - 30 mg/dL    Creatinine 0.60 (L) 0.66 - 1.25 mg/dL    Calcium 7.8 (L) 8.5 - 10.1 mg/dL    Glucose 121 (H) 70 - 99 mg/dL    GFR Estimate >90 >60 mL/min/1.73m2   Alcohol level blood   Result Value Ref Range    Alcohol ethyl 0.21 (H) <=0.01 g/dL   CBC with platelets and differential   Result Value Ref  Range    WBC Count 10.7 4.0 - 11.0 10e3/uL    RBC Count 3.68 (L) 4.40 - 5.90 10e6/uL    Hemoglobin 10.6 (L) 13.3 - 17.7 g/dL    Hematocrit 31.6 (L) 40.0 - 53.0 %    MCV 86 78 - 100 fL    MCH 28.8 26.5 - 33.0 pg    MCHC 33.5 31.5 - 36.5 g/dL    RDW 15.5 (H) 10.0 - 15.0 %    Platelet Count 329 150 - 450 10e3/uL    % Neutrophils 76 %    % Lymphocytes 12 %    % Monocytes 11 %    % Eosinophils 1 %    % Basophils 0 %    % Immature Granulocytes 0 %    NRBCs per 100 WBC 0 <1 /100    Absolute Neutrophils 8.0 1.6 - 8.3 10e3/uL    Absolute Lymphocytes 1.3 0.8 - 5.3 10e3/uL    Absolute Monocytes 1.2 0.0 - 1.3 10e3/uL    Absolute Eosinophils 0.1 0.0 - 0.7 10e3/uL    Absolute Basophils 0.0 0.0 - 0.2 10e3/uL    Absolute Immature Granulocytes 0.0 <=0.4 10e3/uL    Absolute NRBCs 0.0 10e3/uL   Hepatic panel   Result Value Ref Range    Bilirubin Total 0.2 0.2 - 1.3 mg/dL    Bilirubin Direct 0.1 0.0 - 0.2 mg/dL    Protein Total 6.4 (L) 6.8 - 8.8 g/dL    Albumin 2.6 (L) 3.4 - 5.0 g/dL    Alkaline Phosphatase 134 40 - 150 U/L    AST 44 0 - 45 U/L    ALT 44 0 - 70 U/L   Nt probnp inpatient   Result Value Ref Range    N terminal Pro BNP Inpatient 308 0 - 900 pg/mL   Iron and iron binding capacity   Result Value Ref Range    Iron 31 (L) 35 - 180 ug/dL    Iron Binding Capacity 336 240 - 430 ug/dL    Iron Sat Index 9 (L) 15 - 46 %   Ferritin   Result Value Ref Range    Ferritin 18 (L) 26 - 388 ng/mL   Vitamin B12   Result Value Ref Range    Vitamin B12 364 232 - 1,245 pg/mL   Asymptomatic COVID-19 Virus (Coronavirus) by PCR Nasopharyngeal    Specimen: Nasopharyngeal; Swab   Result Value Ref Range    SARS CoV2 PCR Negative Negative    Narrative    Testing was performed using the [employer] Xpress SARS-CoV-2 Assay on the   Cepheid [employer] Instrument Systems. Additional information about   this Emergency Use Authorization (EUA) assay can be found via the Lab   Guide. This test should be ordered for the detection of SARS-CoV-2 in   individuals who  meet SARS-CoV-2 clinical and/or epidemiological   criteria. Test performance is unknown in asymptomatic patients. This   test is for in vitro diagnostic use under the FDA EUA for   laboratories certified under CLIA to perform high complexity testing.   This test has not been FDA cleared or approved. A negative result   does not rule out the presence of PCR inhibitors in the specimen or   target RNA in concentration below the limit of detection for the   assay. The possibility of a false negative should be considered if   the patient's recent exposure or clinical presentation suggests   COVID-19. This test was validated by the [facility]. This laboratory is certified under the Clinical Laboratory Improvement Amendments of 1988 (CLIA-88) as qualified to perform high complexity laboratory testing.     Sodium random urine   Result Value Ref Range    Sodium Urine mmol/L 132 mmol/L   Osmolality urine   Result Value Ref Range    Osmolality Urine 521 100 - 1,200 mmol/kg    Narrative    Reference Ranges depend on patient's hydration status and renal function.   Neonates:  mmol/kg   2 years and older, random specimens: 100-1200 mmol/kg; Greater than 850 mmol/kg after 12 hour fluid restriction  Urine/serum osmolality ratio: 2 years and older: 1.0-3.0; 3.0-4.7 after 12 hour fluid restriction

## 2022-09-04 NOTE — ANESTHESIA PROCEDURE NOTES
Fascia iliaca Procedure Note    Pre-Procedure   Staff -        Anesthesiologist:  Paola Beckford       Performed By: Anesthesiologist and anesthesiologist       Location: pre-op       Pre-Anesthestic Checklist: patient identified, IV checked, site marked, risks and benefits discussed, informed consent, monitors and equipment checked, at physician/surgeon's request and post-op pain management  Timeout:       Correct Patient: Yes        Correct Procedure: Yes        Correct Site: Yes        Correct Position: Yes        Correct Laterality: Yes        Site Marked: Yes  Procedure Documentation  Procedure: Fascia iliaca       Laterality: left       Patient Position: supine       Patient Prep/Sterile Barriers: sterile gloves, mask       Skin prep: Chloraprep       Local skin infiltrated with mL of 1% lidocaine.        Needle Type: insulated and short bevel (Arrow)       Needle Gauge: 21.        Needle Length (millimeters): 90        Ultrasound guided       1. Ultrasound was used to identify targeted nerve, plexus, vascular marker, or fascial plane and place a needle adjacent to it in real-time.       2. Ultrasound was used to visualize the spread of anesthetic in close proximity to the above referenced structure.       3. A permanent image is entered into the patient's record.       4. The visualized anatomic structures appeared normal.       5. There were no apparent abnormal pathologic findings.    Assessment/Narrative         The placement was negative for: blood aspirated, painful injection and site bleeding       Paresthesias: No.       Bolus given via needle..        Secured via.        Insertion/Infusion Method: Single Shot       Complications: none    Medication(s) Administered   Ropivacaine 0.5% w/ 1:400K Epi (Injection) - Injection   15 mL - 9/4/2022 2:40:00 PM   Comments:  Peripheral nerve block performed at request of the primary medical team.        Postoperative pain block requested by surgeon for severe  postoperative pain.  Patient brought to Preop for procedure.      Pt tolerated well.    No complications.      The surgeon has given a verbal order transferring care of this patient to me for the performance of a regional analgesia block for post-op pain control. It is requested of me because I am uniquely trained and qualified to perform this block and the surgeon is neither trained nor qualified to perform this procedure.    Paola Beckford   September 4, 2022 2:39 PM

## 2022-09-04 NOTE — ANESTHESIA PROCEDURE NOTES
Airway       Patient location during procedure: OR       Procedure Start/Stop Times: 9/4/2022 11:54 AM  Staff -        Anesthesiologist:  Paola Beckford       CRNA: Khushi Flores APRN CRNA       Performed By: CRNA  Consent for Airway        Urgency: elective  Indications and Patient Condition       Indications for airway management: ashu-procedural       Induction type:intravenous       Mask difficulty assessment: 2 - vent by mask + OA or adjuvant +/- NMBA    Final Airway Details       Final airway type: endotracheal airway       Successful airway: ETT - single and Oral  Endotracheal Airway Details        ETT size (mm): 8.0       Cuffed: yes       Successful intubation technique: video laryngoscopy       VL Blade Size: Glidescope 4       Grade View of Cords: 1       Adjucts: stylet       Position: Right       Measured from: lips       Secured at (cm): 22       Bite block used: None    Post intubation assessment        Placement verified by: capnometry, equal breath sounds and chest rise        Number of attempts at approach: 1       Number of other approaches attempted: 0       Secured with: pink tape       Ease of procedure: easy       Dentition: Intact and Unchanged    Medication(s) Administered   Medication Administration Time: 9/4/2022 11:54 AM

## 2022-09-04 NOTE — ANESTHESIA POSTPROCEDURE EVALUATION
Patient: Suresh Powers    Procedure: Procedure(s):  Open reduction internal fixation of left hip fracture       Anesthesia Type:  General    Note:  Disposition: Inpatient   Postop Pain Control: Uneventful            Sign Out: Well controlled pain   PONV: No   Neuro/Psych: Uneventful            Sign Out: Acceptable/Baseline neuro status   Airway/Respiratory: Uneventful            Sign Out: Acceptable/Baseline resp. status   CV/Hemodynamics: Uneventful            Sign Out: Acceptable CV status; No obvious hypovolemia; No obvious fluid overload   Other NRE: NONE   DID A NON-ROUTINE EVENT OCCUR?            Last vitals:  Vitals Value Taken Time   /72 09/04/22 1350   Temp 36.4  C (97.6  F) 09/04/22 1257   Pulse 99 09/04/22 1353   Resp 20 09/04/22 1353   SpO2 95 % 09/04/22 1353   Vitals shown include unvalidated device data.    Electronically Signed By: Paola Beckford  September 4, 2022  2:41 PM

## 2022-09-04 NOTE — ED TRIAGE NOTES
Pt states was at friends house watching the games, fell from a stool and landed on L hip. Since has pain with movement of L hip and non-weight bearing     Triage Assessment     Row Name 09/03/22 5743       Triage Assessment (Adult)    Airway WDL WDL       Respiratory WDL    Respiratory WDL WDL       Skin Circulation/Temperature WDL    Skin Circulation/Temperature WDL WDL       Cardiac WDL    Cardiac WDL WDL       Peripheral/Neurovascular WDL    Peripheral Neurovascular WDL WDL       Cognitive/Neuro/Behavioral WDL    Cognitive/Neuro/Behavioral WDL X   admits 6-8 alcohol drinks through out the day.    Level of Consciousness alert    Arousal Level opens eyes spontaneously    Orientation oriented x 4       Pupils (CN II)    Pupil Size Left 3 mm    Pupil Size Right 3 mm       Kidder Coma Scale    Best Eye Response 4-->(E4) spontaneous    Best Motor Response 6-->(M6) obeys commands    Best Verbal Response 5-->(V5) oriented    Brandy Coma Scale Score 15

## 2022-09-04 NOTE — ED NOTES
Bed: ED08  Expected date:   Expected time:   Means of arrival:   Comments:  447 62m Lt hip dislocation after fall

## 2022-09-04 NOTE — ED NOTES
[facility]  ED Nurse Handoff Report    ED Chief complaint: Hip Pain and Fall      ED Diagnosis:   Final diagnoses:   Closed fracture of left hip, initial encounter (H)   Alcoholic intoxication without complication (H)       Code Status: Full Code    Allergies:   Allergies   Allergen Reactions     Lisinopril Other (See Comments)     Elevated potassium     Sertraline Diarrhea       Patient Story: Pt arrives via EMS after a fall from a stool. Pt states he was watching football and drinking alcohol at friends house. Landed on L hip, denies head injury. No anticoagulation.   Focused Assessment:  Uses cane baseline. NWB . VSS.     Treatments and/or interventions provided: PIV, labs, X-ray, infuvite, Prieto placement  Patient's response to treatments and/or interventions: Toleraed    To be done/followed up on inpatient unit:  Monitor    Does this patient have any cognitive concerns?: N/A    Activity level - Baseline/Home:  Cane  Activity Level - Current:   Total Care    Patient's Preferred language: English   Needed?: No    Isolation: None  Infection: Not Applicable  Patient tested for COVID 19 prior to admission: YES  Bariatric?: No    Vital Signs:   Vitals:    09/03/22 2223 09/03/22 2230   BP:  130/81   Pulse: 98    Resp: 18    SpO2: 98% 98%       Cardiac Rhythm:     Was the PSS-3 completed:   Yes  What interventions are required if any?               Family Comments: N/A  OBS brochure/video discussed/provided to patient/family: N/A              Name of person given brochure if not patient:               Relationship to patient:     For the majority of the shift this patient's behavior was Green.   Behavioral interventions performed were .    ED NURSE PHONE NUMBER: [phone]

## 2022-09-04 NOTE — PLAN OF CARE
Goal Outcome Evaluation:    A&Ox4, pleasant, VSS on RA. NS ruining 100/hr, Pain rates 6/10, IV dilaudid given denied N/V. NPO, Strict bedrest, PCD applied. CMS intact. Has E cigarettes in the closet (2). Skin WDL, unable to do the full skin assessment. Left hip Surgery this afternoon.

## 2022-09-04 NOTE — PROGRESS NOTES
[facility]  Hospitalist Progress Note    Admit Date:  9/3/2022  Date of Service (when I saw the patient): 09/04/2022   Provider:  Sana Gillespie, DO    Assessment & Plan   Suresh Powers is a 62 year old male who was admitted on 9/3/2022 with right hip pain.  He was inebriated and fell off of a bar stools that resulted in right hip pain.  Imaging in the ED revealed left displaced intertrochanteric femur fracture.  Ortho surgery consulted and planning on surgical fixation of the fracture later today.     Problem List:     1.  Acute traumatic displaced intertrochanteric femur fracture         Ortho surgery consult appreciated  NPO for proposed surgical fixation later today  will continue with IV dilaudid  - will increase prn dose this am as pain in poorly controlled and expect that he has increased tolerance to narcotic medication d/t his daily alcohol consumption    2.  Acute inebriation     Supportive care  CIWA scale in place - monitor for withdrawls  Continue with daily MVI, folate and thiamine    Check LFTs, Magnesium    3. Hyponatremia      Edema  Hx hyponatremia, baseline recent upper 120s-low 130s. Had been on chorthalidone and this was recently stopped.     Awaiting repeat sodium this am  - check urine studies - pending    4. Anemia  Hgb at 10.6, MCV 86.   Prior hgb 14.2 (6/21)  - check stool guaiac  - check iron, B12/ folate     5. Hypertension  -130's  - resume amlodipine 10 mg daily with BMP parameters     6. MDD      SPIKE    - resume PTA medications once reconciled     COVID-19 negative 9/4/22     DVT Prophylaxis: Pneumatic Compression Devices  Code Status: Full Code     Disposition: Expected discharge several more days     Diet: NPO for Medical/Clinical Reasons Except for: Meds, Ice Chips    DVT Prophylaxis: Defer to ortho surgery service  Prieto Catheter: Not present  Code Status: Full Code      Disposition Plan      Expected Discharge Date: 09/06/2022            "  Entered: Sana LLidya Gillespie,  09/04/2022, 7:12 AM     We are operating under sub optimal conditions in the setting of a world wide pandemic, hospitals are running at full capacity with limited bed availability. We are providing the best possible patient care with limited resources.    Interval History     Having a lot of pain in his left leg.  IV dilaudid working but \"not very well\" this am.   No HA, CP, SOB, F/C or N/V.  Not feeling shaky.  Tells me that he usually drinks \"2 beers a day\".    -Data reviewed today: I reviewed all new labs and imaging results over the last 24 hours. I personally reviewed no images or EKG's today.    Physical Exam   Temp: 98.4  F (36.9  C) Temp src: Oral BP: 128/77 Pulse: 91   Resp: 18 SpO2: 98 % O2 Device: None (Room air)    There were no vitals filed for this visit.  Vital Signs with Ranges  Temp:  [98.4  F (36.9  C)] 98.4  F (36.9  C)  Pulse:  [] 91  Resp:  [16-18] 18  BP: (109-130)/(66-81) 128/77  SpO2:  [98 %] 98 %  I/O last 3 completed shifts:  In: -   Out: 3200 [Urine:3200]    GEN:  Alert, oriented x 3, comfortable, no overt respiratory distress.  HEENT:  Normocephalic/atraumatic, no scleral icterus, no nasal discharge, mouth and membranes fairly moist  NECK:  No clear thyromegaly or JVD  CV:  Regular rate and rhythm, no loud murmur to ausc.  S1 + S2 noted, no S3 or S4.  LUNGS:  Clear to auscultation ant/lat bilaterally.  No clear rales/rhonchi/wheezing auscultated bilaterally.  No costal retractions bilaterally.  Symmetric chest rise on inhalation noted.  ABD:  Active bowel sounds, soft, non-tender, mildly distended.  No clear rebound/guarding/rigidity.  No masses palpated.  No obvious HSM to exam.  EXT:  No pretibial edema or cyanosis bilaterally. No joint synovitis noted.  No calf-tenderness or asymmetry noted.  SKIN:  Dry to touch, no rashes or jaundice noted.  PSYCH:  Mood appropriate, Not tearful or depressed.  Maintains mainly direct eye contact.  " cooperative  NEURO:  No tremors at rest currently, speech is clear and appropriate.  CN 2-12 intact to gross testing bilaterally    Data   Labs:  Recent Labs   Lab 09/03/22  2305   *   POTASSIUM 3.4   CHLORIDE 96   CO2 23   ANIONGAP 10   *   BUN 7   CR 0.60*   GFRESTIMATED >90   NIMISHA 7.8*     Recent Labs   Lab 09/03/22  2305   WBC 10.7   HGB 10.6*   HCT 31.6*   MCV 86        Recent Labs   Lab 09/03/22  2305   *   POTASSIUM 3.4   CHLORIDE 96   CO2 23   ANIONGAP 10   *   BUN 7   CR 0.60*   GFRESTIMATED >90   NIMISHA 7.8*   PROTTOTAL 6.4*   ALBUMIN 2.6*   BILITOTAL 0.2   ALKPHOS 134   AST 44   ALT 44      Recent Imaging:   Recent Results (from the past 24 hour(s))   XR Pelvis w Hip Left 1 View    Narrative    EXAM: PELVIS AND LEFT HIP 2 VIEWS  LOCATION: [facility]  DATE/TIME: 9/3/2022 10:57 PM    INDICATION: Fall. Left hip pain.  COMPARISON: None.      Impression    IMPRESSION:   1. Acute comminuted transverse fracture of the proximal left femur, extending from the region of the lesser trochanter medially and into the subtrochanteric region of the proximal femur laterally. There is moderate to marked varus angulation about the   fracture.  2. No other visualized acute fractures of the pelvis.  3. Diffuse osteopenia.        Medications     sodium chloride 100 mL/hr at 09/04/22 0239       sodium chloride (PF)  3 mL Intracatheter Q8H

## 2022-09-04 NOTE — ANESTHESIA PREPROCEDURE EVALUATION
Anesthesia Pre-Procedure Evaluation    Patient: Suresh Powers   MRN: [id number] : 1960        Procedure : Procedure(s):  Open reduction internal fixation of left hip fracture          Past Medical History:   Diagnosis Date     Anxiety     sees psych for trazodone     Clostridium difficile diarrhea 3/9/2016     Foot pain      Hypertension      Mold exposure      Shingles       Past Surgical History:   Procedure Laterality Date     foot crush injury       kidney donation       OPEN REDUCTION INTERNAL FIXATION CLAVICLE Left 2016    Procedure: OPEN REDUCTION INTERNAL FIXATION CLAVICLE;  Surgeon: Rakesh Hui MD;  Location: [facility] OR     OPEN REDUCTION INTERNAL FIXATION TIBIAL PLATEAU Left 2016    Procedure: OPEN REDUCTION INTERNAL FIXATION TIBIAL PLATEAU;  Surgeon: Rakesh Hui MD;  Location: [facility] OR      Allergies   Allergen Reactions     Lisinopril Other (See Comments)     Elevated potassium     Sertraline Diarrhea      Social History     Tobacco Use     Smoking status: Former Smoker     Packs/day: 1.00     Years: 0.00     Pack years: 0.00     Smokeless tobacco: Former User     Quit date: 2016   Substance Use Topics     Alcohol use: Yes     Comment: states 8 beerrs all day over past 8 hours      Wt Readings from Last 1 Encounters:   21 83.9 kg (185 lb)        Anesthesia Evaluation   Pt has had prior anesthetic.     No history of anesthetic complications       ROS/MED HX  ENT/Pulmonary:    (-) tobacco use, asthma and sleep apnea   Neurologic:       Cardiovascular:     (+) hypertension-----    METS/Exercise Tolerance:     Hematologic:       Musculoskeletal: Comment: Multiple fractioos      GI/Hepatic:    (-) GERD   Renal/Genitourinary:       Endo:    (-) Type II DM and thyroid disease   Psychiatric/Substance Use: Comment: etoh    (+) alcohol abuse     Infectious Disease:       Malignancy:       Other:            Physical Exam    Airway        Mallampati: II   TM distance: > 3 FB   Neck  ROM: full   Mouth opening: > 3 cm    Respiratory Devices and Support         Dental  no notable dental history         Cardiovascular   cardiovascular exam normal          Pulmonary   pulmonary exam normal                OUTSIDE LABS:  CBC:   Lab Results   Component Value Date    WBC 10.7 09/03/2022    WBC 6.6 06/15/2021    HGB 10.6 (L) 09/03/2022    HGB 14.2 06/15/2021    HCT 31.6 (L) 09/03/2022    HCT 39.8 (L) 06/15/2021     09/03/2022     06/15/2021     BMP:   Lab Results   Component Value Date     (L) 09/03/2022     (L) 06/15/2021    POTASSIUM 3.4 09/03/2022    POTASSIUM 3.8 06/15/2021    CHLORIDE 96 09/03/2022    CHLORIDE 87 (L) 06/15/2021    CO2 23 09/03/2022    CO2 33 (H) 06/15/2021    BUN 7 09/03/2022    BUN 9 06/15/2021    CR 0.60 (L) 09/03/2022    CR 0.81 06/15/2021     (H) 09/03/2022     (H) 06/15/2021     COAGS:   Lab Results   Component Value Date    PTT 29 06/15/2021    INR 0.87 06/15/2021     POC: No results found for: BGM, HCG, HCGS  HEPATIC:   Lab Results   Component Value Date    ALBUMIN 2.6 (L) 09/03/2022    PROTTOTAL 6.4 (L) 09/03/2022    ALT 44 09/03/2022    AST 44 09/03/2022    ALKPHOS 134 09/03/2022    BILITOTAL 0.2 09/03/2022     OTHER:   Lab Results   Component Value Date    LACT 0.9 02/05/2016    A1C 5.0 10/13/2020    NIMISHA 7.8 (L) 09/03/2022    MAG 2.5 (H) 09/13/2019    TSH 1.04 06/15/2021    CRP 3.7 03/24/2021    SED 17 03/24/2021       Anesthesia Plan    ASA Status:  3      Anesthesia Type: General.     - Airway: ETT   Induction: Intravenous.   Maintenance: Balanced.   Techniques and Equipment:     - Airway: Video-Laryngoscope         Consents    Anesthesia Plan(s) and associated risks, benefits, and realistic alternatives discussed. Questions answered and patient/representative(s) expressed understanding.    - Discussed:     - Discussed with:  Patient         Postoperative Care    Pain management: IV analgesics, Oral pain medications.   PONV  prophylaxis: Ondansetron (or other 5HT-3), Dexamethasone or Solumedrol     Comments:    Other Comments: The surgeon has given a verbal order transferring care of this patient to me for the performance of a regional analgesia block for post-op pain control. It is requested of me because I am uniquely trained and qualified to perform this block and the surgeon is neither trained nor qualified to perform this procedure.The surgeon has given a verbal order transferring care of this patient to me for the performance of a regional analgesia block for post-op pain control. It is requested of me because I am uniquely trained and qualified to perform this block and the surgeon is neither trained nor qualified to perform this procedure.            Paola Beckford

## 2022-09-04 NOTE — PROGRESS NOTES
RECEIVING UNIT ED HANDOFF REVIEW    ED Nurse Handoff Report was reviewed by: Pamela Painting RN on September 4, 2022 at 1:35 AM

## 2022-09-04 NOTE — PHARMACY-ADMISSION MEDICATION HISTORY
Pharmacy Medication History  Admission medication history interview status for the 9/3/2022  admission is complete. See EPIC admission navigator for prior to admission medications     Location of Interview: Phone  Medication history sources: Patient and Surescripts    Significant changes made to the medication list:  none    In the past week, patient estimated taking medication this percent of the time: greater than 90%    Additional medication history information:   Patient stopped taking lamotrigine a few months ago due to a rash. He states he would like to restart on it but at a lower dose.     Medication reconciliation completed by provider prior to medication history? Yes    Time spent in this activity: 15 minutes    Prior to Admission medications    Medication Sig Last Dose Taking? Auth Provider Long Term End Date   amLODIPine (NORVASC) 10 MG tablet Take 1 tablet (10 mg) by mouth daily 9/2/2022 Yes Brad Thurston MD Yes    escitalopram (LEXAPRO) 20 MG tablet Take 20 mg by mouth daily 9/2/2022 Yes Unknown, Entered By History No    fluticasone (FLONASE) 50 MCG/ACT nasal spray use 2 sprays in each nostril daily.  Patient taking differently: Spray 2 sprays into both nostrils daily as needed use 2 sprays in each nostril daily. Unknown at Unknown time Yes Brad Thurston MD     lamoTRIgine (LAMICTAL) 200 MG tablet Take 200 mg by mouth At Bedtime More than a month at Unknown time Yes Brad Thurston MD Yes    Multiple Vitamins-Minerals (MULTIVITAMIN ADULT PO) Take 1 tablet by mouth daily 9/2/2022 Yes Reported, Patient     polyethylene glycol-propylene glycol (SYSTANE ULTRA) 0.4-0.3 % SOLN ophthalmic solution Place 1 drop into both eyes 4 times daily as needed 9/4/2022 at Unknown time Yes Unknown, Entered By History     traZODone (DESYREL) 50 MG tablet Take 1-4 tablets ( mg) by mouth At Bedtime  Patient taking differently: Take 100 mg by mouth At Bedtime 9/2/2022 Yes Carmen Martinez Ra, APRN CNP Yes    vitamin C  (ASCORBIC ACID) 500 MG tablet Take 500 mg by mouth daily 9/2/2022 Yes Unknown, Entered By History     zinc 50 MG TABS Take 100 mg by mouth daily 9/2/2022 Yes Reported, Patient         The information provided in this note is only as accurate as the sources available at the time of update(s)

## 2022-09-05 ENCOUNTER — APPOINTMENT (OUTPATIENT)
Dept: PHYSICAL THERAPY | Facility: CLINIC | Age: 62
DRG: 481 | End: 2022-09-05
Payer: COMMERCIAL

## 2022-09-05 LAB
ALBUMIN SERPL-MCNC: 2 G/DL (ref 3.4–5)
ALP SERPL-CCNC: 90 U/L (ref 40–150)
ALT SERPL W P-5'-P-CCNC: 22 U/L (ref 0–70)
ANION GAP SERPL CALCULATED.3IONS-SCNC: 5 MMOL/L (ref 3–14)
AST SERPL W P-5'-P-CCNC: 16 U/L (ref 0–45)
BILIRUB SERPL-MCNC: 0.3 MG/DL (ref 0.2–1.3)
BUN SERPL-MCNC: 7 MG/DL (ref 7–30)
CALCIUM SERPL-MCNC: 8.1 MG/DL (ref 8.5–10.1)
CHLORIDE BLD-SCNC: 98 MMOL/L (ref 94–109)
CO2 SERPL-SCNC: 26 MMOL/L (ref 20–32)
CREAT SERPL-MCNC: 0.59 MG/DL (ref 0.66–1.25)
FOLATE SERPL-MCNC: >40 NG/ML (ref 4.6–34.8)
GFR SERPL CREATININE-BSD FRML MDRD: >90 ML/MIN/1.73M2
GLUCOSE BLD-MCNC: 124 MG/DL (ref 70–99)
GLUCOSE BLDC GLUCOMTR-MCNC: 127 MG/DL (ref 70–99)
GLUCOSE BLDC GLUCOMTR-MCNC: 137 MG/DL (ref 70–99)
GLUCOSE BLDC GLUCOMTR-MCNC: 139 MG/DL (ref 70–99)
GLUCOSE BLDC GLUCOMTR-MCNC: 160 MG/DL (ref 70–99)
HGB BLD-MCNC: 8.1 G/DL (ref 13.3–17.7)
POTASSIUM BLD-SCNC: 3.8 MMOL/L (ref 3.4–5.3)
PROT SERPL-MCNC: 5.5 G/DL (ref 6.8–8.8)
SODIUM SERPL-SCNC: 129 MMOL/L (ref 133–144)

## 2022-09-05 PROCEDURE — 120N000001 HC R&B MED SURG/OB

## 2022-09-05 PROCEDURE — 250N000011 HC RX IP 250 OP 636: Performed by: ORTHOPAEDIC SURGERY

## 2022-09-05 PROCEDURE — 80053 COMPREHEN METABOLIC PANEL: CPT | Performed by: INTERNAL MEDICINE

## 2022-09-05 PROCEDURE — 250N000013 HC RX MED GY IP 250 OP 250 PS 637: Performed by: INTERNAL MEDICINE

## 2022-09-05 PROCEDURE — 36415 COLL VENOUS BLD VENIPUNCTURE: CPT | Performed by: ORTHOPAEDIC SURGERY

## 2022-09-05 PROCEDURE — 250N000013 HC RX MED GY IP 250 OP 250 PS 637: Performed by: ORTHOPAEDIC SURGERY

## 2022-09-05 PROCEDURE — 97161 PT EVAL LOW COMPLEX 20 MIN: CPT | Mod: GP | Performed by: PHYSICAL THERAPIST

## 2022-09-05 PROCEDURE — 250N000011 HC RX IP 250 OP 636: Performed by: INTERNAL MEDICINE

## 2022-09-05 PROCEDURE — 85018 HEMOGLOBIN: CPT | Performed by: ORTHOPAEDIC SURGERY

## 2022-09-05 PROCEDURE — 97110 THERAPEUTIC EXERCISES: CPT | Mod: GP | Performed by: PHYSICAL THERAPIST

## 2022-09-05 PROCEDURE — 82040 ASSAY OF SERUM ALBUMIN: CPT | Performed by: INTERNAL MEDICINE

## 2022-09-05 PROCEDURE — 99207 PR APP CREDIT; MD BILLING SHARED VISIT: CPT | Performed by: INTERNAL MEDICINE

## 2022-09-05 PROCEDURE — 97530 THERAPEUTIC ACTIVITIES: CPT | Mod: GP | Performed by: PHYSICAL THERAPIST

## 2022-09-05 RX ORDER — AMLODIPINE BESYLATE 2.5 MG/1
2.5 TABLET ORAL DAILY
Status: DISCONTINUED | OUTPATIENT
Start: 2022-09-05 | End: 2022-09-06 | Stop reason: HOSPADM

## 2022-09-05 RX ORDER — ACETAMINOPHEN 325 MG/1
650 TABLET ORAL EVERY 4 HOURS PRN
Qty: 100 TABLET | Refills: 0 | Status: SHIPPED | OUTPATIENT
Start: 2022-09-05 | End: 2022-09-07

## 2022-09-05 RX ORDER — AMOXICILLIN 250 MG
1-2 CAPSULE ORAL 2 TIMES DAILY
Qty: 30 TABLET | Refills: 0 | Status: SHIPPED | OUTPATIENT
Start: 2022-09-05 | End: 2022-09-28

## 2022-09-05 RX ORDER — OXYCODONE HYDROCHLORIDE 5 MG/1
5-10 TABLET ORAL EVERY 4 HOURS PRN
Qty: 25 TABLET | Refills: 0 | Status: SHIPPED | OUTPATIENT
Start: 2022-09-05 | End: 2022-09-09

## 2022-09-05 RX ORDER — MAGNESIUM SULFATE HEPTAHYDRATE 40 MG/ML
2 INJECTION, SOLUTION INTRAVENOUS ONCE
Status: COMPLETED | OUTPATIENT
Start: 2022-09-05 | End: 2022-09-05

## 2022-09-05 RX ORDER — OXYCODONE HYDROCHLORIDE 5 MG/1
5 TABLET ORAL EVERY 4 HOURS PRN
Qty: 25 TABLET | Refills: 0 | Status: SHIPPED | OUTPATIENT
Start: 2022-09-05 | End: 2022-09-05

## 2022-09-05 RX ADMIN — LAMOTRIGINE 100 MG: 100 TABLET ORAL at 22:04

## 2022-09-05 RX ADMIN — MAGNESIUM SULFATE HEPTAHYDRATE 2 G: 40 INJECTION, SOLUTION INTRAVENOUS at 16:08

## 2022-09-05 RX ADMIN — ACETAMINOPHEN 975 MG: 325 TABLET ORAL at 06:11

## 2022-09-05 RX ADMIN — OXYCODONE HYDROCHLORIDE 10 MG: 5 TABLET ORAL at 19:23

## 2022-09-05 RX ADMIN — TRAZODONE HYDROCHLORIDE 100 MG: 100 TABLET ORAL at 22:04

## 2022-09-05 RX ADMIN — AMLODIPINE BESYLATE 2.5 MG: 2.5 TABLET ORAL at 16:08

## 2022-09-05 RX ADMIN — THIAMINE HCL TAB 100 MG 100 MG: 100 TAB at 09:15

## 2022-09-05 RX ADMIN — FOLIC ACID 1 MG: 1 TABLET ORAL at 09:15

## 2022-09-05 RX ADMIN — ESCITALOPRAM OXALATE 20 MG: 10 TABLET ORAL at 22:04

## 2022-09-05 RX ADMIN — SENNOSIDES AND DOCUSATE SODIUM 1 TABLET: 50; 8.6 TABLET ORAL at 09:15

## 2022-09-05 RX ADMIN — OXYCODONE HYDROCHLORIDE 10 MG: 5 TABLET ORAL at 07:52

## 2022-09-05 RX ADMIN — THERA TABS 1 TABLET: TAB at 09:15

## 2022-09-05 RX ADMIN — OXYCODONE HYDROCHLORIDE 10 MG: 5 TABLET ORAL at 13:25

## 2022-09-05 RX ADMIN — ASPIRIN 325 MG: 325 TABLET, COATED ORAL at 09:15

## 2022-09-05 RX ADMIN — CEFAZOLIN SODIUM 2 G: 2 INJECTION, SOLUTION INTRAVENOUS at 04:05

## 2022-09-05 ASSESSMENT — ACTIVITIES OF DAILY LIVING (ADL)
ADLS_ACUITY_SCORE: 26

## 2022-09-05 NOTE — PROGRESS NOTES
[facility]  Hospitalist Progress Note    Admit Date:  9/3/2022  Date of Service (when I saw the patient): 09/05/2022   Provider:  Sana Gillespie, DO    Assessment & Plan   Suresh Powers is a 62 year old male who was admitted on 9/3/2022 with right hip pain due to hip fracture secondary to fall.  He was intoxicated and fell off of a bar stool when he tried to get up.   Imaging showed left displaced intertrochanteric femur fracture.  Ortho surgery consulted.  He underwent left hip cephalomedullary nailing 9/4/2022.        #.  Acute displaced left intertrochanteric femur fracture-secondary to mechanical fall  #.  S/p left hip cephalomedullary nailing, 9/4/2022  -Surgical management per orthopedics  -continue pain control  -Continue PT/OT  -Weightbearing as tolerated  -Aspirin 325 mg daily for 6 weeks DVT Prophylaxis         #.  Acute alcohol intoxication, blood alcohol level 0.21 g/dL  #.  Chronic alcohol use disorder  -Patient reports he had 6 beers with his friends as it was Saturday and they were watching a game.  He denies daily alcohol use.  Reports he has poor balance at baseline, reports he is on disability due to multiple foot injuries, reports history of falls even without alcohol use.  -Discussed with patient since he already is at increased risk of falls due to poor balance and previous foot injuries, he should avoid alcohol as it increases his risk of falls.  -Monitor for alcohol withdrawal  -Continue multivitamin, thiamine.  Discontinue folate acid levels are good  -LFTs in normal range    #.  Hypomagnesemia, magnesium 1.5  -Replace per protocol    #.  Chronic hyponatremia, sodium 129  -This is his baseline.  Sodium at baseline.  Most sodium checks have been 123-130  -Chlorthalidone was recently discontinued  -Monitor sodium    #.  Acute blood loss anemia Due to surgical blood loss on top of chronic normocytic anemia   -Hemoglobin 10.6 on admission, was 14.2 in 6/2021    -Folate > 40, B12 364  -Iron studies show iron deficiency with 9% iron saturation normal iron binding capacity  -Monitor hemoglobin  -Start on p.o. iron at discharge  -B12 as low normal range.  Will start on B12 replacement    #.  Essential hypertension  -Blood pressure currently in normal range  -Normally on amlodipine 10 mg daily.  We will resume at 2.5 mg daily and increase as blood pressure allows     #.  Chronic depression  #.  Generalized anxiety disorder  -Continue Lexapro, trazodone, lamotrigine    #.  Hypoalbuminemia, albumin 2     COVID-19 negative 9/4/22     DVT Prophylaxis: Pneumatic Compression Devices  Code Status: Full Code     Disposition:  Will need TCU     Diet: Advance Diet as Tolerated: Regular Diet Adult  Discharge Instruction - Regular Diet Adult    DVT Prophylaxis: Aspirin  Prieto Catheter: PRESENT, indication:    Code Status: Full Code      Disposition Plan      Expected Discharge Date: 09/07/2022      Destination: home with family  Discharge Comments: most likely will need placement     Entered: Kadie Ospina MD 09/05/2022, 2:22 PM       Interval History     Patient reports pain is controlled at rest.  He does experience increased pain with ambulation.  Working with PT/OT.  Is able to walk few steps.    No chest pain or shortness of breath   No nausea or vomiting   No other complaints       -Data reviewed today: I reviewed all new labs and imaging results over the last 24 hours. I personally reviewed no images or EKG's today.    Physical Exam   Temp: 97.7  F (36.5  C) Temp src: Oral BP: 136/78 Pulse: 91   Resp: 16 SpO2: 97 % O2 Device: None (Room air) Oxygen Delivery: 2 LPM  Vitals:    09/04/22 1113   Weight: 86.2 kg (190 lb)     Vital Signs with Ranges  Temp:  [97.7  F (36.5  C)-98  F (36.7  C)] 97.7  F (36.5  C)  Pulse:  [] 91  Resp:  [13-17] 16  BP: (102-136)/(65-78) 136/78  SpO2:  [95 %-98 %] 97 %  I/O last 3 completed shifts:  In: 750 [I.V.:750]  Out: 1400 [Urine:1300;  Blood:100]    GEN:  Alert, oriented x 3, comfortable, no overt respiratory distress.  CV:  Regular rate and rhythm, no loud murmur to ausc.  S1 + S2 noted.  LUNGS:  Clear to auscultation ant/lat bilaterally.  No clear rales/rhonchi/wheezing auscultated bilaterally.  No costal retractions bilaterally.  Symmetric chest rise on inhalation noted.  ABD:  Active bowel sounds, soft, non-tender, mildly distended.  No clear rebound/guarding/rigidity.  No masses palpated.  No obvious HSM to exam.  EXT:  No pretibial edema or cyanosis bilaterally. No joint synovitis noted.  No calf-tenderness or asymmetry noted.  SKIN:  Dry to touch, no rashes or jaundice noted.  NEURO:  No tremors at rest currently, speech is clear and appropriate.        Data   Labs:  Recent Labs   Lab 09/05/22 0803 09/05/22 0758 09/05/22 0610 09/05/22 0246 09/04/22 2244 09/04/22 1709 09/03/22  2305   NA  --  129*  --   --  130*  --  129*   POTASSIUM  --  3.8  --   --  4.2  --  3.4   CHLORIDE  --  98  --   --  98  --  96   CO2  --  26  --   --  24  --  23   ANIONGAP  --  5  --   --  8  --  10   * 124* 137*   < > 227*   < > 121*   BUN  --  7  --   --  10  --  7   CR  --  0.59*  --   --  0.51*  --  0.60*   GFRESTIMATED  --  >90  --   --  >90  --  >90   NIMISHA  --  8.1*  --   --  7.7*  --  7.8*    < > = values in this interval not displayed.     Recent Labs   Lab 09/05/22 0758 09/04/22 2244 09/03/22  2305   WBC  --  13.2* 10.7   HGB 8.1* 8.5* 10.6*   HCT  --  25.3* 31.6*   MCV  --  86 86   PLT  --  304 329     Recent Labs   Lab 09/05/22 0803 09/05/22 0758 09/05/22 0610 09/05/22 0246 09/04/22 2244 09/04/22 1709 09/03/22  2305   NA  --  129*  --   --  130*  --  129*   POTASSIUM  --  3.8  --   --  4.2  --  3.4   CHLORIDE  --  98  --   --  98  --  96   CO2  --  26  --   --  24  --  23   ANIONGAP  --  5  --   --  8  --  10   * 124* 137*   < > 227*   < > 121*   BUN  --  7  --   --  10  --  7   CR  --  0.59*  --   --  0.51*  --  0.60*    GFRESTIMATED  --  >90  --   --  >90  --  >90   NIMISHA  --  8.1*  --   --  7.7*  --  7.8*   MAG  --   --   --   --  1.5*  --   --    PROTTOTAL  --  5.5*  --   --   --   --  6.4*   ALBUMIN  --  2.0*  --   --   --   --  2.6*   BILITOTAL  --  0.3  --   --   --   --  0.2   ALKPHOS  --  90  --   --   --   --  134   AST  --  16  --   --   --   --  44   ALT  --  22  --   --   --   --  44    < > = values in this interval not displayed.      Recent Imaging:   No results found for this or any previous visit (from the past 24 hour(s)).    Medications     lactated ringers Stopped (09/05/22 0450)       acetaminophen  975 mg Oral Q8H     aspirin  325 mg Oral Daily     escitalopram  20 mg Oral Daily     folic acid  1 mg Oral Daily     lamoTRIgine  100 mg Oral At Bedtime     multivitamin, therapeutic  1 tablet Oral Daily     polyethylene glycol  17 g Oral Daily     senna-docusate  1 tablet Oral BID     sodium chloride (PF)  3 mL Intracatheter Q8H     thiamine  100 mg Oral Daily     traZODone  100 mg Oral At Bedtime

## 2022-09-05 NOTE — PROGRESS NOTES
09/05/22 0832   Quick Adds   Type of Visit Initial PT Evaluation   Living Environment   People in Home alone   Current Living Arrangements apartment   Home Accessibility stairs to enter home;stairs within home   Number of Stairs, Main Entrance 3   Stair Railings, Main Entrance railing on left side (ascending)   Number of Stairs, Within Home, Primary ten   Stair Railings, Within Home, Primary railings on both sides of stairs   Transportation Anticipated car, drives self   Self-Care   Usual Activity Tolerance moderate   Current Activity Tolerance fair   Equipment Currently Used at Home cane, straight   Fall history within last six months yes   Number of times patient has fallen within last six months 5   Activity/Exercise/Self-Care Comment Pt owns FWW.   General Information   Onset of Illness/Injury or Date of Surgery 09/03/22   Referring Physician Huang Cochran MD   Patient/Family Therapy Goals Statement (PT) Pt plans to discharge to TCU prior to returning home alone.   Pertinent History of Current Problem (include personal factors and/or comorbidities that impact the POC) 63 y/o male POD # 1 L hip cephalomedullary nailing of femur fracture.   Existing Precautions/Restrictions fall   Weight-Bearing Status - LLE weight-bearing as tolerated   General Observations Pt in supine upon arrival of therapist.   Cognition   Affect/Mental Status (Cognition) WFL   Orientation Status (Cognition) oriented x 3   Follows Commands (Cognition) WFL   Pain Assessment   Patient Currently in Pain   (L hip pain of 3/10 at rest.)   Integumentary/Edema   Integumentary/Edema Comments l hip incision not observed, covered with dressing.   Posture    Posture Comments Noted forward flexed posture sitting EOB and standing at FWW.   Range of Motion (ROM)   ROM Comment Limited L hip ROM d/t pain otherwise B LEs WFL.   Strength (Manual Muscle Testing)   Strength Comments Not formally assessed, pt demonstrates at least 3/5 grossly in B LEs,  limited by pain.   Bed Mobility   Comment, (Bed Mobility) Supine-sit with Les.   Transfers   Comment, (Transfers) Sit <> stand with FWW and Les.   Gait/Stairs (Locomotion)   Comment, (Gait/Stairs) Pt amb a couple steps from EOB to chair with FWW and Les.   Balance   Balance Comments Noted good seated balance and fair standing balance at FWW.   Sensory Examination   Sensory Perception Comments Pt denies numbness/tingling in B LEs.   Clinical Impression   Criteria for Skilled Therapeutic Intervention Yes, treatment indicated   PT Diagnosis (PT) Difficulty with functional mobility.   Influenced by the following impairments Pain, Impaired L hip ROM, Decreased strength, Decreased activity tolerance   Functional limitations due to impairments Limited functional mobility requiring AD and assist.   Clinical Presentation (PT Evaluation Complexity) Stable/Uncomplicated   Clinical Presentation Rationale Based on PMH, current presentation, and social support.   Clinical Decision Making (Complexity) low complexity   Planned Therapy Interventions (PT) bed mobility training;cryotherapy;gait training;ROM (range of motion);stair training;strengthening;transfer training   Risk & Benefits of therapy have been explained patient   PT Discharge Planning   PT Discharge Recommendation (DC Rec) Transitional Care Facility   PT Rationale for DC Rec Pt is below baseline, limited by pain and weakness. Pt will benefit from continued skilled PT intervention at TCU to progress strength and independence with functional mobility. Barriers to return home include living alone and multiple stairs to navigate/access apartment. If pt were to disch home pt would require assist of 1 with all functional mobility with use of FWW and Home PT.   PT Brief overview of current status Les for bed mobility, Les for transfers and ambulation of a couple steps with FWW   Plan of Care Review   Plan of Care Reviewed With patient   Total Evaluation Time   Total  Evaluation Time (Minutes) 10   Physical Therapy Goals   PT Frequency 5x/week   PT Predicted Duration/Target Date for Goal Attainment 09/10/22   PT Goals Bed Mobility;Transfers;Gait;Stairs   PT: Bed Mobility Supervision/stand-by assist;Supine to/from sit   PT: Transfers Supervision/stand-by assist;Sit to/from stand;Assistive device   PT: Gait Rolling walker;50 feet;Minimal assist   PT: Stairs 4 stairs;Rail on both sides;Minimal assist

## 2022-09-05 NOTE — PROGRESS NOTES
Orthopedic Surgery  Suresh Powers  2022  Admit Date:  9/3/2022  POD 1 Day Post-Op  S/P Procedure(s):  Open reduction internal fixation of left hip fracture    Patient is having some pain, but controlled.  Tolerating oral intake.  No events overnight. Requests to keep vasquez in until better mobility because can't urinate unless standing up discussed risks of keeping this in.  Requesting a TCU because he lives along.    Alert and orient to person, place, and time.  Vital Sign Ranges  Temperature Temp  Av.9  F (36.6  C)  Min: 97.6  F (36.4  C)  Max: 98.3  F (36.8  C)   Blood pressure Systolic (24hrs), Av , Min:102 , Max:138        Diastolic (24hrs), Av, Min:65, Max:93      Pulse Pulse  Av.8  Min: 88  Max: 113   Respirations Resp  Av  Min: 9  Max: 28   Pulse oximetry SpO2  Av.7 %  Min: 94 %  Max: 100 %       Left hip AG is clean, dry, and intact. Minimal erythema of the surrounding skin.  Bilateral calves are soft, non-tender.  left lower extremity is NVI.    Labs:  Recent Labs   Lab Test 22  0758 22  2244 22  2305   POTASSIUM 3.8 4.2 3.4     Recent Labs   Lab Test 22  0758 22  2244 22  2305   HGB 8.1* 8.5* 10.6*     Recent Labs   Lab Test 06/15/21  1131   INR 0.87     Recent Labs   Lab Test 22  2244 22  2305 06/15/21  1131    329 327       A/P  1. S/p ORIF left hip   Continue aspirin 325mg qdx 6 weeks for DVT prophylaxis.     Mobilize with PT/OT WBAT.     Continue current pain regimen, minimize as able to minimize urinary retention issues.   Okay to keep vasquez today, but would prefer we discontinue tomorrow, discussed working on mobility with therapist    2. Disposition   Anticipate d/c pending progress     Kjerstin L Foss, PA-C

## 2022-09-05 NOTE — PLAN OF CARE
Goal Outcome Evaluation:    Plan of Care Reviewed With: patient     Overall Patient Progress: improving    POD 0 s/p ORIF -Lt hip cephalo medullary nailing fo femur fracture.He is A&OX4,VSS,Aquacel dressing on lt hip C/D/I,pain controlled with I.V dilaudid per pt's request,refused oxy,Prieto in place,draing o.k,L/R cont@100.PT/OT consult pending.

## 2022-09-05 NOTE — PROVIDER NOTIFICATION
MD Notification    Notified Person: MD    Notified Person Name: Evangelista Sierra     Notification Date/Time: 9/5/22 @ 0439    Notification Interaction: paged    Purpose of Notification: Pt MS in 2415 is requesting amlodipine to be put in his current order; states that he takes this everyday.   Thank you. Fabien PIERSON, RN  *[phone]    Orders Received: no new orders at this time    Comments: hospitalist suggested to speak with morning doctor regarding said med.

## 2022-09-05 NOTE — PLAN OF CARE
Goal Outcome Evaluation:  Patient vital signs are at baseline: Yes  Patient able to ambulate as they were prior to admission or with assist devices provided by therapies during their stay: Yes, pt ambulated 2-3 steps with physical therapy.  Patient MUST void prior to discharge:  No. Pt has vasquez catheter and refuses to have it removed.  Patient able to tolerate oral intake:  Yes, but pt has only eaten a few crackers today on this shift.  Pain has adequate pain control using Oral analgesics:  Yes. Oxycodone.  Does patient have an identified : No yet. Pt waiting placement.  Has goal D/C date and time been discussed with patient: Yes.    Notified provider about indwelling vasquez catheter discussed removal or continued need.    Did provider choose to remove indwelling vasquez catheter? No. Ok to keep for now. Pt has requested to keep vasquez in.    Provider's vasquez indication for keeping indwelling vasquez catheter: Retention and fracture pain.    Is there an order for indwelling vasquez catheter? Yes    *If there is a plan to keep vasquez catheter in place at discharge daily notification with provider is not necessary.

## 2022-09-05 NOTE — PLAN OF CARE
"Patient vital signs are at baseline: Yes  Patient able to ambulate as they were prior to admission or with assist devices provided by therapies during their stay:  No,  Reason: not OOB yet  Patient MUST void prior to discharge:  No,  Reason:  Vasquez cath in place.  Patient able to tolerate oral intake:  Yes  Pain has adequate pain control using Oral analgesics:  Yes  Does patient have an identified :  Yes  Has goal D/C date and time been discussed with patient:  No,  Reason:  TBD    Pt A&Ox4. Pain managed with current regimen. Writer encouraged pt to dangle feet at bedside but pt refused and stated \"I just wanna take it easy tonight. I'll do it tomorrow\". Writer informed pt regarding discontinuing vasquez cath but pt adamant about leaving it in and stated that he will have a problem voiding; writer discussed issue with charge, charge OK with leaving vasquez cath for now. Dressing to L hip CDI. Pt requested amlodipine to be put in his order; hospitalist paged and received a call back *see previous note* SL between abx.                         "

## 2022-09-05 NOTE — CONSULTS
Care Management Initial Consult    General Information  Assessment completed with: Patient, Suresh  Type of CM/SW Visit: Initial Assessment    Primary Care Provider verified and updated as needed: Yes   Readmission within the last 30 days: no previous admission in last 30 days      Reason for Consult: discharge planning  Advance Care Planning:            Communication Assessment  Patient's communication style: spoken language (English or Bilingual)    Hearing Difficulty or Deaf: no   Wear Glasses or Blind: yes (reading glasses)    Cognitive  Cognitive/Neuro/Behavioral: WDL  Level of Consciousness: alert  Arousal Level: opens eyes spontaneously  Orientation: oriented x 4  Mood/Behavior: anxious  Best Language: 0 - No aphasia  Speech: slurred    Living Environment:   People in home: alone     Current living Arrangements: apartment      Able to return to prior arrangements: yes       Family/Social Support:  Care provided by: self  Provides care for: no one  Marital Status: Single  Other (specify)          Description of Support System: Supportive         Current Resources:   Patient receiving home care services:       Community Resources:    Equipment currently used at home: cane, straight  Supplies currently used at home:      Employment/Financial:  Employment Status: retired        Financial Concerns: other (see comments) (would like to be screened for MA)   Referral to Financial Worker: Yes  Finance Comments: limited income SSDI    Lifestyle & Psychosocial Needs:  Social Determinants of Health     Tobacco Use: Medium Risk     Smoking Tobacco Use: Former Smoker     Smokeless Tobacco Use: Former User   Alcohol Use: Not on file   Financial Resource Strain: Not on file   Food Insecurity: Not on file   Transportation Needs: Not on file   Physical Activity: Not on file   Stress: Not on file   Social Connections: Not on file   Intimate Partner Violence: Not on file   Depression: Not at risk     PHQ-2 Score: 2   Housing  Stability: Not on file       Functional Status:  Prior to admission patient needed assistance:              Mental Health Status:          Chemical Dependency Status:                Values/Beliefs:  Spiritual, Cultural Beliefs, Spiritism Practices, Values that affect care:                 Additional Information:  Consult for discharge planning. Patient admitted on 9/3/22 for fall with hip fracture and a tentative discharge date of 9/7/22.  Writer reviewed chart and TCU recommendations at discharge. Writer met with patient at bedside and introduced self and role. Writer confirmed patient's primary doctor is Dr. Santiago at [facility]. Patient in agreement for TCU at discharge and would like referrals sent to [facility], [facility] and [facility]. Writer discussed private room and cost associated vs shared room and patient would like shared. Referrals sent via [facility].     Patient would like to be screened to see if he is eligible for MA as his only income is social security disability. Email sent to Willian to review.     Writer confirmed that patient does not have a community  or .     Patient has been vaccinated for COVID and has had the booster. Writer encouraged patient and/or family to reach out to facility directly for most up to date visitor guidelines.     Writer discussed transportation options and possible out of pocket costs of transport with patient. Patient would like to determine at discharge but will likely need transport set up.      TIFF Alonzo

## 2022-09-05 NOTE — PLAN OF CARE
OT: Orders received. Chart reviewed and discussed with care team.  OT not indicated per discussion with PT. IP PT currently recommending IP PT. All pt needs met with IP PT. Defer discharge recommendations to IP PT and next level of care.  Will complete orders.

## 2022-09-06 ENCOUNTER — APPOINTMENT (OUTPATIENT)
Dept: PHYSICAL THERAPY | Facility: CLINIC | Age: 62
DRG: 481 | End: 2022-09-06
Payer: COMMERCIAL

## 2022-09-06 VITALS
HEART RATE: 94 BPM | TEMPERATURE: 97.4 F | RESPIRATION RATE: 18 BRPM | OXYGEN SATURATION: 96 % | WEIGHT: 190 LBS | BODY MASS INDEX: 29.82 KG/M2 | HEIGHT: 67 IN | DIASTOLIC BLOOD PRESSURE: 63 MMHG | SYSTOLIC BLOOD PRESSURE: 107 MMHG

## 2022-09-06 LAB
ANION GAP SERPL CALCULATED.3IONS-SCNC: 7 MMOL/L (ref 3–14)
BUN SERPL-MCNC: 6 MG/DL (ref 7–30)
CALCIUM SERPL-MCNC: 8.1 MG/DL (ref 8.5–10.1)
CHLORIDE BLD-SCNC: 96 MMOL/L (ref 94–109)
CO2 SERPL-SCNC: 27 MMOL/L (ref 20–32)
CREAT SERPL-MCNC: 0.57 MG/DL (ref 0.66–1.25)
ERYTHROCYTE [DISTWIDTH] IN BLOOD BY AUTOMATED COUNT: 15.9 % (ref 10–15)
GFR SERPL CREATININE-BSD FRML MDRD: >90 ML/MIN/1.73M2
GLUCOSE BLD-MCNC: 111 MG/DL (ref 70–99)
GLUCOSE BLDC GLUCOMTR-MCNC: 129 MG/DL (ref 70–99)
GLUCOSE BLDC GLUCOMTR-MCNC: 150 MG/DL (ref 70–99)
GLUCOSE BLDC GLUCOMTR-MCNC: 153 MG/DL (ref 70–99)
HCT VFR BLD AUTO: 25.5 % (ref 40–53)
HGB BLD-MCNC: 8.3 G/DL (ref 13.3–17.7)
MAGNESIUM SERPL-MCNC: 2.2 MG/DL (ref 1.6–2.3)
MCH RBC QN AUTO: 28.4 PG (ref 26.5–33)
MCHC RBC AUTO-ENTMCNC: 32.5 G/DL (ref 31.5–36.5)
MCV RBC AUTO: 87 FL (ref 78–100)
PLATELET # BLD AUTO: 324 10E3/UL (ref 150–450)
POTASSIUM BLD-SCNC: 4 MMOL/L (ref 3.4–5.3)
RBC # BLD AUTO: 2.92 10E6/UL (ref 4.4–5.9)
SODIUM SERPL-SCNC: 130 MMOL/L (ref 133–144)
WBC # BLD AUTO: 12.4 10E3/UL (ref 4–11)

## 2022-09-06 PROCEDURE — 82310 ASSAY OF CALCIUM: CPT | Performed by: INTERNAL MEDICINE

## 2022-09-06 PROCEDURE — 250N000013 HC RX MED GY IP 250 OP 250 PS 637: Performed by: INTERNAL MEDICINE

## 2022-09-06 PROCEDURE — 83735 ASSAY OF MAGNESIUM: CPT | Performed by: INTERNAL MEDICINE

## 2022-09-06 PROCEDURE — 85027 COMPLETE CBC AUTOMATED: CPT | Performed by: INTERNAL MEDICINE

## 2022-09-06 PROCEDURE — 97116 GAIT TRAINING THERAPY: CPT | Mod: GP | Performed by: PHYSICAL THERAPIST

## 2022-09-06 PROCEDURE — 36415 COLL VENOUS BLD VENIPUNCTURE: CPT | Performed by: INTERNAL MEDICINE

## 2022-09-06 PROCEDURE — 250N000013 HC RX MED GY IP 250 OP 250 PS 637: Performed by: ORTHOPAEDIC SURGERY

## 2022-09-06 PROCEDURE — 97110 THERAPEUTIC EXERCISES: CPT | Mod: GP | Performed by: PHYSICAL THERAPIST

## 2022-09-06 PROCEDURE — 99207 PR APP CREDIT; MD BILLING SHARED VISIT: CPT | Performed by: INTERNAL MEDICINE

## 2022-09-06 RX ORDER — AMOXICILLIN 250 MG
2 CAPSULE ORAL 2 TIMES DAILY
Status: DISCONTINUED | OUTPATIENT
Start: 2022-09-06 | End: 2022-09-06 | Stop reason: HOSPADM

## 2022-09-06 RX ORDER — POLYETHYLENE GLYCOL 3350 17 G/17G
17 POWDER, FOR SOLUTION ORAL 2 TIMES DAILY
Status: DISCONTINUED | OUTPATIENT
Start: 2022-09-06 | End: 2022-09-06 | Stop reason: HOSPADM

## 2022-09-06 RX ORDER — POLYETHYLENE GLYCOL 3350 17 G/17G
17 POWDER, FOR SOLUTION ORAL DAILY
Qty: 510 G | Refills: 0 | DISCHARGE
Start: 2022-09-06 | End: 2022-10-05

## 2022-09-06 RX ORDER — TRAZODONE HYDROCHLORIDE 50 MG/1
100 TABLET, FILM COATED ORAL AT BEDTIME
DISCHARGE
Start: 2022-09-06 | End: 2023-05-23

## 2022-09-06 RX ORDER — TAMSULOSIN HYDROCHLORIDE 0.4 MG/1
0.4 CAPSULE ORAL EVERY EVENING
Status: DISCONTINUED | OUTPATIENT
Start: 2022-09-06 | End: 2022-09-06 | Stop reason: HOSPADM

## 2022-09-06 RX ORDER — TAMSULOSIN HYDROCHLORIDE 0.4 MG/1
0.4 CAPSULE ORAL EVERY EVENING
Qty: 30 CAPSULE | Refills: 0 | DISCHARGE
Start: 2022-09-06 | End: 2022-11-04

## 2022-09-06 RX ADMIN — HYDROXYZINE HYDROCHLORIDE 25 MG: 25 TABLET, FILM COATED ORAL at 10:24

## 2022-09-06 RX ADMIN — POLYETHYLENE GLYCOL 3350 17 G: 17 POWDER, FOR SOLUTION ORAL at 08:19

## 2022-09-06 RX ADMIN — ASPIRIN 325 MG: 325 TABLET, COATED ORAL at 08:19

## 2022-09-06 RX ADMIN — OXYCODONE HYDROCHLORIDE 10 MG: 5 TABLET ORAL at 12:50

## 2022-09-06 RX ADMIN — SENNOSIDES AND DOCUSATE SODIUM 1 TABLET: 50; 8.6 TABLET ORAL at 08:19

## 2022-09-06 RX ADMIN — AMLODIPINE BESYLATE 2.5 MG: 2.5 TABLET ORAL at 08:19

## 2022-09-06 RX ADMIN — THIAMINE HCL TAB 100 MG 100 MG: 100 TAB at 08:19

## 2022-09-06 RX ADMIN — THERA TABS 1 TABLET: TAB at 08:19

## 2022-09-06 RX ADMIN — OXYCODONE HYDROCHLORIDE 10 MG: 5 TABLET ORAL at 08:19

## 2022-09-06 RX ADMIN — OXYCODONE HYDROCHLORIDE 10 MG: 5 TABLET ORAL at 18:04

## 2022-09-06 RX ADMIN — OXYCODONE HYDROCHLORIDE 10 MG: 5 TABLET ORAL at 01:19

## 2022-09-06 ASSESSMENT — ACTIVITIES OF DAILY LIVING (ADL)
ADLS_ACUITY_SCORE: 26

## 2022-09-06 NOTE — PROGRESS NOTES
Patient vital signs are at baseline: Yes  Patient able to ambulate as they were prior to admission or with assist devices provided by therapies during their stay:  Yes  Patient MUST void prior to discharge:  Vasquez  Patient able to tolerate oral intake:  Yes  Pain has adequate pain control using Oral analgesics:  Yes  Does patient have an identified :  Yes  Has goal D/C date and time been discussed with patient:  Yes   Up in chair all night, CMS intact, vasquez draining well.

## 2022-09-06 NOTE — PLAN OF CARE
VSS, on room air. PO oxycodone given prn for pain control. Magnesium replaced and MD ordered recheck for the AM. Pt tolerated regular diet. Dressing C/D/I to left hip. CMS intact.

## 2022-09-06 NOTE — PROGRESS NOTES
[facility]  Hospitalist Progress Note    Admit Date:  9/3/2022  Date of Service (when I saw the patient): 09/06/2022   Provider:  Sana Gillespie, DO    Assessment & Plan   Suresh Powers is a 62 year old male who was admitted on 9/3/2022 with right hip pain due to hip fracture secondary to fall.  He was intoxicated and fell off of a bar stool when he tried to get up.   Imaging showed left displaced intertrochanteric femur fracture.  Ortho surgery consulted.  He underwent left hip cephalomedullary nailing 9/4/2022.        #.  Acute displaced left intertrochanteric femur fracture-secondary to mechanical fall  #.  S/p left hip cephalomedullary nailing, 9/4/2022  -Surgical management per orthopedics  -continue pain control  -Continue PT/OT  -Weightbearing as tolerated  -Aspirin 325 mg daily for 6 weeks DVT Prophylaxis         #.  Acute alcohol intoxication, blood alcohol level 0.21 g/dL  #.  Chronic alcohol use disorder  -Patient reports he had 6 beers with his friends as it was Saturday and they were watching a game.  He denies daily alcohol use.  Reports he has poor balance at baseline, reports he is on disability due to multiple foot injuries, reports history of falls even without alcohol use.  -Discussed with patient since he already is at increased risk of falls due to poor balance and previous foot injuries, he should avoid alcohol as it increases his risk of falls.  -Monitor for alcohol withdrawal.  No signs of withdrawal yet  -Continue multivitamin, thiamine.  Folate levels are good   -LFTs in normal range    #.  Hypomagnesemia, magnesium 1.5  -Replaced per protocol. now 2.2  - monitor and replace as needed    #.  Chronic hyponatremia, sodium 129  -This is his baseline.  Sodium at baseline.  Most sodium checks have been 123-130  -Chlorthalidone was recently discontinued  -Monitor sodium    #.  Acute blood loss anemia Due to surgical blood loss on top of chronic normocytic anemia    -Hemoglobin 10.6 on admission, was 14.2 in 6/2021   -Folate > 40, B12 364  -Iron studies show iron deficiency with 9% iron saturation normal iron binding capacity  -Monitor hemoglobin  -Start on p.o. iron at discharge  -B12 as low normal range.  Will start on B12 replacement at discharge    #.  Essential hypertension  -Blood pressure currently in normal range  -Normally on amlodipine 10 mg daily.  Continue amlodipine 2.5 mg daily and increase as blood pressure allows     #.  Chronic depression  #.  Generalized anxiety disorder  -Continue Lexapro, trazodone, lamotrigine    #.  Hypoalbuminemia, albumin 2    #.  Urinary retention-s/p Prieto placement  -Patient reports longstanding history of urinary retention especially after surgeries and when he is on narcotics.  He does not think he will be able to void if Prieto is removed.  -Continue Prieto catheter.  We will likely keep it in for now and do voiding trial after discharge when he is able to stand up and void  -Start on Flomax  -Increased risk of catheter associated infections discussed with patient.     COVID-19 negative 9/4/22     DVT Prophylaxis: Pneumatic Compression Devices  Code Status: Full Code     Disposition:  Patient is ready for discharge to TCU when a bed is available.      Diet: Advance Diet as Tolerated: Regular Diet Adult  Discharge Instruction - Regular Diet Adult    DVT Prophylaxis: Aspirin  Prieto Catheter: PRESENT, indication: Retention  Code Status: Full Code      Disposition Plan      Expected Discharge Date: 09/07/2022,  3:00 PM    Destination: home with family  Discharge Comments: most likely will need placement     Entered: Kadie Ospina MD 09/06/2022, 12:27 PM       Interval History     Patient reports pain is controlled at rest.  He does experience increased pain with ambulation.  Working with PT/OT.  Is able to walk few steps.    No chest pain or shortness of breath   No nausea or vomiting   No other complaints   Discussed about removing  Prieto catheter.  He absolutely declined reporting that he has longstanding issues with urinary retention especially after surgery and when he is on narcotics.  Reports that he is not able to void if he is not standing up.  He understand the risks of catheter associated urinary tract infections.      -Data reviewed today: I reviewed all new labs and imaging results over the last 24 hours. I personally reviewed no images or EKG's today.    Physical Exam   Temp: 98.9  F (37.2  C) Temp src: Oral BP: 113/74 Pulse: 91   Resp: 18 SpO2: 93 % O2 Device: None (Room air)    Vitals:    09/04/22 1113   Weight: 86.2 kg (190 lb)     Vital Signs with Ranges  Temp:  [98.2  F (36.8  C)-98.9  F (37.2  C)] 98.9  F (37.2  C)  Pulse:  [88-96] 91  Resp:  [16-18] 18  BP: (111-127)/(67-74) 113/74  SpO2:  [93 %-95 %] 93 %  I/O last 3 completed shifts:  In: -   Out: 450 [Urine:450]    GEN:  Alert, oriented x 3, comfortable, no overt respiratory distress.  CV:  Regular rate and rhythm, no loud murmur to ausc.  S1 + S2 noted.  LUNGS:  Clear to auscultation ant/lat bilaterally.  No clear rales/rhonchi/wheezing auscultated bilaterally.  No costal retractions bilaterally.  Symmetric chest rise on inhalation noted.  ABD:  Active bowel sounds, soft, non-tender, mildly distended.  No clear rebound/guarding/rigidity.  No masses palpated.  No obvious HSM to exam.  EXT:  No pretibial edema or cyanosis bilaterally. No joint synovitis noted.  No calf-tenderness or asymmetry noted.  SKIN:  Dry to touch, no rashes or jaundice noted.  NEURO:  No tremors at rest currently, speech is clear and appropriate.    urologic-Prieto catheter in place      Data   Labs:  Recent Labs   Lab 09/06/22  1048 09/06/22  0750 09/06/22  0647 09/05/22  0803 09/05/22  0758 09/05/22  0246 09/04/22  2244   NA  --  130*  --   --  129*  --  130*   POTASSIUM  --  4.0  --   --  3.8  --  4.2   CHLORIDE  --  96  --   --  98  --  98   CO2  --  27  --   --  26  --  24   ANIONGAP  --  7  --    --  5  --  8   * 111* 129*   < > 124*   < > 227*   BUN  --  6*  --   --  7  --  10   CR  --  0.57*  --   --  0.59*  --  0.51*   GFRESTIMATED  --  >90  --   --  >90  --  >90   NIMISHA  --  8.1*  --   --  8.1*  --  7.7*    < > = values in this interval not displayed.     Recent Labs   Lab 09/06/22  0750 09/05/22  0758 09/04/22 2244 09/03/22  2305   WBC 12.4*  --  13.2* 10.7   HGB 8.3* 8.1* 8.5* 10.6*   HCT 25.5*  --  25.3* 31.6*   MCV 87  --  86 86     --  304 329     Recent Labs   Lab 09/06/22  1048 09/06/22  0750 09/06/22  0647 09/05/22  0803 09/05/22 0758 09/05/22  0246 09/04/22 2244 09/04/22  1709 09/03/22  2305   NA  --  130*  --   --  129*  --  130*  --  129*   POTASSIUM  --  4.0  --   --  3.8  --  4.2  --  3.4   CHLORIDE  --  96  --   --  98  --  98  --  96   CO2  --  27  --   --  26  --  24  --  23   ANIONGAP  --  7  --   --  5  --  8  --  10   * 111* 129*   < > 124*   < > 227*   < > 121*   BUN  --  6*  --   --  7  --  10  --  7   CR  --  0.57*  --   --  0.59*  --  0.51*  --  0.60*   GFRESTIMATED  --  >90  --   --  >90  --  >90  --  >90   NIMISHA  --  8.1*  --   --  8.1*  --  7.7*  --  7.8*   MAG  --  2.2  --   --   --   --  1.5*  --   --    PROTTOTAL  --   --   --   --  5.5*  --   --   --  6.4*   ALBUMIN  --   --   --   --  2.0*  --   --   --  2.6*   BILITOTAL  --   --   --   --  0.3  --   --   --  0.2   ALKPHOS  --   --   --   --  90  --   --   --  134   AST  --   --   --   --  16  --   --   --  44   ALT  --   --   --   --  22  --   --   --  44    < > = values in this interval not displayed.      Recent Imaging:   No results found for this or any previous visit (from the past 24 hour(s)).    Medications       acetaminophen  975 mg Oral Q8H     amLODIPine  2.5 mg Oral Daily     aspirin  325 mg Oral Daily     escitalopram  20 mg Oral Daily     lamoTRIgine  100 mg Oral At Bedtime     multivitamin, therapeutic  1 tablet Oral Daily     polyethylene glycol  17 g Oral BID     senna-docusate  2  tablet Oral BID     sodium chloride (PF)  3 mL Intracatheter Q8H     tamsulosin  0.4 mg Oral QPM     thiamine  100 mg Oral Daily     traZODone  100 mg Oral At Bedtime

## 2022-09-06 NOTE — PLAN OF CARE
"Goal Outcome Evaluation:    Denies CP, SOB. Dressing CDI. Vasquez in place. Possible discharge to TCU. Pt got up and walked in room per pt, pt does not want to walk anymore in room because \"he knows his body and needs a break\". Author gave verbal ed on importance of moving around to prevent pressure sores/blood clots/weakness. Pt verbalized understanding. Notified provider about indwelling vasquez catheter discussed removal or continued need.    Did provider choose to remove indwelling vasquez catheter? No    Provider's vasquez indication for keeping indwelling vasquez catheter: TO go to TCU,     Is there an order for indwelling vasquez catheter? Yes    *If there is a plan to keep vasquez catheter in place at discharge daily notification with provider is not necessary. YES for TCU      "

## 2022-09-06 NOTE — PROGRESS NOTES
Orthopedic Surgery  Suresh Powers  09/06/2022     Admit Date:  9/3/2022    POD: 2 Days Post-Op   Procedure(s):  Open reduction internal fixation of left hip fracture    Alert and oriented.   Out of bed to chair.  Pain controlled.  Tolerating oral intake.    Denies nausea or vomiting  Denies chest pain or shortness of breath    Temp:  [98.2  F (36.8  C)-98.9  F (37.2  C)] 98.9  F (37.2  C)  Pulse:  [88-96] 91  Resp:  [16-18] 18  BP: (111-127)/(67-74) 113/74  SpO2:  [93 %-95 %] 93 %    LLE  Aquacel dressing is clean, dry, and intact.   Minimal erythema of the surrounding skin.   Bilateral calves are soft, non-tender.  Sensation intact bilateral lower extremities.  Resists dorsi and plantar flexion bilaterally.  +Dp pulse    Labs:  Recent Labs   Lab Test 09/06/22  0750 09/05/22  0758 09/04/22  2244 09/03/22  2305   WBC 12.4*  --  13.2* 10.7   HGB 8.3* 8.1* 8.5* 10.6*     --  304 329     1. PLAN:  -Continue ASA 325mg  Daily for DVT prophylaxis.    -Mobilize with PT/OT   -WBAT LLE.    -Continue current pain regiment.  -Dressings: Keep Aquacel intact.  Change if >60% saturated or peeling off.   -Follow-up: in 2 weeks with Ayde Otoole PA-C/Heather Gill CNP, Dr. Cochran's team for staple removal and wound check.    2. Disposition  -Anticipate d/c to TCU when medically cleared and progressing in PT. Okay from Ortho standpoint.    Maria Guadalupe Wilkins PA-C   [facility]

## 2022-09-06 NOTE — PROGRESS NOTES
Care Management Discharge Note    Discharge Date: 09/06/2022       Discharge Disposition: Skilled Nursing Facility    Discharge Services: None    Discharge DME: None    Discharge Transportation: car, drives self    Private pay costs discussed: Not applicable    PAS Confirmation Code: [id number]  Patient/family educated on Medicare website which has current facility and service quality ratings: yes    Education Provided on the Discharge Plan:  Yes  Persons Notified of Discharge Plans: Patient  Patient/Family in Agreement with the Plan: yes    Handoff Referral Completed: Yes    Additional Information:  IDA informed [facility] can accept patient into a shared room. IDA completed [employer] auth [id number] 09/06/22-09/12/22. SW received discharge orders and script and faxed to facility. IDA completed PAS and faxed/added to chart. SW discussed transport and patient is not able to safely be transported by family and does not have the funds available to pay for transport as his only source of income is Social Security/disability. IDA spoke with supervisor who agreed to the use of foundation funds. Ida completed foundation funds request and scheduled [facility] wheelchair for 7pm today. Patient in agreement with discharge via [facility] wheelchair at 1900 today to [facility] TCU.         TIFF Sanchez    [facility]

## 2022-09-07 ENCOUNTER — PATIENT OUTREACH (OUTPATIENT)
Dept: CARE COORDINATION | Facility: CLINIC | Age: 62
End: 2022-09-07

## 2022-09-07 ENCOUNTER — TRANSITIONAL CARE UNIT VISIT (OUTPATIENT)
Dept: GERIATRICS | Facility: CLINIC | Age: 62
End: 2022-09-07
Payer: COMMERCIAL

## 2022-09-07 VITALS
HEART RATE: 106 BPM | BODY MASS INDEX: 29.82 KG/M2 | RESPIRATION RATE: 18 BRPM | DIASTOLIC BLOOD PRESSURE: 76 MMHG | WEIGHT: 190 LBS | SYSTOLIC BLOOD PRESSURE: 116 MMHG | OXYGEN SATURATION: 93 % | HEIGHT: 67 IN | TEMPERATURE: 98.3 F

## 2022-09-07 DIAGNOSIS — R26.9 ABNORMAL GAIT: ICD-10-CM

## 2022-09-07 DIAGNOSIS — M62.81 GENERALIZED MUSCLE WEAKNESS: ICD-10-CM

## 2022-09-07 DIAGNOSIS — F41.1 GENERALIZED ANXIETY DISORDER: ICD-10-CM

## 2022-09-07 DIAGNOSIS — F33.1 MODERATE EPISODE OF RECURRENT MAJOR DEPRESSIVE DISORDER (H): ICD-10-CM

## 2022-09-07 DIAGNOSIS — F10.920 ALCOHOLIC INTOXICATION WITHOUT COMPLICATION (H): ICD-10-CM

## 2022-09-07 DIAGNOSIS — S72.002D CLOSED FRACTURE DISLOCATION OF LEFT HIP JOINT WITH ROUTINE HEALING, SUBSEQUENT ENCOUNTER: Primary | ICD-10-CM

## 2022-09-07 DIAGNOSIS — I10 BENIGN ESSENTIAL HYPERTENSION: ICD-10-CM

## 2022-09-07 DIAGNOSIS — E87.1 HYPONATREMIA: ICD-10-CM

## 2022-09-07 PROCEDURE — 99316 NF DSCHRG MGMT 30 MIN+: CPT | Performed by: NURSE PRACTITIONER

## 2022-09-07 RX ORDER — ACETAMINOPHEN 325 MG/1
650 TABLET ORAL EVERY 8 HOURS PRN
COMMUNITY
End: 2022-11-29

## 2022-09-07 RX ORDER — BISACODYL 10 MG
10 SUPPOSITORY, RECTAL RECTAL DAILY PRN
COMMUNITY
End: 2022-10-04

## 2022-09-07 NOTE — LETTER
TCU Hand In    Patient name: Suresh Powers  PCP: Brad Thurston  PCP Care Coordinator's information (phone number/email): [phone]  Primary family contact: Darlyn Bower [phone]  Other MDs involved: None    Patient Care Team:  Brad Thurston MD as PCP - General (Internal Medicine)  Arlene Yarbrough [facility] as Pharmacist (Pharmacist)  Brad Thurston MD as Assigned PCP  Charito Pineda LSW as Clinic Care Coordinator    Resources in place previously (home care services, equipment needs, etc.): None    Advanced Directive: N    Social History     Socioeconomic History     Marital status: Single     Spouse name: Not on file     Number of children: Not on file     Years of education: Not on file     Highest education level: Not on file   Occupational History     Not on file   Tobacco Use     Smoking status: Former Smoker     Packs/day: 1.00     Years: 0.00     Pack years: 0.00     Smokeless tobacco: Former User     Quit date: 2/2/2016   Substance and Sexual Activity     Alcohol use: Yes     Comment: states 8 beerrs all day over past 8 hours     Drug use: No     Sexual activity: Not Currently   Other Topics Concern     Parent/sibling w/ CABG, MI or angioplasty before 65F 55M? No   Social History Narrative     Not on file     Social Determinants of Health     Financial Resource Strain: Not on file   Food Insecurity: Not on file   Transportation Needs: Not on file   Physical Activity: Not on file   Stress: Not on file   Social Connections: Not on file   Intimate Partner Violence: Not on file   Housing Stability: Not on file       Additional social history: None    Known coverage issues: Blue [employer] blue Shield Medicare Advantage    JESSICA score: Average    Please contact me with discharge planning info. I would like to attend any care conferences you have as well.    -TIFF Johnson

## 2022-09-07 NOTE — PLAN OF CARE
Physical Therapy Discharge Summary    Reason for therapy discharge:    Discharged to transitional care facility.    Progress towards therapy goal(s). See goals on Care Plan in [facility] electronic health record for goal details.  Goals not met.  Barriers to achieving goals:   discharge from facility.    Therapy recommendation(s):    Continued therapy is recommended.  Rationale/Recommendations:  Pt would benefit from continued skilled PT to improve safety and independence with all functional mobility.

## 2022-09-07 NOTE — PLAN OF CARE
"Goal Outcome Evaluation:    Confirmed via MD that pt \"not sent\" meds were going to be prescribed at TCU. PIV out. Pt left with all belongings.     "

## 2022-09-07 NOTE — PROGRESS NOTES
[facility]    Background: Transitional Care Management program auto-identified and prompting a chart review by [facility] [place] team.    Assessment: Upon chart review, [facility] Team member will cancel/close this episode of Transitional Care Management program due to reason below:    Patient discharged to TCU/ARU/LTACH and is established within [facility] Primary [facility]. Referral created for Primary Care-Care Coordination program.    Plan: Transitional Care Management episode closed per reason above.      YVROSE Flor  [facility] [place], [facility]    *Connected Care Resource Team does NOT follow patient ongoing. Referrals are identified based on internal discharge reports and the outreach is to ensure patient has an understanding of their discharge instructions.

## 2022-09-07 NOTE — PROGRESS NOTES
[place] [facility]  PRIMARY CARE PROVIDER AND CLINIC:  Brad Thurston MD, [address] [place] [address] / [facility] [address]  Chief Complaint   Patient presents with     Hospital F/U     [facility] Medical Record Number:  [id number]  Place of Service where encounter took place:  No question data found.    Suresh Powers  is a 62 year old  (1960), admitted to the above facility from  [facility]. Hospital stay 9/3/22 through 9/6/22..  Admitted to this facility for  rehab, medical management and nursing care.    HPI:    HPI information obtained from: facility chart records, facility staff and patient report.   Brief Summary of Hospital Course:   Updates on Status Since Skilled nursing Admission:     Patient Suresh Powers is a 62 yr old male admitted to [facility] for rehabilitation s/p hospitalization [facility] 9/3-9/6/22 for left hip fracture s/p fall. S/P left hip cephalomedullary nailing 9/4/2022. Noted to have alcoholic intoxication with blood level 0.21  Complications urinary retention and vasquez catheter placed and acute blood loss s/p surgery    PMHx chronic alcohol use, chronic hyponatremia, hypertension, anxiety and depression       TODAY  Patient report pain managed with use of oxycodone  C/o constipation  Sitting on side of bed with therapies  Vasquez catheter draining clear urine. Patient states he does not want vasquez catheter removed until off narcotics   Denies chest pain, shortness of breath or neuropathy  Eating meals and denies nausea and vomiting   States is disabled due to work accident (bilateral feet crushed and fall on ice)      CODE STATUS/ADVANCE DIRECTIVES DISCUSSION:   CPR/Full code   Patient's living condition: lives alone  ALLERGIES: Lisinopril and Sertraline  PAST MEDICAL HISTORY:  has a past medical history of Anxiety, Clostridium difficile diarrhea (3/9/2016), Foot pain, Hypertension, Mold exposure, and Shingles (1984).  PAST SURGICAL HISTORY:   has a past  surgical history that includes foot crush injury; kidney donation; Open reduction internal fixation tibial plateau (Left, 2/4/2016); Open reduction internal fixation clavicle (Left, 2/4/2016); and Open reduction internal fixation hip nailing (Left, 9/4/2022).  FAMILY HISTORY: family history includes Breast Cancer in his mother; Cerebrovascular Disease in his father; Diabetes Type 2  in his mother; Skin Cancer in his father.  SOCIAL HISTORY:   reports that he has quit smoking. He smoked 1.00 pack per day for 0.00 years. He quit smokeless tobacco use about 6 years ago. He reports current alcohol use. He reports that he does not use drugs.    Post Discharge Medication Reconciliation Status: discharge medications reconciled and changed, per note/orders    Current Outpatient Medications   Medication Sig Dispense Refill     fluticasone (FLONASE) 50 MCG/ACT nasal spray use 2 sprays in each nostril daily. 48 g 3     lamoTRIgine (LAMICTAL) 200 MG tablet Take 200 mg by mouth At Bedtime       tamsulosin (FLOMAX) 0.4 MG capsule Take 1 capsule (0.4 mg) by mouth every evening 30 capsule 0     acetaminophen (TYLENOL) 325 MG tablet Take 2 tablets (650 mg) by mouth every 4 hours as needed for other (mild pain) (Patient not taking: Reported on 9/7/2022) 100 tablet 0     amLODIPine (NORVASC) 10 MG tablet Take 1 tablet (10 mg) by mouth daily (Patient not taking: Reported on 9/7/2022) 90 tablet 0     aspirin (ASA) 325 MG EC tablet Take 1 tablet (325 mg) by mouth daily (Patient not taking: Reported on 9/7/2022) 30 tablet 0     escitalopram (LEXAPRO) 20 MG tablet Take 20 mg by mouth daily (Patient not taking: Reported on 9/7/2022)       Multiple Vitamins-Minerals (MULTIVITAMIN ADULT PO) Take 1 tablet by mouth daily (Patient not taking: Reported on 9/7/2022)       oxyCODONE (ROXICODONE) 5 MG tablet Take 1-2 tablets (5-10 mg) by mouth every 4 hours as needed (Patient not taking: Reported on 9/7/2022) 25 tablet 0     polyethylene glycol  "(MIRALAX) 17 GM/Dose powder Take 17 g by mouth daily (Patient not taking: Reported on 9/7/2022) 510 g 0     polyethylene glycol-propylene glycol (SYSTANE ULTRA) 0.4-0.3 % SOLN ophthalmic solution Place 1 drop into both eyes 4 times daily as needed (Patient not taking: Reported on 9/7/2022)       senna-docusate (SENOKOT-S/PERICOLACE) 8.6-50 MG tablet Take 1-2 tablets by mouth 2 times daily Take while on oral narcotics to prevent or treat constipation. (Patient not taking: Reported on 9/7/2022) 30 tablet 0     traZODone (DESYREL) 50 MG tablet Take 2 tablets (100 mg) by mouth At Bedtime (Patient not taking: Reported on 9/7/2022)       vitamin C (ASCORBIC ACID) 500 MG tablet Take 500 mg by mouth daily (Patient not taking: Reported on 9/7/2022)       zinc 50 MG TABS Take 100 mg by mouth daily (Patient not taking: Reported on 9/7/2022)         ROS:  10 point ROS of systems including Constitutional, Eyes, Respiratory, Cardiovascular, Gastroenterology, Genitourinary, Integumentary, Musculoskeletal, Psychiatric were all negative except for pertinent positives noted in my HPI.    Vitals:  /76   Pulse 106   Temp 98.3  F (36.8  C)   Resp 18   Ht 1.702 m (5' 7\")   Wt 86.2 kg (190 lb)   SpO2 93%   BMI 29.76 kg/m    Exam:  GENERAL APPEARANCE:  Alert, in no distress  ENT:  Mouth and posterior oropharynx normal, moist mucous membranes  EYES:  EOM, conjunctivae, lids, pupils and irises normal  RESP:  respiratory effort and palpation of chest normal, lungs clear to auscultation , no respiratory distress  CV:  Palpation and auscultation of heart done , regular rate and rhythm, no murmur, rub, or gallop  ABDOMEN:  normal bowel sounds, soft, nontender, no hepatosplenomegaly or other masses  M/S:   lying in bed  SKIN:  Inspection of skin and subcutaneous tissue drsg intact left hip, aquacell drsgs intact, small /med amount bloody drainage noted  NEURO:   Cranial nerves 2-12 are normal tested and grossly at patient's " baseline  PSYCH:  oriented X 3    Lab/Diagnostic data:  Labs done in SNF are in [place] EPIC. Please refer to them using [facility]/Care Everywhere.    ASSESSMENT/PLAN:  Acute displaced left intertrochanteric femur fracture-secondary to mechanical fall  S/p left hip cephalomedullary nailing, 9/4/2022  Will schedule Tylenol   Continue oxycodone as needed, goal to wean off as able  Weightbearing as tolerated  Aspirin 325 mg daily for 6 weeks DVT Prophylaxis  Follow up ortho Dr. Cochran at [facility] as advised. (contact: [phone], after-hours, [phone])             Constipation   Offer dulcolax supp  Schedule Senna S  Monitor          alcohol intoxication, blood alcohol level 0.21 g/dL in hospital   Chronic alcohol use disorder  Per hospital report   - had 6 beers with his friends as it was Saturday and they were watching a game.  Reports he has poor balance at baseline, is on disability due to multiple foot injuries,  reports history of falls even without alcohol use.  - increased risk of falls due to poor balance and previous foot injuries, avoid alcohol as it increases his risk of falls.  - No signs of withdrawal & LFTs in normal range     Hypomagnesemia, magnesium 1.5  Replaced per protocol. now 2.2  Dietary involved  monitor      Chronic hyponatremia, sodium 129  Baseline NA ~129  Chlorthalidone was recently discontinued  monitor       Acute blood loss anemia Due to surgical blood loss on top of chronic normocytic anemia   Hemoglobin 10.6 on admission, was 14.2 in 6/2021   - Folate > 40, B12 364  - Iron studies showed iron deficiency with 9% iron saturation normal iron binding capacity  Consider iron when off narcotics (avoid constipation)  Monitor      Essential hypertension  blood pressure managed  continue amlodipine 10 mg daily.       Chronic depression  Generalized anxiety disorder  Mood stable  Consider house psychologist  Continue Lexapro, trazodone, lamotrigine     Hypoalbuminemia, albumin 2  Dietary to  follow  Encourage Etoh cessation     Urinary retention-s/p Vasquez placement  Continue vasquez catheter cares  Plan for future voiding trial when off narcotics and progress in therapies   Continue Flomax (new), consider give in evening  Monitor      COVID-19 negative 9/4/22    Deconditioned  Impaired balance  Comment: d/t recent hospitalization & comorbidity, expect delay rehabilitation d/t age & comorbidity  Plan: PT/OT eval & treat, monitor  History impaired balance due to bilateral injuries to feet due to work accident  Patient retired, on disability         Total time spent with patient visit at the skilled nursing facility was 36 min including patient visit and review of past records. Greater than 50% of total time spent with counseling and coordinating care due to discussion on pain management, functional goals, goals of care and discharge planning.     Electronically signed by:  LUIS Cuadra CNP

## 2022-09-07 NOTE — PROGRESS NOTES
Clinic Care Coordination Contact  Care Coordination Transition Communication         Clinical Data: Patient was hospitalized at [facility] from 9/3/22 to 9/6/22 with diagnosis of Acute displaced left intertrochanteric femur fracture-secondary to mechanical fall and S/p left hip cephalomedullary nailing, 9/4/2022.     Transition to Facility:              Facility Name: [facility]              Contact name and phone number/fax: [phone]    Plan: RN/SW Care Coordinator will await notification from facility staff informing RN/SW Care Coordinator of patient's discharge plans/needs. RN/SW Care Coordinator will review chart and outreach to facility staff every 4 weeks and as needed.     TIFF Hernandes  [facility] Care Coordination   [place] and [facility]  Social Work Care Coordinator  [phone]

## 2022-09-07 NOTE — LETTER
9/7/2022        RE: Suresh Powers  [address]  [facility] [place] [facility] [address]        [place] [facility]  PRIMARY CARE PROVIDER AND CLINIC:  Brad Thurston MD, [address]  / [facility] [address]  Chief Complaint   Patient presents with     Hospital F/U     [facility] Medical Record Number:  [id number]  Place of Service where encounter took place:  No question data found.    Suresh Powers  is a 62 year old  (1960), admitted to the above facility from  [facility]. Hospital stay 9/3/22 through 9/6/22..  Admitted to this facility for  rehab, medical management and nursing care.    HPI:    HPI information obtained from: facility chart records, facility staff and patient report.   Brief Summary of Hospital Course:   Updates on Status Since Skilled nursing Admission:     Patient Suresh Powesr is a 62 yr old male admitted to [facility] for rehabilitation s/p hospitalization [facility] 9/3-9/6/22 for left hip fracture s/p fall. S/P left hip cephalomedullary nailing 9/4/2022. Noted to have alcoholic intoxication with blood level 0.21  Complications urinary retention and vasquez catheter placed and acute blood loss s/p surgery    PMHx chronic alcohol use, chronic hyponatremia, hypertension, anxiety and depression       TODAY  Patient report pain managed with use of oxycodone  C/o constipation  Sitting on side of bed with therapies  Vasquez catheter draining clear urine. Patient states he does not want vasquez catheter removed until off narcotics   Denies chest pain, shortness of breath or neuropathy  Eating meals and denies nausea and vomiting   States is disabled due to work accident (bilateral feet crushed and fall on ice)      CODE STATUS/ADVANCE DIRECTIVES DISCUSSION:   CPR/Full code   Patient's living condition: lives alone  ALLERGIES: Lisinopril and Sertraline  PAST MEDICAL HISTORY:  has a past medical history of Anxiety, Clostridium difficile diarrhea (3/9/2016), Foot  pain, Hypertension, Mold exposure, and Shingles (1984).  PAST SURGICAL HISTORY:   has a past surgical history that includes foot crush injury; kidney donation; Open reduction internal fixation tibial plateau (Left, 2/4/2016); Open reduction internal fixation clavicle (Left, 2/4/2016); and Open reduction internal fixation hip nailing (Left, 9/4/2022).  FAMILY HISTORY: family history includes Breast Cancer in his mother; Cerebrovascular Disease in his father; Diabetes Type 2  in his mother; Skin Cancer in his father.  SOCIAL HISTORY:   reports that he has quit smoking. He smoked 1.00 pack per day for 0.00 years. He quit smokeless tobacco use about 6 years ago. He reports current alcohol use. He reports that he does not use drugs.    Post Discharge Medication Reconciliation Status: discharge medications reconciled and changed, per note/orders    Current Outpatient Medications   Medication Sig Dispense Refill     fluticasone (FLONASE) 50 MCG/ACT nasal spray use 2 sprays in each nostril daily. 48 g 3     lamoTRIgine (LAMICTAL) 200 MG tablet Take 200 mg by mouth At Bedtime       tamsulosin (FLOMAX) 0.4 MG capsule Take 1 capsule (0.4 mg) by mouth every evening 30 capsule 0     acetaminophen (TYLENOL) 325 MG tablet Take 2 tablets (650 mg) by mouth every 4 hours as needed for other (mild pain) (Patient not taking: Reported on 9/7/2022) 100 tablet 0     amLODIPine (NORVASC) 10 MG tablet Take 1 tablet (10 mg) by mouth daily (Patient not taking: Reported on 9/7/2022) 90 tablet 0     aspirin (ASA) 325 MG EC tablet Take 1 tablet (325 mg) by mouth daily (Patient not taking: Reported on 9/7/2022) 30 tablet 0     escitalopram (LEXAPRO) 20 MG tablet Take 20 mg by mouth daily (Patient not taking: Reported on 9/7/2022)       Multiple Vitamins-Minerals (MULTIVITAMIN ADULT PO) Take 1 tablet by mouth daily (Patient not taking: Reported on 9/7/2022)       oxyCODONE (ROXICODONE) 5 MG tablet Take 1-2 tablets (5-10 mg) by mouth every 4 hours  "as needed (Patient not taking: Reported on 9/7/2022) 25 tablet 0     polyethylene glycol (MIRALAX) 17 GM/Dose powder Take 17 g by mouth daily (Patient not taking: Reported on 9/7/2022) 510 g 0     polyethylene glycol-propylene glycol (SYSTANE ULTRA) 0.4-0.3 % SOLN ophthalmic solution Place 1 drop into both eyes 4 times daily as needed (Patient not taking: Reported on 9/7/2022)       senna-docusate (SENOKOT-S/PERICOLACE) 8.6-50 MG tablet Take 1-2 tablets by mouth 2 times daily Take while on oral narcotics to prevent or treat constipation. (Patient not taking: Reported on 9/7/2022) 30 tablet 0     traZODone (DESYREL) 50 MG tablet Take 2 tablets (100 mg) by mouth At Bedtime (Patient not taking: Reported on 9/7/2022)       vitamin C (ASCORBIC ACID) 500 MG tablet Take 500 mg by mouth daily (Patient not taking: Reported on 9/7/2022)       zinc 50 MG TABS Take 100 mg by mouth daily (Patient not taking: Reported on 9/7/2022)         ROS:  10 point ROS of systems including Constitutional, Eyes, Respiratory, Cardiovascular, Gastroenterology, Genitourinary, Integumentary, Musculoskeletal, Psychiatric were all negative except for pertinent positives noted in my HPI.    Vitals:  /76   Pulse 106   Temp 98.3  F (36.8  C)   Resp 18   Ht 1.702 m (5' 7\")   Wt 86.2 kg (190 lb)   SpO2 93%   BMI 29.76 kg/m    Exam:  GENERAL APPEARANCE:  Alert, in no distress  ENT:  Mouth and posterior oropharynx normal, moist mucous membranes  EYES:  EOM, conjunctivae, lids, pupils and irises normal  RESP:  respiratory effort and palpation of chest normal, lungs clear to auscultation , no respiratory distress  CV:  Palpation and auscultation of heart done , regular rate and rhythm, no murmur, rub, or gallop  ABDOMEN:  normal bowel sounds, soft, nontender, no hepatosplenomegaly or other masses  M/S:   lying in bed  SKIN:  Inspection of skin and subcutaneous tissue drsg intact left hip, aquacell drsgs intact, small /med amount bloody drainage " noted  NEURO:   Cranial nerves 2-12 are normal tested and grossly at patient's baseline  PSYCH:  oriented X 3    Lab/Diagnostic data:  Labs done in SNF are in [place] EPIC. Please refer to them using [facility]/Care Everywhere.    ASSESSMENT/PLAN:  Acute displaced left intertrochanteric femur fracture-secondary to mechanical fall  S/p left hip cephalomedullary nailing, 9/4/2022  Will schedule Tylenol   Continue oxycodone as needed, goal to wean off as able  Weightbearing as tolerated  Aspirin 325 mg daily for 6 weeks DVT Prophylaxis  Follow up ortho Dr. Cochran at [facility] as advised. (contact: [phone], after-hours, [phone])             Constipation   Offer dulcolax supp  Schedule Senna S  Monitor          alcohol intoxication, blood alcohol level 0.21 g/dL in hospital   Chronic alcohol use disorder  Per hospital report   - had 6 beers with his friends as it was Saturday and they were watching a game.  Reports he has poor balance at baseline, is on disability due to multiple foot injuries,  reports history of falls even without alcohol use.  - increased risk of falls due to poor balance and previous foot injuries, avoid alcohol as it increases his risk of falls.  - No signs of withdrawal & LFTs in normal range     Hypomagnesemia, magnesium 1.5  Replaced per protocol. now 2.2  Dietary involved  monitor      Chronic hyponatremia, sodium 129  Baseline NA ~129  Chlorthalidone was recently discontinued  monitor       Acute blood loss anemia Due to surgical blood loss on top of chronic normocytic anemia   Hemoglobin 10.6 on admission, was 14.2 in 6/2021   - Folate > 40, B12 364  - Iron studies showed iron deficiency with 9% iron saturation normal iron binding capacity  Consider iron when off narcotics (avoid constipation)  Monitor      Essential hypertension  blood pressure managed  continue amlodipine 10 mg daily.       Chronic depression  Generalized anxiety disorder  Mood stable  Consider house psychologist  Continue  Lexapro, trazodone, lamotrigine     Hypoalbuminemia, albumin 2  Dietary to follow  Encourage Etoh cessation     Urinary retention-s/p Vasquez placement  Continue vasquez catheter cares  Plan for future voiding trial when off narcotics and progress in therapies   Continue Flomax (new), consider give in evening  Monitor      COVID-19 negative 9/4/22    Deconditioned  Impaired balance  Comment: d/t recent hospitalization & comorbidity, expect delay rehabilitation d/t age & comorbidity  Plan: PT/OT eval & treat, monitor  History impaired balance due to bilateral injuries to feet due to work accident  Patient retired, on disability         Total time spent with patient visit at the [facility] nursing facility was 36 min including patient visit and review of past records. Greater than 50% of total time spent with counseling and coordinating care due to discussion on pain management, functional goals, goals of care and discharge planning.     Electronically signed by:  LUIS Cuadra CNP                         Sincerely,        LUIS Cuadra CNP

## 2022-09-08 ENCOUNTER — DOCUMENTATION ONLY (OUTPATIENT)
Dept: OTHER | Facility: CLINIC | Age: 62
End: 2022-09-08

## 2022-09-08 ENCOUNTER — LAB REQUISITION (OUTPATIENT)
Dept: LAB | Facility: CLINIC | Age: 62
End: 2022-09-08
Payer: COMMERCIAL

## 2022-09-08 DIAGNOSIS — D64.9 ANEMIA, UNSPECIFIED: ICD-10-CM

## 2022-09-08 DIAGNOSIS — Z11.1 ENCOUNTER FOR SCREENING FOR RESPIRATORY TUBERCULOSIS: ICD-10-CM

## 2022-09-08 DIAGNOSIS — E83.42 HYPOMAGNESEMIA: ICD-10-CM

## 2022-09-09 ENCOUNTER — TELEPHONE (OUTPATIENT)
Dept: GERIATRICS | Facility: CLINIC | Age: 62
End: 2022-09-09

## 2022-09-09 DIAGNOSIS — Z98.890 POSTOPERATIVE STATE: ICD-10-CM

## 2022-09-09 LAB
ANION GAP SERPL CALCULATED.3IONS-SCNC: 7 MMOL/L (ref 3–14)
BUN SERPL-MCNC: 12 MG/DL (ref 7–30)
CALCIUM SERPL-MCNC: 8.4 MG/DL (ref 8.5–10.1)
CHLORIDE BLD-SCNC: 98 MMOL/L (ref 94–109)
CO2 SERPL-SCNC: 31 MMOL/L (ref 20–32)
CREAT SERPL-MCNC: 0.63 MG/DL (ref 0.66–1.25)
ERYTHROCYTE [DISTWIDTH] IN BLOOD BY AUTOMATED COUNT: 16.5 % (ref 10–15)
GFR SERPL CREATININE-BSD FRML MDRD: >90 ML/MIN/1.73M2
GLUCOSE BLD-MCNC: 87 MG/DL (ref 70–99)
HCT VFR BLD AUTO: 26.1 % (ref 40–53)
HGB BLD-MCNC: 8.1 G/DL (ref 13.3–17.7)
MAGNESIUM SERPL-MCNC: 2.1 MG/DL (ref 1.6–2.3)
MCH RBC QN AUTO: 27.9 PG (ref 26.5–33)
MCHC RBC AUTO-ENTMCNC: 31 G/DL (ref 31.5–36.5)
MCV RBC AUTO: 90 FL (ref 78–100)
PLATELET # BLD AUTO: 474 10E3/UL (ref 150–450)
POTASSIUM BLD-SCNC: 3.4 MMOL/L (ref 3.4–5.3)
RBC # BLD AUTO: 2.9 10E6/UL (ref 4.4–5.9)
SODIUM SERPL-SCNC: 136 MMOL/L (ref 133–144)
WBC # BLD AUTO: 8.8 10E3/UL (ref 4–11)

## 2022-09-09 PROCEDURE — 86481 TB AG RESPONSE T-CELL SUSP: CPT | Performed by: NURSE PRACTITIONER

## 2022-09-09 PROCEDURE — 83735 ASSAY OF MAGNESIUM: CPT | Performed by: NURSE PRACTITIONER

## 2022-09-09 PROCEDURE — 85027 COMPLETE CBC AUTOMATED: CPT | Performed by: NURSE PRACTITIONER

## 2022-09-09 PROCEDURE — 80048 BASIC METABOLIC PNL TOTAL CA: CPT | Performed by: NURSE PRACTITIONER

## 2022-09-09 PROCEDURE — 36415 COLL VENOUS BLD VENIPUNCTURE: CPT | Performed by: NURSE PRACTITIONER

## 2022-09-09 RX ORDER — OXYCODONE HYDROCHLORIDE 5 MG/1
5-10 TABLET ORAL EVERY 4 HOURS PRN
Qty: 60 TABLET | Refills: 0 | Status: SHIPPED | OUTPATIENT
Start: 2022-09-09 | End: 2022-09-22

## 2022-09-09 NOTE — TELEPHONE ENCOUNTER
[facility] Triage Nurse Telephone Encounter    Provider: LUIS Morris CNP   Facility: [facility] Facility Type:  TCU    Caller: Jessica  Call Back Number: [phone]    Allergies:    Allergies   Allergen Reactions     Lisinopril Other (See Comments)     Elevated potassium     Sertraline Diarrhea        Reason for call: Pt's bandage on left hip has become saturated and is soaking pt's bed. Serosanguinous drainage. Facility nurse and writer attempted to get a hold of surgeon at [facility] without success.     Verbal Order/Direction given by Provider:   1) Remove bandage to assess incision - if not dehisced or having copious amounts of drainage then do the followin) Clean with normal saline and pat dry. Change bandage twice daily PRN - use ABD and secure with tape.     Provider giving Order:  LUIS Morris CNP     Verbal Order given to: Suzanne Murcia RN

## 2022-09-11 LAB
GAMMA INTERFERON BACKGROUND BLD IA-ACNC: 0.07 IU/ML
M TB IFN-G BLD-IMP: NEGATIVE
M TB IFN-G CD4+ BCKGRND COR BLD-ACNC: 9.93 IU/ML
MITOGEN IGNF BCKGRD COR BLD-ACNC: -0.01 IU/ML
MITOGEN IGNF BCKGRD COR BLD-ACNC: 0 IU/ML
QUANTIFERON MITOGEN: 10 IU/ML
QUANTIFERON NIL TUBE: 0.07 IU/ML
QUANTIFERON TB1 TUBE: 0.07 IU/ML
QUANTIFERON TB2 TUBE: 0.06

## 2022-09-12 ENCOUNTER — TRANSITIONAL CARE UNIT VISIT (OUTPATIENT)
Dept: GERIATRICS | Facility: CLINIC | Age: 62
End: 2022-09-12
Payer: COMMERCIAL

## 2022-09-12 VITALS
TEMPERATURE: 98 F | HEART RATE: 72 BPM | BODY MASS INDEX: 29.78 KG/M2 | DIASTOLIC BLOOD PRESSURE: 70 MMHG | WEIGHT: 189.7 LBS | RESPIRATION RATE: 18 BRPM | HEIGHT: 67 IN | SYSTOLIC BLOOD PRESSURE: 120 MMHG | OXYGEN SATURATION: 94 %

## 2022-09-12 DIAGNOSIS — S72.002D CLOSED FRACTURE DISLOCATION OF LEFT HIP JOINT WITH ROUTINE HEALING, SUBSEQUENT ENCOUNTER: Primary | ICD-10-CM

## 2022-09-12 DIAGNOSIS — M62.81 GENERALIZED MUSCLE WEAKNESS: ICD-10-CM

## 2022-09-12 DIAGNOSIS — K59.00 CONSTIPATION, UNSPECIFIED CONSTIPATION TYPE: ICD-10-CM

## 2022-09-12 DIAGNOSIS — I10 BENIGN ESSENTIAL HYPERTENSION: ICD-10-CM

## 2022-09-12 DIAGNOSIS — F41.1 GENERALIZED ANXIETY DISORDER: ICD-10-CM

## 2022-09-12 PROCEDURE — 99309 SBSQ NF CARE MODERATE MDM 30: CPT | Performed by: NURSE PRACTITIONER

## 2022-09-12 NOTE — PROGRESS NOTES
[facility]  [place] Medical Record Number:  [id number]  Place of Service where encounter took place:  [facility] ([facility]) [[id number]]  Chief Complaint   Patient presents with     Nursing Home Acute       HPI:    Suresh Powers  is a 62 year old (1960), who is being seen today for an episodic care visit.  HPI information obtained from: facility chart records, facility staff and patient report. Today's concern is:    Patient Suresh Powers is a 62 yr old male admitted to [facility] for rehabilitation s/p hospitalization [facility] 9/3-9/6/22 for left hip fracture s/p fall. S/P left hip cephalomedullary nailing 9/4/2022. Noted to have alcoholic intoxication with blood level 0.21  Complications urinary retention and vasquez catheter placed and acute blood loss s/p surgery    PMHx chronic alcohol use, chronic hyponatremia, hypertension, anxiety and depression        Closed fracture dislocation of left hip joint with routine healing, subsequent encounter  Benign essential hypertension  Generalized anxiety disorder  Generalized muscle weakness  Constipation, unspecified constipation type     Pain managed with current pain medications. Using oxycodone 10mg ~every 4hours   Participating in therapies and working on transfers  Discussed goal to wean off narcotics as heals from surgery, he is agreeable     Reports had bowel movement and does not want further changes in bowel movement medications     Mood stable    Vitals stable     Past Medical and Surgical History reviewed in Epic today.    MEDICATIONS:    Current Outpatient Medications   Medication Sig Dispense Refill     acetaminophen (TYLENOL) 325 MG tablet Take 650 mg by mouth 3 times daily & daily as needed       amLODIPine (NORVASC) 10 MG tablet Take 1 tablet (10 mg) by mouth daily 90 tablet 0     aspirin (ASA) 325 MG EC tablet Take 1 tablet (325 mg) by mouth daily 30 tablet 0     bisacodyl (DULCOLAX) 10 MG suppository Place  "10 mg rectally daily as needed for constipation       escitalopram (LEXAPRO) 20 MG tablet Take 20 mg by mouth daily       fluticasone (FLONASE) 50 MCG/ACT nasal spray use 2 sprays in each nostril daily. 48 g 3     lamoTRIgine (LAMICTAL) 200 MG tablet Take 200 mg by mouth At Bedtime       Multiple Vitamins-Minerals (MULTIVITAMIN ADULT PO) Take 1 tablet by mouth daily       oxyCODONE (ROXICODONE) 5 MG tablet Take 1-2 tablets (5-10 mg) by mouth every 4 hours as needed for pain 60 tablet 0     polyethylene glycol (MIRALAX) 17 GM/Dose powder Take 17 g by mouth daily 510 g 0     polyethylene glycol-propylene glycol (SYSTANE ULTRA) 0.4-0.3 % SOLN ophthalmic solution Place 1 drop into both eyes 4 times daily as needed       senna-docusate (SENOKOT-S/PERICOLACE) 8.6-50 MG tablet Take 1-2 tablets by mouth 2 times daily Take while on oral narcotics to prevent or treat constipation. (Patient taking differently: Take 2 tablets by mouth 2 times daily Take while on oral narcotics to prevent or treat constipation.  & 2 tabs daily as needed) 30 tablet 0     tamsulosin (FLOMAX) 0.4 MG capsule Take 1 capsule (0.4 mg) by mouth every evening 30 capsule 0     traZODone (DESYREL) 50 MG tablet Take 2 tablets (100 mg) by mouth At Bedtime       vitamin C (ASCORBIC ACID) 500 MG tablet Take 500 mg by mouth daily       zinc 50 MG TABS Take 100 mg by mouth daily           REVIEW OF SYSTEMS:  4 point ROS including Respiratory, CV, GI and , other than that noted in the HPI,  is negative    Objective:  /70   Pulse 72   Temp 98  F (36.7  C)   Resp 18   Ht 1.702 m (5' 7\")   Wt 86 kg (189 lb 11.2 oz)   SpO2 94%   BMI 29.71 kg/m    Exam:  GENERAL APPEARANCE:  Alert, in no distress  RESP:  respiratory effort and palpation of chest normal, lungs clear to auscultation , no respiratory distress  CV:  Palpation and auscultation of heart done , regular rate and rhythm, no murmur, rub, or gallop  ABDOMEN:  normal bowel sounds, soft, nontender, " no hepatosplenomegaly or other masses  : Catheter drain clear yellow drainage   M/S:   sitting in bed  SKIN:  Inspection of skin and subcutaneous tissue drsg intact left leg  PSYCH:  oriented X 3    Labs:   Labs done in SNF are in [facility] EPIC. Please refer to them using EPIC/Care Everywhere.     Last Comprehensive Metabolic Panel:  Sodium   Date Value Ref Range Status   09/09/2022 136 133 - 144 mmol/L Final   06/15/2021 125 (L) 133 - 144 mmol/L Final     Potassium   Date Value Ref Range Status   09/09/2022 3.4 3.4 - 5.3 mmol/L Final   06/15/2021 3.8 3.4 - 5.3 mmol/L Final     Chloride   Date Value Ref Range Status   09/09/2022 98 94 - 109 mmol/L Final   06/15/2021 87 (L) 94 - 109 mmol/L Final     Carbon Dioxide   Date Value Ref Range Status   06/15/2021 33 (H) 20 - 32 mmol/L Final     Carbon Dioxide (CO2)   Date Value Ref Range Status   09/09/2022 31 20 - 32 mmol/L Final     Anion Gap   Date Value Ref Range Status   09/09/2022 7 3 - 14 mmol/L Final   06/15/2021 5 3 - 14 mmol/L Final     Glucose   Date Value Ref Range Status   09/09/2022 87 70 - 99 mg/dL Final   06/15/2021 110 (H) 70 - 99 mg/dL Final     Urea Nitrogen   Date Value Ref Range Status   09/09/2022 12 7 - 30 mg/dL Final   06/15/2021 9 7 - 30 mg/dL Final     Creatinine   Date Value Ref Range Status   09/09/2022 0.63 (L) 0.66 - 1.25 mg/dL Final   06/15/2021 0.81 0.66 - 1.25 mg/dL Final     GFR Estimate   Date Value Ref Range Status   09/09/2022 >90 >60 mL/min/1.73m2 Final     Comment:     Effective December 21, 2021 eGFRcr in adults is calculated using the 2021 CKD-EPI creatinine equation which includes age and gender (Cristy rod al., NEJM, DOI: 10.1056/NAYWtn9121141)   06/15/2021 >90 >60 mL/min/[1.73_m2] Final     Comment:     Non  GFR Calc  Starting 12/18/2018, serum creatinine based estimated GFR (eGFR) will be   calculated using the Chronic Kidney Disease Epidemiology Collaboration   (CKD-EPI) equation.       Calcium   Date Value Ref  Range Status   09/09/2022 8.4 (L) 8.5 - 10.1 mg/dL Final   06/15/2021 8.7 8.5 - 10.1 mg/dL Final       Lab Results   Component Value Date    WBC 8.8 09/09/2022    WBC 6.6 06/15/2021     Lab Results   Component Value Date    RBC 2.90 09/09/2022    RBC 4.27 06/15/2021     Lab Results   Component Value Date    HGB 8.1 09/09/2022    HGB 14.2 06/15/2021     Lab Results   Component Value Date    HCT 26.1 09/09/2022    HCT 39.8 06/15/2021     No components found for: MCT  Lab Results   Component Value Date    MCV 90 09/09/2022    MCV 93 06/15/2021     Lab Results   Component Value Date    MCH 27.9 09/09/2022    MCH 33.3 06/15/2021     Lab Results   Component Value Date    MCHC 31.0 09/09/2022    MCHC 35.7 06/15/2021     Lab Results   Component Value Date    RDW 16.5 09/09/2022    RDW 13.2 06/15/2021     Lab Results   Component Value Date     09/09/2022     06/15/2021         ASSESSMENT/PLAN:     Closed fracture dislocation of left hip joint with routine healing, subsequent encounter  Benign essential hypertension  Generalized anxiety disorder  Generalized muscle weakness  Constipation, unspecified constipation type     Pain managed with current pain medications. Using oxycodone 10mg ~every 4hours   Participating in therapies and working on transfers  Discussed goal to wean off narcotics as heals from surgery, he is agreeable   Continue current pain management with oxycodone, Tylenol   Continue therapies  Follow up ortho as advised  Has daily dressing changes due to bleeding through Aquacel last Friday  hgb stable and drainage lesson    Reports had bowel movement and does not want further changes in bowel movement medications   Continue current bowel medications with scheduled Senna S & Miralax    Mood stable  Continue current mood medications with Lexapro, Trazodone and Lamictal  Monitor     Vitals stable   blood pressure managed  Continue current dose of Norvasc      Electronically signed by:  Juany Dash  LUIS Dietrich CNP

## 2022-09-13 ENCOUNTER — TRANSITIONAL CARE UNIT VISIT (OUTPATIENT)
Dept: GERIATRICS | Facility: CLINIC | Age: 62
End: 2022-09-13
Payer: COMMERCIAL

## 2022-09-13 VITALS
HEART RATE: 77 BPM | WEIGHT: 190.8 LBS | OXYGEN SATURATION: 94 % | TEMPERATURE: 97.7 F | HEIGHT: 67 IN | RESPIRATION RATE: 18 BRPM | DIASTOLIC BLOOD PRESSURE: 69 MMHG | SYSTOLIC BLOOD PRESSURE: 123 MMHG | BODY MASS INDEX: 29.95 KG/M2

## 2022-09-13 DIAGNOSIS — S72.002D CLOSED FRACTURE DISLOCATION OF LEFT HIP JOINT WITH ROUTINE HEALING, SUBSEQUENT ENCOUNTER: Primary | ICD-10-CM

## 2022-09-13 DIAGNOSIS — D64.9 ANEMIA, UNSPECIFIED TYPE: ICD-10-CM

## 2022-09-13 DIAGNOSIS — Z98.890 POSTOPERATIVE STATE: ICD-10-CM

## 2022-09-13 DIAGNOSIS — I10 BENIGN ESSENTIAL HYPERTENSION: ICD-10-CM

## 2022-09-13 DIAGNOSIS — E87.1 HYPONATREMIA: ICD-10-CM

## 2022-09-13 PROCEDURE — 99305 1ST NF CARE MODERATE MDM 35: CPT | Performed by: INTERNAL MEDICINE

## 2022-09-13 NOTE — LETTER
9/13/2022        RE: Suresh Powers  [address]  [facility] [id number]        [facility]    PRIMARY CARE PROVIDER AND CLINIC:  Brad Thurston MD, [address] [place] [address] / [facility] [address]  Chief Complaint   Patient presents with     Hospital F/U      [place] Medical Record Number:  [id number]  Place of Service where encounter took place:  [facility] (U)     Suresh Powers  is a 62 year old  (1960), admitted to the above facility from  [facility]. Hospital stay 9/3/22 through 9/6/22..     Hospital course was reviewed by me, is as per the hospital discharge summary and nurse practitioner note.    Patient suffered a left hip fracture following a fall.  Alcohol level was 4 2 1 g/dL the time of presentation to the hospital.  He underwent a left hip IM nailing on 9/4/2022.  Postop course was complicated by urinary retention for which Prieto catheter was placed.  Patient notes chronic bilateral foot pain and toe deformity such that he walks with a cane, has chronic impaired balance.  Sodium stable in the upper 120s.  Hemoglobin was 10.6 on admission.  iron studies were suggestive of iron deficiency      PMHx chronic alcohol use, chronic hyponatremia, hypertension, anxiety and depression    Patient notes fair pain control on oxycodone.  After initial period of constipation, he has been having bowel movements.  Prieto catheter remains in place.  He denies cough, chest pain, shortness of breath, nausea, vomiting, abdominal pain.  He states he only has occasional alcohol use.    Vital signs have been stable since admission.      CODE STATUS/ADVANCE DIRECTIVES DISCUSSION:  Prior  CPR/Full code   ALLERGIES:   Allergies   Allergen Reactions     Lisinopril Other (See Comments)     Elevated potassium     Sertraline Diarrhea      PAST MEDICAL HISTORY:   Past Medical History:   Diagnosis Date     Anxiety     sees psych for trazodone     Clostridium  difficile diarrhea 3/9/2016     Foot pain      Hypertension      Mold exposure      Shingles 1984      PAST SURGICAL HISTORY:   has a past surgical history that includes foot crush injury; kidney donation; Open reduction internal fixation tibial plateau (Left, 2/4/2016); Open reduction internal fixation clavicle (Left, 2/4/2016); and Open reduction internal fixation hip nailing (Left, 9/4/2022).  FAMILY HISTORY: family history includes Breast Cancer in his mother; Cerebrovascular Disease in his father; Diabetes Type 2  in his mother; Skin Cancer in his father.  SOCIAL HISTORY:   reports that he has quit smoking. He smoked 1.00 pack per day for 0.00 years. He quit smokeless tobacco use about 6 years ago. He reports current alcohol use. He reports that he does not use drugs.  Patient's living condition: lives alone    Current medications were reviewed by me today.       Current Outpatient Medications   Medication Sig     acetaminophen (TYLENOL) 325 MG tablet Take 650 mg by mouth 3 times daily & daily as needed     amLODIPine (NORVASC) 10 MG tablet Take 1 tablet (10 mg) by mouth daily     aspirin (ASA) 325 MG EC tablet Take 1 tablet (325 mg) by mouth daily     bisacodyl (DULCOLAX) 10 MG suppository Place 10 mg rectally daily as needed for constipation     escitalopram (LEXAPRO) 20 MG tablet Take 20 mg by mouth daily     fluticasone (FLONASE) 50 MCG/ACT nasal spray use 2 sprays in each nostril daily.     lamoTRIgine (LAMICTAL) 200 MG tablet Take 200 mg by mouth At Bedtime     Multiple Vitamins-Minerals (MULTIVITAMIN ADULT PO) Take 1 tablet by mouth daily     oxyCODONE (ROXICODONE) 5 MG tablet Take 1-2 tablets (5-10 mg) by mouth every 4 hours as needed for pain     polyethylene glycol (MIRALAX) 17 GM/Dose powder Take 17 g by mouth daily     polyethylene glycol-propylene glycol (SYSTANE ULTRA) 0.4-0.3 % SOLN ophthalmic solution Place 1 drop into both eyes 4 times daily as needed     senna-docusate (SENOKOT-S/PERICOLACE)  "8.6-50 MG tablet Take 1-2 tablets by mouth 2 times daily Take while on oral narcotics to prevent or treat constipation. (Patient taking differently: Take 2 tablets by mouth 2 times daily Take while on oral narcotics to prevent or treat constipation.  & 2 tabs daily as needed)     tamsulosin (FLOMAX) 0.4 MG capsule Take 1 capsule (0.4 mg) by mouth every evening     traZODone (DESYREL) 50 MG tablet Take 2 tablets (100 mg) by mouth At Bedtime     vitamin C (ASCORBIC ACID) 500 MG tablet Take 500 mg by mouth daily     zinc 50 MG TABS Take 100 mg by mouth daily     No current facility-administered medications for this visit.       ROS:  10 point ROS of systems including Constitutional, Eyes, Respiratory, Cardiovascular, Gastroenterology, Genitourinary, Integumentary, Musculoskeletal, Psychiatric were all negative except for pertinent positives noted in my HPI.    Vitals:  /69   Pulse 77   Temp 97.7  F (36.5  C)   Resp 18   Ht 1.702 m (5' 7\")   Wt 86.5 kg (190 lb 12.8 oz)   SpO2 94%   BMI 29.88 kg/m    Exam:  Very pleasant male, lying in bed, appears comfortable  HEENT: Pharynx benign, oral mucosa moist  Neck: No adenopathy  Lungs: Clear  CV: RRR, no edema  Abdomen: Soft  Extremities: Left hip incision was not examined.  There is moderate swelling left thigh area.  Trace left calf edema.  Feet were not examined.  Neuro: Alert, fully oriented, very pleasant.  No tremors.  Insight appears diminished.  Mood: Upbeat    Lab/Diagnostic data:    Most Recent 3 CBC's:Recent Labs   Lab Test 09/09/22  0605 09/06/22  0750 09/05/22  0758 09/04/22  2244   WBC 8.8 12.4*  --  13.2*   HGB 8.1* 8.3* 8.1* 8.5*   MCV 90 87  --  86   * 324  --  304     Most Recent 3 BMP's:Recent Labs   Lab Test 09/09/22  0605 09/06/22  1723 09/06/22  1048 09/06/22  0750 09/05/22  0803 09/05/22  0758     --   --  130*  --  129*   POTASSIUM 3.4  --   --  4.0  --  3.8   CHLORIDE 98  --   --  96  --  98   CO2 31  --   --  27  --  26   BUN " 12  --   --  6*  --  7   CR 0.63*  --   --  0.57*  --  0.59*   ANIONGAP 7  --   --  7  --  5   NIMISHA 8.4*  --   --  8.1*  --  8.1*   GLC 87 150* 153* 111*   < > 124*    < > = values in this interval not displayed.     Most Recent 2 LFT's:Recent Labs   Lab Test 09/05/22  0758 09/03/22  2305   AST 16 44   ALT 22 44   ALKPHOS 90 134   BILITOTAL 0.3 0.2     Most Recent TSH and T4:Recent Labs   Lab Test 06/15/21  1131   TSH 1.04       ASSESSMENT/PLAN:    Left intertrochanteric femur fracture secondary to mechanical fall in the setting of alcohol intoxication, status post left hip nailing  Fair pain control  Slow progress in therapy noted, as expected given chronic balance issues and deconditioning  Plan: Therapies, expect slow wean off oxycodone.  Aspirin for DVT prophylaxis per orthopedics.  Orthopedics follow-up.      Anemia, secondary to acute blood loss superimposed upon what appears to be chronic anemia with iron studies suggestive of iron deficiency.  Hemoglobin stable in the low 8 range  Plan: Monitor hemoglobin, p.o. iron has been started.  Outpatient monitoring of hemoglobin with consideration of GI evaluation, particularly if ongoing anemia or evidence of occult blood loss    History of alcohol use disorder with acute intoxication in setting of fall  LFTs normal  No withdrawal noted postop.  Plan: Offer CD treatment at conclusion of rehab course here.  Suspect patient will not desire this.    Urinary retention postop  Plan: Treat constipation, minimize narcotics, trial of voiding when patient is more active    Hypertension  Stable on amlodipine  Plan: Monitor blood pressure    History of hyponatremia  Felt in the past secondary to diuretic therapy versus Lamictal  Stable  Plan: Monitor BMP    Chronic depression, anxiety  Appears stable on Lexapro, trazodone, lamotrigine  Plan: Monitor mental status      Sharif Cannon MD                    Sincerely,        Sharif Cannon MD

## 2022-09-13 NOTE — PROGRESS NOTES
[facility]ERIATR[facility]    PRIMARY CARE PROVIDER AND CLINIC:  Brad Thurston MD, [address] [place] [address] / [facility] [address]  Chief Complaint   Patient presents with     Hospital F/U      [place] Medical Record Number:  [id number]  Place of Service where encounter took place:  [facility] ([facility])     Suresh Powers  is a 62 year old  (1960), admitted to the above facility from  [facility]. Hospital stay 9/3/22 through 9/6/22..     Hospital course was reviewed by me, is as per the hospital discharge summary and nurse practitioner note.    Patient suffered a left hip fracture following a fall.  Alcohol level was 4 2 1 g/dL the time of presentation to the hospital.  He underwent a left hip IM nailing on 9/4/2022.  Postop course was complicated by urinary retention for which Prieto catheter was placed.  Patient notes chronic bilateral foot pain and toe deformity such that he walks with a cane, has chronic impaired balance.  Sodium stable in the upper 120s.  Hemoglobin was 10.6 on admission.  iron studies were suggestive of iron deficiency      PMHx chronic alcohol use, chronic hyponatremia, hypertension, anxiety and depression    Patient notes fair pain control on oxycodone.  After initial period of constipation, he has been having bowel movements.  Prieto catheter remains in place.  He denies cough, chest pain, shortness of breath, nausea, vomiting, abdominal pain.  He states he only has occasional alcohol use.    Vital signs have been stable since admission.      CODE STATUS/ADVANCE DIRECTIVES DISCUSSION:  Prior  CPR/Full code   ALLERGIES:   Allergies   Allergen Reactions     Lisinopril Other (See Comments)     Elevated potassium     Sertraline Diarrhea      PAST MEDICAL HISTORY:   Past Medical History:   Diagnosis Date     Anxiety     sees psych for trazodone     Clostridium difficile diarrhea 3/9/2016     Foot pain      Hypertension      Mold exposure      Shingles  1984      PAST SURGICAL HISTORY:   has a past surgical history that includes foot crush injury; kidney donation; Open reduction internal fixation tibial plateau (Left, 2/4/2016); Open reduction internal fixation clavicle (Left, 2/4/2016); and Open reduction internal fixation hip nailing (Left, 9/4/2022).  FAMILY HISTORY: family history includes Breast Cancer in his mother; Cerebrovascular Disease in his father; Diabetes Type 2  in his mother; Skin Cancer in his father.  SOCIAL HISTORY:   reports that he has quit smoking. He smoked 1.00 pack per day for 0.00 years. He quit smokeless tobacco use about 6 years ago. He reports current alcohol use. He reports that he does not use drugs.  Patient's living condition: lives alone    Current medications were reviewed by me today.       Current Outpatient Medications   Medication Sig     acetaminophen (TYLENOL) 325 MG tablet Take 650 mg by mouth 3 times daily & daily as needed     amLODIPine (NORVASC) 10 MG tablet Take 1 tablet (10 mg) by mouth daily     aspirin (ASA) 325 MG EC tablet Take 1 tablet (325 mg) by mouth daily     bisacodyl (DULCOLAX) 10 MG suppository Place 10 mg rectally daily as needed for constipation     escitalopram (LEXAPRO) 20 MG tablet Take 20 mg by mouth daily     fluticasone (FLONASE) 50 MCG/ACT nasal spray use 2 sprays in each nostril daily.     lamoTRIgine (LAMICTAL) 200 MG tablet Take 200 mg by mouth At Bedtime     Multiple Vitamins-Minerals (MULTIVITAMIN ADULT PO) Take 1 tablet by mouth daily     oxyCODONE (ROXICODONE) 5 MG tablet Take 1-2 tablets (5-10 mg) by mouth every 4 hours as needed for pain     polyethylene glycol (MIRALAX) 17 GM/Dose powder Take 17 g by mouth daily     polyethylene glycol-propylene glycol (SYSTANE ULTRA) 0.4-0.3 % SOLN ophthalmic solution Place 1 drop into both eyes 4 times daily as needed     senna-docusate (SENOKOT-S/PERICOLACE) 8.6-50 MG tablet Take 1-2 tablets by mouth 2 times daily Take while on oral narcotics to  "prevent or treat constipation. (Patient taking differently: Take 2 tablets by mouth 2 times daily Take while on oral narcotics to prevent or treat constipation.  & 2 tabs daily as needed)     tamsulosin (FLOMAX) 0.4 MG capsule Take 1 capsule (0.4 mg) by mouth every evening     traZODone (DESYREL) 50 MG tablet Take 2 tablets (100 mg) by mouth At Bedtime     vitamin C (ASCORBIC ACID) 500 MG tablet Take 500 mg by mouth daily     zinc 50 MG TABS Take 100 mg by mouth daily     No current facility-administered medications for this visit.       ROS:  10 point ROS of systems including Constitutional, Eyes, Respiratory, Cardiovascular, Gastroenterology, Genitourinary, Integumentary, Musculoskeletal, Psychiatric were all negative except for pertinent positives noted in my HPI.    Vitals:  /69   Pulse 77   Temp 97.7  F (36.5  C)   Resp 18   Ht 1.702 m (5' 7\")   Wt 86.5 kg (190 lb 12.8 oz)   SpO2 94%   BMI 29.88 kg/m    Exam:  Very pleasant male, lying in bed, appears comfortable  HEENT: Pharynx benign, oral mucosa moist  Neck: No adenopathy  Lungs: Clear  CV: RRR, no edema  Abdomen: Soft  Extremities: Left hip incision was not examined.  There is moderate swelling left thigh area.  Trace left calf edema.  Feet were not examined.  Neuro: Alert, fully oriented, very pleasant.  No tremors.  Insight appears diminished.  Mood: Upbeat    Lab/Diagnostic data:    Most Recent 3 CBC's:Recent Labs   Lab Test 09/09/22  0605 09/06/22  0750 09/05/22  0758 09/04/22  2244   WBC 8.8 12.4*  --  13.2*   HGB 8.1* 8.3* 8.1* 8.5*   MCV 90 87  --  86   * 324  --  304     Most Recent 3 BMP's:Recent Labs   Lab Test 09/09/22  0605 09/06/22  1723 09/06/22  1048 09/06/22  0750 09/05/22  0803 09/05/22  0758     --   --  130*  --  129*   POTASSIUM 3.4  --   --  4.0  --  3.8   CHLORIDE 98  --   --  96  --  98   CO2 31  --   --  27  --  26   BUN 12  --   --  6*  --  7   CR 0.63*  --   --  0.57*  --  0.59*   ANIONGAP 7  --   --  7  " --  5   NIMISHA 8.4*  --   --  8.1*  --  8.1*   GLC 87 150* 153* 111*   < > 124*    < > = values in this interval not displayed.     Most Recent 2 LFT's:Recent Labs   Lab Test 09/05/22  0758 09/03/22  2305   AST 16 44   ALT 22 44   ALKPHOS 90 134   BILITOTAL 0.3 0.2     Most Recent TSH and T4:Recent Labs   Lab Test 06/15/21  1131   TSH 1.04       ASSESSMENT/PLAN:    Left intertrochanteric femur fracture secondary to mechanical fall in the setting of alcohol intoxication, status post left hip nailing  Fair pain control  Slow progress in therapy noted, as expected given chronic balance issues and deconditioning  Plan: Therapies, expect slow wean off oxycodone.  Aspirin for DVT prophylaxis per orthopedics.  Orthopedics follow-up.      Anemia, secondary to acute blood loss superimposed upon what appears to be chronic anemia with iron studies suggestive of iron deficiency.  Hemoglobin stable in the low 8 range  Plan: Monitor hemoglobin, p.o. iron has been started.  Outpatient monitoring of hemoglobin with consideration of GI evaluation, particularly if ongoing anemia or evidence of occult blood loss    History of alcohol use disorder with acute intoxication in setting of fall  LFTs normal  No withdrawal noted postop.  Plan: Offer CD treatment at conclusion of rehab course here.  Suspect patient will not desire this.    Urinary retention postop  Plan: Treat constipation, minimize narcotics, trial of voiding when patient is more active    Hypertension  Stable on amlodipine  Plan: Monitor blood pressure    History of hyponatremia  Felt in the past secondary to diuretic therapy versus Lamictal  Stable  Plan: Monitor BMP    Chronic depression, anxiety  Appears stable on Lexapro, trazodone, lamotrigine  Plan: Monitor mental status      Sharif Cannon MD

## 2022-09-19 ENCOUNTER — TRANSITIONAL CARE UNIT VISIT (OUTPATIENT)
Dept: GERIATRICS | Facility: CLINIC | Age: 62
End: 2022-09-19
Payer: COMMERCIAL

## 2022-09-19 VITALS
BODY MASS INDEX: 30.2 KG/M2 | HEART RATE: 84 BPM | HEIGHT: 67 IN | TEMPERATURE: 97.9 F | DIASTOLIC BLOOD PRESSURE: 80 MMHG | RESPIRATION RATE: 18 BRPM | SYSTOLIC BLOOD PRESSURE: 137 MMHG | OXYGEN SATURATION: 94 % | WEIGHT: 192.4 LBS

## 2022-09-19 DIAGNOSIS — K59.00 CONSTIPATION, UNSPECIFIED CONSTIPATION TYPE: ICD-10-CM

## 2022-09-19 DIAGNOSIS — S72.002D CLOSED FRACTURE DISLOCATION OF LEFT HIP JOINT WITH ROUTINE HEALING, SUBSEQUENT ENCOUNTER: Primary | ICD-10-CM

## 2022-09-19 DIAGNOSIS — F41.1 GENERALIZED ANXIETY DISORDER: ICD-10-CM

## 2022-09-19 DIAGNOSIS — R33.9 URINARY RETENTION: ICD-10-CM

## 2022-09-19 PROCEDURE — 99309 SBSQ NF CARE MODERATE MDM 30: CPT | Performed by: NURSE PRACTITIONER

## 2022-09-19 NOTE — PROGRESS NOTES
[facility]  [place] [facility] Record Number:  [id number]  Place of Service where encounter took place:  [facility] [place] ([facility]) [[id number]]  Chief Complaint   Patient presents with     Nursing Home Acute       HPI:    Suresh Powers  is a 62 year old (1960), who is being seen today for an episodic care visit.  HPI information obtained from: facility chart records, facility staff and patient report. Today's concern is:    Patient Suresh Powers is a 62 yr old male admitted to [facility] for rehabilitation s/p hospitalization [facility] 9/3-9/6/22 for left hip fracture s/p fall. S/P left hip cephalomedullary nailing 9/4/2022. Noted to have alcoholic intoxication with blood level 0.21  Complications urinary retention and vasquez catheter placed and acute blood loss s/p surgery    PMHx chronic alcohol use, chronic hyponatremia, hypertension, anxiety and depression       Closed fracture dislocation of left hip joint with routine healing, subsequent encounter  Generalized anxiety disorder  Constipation, unspecified constipation type  Urinary retention     Patient report pain managed, states he is weaning down on Dilaudid  Participating in therapies and walking parallel bars  Reports he is having stools regular  Declined voiding trial at this time  Mood stable        Past Medical and Surgical History reviewed in Epic today.    MEDICATIONS:    Current Outpatient Medications   Medication Sig Dispense Refill     acetaminophen (TYLENOL) 325 MG tablet Take 650 mg by mouth 3 times daily & daily as needed       amLODIPine (NORVASC) 10 MG tablet Take 1 tablet (10 mg) by mouth daily 90 tablet 0     aspirin (ASA) 325 MG EC tablet Take 1 tablet (325 mg) by mouth daily 30 tablet 0     bisacodyl (DULCOLAX) 10 MG suppository Place 10 mg rectally daily as needed for constipation       escitalopram (LEXAPRO) 20 MG tablet Take 20 mg by mouth daily       fluticasone (FLONASE) 50 MCG/ACT nasal spray use  "2 sprays in each nostril daily. 48 g 3     lamoTRIgine (LAMICTAL) 200 MG tablet Take 200 mg by mouth At Bedtime       Multiple Vitamins-Minerals (MULTIVITAMIN ADULT PO) Take 1 tablet by mouth daily       oxyCODONE (ROXICODONE) 5 MG tablet Take 1-2 tablets (5-10 mg) by mouth every 4 hours as needed for pain 60 tablet 0     polyethylene glycol (MIRALAX) 17 GM/Dose powder Take 17 g by mouth daily 510 g 0     polyethylene glycol-propylene glycol (SYSTANE ULTRA) 0.4-0.3 % SOLN ophthalmic solution Place 1 drop into both eyes 4 times daily as needed       senna-docusate (SENOKOT-S/PERICOLACE) 8.6-50 MG tablet Take 1-2 tablets by mouth 2 times daily Take while on oral narcotics to prevent or treat constipation. (Patient taking differently: Take 2 tablets by mouth 2 times daily Take while on oral narcotics to prevent or treat constipation.  & 2 tabs daily as needed) 30 tablet 0     tamsulosin (FLOMAX) 0.4 MG capsule Take 1 capsule (0.4 mg) by mouth every evening 30 capsule 0     traZODone (DESYREL) 50 MG tablet Take 2 tablets (100 mg) by mouth At Bedtime       vitamin C (ASCORBIC ACID) 500 MG tablet Take 500 mg by mouth daily       zinc 50 MG TABS Take 100 mg by mouth daily           REVIEW OF SYSTEMS:  4 point ROS including Respiratory, CV, GI and , other than that noted in the HPI,  is negative    Objective:  /80   Pulse 84   Temp 97.9  F (36.6  C)   Resp 18   Ht 1.702 m (5' 7\")   Wt 87.3 kg (192 lb 6.4 oz)   SpO2 94%   BMI 30.13 kg/m    Exam:  GENERAL APPEARANCE:  Alert, in no distress  RESP:  respiratory effort and palpation of chest normal, lungs clear to auscultation , no respiratory distress  CV:  Palpation and auscultation of heart done , regular rate and rhythm, no murmur, rub, or gallop  ABDOMEN:  normal bowel sounds, soft, nontender, no hepatosplenomegaly or other masses  M/S:   lying in bed  SKIN:  Inspection of skin and subcutaneous tissue drsg intact left hip  PSYCH:  oriented X 3    Labs:   Labs " done in SNF are in [facility]. Please refer to them using EPIC/Care Everywhere.     Last Comprehensive Metabolic Panel:  Sodium   Date Value Ref Range Status   09/09/2022 136 133 - 144 mmol/L Final   06/15/2021 125 (L) 133 - 144 mmol/L Final     Potassium   Date Value Ref Range Status   09/09/2022 3.4 3.4 - 5.3 mmol/L Final   06/15/2021 3.8 3.4 - 5.3 mmol/L Final     Chloride   Date Value Ref Range Status   09/09/2022 98 94 - 109 mmol/L Final   06/15/2021 87 (L) 94 - 109 mmol/L Final     Carbon Dioxide   Date Value Ref Range Status   06/15/2021 33 (H) 20 - 32 mmol/L Final     Carbon Dioxide (CO2)   Date Value Ref Range Status   09/09/2022 31 20 - 32 mmol/L Final     Anion Gap   Date Value Ref Range Status   09/09/2022 7 3 - 14 mmol/L Final   06/15/2021 5 3 - 14 mmol/L Final     Glucose   Date Value Ref Range Status   09/09/2022 87 70 - 99 mg/dL Final   06/15/2021 110 (H) 70 - 99 mg/dL Final     Urea Nitrogen   Date Value Ref Range Status   09/09/2022 12 7 - 30 mg/dL Final   06/15/2021 9 7 - 30 mg/dL Final     Creatinine   Date Value Ref Range Status   09/09/2022 0.63 (L) 0.66 - 1.25 mg/dL Final   06/15/2021 0.81 0.66 - 1.25 mg/dL Final     GFR Estimate   Date Value Ref Range Status   09/09/2022 >90 >60 mL/min/1.73m2 Final     Comment:     Effective December 21, 2021 eGFRcr in adults is calculated using the 2021 CKD-EPI creatinine equation which includes age and gender (Cristy et al., NEJM, DOI: 10.1056/THBLdr4466282)   06/15/2021 >90 >60 mL/min/[1.73_m2] Final     Comment:     Non  GFR Calc  Starting 12/18/2018, serum creatinine based estimated GFR (eGFR) will be   calculated using the Chronic Kidney Disease Epidemiology Collaboration   (CKD-EPI) equation.       Calcium   Date Value Ref Range Status   09/09/2022 8.4 (L) 8.5 - 10.1 mg/dL Final   06/15/2021 8.7 8.5 - 10.1 mg/dL Final       Lab Results   Component Value Date    WBC 8.8 09/09/2022    WBC 6.6 06/15/2021     Lab Results   Component Value  Date    RBC 2.90 09/09/2022    RBC 4.27 06/15/2021     Lab Results   Component Value Date    HGB 8.1 09/09/2022    HGB 14.2 06/15/2021     Lab Results   Component Value Date    HCT 26.1 09/09/2022    HCT 39.8 06/15/2021     No components found for: MCT  Lab Results   Component Value Date    MCV 90 09/09/2022    MCV 93 06/15/2021     Lab Results   Component Value Date    MCH 27.9 09/09/2022    MCH 33.3 06/15/2021     Lab Results   Component Value Date    MCHC 31.0 09/09/2022    MCHC 35.7 06/15/2021     Lab Results   Component Value Date    RDW 16.5 09/09/2022    RDW 13.2 06/15/2021     Lab Results   Component Value Date     09/09/2022     06/15/2021         ASSESSMENT/PLAN:     Closed fracture dislocation of left hip joint with routine healing, subsequent encounter  Generalized anxiety disorder  Constipation, unspecified constipation type  Urinary retention     Patient report pain managed, states he is weaning down on Dilaudid  Continue current pain medications    Participating in therapies and walking parallel bars  Continue therapies     Reports he is having stools regular  Continue current bowel movement management     Declined voiding trial at this time  Patient elect to wait until off narcotics to have voiding trial  Monitor     Mood stable  Continue current mood medications         Electronically signed by:  LUIS Cuadra CNP

## 2022-09-21 DIAGNOSIS — Z98.890 POSTOPERATIVE STATE: ICD-10-CM

## 2022-09-22 RX ORDER — OXYCODONE HYDROCHLORIDE 5 MG/1
TABLET ORAL
Qty: 60 TABLET | Refills: 0 | Status: SHIPPED | OUTPATIENT
Start: 2022-09-22 | End: 2022-10-05

## 2022-09-28 ENCOUNTER — TRANSITIONAL CARE UNIT VISIT (OUTPATIENT)
Dept: GERIATRICS | Facility: CLINIC | Age: 62
End: 2022-09-28
Payer: COMMERCIAL

## 2022-09-28 VITALS
BODY MASS INDEX: 29.6 KG/M2 | SYSTOLIC BLOOD PRESSURE: 133 MMHG | HEART RATE: 93 BPM | WEIGHT: 188.6 LBS | DIASTOLIC BLOOD PRESSURE: 83 MMHG | RESPIRATION RATE: 16 BRPM | TEMPERATURE: 96.4 F | OXYGEN SATURATION: 94 % | HEIGHT: 67 IN

## 2022-09-28 DIAGNOSIS — R33.9 URINARY RETENTION: ICD-10-CM

## 2022-09-28 DIAGNOSIS — S72.002D CLOSED FRACTURE DISLOCATION OF LEFT HIP JOINT WITH ROUTINE HEALING, SUBSEQUENT ENCOUNTER: Primary | ICD-10-CM

## 2022-09-28 DIAGNOSIS — F41.1 GENERALIZED ANXIETY DISORDER: ICD-10-CM

## 2022-09-28 DIAGNOSIS — K59.00 CONSTIPATION, UNSPECIFIED CONSTIPATION TYPE: ICD-10-CM

## 2022-09-28 PROCEDURE — 99309 SBSQ NF CARE MODERATE MDM 30: CPT | Performed by: NURSE PRACTITIONER

## 2022-09-28 RX ORDER — AMOXICILLIN 250 MG
2 CAPSULE ORAL DAILY PRN
COMMUNITY
End: 2022-10-05

## 2022-09-28 NOTE — PROGRESS NOTES
"[facility]  [place] [facility] Record Number:  [id number]  Place of Service where encounter took place:  [facility] [place] ([facility]) [[id number]]  Chief Complaint   Patient presents with     RECHECK       HPI:    Suresh Powers  is a 62 year old (1960), who is being seen today for an episodic care visit.  HPI information obtained from: facility chart records, facility staff and patient report. Today's concern is:    Patient Suresh Powers is a 62 yr old male admitted to [facility] for rehabilitation s/p hospitalization [facility] 9/3-9/6/22 for left hip fracture s/p fall. S/P left hip cephalomedullary nailing 9/4/2022. Noted to have alcoholic intoxication with blood level 0.21  Complications urinary retention and vasquez catheter placed and acute blood loss s/p surgery    PMHx chronic alcohol use, chronic hyponatremia, hypertension, anxiety and depression     Closed fracture dislocation of left hip joint with routine healing, subsequent encounter  Generalized anxiety disorder  Constipation, unspecified constipation type  Urinary retention     Patient report pain managed left hip, using Oxycodone 5-10mg 1-2xday, weaning off   Would like to continue with vasquez catheter until complete narcotics  Reports regular stools and request stop scheduled Senna S  Mood stable    Seen by ortho PA onsite and staples removed and reported healing well  Progressing in therapies. Still needing help with activities of daily living     Per therapies report  Transfers SBA   Bed mobility SBA   Stairs 6\" step x1 with CGA   Ambulating 165' with FWW SBA   SLUMS 26/30   Discharge plan and any ID barriers to discharge: Barriers include pain and self limiting in OT with ADLs. Goal to discharge home. Therapy plans to continue at this time.    Past Medical and Surgical History reviewed in Epic today.    MEDICATIONS:    Current Outpatient Medications   Medication Sig Dispense Refill     oxyCODONE (ROXICODONE) 5 MG " "tablet TAKE ONE TO TWO TABLETS (5-10MG) BY MOUTH EVERY 4 HOURS AS NEEDED FOR PAIN **CONTROLLED SUBSTANCE-DOUBLE LOCK STORAGE** 60 tablet 0     senna-docusate (SENOKOT-S/PERICOLACE) 8.6-50 MG tablet Take 2 tablets by mouth daily as needed for constipation       acetaminophen (TYLENOL) 325 MG tablet Take 650 mg by mouth 3 times daily & daily as needed       amLODIPine (NORVASC) 10 MG tablet Take 1 tablet (10 mg) by mouth daily 90 tablet 0     aspirin (ASA) 325 MG EC tablet Take 1 tablet (325 mg) by mouth daily 30 tablet 0     bisacodyl (DULCOLAX) 10 MG suppository Place 10 mg rectally daily as needed for constipation       escitalopram (LEXAPRO) 20 MG tablet Take 20 mg by mouth daily       fluticasone (FLONASE) 50 MCG/ACT nasal spray use 2 sprays in each nostril daily. 48 g 3     lamoTRIgine (LAMICTAL) 200 MG tablet Take 200 mg by mouth At Bedtime       Multiple Vitamins-Minerals (MULTIVITAMIN ADULT PO) Take 1 tablet by mouth daily       polyethylene glycol (MIRALAX) 17 GM/Dose powder Take 17 g by mouth daily 510 g 0     polyethylene glycol-propylene glycol (SYSTANE ULTRA) 0.4-0.3 % SOLN ophthalmic solution Place 1 drop into both eyes 4 times daily as needed       tamsulosin (FLOMAX) 0.4 MG capsule Take 1 capsule (0.4 mg) by mouth every evening 30 capsule 0     traZODone (DESYREL) 50 MG tablet Take 2 tablets (100 mg) by mouth At Bedtime       vitamin C (ASCORBIC ACID) 500 MG tablet Take 500 mg by mouth daily       zinc 50 MG TABS Take 100 mg by mouth daily           REVIEW OF SYSTEMS:  4 point ROS including Respiratory, CV, GI and , other than that noted in the HPI,  is negative    Objective:  /83   Pulse 93   Temp (!) 96.4  F (35.8  C)   Resp 16   Ht 1.702 m (5' 7\")   Wt 85.5 kg (188 lb 9.6 oz)   SpO2 94%   BMI 29.54 kg/m    Exam:  GENERAL APPEARANCE:  Alert, in no distress  RESP:  respiratory effort and palpation of chest normal, lungs clear to auscultation , no respiratory distress  CV:  Palpation " and auscultation of heart done , regular rate and rhythm, no murmur, rub, or gallop  ABDOMEN:  normal bowel sounds, soft, nontender, no hepatosplenomegaly or other masses  M/S:   lying in bed  SKIN:  Inspection of skin and subcutaneous tissue baseline  PSYCH:  oriented X 3    Labs:   Labs done in SNF are in [place] EPIC. Please refer to them using EPIC/Care Everywhere.     Last Comprehensive Metabolic Panel:  Sodium   Date Value Ref Range Status   09/09/2022 136 133 - 144 mmol/L Final   06/15/2021 125 (L) 133 - 144 mmol/L Final     Potassium   Date Value Ref Range Status   09/09/2022 3.4 3.4 - 5.3 mmol/L Final   06/15/2021 3.8 3.4 - 5.3 mmol/L Final     Chloride   Date Value Ref Range Status   09/09/2022 98 94 - 109 mmol/L Final   06/15/2021 87 (L) 94 - 109 mmol/L Final     Carbon Dioxide   Date Value Ref Range Status   06/15/2021 33 (H) 20 - 32 mmol/L Final     Carbon Dioxide (CO2)   Date Value Ref Range Status   09/09/2022 31 20 - 32 mmol/L Final     Anion Gap   Date Value Ref Range Status   09/09/2022 7 3 - 14 mmol/L Final   06/15/2021 5 3 - 14 mmol/L Final     Glucose   Date Value Ref Range Status   09/09/2022 87 70 - 99 mg/dL Final   06/15/2021 110 (H) 70 - 99 mg/dL Final     Urea Nitrogen   Date Value Ref Range Status   09/09/2022 12 7 - 30 mg/dL Final   06/15/2021 9 7 - 30 mg/dL Final     Creatinine   Date Value Ref Range Status   09/09/2022 0.63 (L) 0.66 - 1.25 mg/dL Final   06/15/2021 0.81 0.66 - 1.25 mg/dL Final     GFR Estimate   Date Value Ref Range Status   09/09/2022 >90 >60 mL/min/1.73m2 Final     Comment:     Effective December 21, 2021 eGFRcr in adults is calculated using the 2021 CKD-EPI creatinine equation which includes age and gender (Cristy rod al., NEJM, DOI: 10.1056/XHAZyt1772015)   06/15/2021 >90 >60 mL/min/[1.73_m2] Final     Comment:     Non  GFR Calc  Starting 12/18/2018, serum creatinine based estimated GFR (eGFR) will be   calculated using the Chronic Kidney Disease  "Epidemiology Collaboration   (CKD-EPI) equation.       Calcium   Date Value Ref Range Status   09/09/2022 8.4 (L) 8.5 - 10.1 mg/dL Final   06/15/2021 8.7 8.5 - 10.1 mg/dL Final       Lab Results   Component Value Date    WBC 8.8 09/09/2022    WBC 6.6 06/15/2021     Lab Results   Component Value Date    RBC 2.90 09/09/2022    RBC 4.27 06/15/2021     Lab Results   Component Value Date    HGB 8.1 09/09/2022    HGB 14.2 06/15/2021     Lab Results   Component Value Date    HCT 26.1 09/09/2022    HCT 39.8 06/15/2021     No components found for: MCT  Lab Results   Component Value Date    MCV 90 09/09/2022    MCV 93 06/15/2021     Lab Results   Component Value Date    MCH 27.9 09/09/2022    MCH 33.3 06/15/2021     Lab Results   Component Value Date    MCHC 31.0 09/09/2022    MCHC 35.7 06/15/2021     Lab Results   Component Value Date    RDW 16.5 09/09/2022    RDW 13.2 06/15/2021     Lab Results   Component Value Date     09/09/2022     06/15/2021       ASSESSMENT/PLAN:     Closed fracture dislocation of left hip joint with routine healing, subsequent encounter  Generalized anxiety disorder  Constipation, unspecified constipation type  Urinary retention    Patient report pain managed left hip, using Oxycodone 5-10mg 1-2xday, weaning off   Continue current pain medications     Would like to continue with vasquez catheter until complete narcotics  Continue current cares to catheter    Reports regular stools and request stop scheduled Senna S  Will discontinue Senna S scheduled and continue with as needed     Mood stable  Continue current pain medications     Seen by ortho PA onsite and staples removed and reported healing well  Progressing in therapies. Still needing help with activities of daily living   Follow up ortho as advised    Per therapies report  Transfers SBA   Bed mobility SBA   Stairs 6\" step x1 with CGA   Ambulating 165' with FWW SBA   SLUMS 26/30   Discharge plan and any ID barriers to discharge: " Barriers include pain and self limiting in OT with ADLs. Goal to discharge home. Therapy plans to continue at this time.    Continue therapies       Electronically signed by:  LUIS Cuadra CNP

## 2022-10-03 NOTE — PROGRESS NOTES
"[facility] DISCHARGE SUMMARY  PATIENT'S NAME: Suresh Powers  YOB: 1960  MEDICAL RECORD NUMBER:  [id number]  Place of Service where encounter took place:  [facility] [place] [facility] [[id number]]    PRIMARY CARE PROVIDER AND CLINIC RESPONSIBLE AFTER TRANSFER:   Brad Thurston MD, [address] [place] [address] / [facility] [id number]    [facility] Provider     Transferring providers: LUIS Cuadra CNP; Sharif Cannon MD  Recent Hospitalization/ED:  [facility] Hospital stay 9/3/22 to 9/6/22.  Date of SNF Admission: September 06, 2022  Date of SNF (anticipated) Discharge: October 06, 2022  Discharged to: previous independent home  Cognitive Scores/ Physical Function/DME:   Transfers SBA   Bed mobility SBA   Stairs 6\" step x1 with CGA   Ambulating 165' with FWW SBA   SLUMS 26/30   Discharge plan and any ID barriers to discharge: Barriers include pain and self limiting in OT with ADLs. Goal to discharge home. Therapy plans to continue at this time.    CODE STATUS/ADVANCE DIRECTIVES DISCUSSION:  Full Code   ALLERGIES: Lisinopril and Sertraline    DISCHARGE DIAGNOSIS/NURSING FACILITY COURSE:     Patient Suresh Powers is a 62 yr old male admitted to [facility] for rehabilitation s/p hospitalization [facility] 9/3-9/6/22 for left hip fracture s/p fall. S/P left hip cephalomedullary nailing 9/4/2022. Noted to have alcoholic intoxication with blood level 0.21  Complications urinary retention and vasquez catheter placed and acute blood loss s/p surgery    PMHx chronic alcohol use, chronic hyponatremia, hypertension, anxiety and depression    Acute displaced left intertrochanteric femur fracture-secondary to mechanical fall  S/p left hip cephalomedullary nailing, 9/4/2022  Continue scheduled Tylenol   Continue oxycodone as needed, goal to wean off as able . He is agreeable to decrease oxycodone 5mg 2 x daily as needed.   Do not use Etoh and take oxycodone  Weightbearing " as tolerated  Aspirin 325 mg daily for 6 weeks DVT Prophylaxis  (STOP DATE 10/18/22)  Follow up ortho Dr. Cochran at [facility] as advised. (contact: [phone], after-hours, [phone])             Constipation   chronic managed   Continue as needed Miralax and as needed Senna S (while on narcotics)  Monitor          alcohol intoxication, blood alcohol level 0.21 g/dL in hospital   Chronic alcohol use disorder  Per hospital report   - had 6 beers with his friends as it was Saturday and they were watching a game.  Reports he has poor balance at baseline, is on disability due to multiple foot injuries,  reports history of falls even without alcohol use.  - increased risk of falls due to poor balance and previous foot injuries, avoid alcohol as it increases his risk of falls.  - No signs of withdrawal & LFTs in normal range     Hypomagnesemia, magnesium 1.5  Replaced per protocol. now 2.2  Dietary involved  monitor      Chronic hyponatremia, sodium 129  Baseline NA ~129  Sodium   Date Value Ref Range Status   09/09/2022 136 133 - 144 mmol/L Final   06/15/2021 125 (L) 133 - 144 mmol/L Final   Chlorthalidone was recently discontinued  monitor       Acute blood loss anemia Due to surgical blood loss on top of chronic normocytic anemia   Hemoglobin 10.6 on admission, was 14.2 in 6/2021   - Folate > 40, B12 364  - Iron studies showed iron deficiency with 9% iron saturation normal iron binding capacity  Consider iron when off narcotics (avoid constipation)  Hemoglobin   Date Value Ref Range Status   09/09/2022 8.1 (L) 13.3 - 17.7 g/dL Final   09/06/2022 8.3 (L) 13.3 - 17.7 g/dL Final   06/15/2021 14.2 13.3 - 17.7 g/dL Final   03/24/2021 13.7 13.3 - 17.7 g/dL Final   Monitor      Essential hypertension  blood pressure managed  continue amlodipine 10 mg daily.       Chronic depression  Generalized anxiety disorder  Mood stable  Continue Lexapro, trazodone, lamotrigine  Monitor      Hypoalbuminemia, albumin 2  Dietary follow at  TCU  Patient eating meals  Encourage Etoh cessation     Urinary retention-s/p Prieto placement  Started voiding trial 10/4/22 , patient report no trouble voiding, post void residual within normal limits   Continue Flomax (new), recommend take in evening to avoid hypotension  Monitor      COVID-19 negative 9/4/22    Deconditioned  Impaired balance  Comment: d/t recent hospitalization & comorbidity, expect delay rehabilitation d/t age & comorbidity  Plan: PT/OT eval & treat, monitor  History impaired balance due to bilateral injuries to feet due to work accident  Patient retired, on disability   Progressing in therapies and plans to discharge home with homecare      Past Medical History:  has a past medical history of Anxiety, Clostridium difficile diarrhea (3/9/2016), Foot pain, Hypertension, Mold exposure, and Shingles (1984).    Discharge Medications:    Current Outpatient Medications   Medication Sig Dispense Refill     acetaminophen (TYLENOL) 325 MG tablet Take 650 mg by mouth 3 times daily & daily as needed       amLODIPine (NORVASC) 10 MG tablet Take 1 tablet (10 mg) by mouth daily 90 tablet 0     aspirin (ASA) 325 MG EC tablet Take 1 tablet (325 mg) by mouth daily (Patient taking differently: Take 325 mg by mouth daily STOP DATE 10/18/22) 30 tablet 0     escitalopram (LEXAPRO) 20 MG tablet Take 20 mg by mouth daily       fluticasone (FLONASE) 50 MCG/ACT nasal spray use 2 sprays in each nostril daily. 48 g 3     lamoTRIgine (LAMICTAL) 200 MG tablet Take 200 mg by mouth At Bedtime       Multiple Vitamins-Minerals (MULTIVITAMIN ADULT PO) Take 1 tablet by mouth daily       oxyCODONE (ROXICODONE) 5 MG tablet Take 1 tablet (5 mg) by mouth 2 times daily as needed for pain 20 tablet 0     polyethylene glycol (MIRALAX) 17 g packet Take 1 packet by mouth daily as needed for constipation       polyethylene glycol-propylene glycol (SYSTANE ULTRA) 0.4-0.3 % SOLN ophthalmic solution Place 1 drop into both eyes 4 times daily  "as needed       tamsulosin (FLOMAX) 0.4 MG capsule Take 1 capsule (0.4 mg) by mouth every evening 30 capsule 0     traZODone (DESYREL) 50 MG tablet Take 2 tablets (100 mg) by mouth At Bedtime       vitamin C (ASCORBIC ACID) 500 MG tablet Take 500 mg by mouth daily       zinc 50 MG TABS Take 100 mg by mouth daily         Medication Changes/Rationale:     See HPI    Controlled medications sent with patient:    No more than 2 wks supply     ROS:   4 point ROS including Respiratory, CV, GI and , other than that noted in the HPI,  is negative    Physical Exam:   Vitals: /74   Pulse 85   Temp 98.4  F (36.9  C)   Resp 18   Ht 1.702 m (5' 7\")   Wt 87.4 kg (192 lb 9.6 oz)   SpO2 95%   BMI 30.17 kg/m    BMI= Body mass index is 30.17 kg/m .  GENERAL APPEARANCE:  Alert, in no distress  NECK:  No adenopathy,masses or thyromegaly  RESP:  respiratory effort and palpation of chest normal, lungs clear to auscultation , no respiratory distress  CV:  Palpation and auscultation of heart done , regular rate and rhythm, no murmur, rub, or gallop  ABDOMEN:  normal bowel sounds, soft, nontender, no hepatosplenomegaly or other masses  M/S:   lying in bed  SKIN:  Inspection of skin and subcutaneous tissue incision healed left hip  PSYCH:  oriented X 3     SNF labs: Labs done in SNF are in [facility] EPIC. Please refer to them using [facility]/Care Everywhere.  Last Comprehensive Metabolic Panel:  Sodium   Date Value Ref Range Status   09/09/2022 136 133 - 144 mmol/L Final   06/15/2021 125 (L) 133 - 144 mmol/L Final     Potassium   Date Value Ref Range Status   09/09/2022 3.4 3.4 - 5.3 mmol/L Final   06/15/2021 3.8 3.4 - 5.3 mmol/L Final     Chloride   Date Value Ref Range Status   09/09/2022 98 94 - 109 mmol/L Final   06/15/2021 87 (L) 94 - 109 mmol/L Final     Carbon Dioxide   Date Value Ref Range Status   06/15/2021 33 (H) 20 - 32 mmol/L Final     Carbon Dioxide (CO2)   Date Value Ref Range Status   09/09/2022 31 20 - 32 mmol/L " Final     Anion Gap   Date Value Ref Range Status   09/09/2022 7 3 - 14 mmol/L Final   06/15/2021 5 3 - 14 mmol/L Final     Glucose   Date Value Ref Range Status   09/09/2022 87 70 - 99 mg/dL Final   06/15/2021 110 (H) 70 - 99 mg/dL Final     Urea Nitrogen   Date Value Ref Range Status   09/09/2022 12 7 - 30 mg/dL Final   06/15/2021 9 7 - 30 mg/dL Final     Creatinine   Date Value Ref Range Status   09/09/2022 0.63 (L) 0.66 - 1.25 mg/dL Final   06/15/2021 0.81 0.66 - 1.25 mg/dL Final     GFR Estimate   Date Value Ref Range Status   09/09/2022 >90 >60 mL/min/1.73m2 Final     Comment:     Effective December 21, 2021 eGFRcr in adults is calculated using the 2021 CKD-EPI creatinine equation which includes age and gender (Cristy rod al., NEJ, DOI: 10.1056/KROItk9717535)   06/15/2021 >90 >60 mL/min/[1.73_m2] Final     Comment:     Non  GFR Calc  Starting 12/18/2018, serum creatinine based estimated GFR (eGFR) will be   calculated using the Chronic Kidney Disease Epidemiology Collaboration   (CKD-EPI) equation.       Calcium   Date Value Ref Range Status   09/09/2022 8.4 (L) 8.5 - 10.1 mg/dL Final   06/15/2021 8.7 8.5 - 10.1 mg/dL Final       Lab Results   Component Value Date    WBC 8.8 09/09/2022    WBC 6.6 06/15/2021     Lab Results   Component Value Date    RBC 2.90 09/09/2022    RBC 4.27 06/15/2021     Lab Results   Component Value Date    HGB 8.1 09/09/2022    HGB 14.2 06/15/2021     Lab Results   Component Value Date    HCT 26.1 09/09/2022    HCT 39.8 06/15/2021     No components found for: MCT  Lab Results   Component Value Date    MCV 90 09/09/2022    MCV 93 06/15/2021     Lab Results   Component Value Date    MCH 27.9 09/09/2022    MCH 33.3 06/15/2021     Lab Results   Component Value Date    MCHC 31.0 09/09/2022    MCHC 35.7 06/15/2021     Lab Results   Component Value Date    RDW 16.5 09/09/2022    RDW 13.2 06/15/2021     Lab Results   Component Value Date     09/09/2022      06/15/2021         DISCHARGE PLAN:    Follow up labs: No labs orders/due    Medical Follow Up:      Follow up with primary care provider in 1-2 weeks    MTM referral needed and placed by this provider: No    Current [place] scheduled appointments:       Discharge Services: Home Care:  Occupational Therapy, Physical Therapy, Registered Nurse, Home Health Aide and From:  [facility]        TOTAL DISCHARGE TIME:   Greater than 30 minutes  Electronically signed by:  LUIS Cuadra CNP     Home care Face to Face documentation done in [facility] attached to Home care orders for [facility].

## 2022-10-04 VITALS
DIASTOLIC BLOOD PRESSURE: 74 MMHG | SYSTOLIC BLOOD PRESSURE: 131 MMHG | TEMPERATURE: 98.4 F | RESPIRATION RATE: 18 BRPM | OXYGEN SATURATION: 95 % | HEIGHT: 67 IN | BODY MASS INDEX: 30.23 KG/M2 | HEART RATE: 85 BPM | WEIGHT: 192.6 LBS

## 2022-10-05 ENCOUNTER — DISCHARGE SUMMARY NURSING HOME (OUTPATIENT)
Dept: GERIATRICS | Facility: CLINIC | Age: 62
End: 2022-10-05
Payer: COMMERCIAL

## 2022-10-05 DIAGNOSIS — F41.1 GENERALIZED ANXIETY DISORDER: ICD-10-CM

## 2022-10-05 DIAGNOSIS — S72.002D CLOSED FRACTURE DISLOCATION OF LEFT HIP JOINT WITH ROUTINE HEALING, SUBSEQUENT ENCOUNTER: Primary | ICD-10-CM

## 2022-10-05 DIAGNOSIS — M62.81 GENERALIZED MUSCLE WEAKNESS: ICD-10-CM

## 2022-10-05 DIAGNOSIS — I10 BENIGN ESSENTIAL HYPERTENSION: ICD-10-CM

## 2022-10-05 DIAGNOSIS — R33.9 URINARY RETENTION: ICD-10-CM

## 2022-10-05 DIAGNOSIS — K59.00 CONSTIPATION, UNSPECIFIED CONSTIPATION TYPE: ICD-10-CM

## 2022-10-05 PROCEDURE — 99310 SBSQ NF CARE HIGH MDM 45: CPT | Performed by: NURSE PRACTITIONER

## 2022-10-05 RX ORDER — POLYETHYLENE GLYCOL 3350 17 G/17G
1 POWDER, FOR SOLUTION ORAL DAILY PRN
COMMUNITY
End: 2022-11-29

## 2022-10-05 RX ORDER — OXYCODONE HYDROCHLORIDE 5 MG/1
5 TABLET ORAL 2 TIMES DAILY PRN
Qty: 20 TABLET | Refills: 0 | Status: ON HOLD | OUTPATIENT
Start: 2022-10-05 | End: 2022-10-17

## 2022-10-05 NOTE — LETTER
"    10/5/2022        RE: Suresh Powers  [address]  [facility] [address]        [facility] DISCHARGE SUMMARY  PATIENT'S NAME: Suresh Powers  YOB: 1960  MEDICAL RECORD NUMBER:  [id number]  Place of Service where encounter took place:  [facility] ([facility]) [[id number]]    PRIMARY CARE PROVIDER AND CLINIC RESPONSIBLE AFTER TRANSFER:   Brad Thurston MD, [address]  / [facility] [address]    [facility] Provider     Transferring providers: LUIS Cuadra CNP; Sharif Cannon MD  Recent Hospitalization/ED:  [facility] Hospital stay 9/3/22 to 9/6/22.  Date of SNF Admission: September 06, 2022  Date of SNF (anticipated) Discharge: October 06, 2022  Discharged to: previous independent home  Cognitive Scores/ Physical Function/DME:   Transfers SBA   Bed mobility SBA   Stairs 6\" step x1 with CGA   Ambulating 165' with FWW SBA   SLUMS 26/30   Discharge plan and any ID barriers to discharge: Barriers include pain and self limiting in OT with ADLs. Goal to discharge home. Therapy plans to continue at this time.    CODE STATUS/ADVANCE DIRECTIVES DISCUSSION:  Full Code   ALLERGIES: Lisinopril and Sertraline    DISCHARGE DIAGNOSIS/NURSING FACILITY COURSE:     Patient Suresh Powers is a 62 yr old male admitted to [facility] for rehabilitation s/p hospitalization [facility] 9/3-9/6/22 for left hip fracture s/p fall. S/P left hip cephalomedullary nailing 9/4/2022. Noted to have alcoholic intoxication with blood level 0.21  Complications urinary retention and vasquez catheter placed and acute blood loss s/p surgery    PMHx chronic alcohol use, chronic hyponatremia, hypertension, anxiety and depression    Acute displaced left intertrochanteric femur fracture-secondary to mechanical fall  S/p left hip cephalomedullary nailing, 9/4/2022  Continue scheduled Tylenol   Continue oxycodone as needed, goal to wean off as able . He is agreeable " to decrease oxycodone 5mg 2 x daily as needed.   Do not use Etoh and take oxycodone  Weightbearing as tolerated  Aspirin 325 mg daily for 6 weeks DVT Prophylaxis  (STOP DATE 10/18/22)  Follow up ortho Dr. Cochran at [facility] as advised. (contact: [phone], after-hours, [phone])             Constipation   chronic managed   Continue as needed Miralax and as needed Senna S (while on narcotics)  Monitor          alcohol intoxication, blood alcohol level 0.21 g/dL in hospital   Chronic alcohol use disorder  Per hospital report   - had 6 beers with his friends as it was Saturday and they were watching a game.  Reports he has poor balance at baseline, is on disability due to multiple foot injuries,  reports history of falls even without alcohol use.  - increased risk of falls due to poor balance and previous foot injuries, avoid alcohol as it increases his risk of falls.  - No signs of withdrawal & LFTs in normal range     Hypomagnesemia, magnesium 1.5  Replaced per protocol. now 2.2  Dietary involved  monitor      Chronic hyponatremia, sodium 129  Baseline NA ~129  Sodium   Date Value Ref Range Status   09/09/2022 136 133 - 144 mmol/L Final   06/15/2021 125 (L) 133 - 144 mmol/L Final   Chlorthalidone was recently discontinued  monitor       Acute blood loss anemia Due to surgical blood loss on top of chronic normocytic anemia   Hemoglobin 10.6 on admission, was 14.2 in 6/2021   - Folate > 40, B12 364  - Iron studies showed iron deficiency with 9% iron saturation normal iron binding capacity  Consider iron when off narcotics (avoid constipation)  Hemoglobin   Date Value Ref Range Status   09/09/2022 8.1 (L) 13.3 - 17.7 g/dL Final   09/06/2022 8.3 (L) 13.3 - 17.7 g/dL Final   06/15/2021 14.2 13.3 - 17.7 g/dL Final   03/24/2021 13.7 13.3 - 17.7 g/dL Final   Monitor      Essential hypertension  blood pressure managed  continue amlodipine 10 mg daily.       Chronic depression  Generalized anxiety disorder  Mood  stable  Continue Lexapro, trazodone, lamotrigine  Monitor      Hypoalbuminemia, albumin 2  Dietary follow at TCU  Patient eating meals  Encourage Etoh cessation     Urinary retention-s/p Prieto placement  Started voiding trial 10/4/22 , patient report no trouble voiding, post void residual within normal limits   Continue Flomax (new), recommend take in evening to avoid hypotension  Monitor      COVID-19 negative 9/4/22    Deconditioned  Impaired balance  Comment: d/t recent hospitalization & comorbidity, expect delay rehabilitation d/t age & comorbidity  Plan: PT/OT eval & treat, monitor  History impaired balance due to bilateral injuries to feet due to work accident  Patient retired, on disability   Progressing in therapies and plans to discharge home with homecare      Past Medical History:  has a past medical history of Anxiety, Clostridium difficile diarrhea (3/9/2016), Foot pain, Hypertension, Mold exposure, and Shingles (1984).    Discharge Medications:    Current Outpatient Medications   Medication Sig Dispense Refill     acetaminophen (TYLENOL) 325 MG tablet Take 650 mg by mouth 3 times daily & daily as needed       amLODIPine (NORVASC) 10 MG tablet Take 1 tablet (10 mg) by mouth daily 90 tablet 0     aspirin (ASA) 325 MG EC tablet Take 1 tablet (325 mg) by mouth daily (Patient taking differently: Take 325 mg by mouth daily STOP DATE 10/18/22) 30 tablet 0     escitalopram (LEXAPRO) 20 MG tablet Take 20 mg by mouth daily       fluticasone (FLONASE) 50 MCG/ACT nasal spray use 2 sprays in each nostril daily. 48 g 3     lamoTRIgine (LAMICTAL) 200 MG tablet Take 200 mg by mouth At Bedtime       Multiple Vitamins-Minerals (MULTIVITAMIN ADULT PO) Take 1 tablet by mouth daily       oxyCODONE (ROXICODONE) 5 MG tablet Take 1 tablet (5 mg) by mouth 2 times daily as needed for pain 20 tablet 0     polyethylene glycol (MIRALAX) 17 g packet Take 1 packet by mouth daily as needed for constipation       polyethylene  "glycol-propylene glycol (SYSTANE ULTRA) 0.4-0.3 % SOLN ophthalmic solution Place 1 drop into both eyes 4 times daily as needed       tamsulosin (FLOMAX) 0.4 MG capsule Take 1 capsule (0.4 mg) by mouth every evening 30 capsule 0     traZODone (DESYREL) 50 MG tablet Take 2 tablets (100 mg) by mouth At Bedtime       vitamin C (ASCORBIC ACID) 500 MG tablet Take 500 mg by mouth daily       zinc 50 MG TABS Take 100 mg by mouth daily         Medication Changes/Rationale:     See HPI    Controlled medications sent with patient:    No more than 2 wks supply     ROS:   4 point ROS including Respiratory, CV, GI and , other than that noted in the HPI,  is negative    Physical Exam:   Vitals: /74   Pulse 85   Temp 98.4  F (36.9  C)   Resp 18   Ht 1.702 m (5' 7\")   Wt 87.4 kg (192 lb 9.6 oz)   SpO2 95%   BMI 30.17 kg/m    BMI= Body mass index is 30.17 kg/m .  GENERAL APPEARANCE:  Alert, in no distress  NECK:  No adenopathy,masses or thyromegaly  RESP:  respiratory effort and palpation of chest normal, lungs clear to auscultation , no respiratory distress  CV:  Palpation and auscultation of heart done , regular rate and rhythm, no murmur, rub, or gallop  ABDOMEN:  normal bowel sounds, soft, nontender, no hepatosplenomegaly or other masses  M/S:   lying in bed  SKIN:  Inspection of skin and subcutaneous tissue incision healed left hip  PSYCH:  oriented X 3     SNF labs: Labs done in SNF are in [facility] EPIC. Please refer to them using [facility]/Care Everywhere.  Last Comprehensive Metabolic Panel:  Sodium   Date Value Ref Range Status   09/09/2022 136 133 - 144 mmol/L Final   06/15/2021 125 (L) 133 - 144 mmol/L Final     Potassium   Date Value Ref Range Status   09/09/2022 3.4 3.4 - 5.3 mmol/L Final   06/15/2021 3.8 3.4 - 5.3 mmol/L Final     Chloride   Date Value Ref Range Status   09/09/2022 98 94 - 109 mmol/L Final   06/15/2021 87 (L) 94 - 109 mmol/L Final     Carbon Dioxide   Date Value Ref Range Status   06/15/2021 " 33 (H) 20 - 32 mmol/L Final     Carbon Dioxide (CO2)   Date Value Ref Range Status   09/09/2022 31 20 - 32 mmol/L Final     Anion Gap   Date Value Ref Range Status   09/09/2022 7 3 - 14 mmol/L Final   06/15/2021 5 3 - 14 mmol/L Final     Glucose   Date Value Ref Range Status   09/09/2022 87 70 - 99 mg/dL Final   06/15/2021 110 (H) 70 - 99 mg/dL Final     Urea Nitrogen   Date Value Ref Range Status   09/09/2022 12 7 - 30 mg/dL Final   06/15/2021 9 7 - 30 mg/dL Final     Creatinine   Date Value Ref Range Status   09/09/2022 0.63 (L) 0.66 - 1.25 mg/dL Final   06/15/2021 0.81 0.66 - 1.25 mg/dL Final     GFR Estimate   Date Value Ref Range Status   09/09/2022 >90 >60 mL/min/1.73m2 Final     Comment:     Effective December 21, 2021 eGFRcr in adults is calculated using the 2021 CKD-EPI creatinine equation which includes age and gender (Cristy et al., NEJ, DOI: 10.1056/NNDYbi2320618)   06/15/2021 >90 >60 mL/min/[1.73_m2] Final     Comment:     Non  GFR Calc  Starting 12/18/2018, serum creatinine based estimated GFR (eGFR) will be   calculated using the Chronic Kidney Disease Epidemiology Collaboration   (CKD-EPI) equation.       Calcium   Date Value Ref Range Status   09/09/2022 8.4 (L) 8.5 - 10.1 mg/dL Final   06/15/2021 8.7 8.5 - 10.1 mg/dL Final       Lab Results   Component Value Date    WBC 8.8 09/09/2022    WBC 6.6 06/15/2021     Lab Results   Component Value Date    RBC 2.90 09/09/2022    RBC 4.27 06/15/2021     Lab Results   Component Value Date    HGB 8.1 09/09/2022    HGB 14.2 06/15/2021     Lab Results   Component Value Date    HCT 26.1 09/09/2022    HCT 39.8 06/15/2021     No components found for: MCT  Lab Results   Component Value Date    MCV 90 09/09/2022    MCV 93 06/15/2021     Lab Results   Component Value Date    MCH 27.9 09/09/2022    MCH 33.3 06/15/2021     Lab Results   Component Value Date    MCHC 31.0 09/09/2022    MCHC 35.7 06/15/2021     Lab Results   Component Value Date    RDW 16.5  09/09/2022    RDW 13.2 06/15/2021     Lab Results   Component Value Date     09/09/2022     06/15/2021         DISCHARGE PLAN:    Follow up labs: No labs orders/due    Medical Follow Up:      Follow up with primary care provider in 1-2 weeks    MTM referral needed and placed by this provider: No    Current [place] scheduled appointments:       Discharge Services: Home Care:  Occupational Therapy, Physical Therapy, Registered Nurse, Home Health Aide and From:  [facility]        TOTAL DISCHARGE TIME:   Greater than 30 minutes  Electronically signed by:  LUIS Cuadra CNP     Home care Face to Face documentation done in EPIC attached to Home care orders for [facility].                       Sincerely,        LUIS Cuadra CNP

## 2022-10-06 DIAGNOSIS — I10 BENIGN ESSENTIAL HYPERTENSION: ICD-10-CM

## 2022-10-07 ENCOUNTER — TELEPHONE (OUTPATIENT)
Dept: INTERNAL MEDICINE | Facility: CLINIC | Age: 62
End: 2022-10-07

## 2022-10-07 NOTE — TELEPHONE ENCOUNTER
The Home Care/Assisted Living/Nursing Facility is calling regarding an established patient.  Has the patient seen Home Care in the past or is currently residing in Assisted Living or Nursing Facility? Ashanti Rojas RN calling from [facility] requesting the following orders that are NOT within the Home Care, Assisted Living or Nursing Home Eval and Treatment standing order and must be ordered by a Licensed Practitioner.    Preferred Call Back Number: [phone]    Delay in Start of Care     Patient discharged 10/6, SOC scheduled for 10/10/22     Routing to Licensed Practitioner (Provider) to review request and provide approval or recommendation.    Writer has verified Requestor will send fax to have orders signed.    Aby Garcia RN  [facility] Triage Team

## 2022-10-07 NOTE — TELEPHONE ENCOUNTER
"Noted. DOn't have any updated info re\": pt except for discharge summary. OK for delay if HC not able to provide care until then. If acute issues, pt to contact HC agency  "

## 2022-10-09 RX ORDER — AMLODIPINE BESYLATE 10 MG/1
10 TABLET ORAL DAILY
Qty: 90 TABLET | Refills: 1 | Status: SHIPPED | OUTPATIENT
Start: 2022-10-09 | End: 2022-11-01

## 2022-10-10 ENCOUNTER — APPOINTMENT (OUTPATIENT)
Dept: GENERAL RADIOLOGY | Facility: CLINIC | Age: 62
DRG: 872 | End: 2022-10-10
Attending: HOSPITALIST
Payer: COMMERCIAL

## 2022-10-10 ENCOUNTER — HOSPITAL ENCOUNTER (INPATIENT)
Facility: CLINIC | Age: 62
LOS: 8 days | Discharge: HOME HEALTH | DRG: 872 | End: 2022-10-18
Attending: EMERGENCY MEDICINE | Admitting: HOSPITALIST
Payer: COMMERCIAL

## 2022-10-10 ENCOUNTER — APPOINTMENT (OUTPATIENT)
Dept: ULTRASOUND IMAGING | Facility: CLINIC | Age: 62
DRG: 872 | End: 2022-10-10
Attending: EMERGENCY MEDICINE
Payer: COMMERCIAL

## 2022-10-10 ENCOUNTER — APPOINTMENT (OUTPATIENT)
Dept: GENERAL RADIOLOGY | Facility: CLINIC | Age: 62
DRG: 872 | End: 2022-10-10
Attending: EMERGENCY MEDICINE
Payer: COMMERCIAL

## 2022-10-10 ENCOUNTER — TELEPHONE (OUTPATIENT)
Dept: INTERNAL MEDICINE | Facility: CLINIC | Age: 62
End: 2022-10-10

## 2022-10-10 ENCOUNTER — PATIENT OUTREACH (OUTPATIENT)
Dept: CARE COORDINATION | Facility: CLINIC | Age: 62
End: 2022-10-10

## 2022-10-10 DIAGNOSIS — N39.0 URINARY TRACT INFECTION WITHOUT HEMATURIA, SITE UNSPECIFIED: ICD-10-CM

## 2022-10-10 DIAGNOSIS — A41.9 SEPSIS, DUE TO UNSPECIFIED ORGANISM, UNSPECIFIED WHETHER ACUTE ORGAN DYSFUNCTION PRESENT (H): ICD-10-CM

## 2022-10-10 DIAGNOSIS — S72.002A CLOSED FRACTURE OF LEFT HIP, INITIAL ENCOUNTER (H): Primary | ICD-10-CM

## 2022-10-10 DIAGNOSIS — R33.9 URINARY RETENTION: ICD-10-CM

## 2022-10-10 LAB
ALBUMIN UR-MCNC: 20 MG/DL
ANION GAP SERPL CALCULATED.3IONS-SCNC: 11 MMOL/L (ref 3–14)
ANION GAP SERPL CALCULATED.3IONS-SCNC: 9 MMOL/L (ref 3–14)
APPEARANCE UR: ABNORMAL
ATRIAL RATE - MUSE: 115 BPM
BACTERIA #/AREA URNS HPF: ABNORMAL /HPF
BASOPHILS # BLD AUTO: 0 10E3/UL (ref 0–0.2)
BASOPHILS NFR BLD AUTO: 0 %
BILIRUB UR QL STRIP: NEGATIVE
BUN SERPL-MCNC: 5 MG/DL (ref 7–30)
BUN SERPL-MCNC: 6 MG/DL (ref 7–30)
CALCIUM SERPL-MCNC: 8.2 MG/DL (ref 8.5–10.1)
CALCIUM SERPL-MCNC: 8.7 MG/DL (ref 8.5–10.1)
CHLORIDE BLD-SCNC: 89 MMOL/L (ref 94–109)
CHLORIDE BLD-SCNC: 90 MMOL/L (ref 94–109)
CO2 SERPL-SCNC: 23 MMOL/L (ref 20–32)
CO2 SERPL-SCNC: 25 MMOL/L (ref 20–32)
COLOR UR AUTO: ABNORMAL
CREAT SERPL-MCNC: 0.57 MG/DL (ref 0.66–1.25)
CREAT SERPL-MCNC: 0.61 MG/DL (ref 0.66–1.25)
DIASTOLIC BLOOD PRESSURE - MUSE: NORMAL MMHG
EOSINOPHIL # BLD AUTO: 0.1 10E3/UL (ref 0–0.7)
EOSINOPHIL NFR BLD AUTO: 1 %
ERYTHROCYTE [DISTWIDTH] IN BLOOD BY AUTOMATED COUNT: 15.9 % (ref 10–15)
GFR SERPL CREATININE-BSD FRML MDRD: >90 ML/MIN/1.73M2
GFR SERPL CREATININE-BSD FRML MDRD: >90 ML/MIN/1.73M2
GLUCOSE BLD-MCNC: 105 MG/DL (ref 70–99)
GLUCOSE BLD-MCNC: 160 MG/DL (ref 70–99)
GLUCOSE UR STRIP-MCNC: NEGATIVE MG/DL
HCT VFR BLD AUTO: 29.8 % (ref 40–53)
HGB BLD-MCNC: 9.8 G/DL (ref 13.3–17.7)
HGB UR QL STRIP: NEGATIVE
HYALINE CASTS: 3 /LPF
IMM GRANULOCYTES # BLD: 0.1 10E3/UL
IMM GRANULOCYTES NFR BLD: 0 %
INTERPRETATION ECG - MUSE: NORMAL
KETONES UR STRIP-MCNC: 60 MG/DL
LACTATE SERPL-SCNC: 0.6 MMOL/L (ref 0.7–2)
LEUKOCYTE ESTERASE UR QL STRIP: ABNORMAL
LYMPHOCYTES # BLD AUTO: 1.5 10E3/UL (ref 0.8–5.3)
LYMPHOCYTES NFR BLD AUTO: 11 %
MCH RBC QN AUTO: 27.2 PG (ref 26.5–33)
MCHC RBC AUTO-ENTMCNC: 32.9 G/DL (ref 31.5–36.5)
MCV RBC AUTO: 83 FL (ref 78–100)
MONOCYTES # BLD AUTO: 1.4 10E3/UL (ref 0–1.3)
MONOCYTES NFR BLD AUTO: 10 %
MUCOUS THREADS #/AREA URNS LPF: PRESENT /LPF
NEUTROPHILS # BLD AUTO: 10.3 10E3/UL (ref 1.6–8.3)
NEUTROPHILS NFR BLD AUTO: 78 %
NITRATE UR QL: POSITIVE
NRBC # BLD AUTO: 0 10E3/UL
NRBC BLD AUTO-RTO: 0 /100
NT-PROBNP SERPL-MCNC: 502 PG/ML (ref 0–900)
P AXIS - MUSE: 64 DEGREES
PH UR STRIP: 6 [PH] (ref 5–7)
PLATELET # BLD AUTO: 451 10E3/UL (ref 150–450)
POTASSIUM BLD-SCNC: 3.8 MMOL/L (ref 3.4–5.3)
POTASSIUM BLD-SCNC: 4 MMOL/L (ref 3.4–5.3)
PR INTERVAL - MUSE: 178 MS
QRS DURATION - MUSE: 76 MS
QT - MUSE: 328 MS
QTC - MUSE: 453 MS
R AXIS - MUSE: 72 DEGREES
RBC # BLD AUTO: 3.6 10E6/UL (ref 4.4–5.9)
RBC URINE: 1 /HPF
SARS-COV-2 RNA RESP QL NAA+PROBE: NEGATIVE
SODIUM SERPL-SCNC: 123 MMOL/L (ref 133–144)
SODIUM SERPL-SCNC: 124 MMOL/L (ref 133–144)
SP GR UR STRIP: 1.01 (ref 1–1.03)
SYSTOLIC BLOOD PRESSURE - MUSE: NORMAL MMHG
T AXIS - MUSE: 69 DEGREES
TROPONIN I SERPL HS-MCNC: 5 NG/L
UROBILINOGEN UR STRIP-MCNC: NORMAL MG/DL
VENTRICULAR RATE- MUSE: 115 BPM
WBC # BLD AUTO: 13.2 10E3/UL (ref 4–11)
WBC URINE: 115 /HPF

## 2022-10-10 PROCEDURE — U0005 INFEC AGEN DETEC AMPLI PROBE: HCPCS | Performed by: EMERGENCY MEDICINE

## 2022-10-10 PROCEDURE — 36415 COLL VENOUS BLD VENIPUNCTURE: CPT | Performed by: EMERGENCY MEDICINE

## 2022-10-10 PROCEDURE — 99223 1ST HOSP IP/OBS HIGH 75: CPT | Mod: AI | Performed by: HOSPITALIST

## 2022-10-10 PROCEDURE — 96361 HYDRATE IV INFUSION ADD-ON: CPT

## 2022-10-10 PROCEDURE — 83935 ASSAY OF URINE OSMOLALITY: CPT | Performed by: INTERNAL MEDICINE

## 2022-10-10 PROCEDURE — 51798 US URINE CAPACITY MEASURE: CPT

## 2022-10-10 PROCEDURE — 99285 EMERGENCY DEPT VISIT HI MDM: CPT | Mod: 25

## 2022-10-10 PROCEDURE — 258N000003 HC RX IP 258 OP 636: Performed by: HOSPITALIST

## 2022-10-10 PROCEDURE — 84300 ASSAY OF URINE SODIUM: CPT | Performed by: INTERNAL MEDICINE

## 2022-10-10 PROCEDURE — 36415 COLL VENOUS BLD VENIPUNCTURE: CPT | Performed by: HOSPITALIST

## 2022-10-10 PROCEDURE — 83605 ASSAY OF LACTIC ACID: CPT | Performed by: EMERGENCY MEDICINE

## 2022-10-10 PROCEDURE — 83880 ASSAY OF NATRIURETIC PEPTIDE: CPT | Performed by: EMERGENCY MEDICINE

## 2022-10-10 PROCEDURE — 81003 URINALYSIS AUTO W/O SCOPE: CPT | Performed by: EMERGENCY MEDICINE

## 2022-10-10 PROCEDURE — 82310 ASSAY OF CALCIUM: CPT | Performed by: EMERGENCY MEDICINE

## 2022-10-10 PROCEDURE — 73560 X-RAY EXAM OF KNEE 1 OR 2: CPT | Mod: LT

## 2022-10-10 PROCEDURE — 73502 X-RAY EXAM HIP UNI 2-3 VIEWS: CPT

## 2022-10-10 PROCEDURE — 87086 URINE CULTURE/COLONY COUNT: CPT | Performed by: EMERGENCY MEDICINE

## 2022-10-10 PROCEDURE — 93005 ELECTROCARDIOGRAM TRACING: CPT

## 2022-10-10 PROCEDURE — 250N000011 HC RX IP 250 OP 636: Performed by: EMERGENCY MEDICINE

## 2022-10-10 PROCEDURE — 99222 1ST HOSP IP/OBS MODERATE 55: CPT | Performed by: UROLOGY

## 2022-10-10 PROCEDURE — 96365 THER/PROPH/DIAG IV INF INIT: CPT

## 2022-10-10 PROCEDURE — 93970 EXTREMITY STUDY: CPT

## 2022-10-10 PROCEDURE — 85041 AUTOMATED RBC COUNT: CPT | Performed by: EMERGENCY MEDICINE

## 2022-10-10 PROCEDURE — C9803 HOPD COVID-19 SPEC COLLECT: HCPCS

## 2022-10-10 PROCEDURE — 258N000003 HC RX IP 258 OP 636: Performed by: EMERGENCY MEDICINE

## 2022-10-10 PROCEDURE — 51702 INSERT TEMP BLADDER CATH: CPT

## 2022-10-10 PROCEDURE — 120N000001 HC R&B MED SURG/OB

## 2022-10-10 PROCEDURE — 250N000013 HC RX MED GY IP 250 OP 250 PS 637: Performed by: HOSPITALIST

## 2022-10-10 PROCEDURE — 82310 ASSAY OF CALCIUM: CPT | Performed by: HOSPITALIST

## 2022-10-10 PROCEDURE — 87040 BLOOD CULTURE FOR BACTERIA: CPT | Performed by: EMERGENCY MEDICINE

## 2022-10-10 PROCEDURE — 84484 ASSAY OF TROPONIN QUANT: CPT | Performed by: EMERGENCY MEDICINE

## 2022-10-10 RX ORDER — BUSPIRONE HYDROCHLORIDE 15 MG/1
15 TABLET ORAL 2 TIMES DAILY
COMMUNITY

## 2022-10-10 RX ORDER — ONDANSETRON 2 MG/ML
4 INJECTION INTRAMUSCULAR; INTRAVENOUS EVERY 6 HOURS PRN
Status: DISCONTINUED | OUTPATIENT
Start: 2022-10-10 | End: 2022-10-18 | Stop reason: HOSPADM

## 2022-10-10 RX ORDER — HYDRALAZINE HYDROCHLORIDE 20 MG/ML
10 INJECTION INTRAMUSCULAR; INTRAVENOUS EVERY 4 HOURS PRN
Status: DISCONTINUED | OUTPATIENT
Start: 2022-10-10 | End: 2022-10-18 | Stop reason: HOSPADM

## 2022-10-10 RX ORDER — POLYETHYLENE GLYCOL 3350 17 G/17G
17 POWDER, FOR SOLUTION ORAL DAILY PRN
Status: DISCONTINUED | OUTPATIENT
Start: 2022-10-10 | End: 2022-10-10

## 2022-10-10 RX ORDER — BUSPIRONE HYDROCHLORIDE 5 MG/1
15 TABLET ORAL 2 TIMES DAILY
Status: DISCONTINUED | OUTPATIENT
Start: 2022-10-10 | End: 2022-10-18 | Stop reason: HOSPADM

## 2022-10-10 RX ORDER — SODIUM CHLORIDE 9 MG/ML
INJECTION, SOLUTION INTRAVENOUS CONTINUOUS
Status: DISCONTINUED | OUTPATIENT
Start: 2022-10-10 | End: 2022-10-12

## 2022-10-10 RX ORDER — BISACODYL 10 MG
10 SUPPOSITORY, RECTAL RECTAL DAILY PRN
Status: DISCONTINUED | OUTPATIENT
Start: 2022-10-10 | End: 2022-10-18 | Stop reason: HOSPADM

## 2022-10-10 RX ORDER — FLUTICASONE PROPIONATE 50 MCG
2 SPRAY, SUSPENSION (ML) NASAL DAILY PRN
Status: DISCONTINUED | OUTPATIENT
Start: 2022-10-10 | End: 2022-10-18 | Stop reason: HOSPADM

## 2022-10-10 RX ORDER — POLYETHYLENE GLYCOL 3350 17 G/17G
17 POWDER, FOR SOLUTION ORAL DAILY PRN
Status: DISCONTINUED | OUTPATIENT
Start: 2022-10-10 | End: 2022-10-18 | Stop reason: HOSPADM

## 2022-10-10 RX ORDER — CEFTRIAXONE 2 G/1
2 INJECTION, POWDER, FOR SOLUTION INTRAMUSCULAR; INTRAVENOUS ONCE
Status: COMPLETED | OUTPATIENT
Start: 2022-10-10 | End: 2022-10-10

## 2022-10-10 RX ORDER — AMOXICILLIN 250 MG
2 CAPSULE ORAL 2 TIMES DAILY
Status: DISCONTINUED | OUTPATIENT
Start: 2022-10-10 | End: 2022-10-18 | Stop reason: HOSPADM

## 2022-10-10 RX ORDER — ESCITALOPRAM OXALATE 10 MG/1
20 TABLET ORAL DAILY
Status: DISCONTINUED | OUTPATIENT
Start: 2022-10-10 | End: 2022-10-18 | Stop reason: HOSPADM

## 2022-10-10 RX ORDER — ACETAMINOPHEN 650 MG/1
650 SUPPOSITORY RECTAL EVERY 6 HOURS PRN
Status: DISCONTINUED | OUTPATIENT
Start: 2022-10-10 | End: 2022-10-18 | Stop reason: HOSPADM

## 2022-10-10 RX ORDER — LIDOCAINE 40 MG/G
CREAM TOPICAL
Status: DISCONTINUED | OUTPATIENT
Start: 2022-10-10 | End: 2022-10-18 | Stop reason: HOSPADM

## 2022-10-10 RX ORDER — AMLODIPINE BESYLATE 5 MG/1
10 TABLET ORAL DAILY
Status: DISCONTINUED | OUTPATIENT
Start: 2022-10-10 | End: 2022-10-18 | Stop reason: HOSPADM

## 2022-10-10 RX ORDER — LAMOTRIGINE 100 MG/1
200 TABLET ORAL AT BEDTIME
Status: DISCONTINUED | OUTPATIENT
Start: 2022-10-10 | End: 2022-10-18 | Stop reason: HOSPADM

## 2022-10-10 RX ORDER — ONDANSETRON 4 MG/1
4 TABLET, ORALLY DISINTEGRATING ORAL EVERY 6 HOURS PRN
Status: DISCONTINUED | OUTPATIENT
Start: 2022-10-10 | End: 2022-10-18 | Stop reason: HOSPADM

## 2022-10-10 RX ORDER — TAMSULOSIN HYDROCHLORIDE 0.4 MG/1
0.4 CAPSULE ORAL EVERY EVENING
Status: DISCONTINUED | OUTPATIENT
Start: 2022-10-10 | End: 2022-10-18 | Stop reason: HOSPADM

## 2022-10-10 RX ORDER — TRAZODONE HYDROCHLORIDE 50 MG/1
100 TABLET, FILM COATED ORAL AT BEDTIME
Status: DISCONTINUED | OUTPATIENT
Start: 2022-10-10 | End: 2022-10-18 | Stop reason: HOSPADM

## 2022-10-10 RX ORDER — AMOXICILLIN 250 MG
1 CAPSULE ORAL 2 TIMES DAILY
Status: DISCONTINUED | OUTPATIENT
Start: 2022-10-10 | End: 2022-10-18 | Stop reason: HOSPADM

## 2022-10-10 RX ORDER — OXYCODONE HYDROCHLORIDE 5 MG/1
5 TABLET ORAL 2 TIMES DAILY PRN
Status: DISCONTINUED | OUTPATIENT
Start: 2022-10-10 | End: 2022-10-18 | Stop reason: HOSPADM

## 2022-10-10 RX ORDER — CEFTRIAXONE 1 G/1
1 INJECTION, POWDER, FOR SOLUTION INTRAMUSCULAR; INTRAVENOUS EVERY 24 HOURS
Status: DISCONTINUED | OUTPATIENT
Start: 2022-10-11 | End: 2022-10-11

## 2022-10-10 RX ORDER — ACETAMINOPHEN 325 MG/1
650 TABLET ORAL EVERY 6 HOURS PRN
Status: DISCONTINUED | OUTPATIENT
Start: 2022-10-10 | End: 2022-10-18 | Stop reason: HOSPADM

## 2022-10-10 RX ADMIN — LAMOTRIGINE 200 MG: 100 TABLET ORAL at 22:09

## 2022-10-10 RX ADMIN — BUSPIRONE HYDROCHLORIDE 15 MG: 5 TABLET ORAL at 22:09

## 2022-10-10 RX ADMIN — SODIUM CHLORIDE 1000 ML: 9 INJECTION, SOLUTION INTRAVENOUS at 12:12

## 2022-10-10 RX ADMIN — CEFTRIAXONE SODIUM 2 G: 2 INJECTION, POWDER, FOR SOLUTION INTRAMUSCULAR; INTRAVENOUS at 13:03

## 2022-10-10 RX ADMIN — AMLODIPINE BESYLATE 10 MG: 5 TABLET ORAL at 16:20

## 2022-10-10 RX ADMIN — TAMSULOSIN HYDROCHLORIDE 0.4 MG: 0.4 CAPSULE ORAL at 22:08

## 2022-10-10 RX ADMIN — SODIUM CHLORIDE: 9 INJECTION, SOLUTION INTRAVENOUS at 22:40

## 2022-10-10 RX ADMIN — TRAZODONE HYDROCHLORIDE 100 MG: 50 TABLET ORAL at 22:08

## 2022-10-10 ASSESSMENT — ACTIVITIES OF DAILY LIVING (ADL)
ADLS_ACUITY_SCORE: 35

## 2022-10-10 ASSESSMENT — ENCOUNTER SYMPTOMS
SORE THROAT: 0
RHINORRHEA: 0
ARTHRALGIAS: 1
ROS GI COMMENTS: (+) INCONTINENCE
NUMBNESS: 0
DIARRHEA: 0
COUGH: 0
DYSURIA: 0
HEADACHES: 0
FEVER: 0
DIFFICULTY URINATING: 1
CHILLS: 0
NAUSEA: 1
VOMITING: 0
NECK PAIN: 0
CONSTIPATION: 0
SHORTNESS OF BREATH: 0

## 2022-10-10 NOTE — PHARMACY-ADMISSION MEDICATION HISTORY
Pharmacy Medication History  Admission medication history interview status for the 10/10/2022 admission is complete. See EPIC admission navigator for prior to admission medications     Location of Interview: Patient room  Medication history sources: Patient and Surescripts    Significant changes made to the medication list:  Added buspirone, marked aspirin as not taking    In the past week, patient estimated taking medication this percent of the time: greater than 90%    Additional medication history information:   Patient states that he stopped taking aspirin after leaving his TCU on 10/6. He also states he is taking buspirone even though last fill in [facility] in 12/22 (may have gotten from TCU).     Medication reconciliation completed by provider prior to medication history? No    Time spent in this activity: 20 minutes    Prior to Admission medications    Medication Sig Last Dose Taking? Auth Provider Long Term End Date   acetaminophen (TYLENOL) 325 MG tablet Take 2 tablets (650 mg) by mouth every 8 hours as needed  at PRN Yes Reported, Patient     amLODIPine (NORVASC) 10 MG tablet Take 1 tablet (10 mg) by mouth daily 10/9/2022 Yes Brad Thurston MD Yes    busPIRone (BUSPAR) 15 MG tablet Take 1 tablet (15 mg) by mouth 2 times daily 10/9/2022 Yes Unknown, Entered By History No    escitalopram (LEXAPRO) 20 MG tablet Take 20 mg by mouth daily 10/9/2022 Yes Unknown, Entered By History No    fluticasone (FLONASE) 50 MCG/ACT nasal spray use 2 sprays in each nostril daily.  at PRN Yes Brad Thurston MD     lamoTRIgine (LAMICTAL) 200 MG tablet Take 200 mg by mouth At Bedtime 10/9/2022 Yes Brad Thurston MD Yes    Multiple Vitamins-Minerals (MULTIVITAMIN ADULT PO) Take 1 tablet by mouth daily 10/9/2022 Yes Reported, Patient     oxyCODONE (ROXICODONE) 5 MG tablet Take 1 tablet (5 mg) by mouth 2 times daily as needed for pain 10/9/2022 Yes Juany Dietrich, LUIS CNP     polyethylene glycol (MIRALAX) 17 g packet Take 1  packet by mouth daily as needed for constipation  at PRN Yes Reported, Patient     polyethylene glycol-propylene glycol (SYSTANE ULTRA) 0.4-0.3 % SOLN ophthalmic solution Place 1 drop into both eyes 4 times daily as needed 10/9/2022 Yes Unknown, Entered By History     tamsulosin (FLOMAX) 0.4 MG capsule Take 1 capsule (0.4 mg) by mouth every evening 10/9/2022 Yes Kadie Ospina MD     traZODone (DESYREL) 50 MG tablet Take 2 tablets (100 mg) by mouth At Bedtime 10/9/2022 Yes Kadie Ospina MD Yes    vitamin C (ASCORBIC ACID) 500 MG tablet Take 500 mg by mouth daily 10/9/2022 Yes Unknown, Entered By History     zinc 50 MG TABS Take 100 mg by mouth daily 10/9/2022 Yes Reported, Patient     aspirin (ASA) 325 MG EC tablet Take 1 tablet (325 mg) by mouth daily  Patient not taking: Reported on 10/10/2022 Not Taking  Foss, Kjerstin L, PA-C         The information provided in this note is only as accurate as the sources available at the time of update(s)

## 2022-10-10 NOTE — TELEPHONE ENCOUNTER
"Left VM regarding provider message.     \"Noted. DOn't have any updated info re\": pt except for discharge summary. OK for delay if HC not able to provide care until then. If acute issues, pt to contact HC agency\"  "

## 2022-10-10 NOTE — PROGRESS NOTES
Clinic Care Coordination Contact  Ambulatory Care Coordination to Inpatient Care Management   Hand-In Communication    Date:  October 10, 2022  Name: Suresh Powers is enrolled in Ambulatory Care Coordination program and I am the Lead Care Coordinator.  CC Contact Information: [employer] + phone  Payor Source: Payor: [facility] / Plan: BCBS MEDICARE ADVANTAGE / Product Type: Medicare /   Current services in place:     Please see the CC Snaphot and Care Management Flowsheets for specific details of this Suresh Powers care plan.   Additional details/specific concerns r/t this admission:    Home Safety no support at home and unable to care for self at home.     I will follow this admission in Epic. Please feel free to contact me with questions or for further collaboration in discharge planning.    TIFF Hernandes  [facility] Care Coordination   [place] [facility]  Social Work Care Coordinator  [phone]

## 2022-10-10 NOTE — TELEPHONE ENCOUNTER
"IDA reached out to this writer and provided an update. IDA advised pt to call 911. Pt stated HC RN was coming out today. Pt reached back out to IDA and stated he was going to call 911.       RN forwarded the message to Dr. Thurston.     Per Dr. Thurston-   \"pt was just discharged from TCU rehab. Sounds like pt not able to care for himself at home and likely needs NH placement which we won't be able to do here. WOuld be seen in ER for social admission and transfer back to a facility. If doesn't have anyone to bring him to ER, then call 911\"     Called pt to relay Dr. Thurston's message to pt. Pt stated he called 911 and was waiting for them to arrive. Pt stated things such as \"I am sitting in a chair wasting away\" and \"I can't clean my self up if I have an accident\" amd \"I just dribble when I urinate\". Advised pt to inform ED provider's of all of this. Pt stated he will and that the HC RN is there now waiting to let EMS in when they arrive.      Updated provider.   "

## 2022-10-10 NOTE — ED NOTES
Patient requesting a menu from upstairs (updated the patient that dinner orders will just be put in as only upstairs will be able to do that), for his phone to be plugged in, for the lights to be on low, and asking when a bed would be ready. Writer answered all questions and needs.

## 2022-10-10 NOTE — TELEPHONE ENCOUNTER
"PCP FYI:  Lata RN at [facility] home care calling  RN went to do admission and patient is going to go back to hospital because he told GIGI Giudo that  \"he couldn't take care of himself\"  Home care is not able to do admission for home care due to patient going to hospital.       Callback: [phone]- ok to leave detailed VM     Katherine Win RN  [facility]    "

## 2022-10-10 NOTE — PROGRESS NOTES
Clinic Care Coordination Contact  [facility]: Post-Discharge Note  SITUATION                                                      Admission:    Admission Date: 09/03/22   Reason for Admission: Acute displaced left intertrochanteric femur fracture-secondary to mechanical fall  Discharge:   Discharge Date: 09/06/22  Discharge Diagnosis: Acute displaced left intertrochanteric femur fracture-secondary to mechanical fall    BACKGROUND                                                      Per hospital discharge summary and inpatient provider notes:    Hospital Course         Suresh Powers is a 62 year old male who was admitted on 9/3/2022 with right hip pain due to hip fracture secondary to fall.  He was intoxicated and fell off of a bar stool when he tried to get up.   Imaging showed left displaced intertrochanteric femur fracture.  Ortho surgery consulted.  He underwent left hip cephalomedullary nailing 9/4/2022.  He developed urinary retention postprocedure requiring Prieto catheter placement.  He was discharged to TCU for rehab.  Prieto catheter was left in place.  Can undergo voiding trial at TCU.        #.  Acute displaced left intertrochanteric femur fracture-secondary to mechanical fall  #.  S/p left hip cephalomedullary nailing, 9/4/2022  - PT/OT, pain control  -Weightbearing as tolerated  -Aspirin 325 mg daily for 6 weeks DVT Prophylaxis         #.  Acute alcohol intoxication, blood alcohol level 0.21 g/dL  #.  Chronic alcohol use disorder  - had 6 beers with his friends as it was Saturday and they were watching a game.  Reports he has poor balance at baseline, is on disability due to multiple foot injuries,  reports history of falls even without alcohol use.  - Discussed with patient since he already is at increased risk of falls due to poor balance and previous foot injuries, he should avoid alcohol as it increases his risk of falls.  - No signs of withdrawal  -LFTs in normal range     #.  Hypomagnesemia,  magnesium 1.5  -Replaced per protocol. now 2.2     #.  Chronic hyponatremia, sodium 129  -This is his baseline.   -Chlorthalidone was recently discontinued        #.  Acute blood loss anemia Due to surgical blood loss on top of chronic normocytic anemia   - Hemoglobin 10.6 on admission, was 14.2 in 6/2021   - Folate > 40, B12 364  - Iron studies showed iron deficiency with 9% iron saturation normal iron binding capacity  - Monitor hemoglobin  - Start on p.o. iron once no longer on narcotics to prevent additional constipation     #.  Essential hypertension  - Blood pressure currently in normal range  - continue amlodipine 10 mg daily.       #.  Chronic depression  #.  Generalized anxiety disorder  -Continue Lexapro, trazodone, lamotrigine     #.  Hypoalbuminemia, albumin 2     #.  Urinary retention-s/p Prieto placement  - Patient reports longstanding history of urinary retention especially after surgeries and when he is on narcotics.  He does not think he will be able to void if Prieto is removed.  - Continue Prieto catheter.    Discontinuing Prieto and voiding trial can be attempted at TCU  - Started on Flomax  - Increased risk of catheter associated infections discussed with patient.     COVID-19 negative 9/4/22    ASSESSMENT       [facility] spoke with pt. Pt stated that he got home on Thursday and has not been doing well. He stated he lives alone in an apartment. He is having trouble urinating and when urinating, he feels he needs to have a bowel movement and then has a mess to clean up, but is hardly able to clean up. He feels he is wasting away at home with not being able to get around. [facility] asked about support. Pt stated he has no support. He stated he has a sister with cancer and has a friend, but their wife is disabled. [facility] asked if pt was thinking of other living options. Pt stated that he is, but he is unable to afford an YAMIL because he only receives SSDI for income. [facility] spoke about waivers and MA.     [facility]  encouraged pt to call EMS if he felt he was not able to care for himself at home. Pt agreed and stated that he felt this was the best plan. Pt stated that he would wait for the home care nurse and then call EMS.    Discharge Assessment  How are you doing now that you are home?: Not doing well, unable to care for self.  How are your symptoms? (Red Flag symptoms escalate to triage hotline per guidelines): Other (states not doing well.)  Do you feel your condition is stable enough to be safe at home until your provider visit?: No (see comment) (states not doing well)  Does the patient have their discharge instructions? : Yes  Does the patient have questions regarding their discharge instructions? : No  Were you started on any new medications or were there changes to any of your previous medications? : No  Does the patient have all of their medications?: Yes  Do you have questions regarding any of your medications? : No  Do you have all of your needed medical supplies or equipment (DME)?  (i.e. oxygen tank, CPAP, cane, etc.): Yes  Discharge follow-up appointment scheduled within 14 calendar days? : No    Post-op ([facility] CTA Only)  If the patient had a surgery or procedure, do they have any questions for a nurse?: No    Post-op (Clinicians Only)  Did the patient have surgery or a procedure: No  Fever: No  Chills: No  Eating & Drinking:  (not eating much)  Additional Symptoms: decreased appetite    PLAN                                                      Outpatient Plan:  Pt was discharged home from TCU with home care starting 10/10/22 at 10a. Pt states he is unable to care for himself at home and was advised to call 911 to go to the Emergency Department to assist with placement.     No future appointments.      For any urgent concerns, please contact our 24 hour nurse triage line: [phone] ([phone])         TIFF Johnson

## 2022-10-10 NOTE — LETTER
[facility] CARE COORDINATION  [address] [place] [place] [address]    October 10, 2022    Suresh Powers  [address]  [place] [address]      Dear Suresh,        I am a clinic care coordinator who works with Brad Thurston MD with the [facility]. I wanted to thank you for spending the time to talk with me.  Below is a description of clinic care coordination and how I can further assist you.       The clinic care coordination team is made up of a registered nurse, , financial resource worker and community health worker who understand the health care system. The goal of clinic care coordination is to help you manage your health and improve access to the health care system. Our team works alongside your provider to assist you in determining your health and social needs. We can help you obtain health care and community resources, providing you with necessary information and education. We can work with you through any barriers and develop a care plan that helps coordinate and strengthen the communication between you and your care team.    Please feel free to contact me with any questions or concerns regarding care coordination and what we can offer.      We are focused on providing you with the highest-quality healthcare experience possible.    Sincerely,     Charito Pineda, TIFF  [facility] Care Coordination   [place] [facility]  Social Work Care Coordinator  [phone]

## 2022-10-10 NOTE — H&P
[facility]    History and Physical - Hospitalist Service       Date of Admission:  10/10/2022    Assessment & Plan      Suresh Powers is a 62 year old male with history of hypertension, depression, anxiety, OCD, left hip fracture status post surgery September 2022, and sleep disorder among others who presented to the ED from home with difficulty urinating.  Patient reports being [facility] until about 5 days ago after he had fallen and broken his left hip which required surgery.  In the catheter for nearly a month which was then removed last Tuesday (6 days ago).  He reports voiding once at TCU prior to leaving.  Since he got home he has not been doing well in general.  Considerably weak and essentially not safe to be moving around independently at home even with walker assistance devices.  No falls or head traumas or passing out.  He does feel that he came close to passing out/falling at different times.  He has had barely any urinary output despite maximal efforts and unfortunately had some bowel incontinence while attempting to urinate.  He denies any fevers, chills, coughing, shortness of breath, chest pain, palpitation, abdominal pain, nausea, vomiting, or diarrhea and has been taking his medications as directed.  He is very appreciative of cares and he is very sure that he is unable to take care of himself at home in his current state.      Sepsis secondary to UTI  Urinary retention  Patient has been home from TCU ([facility]) for a few days after previously being hospitalized for a broken hip requiring surgery. He had retention when he left the hospital and went to TCU and required a catheter for around a month and was removed 10/4.  Reports voiding at TCU prior to leaving the next day.  At home he was unable to void and has had some bowel incontinence when straining to urinate.  Denies any fevers or chills, abdominal pain, flank pain or back pain.   UA congruent with infection.  Urine culture and blood cultures pending.  Started on empiric Rocephin in ED.  WBC 13.2K, tachycardic to 1 teens, respiratory rate low 20s, UA congruent with infection.  - Admit to inpatient  - Continue empiric antibiotics as above-follow urine culture and blood cultures  - Acetaminophen as needed for fever pain  - Continue Prieto catheter as needed  - Urology consulted  - PTA flomax continued    Hypochloremic hyponatremia  Sodium 123 on adm. chloride 89.  Patient reports struggling at home in general.  He does seem to have a degree of chronic hyponatremia with baseline may be in the upper 120s or 130.  Likely lower today due to decreased p.o. intake as he was at home and really not able to care for himself.  - Repeat BMP later today  - Gentle IV fluids with normal saline  - Goal sodium correction for 24 hours would be 129-131 at maximum  - Kindly request RN to notify myself or cross coverage with sodium updates as he may require change in management    Generalized weakness  Left hip fracture requiring surgery status post fall-early September 2022  As above, patient was hospitalized here in early September required surgery subsequently went to TCU for few weeks and was discharged home less than 1 week ago.  He has not been doing well and has had near falls multiple times. Does endorse worsened L knee pain.   - PT/OT  - CM/SW  - L knee xr    HTN  HL  Mildly hypertensive in the ED.  PTA regimen includes amlodipine 10 mg daily.  Does not appear to be on statin.  - Continue PTA amlodipine  - As needed hydralazine    SPIKE  Depression  OCD  Stable upon admission.  Appropriate mood and affect.  Very appreciative of cares.  - Continue PTA buspirone, escitalopram, and lamotrigine        Diet:   reg  DVT Prophylaxis: Pneumatic Compression Devices  Prieto Catheter: PRESENT, indication:    Central Lines: None  Cardiac Monitoring: None  Code Status:   Full Code -discussed in detail and confirmed upon  "admission    Clinically Significant Risk Factors Present on Admission         # Hyponatremia: Na = 123 mmol/L (Ref range: 133 - 144 mmol/L) on admission, will monitor as appropriate            # Obesity: Estimated body mass index is 30.54 kg/m  as calculated from the following:    Height as of 10/4/22: 1.702 m (5' 7\").    Weight as of this encounter: 88.5 kg (195 lb).        Disposition Plan      Expected Discharge Date: 10/12/2022                The patient's care was discussed with the Patient and ED MD.    Alexander Long MD  Hospitalist Service  [facility]  Securely message with the Vocera Web Console (learn more here)  Text page via [facility] Paging/Directory         ______________________________________________________________________    Chief Complaint   Urinary retention    History is obtained from the patient    History of Present Illness   Suresh Powers is a 62 year old male with history of hypertension, depression, anxiety, OCD, left hip fracture status post surgery September 2022, and sleep disorder among others who presented to the ED from home with difficulty urinating.  [facility] about [facility] after he had fallen and broken his left hip which required surgery.  In the catheter for nearly a month which was then removed last Tuesday (6 days ago).  He reports voiding once at TCU prior to leaving.  Since he got home he has not been doing well in general.  Considerably weak and essentially not safe to be moving around independently at home even with walker assistance devices.  No falls or head traumas or passing out.  He does feel that he came close to passing out/falling at different times.  He has had barely any urinary output despite maximal efforts and unfortunately had some bowel incontinence while attempting to urinate.  He denies any fevers, chills, coughing, shortness of breath, chest pain, palpitation, " abdominal pain, nausea, vomiting, or diarrhea and has been taking his medications as directed.  He is very appreciative of cares and he is very sure that he is unable to take care of himself at home in his current state.    Review of Systems    The 10 point Review of Systems is negative other than noted in the HPI or here.     Past Medical History    I have reviewed this patient's medical history and updated it with pertinent information if needed.   Past Medical History:   Diagnosis Date     Anxiety     sees psych for trazodone     Clostridium difficile diarrhea 3/9/2016     Foot pain      Hypertension      Mold exposure      Shingles 1984       Past Surgical History   I have reviewed this patient's surgical history and updated it with pertinent information if needed.  Past Surgical History:   Procedure Laterality Date     foot crush injury       kidney donation       OPEN REDUCTION INTERNAL FIXATION CLAVICLE Left 2/4/2016    Procedure: OPEN REDUCTION INTERNAL FIXATION CLAVICLE;  Surgeon: Rakesh Hui MD;  Location:  OR     OPEN REDUCTION INTERNAL FIXATION HIP NAILING Left 9/4/2022    Procedure: Open reduction internal fixation of left hip fracture;  Surgeon: Huang Cochran MD;  Location:  OR     OPEN REDUCTION INTERNAL FIXATION TIBIAL PLATEAU Left 2/4/2016    Procedure: OPEN REDUCTION INTERNAL FIXATION TIBIAL PLATEAU;  Surgeon: Rakesh Hui MD;  Location:  OR       Social History   I have reviewed this patient's social history and updated it with pertinent information if needed.  Social History     Tobacco Use     Smoking status: Former     Packs/day: 1.00     Years: 0.00     Pack years: 0.00     Types: Cigarettes     Smokeless tobacco: Former     Quit date: 2/2/2016   Substance Use Topics     Alcohol use: Yes     Comment: states 8 beerrs all day over past 8 hours     Drug use: No       Family History   I have reviewed this patient's family history and updated it with pertinent information if  needed.  Family History   Problem Relation Age of Onset     Breast Cancer Mother      Diabetes Type 2  Mother      Skin Cancer Father      Cerebrovascular Disease Father        Prior to Admission Medications   Prior to Admission Medications   Prescriptions Last Dose Informant Patient Reported? Taking?   Multiple Vitamins-Minerals (MULTIVITAMIN ADULT PO) 10/9/2022  Yes Yes   Sig: Take 1 tablet by mouth daily   acetaminophen (TYLENOL) 325 MG tablet  at PRN  Yes Yes   Sig: Take 2 tablets (650 mg) by mouth every 8 hours as needed   amLODIPine (NORVASC) 10 MG tablet 10/9/2022  No Yes   Sig: Take 1 tablet (10 mg) by mouth daily   aspirin (ASA) 325 MG EC tablet Not Taking  No No   Sig: Take 1 tablet (325 mg) by mouth daily   Patient not taking: Reported on 10/10/2022   busPIRone (BUSPAR) 15 MG tablet 10/9/2022  Yes Yes   Sig: Take 1 tablet (15 mg) by mouth 2 times daily   escitalopram (LEXAPRO) 20 MG tablet 10/9/2022  Yes Yes   Sig: Take 20 mg by mouth daily   fluticasone (FLONASE) 50 MCG/ACT nasal spray  at PRN  No Yes   Sig: use 2 sprays in each nostril daily.   lamoTRIgine (LAMICTAL) 200 MG tablet 10/9/2022  Yes Yes   Sig: Take 200 mg by mouth At Bedtime   oxyCODONE (ROXICODONE) 5 MG tablet 10/9/2022  No Yes   Sig: Take 1 tablet (5 mg) by mouth 2 times daily as needed for pain   polyethylene glycol (MIRALAX) 17 g packet  at PRN  Yes Yes   Sig: Take 1 packet by mouth daily as needed for constipation   polyethylene glycol-propylene glycol (SYSTANE ULTRA) 0.4-0.3 % SOLN ophthalmic solution 10/9/2022  Yes Yes   Sig: Place 1 drop into both eyes 4 times daily as needed   tamsulosin (FLOMAX) 0.4 MG capsule 10/9/2022  No Yes   Sig: Take 1 capsule (0.4 mg) by mouth every evening   traZODone (DESYREL) 50 MG tablet 10/9/2022  No Yes   Sig: Take 2 tablets (100 mg) by mouth At Bedtime   vitamin C (ASCORBIC ACID) 500 MG tablet 10/9/2022  Yes Yes   Sig: Take 500 mg by mouth daily   zinc 50 MG TABS 10/9/2022  Yes Yes   Sig: Take 100 mg  by mouth daily      Facility-Administered Medications: None     Allergies   Allergies   Allergen Reactions     Lisinopril Other (See Comments)     Elevated potassium     Sertraline Diarrhea       Physical Exam   Vital Signs: Temp: 99.3  F (37.4  C) Temp src: Temporal BP: (!) 143/87 Pulse: 109   Resp: 18 SpO2: 96 %      Weight: 195 lbs 0 oz    Gen: NAD, pleasant  HEENT: EOMI, MMM  Resp: no crackles,  no wheezes, no increased work of resp  CV: S1S2 heard, reg rhythm, reg rate  Abdo: soft, nontender, nondistended, bowel sounds present  Ext: calves nontender, well perfused, mild bilateral pitting, knees not swollen, no erythema or TTP, neurovasc intact bilateral legs distally  Neuro: aa, conversing normally, moving all ext, CN grossly intact, no facial asymmetry      Data   Data reviewed today: I reviewed all medications, new labs and imaging results over the last 24 hours. I personally reviewed no images or EKG's today.    Recent Labs   Lab 10/10/22  1210   WBC 13.2*   HGB 9.8*   MCV 83   *   *   POTASSIUM 4.0   CHLORIDE 89*   CO2 23   BUN 6*   CR 0.57*   ANIONGAP 11   NIMISHA 8.7   *     Recent Results (from the past 24 hour(s))   XR Pelvis w Hip Left 1 View    Narrative    EXAM: PELVIS AND HIP LEFT ONE VIEW  DATE/TIME: 10/10/2022 12:48 PM     INDICATION: Left-sided hip pain. Postoperative follow-up.   COMPARISON: 9/3/2022. 9/4/2022.      Impression    IMPRESSION:  1.  ORIF left proximal femur fracture with intramedullary nail and  femoral neck screw fixation. The orthopedic hardware is intact. There  is some new impaction and medial displacement of the fracture since  the 9/4/2022 intraoperative spot views. There is some osseous healing  response at the fracture margins with sclerosis and callus formation.  2.  Normal joint spacing and alignment.    ARTHUR PICHARDO MD         SYSTEM ID:  [id number]   US Lower Extremity Venous Duplex Bilateral    Narrative    VENOUS ULTRASOUND BILATERAL LEG(S)   10/10/2022 1:25 PM     HISTORY: Bilateral lower extremity pain, swelling and edema    COMPARISON: None.    FINDINGS: Examination of the deep veins with graded compression and  color flow Doppler with spectral wave form analysis was performed.  Images show no evidence of thrombus in the bilateral common femoral  vein, femoral vein, popliteal vein or calf veins.      Impression    IMPRESSION: No deep vein thrombosis in either lower extremity.      ROBERT CARL DO         SYSTEM ID:  [id number]

## 2022-10-10 NOTE — ED PROVIDER NOTES
History   Chief Complaint:  Urinary Retention     The history is provided by the patient.      Suresh Powers is a 62 year old male with history of hypertension and left hip fracture who presents with failure to thrive. The patient states that he recently broke his hip and was hospitalized for ORIF surgery. Following this, he had a catheter in place, in place for about one month and removed on 10/4, and was sent to [facility] for TCU. He was then sent home on 10/6 and has since felt that his condition has worsened. He has only had one full urination since his return home, and notes only being able to drip urine. He notes no dysuria with this, but endorses that pushing to urinate causes bowel incontinence. He also has intermittent nausea, which started occurring the past few days. He spoke with his  today, and was told to come into the ED and called EMS. In the ED, he denies any fever, chills, shortness of breath, chest pain, headache, diarrhea, constipation, vomiting, numbness in his feet or groin, cough, rhinorrhea, sore throat, ear pain, or neck pain. He also has no history of atrial fibrillation. He does endorse some pain in his left leg and knee that has worsened since his ORIF surgery on his left hip, as well as longstanding leg swelling and gait issues. Of note, the patient does have a history or urinary retention following surgeries, but notes that it has never been this bad.     Review of Systems   Constitutional: Negative for chills and fever.   HENT: Negative for ear pain, rhinorrhea and sore throat.    Respiratory: Negative for cough and shortness of breath.    Cardiovascular: Positive for leg swelling (longstanding). Negative for chest pain.   Gastrointestinal: Positive for nausea. Negative for constipation, diarrhea and vomiting.        (+) Incontinence   Genitourinary: Positive for difficulty urinating. Negative for dysuria.   Musculoskeletal: Positive for arthralgias (left hip, knee;  longstanding) and gait problem (longstanding). Negative for neck pain.   Neurological: Negative for numbness and headaches.   All other systems reviewed and are negative.    Allergies:  Lisinopril  Sertaline    Medications:  Oxycodone  Lexapro  Lamictal  Norvasc  Trazodone  Aspirin 325 mg  Flomax    Past Medical History:     Hypertension   Sleep disorder  Unilateral inguinal hernia  Solitary kidney  OCD  SPIKE  Depression  Hyponatremia  Cataract  Closed fracture of left hip     Past Surgical History:    Foot crush injury and repair  Kidney donation  ORIF clavicle  ORIF hip  ORIF tibial plateau     Family History:    Breast cancer - mother  Type II diabetes - mother  Skin cancer - father  Cerebrovascular disease - father    Social History:  Patient came from home via EMS.  Patient is unaccompanied in the ED.  Patient sees [facility] for social work.  PCP: Brad Thurston     Physical Exam     Patient Vitals for the past 24 hrs:   BP Temp Temp src Pulse Resp SpO2 Weight   10/10/22 1258 (!) 143/87 -- -- 109 -- -- --   10/10/22 1200 133/83 -- -- 115 18 96 % --   10/10/22 1145 -- -- -- -- -- 97 % --   10/10/22 1132 135/83 99.3  F (37.4  C) Temporal 114 18 96 % 88.5 kg (195 lb)       Physical Exam    Physical Exam   General:  Sitting on bed. Appears disheveled.  HENT:  No obvious trauma to head  Right Ear:  External ear normal.   Left Ear:  External ear normal.   Nose:  Nose normal.   Eyes:  Conjunctivae and EOM are normal. Pupils are equal, round, and reactive.   Neck: Normal range of motion. Neck supple. No tracheal deviation present.   CV:  Normal heart sounds. No murmur heard.  Pulm/Chest: Effort normal and breath sounds normal.   Abd: Soft. No distension. There is no tenderness. There is no rigidity, no rebound and no guarding.   M/S: Normal range of motion. 2+ pitting edema bilateral lower extremities. Left hip and thigh tenderness with palpation.   Neuro: Alert. GCS 15.  Skin: Skin is warm and dry. No rash noted. Not  diaphoretic.   Psych: Normal mood and affect. Behavior is normal.     Emergency Department Course   ECG  ECG obtained at 1149, ECG read at 1149  Sinus tachycardia  Otherwise normal ECG  No significant change from prior ECG dated 3/24/21.  Rate 115 bpm. FL interval 178 ms. QRS duration 76 ms. QT/QTc 328/453 ms. P-R-T axes 64 72 69.    Imaging:  US Lower Extremity Venous Duplex Bilateral   Final Result   IMPRESSION: No deep vein thrombosis in either lower extremity.        ROBERT DO MESERET            SYSTEM ID:  [id number]      XR Pelvis w Hip Left 1 View   Preliminary Result   IMPRESSION:   1.  ORIF left proximal femur fracture with intramedullary nail and   femoral neck screw fixation. The orthopedic hardware is intact. There   is some impaction and medial displacement of the fracture since the   9/4/2022 intraoperative spot views. There is some osseous healing   response at the fracture margins with sclerosis and callus formation.   2.  Normal joint spacing and alignment.        Report per radiology    Laboratory:  Labs Ordered and Resulted from Time of ED Arrival to Time of ED Departure   BASIC METABOLIC PANEL - Abnormal       Result Value    Sodium 123 (*)     Potassium 4.0      Chloride 89 (*)     Carbon Dioxide (CO2) 23      Anion Gap 11      Urea Nitrogen 6 (*)     Creatinine 0.57 (*)     Calcium 8.7      Glucose 105 (*)     GFR Estimate >90     ROUTINE UA WITH MICROSCOPIC REFLEX TO CULTURE - Abnormal    Color Urine Light Yellow      Appearance Urine Slightly Cloudy (*)     Glucose Urine Negative      Bilirubin Urine Negative      Ketones Urine 60 (*)     Specific Gravity Urine 1.010      Blood Urine Negative      pH Urine 6.0      Protein Albumin Urine 20 (*)     Urobilinogen Urine Normal      Nitrite Urine Positive (*)     Leukocyte Esterase Urine Large (*)     Bacteria Urine Few (*)     Mucus Urine Present (*)     RBC Urine 1      WBC Urine 115 (*)     Hyaline Casts Urine 3 (*)    CBC WITH PLATELETS AND  DIFFERENTIAL - Abnormal    WBC Count 13.2 (*)     RBC Count 3.60 (*)     Hemoglobin 9.8 (*)     Hematocrit 29.8 (*)     MCV 83      MCH 27.2      MCHC 32.9      RDW 15.9 (*)     Platelet Count 451 (*)     % Neutrophils 78      % Lymphocytes 11      % Monocytes 10      % Eosinophils 1      % Basophils 0      % Immature Granulocytes 0      NRBCs per 100 WBC 0      Absolute Neutrophils 10.3 (*)     Absolute Lymphocytes 1.5      Absolute Monocytes 1.4 (*)     Absolute Eosinophils 0.1      Absolute Basophils 0.0      Absolute Immature Granulocytes 0.1      Absolute NRBCs 0.0     LACTIC ACID WHOLE BLOOD - Abnormal    Lactic Acid 0.6 (*)    TROPONIN I - Normal    Troponin I High Sensitivity 5     NT PROBNP INPATIENT - Normal    N terminal Pro BNP Inpatient 502     URINE CULTURE   BLOOD CULTURE   BLOOD CULTURE        Emergency Department Course:  Reviewed:  I reviewed nursing notes, vitals, past medical history and Care Everywhere    Assessments:  1105 I obtained history and examined the patient as noted above.   1330 I rechecked the patient and explained findings.     Consults:  1323 I consulted with Dr. Long of the hospitalist service and discussed patient admission. They accepted care of the patient.    Interventions:  1212 NS 1 L IV  1303 Rocephin 2 g IV    Disposition:  The patient was admitted to the hospital under the care of Dr. Long.     Impression & Plan   Medical Decision Making:  Suresh Powers is a very pleasant 62 year old year old patient who presents to the emergency department with concern of urinary retention.  The patient fell and had a hip fracture approximately month ago.  He had surgery and has been at a TCU for approximately 1 month and at home now for the past 4 days.  He has stairs to get up to his apartment.  He had difficulty with the stairs initially and had a friend help him.  He lives by himself.  He has been unable to care for himself at home.  He feels too debilitated and does not feel  comfortable or safe living at home.  He talked to his  today, Charito, who recommended he come to the emergency department for admission again.    Postoperatively after the hip was repaired, he had urinary retention and required a Prieto catheter.  This was removed when he was discharged from [facility].  He has had difficulty urinating since then.  Post void residual showed a significant urine filled bladder with difficulty reading by the machine.  He consented for a catheter and had approximately 1200 mL of urine output.  This shows evidence of an infection in the ua.  He is tachycardic and has a leukocytosis as well consistent with sepsis.  Fortunately, his lactate is normal.  2 blood cultures were obtained and he was provided Rocephin.  Of note, the patient does have mild peripheral edema to the lower extremities that he reports is somewhat baseline, but perhaps worse.  BNP is normal.  Doubt CHF and lungs are clear and there is no coarse breath sounds to suggest CHF exacerbation or pneumonia.  Bilateral lower extremity ultrasounds were obtained to assess for DVT since he is at increased risk of this having had surgery recently.  These revealed no evidence of DVT.  The patient is not safe to go back home and certainly with this diagnosis of sepsis, he requires admission to the hospital.  I spoke to the hospitalist, Dr. Long, who has agreed to admit the patient for continued evaluation and treatment.    Diagnosis:    ICD-10-CM    1. Sepsis, due to unspecified organism, unspecified whether acute organ dysfunction present (H)  A41.9       2. Urinary tract infection without hematuria, site unspecified  N39.0       3. Urinary retention  R33.9             Scribe Disclosure:  I, Marianne Waller, am serving as a scribe at 11:05 AM on 10/10/2022 to document services personally performed by Augie Berg DO based on my observations and the provider's statements to me.        Augie Berg  DO [place]  10/10/22 [id number]

## 2022-10-10 NOTE — CONSULTS
Urology Consult    Name: Suresh Powers    MRN: [id number]   YOB: 1960               Chief Complaint:   Urinary retention, UTI    History is obtained from the patient and chart review          History of Present Illness:   Suresh Powers is a 62 year old male with a history of recent hip fracture in September 2022 and post-op urinary retention (vasquez catheter removed 5 days ago) who presented to the ER today with difficulty urinating, nausea, and leg pain.     Per chart review, his catheter was placed following surgery for a hip fracture in early September. He had post-op urinary retention and was discharged with the catheter in place. He was offered several voiding trials through the month of September, but declined until his appointment on 10/5. He states he was started on Flomax after the catheter was removed.     He presented to the ER today with urinary dribbling since his catheter was removed. A catheter was replaced with 1200 mL urine return. Vitals were notable for tachycardia (110s), UA consistent with infection, and WBC 13. He was started on Ceftriaxone and admitted tu the hospital for further cares.    In speaking with the patient today, he states he has struggled with urinary retention for many years- especially in the setting of narcotic use and in the post-op period. He has never seen a urologist before. There is no cross sectional imaging available to evaluate prostate size. His last PSA was 1.4 in 2019.           Past Medical History:     Past Medical History:   Diagnosis Date     Anxiety     sees psych for trazodone     Clostridium difficile diarrhea 3/9/2016     Foot pain      Hypertension      Mold exposure      Shingles 1984            Past Surgical History:     Past Surgical History:   Procedure Laterality Date     foot crush injury       kidney donation       OPEN REDUCTION INTERNAL FIXATION CLAVICLE Left 2/4/2016    Procedure: OPEN REDUCTION INTERNAL FIXATION CLAVICLE;  Surgeon:  Rakesh Hui MD;  Location:  OR     OPEN REDUCTION INTERNAL FIXATION HIP NAILING Left 9/4/2022    Procedure: Open reduction internal fixation of left hip fracture;  Surgeon: Huang Cochran MD;  Location: [facility] OR     OPEN REDUCTION INTERNAL FIXATION TIBIAL PLATEAU Left 2/4/2016    Procedure: OPEN REDUCTION INTERNAL FIXATION TIBIAL PLATEAU;  Surgeon: Rakesh Hui MD;  Location: [facility] OR            Social History:     Social History     Tobacco Use     Smoking status: Former     Packs/day: 1.00     Years: 0.00     Pack years: 0.00     Types: Cigarettes     Smokeless tobacco: Former     Quit date: 2/2/2016   Substance Use Topics     Alcohol use: Yes     Comment: states 8 beerrs all day over past 8 hours            Family History:     Family History   Problem Relation Age of Onset     Breast Cancer Mother      Diabetes Type 2  Mother      Skin Cancer Father      Cerebrovascular Disease Father             Allergies:     Allergies   Allergen Reactions     Lisinopril Other (See Comments)     Elevated potassium     Sertraline Diarrhea            Medications:     Current Facility-Administered Medications   Medication     amLODIPine (NORVASC) tablet 10 mg     busPIRone (BUSPAR) tablet 15 mg     [START ON 10/11/2022] cefTRIAXone (ROCEPHIN) 1 g vial to attach to  mL bag for ADULTS or NS 50 mL bag for PEDS     escitalopram (LEXAPRO) tablet 20 mg     fluticasone (FLONASE) 50 MCG/ACT spray 2 spray     hydrALAZINE (APRESOLINE) injection 10 mg     lamoTRIgine (LaMICtal) tablet 200 mg     oxyCODONE (ROXICODONE) tablet 5 mg     polyethylene glycol (MIRALAX) Packet 17 g     polyethylene glycol-propylene glycol (SYSTANE ULTRA) ophthalmic solution 1 drop     tamsulosin (FLOMAX) capsule 0.4 mg     traZODone (DESYREL) tablet 100 mg     Current Outpatient Medications   Medication Sig     acetaminophen (TYLENOL) 325 MG tablet Take 2 tablets (650 mg) by mouth every 8 hours as needed     amLODIPine (NORVASC) 10 MG tablet Take  1 tablet (10 mg) by mouth daily     busPIRone (BUSPAR) 15 MG tablet Take 1 tablet (15 mg) by mouth 2 times daily     escitalopram (LEXAPRO) 20 MG tablet Take 20 mg by mouth daily     fluticasone (FLONASE) 50 MCG/ACT nasal spray use 2 sprays in each nostril daily.     lamoTRIgine (LAMICTAL) 200 MG tablet Take 200 mg by mouth At Bedtime     Multiple Vitamins-Minerals (MULTIVITAMIN ADULT PO) Take 1 tablet by mouth daily     oxyCODONE (ROXICODONE) 5 MG tablet Take 1 tablet (5 mg) by mouth 2 times daily as needed for pain     polyethylene glycol (MIRALAX) 17 g packet Take 1 packet by mouth daily as needed for constipation     polyethylene glycol-propylene glycol (SYSTANE ULTRA) 0.4-0.3 % SOLN ophthalmic solution Place 1 drop into both eyes 4 times daily as needed     tamsulosin (FLOMAX) 0.4 MG capsule Take 1 capsule (0.4 mg) by mouth every evening     traZODone (DESYREL) 50 MG tablet Take 2 tablets (100 mg) by mouth At Bedtime     vitamin C (ASCORBIC ACID) 500 MG tablet Take 500 mg by mouth daily     zinc 50 MG TABS Take 100 mg by mouth daily     aspirin (ASA) 325 MG EC tablet Take 1 tablet (325 mg) by mouth daily (Patient not taking: Reported on 10/10/2022)             Review of Systems:    ROS: 10 point ROS neg other than the symptoms noted above in the HPI           Physical Exam:   VS:  T: 99.3    HR: 111    BP: 155/89    RR: 18   GEN:  AOx3.  NAD.    CV:  RRR  LUNGS: Non-labored breathing.   BACK:  No midline or CVA tenderness.  ABD:  Soft.  NT.  ND.  No rebound or guarding.  No masses.  : Catheter draining clear/yellow urine  EXT:  Warm, well perfused. No edema.  SKIN:  Warm.  Dry.  No rashes.  NEURO:  CN grossly intact.            Data:   All laboratory data reviewed:    Recent Labs   Lab 10/10/22  1210   WBC 13.2*   HGB 9.8*   *     Recent Labs   Lab 10/10/22  1210   *   POTASSIUM 4.0   CHLORIDE 89*   CO2 23   BUN 6*   CR 0.57*   *   NIMISHA 8.7     Recent Labs   Lab 10/10/22  1156   COLOR  Light Yellow   APPEARANCE Slightly Cloudy*   URINEGLC Negative   URINEBILI Negative   URINEKETONE 60*   SG 1.010   URINEPH 6.0   PROTEIN 20*   NITRITE Positive*   LEUKEST Large*   RBCU 1   WBCU 115*          Impression and Plan:   Impression:   Suresh Powers is a 62 year old male with a history of recent hip fracture and post-operative urinary retention who presents today with recurrent retention after catheter removal 5 days ago, and evidence of UTI. The patient has never seen a urologist previously, but has a longstanding history of retention in the post-operative period, and while taking narcotic pain medications. We discussed possible etiologies of urinary retention with Mr. Powers today, including BPH, or detrusor failure. We agree with catheter replacement in the meantime and antibiotic treatment of likely UTI. Our team will plan to further investigate his urinary retention on an outpatient basis once he has recovered from his acute illness. Of note, he expresses concern to our team today about being able to acquire transportation to and from his medical appointments. He is hoping to acquire a different living situation on discharge.       Plan:     - Leave vasquez catheter in place now and continue on discharge. Our team will facilitate voiding trial in our office as an outpatient in a few weeks once his infection resolves. His catheter should be exchanged monthly.    - Continue Flomax, 0.4 mg daily    - Minimize narcotic pain medications as able and encourage ambulation as this can improve retention  - The patient will require PSA screening in the near future (not during this hospitalization in the setting of recent catheterization and infection)  - Antibiotic therapy per primary team, tailoring to culture results.     - Urology will sign off. Please contact resident/PA on call with any questions or concerns.     Seen with urology staff surgeon Dr. Steiner, who developed the treatment plan.    Mary Blount  MD  PGY-5 Urology  Pager [phone]           '

## 2022-10-10 NOTE — ED NOTES
[facility]  ED Nurse Handoff Report    ED Chief complaint: Urinary Retention      ED Diagnosis:   Final diagnoses:   Sepsis, due to unspecified organism, unspecified whether acute organ dysfunction present (H)   Urinary tract infection without hematuria, site unspecified   Urinary retention       Code Status: Full Code    Allergies:   Allergies   Allergen Reactions     Lisinopril Other (See Comments)     Elevated potassium     Sertraline Diarrhea       Patient Story: Pt hx of urinary retention and leakage for 2 + months.  Focused Assessment:  Pt came to ED via EMS for evaluation of difficulty caring for self at home and urinary retention. Pt states nurse line told him to come to ED for evaluation. Bladder scanner showed greater than 300ml. Pt attempted to void so RN could do a post void scan but was unable to. 16fr coude vasquez cath placed with immediate urine return of 1000+ml. Pt states significant relief afterwards.Pt urine positive for infection with elevated WBC. Rocephin given with 1L NS IV.    Treatments and/or interventions provided: IVF, abx, vasquez placement  Patient's response to treatments and/or interventions: improved    To be done/followed up on inpatient unit:      Does this patient have any cognitive concerns?: none    Activity level - Baseline/Home:  Independent and Stand with Assist  Activity Level - Current:   Stand with Assist    Patient's Preferred language: English   Needed?: No    Isolation: None  Infection: Not Applicable  None    Patient tested for COVID 19 prior to admission: YES  Bariatric?: No    Vital Signs:   Vitals:    10/10/22 1132 10/10/22 1145 10/10/22 1200 10/10/22 1258   BP: 135/83  133/83 (!) 143/87   Pulse: 114  115 109   Resp: 18  18    Temp: 99.3  F (37.4  C)      TempSrc: Temporal      SpO2: 96% 97% 96%    Weight: 88.5 kg (195 lb)          Cardiac Rhythm:     Was the PSS-3 completed:   Yes  What interventions are required if any?  NA              Family Comments: NA  OBS brochure/video discussed/provided to patient/family: N/A              Name of person given brochure if not patient: NA              Relationship to patient: NA    For the majority of the shift this patient's behavior was Green.   Behavioral interventions performed were NA.    ED NURSE PHONE NUMBER: [phone]

## 2022-10-10 NOTE — ED NOTES
Bed: ED10  Expected date:   Expected time:   Means of arrival:   Comments:  Hems [address] 62 M urinary retention weakness eta 1050

## 2022-10-10 NOTE — ED TRIAGE NOTES
lives at home alone, called 911 unable to care for self, urinary retention since  Prieto removed post op hip fraction, surgery was 9/3, states has only eated peanutbutter sandwiches since home, walks with walker,

## 2022-10-11 ENCOUNTER — APPOINTMENT (OUTPATIENT)
Dept: PHYSICAL THERAPY | Facility: CLINIC | Age: 62
DRG: 872 | End: 2022-10-11
Attending: HOSPITALIST
Payer: COMMERCIAL

## 2022-10-11 LAB
ANION GAP SERPL CALCULATED.3IONS-SCNC: 7 MMOL/L (ref 3–14)
BUN SERPL-MCNC: 5 MG/DL (ref 7–30)
CALCIUM SERPL-MCNC: 8.6 MG/DL (ref 8.5–10.1)
CHLORIDE BLD-SCNC: 91 MMOL/L (ref 94–109)
CO2 SERPL-SCNC: 27 MMOL/L (ref 20–32)
CREAT SERPL-MCNC: 0.57 MG/DL (ref 0.66–1.25)
ERYTHROCYTE [DISTWIDTH] IN BLOOD BY AUTOMATED COUNT: 15.9 % (ref 10–15)
GFR SERPL CREATININE-BSD FRML MDRD: >90 ML/MIN/1.73M2
GLUCOSE BLD-MCNC: 103 MG/DL (ref 70–99)
HCT VFR BLD AUTO: 26.2 % (ref 40–53)
HGB BLD-MCNC: 8.6 G/DL (ref 13.3–17.7)
MCH RBC QN AUTO: 26.5 PG (ref 26.5–33)
MCHC RBC AUTO-ENTMCNC: 32.8 G/DL (ref 31.5–36.5)
MCV RBC AUTO: 81 FL (ref 78–100)
OSMOLALITY SERPL: 262 MMOL/KG (ref 280–301)
OSMOLALITY UR: 273 MMOL/KG (ref 100–1200)
PLATELET # BLD AUTO: 432 10E3/UL (ref 150–450)
POTASSIUM BLD-SCNC: 3.7 MMOL/L (ref 3.4–5.3)
RBC # BLD AUTO: 3.25 10E6/UL (ref 4.4–5.9)
SODIUM SERPL-SCNC: 125 MMOL/L (ref 133–144)
SODIUM SERPL-SCNC: 126 MMOL/L (ref 133–144)
SODIUM SERPL-SCNC: 126 MMOL/L (ref 133–144)
SODIUM SERPL-SCNC: 127 MMOL/L (ref 133–144)
SODIUM UR-SCNC: 24 MMOL/L
TSH SERPL DL<=0.005 MIU/L-ACNC: 1.5 MU/L (ref 0.4–4)
WBC # BLD AUTO: 13 10E3/UL (ref 4–11)

## 2022-10-11 PROCEDURE — 120N000001 HC R&B MED SURG/OB

## 2022-10-11 PROCEDURE — 99233 SBSQ HOSP IP/OBS HIGH 50: CPT | Performed by: INTERNAL MEDICINE

## 2022-10-11 PROCEDURE — 83930 ASSAY OF BLOOD OSMOLALITY: CPT | Performed by: INTERNAL MEDICINE

## 2022-10-11 PROCEDURE — 84295 ASSAY OF SERUM SODIUM: CPT | Performed by: INTERNAL MEDICINE

## 2022-10-11 PROCEDURE — 84443 ASSAY THYROID STIM HORMONE: CPT | Performed by: INTERNAL MEDICINE

## 2022-10-11 PROCEDURE — 97116 GAIT TRAINING THERAPY: CPT | Mod: GP

## 2022-10-11 PROCEDURE — 36415 COLL VENOUS BLD VENIPUNCTURE: CPT | Performed by: HOSPITALIST

## 2022-10-11 PROCEDURE — 85027 COMPLETE CBC AUTOMATED: CPT | Performed by: HOSPITALIST

## 2022-10-11 PROCEDURE — 250N000011 HC RX IP 250 OP 636: Performed by: INTERNAL MEDICINE

## 2022-10-11 PROCEDURE — 36415 COLL VENOUS BLD VENIPUNCTURE: CPT | Performed by: INTERNAL MEDICINE

## 2022-10-11 PROCEDURE — 97530 THERAPEUTIC ACTIVITIES: CPT | Mod: GP

## 2022-10-11 PROCEDURE — 97161 PT EVAL LOW COMPLEX 20 MIN: CPT | Mod: GP

## 2022-10-11 PROCEDURE — 250N000013 HC RX MED GY IP 250 OP 250 PS 637: Performed by: HOSPITALIST

## 2022-10-11 PROCEDURE — 84295 ASSAY OF SERUM SODIUM: CPT | Performed by: HOSPITALIST

## 2022-10-11 RX ORDER — NALOXONE HYDROCHLORIDE 0.4 MG/ML
0.4 INJECTION, SOLUTION INTRAMUSCULAR; INTRAVENOUS; SUBCUTANEOUS
Status: DISCONTINUED | OUTPATIENT
Start: 2022-10-11 | End: 2022-10-18 | Stop reason: HOSPADM

## 2022-10-11 RX ORDER — PIPERACILLIN SODIUM, TAZOBACTAM SODIUM 3; .375 G/15ML; G/15ML
3.38 INJECTION, POWDER, LYOPHILIZED, FOR SOLUTION INTRAVENOUS EVERY 6 HOURS
Status: DISCONTINUED | OUTPATIENT
Start: 2022-10-11 | End: 2022-10-12

## 2022-10-11 RX ORDER — NALOXONE HYDROCHLORIDE 0.4 MG/ML
0.2 INJECTION, SOLUTION INTRAMUSCULAR; INTRAVENOUS; SUBCUTANEOUS
Status: DISCONTINUED | OUTPATIENT
Start: 2022-10-11 | End: 2022-10-18 | Stop reason: HOSPADM

## 2022-10-11 RX ADMIN — AMLODIPINE BESYLATE 10 MG: 5 TABLET ORAL at 08:22

## 2022-10-11 RX ADMIN — BUSPIRONE HYDROCHLORIDE 15 MG: 5 TABLET ORAL at 19:51

## 2022-10-11 RX ADMIN — PIPERACILLIN AND TAZOBACTAM 3.38 G: 3; .375 INJECTION, POWDER, FOR SOLUTION INTRAVENOUS at 08:23

## 2022-10-11 RX ADMIN — BUSPIRONE HYDROCHLORIDE 15 MG: 5 TABLET ORAL at 08:22

## 2022-10-11 RX ADMIN — PIPERACILLIN AND TAZOBACTAM 3.38 G: 3; .375 INJECTION, POWDER, FOR SOLUTION INTRAVENOUS at 14:44

## 2022-10-11 RX ADMIN — ESCITALOPRAM OXALATE 20 MG: 10 TABLET ORAL at 08:22

## 2022-10-11 RX ADMIN — LAMOTRIGINE 200 MG: 100 TABLET ORAL at 23:01

## 2022-10-11 RX ADMIN — TAMSULOSIN HYDROCHLORIDE 0.4 MG: 0.4 CAPSULE ORAL at 19:52

## 2022-10-11 RX ADMIN — POLYETHYLENE GLYCOL 400 AND PROPYLENE GLYCOL 1 DROP: 4; 3 SOLUTION/ DROPS OPHTHALMIC at 23:01

## 2022-10-11 RX ADMIN — TRAZODONE HYDROCHLORIDE 100 MG: 50 TABLET ORAL at 23:00

## 2022-10-11 RX ADMIN — PIPERACILLIN AND TAZOBACTAM 3.38 G: 3; .375 INJECTION, POWDER, FOR SOLUTION INTRAVENOUS at 19:54

## 2022-10-11 ASSESSMENT — ACTIVITIES OF DAILY LIVING (ADL)
FALL_HISTORY_WITHIN_LAST_SIX_MONTHS: YES
CONCENTRATING,_REMEMBERING_OR_MAKING_DECISIONS_DIFFICULTY: YES
ADLS_ACUITY_SCORE: 35
ADLS_ACUITY_SCORE: 35
ADLS_ACUITY_SCORE: 31
DIFFICULTY_EATING/SWALLOWING: NO
ADLS_ACUITY_SCORE: 37
ADLS_ACUITY_SCORE: 31
DRESSING/BATHING: BATHING DIFFICULTY, REQUIRES EQUIPMENT
DOING_ERRANDS_INDEPENDENTLY_DIFFICULTY: NO
ADLS_ACUITY_SCORE: 35
CHANGE_IN_FUNCTIONAL_STATUS_SINCE_ONSET_OF_CURRENT_ILLNESS/INJURY: YES
ADLS_ACUITY_SCORE: 31
TOILETING_ISSUES: YES
TOILETING_ASSISTANCE: TOILETING DIFFICULTY, REQUIRES EQUIPMENT
ADLS_ACUITY_SCORE: 35
WALKING_OR_CLIMBING_STAIRS: AMBULATION DIFFICULTY, REQUIRES EQUIPMENT
WALKING_OR_CLIMBING_STAIRS_DIFFICULTY: YES
ADLS_ACUITY_SCORE: 41
DRESSING/BATHING_DIFFICULTY: YES
ADLS_ACUITY_SCORE: 35
ADLS_ACUITY_SCORE: 41
ADLS_ACUITY_SCORE: 37
WEAR_GLASSES_OR_BLIND: NO

## 2022-10-11 NOTE — PLAN OF CARE
OT: Orders received. Chart reviewed and discussed with care team.  Per PT, patient to discharge to TCU as he was not thriving at home, PT to address functional mobility and transfers. To avoid duplication of services, OT will defer to next level of care at TCU. Defer discharge recommendations to PT.  Will complete orders.

## 2022-10-11 NOTE — PLAN OF CARE
Pt arrived this am from ED.  Prieto in place, draining without issue.  NS infusing, intermittent antibiotic.  Monitoring sodium level closely, NS infusing. Tolerating regular diet.  Showered with assistance this am.  Pt states he cannot care for himself at home.  Up with SBA/GB/walker.  Therapies and SWS consulted.  Recent ORIF left hip, only scar remains.  Up with walker/SBA.  Sacral mepilex placed.
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
## 2022-10-11 NOTE — PROGRESS NOTES
[facility]    Hospitalist Progress Note    Assessment & Plan   Date of Admission:  10/10/2022        Assessment & Plan     Suresh Powers is a 62 year old male with history of hypertension, depression, anxiety, OCD, left hip fracture status post surgery September 2022, and sleep disorder among others who presented to the ED from home with difficulty urinating.  Patient reports being admitted for few weeks at [facility] until about 5 days ago after he had fallen and broken his left hip which required surgery.  In the catheter for nearly a month which was then removed last Tuesday (6 days ago).  He reports voiding once at TCU prior to leaving.  Since he got home he has not been doing well in general.  Considerably weak and essentially not safe to be moving around independently at home even with walker assistance devices.  No falls or head traumas or passing out.  He does feel that he came close to passing out/falling at different times.  He has had barely any urinary output despite maximal efforts and unfortunately had some bowel incontinence while attempting to urinate.  He denies any fevers, chills, coughing, shortness of breath, chest pain, palpitation, abdominal pain, nausea, vomiting, or diarrhea and has been taking his medications as directed.  He is very appreciative of cares and he is very sure that he is unable to take care of himself at home in his current state.        Sepsis secondary to UTI  Urinary retention  Patient has been home from TCU ([facility]) for a few days after previously being hospitalized for a broken hip requiring surgery. He had retention when he left the hospital and went to TCU and required a catheter for around a month and was removed 10/4.  Reports voiding at TCU prior to leaving the next day.  At home he was unable to void and has had some bowel incontinence when straining to urinate.  Denies any fevers or chills, abdominal pain, flank pain or back  pain.  UA congruent with infection.  Urine culture and blood cultures pending.  Started on empiric Rocephin in ED.  WBC 13.2K, tachycardic to 1 teens, respiratory rate low 20s, UA congruent with infection.  -afebrile. Wbc stable at 13,   -cr wnl this am.   -afebrile. HR low 100 range. Not appear toxic    Plan;   -strict I/o to follow for post obstructive diuresis  - Admit to inpatient  - Continue empiric antibiotics as above-follow urine culture and blood cultures, will change rocephin to zozyn as pt with complicated uti, adjust abx per ucx results and sensitivities  - Acetaminophen as needed for fever pain  - Continue Prieto catheter  - Urology consulted- signed off , will need urology follow up outpatient  - PTA flomax continued     Hypochloremic hyponatremia  -chronic to a degree, chlorthalidone stopped long time ago per pt. no longer taking this.   -asymptomatic  Sodium 123 on adm. chloride 89.  Patient reports struggling at home in general.  He does seem to have a degree of chronic hyponatremia with baseline may be in the upper 120s or 130.  Likely lower today due to decreased p.o. intake as he was at home and really not able to care for himself.  -very poor fluid and solute intake last 4 days.     Plan   - q4 hour Na levels for now.   -urine osmol, urine sodium and serum osmol now  - Gentle IV fluids with normal saline  - Goal sodium correction for 24 hours would be 129-131 at maximum at noon  -encourage solute intake     Generalized weakness  Left hip fracture requiring surgery status post fall-early September 2022  As above, patient was hospitalized here in early September required surgery subsequently went to TCU for few weeks and was discharged home less than 1 week ago.  He has not been doing well and has had near falls multiple times. Does endorse worsened L knee pain.   -pain L hip gradually getting a little better.  -had his 2 week follow up with ortho at his TCU   - PT/OT  - CM/SW  - L knee xr  -fall  "precuations  -has ortho follow up on Tuesday     HTN  HL  Mildly hypertensive.    -PTA regimen includes amlodipine 10 mg daily.    Does not appear to be on statin.  - Continue PTA amlodipine  - As needed hydralazine     SPIKE  Depression  OCD  Stable upon admission.  Appropriate mood and affect.  Very appreciative of cares.  - Continue PTA buspirone, escitalopram (hold with low Na for now ), and lamotrigine           Diet:   reg  DVT Prophylaxis: Pneumatic Compression Devices  Vasquez Catheter: PRESENT, indication:  vasquez placed in ED 10/10.   Central Lines: None  Cardiac Monitoring: None  Code Status:   Full Code -discussed in detail and confirmed upon admission           Clinically Significant Risk Factors Present on Admission            # Hyponatremia: Na = 123 mmol/L (Ref range: 133 - 144 mmol/L) on admission, will monitor as appropriate            # Obesity: Estimated body mass index is 30.54 kg/m  as calculated from the following:    Height as of 10/4/22: 1.702 m (5' 7\").    Weight as of this encounter: 88.5 kg (195 lb).           Disposition Plan- likely another 2 days in hospital  Will likely need tcu. sw consult  PT, OT         Rancho Paez MD, MD  Text Page  (7am to 6pm)  Interval History   Na up to 125/126 this am  Very poor fluid and solute intake over last 4-5 days.   No cp or sob.   Pain in L hip slowly getting better      -Data reviewed today: I reviewed all new labs and imaging results over the last 24 hours. I personally reviewed imaging and labs last 24 hours.     Physical Exam   Temp: 98.3  F (36.8  C) Temp src: Oral BP: (!) 147/70 Pulse: 107   Resp: 20 SpO2: 96 % O2 Device: None (Room air)    Vitals:    10/10/22 1132   Weight: 88.5 kg (195 lb)     Vital Signs with Ranges  Temp:  [98.3  F (36.8  C)-99.3  F (37.4  C)] 98.3  F (36.8  C)  Pulse:  [] 107  Resp:  [8-27] 20  BP: (127-165)/(70-99) 147/70  Cuff Mean (mmHg):  [96] 96  SpO2:  [89 %-97 %] 96 %  I/O last 3 completed shifts:  In: 400 " [P.O.:400]  Out: 2650 [Urine:2650]    Constitutional: Nad, nontoxic appearing   Respiratory: CTAB  Cardiovascular: RRR no r/g/m  GI: soft, nt, nd  Skin/Integumen: no rash, 1+ to mild bLE edema  Neuro: nl speech and mentation  Psych:nl affect      Medications     sodium chloride 75 mL/hr at 10/11/22 0405       amLODIPine  10 mg Oral Daily     busPIRone  15 mg Oral BID     escitalopram  20 mg Oral Daily     lamoTRIgine  200 mg Oral At Bedtime     piperacillin-tazobactam  3.375 g Intravenous Q6H     senna-docusate  1 tablet Oral BID    Or     senna-docusate  2 tablet Oral BID     sodium chloride (PF)  3 mL Intracatheter Q8H     tamsulosin  0.4 mg Oral QPM     traZODone  100 mg Oral At Bedtime       Data   Recent Labs   Lab 10/11/22  0614 10/11/22  0019 10/10/22  2015 10/10/22  1210   WBC 13.0*  --   --  13.2*   HGB 8.6*  --   --  9.8*   MCV 81  --   --  83     --   --  451*   * 126* 124* 123*   POTASSIUM 3.7  --  3.8 4.0   CHLORIDE 91*  --  90* 89*   CO2 27  --  25 23   BUN 5*  --  5* 6*   CR 0.57*  --  0.61* 0.57*   ANIONGAP 7  --  9 11   NIMISHA 8.6  --  8.2* 8.7   *  --  160* 105*       Imaging:   Recent Results (from the past 24 hour(s))   XR Pelvis w Hip Left 1 View    Narrative    EXAM: PELVIS AND HIP LEFT ONE VIEW  DATE/TIME: 10/10/2022 12:48 PM     INDICATION: Left-sided hip pain. Postoperative follow-up.   COMPARISON: 9/3/2022. 9/4/2022.      Impression    IMPRESSION:  1.  ORIF left proximal femur fracture with intramedullary nail and  femoral neck screw fixation. The orthopedic hardware is intact. There  is some new impaction and medial displacement of the fracture since  the 9/4/2022 intraoperative spot views. There is some osseous healing  response at the fracture margins with sclerosis and callus formation.  2.  Normal joint spacing and alignment.    ARTHUR PICHARDO MD         SYSTEM ID:  [id number]   US Lower Extremity Venous Duplex Bilateral    Narrative    VENOUS ULTRASOUND BILATERAL  LEG(S)  10/10/2022 1:25 PM     HISTORY: Bilateral lower extremity pain, swelling and edema    COMPARISON: None.    FINDINGS: Examination of the deep veins with graded compression and  color flow Doppler with spectral wave form analysis was performed.  Images show no evidence of thrombus in the bilateral common femoral  vein, femoral vein, popliteal vein or calf veins.      Impression    IMPRESSION: No deep vein thrombosis in either lower extremity.      ROBERT CARL DO         SYSTEM ID:  [id number]   XR Knee Left 1/2 Views    Narrative    EXAM: XR KNEE LEFT 1/2 VIEWS  LOCATION: [facility]  DATE/TIME: 10/10/2022 11:18 PM    INDICATION: pain, recent fall  COMPARISON: 11/17/2021      Impression    IMPRESSION: Normal joint spaces and alignment. No acute fracture or joint effusion. Prior proximal tibial ORIF. Osteopenia.

## 2022-10-11 NOTE — ED NOTES
"Pt stated that he needed assistance with cleaning the \"feces\" off of his butt because he had been sitting in his own feces for days. I asked the pt to stand (because pt is able to stand on his own) and I would get him some wipes so he could clean hisself off. When the pt stood his chucks pad was completely clean and dry and his bottom was also clean. I gave the pt a wipe so he cold wipe hisself and he did ( the whip had a small brown smudge on it but the second wipe was clean after use).   "

## 2022-10-11 NOTE — PLAN OF CARE
Goal Outcome Evaluation:      Plan of Care Reviewed With: patient    Overall Patient Progress: no changeOverall Patient Progress: no change    Outcome Evaluation: Patient reports poor appetite over the past month. Recommended concentrated protein supplements (ie, Ensure, Gelatein). Offered to order scheduled supplements but patient declined at this time and prefers to order PRN with meals. Discussed importance of increasing oral intake with hyponatremia. Patient verbalizes understanding and plans to order a meal this morning with supplement use. Recommend continue regular diet.    Cora Almazan RD, LD

## 2022-10-11 NOTE — UTILIZATION REVIEW
Admission Status; Secondary Review Determination       Under the authority of the Utilization Management Committee, the utilization review process indicated a secondary review on the above patient. The review outcome is based on review of the medical records, discussions with staff, and applying clinical experience noted on the date of the review.     (x) Inpatient Status Appropriate - This patient's medical care is consistent with medical management for inpatient care and reasonable inpatient medical practice.     RATIONALE FOR DETERMINATION   62 year old male with history of hypertension, depression, anxiety, OCD, left hip fracture status post surgery September 2022, and sleep disorder among others who presented to the ED from home with difficulty urinating.  Patient reports being admitted for few weeks at [facility] TCU until about 5 days ago after he had fallen and broken his left hip which required surgery.  In the catheter for nearly a month which was then removed last Tuesday (6 days ago).  He reports voiding once at TCU prior to leaving.  Since he got home he has not been doing well in general.    Patient requires inpatient admission versus short stay observation or outpatient treatment for the following reasons: Multiple concerning issues in this patient including sepsis secondary to urinary tract infection in the setting of urinary retention and complexity, significant inflammatory response with leukocytosis, tachycardia persistently above 100 and up to 120 heart rate, tachypnea with respiratory rate in the upper 20s, severe hyponatremia with a sodium last month in normal range 136, presenting sodium is 123 with decreased oral intake at home recent fall resulting in hip fracture patient is at significant risk of adverse outcome if treated in a short stay or as outpatient.    The expected length of stay at the time of admission was more than 2 nights because of the severity of illness, intensity of  service provided, and risk for adverse outcome. Inpatient admission is appropriate.         This document was produced using voice recognition software       The information on this document is developed by the utilization review team in order for the business office to ensure compliance. This only denotes the appropriateness of proper admission status and does not reflect the quality of care rendered.   The definitions of Inpatient Status and Observation Status used in making the determination above are those provided in the CMS Coverage Manual, Chapter 1 and Chapter 6, section 70.4.   Sincerely,   АЛЕКСАНДР BENZ MD   System Medical Director   Utilization Management   [facility].

## 2022-10-11 NOTE — CONSULTS
CLINICAL NUTRITION SERVICES  -  ASSESSMENT NOTE      Recommendations Ordered by Registered Dietitian (RD):   Recommended concentrated protein supplements (ie, Ensure, Gelatein). Offered to order scheduled supplements but patient declined at this time and prefers to order PRN with meals. Discussed importance of increasing oral intake with hyponatremia. Patient verbalizes understanding and plans to order a meal this morning with supplement use. Recommend continue regular diet.    Malnutrition:   % Weight Loss:  None noted  % Intake:  <75% for >/= 1 month (moderate malnutrition)  Subcutaneous Fat Loss:  None observed  Muscle Loss:  None observed  Fluid Retention:  None noted    Malnutrition Diagnosis: Patient does not meet two of the above criteria necessary for diagnosing malnutrition, but is at risk for malnutrition        REASON FOR ASSESSMENT  Suresh Powers is a 62 year old male seen by Registered Dietitian for Provider Order - at risk for malnourished state. poor appetite, needs po intake/solute to help treat his hyponatremia. assess for appropriate dietary supplement. thanks      NUTRITION HISTORY  - Information obtained from patient at bedside this morning.   - Patient was recently hospitalized last month after a hip fracture, had been residing at [facility] up until 4 days prior to this admission when he was back at home. Notes that while he was at [facility] he wasn't eating a whole lot. Once he got home he was only able to tolerated 1/2 PB sandwich. Notes his nutrition has been poor for about a month now.   - No known food allergies noted.       CURRENT NUTRITION ORDERS  Diet Order:     Regular     Current Intake/Tolerance:  Patient reports he felt well enough to eat dinner in the ED last night. No PO intake yet today - strongly encouraged patient to order something which he plans to do here soon. Discussed recommended ONS that patient can add to his meal trays or in between meals for snacks (Ensure, Gelatein for  "concentrated protein).       NUTRITION FOCUSED PHYSICAL ASSESSMENT FOR DIAGNOSING MALNUTRITION)  Yes            Observed:    No nutrition-related physical findings observed    Obtained from Chart/Interdisciplinary Team:  Admitted with sepsis 2/2 UTI, hypochloremic hyponatremia     ANTHROPOMETRICS  Height: 5' 7\"   Weight: 195 lbs 0 oz  Body mass index is 30.54 kg/m .  Weight Status:  Obesity Grade I BMI 30-34.9  IBW: 67.3 kg (148 lb) +/- 10%  % IBW: 132%  Weight History: Weight overall stable with minor fluctuations noted.   Wt Readings from Last 10 Encounters:   10/10/22 88.5 kg (195 lb)   10/04/22 87.4 kg (192 lb 9.6 oz)   09/28/22 85.5 kg (188 lb 9.6 oz)   09/19/22 87.3 kg (192 lb 6.4 oz)   09/13/22 86.5 kg (190 lb 12.8 oz)   09/12/22 86 kg (189 lb 11.2 oz)   09/04/22 86.2 kg (190 lb)   09/04/22 86.2 kg (190 lb)   11/17/21 83.9 kg (185 lb)   06/15/21 86.2 kg (190 lb)     LABS  Labs reviewed  Na 125 (H)  Cl 91 (L)  BUN 5 (L)    MEDICATIONS  Medications reviewed  Senokot BID  NaCl IVF @ 75 mL/hr       ASSESSED NUTRITION NEEDS PER APPROVED PRACTICE GUIDELINES:    Dosing Weight 73 kg (adjusted)  Estimated Energy Needs: 5978-1368 kcals (25-30 Kcal/Kg)  Justification: maintenance per weight status   Estimated Protein Needs: 70-90 grams protein (1-1.2 g pro/Kg)  Justification: preservation of lean body mass  Estimated Fluid Needs: 2157-4428  mL (1 mL/Kcal)  Justification: maintenance or per provider pending fluid status       NUTRITION DIAGNOSIS:  Inadequate oral intake related to decreased appetite as evidenced by patient report of low oral intake from baseline over the past month while residing at [facility]       NUTRITION INTERVENTIONS  Recommendations / Nutrition Prescription  Recommended concentrated protein supplements (ie, Ensure, Gelatein). Offered to order scheduled supplements but patient declined at this time and prefers to order PRN with meals. Discussed importance of increasing oral intake with hyponatremia. " Patient verbalizes understanding and plans to order a meal this morning with supplement use. Recommend continue regular diet.       Implementation  Nutrition education: Provided education on RD and role of care. Dicussed importance of oral intake with hyponatremia. Recommended sources of concentrated protein such as ONS and high protein foods (eggs, meat, cheese, milk/yogurt, nuts/beans).   Medical Food Supplement - PRN      Nutrition Goals  Patient will consume % meals TID with source of protein on each or equivalent with ONS      MONITORING AND EVALUATION:  Progress towards goals will be monitored and evaluated per protocol and Practice Guidelines      Cora Almazan RD, LD

## 2022-10-11 NOTE — PROGRESS NOTES
RECEIVING UNIT ED HANDOFF REVIEW    ED Nurse Handoff Report was reviewed by: Susan Russell RN on October 11, 2022 at 7:29 AM

## 2022-10-12 ENCOUNTER — APPOINTMENT (OUTPATIENT)
Dept: PHYSICAL THERAPY | Facility: CLINIC | Age: 62
DRG: 872 | End: 2022-10-12
Payer: COMMERCIAL

## 2022-10-12 LAB
ANION GAP SERPL CALCULATED.3IONS-SCNC: 8 MMOL/L (ref 3–14)
BUN SERPL-MCNC: 15 MG/DL (ref 7–30)
CALCIUM SERPL-MCNC: 8.9 MG/DL (ref 8.5–10.1)
CHLORIDE BLD-SCNC: 98 MMOL/L (ref 94–109)
CHLORIDE UR-SCNC: 38 MMOL/L
CO2 SERPL-SCNC: 25 MMOL/L (ref 20–32)
CREAT SERPL-MCNC: 0.69 MG/DL (ref 0.66–1.25)
ERYTHROCYTE [DISTWIDTH] IN BLOOD BY AUTOMATED COUNT: 16.4 % (ref 10–15)
GFR SERPL CREATININE-BSD FRML MDRD: >90 ML/MIN/1.73M2
GLUCOSE BLD-MCNC: 119 MG/DL (ref 70–99)
HCT VFR BLD AUTO: 25 % (ref 40–53)
HGB BLD-MCNC: 8.3 G/DL (ref 13.3–17.7)
MCH RBC QN AUTO: 27.3 PG (ref 26.5–33)
MCHC RBC AUTO-ENTMCNC: 33.2 G/DL (ref 31.5–36.5)
MCV RBC AUTO: 82 FL (ref 78–100)
PLATELET # BLD AUTO: 422 10E3/UL (ref 150–450)
POTASSIUM BLD-SCNC: 3.7 MMOL/L (ref 3.4–5.3)
RBC # BLD AUTO: 3.04 10E6/UL (ref 4.4–5.9)
SODIUM SERPL-SCNC: 127 MMOL/L (ref 133–144)
SODIUM SERPL-SCNC: 129 MMOL/L (ref 133–144)
SODIUM SERPL-SCNC: 131 MMOL/L (ref 133–144)
SODIUM SERPL-SCNC: 131 MMOL/L (ref 133–144)
WBC # BLD AUTO: 11.8 10E3/UL (ref 4–11)

## 2022-10-12 PROCEDURE — 99233 SBSQ HOSP IP/OBS HIGH 50: CPT | Performed by: INTERNAL MEDICINE

## 2022-10-12 PROCEDURE — 36415 COLL VENOUS BLD VENIPUNCTURE: CPT | Performed by: INTERNAL MEDICINE

## 2022-10-12 PROCEDURE — 250N000011 HC RX IP 250 OP 636: Performed by: INTERNAL MEDICINE

## 2022-10-12 PROCEDURE — 85027 COMPLETE CBC AUTOMATED: CPT | Performed by: INTERNAL MEDICINE

## 2022-10-12 PROCEDURE — 82436 ASSAY OF URINE CHLORIDE: CPT | Performed by: INTERNAL MEDICINE

## 2022-10-12 PROCEDURE — G0463 HOSPITAL OUTPT CLINIC VISIT: HCPCS

## 2022-10-12 PROCEDURE — 258N000003 HC RX IP 258 OP 636: Performed by: HOSPITALIST

## 2022-10-12 PROCEDURE — 120N000001 HC R&B MED SURG/OB

## 2022-10-12 PROCEDURE — 250N000013 HC RX MED GY IP 250 OP 250 PS 637: Performed by: HOSPITALIST

## 2022-10-12 PROCEDURE — 84295 ASSAY OF SERUM SODIUM: CPT | Performed by: INTERNAL MEDICINE

## 2022-10-12 PROCEDURE — 97116 GAIT TRAINING THERAPY: CPT | Mod: GP

## 2022-10-12 PROCEDURE — 97530 THERAPEUTIC ACTIVITIES: CPT | Mod: GP

## 2022-10-12 RX ORDER — ACETAMINOPHEN 325 MG/1
650 TABLET ORAL EVERY 6 HOURS PRN
DISCHARGE
Start: 2022-10-12 | End: 2022-11-29

## 2022-10-12 RX ADMIN — SODIUM CHLORIDE: 9 INJECTION, SOLUTION INTRAVENOUS at 07:46

## 2022-10-12 RX ADMIN — CEFEPIME HYDROCHLORIDE 2 G: 2 INJECTION, POWDER, FOR SOLUTION INTRAVENOUS at 13:47

## 2022-10-12 RX ADMIN — POLYETHYLENE GLYCOL 400 AND PROPYLENE GLYCOL 1 DROP: 4; 3 SOLUTION/ DROPS OPHTHALMIC at 21:25

## 2022-10-12 RX ADMIN — POLYETHYLENE GLYCOL 400 AND PROPYLENE GLYCOL 1 DROP: 4; 3 SOLUTION/ DROPS OPHTHALMIC at 08:01

## 2022-10-12 RX ADMIN — TAMSULOSIN HYDROCHLORIDE 0.4 MG: 0.4 CAPSULE ORAL at 21:22

## 2022-10-12 RX ADMIN — PIPERACILLIN AND TAZOBACTAM 3.38 G: 3; .375 INJECTION, POWDER, FOR SOLUTION INTRAVENOUS at 07:43

## 2022-10-12 RX ADMIN — BUSPIRONE HYDROCHLORIDE 15 MG: 5 TABLET ORAL at 07:43

## 2022-10-12 RX ADMIN — TRAZODONE HYDROCHLORIDE 100 MG: 50 TABLET ORAL at 21:22

## 2022-10-12 RX ADMIN — ACETAMINOPHEN 650 MG: 325 TABLET, FILM COATED ORAL at 08:00

## 2022-10-12 RX ADMIN — LAMOTRIGINE 200 MG: 100 TABLET ORAL at 21:22

## 2022-10-12 RX ADMIN — BUSPIRONE HYDROCHLORIDE 15 MG: 5 TABLET ORAL at 21:22

## 2022-10-12 RX ADMIN — PIPERACILLIN AND TAZOBACTAM 3.38 G: 3; .375 INJECTION, POWDER, FOR SOLUTION INTRAVENOUS at 01:52

## 2022-10-12 RX ADMIN — AMLODIPINE BESYLATE 10 MG: 5 TABLET ORAL at 07:43

## 2022-10-12 ASSESSMENT — ACTIVITIES OF DAILY LIVING (ADL)
ADLS_ACUITY_SCORE: 33
ADLS_ACUITY_SCORE: 33
ADLS_ACUITY_SCORE: 31
ADLS_ACUITY_SCORE: 33

## 2022-10-12 NOTE — PLAN OF CARE
AOx4, assist x1 w/ gait belt.Slightly tachycardiac, otherwise WDL respiratory and cardiac. 92% on room air.     Na lab returned during shift at 126. Repeat draw Q6h - next draw at midnight. Paged out twice to hospitalist.  PIV infusing NS @ 75ml/hr.     Edema in legs, ankle and feet at +2. Purple/red rash on bilateral forearms. Faint pedal pulses.     No report of pain, BM during shift, Prieto in place due to urinary retention. Patient refused compression devices during evening. Stated he would be willing to wear them while sleeping.

## 2022-10-12 NOTE — PLAN OF CARE
Goal Outcome Evaluation:       Patient remains stable with vitals in the normal range. Alert and oriented. Denies pain. Left hip discomfort with activity. Prieto in place good urine output. Last serum sodium was 127. Monitor BMP in the morning. Saline infusing at 75 ml/hr.

## 2022-10-12 NOTE — PROVIDER NOTIFICATION
MD Notification    Notified Person: MD    Notified Person Name: Cornelio    Notification Date/Time: October 11, 2022 @ 10:46 PM     Notification Interaction: Web based page    Purpose of Notification: Re-page regarding Sodium level of 126. Do you want further action at this time?    Orders Received: Wait until midnight recheck to determine if further measures should be done.    Comments:

## 2022-10-12 NOTE — PROVIDER NOTIFICATION
MD Notification    Notified Person: MD    Notified Person Name: Cornelio    Notification Date/Time: October 11, 2022 @ 8:11 PM     Notification Interaction: Web based page    Purpose of Notification: FYI - Sodium came back at 126.    Orders Received:    Comments:

## 2022-10-12 NOTE — PLAN OF CARE
[id number]  Pt's sodium improved to 131 this am.  Fluids discontinued.  Remains on intermittent antibiotics.  Prieto draining without issue.  PT following.  Pt up with SBA/GB/walker.  Eating/drinking well.  Some LE edema,  unchanged.  Pt will need TCU at discharge.

## 2022-10-12 NOTE — CONSULTS
[facility] Nurse Inpatient Assessment     Consulted for: Buttocks    Summary: mild MASD (moisture-associated skin damage) to gluteal cleft and inner buttocks, no pressure injury noted.  Pt has decreased mobility s/p left hip fx approx 1 month ago.  Will use Oliver perineal lotion and barrier paste for protection.     Patient History (according to provider note(s):      Suresh Powers is a 62 year old male with history of hypertension, depression, anxiety, OCD, left hip fracture status post surgery September 2022, and sleep disorder among others who presented to the ED from home with difficulty urinating.     Areas Assessed:      Areas visualized during today's visit: buttocks, coccyx area    Wound location: gluteal cleft/ inner buttocks    Last photo: 10-12-22      Wound due to: Moisture Associated Skin Damage (MASD)  Wound history/plan of care: pt with weakness and decreased mobility s/p recent hip fx; appears trapping moisture between buttocks  Wound base: linear area of moist pale pink tissue; flaking serous scabbing along inner buttocks     Palpation of the wound bed: normal      Drainage: none     Description of drainage: none     Measurements (length x width x depth, in cm): approx 2 x 0.3 x 0.1cm      Tunneling: N/A     Undermining: N/A  Periwound skin: Erythema- blanchable and flaking serous scabbing      Color: pink      Temperature: normal   Odor: none  Pain: minimal, tender  Pain interventions prior to dressing change: N/A  Treatment goal: Heal , Decrease moisture and Protection  STATUS: initial assessment  Supplies ordered: at bedside       Treatment Plan:     Perineal cares: BID and prn:  1.  Cleanse gently with Oliver perineal lotion and soft dry wipes (avoid blue bag wipes in ashu area).  2.  Apply thin glaze of Critic-aid barrier paste along gluteal cleft and inner buttocks.  3.  Leave open to air.  PIP measures.    Pressure Injury Prevention (PIP) Plan:  -If patient is  declining pressure injury prevention interventions: Explore reason why and address patient's concerns and Educate on pressure injury risk and prevention intervention(s)  -Mattress: Follow bed algorithm, reassess daily and order specialty mattress, if indicated.  -HOB: Maintain at or below 30 degrees, unless contraindicated  -Repositioning in bed: Every 1-2 hours , Left/right positioning; avoid supine and Raise foot of bed prior to raising head of bed, to reduce patient sliding down (shear)  -Heels: Keep elevated off mattress and Pillows under calves  -Protective Dressing: None  -Chair positioning: Repositions self: patient to shift weight every 15 minutes and Assist patient to reposition hourly   -Moisture Management: Perineal cleansing /protection: Follow Incontinence Protocol, Avoid brief in bed, Clean and dry skin folds with bathing  and Moisturize dry skin  -Under Devices: Inspect skin under all medical devices during skin inspection , Ensure tubes are stabilized without tension and Ensure patient is not lying on medical devices or equipment when repositioned      Orders: Written    RECOMMEND PRIMARY TEAM ORDER: None, at this time  Education provided: importance of repositioning, plan of care, wound progress, Moisture management, Hygiene and Off-loading pressure  Discussed plan of care with: Patient and Nurse  WOC nurse follow-up plan: signing off  Notify WOC if wound(s) deteriorate.  Nursing to notify the Provider(s) and re-consult the WOC Nurse if new skin concern.    DATA:     Current support surface: Standard  Atmos Air mattress  Containment of urine/stool: Incontinence Protocol prn; up to bathroom  BMI: Body mass index is 31.42 kg/m .   Active diet order: Orders Placed This Encounter      Combination Diet Regular Diet Adult     Output: I/O last 3 completed shifts:  In: 3330 [P.O.:1180; I.V.:2150]  Out: 2450 [Urine:2450]     Labs: Recent Labs   Lab 10/12/22  0702   HGB 8.3*   WBC 11.8*     Pressure injury  risk assessment:   Sensory Perception: 4-->no impairment  Moisture: 4-->rarely moist  Activity: 3-->walks occasionally  Mobility: 3-->slightly limited  Nutrition: 3-->adequate  Friction and Shear: 3-->no apparent problem  Jayesh Score: 20    Coty Snowden RN   Dept. Pager: [phone]  Dept. Office Number: [phone]

## 2022-10-12 NOTE — PROGRESS NOTES
[facility]    Hospitalist Progress Note    Assessment & Plan   Date of Admission:  10/10/2022        Assessment & Plan     Suresh Powers is a 62 year old male with history of hypertension, depression, anxiety, OCD, left hip fracture status post surgery September 2022, and sleep disorder among others who presented to the ED from home with difficulty urinating.  Patient reports being admitted for few weeks at [facility] until about 5 days ago after he had fallen and broken his left hip which required surgery.  In the catheter for nearly a month which was then removed last Tuesday (6 days ago).  He reports voiding once at TCU prior to leaving.  Since he got home he has not been doing well in general.  Considerably weak and essentially not safe to be moving around independently at home even with walker assistance devices.  No falls or head traumas or passing out.  He does feel that he came close to passing out/falling at different times.  He has had barely any urinary output despite maximal efforts and unfortunately had some bowel incontinence while attempting to urinate.  He denies any fevers, chills, coughing, shortness of breath, chest pain, palpitation, abdominal pain, nausea, vomiting, or diarrhea and has been taking his medications as directed.  He is very appreciative of cares and he is very sure that he is unable to take care of himself at home in his current state.        Sepsis secondary to UTI  Urinary retention  Patient has been home from TCU ([facility]) for a few days after previously being hospitalized for a broken hip requiring surgery. He had retention when he left the hospital and went to TCU and required a catheter for around a month and was removed 10/4.  Reports voiding at TCU prior to leaving the next day.  At home he was unable to void and has had some bowel incontinence when straining to urinate.  Denies any fevers or chills, abdominal pain, flank pain or back  pain.  UA congruent with infection.  Urine culture and blood cultures pending.  Started on empiric Rocephin in ED.  WBC 13.2K, tachycardic to 1 teens, respiratory rate low 20s, UA congruent with infection.  -afebrile. Wbc stable at 13,   -cr wnl this am.   -afebrile. HR low 100 range. Not appear toxic  -Ucx growing >100K enterobacter cloacae complex.  - afebrile. Feeling well.     Plan;   -strict I/o to follow for post obstructive diuresis  - Admit to inpatient  - stop zosyn. Change to cefepime 2gm q12 hours given potential for restistance to zosyn. Discussed with pharmD  - Acetaminophen as needed for fever pain  - Continue Vasquez catheter  - Urology consulted- signed off , will need urology follow up outpatient in about 1 week in clinic.   - PTA flomax continued     Hypochloremic hyponatremia  -chronic to a degree, chlorthalidone stopped long time ago per pt. no longer taking this.   -asymptomatic  Sodium 123 on adm. chloride 89.  Patient reports struggling at home in general.  He does seem to have a degree of chronic hyponatremia with baseline may be in the upper 120s or 130.  Likely lower today due to decreased p.o. intake as he was at home and really not able to care for himself.  -very poor fluid and solute intake last 4 days.   -has been eating well in hospital following vasquez placement and relief of urinary obstruction. Taking in fluids  - gradual improvement in Na with NS 75cc/hr. Resolution of metabolic alkalosis. Na up to 131.   -hypotonic hyponatremia 2/2 to volume depletion and poor solute intake. Has degree of chronically mildly low Na. May have component of SIADH at baseline. No Psych meds. Holding selective serotonin reuptake inhibitor for now.   -anticipate continued rise in Na over next 24 hours.     Plan   - stop fluids as po intake and solute intake very good  - am bmp, Na at 1800 with parameters to call.   -urine osmol, urine sodium and serum osmol now  --encourage solute intake  -follow bmp at  "tCU to ensure stability.        Generalized weakness  Left hip fracture requiring surgery status post fall-early September 2022  As above, patient was hospitalized here in early September required surgery subsequently went to TCU for few weeks and was discharged home less than 1 week ago.  He has not been doing well and has had near falls multiple times. Does endorse worsened L knee pain.   -pain L hip gradually getting a little better.  -had his 2 week follow up with ortho at his TCU   -negative imaging LLE. Neg US. No dvt LLE  - PT/OT  - CM/SW  - L knee xr  -fall precuations  -has ortho follow up on Tuesday    -review of pelvis imaging indicates: \"There  is some new impaction and medial displacement of the fracture since the 9/4/2022 intraoperative spot views.\"  Called Ortho PA, they will review xray to determine if this warrants further imaging/evaluation vs routine post op findings. Will contact team if concerns. ---> they reviewed xrays and no issue. Cont with wbat and follow up with ortho on Tuesday     HTN  HL  Mildly hypertensive.    -PTA regimen includes amlodipine 10 mg daily.    Does not appear to be on statin.  - Continue PTA amlodipine  - As needed hydralazine    Pressure injury  Coccyx, present on admission  Seen by wound care, see note for details.      SPIKE  Depression  OCD  Stable upon admission.  Appropriate mood and affect.  Very appreciative of cares.  - Continue PTA buspirone, escitalopram (hold with low Na for now ), and lamotrigine    BLE edema  On norvasc  Will provide compression stockings.            Diet:   reg  DVT Prophylaxis: Pneumatic Compression Devices  Vasquez Catheter: PRESENT, indication:  vasquez placed in ED 10/10.   Central Lines: None  Cardiac Monitoring: None  Code Status:   Full Code -discussed in detail and confirmed upon admission           Clinically Significant Risk Factors Present on Admission            # Hyponatremia: Na = 123 mmol/L (Ref range: 133 - 144 mmol/L) on " "admission, will monitor as appropriate            # Obesity: Estimated body mass index is 30.54 kg/m  as calculated from the following:    Height as of 10/4/22: 1.702 m (5' 7\").    Weight as of this encounter: 88.5 kg (195 lb).           Disposition Plan- likely ready for tcu on 10/13.   Will likely need tcu. sw consult  PT, OT         Rancho Paez MD, MD  Text Page  (7am to 6pm)  Interval History   Na up to 131. Eating well. Feeling well.   No new issues  Has some pain LLE medially since hip surgery, pain gradually improving.     -Data reviewed today: I reviewed all new labs and imaging results over the last 24 hours. I personally reviewed imaging and labs last 24 hours.     Physical Exam   Temp: 98.3  F (36.8  C) Temp src: Oral BP: 125/82 Pulse: 102   Resp: 18 SpO2: 95 % O2 Device: None (Room air)    Vitals:    10/10/22 1132 10/12/22 0600   Weight: 88.5 kg (195 lb) 91 kg (200 lb 9.9 oz)     Vital Signs with Ranges  Temp:  [98.2  F (36.8  C)-98.4  F (36.9  C)] 98.3  F (36.8  C)  Pulse:  [] 102  Resp:  [16-18] 18  BP: (106-125)/(68-82) 125/82  SpO2:  [93 %-95 %] 95 %  I/O last 3 completed shifts:  In: 3330 [P.O.:1180; I.V.:2150]  Out: 2450 [Urine:2450]    Constitutional: Nad, nontoxic appearing , up eating in chair  Respiratory: CTAB  Cardiovascular: RRR no r/g/m  GI: soft, nt, nd  Skin/Integumen: no rash, 1+ to mild bLE edema  Neuro: nl speech and mentation  Psych:nl affect  Ortho; some mild pain with palpation LLE medially. No redness or venous cord        Medications       amLODIPine  10 mg Oral Daily     busPIRone  15 mg Oral BID     [Held by provider] escitalopram  20 mg Oral Daily     lamoTRIgine  200 mg Oral At Bedtime     piperacillin-tazobactam  3.375 g Intravenous Q6H     senna-docusate  1 tablet Oral BID    Or     senna-docusate  2 tablet Oral BID     sodium chloride (PF)  3 mL Intracatheter Q8H     tamsulosin  0.4 mg Oral QPM     traZODone  100 mg Oral At Bedtime       Data   Recent Labs "   Lab 10/12/22  0702 10/11/22  2355 10/11/22  1837 10/11/22  1135 10/11/22  0614 10/11/22  0019 10/10/22  2015 10/10/22  1210   WBC 11.8*  --   --   --  13.0*  --   --  13.2*   HGB 8.3*  --   --   --  8.6*  --   --  9.8*   MCV 82  --   --   --  81  --   --  83     --   --   --  432  --   --  451*   *  131* 127* 126*   < > 125*   < > 124* 123*   POTASSIUM 3.7  --   --   --  3.7  --  3.8 4.0   CHLORIDE 98  --   --   --  91*  --  90* 89*   CO2 25  --   --   --  27  --  25 23   BUN 15  --   --   --  5*  --  5* 6*   CR 0.69  --   --   --  0.57*  --  0.61* 0.57*   ANIONGAP 8  --   --   --  7  --  9 11   NIMISHA 8.9  --   --   --  8.6  --  8.2* 8.7   *  --   --   --  103*  --  160* 105*    < > = values in this interval not displayed.       Imaging:   No results found for this or any previous visit (from the past 24 hour(s)).

## 2022-10-12 NOTE — PROGRESS NOTES
Notified provider about indwelling vasquez catheter discussed removal or continued need.    Did provider choose to remove indwelling vasquez catheter? No     Provider's vasquez indication for keeping indwelling vasquez catheter:   Leave vasquez catheter in place now and continue on discharge. Our team will facilitate voiding trial in our office as an outpatient in a few weeks once his infection resolves. His catheter should be exchanged monthly. (Per Urology resident note on 10/10).     Is there an order for indwelling vasquez catheter?  Yes.

## 2022-10-13 ENCOUNTER — APPOINTMENT (OUTPATIENT)
Dept: PHYSICAL THERAPY | Facility: CLINIC | Age: 62
DRG: 872 | End: 2022-10-13
Payer: COMMERCIAL

## 2022-10-13 LAB
ANION GAP SERPL CALCULATED.3IONS-SCNC: 5 MMOL/L (ref 3–14)
BACTERIA UR CULT: ABNORMAL
BUN SERPL-MCNC: 8 MG/DL (ref 7–30)
CALCIUM SERPL-MCNC: 8.6 MG/DL (ref 8.5–10.1)
CHLORIDE BLD-SCNC: 95 MMOL/L (ref 94–109)
CO2 SERPL-SCNC: 30 MMOL/L (ref 20–32)
CREAT SERPL-MCNC: 0.6 MG/DL (ref 0.66–1.25)
ERYTHROCYTE [DISTWIDTH] IN BLOOD BY AUTOMATED COUNT: 16.9 % (ref 10–15)
GFR SERPL CREATININE-BSD FRML MDRD: >90 ML/MIN/1.73M2
GLUCOSE BLD-MCNC: 112 MG/DL (ref 70–99)
HCT VFR BLD AUTO: 26 % (ref 40–53)
HGB BLD-MCNC: 8.2 G/DL (ref 13.3–17.7)
MCH RBC QN AUTO: 27 PG (ref 26.5–33)
MCHC RBC AUTO-ENTMCNC: 31.5 G/DL (ref 31.5–36.5)
MCV RBC AUTO: 86 FL (ref 78–100)
PLATELET # BLD AUTO: 445 10E3/UL (ref 150–450)
POTASSIUM BLD-SCNC: 3.7 MMOL/L (ref 3.4–5.3)
RBC # BLD AUTO: 3.04 10E6/UL (ref 4.4–5.9)
SODIUM SERPL-SCNC: 130 MMOL/L (ref 133–144)
WBC # BLD AUTO: 9.7 10E3/UL (ref 4–11)

## 2022-10-13 PROCEDURE — 99233 SBSQ HOSP IP/OBS HIGH 50: CPT | Performed by: INTERNAL MEDICINE

## 2022-10-13 PROCEDURE — 36415 COLL VENOUS BLD VENIPUNCTURE: CPT | Performed by: INTERNAL MEDICINE

## 2022-10-13 PROCEDURE — 85027 COMPLETE CBC AUTOMATED: CPT | Performed by: INTERNAL MEDICINE

## 2022-10-13 PROCEDURE — 250N000013 HC RX MED GY IP 250 OP 250 PS 637: Performed by: INTERNAL MEDICINE

## 2022-10-13 PROCEDURE — 250N000011 HC RX IP 250 OP 636: Performed by: INTERNAL MEDICINE

## 2022-10-13 PROCEDURE — 97110 THERAPEUTIC EXERCISES: CPT | Mod: GP

## 2022-10-13 PROCEDURE — 97116 GAIT TRAINING THERAPY: CPT | Mod: GP

## 2022-10-13 PROCEDURE — 250N000013 HC RX MED GY IP 250 OP 250 PS 637: Performed by: HOSPITALIST

## 2022-10-13 PROCEDURE — 80048 BASIC METABOLIC PNL TOTAL CA: CPT | Performed by: INTERNAL MEDICINE

## 2022-10-13 PROCEDURE — 120N000001 HC R&B MED SURG/OB

## 2022-10-13 RX ORDER — LACTOBACILLUS RHAMNOSUS GG 10B CELL
1 CAPSULE ORAL 2 TIMES DAILY
Status: DISCONTINUED | OUTPATIENT
Start: 2022-10-13 | End: 2022-10-18 | Stop reason: HOSPADM

## 2022-10-13 RX ADMIN — BUSPIRONE HYDROCHLORIDE 15 MG: 5 TABLET ORAL at 19:15

## 2022-10-13 RX ADMIN — CEFEPIME HYDROCHLORIDE 2 G: 2 INJECTION, POWDER, FOR SOLUTION INTRAVENOUS at 13:58

## 2022-10-13 RX ADMIN — POLYETHYLENE GLYCOL 400 AND PROPYLENE GLYCOL 1 DROP: 4; 3 SOLUTION/ DROPS OPHTHALMIC at 22:08

## 2022-10-13 RX ADMIN — LAMOTRIGINE 200 MG: 100 TABLET ORAL at 22:08

## 2022-10-13 RX ADMIN — Medication 1 CAPSULE: at 12:20

## 2022-10-13 RX ADMIN — TRAZODONE HYDROCHLORIDE 100 MG: 50 TABLET ORAL at 22:07

## 2022-10-13 RX ADMIN — AMLODIPINE BESYLATE 10 MG: 5 TABLET ORAL at 08:55

## 2022-10-13 RX ADMIN — ACETAMINOPHEN 650 MG: 325 TABLET, FILM COATED ORAL at 13:58

## 2022-10-13 RX ADMIN — CEFEPIME HYDROCHLORIDE 2 G: 2 INJECTION, POWDER, FOR SOLUTION INTRAVENOUS at 01:40

## 2022-10-13 RX ADMIN — Medication 1 CAPSULE: at 22:08

## 2022-10-13 RX ADMIN — BUSPIRONE HYDROCHLORIDE 15 MG: 5 TABLET ORAL at 08:55

## 2022-10-13 RX ADMIN — TAMSULOSIN HYDROCHLORIDE 0.4 MG: 0.4 CAPSULE ORAL at 19:15

## 2022-10-13 ASSESSMENT — ACTIVITIES OF DAILY LIVING (ADL)
ADLS_ACUITY_SCORE: 33

## 2022-10-13 NOTE — PROGRESS NOTES
[facility]    Hospitalist Progress Note    Assessment & Plan   Date of Admission:  10/10/2022        Assessment & Plan     Suresh Powers is a 62 year old male with history of hypertension, depression, anxiety, OCD, left hip fracture status post surgery September 2022, and sleep disorder among others who presented to the ED from home with difficulty urinating.  Patient reports being admitted for few weeks at [facility] until about 5 days ago after he had fallen and broken his left hip which required surgery.  In the catheter for nearly a month which was then removed last Tuesday (6 days ago).  He reports voiding once at TCU prior to leaving.  Since he got home he has not been doing well in general.  Considerably weak and essentially not safe to be moving around independently at home even with walker assistance devices.  No falls or head traumas or passing out.  He does feel that he came close to passing out/falling at different times.  He has had barely any urinary output despite maximal efforts and unfortunately had some bowel incontinence while attempting to urinate.  He denies any fevers, chills, coughing, shortness of breath, chest pain, palpitation, abdominal pain, nausea, vomiting, or diarrhea and has been taking his medications as directed.  He is very appreciative of cares and he is very sure that he is unable to take care of himself at home in his current state.        Sepsis secondary to UTI  Urinary retention  Patient has been home from TCU ([facility]) for a few days after previously being hospitalized for a broken hip requiring surgery. He had retention when he left the hospital and went to TCU and required a catheter for around a month and was removed 10/4.  Reports voiding at TCU prior to leaving the next day.  At home he was unable to void and has had some bowel incontinence when straining to urinate.  Denies any fevers or chills, abdominal pain, flank pain or back  pain.  UA congruent with infection.  Urine culture and blood cultures pending.  Started on empiric Rocephin in ED.  WBC 13.2K, tachycardic to 1 teens, respiratory rate low 20s, UA congruent with infection.  -afebrile. Wbc stable at 13,   -cr wnl this am.   -afebrile. HR low 100 range. Not appear toxic  -Ucx growing >100K enterobacter cloacae complex.  - afebrile. Feeling well.     Plan;   -strict I/o to follow for post obstructive diuresis  - Admit to inpatient  - stop zosyn. Change to cefepime 2gm q12 hours given potential for restistance to zosyn. Discussed with pharmD  Having some green diarrhea most likely secondary to antibiotics started on probiotic twice a day today  - Acetaminophen as needed for fever pain  - Continue Vasquez catheter  - Urology consulted- signed off , will need urology follow up outpatient in about 1 week in clinic.   - PTA flomax continued     Hypochloremic hyponatremia  -chronic to a degree, chlorthalidone stopped long time ago per pt. no longer taking this.   -asymptomatic  Sodium 123 on adm. chloride 89.  Patient reports struggling at home in general.  He does seem to have a degree of chronic hyponatremia with baseline may be in the upper 120s or 130.  Likely lower today due to decreased p.o. intake as he was at home and really not able to care for himself.  -very poor fluid and solute intake last 4 days.   -has been eating well in hospital following vasquez placement and relief of urinary obstruction. Taking in fluids  - gradual improvement in Na with NS 75cc/hr. Resolution of metabolic alkalosis. Na up to 131.   -hypotonic hyponatremia 2/2 to volume depletion and poor solute intake. Has degree of chronically mildly low Na. May have component of SIADH at baseline. No Psych meds. Holding selective serotonin reuptake inhibitor for now.   -anticipate continued rise in Na over next 24 hours.     Plan   - stop fluids as po intake and solute intake very good  Patient sodium at 129 last night and  "130 this morning  We will place him on fluid restriction at 1500 and now to help with hyponatremia           Generalized weakness  Left hip fracture requiring surgery status post fall-early September 2022  As above, patient was hospitalized here in early September required surgery subsequently went to TCU for few weeks and was discharged home less than 1 week ago.  He has not been doing well and has had near falls multiple times. Does endorse worsened L knee pain.   -pain L hip gradually getting a little better.  -had his 2 week follow up with ortho at his TCU   -negative imaging LLE. Neg US. No dvt LLE  - PT/OT  - CM/SW  - L knee xr  -fall precuations  -has ortho follow up on Tuesday    -review of pelvis imaging indicates: \"There  is some new impaction and medial displacement of the fracture since the 9/4/2022 intraoperative spot views.\"  Called Ortho PA, they will review xray to determine if this warrants further imaging/evaluation vs routine post op findings. Will contact team if concerns. ---> they reviewed xrays and no issue. Cont with wbat and follow up with ortho on Tuesday     HTN  HL  Mildly hypertensive.    -PTA regimen includes amlodipine 10 mg daily.    Does not appear to be on statin.  - Continue PTA amlodipine  - As needed hydralazine    Pressure injury  Coccyx, present on admission  Seen by wound care, see note for details.      SPIKE  Depression  OCD  Stable upon admission.  Appropriate mood and affect.  Very appreciative of cares.  - Continue PTA buspirone, escitalopram (hold with low Na for now ), and lamotrigine    BLE edema  On norvasc  Will provide compression stockings.            Diet:   reg  DVT Prophylaxis: Pneumatic Compression Devices  Vasquez Catheter: PRESENT, indication:  vasquez placed in ED 10/10.   Central Lines: None  Cardiac Monitoring: None  Code Status:   Full Code -discussed in detail and confirmed upon admission           Clinically Significant Risk Factors Present on Admission       " "     # Hyponatremia: Na = 123 mmol/L (Ref range: 133 - 144 mmol/L) on admission, will monitor as appropriate            # Obesity: Estimated body mass index is 30.54 kg/m  as calculated from the following:    Height as of 10/4/22: 1.702 m (5' 7\").    Weight as of this encounter: 88.5 kg (195 lb).           Disposition Plan- most likely discharge to TCU in the next 1 to 2 days             Interval History    Eating well. Feeling well.   Complains of green-colored diarrhea discussed with him most likely from antibiotic use for the UTI  Otherwise resting comfortably in bed.  No acute issues in the last 24 hours    -Data reviewed today: I reviewed all new labs and imaging results over the last 24 hours. I personally reviewed imaging and labs last 24 hours.     Physical Exam   Temp: 98.6  F (37  C) Temp src: Oral BP: 120/74 Pulse: 108   Resp: 18 SpO2: 94 % O2 Device: None (Room air)    Vitals:    10/10/22 1132 10/12/22 0600 10/13/22 0617   Weight: 88.5 kg (195 lb) 91 kg (200 lb 9.9 oz) 89.7 kg (197 lb 12 oz)     Vital Signs with Ranges  Temp:  [98  F (36.7  C)-98.6  F (37  C)] 98.6  F (37  C)  Pulse:  [] 108  Resp:  [18] 18  BP: (120-126)/(69-74) 120/74  SpO2:  [94 %-95 %] 94 %  I/O last 3 completed shifts:  In: 940 [P.O.:940]  Out: 2625 [Urine:2625]    Constitutional: Nad, nontoxic appearing , up eating in chair  Respiratory: CTAB  Cardiovascular: RRR no r/g/m  GI: soft, nt, nd  Skin/Integumen: no rash, 1+ to mild bLE edema  Neuro: nl speech and mentation  Psych:nl affect  Ortho; some mild pain with palpation LLE medially. No redness or venous cord        Medications       amLODIPine  10 mg Oral Daily     busPIRone  15 mg Oral BID     ceFEPIme  2 g Intravenous Q12H     [Held by provider] escitalopram  20 mg Oral Daily     lactobacillus rhamnosus (GG)  1 capsule Oral BID     lamoTRIgine  200 mg Oral At Bedtime     senna-docusate  1 tablet Oral BID    Or     senna-docusate  2 tablet Oral BID     sodium chloride (PF) "  3 mL Intracatheter Q8H     tamsulosin  0.4 mg Oral QPM     traZODone  100 mg Oral At Bedtime       Data   Recent Labs   Lab 10/13/22  0941 10/12/22  1841 10/12/22  0702 10/11/22  1135 10/11/22  0614   WBC 9.7  --  11.8*  --  13.0*   HGB 8.2*  --  8.3*  --  8.6*   MCV 86  --  82  --  81     --  422  --  432   * 129* 131*  131*   < > 125*   POTASSIUM 3.7  --  3.7  --  3.7   CHLORIDE 95  --  98  --  91*   CO2 30  --  25  --  27   BUN 8  --  15  --  5*   CR 0.60*  --  0.69  --  0.57*   ANIONGAP 5  --  8  --  7   NIMISHA 8.6  --  8.9  --  8.6   *  --  119*  --  103*    < > = values in this interval not displayed.       Imaging:   No results found for this or any previous visit (from the past 24 hour(s)).

## 2022-10-13 NOTE — PLAN OF CARE
Goal Outcome Evaluation:         1900 - 0730  A&Ox4, pleasant mood. VSS on RA. Denied pain/N/V overnight. Up SBA with GB/W to bathroom. Continent of bowel, vasquez in place with adequate output. Several soft stools this shift. Skin breakdown to sacrum - WOC following with orders to use rosy spray and no mepilex. PIV SL'd with int maxipime. Last Na 129 @1800, next check in AM. Bilateral LE edema 2+, unchanged. Urology signed off - will follow up outpatient. PT recommending TCU at discharge.

## 2022-10-13 NOTE — PROGRESS NOTES
Notified provider about indwelling vasquez catheter discussed removal or continued need.    Did provider choose to remove indwelling vasquez catheter? No    Provider's vasquez indication for keeping indwelling vasquez catheter: Retention.    Is there an order for indwelling vasquez catheter? Yes    *If there is a plan to keep vasquez catheter in place at discharge daily notification with provider is not necessary.

## 2022-10-13 NOTE — PLAN OF CARE
Goal Outcome Evaluation:  A&O, VSS on RA, Tylenol x 1 for L hip pain, A1 GB and W in transfers, Intermittent IV Abx for UTI, on chronic vasquez, BM x 1, regular diet, tolerating, discharge pending placement, PT, WOC following, with wound care orders. Na 130 on FR 1500 ml, Continue to monitor.

## 2022-10-14 ENCOUNTER — APPOINTMENT (OUTPATIENT)
Dept: PHYSICAL THERAPY | Facility: CLINIC | Age: 62
DRG: 872 | End: 2022-10-14
Payer: COMMERCIAL

## 2022-10-14 LAB
ANION GAP SERPL CALCULATED.3IONS-SCNC: 5 MMOL/L (ref 3–14)
BUN SERPL-MCNC: 8 MG/DL (ref 7–30)
CALCIUM SERPL-MCNC: 8.5 MG/DL (ref 8.5–10.1)
CHLORIDE BLD-SCNC: 96 MMOL/L (ref 94–109)
CO2 SERPL-SCNC: 29 MMOL/L (ref 20–32)
CREAT SERPL-MCNC: 0.58 MG/DL (ref 0.66–1.25)
GFR SERPL CREATININE-BSD FRML MDRD: >90 ML/MIN/1.73M2
GLUCOSE BLD-MCNC: 113 MG/DL (ref 70–99)
POTASSIUM BLD-SCNC: 3.9 MMOL/L (ref 3.4–5.3)
SODIUM SERPL-SCNC: 130 MMOL/L (ref 133–144)

## 2022-10-14 PROCEDURE — 97116 GAIT TRAINING THERAPY: CPT | Mod: GP

## 2022-10-14 PROCEDURE — 120N000001 HC R&B MED SURG/OB

## 2022-10-14 PROCEDURE — 250N000011 HC RX IP 250 OP 636: Performed by: INTERNAL MEDICINE

## 2022-10-14 PROCEDURE — 82374 ASSAY BLOOD CARBON DIOXIDE: CPT | Performed by: INTERNAL MEDICINE

## 2022-10-14 PROCEDURE — 99232 SBSQ HOSP IP/OBS MODERATE 35: CPT | Performed by: INTERNAL MEDICINE

## 2022-10-14 PROCEDURE — 250N000013 HC RX MED GY IP 250 OP 250 PS 637: Performed by: INTERNAL MEDICINE

## 2022-10-14 PROCEDURE — 250N000013 HC RX MED GY IP 250 OP 250 PS 637: Performed by: HOSPITALIST

## 2022-10-14 PROCEDURE — 97110 THERAPEUTIC EXERCISES: CPT | Mod: GP

## 2022-10-14 PROCEDURE — 36415 COLL VENOUS BLD VENIPUNCTURE: CPT | Performed by: INTERNAL MEDICINE

## 2022-10-14 RX ORDER — CIPROFLOXACIN 500 MG/1
500 TABLET, FILM COATED ORAL EVERY 12 HOURS SCHEDULED
Status: DISCONTINUED | OUTPATIENT
Start: 2022-10-14 | End: 2022-10-15

## 2022-10-14 RX ADMIN — BUSPIRONE HYDROCHLORIDE 15 MG: 5 TABLET ORAL at 20:30

## 2022-10-14 RX ADMIN — TAMSULOSIN HYDROCHLORIDE 0.4 MG: 0.4 CAPSULE ORAL at 20:30

## 2022-10-14 RX ADMIN — AMLODIPINE BESYLATE 10 MG: 5 TABLET ORAL at 08:32

## 2022-10-14 RX ADMIN — CIPROFLOXACIN HYDROCHLORIDE 500 MG: 500 TABLET, FILM COATED ORAL at 12:06

## 2022-10-14 RX ADMIN — BUSPIRONE HYDROCHLORIDE 15 MG: 5 TABLET ORAL at 08:32

## 2022-10-14 RX ADMIN — LAMOTRIGINE 200 MG: 100 TABLET ORAL at 21:58

## 2022-10-14 RX ADMIN — Medication 1 CAPSULE: at 20:30

## 2022-10-14 RX ADMIN — Medication 1 CAPSULE: at 08:32

## 2022-10-14 RX ADMIN — POLYETHYLENE GLYCOL 400 AND PROPYLENE GLYCOL 1 DROP: 4; 3 SOLUTION/ DROPS OPHTHALMIC at 18:07

## 2022-10-14 RX ADMIN — TRAZODONE HYDROCHLORIDE 100 MG: 50 TABLET ORAL at 21:58

## 2022-10-14 RX ADMIN — CIPROFLOXACIN HYDROCHLORIDE 500 MG: 500 TABLET, FILM COATED ORAL at 20:30

## 2022-10-14 RX ADMIN — CEFEPIME HYDROCHLORIDE 2 G: 2 INJECTION, POWDER, FOR SOLUTION INTRAVENOUS at 01:43

## 2022-10-14 ASSESSMENT — ACTIVITIES OF DAILY LIVING (ADL)
ADLS_ACUITY_SCORE: 33

## 2022-10-14 NOTE — CONSULTS
"Care Management Initial Consult    General Information  Assessment completed with: Patient,    Type of CM/SW Visit: Initial Assessment    Primary Care Provider verified and updated as needed: Yes   Readmission within the last 30 days: no previous admission in last 30 days      Reason for Consult: discharge planning  Advance Care Planning:            Communication Assessment  Patient's communication style: spoken language (English or Bilingual)    Hearing Difficulty or Deaf: no   Wear Glasses or Blind: no    Cognitive  Cognitive/Neuro/Behavioral: WDL        Orientation: oriented x 4             Living Environment:   People in home: alone     Current living Arrangements: apartment      Able to return to prior arrangements: no  Living Arrangement Comments: Patient is unable to care for himself at home. Patient states \"if you send me home you will kill me. I will just waste away\".    Family/Social Support:  Care provided by: self  Provides care for: no one, unable/limited ability to care for self  Marital Status: Single  Limited to, Children, Neighbor          Description of Support System: Uninvolved    Support Assessment: Lacks adequate physical care    Current Resources:   Patient receiving home care services:       Community Resources:    Equipment currently used at home: cane, straight  Supplies currently used at home:      Employment/Financial:  Employment Status:          Financial Concerns:     Referral to Financial Worker: Yes  Finance Comments: Referral to Financial Counselor    Lifestyle & Psychosocial Needs:  Social Determinants of Health     Tobacco Use: Medium Risk     Smoking Tobacco Use: Former     Smokeless Tobacco Use: Former     Passive Exposure: Not on file   Alcohol Use: Not on file   Financial Resource Strain: Not on file   Food Insecurity: Not on file   Transportation Needs: Not on file   Physical Activity: Not on file   Stress: Not on file   Social Connections: Not on file   Intimate Partner " "Violence: Not on file   Depression: Not at risk     PHQ-2 Score: 2   Housing Stability: Not on file       Functional Status:  Prior to admission patient needed assistance:              Mental Health Status:          Chemical Dependency Status:                Values/Beliefs:  Spiritual, Cultural Beliefs, Baptism Practices, Values that affect care:                 Additional Information:  Writer received consult for discharge planning. Patient was admitted on 10/10 with UTI. Patient was recently discharged from [facility] after a hip fracture and surgery in September. At that time, patient asked for Financial Counseling to be involved as Social Security is his only income.     Writer met with patient and introduced self and role. Patient states \"I am so glad you are here, they are trying to kick me out and if you send me home you will kill me.\" Patient states he is unable to care for himself and he does not feel he can keep himself alive if we send him home. Patient would like assistance in a long term placement in an assisted living but does not have the money to pay for it. Writer explained that once MA is pending we can look for a skilled nursing facility that is covered by MA. Patient states he is not old enough for a SNF and only wants a \"new assisted Living\". Writer explained that the new assisted livings mostly take Private Pay and a few EW which writer explained he would not qualify for due to being 62 years old. Writer explained that the best option for a long term solution would be looking into a LTC Bed at a SNF. Writer asked if he completed the MA Application and he states that he was under the impression the financial counselors and the Social Workers would do that for him and he got a letter stating they needed more information but never sent it thinking that the SW would do it for him. Writer explained that we can have financial counseling reach out again but he needs to assist in getting " "MA in order and that we do not do it all for him. Patient understood. Patient was in agreement to looking for a LTC Bed that takes MA Pending once that is completed. SW will send referrals for a LTC Bed for patient. Patient states he does not want to go back to [facility] as he has been there twice and feels he did not get good care.       -IDA Emailed FC to check on status of MA application   - SW will send LTC Referrals out for patient. Patient will need to be MA Pending or approved to discharge to a LTC Facility    SW will continue to follow    Addendum: From Financial Counseling- \"[facility] will need a copy of the patients [employer] card. Also the patient will need to reduce all money in all bank accounts to $3000 or less. She will also need a letter stating how this money was reduce (example by paying medical bills etc.)  It can not be given away.  Patient is currently showing over assets.\"    TIFF Rivero        "

## 2022-10-14 NOTE — PLAN OF CARE
A&Ox4. VSS on RA. Denies pain. Regular diet tolerating well. Prieto in place w/ adequate UOP. Regular diet tolerating well, 1500 mL fluid restriction pt has had  today. PIV SL. Skin breakdown to sacrum followed WOC per orders. SBA.

## 2022-10-14 NOTE — PROGRESS NOTES
[facility]    Hospitalist Progress Note    Assessment & Plan   Date of Admission:  10/10/2022        Assessment & Plan     Suresh Powers is a 62 year old male with history of hypertension, depression, anxiety, OCD, left hip fracture status post surgery September 2022, and sleep disorder among others who presented to the ED from home with difficulty urinating.  Patient reports being admitted for few weeks at [facility] until about 5 days ago after he had fallen and broken his left hip which required surgery.  In the catheter for nearly a month which was then removed last Tuesday (6 days ago).  He reports voiding once at TCU prior to leaving.  Since he got home he has not been doing well in general.  Considerably weak and essentially not safe to be moving around independently at home even with walker assistance devices.  No falls or head traumas or passing out.  He does feel that he came close to passing out/falling at different times.  He has had barely any urinary output despite maximal efforts and unfortunately had some bowel incontinence while attempting to urinate.  He denies any fevers, chills, coughing, shortness of breath, chest pain, palpitation, abdominal pain, nausea, vomiting, or diarrhea and has been taking his medications as directed.  He is very appreciative of cares and he is very sure that he is unable to take care of himself at home in his current state.        Sepsis secondary to UTI  Urinary retention  Patient has been home from TCU ([facility]) for a few days after previously being hospitalized for a broken hip requiring surgery. He had retention when he left the hospital and went to TCU and required a catheter for around a month and was removed 10/4.  Reports voiding at TCU prior to leaving the next day.  At home he was unable to void and has had some bowel incontinence when straining to urinate.  Denies any fevers or chills, abdominal pain, flank pain or back  pain.  UA congruent with infection.  Urine culture and blood cultures pending.  Started on empiric Rocephin in ED.  WBC 13.2K, tachycardic to 1 teens, respiratory rate low 20s, UA congruent with infection.  -afebrile. Wbc stable at 13,   -cr wnl this am.   -afebrile. HR low 100 range. Not appear toxic  -Ucx growing >100K enterobacter cloacae complex.  - afebrile. Feeling well.     Plan;   -strict I/o to follow for post obstructive diuresis  - Admit to inpatient  - stop zosyn. Change to cefepime 2gm q12 hours given potential for restistance to zosyn. Discussed with pharmD  Having some green diarrhea most likely secondary to antibiotics started on probiotic twice a day today.  Diarrhea improved today  Urine culture with Enterobacter sensitive to ciprofloxacin so switched IV cefepime to Cipro 500 mg PO every 12 hours  - Acetaminophen as needed for fever pain  - Continue Vasquez catheter  - Urology consulted- signed off , will need urology follow up outpatient in about 1 week in clinic.   - PTA flomax continued     Hypochloremic hyponatremia  -chronic to a degree, chlorthalidone stopped long time ago per pt. no longer taking this.   -asymptomatic  Sodium 123 on adm. chloride 89.  Patient reports struggling at home in general.  He does seem to have a degree of chronic hyponatremia with baseline may be in the upper 120s or 130.  Likely lower today due to decreased p.o. intake as he was at home and really not able to care for himself.  -very poor fluid and solute intake last 4 days.   -has been eating well in hospital following vasquez placement and relief of urinary obstruction. Taking in fluids  - gradual improvement in Na with NS 75cc/hr. Resolution of metabolic alkalosis. Na up to 131.   -hypotonic hyponatremia 2/2 to volume depletion and poor solute intake. Has degree of chronically mildly low Na. May have component of SIADH at baseline. No Psych meds. Holding selective serotonin reuptake inhibitor for now.   -anticipate  "continued rise in Na over next 24 hours.     Plan   - stop fluids as po intake and solute intake very good  Patient sodium at 129 last night and 130 this morning  We will place him on fluid restriction at 1500 and now to help with hyponatremia  Sodium remained stable at 130 today           Generalized weakness  Left hip fracture requiring surgery status post fall-early September 2022  As above, patient was hospitalized here in early September required surgery subsequently went to TCU for few weeks and was discharged home less than 1 week ago.  He has not been doing well and has had near falls multiple times. Does endorse worsened L knee pain.   -pain L hip gradually getting a little better.  -had his 2 week follow up with ortho at his TCU   -negative imaging LLE. Neg US. No dvt LLE  - PT/OT  - CM/SW  - L knee xr  -fall precuations  -has ortho follow up on Tuesday    -review of pelvis imaging indicates: \"There  is some new impaction and medial displacement of the fracture since the 9/4/2022 intraoperative spot views.\"  Called Ortho PA, they will review xray to determine if this warrants further imaging/evaluation vs routine post op findings. Will contact team if concerns. ---> they reviewed xrays and no issue. Cont with wbat and follow up with ortho on Tuesday     HTN  HL  Mildly hypertensive.    -PTA regimen includes amlodipine 10 mg daily.    Does not appear to be on statin.  - Continue PTA amlodipine  - As needed hydralazine    Pressure injury  Coccyx, present on admission  Seen by wound care, see note for details.      SPIKE  Depression  OCD  Stable upon admission.  Appropriate mood and affect.  Very appreciative of cares.  - Continue PTA buspirone, escitalopram (hold with low Na for now ), and lamotrigine    BLE edema  On norvasc  Will provide compression stockings.            Diet:   reg  DVT Prophylaxis: Pneumatic Compression Devices  Vasquez Catheter: PRESENT, indication:  vasquez placed in ED 10/10.   Central " "Lines: None  Cardiac Monitoring: None  Code Status:   Full Code -discussed in detail and confirmed upon admission           Clinically Significant Risk Factors Present on Admission            # Hyponatremia: Na = 123 mmol/L (Ref range: 133 - 144 mmol/L) on admission, will monitor as appropriate            # Obesity: Estimated body mass index is 30.54 kg/m  as calculated from the following:    Height as of 10/4/22: 1.702 m (5' 7\").    Weight as of this encounter: 88.5 kg (195 lb).           Disposition Plan- most likely discharge to TCU in the next 1 to 2 days             Interval History    Eating well. Feeling well.   Diarrhea improved today  Otherwise resting comfortably in bed.  No acute issues in the last 24 hours    -Data reviewed today: I reviewed all new labs and imaging results over the last 24 hours. I personally reviewed imaging and labs last 24 hours.     Physical Exam   Temp: 98.2  F (36.8  C) Temp src: Oral BP: 119/82 Pulse: 97   Resp: 16 SpO2: 95 % O2 Device: None (Room air)    Vitals:    10/10/22 1132 10/12/22 0600 10/13/22 0617   Weight: 88.5 kg (195 lb) 91 kg (200 lb 9.9 oz) 89.7 kg (197 lb 12 oz)     Vital Signs with Ranges  Temp:  [97.6  F (36.4  C)-98.6  F (37  C)] 98.2  F (36.8  C)  Pulse:  [95-97] 97  Resp:  [16] 16  BP: (112-119)/(66-82) 119/82  SpO2:  [95 %-96 %] 95 %  I/O last 3 completed shifts:  In: 200 [P.O.:200]  Out: 2650 [Urine:2650]    Constitutional: Alert, awake, NAD   Respiratory: CTAB  Cardiovascular: RRR no r/g/m  GI: soft, nt, nd  Skin/Integumen: no rash, trace  bLE edema  Neuro: nl speech and mentation  Psych:nl affect  Ortho; some mild pain with palpation LLE medially. No redness or venous cord        Medications       amLODIPine  10 mg Oral Daily     busPIRone  15 mg Oral BID     ciprofloxacin  500 mg Oral Q12H AURE (08/20)     [Held by provider] escitalopram  20 mg Oral Daily     lactobacillus rhamnosus (GG)  1 capsule Oral BID     lamoTRIgine  200 mg Oral At Bedtime     " senna-docusate  1 tablet Oral BID    Or     senna-docusate  2 tablet Oral BID     sodium chloride (PF)  3 mL Intracatheter Q8H     tamsulosin  0.4 mg Oral QPM     traZODone  100 mg Oral At Bedtime       Data   Recent Labs   Lab 10/14/22  0731 10/13/22  0941 10/12/22  1841 10/12/22  0702 10/11/22  1135 10/11/22  0614   WBC  --  9.7  --  11.8*  --  13.0*   HGB  --  8.2*  --  8.3*  --  8.6*   MCV  --  86  --  82  --  81   PLT  --  445  --  422  --  432   * 130* 129* 131*  131*   < > 125*   POTASSIUM 3.9 3.7  --  3.7  --  3.7   CHLORIDE 96 95  --  98  --  91*   CO2 29 30  --  25  --  27   BUN 8 8  --  15  --  5*   CR 0.58* 0.60*  --  0.69  --  0.57*   ANIONGAP 5 5  --  8  --  7   NIMISHA 8.5 8.6  --  8.9  --  8.6   * 112*  --  119*  --  103*    < > = values in this interval not displayed.       Imaging:   No results found for this or any previous visit (from the past 24 hour(s)).

## 2022-10-14 NOTE — PLAN OF CARE
Goal Outcome Evaluation:         2300 - 0730  A&Ox4, pleasant mood. VSS on RA. Denied pain/N/V overnight. Reported diarrhea - likely d/t abx, probiotics scheduled. Pt would like his PTA daily vitamins in AM. Regular diet, good appetite. 1500ml fluid restriction - no fluids yet overnight. Prieto in place with adequate output. Skin breakdown to sacrum - WOC following. Repositioned throughout night. PIV SLd with int abx. Last Na 130, monitor in AM. Bilateral LE edema improving. Urology signed off and pt will follow up with outpatient. Discharge pending TCU placement.

## 2022-10-15 LAB
ALBUMIN SERPL-MCNC: 2.6 G/DL (ref 3.4–5)
ALP SERPL-CCNC: 120 U/L (ref 40–150)
ALT SERPL W P-5'-P-CCNC: 21 U/L (ref 0–70)
ANION GAP SERPL CALCULATED.3IONS-SCNC: 5 MMOL/L (ref 3–14)
AST SERPL W P-5'-P-CCNC: 13 U/L (ref 0–45)
BACTERIA BLD CULT: NO GROWTH
BACTERIA BLD CULT: NO GROWTH
BILIRUB SERPL-MCNC: 0.4 MG/DL (ref 0.2–1.3)
BUN SERPL-MCNC: 15 MG/DL (ref 7–30)
CALCIUM SERPL-MCNC: 8.7 MG/DL (ref 8.5–10.1)
CHLORIDE BLD-SCNC: 99 MMOL/L (ref 94–109)
CO2 SERPL-SCNC: 28 MMOL/L (ref 20–32)
CREAT SERPL-MCNC: 0.62 MG/DL (ref 0.66–1.25)
GFR SERPL CREATININE-BSD FRML MDRD: >90 ML/MIN/1.73M2
GLUCOSE BLD-MCNC: 117 MG/DL (ref 70–99)
POTASSIUM BLD-SCNC: 4.4 MMOL/L (ref 3.4–5.3)
PROT SERPL-MCNC: 7 G/DL (ref 6.8–8.8)
SODIUM SERPL-SCNC: 132 MMOL/L (ref 133–144)

## 2022-10-15 PROCEDURE — 120N000001 HC R&B MED SURG/OB

## 2022-10-15 PROCEDURE — 99232 SBSQ HOSP IP/OBS MODERATE 35: CPT | Performed by: INTERNAL MEDICINE

## 2022-10-15 PROCEDURE — 250N000013 HC RX MED GY IP 250 OP 250 PS 637: Performed by: INTERNAL MEDICINE

## 2022-10-15 PROCEDURE — 80053 COMPREHEN METABOLIC PANEL: CPT | Performed by: HOSPITALIST

## 2022-10-15 PROCEDURE — 36415 COLL VENOUS BLD VENIPUNCTURE: CPT | Performed by: HOSPITALIST

## 2022-10-15 PROCEDURE — 250N000013 HC RX MED GY IP 250 OP 250 PS 637: Performed by: HOSPITALIST

## 2022-10-15 RX ORDER — CIPROFLOXACIN 500 MG/1
500 TABLET, FILM COATED ORAL EVERY 12 HOURS SCHEDULED
Status: DISCONTINUED | OUTPATIENT
Start: 2022-10-15 | End: 2022-10-18 | Stop reason: HOSPADM

## 2022-10-15 RX ADMIN — TRAZODONE HYDROCHLORIDE 100 MG: 50 TABLET ORAL at 21:37

## 2022-10-15 RX ADMIN — BUSPIRONE HYDROCHLORIDE 15 MG: 5 TABLET ORAL at 07:58

## 2022-10-15 RX ADMIN — TAMSULOSIN HYDROCHLORIDE 0.4 MG: 0.4 CAPSULE ORAL at 20:14

## 2022-10-15 RX ADMIN — CIPROFLOXACIN HYDROCHLORIDE 500 MG: 500 TABLET, FILM COATED ORAL at 07:58

## 2022-10-15 RX ADMIN — POLYETHYLENE GLYCOL 400 AND PROPYLENE GLYCOL 1 DROP: 4; 3 SOLUTION/ DROPS OPHTHALMIC at 21:38

## 2022-10-15 RX ADMIN — Medication 1 CAPSULE: at 20:14

## 2022-10-15 RX ADMIN — AMLODIPINE BESYLATE 10 MG: 5 TABLET ORAL at 07:58

## 2022-10-15 RX ADMIN — Medication 1 CAPSULE: at 08:02

## 2022-10-15 RX ADMIN — POLYETHYLENE GLYCOL 400 AND PROPYLENE GLYCOL 1 DROP: 4; 3 SOLUTION/ DROPS OPHTHALMIC at 00:00

## 2022-10-15 RX ADMIN — LAMOTRIGINE 200 MG: 100 TABLET ORAL at 21:37

## 2022-10-15 RX ADMIN — CIPROFLOXACIN HYDROCHLORIDE 500 MG: 500 TABLET, FILM COATED ORAL at 20:14

## 2022-10-15 RX ADMIN — BUSPIRONE HYDROCHLORIDE 15 MG: 5 TABLET ORAL at 20:14

## 2022-10-15 ASSESSMENT — ACTIVITIES OF DAILY LIVING (ADL)
ADLS_ACUITY_SCORE: 33

## 2022-10-15 NOTE — PROGRESS NOTES
[facility]    Hospitalist Progress Note    Assessment & Plan   Date of Admission:  10/10/2022        Assessment & Plan     Suresh Powers is a 62 year old male with history of hypertension, depression, anxiety, OCD, left hip fracture status post surgery September 2022, and sleep disorder among others who presented to the ED from home with difficulty urinating.  Patient reports being admitted for few weeks at [facility] until about 5 days ago after he had fallen and broken his left hip which required surgery.  In the catheter for nearly a month which was then removed last Tuesday (6 days ago).  He reports voiding once at TCU prior to leaving.  Since he got home he has not been doing well in general.  Considerably weak and essentially not safe to be moving around independently at home even with walker assistance devices.  No falls or head traumas or passing out.  He does feel that he came close to passing out/falling at different times.  He has had barely any urinary output despite maximal efforts and unfortunately had some bowel incontinence while attempting to urinate.  He denies any fevers, chills, coughing, shortness of breath, chest pain, palpitation, abdominal pain, nausea, vomiting, or diarrhea and has been taking his medications as directed.  He is very appreciative of cares and he is very sure that he is unable to take care of himself at home in his current state.        Sepsis secondary to UTI  Urinary retention  Patient has been home from TCU ([facility]) for a few days after previously being hospitalized for a broken hip requiring surgery. He had retention when he left the hospital and went to TCU and required a catheter for around a month and was removed 10/4.  Reports voiding at TCU prior to leaving the next day.  At home he was unable to void and has had some bowel incontinence when straining to urinate.  Denies any fevers or chills, abdominal pain, flank pain or back  pain.  UA congruent with infection.  Urine culture and blood cultures pending.  Started on empiric Rocephin in ED.  WBC 13.2K, tachycardic to 1 teens, respiratory rate low 20s, UA congruent with infection.  -afebrile. Wbc stable at 13,   -cr wnl this am.   -afebrile. HR low 100 range. Not appear toxic  -Ucx growing >100K enterobacter cloacae complex.  - afebrile. Feeling well.     Plan;   -strict I/o to follow for post obstructive diuresis  - Admit to inpatient  - stop zosyn. Change to cefepime 2gm q12 hours given potential for restistance to zosyn. Discussed with pharmD  Having some green diarrhea most likely secondary to antibiotics started on probiotic twice a day today.  Diarrhea improved today  Urine culture with Enterobacter sensitive to ciprofloxacin so switched IV cefepime to Cipro 500 mg PO every 12 hours  - Acetaminophen as needed for fever pain  - Continue Vasquez catheter  - Urology consulted- signed off , will need urology follow up outpatient in about 1 week in clinic.   - PTA flomax continued     Hypochloremic hyponatremia  -chronic to a degree, chlorthalidone stopped long time ago per pt. no longer taking this.   -asymptomatic  Sodium 123 on adm. chloride 89.  Patient reports struggling at home in general.  He does seem to have a degree of chronic hyponatremia with baseline may be in the upper 120s or 130.  Likely lower today due to decreased p.o. intake as he was at home and really not able to care for himself.  -very poor fluid and solute intake last 4 days.   -has been eating well in hospital following vasquez placement and relief of urinary obstruction. Taking in fluids  - gradual improvement in Na with NS 75cc/hr. Resolution of metabolic alkalosis. Na up to 131.   -hypotonic hyponatremia 2/2 to volume depletion and poor solute intake. Has degree of chronically mildly low Na. May have component of SIADH at baseline. No Psych meds. Holding selective serotonin reuptake inhibitor for now.   -anticipate  "continued rise in Na over next 24 hours.     Plan   - stop fluids as po intake and solute intake very good  Patient sodium at 129 last night and 130 this morning  We will place him on fluid restriction at 1800ml  to help with hyponatremia  Sodium remained stable at 130 -132 today   Restart Lexapro today            Generalized weakness  Left hip fracture requiring surgery status post fall-early September 2022  As above, patient was hospitalized here in early September required surgery subsequently went to TCU for few weeks and was discharged home less than 1 week ago.  He has not been doing well and has had near falls multiple times. Does endorse worsened L knee pain.   -pain L hip gradually getting a little better.  -had his 2 week follow up with ortho at his TCU   -negative imaging LLE. Neg US. No dvt LLE  - PT/OT  - CM/SW  - L knee xr  -fall precuations  -has ortho follow up on Tuesday    -review of pelvis imaging indicates: \"There  is some new impaction and medial displacement of the fracture since the 9/4/2022 intraoperative spot views.\"  Called Ortho PA, they will review xray to determine if this warrants further imaging/evaluation vs routine post op findings. Will contact team if concerns. ---> they reviewed xrays and no issue. Cont with wbat and follow up with ortho on Tuesday     HTN  HL  Mildly hypertensive.    -PTA regimen includes amlodipine 10 mg daily.    Does not appear to be on statin.  - Continue PTA amlodipine  - As needed hydralazine    Pressure injury  Coccyx, present on admission  Seen by wound care, see note for details.      SPIKE  Depression  OCD  Stable upon admission.  Appropriate mood and affect.  Very appreciative of cares.  - Continue PTA buspirone, escitalopram (hold with low Na for now ), and lamotrigine    BLE edema  On norvasc  Will provide compression stockings.            Diet:   reg  DVT Prophylaxis: Pneumatic Compression Devices  Vasquez Catheter: PRESENT, indication:  vasquez placed in " "ED 10/10.   Central Lines: None  Cardiac Monitoring: None  Code Status:   Full Code -discussed in detail and confirmed upon admission           Clinically Significant Risk Factors Present on Admission            # Hyponatremia: Na = 123 mmol/L (Ref range: 133 - 144 mmol/L) on admission, will monitor as appropriate            # Obesity: Estimated body mass index is 30.54 kg/m  as calculated from the following:    Height as of 10/4/22: 1.702 m (5' 7\").    Weight as of this encounter: 88.5 kg (195 lb).           Disposition Plan- most likely discharge to TCU in the next 1 to 2 days when placement found. Pt has insurance issues and social work working on it              Interval History    Eating well. Feeling well.   Diarrhea improved now   Otherwise resting comfortably in bed.  No acute issues in the last 24 hours    -Data reviewed today: I reviewed all new labs and imaging results over the last 24 hours. I personally reviewed imaging and labs last 24 hours.     Physical Exam   Temp: 97.9  F (36.6  C) Temp src: Oral BP: 121/77 Pulse: 105   Resp: 16 SpO2: 95 % O2 Device: None (Room air)    Vitals:    10/12/22 0600 10/13/22 0617 10/15/22 0552   Weight: 91 kg (200 lb 9.9 oz) 89.7 kg (197 lb 12 oz) 88.4 kg (194 lb 14.2 oz)     Vital Signs with Ranges  Temp:  [97.9  F (36.6  C)-98.4  F (36.9  C)] 97.9  F (36.6  C)  Pulse:  [] 105  Resp:  [16-18] 16  BP: (115-125)/(70-82) 121/77  SpO2:  [94 %-95 %] 95 %  I/O last 3 completed shifts:  In: 180 [P.O.:180]  Out: 2100 [Urine:2100]    Constitutional: Alert, awake, NAD   Respiratory: CTAB  Cardiovascular: RRR no r/g/m  GI: soft, nt, nd  Skin/Integumen: no rash, trace  bLE edema  Neuro: nl speech and mentation  Psych:nl affect  Ortho; some mild pain with palpation LLE medially. No redness or venous cord        Medications       amLODIPine  10 mg Oral Daily     busPIRone  15 mg Oral BID     ciprofloxacin  500 mg Oral Q12H AURE (08/20)     [Held by provider] escitalopram  20 mg " Oral Daily     lactobacillus rhamnosus (GG)  1 capsule Oral BID     lamoTRIgine  200 mg Oral At Bedtime     senna-docusate  1 tablet Oral BID    Or     senna-docusate  2 tablet Oral BID     sodium chloride (PF)  3 mL Intracatheter Q8H     tamsulosin  0.4 mg Oral QPM     traZODone  100 mg Oral At Bedtime       Data   Recent Labs   Lab 10/15/22  0748 10/14/22  0731 10/13/22  0941 10/12/22  1841 10/12/22  0702 10/11/22  1135 10/11/22  0614   WBC  --   --  9.7  --  11.8*  --  13.0*   HGB  --   --  8.2*  --  8.3*  --  8.6*   MCV  --   --  86  --  82  --  81   PLT  --   --  445  --  422  --  432   * 130* 130*   < > 131*  131*   < > 125*   POTASSIUM 4.4 3.9 3.7  --  3.7  --  3.7   CHLORIDE 99 96 95  --  98  --  91*   CO2 28 29 30  --  25  --  27   BUN 15 8 8  --  15  --  5*   CR 0.62* 0.58* 0.60*  --  0.69  --  0.57*   ANIONGAP 5 5 5  --  8  --  7   NIMISHA 8.7 8.5 8.6  --  8.9  --  8.6   * 113* 112*  --  119*  --  103*   ALBUMIN 2.6*  --   --   --   --   --   --    PROTTOTAL 7.0  --   --   --   --   --   --    BILITOTAL 0.4  --   --   --   --   --   --    ALKPHOS 120  --   --   --   --   --   --    ALT 21  --   --   --   --   --   --    AST 13  --   --   --   --   --   --     < > = values in this interval not displayed.       Imaging:   No results found for this or any previous visit (from the past 24 hour(s)).

## 2022-10-15 NOTE — PLAN OF CARE
A&Ox4. VSS on rA. Denies pain. Regular diet w/ FR of 1800 mL has 300mL left. R PIV SL. Prieto in place patent w/ good UOP. WOC orders performed to sacrum. SBA.

## 2022-10-15 NOTE — PLAN OF CARE
Goal Outcome Evaluation:      1900-0730:    A/Ox4. Calm and pleasant. VSS on RA. Denies pain/SOB/N/V. Up SBA to the bathroom. Continent bowel, BM x1. Prieto in place patent, good UOP. Regular diet, good appetite. On 1500mL FR. Skin breakdown to sacrum, WOC following. R PIV SL patent. Discharge to TCU pending placement.

## 2022-10-15 NOTE — PROGRESS NOTES
Care Management Follow Up    Length of Stay (days): 5    Expected Discharge Date: 10/17/2022     Concerns to be Addressed: discharge planning     Patient plan of care discussed at interdisciplinary rounds: Yes    Anticipated Discharge Disposition: Skilled Nursing Facility, Long Term Care     Anticipated Discharge Services: None  Anticipated Discharge DME: None    Patient/family educated on Medicare website which has current facility and service quality ratings: yes  Education Provided on the Discharge Plan:    Patient/Family in Agreement with the Plan: yes    Referrals Placed by CM/SW: Financial Services, Post Acute Facilities  Private pay costs discussed: Not applicable    Additional Information:  Writer was updated by hospitalist that patient is in agreement with discharging to a TCU.   Writer met with patient and discussed recommendations and patient confirmed his wishes are to go to TCU at discharge and once strength is gained, to return home. Writer provided patient with a printed out list of TCU near patient's zipcode [address] from medicare.gov. Patient requested for referrals to be sent to [facility] (first choice as he is familiar with the facility as one of his parent's was there), [facility], and [facility]. Writer sent referrals via [facility] for bed availability.   Patient inquired if he would be discharging today to a TCU. Writer educated that although he is medically stable for discharge and referrals will be sent, that it is not likely a bed will be secured over the weekend as most facilities do not have an admissions coordinator in over weekend/limited bed availability.     Will continue to follow.    OSMEL Forde, LGSW    [facility]

## 2022-10-16 LAB
ALBUMIN SERPL-MCNC: 2.9 G/DL (ref 3.4–5)
ALP SERPL-CCNC: 124 U/L (ref 40–150)
ALT SERPL W P-5'-P-CCNC: 23 U/L (ref 0–70)
ANION GAP SERPL CALCULATED.3IONS-SCNC: 3 MMOL/L (ref 3–14)
AST SERPL W P-5'-P-CCNC: 15 U/L (ref 0–45)
BILIRUB SERPL-MCNC: 0.3 MG/DL (ref 0.2–1.3)
BUN SERPL-MCNC: 16 MG/DL (ref 7–30)
CALCIUM SERPL-MCNC: 9 MG/DL (ref 8.5–10.1)
CHLORIDE BLD-SCNC: 98 MMOL/L (ref 94–109)
CO2 SERPL-SCNC: 29 MMOL/L (ref 20–32)
CREAT SERPL-MCNC: 0.61 MG/DL (ref 0.66–1.25)
GFR SERPL CREATININE-BSD FRML MDRD: >90 ML/MIN/1.73M2
GLUCOSE BLD-MCNC: 110 MG/DL (ref 70–99)
POTASSIUM BLD-SCNC: 4.4 MMOL/L (ref 3.4–5.3)
PROT SERPL-MCNC: 7.4 G/DL (ref 6.8–8.8)
SODIUM SERPL-SCNC: 130 MMOL/L (ref 133–144)

## 2022-10-16 PROCEDURE — 99232 SBSQ HOSP IP/OBS MODERATE 35: CPT | Performed by: INTERNAL MEDICINE

## 2022-10-16 PROCEDURE — 36415 COLL VENOUS BLD VENIPUNCTURE: CPT | Performed by: HOSPITALIST

## 2022-10-16 PROCEDURE — 80053 COMPREHEN METABOLIC PANEL: CPT | Performed by: HOSPITALIST

## 2022-10-16 PROCEDURE — 250N000013 HC RX MED GY IP 250 OP 250 PS 637: Performed by: INTERNAL MEDICINE

## 2022-10-16 PROCEDURE — 250N000013 HC RX MED GY IP 250 OP 250 PS 637: Performed by: HOSPITALIST

## 2022-10-16 PROCEDURE — 120N000001 HC R&B MED SURG/OB

## 2022-10-16 RX ADMIN — TAMSULOSIN HYDROCHLORIDE 0.4 MG: 0.4 CAPSULE ORAL at 20:21

## 2022-10-16 RX ADMIN — BUSPIRONE HYDROCHLORIDE 15 MG: 5 TABLET ORAL at 08:09

## 2022-10-16 RX ADMIN — OXYCODONE HYDROCHLORIDE 5 MG: 5 TABLET ORAL at 06:42

## 2022-10-16 RX ADMIN — AMLODIPINE BESYLATE 10 MG: 5 TABLET ORAL at 08:09

## 2022-10-16 RX ADMIN — BUSPIRONE HYDROCHLORIDE 15 MG: 5 TABLET ORAL at 20:21

## 2022-10-16 RX ADMIN — POLYETHYLENE GLYCOL 400 AND PROPYLENE GLYCOL 1 DROP: 4; 3 SOLUTION/ DROPS OPHTHALMIC at 21:49

## 2022-10-16 RX ADMIN — LAMOTRIGINE 200 MG: 100 TABLET ORAL at 21:49

## 2022-10-16 RX ADMIN — Medication 1 CAPSULE: at 20:21

## 2022-10-16 RX ADMIN — CIPROFLOXACIN HYDROCHLORIDE 500 MG: 500 TABLET, FILM COATED ORAL at 08:09

## 2022-10-16 RX ADMIN — TRAZODONE HYDROCHLORIDE 100 MG: 50 TABLET ORAL at 21:49

## 2022-10-16 RX ADMIN — CIPROFLOXACIN HYDROCHLORIDE 500 MG: 500 TABLET, FILM COATED ORAL at 20:21

## 2022-10-16 RX ADMIN — ESCITALOPRAM OXALATE 20 MG: 10 TABLET ORAL at 08:09

## 2022-10-16 RX ADMIN — Medication 1 CAPSULE: at 08:09

## 2022-10-16 ASSESSMENT — ACTIVITIES OF DAILY LIVING (ADL)
ADLS_ACUITY_SCORE: 37
ADLS_ACUITY_SCORE: 33
ADLS_ACUITY_SCORE: 33
ADLS_ACUITY_SCORE: 37
ADLS_ACUITY_SCORE: 33
ADLS_ACUITY_SCORE: 37
ADLS_ACUITY_SCORE: 37
ADLS_ACUITY_SCORE: 33

## 2022-10-16 NOTE — PLAN OF CARE
Goal Outcome Evaluation:       1900-0730:     A/Ox4. VSS on RA. Denies SOB/N/V. C/o left hip pain, managed with PRN Oxycodone x1. Up SBA to the bathroom. Continent bowel, no BM this shift. Prieto in place patent, good UOP. Regular diet, good appetite. On 1800mL FR. Skin breakdown to sacrum, WOC following. R PIV SL patent. Discharge to TCU pending placement.

## 2022-10-16 NOTE — PLAN OF CARE
A&Ox4. VSS on RA. Denies pain. Regular diet tolerating well, fluid restriction 1800 mL. Ida patent w/ good UOP. SBA.

## 2022-10-16 NOTE — PROGRESS NOTES
Care Management Follow Up    Length of Stay (days): 6    Expected Discharge Date: 10/17/2022     Concerns to be Addressed: discharge planning     Patient plan of care discussed at interdisciplinary rounds: Yes    Anticipated Discharge Disposition: Skilled Nursing Facility, Long Term Care     Anticipated Discharge Services: None  Anticipated Discharge DME: None    Patient/family educated on Medicare website which has current facility and service quality ratings: yes  Education Provided on the Discharge Plan:    Patient/Family in Agreement with the Plan: yes    Referrals Placed by CM/SW: Financial Services, Post Acute Facilities  Private pay costs discussed: Not applicable    Additional Information:  Writer left messages for the outstanding referrals for TCU.       TIFF Rivero

## 2022-10-16 NOTE — PROGRESS NOTES
[facility]    Hospitalist Progress Note    Assessment & Plan   Date of Admission:  10/10/2022        Assessment & Plan     Suresh Powers is a 62 year old male with history of hypertension, depression, anxiety, OCD, left hip fracture status post surgery September 2022, and sleep disorder among others who presented to the ED from home with difficulty urinating.  Patient reports being admitted for few weeks at [facility] until about 5 days ago after he had fallen and broken his left hip which required surgery.  In the catheter for nearly a month which was then removed last Tuesday (6 days ago).  He reports voiding once at TCU prior to leaving.  Since he got home he has not been doing well in general.  Considerably weak and essentially not safe to be moving around independently at home even with walker assistance devices.  No falls or head traumas or passing out.  He does feel that he came close to passing out/falling at different times.  He has had barely any urinary output despite maximal efforts and unfortunately had some bowel incontinence while attempting to urinate.  He denies any fevers, chills, coughing, shortness of breath, chest pain, palpitation, abdominal pain, nausea, vomiting, or diarrhea and has been taking his medications as directed.  He is very appreciative of cares and he is very sure that he is unable to take care of himself at home in his current state.        Sepsis secondary to UTI  Urinary retention  Patient has been home from TCU ([facility]) for a few days after previously being hospitalized for a broken hip requiring surgery. He had retention when he left the hospital and went to TCU and required a catheter for around a month and was removed 10/4.  Reports voiding at TCU prior to leaving the next day.  At home he was unable to void and has had some bowel incontinence when straining to urinate.  Denies any fevers or chills, abdominal pain, flank pain or back  pain.  UA congruent with infection.  Urine culture and blood cultures pending.  Started on empiric Rocephin in ED.  WBC 13.2K, tachycardic to 1 teens, respiratory rate low 20s, UA congruent with infection.  -afebrile. Wbc stable at 13,   -cr wnl this am.   -afebrile. HR low 100 range. Not appear toxic  -Ucx growing >100K enterobacter cloacae complex.  - afebrile. Feeling well.     Plan;   -strict I/o to follow for post obstructive diuresis  - Admit to inpatient  - stop zosyn. Change to cefepime 2gm q12 hours given potential for restistance to zosyn. Discussed with pharmD  Having some green diarrhea most likely secondary to antibiotics started on probiotic twice a day today.  Diarrhea improved today  Urine culture with Enterobacter sensitive to ciprofloxacin so switched IV cefepime to Cipro 500 mg PO every 12 hours  - Acetaminophen as needed for fever pain  - Continue Vasquez catheter  - Urology consulted- signed off , will need urology follow up outpatient in about 1 week in clinic.   - PTA flomax continued     Hypochloremic hyponatremia  -chronic to a degree, chlorthalidone stopped long time ago per pt. no longer taking this.   -asymptomatic  Sodium 123 on adm. chloride 89.  Patient reports struggling at home in general.  He does seem to have a degree of chronic hyponatremia with baseline may be in the upper 120s or 130.  Likely lower today due to decreased p.o. intake as he was at home and really not able to care for himself.  -very poor fluid and solute intake last 4 days.   -has been eating well in hospital following vasquez placement and relief of urinary obstruction. Taking in fluids  - gradual improvement in Na with NS 75cc/hr. Resolution of metabolic alkalosis. Na up to 131.   -hypotonic hyponatremia 2/2 to volume depletion and poor solute intake. Has degree of chronically mildly low Na. May have component of SIADH at baseline. No Psych meds. Holding selective serotonin reuptake inhibitor for now.   -anticipate  "continued rise in Na over next 24 hours.     Plan   - stop fluids as po intake and solute intake very good  Patient sodium at 129 last night and 130 this morning  We will place him on fluid restriction at 1800ml  to help with hyponatremia  Sodium remained stable at 130 -132 on 10/15/22  Restarted Lexapro on 10/15            Generalized weakness  Left hip fracture requiring surgery status post fall-early September 2022  As above, patient was hospitalized here in early September required surgery subsequently went to TCU for few weeks and was discharged home less than 1 week ago.  He has not been doing well and has had near falls multiple times. Does endorse worsened L knee pain.   -pain L hip gradually getting a little better.  -had his 2 week follow up with ortho at his TCU   -negative imaging LLE. Neg US. No dvt LLE  - PT/OT  - CM/SW  - L knee xr  -fall precuations  -has ortho follow up on Tuesday    -review of pelvis imaging indicates: \"There  is some new impaction and medial displacement of the fracture since the 9/4/2022 intraoperative spot views.\"  Called Ortho PA, they will review xray to determine if this warrants further imaging/evaluation vs routine post op findings. Will contact team if concerns. ---> they reviewed xrays and no issue. Cont with wbat and follow up with ortho on Tuesday     HTN  HL  Mildly hypertensive.    -PTA regimen includes amlodipine 10 mg daily.    Does not appear to be on statin.  - Continue PTA amlodipine  - BP well controlled     Pressure injury  Coccyx, present on admission  Seen by wound care, see note for details.      SPIKE  Depression  OCD  Stable upon admission.  Appropriate mood and affect.  Very appreciative of cares.  - Continue PTA buspirone, escitalopram,  and lamotrigine    BLE edema  On norvasc  Will provide compression stockings.            Diet:   reg  DVT Prophylaxis: Pneumatic Compression Devices  Vasquez Catheter: PRESENT, indication:  vasquez placed in ED 10/10. " "  Central Lines: None  Cardiac Monitoring: None  Code Status:   Full Code -discussed in detail and confirmed upon admission           Clinically Significant Risk Factors Present on Admission            # Hyponatremia: Na = 123 mmol/L (Ref range: 133 - 144 mmol/L) on admission, will monitor as appropriate            # Obesity: Estimated body mass index is 30.54 kg/m  as calculated from the following:    Height as of 10/4/22: 1.702 m (5' 7\").    Weight as of this encounter: 88.5 kg (195 lb).           Disposition Plan- most likely discharge to TCU in the next 1 to 2 days when placement found. Pt has insurance issues and social work working on it              Interval History    Eating well. Feeling well.   No new complaints .  No acute issues in the last 24 hours    -Data reviewed today: I reviewed all new labs and imaging results over the last 24 hours. I personally reviewed imaging and labs last 24 hours.     Physical Exam   Temp: 97.6  F (36.4  C) Temp src: Oral BP: 123/76 Pulse: 95   Resp: 16 SpO2: 96 % O2 Device: None (Room air)    Vitals:    10/12/22 0600 10/13/22 0617 10/15/22 0552   Weight: 91 kg (200 lb 9.9 oz) 89.7 kg (197 lb 12 oz) 88.4 kg (194 lb 14.2 oz)     Vital Signs with Ranges  Temp:  [97.6  F (36.4  C)-98.6  F (37  C)] 97.6  F (36.4  C)  Pulse:  [] 95  Resp:  [16-18] 16  BP: (121-135)/(66-76) 123/76  SpO2:  [94 %-96 %] 96 %  I/O last 3 completed shifts:  In: 1210 [P.O.:1210]  Out: 2225 [Urine:2225]    Constitutional: Alert, awake, NAD, pleasant male lying comfortably in bed    Respiratory: CTAB  Cardiovascular: RRR no r/g/m  GI: soft, nt, nd  Skin/Integumen: no rash, trace  bLE edema  Neuro: nl speech and mentation  Psych:nl affect  Ortho; some mild pain with palpation LLE medially. No redness or venous cord        Medications       amLODIPine  10 mg Oral Daily     busPIRone  15 mg Oral BID     ciprofloxacin  500 mg Oral Q12H [facility] (08/20)     escitalopram  20 mg Oral Daily     lactobacillus " rhamnosus (GG)  1 capsule Oral BID     lamoTRIgine  200 mg Oral At Bedtime     senna-docusate  1 tablet Oral BID    Or     senna-docusate  2 tablet Oral BID     sodium chloride (PF)  3 mL Intracatheter Q8H     tamsulosin  0.4 mg Oral QPM     traZODone  100 mg Oral At Bedtime       Data   Recent Labs   Lab 10/16/22  0803 10/15/22  0748 10/14/22  0731 10/13/22  0941 10/12/22  1841 10/12/22  0702 10/11/22  1135 10/11/22  0614   WBC  --   --   --  9.7  --  11.8*  --  13.0*   HGB  --   --   --  8.2*  --  8.3*  --  8.6*   MCV  --   --   --  86  --  82  --  81   PLT  --   --   --  445  --  422  --  432   * 132* 130* 130*   < > 131*  131*   < > 125*   POTASSIUM 4.4 4.4 3.9 3.7  --  3.7  --  3.7   CHLORIDE 98 99 96 95  --  98  --  91*   CO2 29 28 29 30  --  25  --  27   BUN 16 15 8 8  --  15  --  5*   CR 0.61* 0.62* 0.58* 0.60*  --  0.69  --  0.57*   ANIONGAP 3 5 5 5  --  8  --  7   NIMISHA 9.0 8.7 8.5 8.6  --  8.9  --  8.6   * 117* 113* 112*  --  119*  --  103*   ALBUMIN 2.9* 2.6*  --   --   --   --   --   --    PROTTOTAL 7.4 7.0  --   --   --   --   --   --    BILITOTAL 0.3 0.4  --   --   --   --   --   --    ALKPHOS 124 120  --   --   --   --   --   --    ALT 23 21  --   --   --   --   --   --    AST 15 13  --   --   --   --   --   --     < > = values in this interval not displayed.       Imaging:   No results found for this or any previous visit (from the past 24 hour(s)).

## 2022-10-17 ENCOUNTER — APPOINTMENT (OUTPATIENT)
Dept: PHYSICAL THERAPY | Facility: CLINIC | Age: 62
DRG: 872 | End: 2022-10-17
Payer: COMMERCIAL

## 2022-10-17 PROCEDURE — 250N000013 HC RX MED GY IP 250 OP 250 PS 637: Performed by: HOSPITALIST

## 2022-10-17 PROCEDURE — 120N000001 HC R&B MED SURG/OB

## 2022-10-17 PROCEDURE — 99232 SBSQ HOSP IP/OBS MODERATE 35: CPT | Performed by: INTERNAL MEDICINE

## 2022-10-17 PROCEDURE — 97530 THERAPEUTIC ACTIVITIES: CPT | Mod: GP

## 2022-10-17 PROCEDURE — 250N000013 HC RX MED GY IP 250 OP 250 PS 637: Performed by: INTERNAL MEDICINE

## 2022-10-17 PROCEDURE — 97116 GAIT TRAINING THERAPY: CPT | Mod: GP

## 2022-10-17 RX ORDER — LACTOBACILLUS RHAMNOSUS GG 10B CELL
1 CAPSULE ORAL 2 TIMES DAILY
Qty: 60 CAPSULE | Refills: 0 | DISCHARGE
Start: 2022-10-17 | End: 2022-10-18

## 2022-10-17 RX ORDER — CIPROFLOXACIN 500 MG/1
500 TABLET, FILM COATED ORAL EVERY 12 HOURS
Qty: 3 TABLET | Refills: 0 | DISCHARGE
Start: 2022-10-17 | End: 2022-10-18

## 2022-10-17 RX ADMIN — TAMSULOSIN HYDROCHLORIDE 0.4 MG: 0.4 CAPSULE ORAL at 21:50

## 2022-10-17 RX ADMIN — CIPROFLOXACIN HYDROCHLORIDE 500 MG: 500 TABLET, FILM COATED ORAL at 10:29

## 2022-10-17 RX ADMIN — TRAZODONE HYDROCHLORIDE 100 MG: 50 TABLET ORAL at 21:50

## 2022-10-17 RX ADMIN — BUSPIRONE HYDROCHLORIDE 15 MG: 5 TABLET ORAL at 10:35

## 2022-10-17 RX ADMIN — CIPROFLOXACIN HYDROCHLORIDE 500 MG: 500 TABLET, FILM COATED ORAL at 21:50

## 2022-10-17 RX ADMIN — ESCITALOPRAM OXALATE 20 MG: 10 TABLET ORAL at 10:28

## 2022-10-17 RX ADMIN — POLYETHYLENE GLYCOL 400 AND PROPYLENE GLYCOL 1 DROP: 4; 3 SOLUTION/ DROPS OPHTHALMIC at 21:54

## 2022-10-17 RX ADMIN — Medication 1 CAPSULE: at 10:26

## 2022-10-17 RX ADMIN — LAMOTRIGINE 200 MG: 100 TABLET ORAL at 21:50

## 2022-10-17 RX ADMIN — BUSPIRONE HYDROCHLORIDE 15 MG: 5 TABLET ORAL at 21:49

## 2022-10-17 RX ADMIN — Medication 1 CAPSULE: at 21:49

## 2022-10-17 RX ADMIN — AMLODIPINE BESYLATE 10 MG: 5 TABLET ORAL at 10:27

## 2022-10-17 ASSESSMENT — ACTIVITIES OF DAILY LIVING (ADL)
ADLS_ACUITY_SCORE: 37
ADLS_ACUITY_SCORE: 35
ADLS_ACUITY_SCORE: 36
ADLS_ACUITY_SCORE: 35
ADLS_ACUITY_SCORE: 34
ADLS_ACUITY_SCORE: 36
ADLS_ACUITY_SCORE: 34
ADLS_ACUITY_SCORE: 37
ADLS_ACUITY_SCORE: 37
ADLS_ACUITY_SCORE: 35
ADLS_ACUITY_SCORE: 35
ADLS_ACUITY_SCORE: 37

## 2022-10-17 NOTE — DISCHARGE SUMMARY
[facility]  Discharge Summary        Suresh Powers MRN# [id number]   YOB: 1960 Age: 62 year old     Date of Admission:  10/10/2022  Date of Discharge:  10/18/2022  Admitting Physician:  Alexander Long MD  Discharge Physician: Briana García MD  Discharging Service: Hospitalist     Primary Provider: Brad Thurston  Primary Care Physician Phone Number: [phone]         Discharge Diagnoses/Problem Oriented Hospital Course (Providers):    Suresh Powers was admitted on 10/10/2022 by Alexander Long MD and I would refer you to their history and physical.  The following problems were addressed during his hospitalization:    Assessment & Plan     Suresh Powers is a 62 year old male with history of hypertension, depression, anxiety, OCD, left hip fracture status post surgery September 2022, and sleep disorder among others who presented to the ED from home with difficulty urinating.  Patient reports being admitted for few weeks at [facility] TCU until about 5 days ago after he had fallen and broken his left hip which required surgery.  In the catheter for nearly a month which was then removed last Tuesday (6 days ago).  He reports voiding once at TCU prior to leaving.  Since he got home he has not been doing well in general.  Considerably weak and essentially not safe to be moving around independently at home even with walker assistance devices.  No falls or head traumas or passing out.  He does feel that he came close to passing out/falling at different times.  He has had barely any urinary output despite maximal efforts and unfortunately had some bowel incontinence while attempting to urinate.  He denies any fevers, chills, coughing, shortness of breath, chest pain, palpitation, abdominal pain, nausea, vomiting, or diarrhea and has been taking his medications as directed.  He is very appreciative of cares and he is very sure that he is unable to take care of himself at home in  his current state.        Sepsis secondary to UTI  Urinary retention  Patient has been home from [facility] for a few days after previously being hospitalized for a broken hip requiring surgery. He had retention when he left the hospital and went to TCU and required a catheter for around a month and was removed 10/4.  Reports voiding at TCU prior to leaving the next day.  At home he was unable to void and has had some bowel incontinence when straining to urinate.  Denies any fevers or chills, abdominal pain, flank pain or back pain.  UA congruent with infection.  Urine culture and blood cultures pending.  Started on empiric Rocephin in ED.  WBC 13.2K, tachycardic to 1 teens, respiratory rate low 20s, UA congruent with infection.  -afebrile. Wbc stable at 13,   -cr wnl this am.   -afebrile. HR low 100 range. Not appear toxic  -Ucx growing >100K enterobacter cloacae complex.  - afebrile. Feeling well.      Plan;   -strict I/o to follow for post obstructive diuresis  - stop zosyn. Change to cefepime 2gm q12 hours given potential for restistance to zosyn. Discussed with pharmD  Having some green diarrhea most likely secondary to antibiotics started on probiotic twice a day today.  Diarrhea improved today  Urine culture with Enterobacter sensitive to ciprofloxacin so switched IV cefepime to Cipro 500 mg PO every 12 hours  - Acetaminophen as needed for fever pain  - Continue Prieto catheter  - Urology consulted- signed off , will need urology follow up outpatient in about 1 week in clinic.   - PTA flomax continued     Hypochloremic hyponatremia  -chronic to a degree, chlorthalidone stopped long time ago per pt. no longer taking this.   -asymptomatic  Sodium 123 on adm. chloride 89.  Patient reports struggling at home in general.  He does seem to have a degree of chronic hyponatremia with baseline may be in the upper 120s or 130.  Likely lower today due to decreased p.o. intake as he was at home and really not able  "to care for himself.  -very poor fluid and solute intake last 4 days.   -has been eating well in hospital following vasquez placement and relief of urinary obstruction. Taking in fluids  - gradual improvement in Na with NS 75cc/hr. Resolution of metabolic alkalosis. Na up to 131.   -hypotonic hyponatremia 2/2 to volume depletion and poor solute intake. Has degree of chronically mildly low Na. May have component of SIADH at baseline. No Psych meds. Holding selective serotonin reuptake inhibitor for now.   -anticipate continued rise in Na over next 24 hours.      Plan   - stop fluids as po intake and solute intake very good  Patient sodium at 129 last night and 130 this morning  We will place him on fluid restriction at 1800ml  to help with hyponatremia  Sodium remained stable at 130 -132-130 on 10/16/22  Restarted Lexapro on 10/15               Generalized weakness  Left hip fracture requiring surgery status post fall-early September 2022  As above, patient was hospitalized here in early September required surgery subsequently went to TCU for few weeks and was discharged home less than 1 week ago.  He has not been doing well and has had near falls multiple times. Does endorse worsened L knee pain.   -pain L hip gradually getting a little better.  -had his 2 week follow up with ortho at his TCU   -negative imaging LLE. Neg US. No dvt LLE  - PT/OT  - CM/SW  - L knee xr  -fall precuations  -has ortho follow up on Tuesday     -review of pelvis imaging indicates: \"There  is some new impaction and medial displacement of the fracture since the 9/4/2022 intraoperative spot views.\"  Called Ortho PA, they will review xray to determine if this warrants further imaging/evaluation vs routine post op findings. Will contact team if concerns. ---> they reviewed xrays and no issue. Cont with wbat and follow up with ortho  Out patient      HTN  HL  Mildly hypertensive.    -PTA regimen includes amlodipine 10 mg daily.    Does not appear " "to be on statin.  - Continue PTA amlodipine  - BP well controlled      Pressure injury  Coccyx, present on admission  Seen by wound care, see note for details.      SPIKE  Depression  OCD  Stable upon admission.  Appropriate mood and affect.  Very appreciative of cares.  - Continue PTA buspirone, escitalopram,  and lamotrigine     BLE edema  On norvasc  Will provide compression stockings.            Diet:   reg  DVT Prophylaxis: Pneumatic Compression Devices  Vasquez Catheter: PRESENT, indication:  vasquez placed in ED 10/10.   Central Lines: None  Cardiac Monitoring: None  Code Status:   Full Code -discussed in detail and confirmed upon admission           Clinically Significant Risk Factors Present on Admission             # Hyponatremia: Na = 123 mmol/L (Ref range: 133 - 144 mmol/L) on admission, will monitor as appropriate            # Obesity: Estimated body mass index is 30.54 kg/m  as calculated from the following:    Height as of 10/4/22: 1.702 m (5' 7\").    Weight as of this encounter: 88.5 kg (195 lb).           Disposition Plan-TCU stay was denied by insurance company so he is getting discharged home with home health care services in stable condition    Briana García MD   Pager  [phone](7AM-6PM)                      Code Status:      Full Code         Important Results:      Please see below         Pending Results:        Unresulted Labs Ordered in the Past 30 Days of this Admission     No orders found from 9/10/2022 to 10/11/2022.               Discharge Instructions and Follow-Up:      Follow-up Appointments     Follow Up and recommended labs and tests      1. Follow up with [facility] on Tuesday 10/18/22, Dr. Cochran to   follow up hip surgery as arranged  2. Follow up with PCP 1 week after discharge from TCU  F/u with NH physician in 1week. Recheck CBC, BMP in 1week   4. Follow up with [facility] ~2 weeks in clinic to follow   up urinary retention, voiding trial. Urology will " arrange follow up                  Discharge Disposition:      Discharged to short-term care facility         Discharge Medications:        Current Discharge Medication List      START taking these medications    Details   !! acetaminophen (TYLENOL) 325 MG tablet Take 2 tablets (650 mg) by mouth every 6 hours as needed for mild pain or other (and adjunct with moderate or severe pain or per patient request)    Associated Diagnoses: Closed fracture of left hip, initial encounter (H)      ciprofloxacin (CIPRO) 500 MG tablet Take 1 tablet (500 mg) by mouth every 12 hours for 1 day  Qty: 3 tablet, Refills: 0    Associated Diagnoses: Urinary tract infection without hematuria, site unspecified      lactobacillus rhamnosus, GG, (CULTURELL) capsule Take 1 capsule by mouth 2 times daily  Qty: 60 capsule, Refills: 0    Associated Diagnoses: Urinary tract infection without hematuria, site unspecified       !! - Potential duplicate medications found. Please discuss with provider.      CONTINUE these medications which have NOT CHANGED    Details   !! acetaminophen (TYLENOL) 325 MG tablet Take 2 tablets (650 mg) by mouth every 8 hours as needed      amLODIPine (NORVASC) 10 MG tablet Take 1 tablet (10 mg) by mouth daily  Qty: 90 tablet, Refills: 1    Associated Diagnoses: Benign essential hypertension      busPIRone (BUSPAR) 15 MG tablet Take 1 tablet (15 mg) by mouth 2 times daily      escitalopram (LEXAPRO) 20 MG tablet Take 20 mg by mouth daily      fluticasone (FLONASE) 50 MCG/ACT nasal spray use 2 sprays in each nostril daily.  Qty: 48 g, Refills: 3    Associated Diagnoses: Non-seasonal allergic rhinitis, unspecified trigger      lamoTRIgine (LAMICTAL) 200 MG tablet Take 200 mg by mouth At Bedtime    Comments: Prescribed by psychiatry      Multiple Vitamins-Minerals (MULTIVITAMIN ADULT PO) Take 1 tablet by mouth daily      polyethylene glycol (MIRALAX) 17 g packet Take 1 packet by mouth daily as needed for constipation       polyethylene glycol-propylene glycol (SYSTANE ULTRA) 0.4-0.3 % SOLN ophthalmic solution Place 1 drop into both eyes 4 times daily as needed      tamsulosin (FLOMAX) 0.4 MG capsule Take 1 capsule (0.4 mg) by mouth every evening  Qty: 30 capsule, Refills: 0    Associated Diagnoses: Urinary retention      traZODone (DESYREL) 50 MG tablet Take 2 tablets (100 mg) by mouth At Bedtime    Associated Diagnoses: Sleep disorder      vitamin C (ASCORBIC ACID) 500 MG tablet Take 500 mg by mouth daily      zinc 50 MG TABS Take 100 mg by mouth daily      aspirin (ASA) 325 MG EC tablet Take 1 tablet (325 mg) by mouth daily  Qty: 30 tablet, Refills: 0    Associated Diagnoses: Postoperative state       !! - Potential duplicate medications found. Please discuss with provider.      STOP taking these medications       oxyCODONE (ROXICODONE) 5 MG tablet Comments:   Reason for Stopping:                    Allergies:         Allergies   Allergen Reactions     Lisinopril Other (See Comments)     Elevated potassium     Sertraline Diarrhea            Consultations This Hospital Stay:      Consultation during this admission received from orthopedics and urology         Condition and Physical Exam on Discharge:      Discharge condition: Stable   Discharge vitals: Blood pressure 118/74, pulse 106, temperature 97.7  F (36.5  C), temperature source Oral, resp. rate 18, weight 88.4 kg (194 lb 14.2 oz), SpO2 96 %.   Constitutional: Alert, awake, NAD, pleasant male lying comfortably in bed    Respiratory:     CTAB  Cardiovascular: RRR no r/g/m  GI: soft, nt, nd  Skin/Integumen: no rash, trace  bLE edema  Neuro: nl speech and mentation  Psych:nl affect  Ortho; some mild pain with palpation LLE medially. No redness or venous cord                Discharge Orders for Skilled Facility (from Discharge Orders):        After Care Instructions     Activity - Up with nursing assistance      WBAT both legs         Daily weights      Call Provider for weight  gain of more than 2 pounds per day or 5 pounds per week.         Diet      Follow this diet upon discharge: Orders Placed This Encounter      Snacks/Supplements Adult: Other; Patient may order supplements PRN (RD); With Meals      Combination Diet Regular Diet Adult. Fluid restriction 1800ml per day         Fall precautions      Prieto catheter      To straight gravity drainage. Change catheter every 2 weeks and PRN for leaking or decreased urine output with signs of bladder distention. DO NOT change catheter without a specific Provider order IF diagnosis of benign prostatic hypertrophy (BPH), neurogenic bladder, or other urological conditions         General info for SNF      Length of Stay Estimate: Short Term Care: Estimated # of Days <30  Condition at Discharge: Improving  Level of care:skilled   Rehabilitation Potential: Good  Admission H&P remains valid and up-to-date: Yes  Recent Chemotherapy: N/A  Use Nursing Home Standing Orders: Yes         Intake and output      Every shift         Mantoux instructions      Give two-step Mantoux (PPD) Per Facility Policy Yes         Patient care order      Prieto catheter should be changed monthly, placed on 10/10/22         Wound care (specify)      Site:   Perineal cares: BID and prn:  1.  Cleanse gently with Oliver perineal lotion and soft dry wipes (avoid blue bag wipes in ashu area).  2.  Apply thin glaze of Critic-aid barrier paste along gluteal cleft and inner buttocks.  3.  Leave open to air.  PIP measures.                    Rehab orders for Skilled Facility (from Discharge Orders):      Referrals     Future Labs/Procedures    Primary Care - Care Coordination Referral     Process Instructions:    Services are provided by a Care Coordinator for people with complex needs   such as: medical, social, or financial troubles.  The Care Coordinator   works with the patient and their Primary Care Provider to determine health   goals, obtain resources, achieve outcomes, and  develop care plans that   help coordinate the patient's care.     Comments:         Occupational Therapy Adult Consult     Comments:    Evaluate and treat as clinically indicated.    Reason:  falls, deconditioned, recent L hip surgery, wbat both legs    Physical Therapy Adult Consult     Comments:    Evaluate and treat as clinically indicated.    Reason:  recent L hip surgery, deconditioned, falls. wbat             Discharge Time:      Greater than 30 minutes.        Image Results From This Hospital Stay (For Non-EPIC Providers):        Results for orders placed or performed during the hospital encounter of 10/10/22   XR Pelvis w Hip Left 1 View    Narrative    EXAM: PELVIS AND HIP LEFT ONE VIEW  DATE/TIME: 10/10/2022 12:48 PM     INDICATION: Left-sided hip pain. Postoperative follow-up.   COMPARISON: 9/3/2022. 9/4/2022.      Impression    IMPRESSION:  1.  ORIF left proximal femur fracture with intramedullary nail and  femoral neck screw fixation. The orthopedic hardware is intact. There  is some new impaction and medial displacement of the fracture since  the 9/4/2022 intraoperative spot views. There is some osseous healing  response at the fracture margins with sclerosis and callus formation.  2.  Normal joint spacing and alignment.    ARTHUR PICHARDO MD         SYSTEM ID:  [id number]   US Lower Extremity Venous Duplex Bilateral    Narrative    VENOUS ULTRASOUND BILATERAL LEG(S)  10/10/2022 1:25 PM     HISTORY: Bilateral lower extremity pain, swelling and edema    COMPARISON: None.    FINDINGS: Examination of the deep veins with graded compression and  color flow Doppler with spectral wave form analysis was performed.  Images show no evidence of thrombus in the bilateral common femoral  vein, femoral vein, popliteal vein or calf veins.      Impression    IMPRESSION: No deep vein thrombosis in either lower extremity.      ROBERT CARL DO         SYSTEM ID:  [id number]   XR Knee Left 1/2 Views    Narrative     EXAM: XR KNEE LEFT 1/2 VIEWS  LOCATION: [facility]  DATE/TIME: 10/10/2022 11:18 PM    INDICATION: pain, recent fall  COMPARISON: 11/17/2021      Impression    IMPRESSION: Normal joint spaces and alignment. No acute fracture or joint effusion. Prior proximal tibial ORIF. Osteopenia.           Most Recent Lab Results In EPIC (For Non-EPIC Providers):    Most Recent 3 CBC's:  Recent Labs   Lab Test 10/13/22  0941 10/12/22  0702 10/11/22  0614   WBC 9.7 11.8* 13.0*   HGB 8.2* 8.3* 8.6*   MCV 86 82 81    422 432      Most Recent 3 BMP's:  Recent Labs   Lab Test 10/16/22  0803 10/15/22  0748 10/14/22  0731   * 132* 130*   POTASSIUM 4.4 4.4 3.9   CHLORIDE 98 99 96   CO2 29 28 29   BUN 16 15 8   CR 0.61* 0.62* 0.58*   ANIONGAP 3 5 5   NIMISHA 9.0 8.7 8.5   * 117* 113*     Most Recent 3 Troponin's:  Recent Labs   Lab Test 03/24/21  1158   TROPI <0.015     Most Recent 3 INR's:  Recent Labs   Lab Test 06/15/21  1131   INR 0.87     Most Recent 2 LFT's:  Recent Labs   Lab Test 10/16/22  0803 10/15/22  0748   AST 15 13   ALT 23 21   ALKPHOS 124 120   BILITOTAL 0.3 0.4     Most Recent Cholesterol Panel:  Recent Labs   Lab Test 06/15/21  1131   CHOL 224*   LDL 43      TRIG 31     Most Recent 6 Bacteria Isolates From Any Culture (See EPIC Reports for Culture Details):No lab results found.  Most Recent TSH, T4 and HgbA1c:  Recent Labs   Lab Test 10/11/22  1135 06/15/21  1131 10/13/20  1033   TSH 1.50   < >  --    A1C  --   --  5.0    < > = values in this interval not displayed.

## 2022-10-17 NOTE — PROGRESS NOTES
[facility]    Hospitalist Progress Note    Assessment & Plan   Date of Admission:  10/10/2022        Assessment & Plan     Suresh Powers is a 62 year old male with history of hypertension, depression, anxiety, OCD, left hip fracture status post surgery September 2022, and sleep disorder among others who presented to the ED from home with difficulty urinating.  Patient reports being admitted for few weeks at [facility] until about 5 days ago after he had fallen and broken his left hip which required surgery.  In the catheter for nearly a month which was then removed last Tuesday (6 days ago).  He reports voiding once at TCU prior to leaving.  Since he got home he has not been doing well in general.  Considerably weak and essentially not safe to be moving around independently at home even with walker assistance devices.  No falls or head traumas or passing out.  He does feel that he came close to passing out/falling at different times.  He has had barely any urinary output despite maximal efforts and unfortunately had some bowel incontinence while attempting to urinate.  He denies any fevers, chills, coughing, shortness of breath, chest pain, palpitation, abdominal pain, nausea, vomiting, or diarrhea and has been taking his medications as directed.  He is very appreciative of cares and he is very sure that he is unable to take care of himself at home in his current state.        Sepsis secondary to UTI  Urinary retention  Patient has been home from TCU ([facility]) for a few days after previously being hospitalized for a broken hip requiring surgery. He had retention when he left the hospital and went to TCU and required a catheter for around a month and was removed 10/4.  Reports voiding at TCU prior to leaving the next day.  At home he was unable to void and has had some bowel incontinence when straining to urinate.  Denies any fevers or chills, abdominal pain, flank pain or back  pain.  UA congruent with infection.  Urine culture and blood cultures pending.  Started on empiric Rocephin in ED.  WBC 13.2K, tachycardic to 1 teens, respiratory rate low 20s, UA congruent with infection.  -afebrile. Wbc stable at 13,   -cr wnl this am.   -afebrile. HR low 100 range. Not appear toxic  -Ucx growing >100K enterobacter cloacae complex.  - afebrile. Feeling well.     Plan;   -strict I/o to follow for post obstructive diuresis  - Admit to inpatient  - stop zosyn. Change to cefepime 2gm q12 hours given potential for restistance to zosyn. Discussed with pharmD  Having some green diarrhea most likely secondary to antibiotics started on probiotic twice a day today.  Diarrhea improved today  Urine culture with Enterobacter sensitive to ciprofloxacin so switched IV cefepime to Cipro 500 mg PO every 12 hours  - Acetaminophen as needed for fever pain  - Continue Vasquez catheter  - Urology consulted- signed off , will need urology follow up outpatient in about 1 week in clinic.   - PTA flomax continued     Hypochloremic hyponatremia  -chronic to a degree, chlorthalidone stopped long time ago per pt. no longer taking this.   -asymptomatic  Sodium 123 on adm. chloride 89.  Patient reports struggling at home in general.  He does seem to have a degree of chronic hyponatremia with baseline may be in the upper 120s or 130.  Likely lower today due to decreased p.o. intake as he was at home and really not able to care for himself.  -very poor fluid and solute intake last 4 days.   -has been eating well in hospital following vasquez placement and relief of urinary obstruction. Taking in fluids  - gradual improvement in Na with NS 75cc/hr. Resolution of metabolic alkalosis. Na up to 131.   -hypotonic hyponatremia 2/2 to volume depletion and poor solute intake. Has degree of chronically mildly low Na. May have component of SIADH at baseline. No Psych meds. Holding selective serotonin reuptake inhibitor for now.   -anticipate  "continued rise in Na over next 24 hours.     Plan   - stop fluids as po intake and solute intake very good  Patient sodium at 129 last night and 130 this morning  We will place him on fluid restriction at 1800ml  to help with hyponatremia  Sodium remained stable at 130 -132 on 10/15/22  Restarted Lexapro on 10/15            Generalized weakness  Left hip fracture requiring surgery status post fall-early September 2022  As above, patient was hospitalized here in early September required surgery subsequently went to TCU for few weeks and was discharged home less than 1 week ago.  He has not been doing well and has had near falls multiple times. Does endorse worsened L knee pain.   -pain L hip gradually getting a little better.  -had his 2 week follow up with ortho at his TCU   -negative imaging LLE. Neg US. No dvt LLE  - PT/OT  - CM/SW  - L knee xr  -fall precuations  -has ortho follow up on Tuesday    -review of pelvis imaging indicates: \"There  is some new impaction and medial displacement of the fracture since the 9/4/2022 intraoperative spot views.\"  Called Ortho PA, they will review xray to determine if this warrants further imaging/evaluation vs routine post op findings. Will contact team if concerns. ---> they reviewed xrays and no issue. Cont with wbat and follow up with ortho on Tuesday     HTN  HL  Mildly hypertensive.    -PTA regimen includes amlodipine 10 mg daily.    Does not appear to be on statin.  - Continue PTA amlodipine  - BP well controlled     Pressure injury  Coccyx, present on admission  Seen by wound care, see note for details.      SPIKE  Depression  OCD  Stable upon admission.  Appropriate mood and affect.  Very appreciative of cares.  - Continue PTA buspirone, escitalopram,  and lamotrigine    BLE edema  On norvasc  Will provide compression stockings.            Diet:   reg  DVT Prophylaxis: Pneumatic Compression Devices  Vasquez Catheter: PRESENT, indication:  vasquez placed in ED 10/10. " "  Central Lines: None  Cardiac Monitoring: None  Code Status:   Full Code -discussed in detail and confirmed upon admission           Clinically Significant Risk Factors Present on Admission            # Hyponatremia: Na = 123 mmol/L (Ref range: 133 - 144 mmol/L) on admission, will monitor as appropriate            # Obesity: Estimated body mass index is 30.54 kg/m  as calculated from the following:    Height as of 10/4/22: 1.702 m (5' 7\").    Weight as of this encounter: 88.5 kg (195 lb).           Disposition Plan- pt is medically stable for discharge pending placement     Briana García MD   Page [phone](7AM-6PM)           Interval History    Eating well. Feeling well.   No new complaints .  No acute issues in the last 24 hours    -Data reviewed today: I reviewed all new labs and imaging results over the last 24 hours. I personally reviewed imaging and labs last 24 hours.     Physical Exam   Temp: 97.7  F (36.5  C) Temp src: Oral BP: 118/74 Pulse: 106   Resp: 18 SpO2: 96 % O2 Device: None (Room air)    Vitals:    10/12/22 0600 10/13/22 0617 10/15/22 0552   Weight: 91 kg (200 lb 9.9 oz) 89.7 kg (197 lb 12 oz) 88.4 kg (194 lb 14.2 oz)     Vital Signs with Ranges  Temp:  [97.7  F (36.5  C)-98.3  F (36.8  C)] 97.7  F (36.5  C)  Pulse:  [] 106  Resp:  [16-18] 18  BP: (117-128)/(68-86) 118/74  SpO2:  [95 %-96 %] 96 %  I/O last 3 completed shifts:  In: -   Out: 1175 [Urine:1175]    Constitutional: Alert, awake, NAD, pleasant male lying comfortably in bed    Respiratory: CTAB  Cardiovascular: RRR no r/g/m  GI: soft, nt, nd  Skin/Integumen: no rash, trace  bLE edema  Neuro: nl speech and mentation  Psych:nl affect  Ortho; some mild pain with palpation LLE medially. No redness or venous cord        Medications       amLODIPine  10 mg Oral Daily     busPIRone  15 mg Oral BID     ciprofloxacin  500 mg Oral Q12H [facility] (08/20)     escitalopram  20 mg Oral Daily     lactobacillus rhamnosus (GG)  1 capsule Oral BID     " lamoTRIgine  200 mg Oral At Bedtime     senna-docusate  1 tablet Oral BID    Or     senna-docusate  2 tablet Oral BID     sodium chloride (PF)  3 mL Intracatheter Q8H     tamsulosin  0.4 mg Oral QPM     traZODone  100 mg Oral At Bedtime       Data   Recent Labs   Lab 10/16/22  0803 10/15/22  0748 10/14/22  0731 10/13/22  0941 10/12/22  1841 10/12/22  0702 10/11/22  1135 10/11/22  0614   WBC  --   --   --  9.7  --  11.8*  --  13.0*   HGB  --   --   --  8.2*  --  8.3*  --  8.6*   MCV  --   --   --  86  --  82  --  81   PLT  --   --   --  445  --  422  --  432   * 132* 130* 130*   < > 131*  131*   < > 125*   POTASSIUM 4.4 4.4 3.9 3.7  --  3.7  --  3.7   CHLORIDE 98 99 96 95  --  98  --  91*   CO2 29 28 29 30  --  25  --  27   BUN 16 15 8 8  --  15  --  5*   CR 0.61* 0.62* 0.58* 0.60*  --  0.69  --  0.57*   ANIONGAP 3 5 5 5  --  8  --  7   NIMISHA 9.0 8.7 8.5 8.6  --  8.9  --  8.6   * 117* 113* 112*  --  119*  --  103*   ALBUMIN 2.9* 2.6*  --   --   --   --   --   --    PROTTOTAL 7.4 7.0  --   --   --   --   --   --    BILITOTAL 0.3 0.4  --   --   --   --   --   --    ALKPHOS 124 120  --   --   --   --   --   --    ALT 23 21  --   --   --   --   --   --    AST 15 13  --   --   --   --   --   --     < > = values in this interval not displayed.       Imaging:   No results found for this or any previous visit (from the past 24 hour(s)).

## 2022-10-17 NOTE — PROGRESS NOTES
Care Management Discharge Note    Discharge Date: 10/17/2022       Discharge Disposition: Skilled Nursing Facility,     Discharge Services: None    Discharge DME: None    Discharge Transportation: health plan transportation    Private pay costs discussed: private room/amenity fees and transportation costs    PAS Confirmation Code: [id number]  Patient/family educated on Medicare website which has current facility and service quality ratings: yes    Education Provided on the Discharge Plan:    Persons Notified of Discharge Plans: Patient  Patient/Family in Agreement with the Plan: yes    Handoff Referral Completed: Yes    Additional Information:  Patient will be discharging today to [facility] in a private room for $58.13/day until a shared room opens up on Wednesday. Writer discussed fees with patient who was in agreement with the plan. Writer discussed transportation with patient who states he will need wheelchair transport set up at discharge to the facility. Patient understands that this is private pay and he states he has the money to take care of that as it is a short ride. Writer completed PAS. Writer started [employer]. Reference number is [id number]. Waiting for medical director review.   Orders faxed to facility.    PAS-RR    D: Per DHS regulation, SW completed and submitted PAS-RR to [place] [employer]ging Direct Connect via the Senior LinkAge Line.  PAS-RR confirmation # is : [id number]    I: SW spoke with patient and they are aware a PAS-RR has been submitted.  SW reviewed with patient  that they may be contacted for a follow up appointment within 10 days of hospital discharge if their SNF stay is < 30 days.  Contact information for Senior LinkAge Line was also provided.    A: patient verbalized understanding.    P: Further questions may be directed to Senior LinkAge Line at #[phone], option #4 for PAS-RR staff.    Addendum: Patients BCBS Authorization was denied for SNF stay. Writer discussed with MD.  Writer discussed with patient who was visibly upset with the outcome. Patient states he cannot go home and he just needs to go to TCU. Writer explained that unfortunately insurance feels he is too good to go to TCU. Patient wanted SW to talk with the doctor to see if she can change the outcome. Writer spoke with  who states patient will stay here today and then discharge home tomorrow with home care. Patients hip surgery was in September and he was good enough to go home from TCU from a hip standpoint at that point.  Care Coordinator updated    TIFF Rivero

## 2022-10-17 NOTE — PLAN OF CARE
A/O x 4.  VSS on RA.  Denies pain, nausea, vomiting, or shortness of breath.  Up with SBA walker/GB.  Tolerating regular diet; Fluid restriction of 1800 mL, has 640 mL left until midnight.  Coccyx/buttock WOC completed once this shift.  Continent of bowel; vasquez for retention; patent with good output; pt will discharge on vasquez.  Discharge postponed r/t insurance denial for TCU placement;  Per Dr. García patient will stay overnight then discharge home with home care tomorrow.

## 2022-10-17 NOTE — PLAN OF CARE
Goal Outcome Evaluation:      [id number]:     A/Ox4. VSS on RA. Denies pain/SOB/N/V. Up SBA to the bathroom. Continent bowel, no BM this shift. Prieto in place patent, good UOP. Regular diet, good appetite. On 1800mL FR. Skin breakdown to sacrum, WOC following. Removed PIV due to pain from pt's report. Likely discharge to TCU today 10/17/22. SW following.

## 2022-10-18 VITALS
DIASTOLIC BLOOD PRESSURE: 84 MMHG | WEIGHT: 189.6 LBS | BODY MASS INDEX: 29.69 KG/M2 | RESPIRATION RATE: 16 BRPM | SYSTOLIC BLOOD PRESSURE: 129 MMHG | HEART RATE: 93 BPM | OXYGEN SATURATION: 96 % | TEMPERATURE: 98.1 F

## 2022-10-18 PROCEDURE — 250N000013 HC RX MED GY IP 250 OP 250 PS 637: Performed by: INTERNAL MEDICINE

## 2022-10-18 PROCEDURE — 250N000013 HC RX MED GY IP 250 OP 250 PS 637: Performed by: HOSPITALIST

## 2022-10-18 PROCEDURE — 99239 HOSP IP/OBS DSCHRG MGMT >30: CPT | Performed by: INTERNAL MEDICINE

## 2022-10-18 RX ORDER — CIPROFLOXACIN 500 MG/1
500 TABLET, FILM COATED ORAL EVERY 12 HOURS
Qty: 3 TABLET | Refills: 0 | Status: SHIPPED | OUTPATIENT
Start: 2022-10-18 | End: 2022-10-19

## 2022-10-18 RX ORDER — LACTOBACILLUS RHAMNOSUS GG 10B CELL
1 CAPSULE ORAL 2 TIMES DAILY
Qty: 60 CAPSULE | Refills: 0 | Status: ON HOLD | OUTPATIENT
Start: 2022-10-18 | End: 2022-12-08

## 2022-10-18 RX ADMIN — ESCITALOPRAM OXALATE 20 MG: 10 TABLET ORAL at 08:18

## 2022-10-18 RX ADMIN — CIPROFLOXACIN HYDROCHLORIDE 500 MG: 500 TABLET, FILM COATED ORAL at 08:18

## 2022-10-18 RX ADMIN — AMLODIPINE BESYLATE 10 MG: 5 TABLET ORAL at 08:18

## 2022-10-18 RX ADMIN — BUSPIRONE HYDROCHLORIDE 15 MG: 5 TABLET ORAL at 08:18

## 2022-10-18 RX ADMIN — Medication 1 CAPSULE: at 08:18

## 2022-10-18 ASSESSMENT — ACTIVITIES OF DAILY LIVING (ADL)
ADLS_ACUITY_SCORE: 36

## 2022-10-18 NOTE — PROGRESS NOTES
Care Management Discharge Note    Discharge Date: 10/18/2022       Discharge Disposition: Skilled Nursing Facility, Long Term Care    Discharge Services: None    Discharge DME: None    Discharge Transportation: health plan transportation    Private pay costs discussed: Not applicable    PAS Confirmation Code: [id number]  Patient/family educated on Medicare website which has current facility and service quality ratings: yes    Education Provided on the Discharge Plan:    Persons Notified of Discharge Plans: bedside RN  Patient/Family in Agreement with the Plan: yes    Handoff Referral Completed: Yes    Additional Information:  Patient has discharge orders for home with [facility] RN, PT/OT, HHA & SW.  Met with pt to discuss discharge plan for home with [facility] RN, PT/OT, HHA & SW.  Informed pt that he will receive a call regarding home care visit date and times.  Informed pt that PCP & Urology follow-ups scheduled.  Discussed pt discharging with vasquez catheter and bedside to educate pt on change vasquez bag to leg bag.  Per pt, his friend will be able to transport him home today and he will arrange rides to his medical appts.    GIGI Ibarra RN, BSN, OCN   Inpatient Care Coordination [address]  Office: [phone]

## 2022-10-18 NOTE — PLAN OF CARE
Goal Outcome Evaluation:         Pt A&O x 4. VSS on RA. No acute concerns overnight. Regular diet with 1800ml fluid restriction, no additional intake after 11pm. Prieto patent with good output. SBA but not OOB overnight. Pt expresses some frustration with discharge planning, hopeful for TCU placement for strengthening as he is worried about returning home alone. Pt will likely discharge home with homecare.

## 2022-10-18 NOTE — PROGRESS NOTES
Discharge Note    Patient discharged to home via private vehicle  accompanied by no family/friend .  IV: Discontinued  Prescriptions printed and given to patient/family.   Belongings reviewed and sent with patient.   Home medications returned to patient: NA  Equipment sent with: patient, N/A.   patient verbalizes understanding of discharge instructions. AVS given to patient.

## 2022-10-18 NOTE — PLAN OF CARE
Summary:  Admitted from ED with urinary retention, UTI, hyponatremia, recent ORIF left hip in Sept.  Pt unable to care for himself at home currently.  Hx OCD/depression.   Primary Diagnosis: Urinary retention, UTI.  Orientation: A&OX4 - very pleasant  Aggression Stop Light: Green  Mobility: Up with SBA/GB/walker. Not OOB this shift  Pain Management: pain to hip - received systane x1  Diet: Regular  Bowel/Bladder: Prieto in place from ED.   Abnormal Lab/Assessments: Sodium 130.  Drain/Device/Wound: Sacral skin breakdown, rashes to BUE (cleft region) barrier applied per plan of care  Consults: PT/OT, Nutrition, SWS.  D/C Day/Goals/Place: Pt will need a TCU stay at discharge.    Shift Note:   A&Ox4. VSS on RA. Denies pain. Regular diet tolerating well, fluid restriction 1800 mL. Prieto patent w/ good UOP. SBA. - approx 240 ml of intake remain

## 2022-10-19 ENCOUNTER — PATIENT OUTREACH (OUTPATIENT)
Dept: CARE COORDINATION | Facility: CLINIC | Age: 62
End: 2022-10-19

## 2022-10-19 ASSESSMENT — ACTIVITIES OF DAILY LIVING (ADL): DEPENDENT_IADLS:: TRANSPORTATION;CLEANING

## 2022-10-19 NOTE — LETTER
[facility]  Patient Centered Plan of Care  About Me:        Patient Name:  Suresh Cummins    YOB: 1960  Age:         62 year old   Markie MRN:    [id number] Telephone Information:  Home Phone [phone]   Mobile [phone]       Address:  [address]  [place] [facility] [address] Email address:  [email or web address]      Emergency Contact(s)    Name Relationship Lgl Grd Work Phone Home Phone Mobile Phone   1. EMERITA CUMMINS* Daughter No   [phone]   2. BOZENA GRAHAM Sister No   [phone]   3. JIL MTZ Friend No   [phone]           Primary language:  English     needed? No   [employer]:  [phone] op. 1  Other communication barriers:No data recorded  Preferred Method of Communication:  Mail  Current living arrangement: No data recorded  Mobility Status/ Medical Equipment: No data recorded      Health Maintenance  Health Maintenance Reviewed:   Health Maintenance Due   Topic Date Due     HEPATITIS B IMMUNIZATION (1 of 3 - 3-dose series) Never done     HIV SCREENING  Never done     ZOSTER IMMUNIZATION (1 of 2) Never done     LUNG CANCER SCREENING  Never done     MEDICARE ANNUAL WELLNESS VISIT  03/21/2018     COLORECTAL CANCER SCREENING  08/20/2018     PHQ-9  12/15/2021     COVID-19 Vaccine (4 - Booster for Pfizer series) 02/08/2022       My Access Plan  Medical Emergency 911   Primary Clinic Line [facility] - [phone]   24 Hour Appointment Line [phone] or  [phone]) (toll-free)   24 Hour Nurse Line [phone] (toll-free)   Preferred Urgent Care No data recorded   Preferred Hospital No data recorded   Preferred Pharmacy [facility] - [place], [place] - [address]     Behavioral Health Crisis Line The National Suicide Prevention Lifeline at 1-840.514.1308 or Text/Call 968             My Care Team Members  Patient Care Team       Relationship Specialty  Notifications Start End    Brad Thurston MD PCP - General Internal Medicine  9/14/19     Phone: [phone] Fax: [phone]         [address] [place] [address]    Arlene Yarbrough, [facility] Pharmacist Pharmacist  6/4/19     Phone: [phone] Fax: [phone]         [address] [facility] [address]    Brad Thurston MD Assigned PCP   9/22/19     Phone: [phone] Fax: [phone]         [address] [place] [address]    Charito Pineda LSW Lead Care Coordinator  Admissions 9/7/22             My Care Plans  Self Management and Treatment Plan  Care Plan  Care Plan: Social Support     Problem: I will have in home supprt within the next 9 months.     Note:             Goal: I Will have in home support within the next  months.     Start Date: 10/19/2022 Expected End Date: 7/19/2023    Note:     Barriers: length of county process  Strengths: pt is a good advocate for self.  Patient expressed understanding of goal: yes  Action steps to achieve this goal:  1. I will contact [facility] and request a [facility] assessment.   2. I will complete the [employer] assessment.   3. I will contact my [facility] with any questions or needs.                          Action Plans on File:            Depression          Advance Care Plans/Directives Type:   No data recorded    My Medical and Care Information  Problem List   Patient Active Problem List   Diagnosis     Benign essential hypertension     Sleep disorder     Unilateral inguinal hernia without obstruction or gangrene     Single kidney     OCD (obsessive compulsive disorder)     Generalized anxiety disorder     Moderate episode of recurrent major depressive disorder (H)     Hyponatremia     Cataract of both eyes, unspecified cataract type     Closed fracture of left hip, initial encounter (H)     Alcoholic intoxication without complication (H)     Contracture of joint of ankle and foot     Abnormal gait     Urinary retention     Urinary tract infection  without hematuria, site unspecified     Sepsis, due to unspecified organism, unspecified whether acute organ dysfunction present (H)      Current Medications and Allergies:  See printed Medication Report.    Care Coordination Start Date: 9/7/2022   Frequency of Care Coordination: No data recorded   Form Last Updated: 10/19/2022

## 2022-10-19 NOTE — PROGRESS NOTES
Clinic Care Coordination Contact    Follow Up Progress Note      Assessment: [facility] spoke with pt. He stated that home care is coming out tomorrow. He stated that he feels much better. He stated he has a catheter until he sees a urologist.     CC spoke about getting more assistance in the home, as home care paid for by Medicare is short term. [facility] gave pt the county number to request a [place]. Pt receives disability and does not have assets. Pt stated that he also applied for Medical Assistance.     Care Gaps:    Health Maintenance Due   Topic Date Due     HEPATITIS B IMMUNIZATION (1 of 3 - 3-dose series) Never done     HIV SCREENING  Never done     ZOSTER IMMUNIZATION (1 of 2) Never done     LUNG CANCER SCREENING  Never done     MEDICARE ANNUAL WELLNESS VISIT  03/21/2018     COLORECTAL CANCER SCREENING  08/20/2018     PHQ-9  12/15/2021     COVID-19 Vaccine (4 - Booster for Pfizer series) 02/08/2022       Not addressed at this encounter.     Care Plans  Care Plan: Social Support     Problem: I will have in home supprt within the next 9 months.     Note:             Goal: I Will have in home support within the next  months.     Start Date: 10/19/2022 Expected End Date: 7/19/2023    Note:     Barriers: length of county process  Strengths: pt is a good advocate for self.  Patient expressed understanding of goal: yes  Action steps to achieve this goal:  1. I will contact [facility] and request a [facility] assessment.   2. I will complete the [employer] assessment.   3. I will contact my CC with any questions or needs.                         Intervention/Education provided during outreach: provided needed information.      Outreach Frequency: 2 weeks        Plan:   Care Coordinator will follow up in 2 weeks.    Charito Pineda [facility] Care Coordination   [facility]  Social Work Care Coordinator  [phone]

## 2022-10-19 NOTE — PLAN OF CARE
Physical Therapy Discharge Summary    Reason for therapy discharge:    Discharged to home with home therapy.    Progress towards therapy goal(s). See goals on Care Plan in [facility] electronic health record for goal details.  Goals not met.  Barriers to achieving goals:   discharge from facility.    Therapy recommendation(s):    Continued therapy is recommended.  Rationale/Recommendations:  Recommended TCU as pt lives alone and doesn't have assist upon discharge, noted pt declined TCU. Pt will benefit from Home PT to progress strength and independence with functional mobility.

## 2022-10-26 ENCOUNTER — TELEPHONE (OUTPATIENT)
Dept: INTERNAL MEDICINE | Facility: CLINIC | Age: 62
End: 2022-10-26

## 2022-10-26 NOTE — TELEPHONE ENCOUNTER
Order/Referral Request    Who is requesting: [facility]    Orders being requested:  Functional strengthening 1 x a week for 2 weeks and then once every other week for 4 weeks. Also delay the occupational therapy eval next week.    Reason service is needed/diagnosis:  hip fracture    When are orders needed by: asap    Has this been discussed with Provider: Yes    Does patient have a preference on a Group/Provider/Facility? [facility]    Does patient have an appointment scheduled?: Yes: 11-1    Where to send orders: verbal    Could we send this information to you in [employer] or would you prefer to receive a phone call?:   Patient would prefer a phone call   Okay to leave a detailed message?: Yes at Other phone number:  [phone] [facility]

## 2022-10-27 NOTE — TELEPHONE ENCOUNTER
Left detailed message relaying providers approval for requested home care orders, and information to call clinic back if any further questions.

## 2022-10-27 NOTE — TELEPHONE ENCOUNTER
Candelaria called back in for the orders    Informed I will sent to PCP to review    PT requesting delay OT eval to week of 10.31 and Pt 1w2, 1eow4 for functional strengthening

## 2022-10-31 ENCOUNTER — MEDICAL CORRESPONDENCE (OUTPATIENT)
Dept: HEALTH INFORMATION MANAGEMENT | Facility: CLINIC | Age: 62
End: 2022-10-31

## 2022-11-01 ENCOUNTER — OFFICE VISIT (OUTPATIENT)
Dept: INTERNAL MEDICINE | Facility: CLINIC | Age: 62
End: 2022-11-01
Payer: COMMERCIAL

## 2022-11-01 DIAGNOSIS — E87.1 HYPONATREMIA: ICD-10-CM

## 2022-11-01 DIAGNOSIS — R39.14 BENIGN PROSTATIC HYPERPLASIA WITH INCOMPLETE BLADDER EMPTYING: ICD-10-CM

## 2022-11-01 DIAGNOSIS — F33.1 MODERATE EPISODE OF RECURRENT MAJOR DEPRESSIVE DISORDER (H): ICD-10-CM

## 2022-11-01 DIAGNOSIS — R60.9 EDEMA, UNSPECIFIED TYPE: ICD-10-CM

## 2022-11-01 DIAGNOSIS — I10 BENIGN ESSENTIAL HYPERTENSION: ICD-10-CM

## 2022-11-01 DIAGNOSIS — N40.1 BENIGN PROSTATIC HYPERPLASIA WITH INCOMPLETE BLADDER EMPTYING: ICD-10-CM

## 2022-11-01 DIAGNOSIS — Z23 HIGH PRIORITY FOR 2019-NCOV VACCINE: ICD-10-CM

## 2022-11-01 DIAGNOSIS — D64.9 ANEMIA, UNSPECIFIED TYPE: ICD-10-CM

## 2022-11-01 DIAGNOSIS — M25.552 HIP PAIN, LEFT: Primary | ICD-10-CM

## 2022-11-01 LAB
ALBUMIN SERPL-MCNC: 3.6 G/DL (ref 3.4–5)
ALP SERPL-CCNC: 178 U/L (ref 40–150)
ALT SERPL W P-5'-P-CCNC: 29 U/L (ref 0–70)
ANION GAP SERPL CALCULATED.3IONS-SCNC: 11 MMOL/L (ref 3–14)
AST SERPL W P-5'-P-CCNC: 31 U/L (ref 0–45)
BILIRUB SERPL-MCNC: 0.6 MG/DL (ref 0.2–1.3)
BUN SERPL-MCNC: 8 MG/DL (ref 7–30)
CALCIUM SERPL-MCNC: 8.9 MG/DL (ref 8.5–10.1)
CHLORIDE BLD-SCNC: 93 MMOL/L (ref 94–109)
CO2 SERPL-SCNC: 22 MMOL/L (ref 20–32)
CREAT SERPL-MCNC: 0.59 MG/DL (ref 0.66–1.25)
ERYTHROCYTE [DISTWIDTH] IN BLOOD BY AUTOMATED COUNT: 16 % (ref 10–15)
FERRITIN SERPL-MCNC: 39 NG/ML (ref 26–388)
GFR SERPL CREATININE-BSD FRML MDRD: >90 ML/MIN/1.73M2
GLUCOSE BLD-MCNC: 105 MG/DL (ref 70–99)
HCT VFR BLD AUTO: 31.1 % (ref 40–53)
HGB BLD-MCNC: 10.1 G/DL (ref 13.3–17.7)
IRON SATN MFR SERPL: 6 % (ref 15–46)
IRON SERPL-MCNC: 22 UG/DL (ref 35–180)
MCH RBC QN AUTO: 25.4 PG (ref 26.5–33)
MCHC RBC AUTO-ENTMCNC: 32.5 G/DL (ref 31.5–36.5)
MCV RBC AUTO: 78 FL (ref 78–100)
PLATELET # BLD AUTO: 572 10E3/UL (ref 150–450)
POTASSIUM BLD-SCNC: 4.7 MMOL/L (ref 3.4–5.3)
PROT SERPL-MCNC: 8 G/DL (ref 6.8–8.8)
RBC # BLD AUTO: 3.97 10E6/UL (ref 4.4–5.9)
RETICS # AUTO: 0.07 10E6/UL (ref 0.03–0.1)
RETICS/RBC NFR AUTO: 1.6 % (ref 0.5–2)
SODIUM SERPL-SCNC: 126 MMOL/L (ref 133–144)
TIBC SERPL-MCNC: 350 UG/DL (ref 240–430)
WBC # BLD AUTO: 8.7 10E3/UL (ref 4–11)

## 2022-11-01 PROCEDURE — 91312 COVID-19,PF,PFIZER BOOSTER BIVALENT: CPT | Performed by: INTERNAL MEDICINE

## 2022-11-01 PROCEDURE — 99495 TRANSJ CARE MGMT MOD F2F 14D: CPT | Mod: 25 | Performed by: INTERNAL MEDICINE

## 2022-11-01 PROCEDURE — 83550 IRON BINDING TEST: CPT | Performed by: INTERNAL MEDICINE

## 2022-11-01 PROCEDURE — 85027 COMPLETE CBC AUTOMATED: CPT | Performed by: INTERNAL MEDICINE

## 2022-11-01 PROCEDURE — 0124A COVID-19,PF,PFIZER BOOSTER BIVALENT: CPT | Performed by: INTERNAL MEDICINE

## 2022-11-01 PROCEDURE — 36415 COLL VENOUS BLD VENIPUNCTURE: CPT | Performed by: INTERNAL MEDICINE

## 2022-11-01 PROCEDURE — 82728 ASSAY OF FERRITIN: CPT | Performed by: INTERNAL MEDICINE

## 2022-11-01 PROCEDURE — 83540 ASSAY OF IRON: CPT | Performed by: INTERNAL MEDICINE

## 2022-11-01 PROCEDURE — 85045 AUTOMATED RETICULOCYTE COUNT: CPT | Performed by: INTERNAL MEDICINE

## 2022-11-01 PROCEDURE — 80053 COMPREHEN METABOLIC PANEL: CPT | Performed by: INTERNAL MEDICINE

## 2022-11-01 RX ORDER — CELECOXIB 200 MG/1
200 CAPSULE ORAL DAILY
Qty: 30 CAPSULE | Refills: 1 | Status: ON HOLD | OUTPATIENT
Start: 2022-11-01 | End: 2022-12-08

## 2022-11-01 RX ORDER — AMLODIPINE BESYLATE 10 MG/1
10 TABLET ORAL DAILY
Qty: 90 TABLET | Refills: 3 | Status: ON HOLD | OUTPATIENT
Start: 2022-11-01 | End: 2022-12-08
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
## 2022-11-01 NOTE — PATIENT INSTRUCTIONS
Pfizer covid booster vaccine today   Celecoxib (Celebrex) 200mg capsule, 1 capsule  daily  with food for hip and leg pain  See Urology this Friday for cystoscopy and possible removal of Prieto  See ortho in 2 week as scheduled  Continue physical therapy   Labs as ordered. Will make decision re: Escitalopram  use and any possible fluid restriction based on sodium lab result today    Continue other medications for now

## 2022-11-02 ENCOUNTER — PATIENT OUTREACH (OUTPATIENT)
Dept: CARE COORDINATION | Facility: CLINIC | Age: 62
End: 2022-11-02

## 2022-11-02 NOTE — PROGRESS NOTES
Clinic Care Coordination Contact    Follow Up Progress Note      Assessment: [facility] spoke with pt. He stated that he has been doing okay. He is working with [facility]. He stated that the  through [facility] (Jerri salazar [phone])spoke with the county and they stated that pt does not qualify for services through the [facility].     Pt stated that he has no needs at this time.    Care Gaps:    Health Maintenance Due   Topic Date Due     HEPATITIS B IMMUNIZATION (1 of 3 - 3-dose series) Never done     HIV SCREENING  Never done     ZOSTER IMMUNIZATION (1 of 2) Never done     LUNG CANCER SCREENING  Never done     MEDICARE ANNUAL WELLNESS VISIT  03/21/2018     COLORECTAL CANCER SCREENING  08/20/2018     PHQ-9  12/15/2021       Did not discuss HM due during this encounter.     Care Plans  Care Plan: Social Support     Problem: I will have in home supprt within the next 9 months.     Note:             Goal: I Will have in home support within the next  months.     Start Date: 10/19/2022 Expected End Date: 7/19/2023    Note:     Barriers: length of county process  Strengths: pt is a good advocate for self.  Patient expressed understanding of goal: yes  Action steps to achieve this goal:  1. I will contact [facility] and request a M[place]ho[employer] assessment.   2. I will complete the M[place]hoices assessment.   3. I will contact my CC with any questions or needs.                         Intervention/Education provided during outreach: provided validation and needed information.      Plan:   Care Coordinator will follow up in 2 weeks.     TIFF Hernandes  [facility] Care Coordination   [employer]  Social Work Care Coordinator  [phone]

## 2022-11-03 ENCOUNTER — MEDICAL CORRESPONDENCE (OUTPATIENT)
Dept: HEALTH INFORMATION MANAGEMENT | Facility: CLINIC | Age: 62
End: 2022-11-03

## 2022-11-04 ENCOUNTER — TELEPHONE (OUTPATIENT)
Dept: UROLOGY | Facility: CLINIC | Age: 62
End: 2022-11-04

## 2022-11-04 ENCOUNTER — OFFICE VISIT (OUTPATIENT)
Dept: UROLOGY | Facility: CLINIC | Age: 62
End: 2022-11-04
Payer: COMMERCIAL

## 2022-11-04 VITALS
WEIGHT: 185 LBS | BODY MASS INDEX: 29.03 KG/M2 | DIASTOLIC BLOOD PRESSURE: 74 MMHG | HEART RATE: 110 BPM | HEIGHT: 67 IN | OXYGEN SATURATION: 97 % | SYSTOLIC BLOOD PRESSURE: 120 MMHG

## 2022-11-04 DIAGNOSIS — R33.9 URINARY RETENTION: Primary | ICD-10-CM

## 2022-11-04 DIAGNOSIS — R39.9 LOWER URINARY TRACT SYMPTOMS (LUTS): ICD-10-CM

## 2022-11-04 PROCEDURE — 99214 OFFICE O/P EST MOD 30 MIN: CPT | Mod: 25 | Performed by: UROLOGY

## 2022-11-04 PROCEDURE — 52000 CYSTOURETHROSCOPY: CPT | Performed by: UROLOGY

## 2022-11-04 RX ORDER — TAMSULOSIN HYDROCHLORIDE 0.4 MG/1
0.4 CAPSULE ORAL EVERY EVENING
Qty: 90 CAPSULE | Refills: 3 | Status: SHIPPED | OUTPATIENT
Start: 2022-11-04 | End: 2023-02-22

## 2022-11-04 RX ORDER — LIDOCAINE HYDROCHLORIDE 20 MG/ML
JELLY TOPICAL ONCE
Status: COMPLETED | OUTPATIENT
Start: 2022-11-04 | End: 2022-11-04

## 2022-11-04 RX ORDER — SULFAMETHOXAZOLE/TRIMETHOPRIM 800-160 MG
1 TABLET ORAL 2 TIMES DAILY
Qty: 6 TABLET | Refills: 0 | Status: SHIPPED | OUTPATIENT
Start: 2022-11-04 | End: 2022-11-07

## 2022-11-04 RX ADMIN — LIDOCAINE HYDROCHLORIDE 5 ML: 20 JELLY TOPICAL at 09:17

## 2022-11-04 ASSESSMENT — PAIN SCALES - GENERAL: PAINLEVEL: MILD PAIN (2)

## 2022-11-04 NOTE — NURSING NOTE
Chief Complaint   Patient presents with     Urinary Retention     Recurrent. In office cystoscopy.      Catheter removal documentation on 11/4/2022:    Suresh Powers presents to the clinic for catheter removal.  Reason for removal: cystoscopy  Order has been verified. yes  Catheter successfully removed at 8:20 AM without immediate complication.  100 cc's of urine present in the catheter bag.  Urethral meatus is free of secretions and encrustation.  The patient is afebrile.  The patient tolerated the procedure and was instructed to prep for cystoscopy    Prior to the start of the procedure and with procedural staff participation, I verbally confirmed the patient s identity using two indicators, relevant allergies, that the procedure was appropriate and matched the consent or emergent situation, and that the correct equipment/implants were available. Immediately prior to starting the procedure I conducted the Time Out with the procedural staff and re-confirmed the patient s name, procedure, and site/side. I have wiped the patient off with the povidone-Iodine solution, draped them,  used Lidocaine hydrochloride jelly, and instilled sterile water into the bladder. (The Joint Commission universal protocol was followed.)  Yes    Sedation (Moderate or Deep): None  5mL 2% lidocaine hydrochloride Urojet instilled into urethra.    NDC# [address]-1  Lot #: [id number]  Expiration Date:  12-23  After procedure patients bladder was filled with approximately 250 ml. And was given some time to try to urinate. Patient stated he was unable to urinate as it is hard for him to do so when not at home. Patient was taught self catheterizing and was given some gentle caths 16 Mauritian coude to take with him. He was given a sample box to get through the weekend and told to call on Monday to order if it goes well.  Candelaria Rolon LPN

## 2022-11-04 NOTE — PATIENT INSTRUCTIONS
"AFTER YOUR CYSTOSCOPY  ?  ?  You have just completed a cystoscopy, or \"cysto\", which allowed your physician to learn more about your bladder (or to remove a stent placed after surgery). We suggest that you continue to avoid caffeine, fruit juice, and alcohol for the next 24 hours, however, you are encouraged to return to your normal activities.  ?  ?  A few things that are considered normal after your cystoscopy:  ?  * small amount of bleeding (or spotting) that clears within the next 24 hours  ?  * slight burning sensation with urination  ?  * sensation of needing to void (urinate) more frequently  ?  * the feeling of \"air\" in your urine  ?  * mild discomfort that is relieved with Tylenol    * bladder spasms  ?  ?  ?  Please contact our office promptly if you:  ?  * develop a fever above 101 degrees  ?  * are unable to urinate  ?  * develop bright red blood that does not stop  ?  * experience severe pain or swelling  ?  ?  ?  And of course, please contact our office with any concerns or questions [phone].  ?   AFTER YOUR CYSTOSCOPY        You have just completed a cystoscopy, or \"cysto\", which allowed your physician to learn more about your bladder (or to remove a stent placed after surgery). We suggest that you continue to avoid caffeine, fruit juice, and alcohol for the next 24 hours, however, you are encouraged to return to your normal activities.         A few things that are considered normal after your cystoscopy:     * Small amount of bleeding (or spotting) that clears within the next 24 hours     * Slight burning sensation with urination     * Sensation to of needing to avoid more frequently     * The feeling of \"air\" in your urine     * Mild discomfort that is relieved with Tylenol        Please contact our office promptly if you:     * Develop a fever above 101 degrees     * Are unable to urinate     * Develop bright red blood that does not stop     * Severe pain or swelling         Please contact " our office with any concerns or questions @[facility].

## 2022-11-04 NOTE — LETTER
11/4/2022       RE: Suresh Powers  [address]  [facility] [address]     Dear Colleague,    Thank you for referring your patient, Suresh Powers, to the [facility] at [facility]. Please see a copy of my visit note below.    Urology Consult History and Physical  ANGIEDAVIOLETTA   Name: Suresh Powers    MRN: [id number]   YOB: 1960       We were asked to see Suresh Powers at the request of Dr. Thurston for evaluation and treatment of acute urinary retention .        Chief Complaint:   Acute urinary retention     History is obtained from the patient            History of Present Illness:   Seen at ER consult on 10/10/2022:  Suresh Powers is a 62 year old male with a history of recent hip fracture in September 2022 and post-op urinary retention (vasquez catheter removed 5 days ago) who presented to the ER today with difficulty urinating, nausea, and leg pain.      Per chart review, his catheter was placed following surgery for a hip fracture in early September. He had post-op urinary retention and was discharged with the catheter in place. He was offered several voiding trials through the month of September, but declined until his appointment on 10/5. He states he was started on Flomax after the catheter was removed.      He presented to the ER today with urinary dribbling since his catheter was removed. A catheter was replaced with 1200 mL urine return. Vitals were notable for tachycardia (110s), UA consistent with infection, and WBC 13. He was started on Ceftriaxone and admitted tu the hospital for further cares.     In speaking with the patient today, he states he has struggled with urinary retention for many years- especially in the setting of narcotic use and in the post-op period. He has never seen a urologist before. There is no cross sectional imaging available to evaluate prostate size. His last PSA was 1.4 in 2019.      TODAY 11/4/2022:  He was ultimately discharged on 10/17/2022  He presents today for cystoscopy and trial of void  He is continued on tamsulosin 0.4 mg daily           Past Medical History:     Past Medical History:   Diagnosis Date     Anxiety     sees psych for trazodone     Clostridium difficile diarrhea 3/9/2016     Foot pain      Hypertension      Mold exposure      Shingles 1984            Past Surgical History:     Past Surgical History:   Procedure Laterality Date     foot crush injury       kidney donation       OPEN REDUCTION INTERNAL FIXATION CLAVICLE Left 2/4/2016    Procedure: OPEN REDUCTION INTERNAL FIXATION CLAVICLE;  Surgeon: Rakesh Hui MD;  Location:  OR     OPEN REDUCTION INTERNAL FIXATION HIP NAILING Left 9/4/2022    Procedure: Open reduction internal fixation of left hip fracture;  Surgeon: Huang Cochran MD;  Location:  OR     OPEN REDUCTION INTERNAL FIXATION TIBIAL PLATEAU Left 2/4/2016    Procedure: OPEN REDUCTION INTERNAL FIXATION TIBIAL PLATEAU;  Surgeon: Rakesh Hui MD;  Location:  OR            Social History:     Social History     Tobacco Use     Smoking status: Former     Packs/day: 1.00     Years: 0.00     Pack years: 0.00     Types: Cigarettes     Smokeless tobacco: Former     Quit date: 2/2/2016   Substance Use Topics     Alcohol use: Yes     Comment: states 8 beerrs all day over past 8 hours       History   Smoking Status     Former     Packs/day: 1.00     Years: 0.00     Types: Cigarettes   Smokeless Tobacco     Former     Quit date: 2/2/2016            Family History:     Family History   Problem Relation Age of Onset     Breast Cancer Mother      Diabetes Type 2  Mother      Skin Cancer Father      Cerebrovascular Disease Father               Allergies:     Allergies   Allergen Reactions     Lisinopril Other (See Comments)     Elevated potassium     Sertraline Diarrhea            Medications:     Current Outpatient Medications   Medication Sig      acetaminophen (TYLENOL) 325 MG tablet Take 2 tablets (650 mg) by mouth every 6 hours as needed for mild pain or other (and adjunct with moderate or severe pain or per patient request)     acetaminophen (TYLENOL) 325 MG tablet Take 2 tablets (650 mg) by mouth every 8 hours as needed     amLODIPine (NORVASC) 10 MG tablet Take 1 tablet (10 mg) by mouth daily     busPIRone (BUSPAR) 15 MG tablet Take 1 tablet (15 mg) by mouth 2 times daily     celecoxib (CELEBREX) 200 MG capsule Take 1 capsule (200 mg) by mouth daily     escitalopram (LEXAPRO) 20 MG tablet Take 20 mg by mouth daily     fluticasone (FLONASE) 50 MCG/ACT nasal spray use 2 sprays in each nostril daily.     lactobacillus rhamnosus, GG, (CULTURELL) capsule Take 1 capsule by mouth 2 times daily     lamoTRIgine (LAMICTAL) 200 MG tablet Take 200 mg by mouth At Bedtime     Multiple Vitamins-Minerals (MULTIVITAMIN ADULT PO) Take 1 tablet by mouth daily     polyethylene glycol (MIRALAX) 17 g packet Take 1 packet by mouth daily as needed for constipation     polyethylene glycol-propylene glycol (SYSTANE ULTRA) 0.4-0.3 % SOLN ophthalmic solution Place 1 drop into both eyes 4 times daily as needed     tamsulosin (FLOMAX) 0.4 MG capsule Take 1 capsule (0.4 mg) by mouth every evening     traZODone (DESYREL) 50 MG tablet Take 2 tablets (100 mg) by mouth At Bedtime     vitamin C (ASCORBIC ACID) 500 MG tablet Take 500 mg by mouth daily     zinc 50 MG TABS Take 100 mg by mouth daily     Current Facility-Administered Medications   Medication     lidocaine (XYLOCAINE) 2 % external gel             Review of Systems:     Skin: negative  Eyes: negative  Ears/Nose/Throat: negative  Respiratory: No shortness of breath, dyspnea on exertion, cough, or hemoptysis  Cardiovascular: negative  Gastrointestinal: negative  Genitourinary: as above  Musculoskeletal: as above  Neurologic: negative  Psychiatric: negative  Hematologic/Lymphatic/Immunologic: negative  Endocrine: negative           Physical Exam:   No data found.  There is no height or weight on file to calculate BMI.     General: age-appropriate appearing male in NAD  HEENT: Head AT/NC, EOMI, CN Grossly intact  Lungs: no respiratory distress, or pursed lip breathing  Heart: No obvious jugular venous distension present  Back: no bony midline tenderness, no CVAT bilaterally.  Abdomen: soft, non-distended, non-tender. No organomegaly  : normal male external genitalia.   Lymph: no palpable inguinal lymphadenopathy.  LE: no edema.   Musculoskeltal: s/p left hip fracture and surgery  Skin: no suspicious lesions or rashes  Neuro: Alert, oriented, speech and mentation normal;   moving all 4 extremities equally.  Psych: affect and mood normal          Data:   All laboratory data reviewed:    UA RESULTS:  Recent Labs   Lab Test 10/10/22  1156   COLOR Light Yellow   APPEARANCE Slightly Cloudy*   URINEGLC Negative   URINEBILI Negative   URINEKETONE 60*   SG 1.010   UBLD Negative   URINEPH 6.0   PROTEIN 20*   NITRITE Positive*   LEUKEST Large*   RBCU 1   WBCU 115*     Component PSA   Latest Ref Rng & Units 0 - 4 ug/L   12/19/2015 1.08   9/13/2019 1.38     Lab Results   Component Value Date    CR 0.59 11/01/2022    CR 0.81 06/15/2021               Impression and Plan:   Impression:   62-year-old man status post a left hip fracture which required surgery in September 22 with post operative urinary retention and history of prior episodes of urinary retention and LUTS      Plan:   LUTS  - We discussed the pathophysiology of the bladder and the prostate and the normal changes associated with the development of BPH and LUTS  - He has not been on tamsulosin 0.4 mg daily for several weeks  - Cystoscopy today with no evidence of very slight by lobar prostatic hypertrophy without clear bladder outlet obstruction  - Trial of void today unsuccessful with a bladder scan of 240 cc  - We will continue on tamsulosin 0.4 mg daily new prescription sent to the pharmacy  -  We discussed recommendation for instruction with intermittent self-catheterization and this was completed with my RN team today  - Would like him to ensure that he is emptying at least every 6 hours  - Hopefully he will have spontaneous voiding and not require prolonged intermittent self-catheterization  - Follow-up with me in about 2 months to review his progress  - Prescription for Bactrim DS twice daily for 3 days sent to the pharmacy     Thank you for the kind consultation.    Time spent: 30 minutes spent on the date of the encounter doing chart review, history and exam, documentation and further activities as noted above.  This was in addition to cystoscopy time    Fernando Steiner MD   Urology  [facility]  [facility] Phone: [phone]  [facility] Phone: [phone]

## 2022-11-04 NOTE — PROGRESS NOTES
Called patient and talked with him on catheterizing himself. He stated for the most part it went well for the first time. I called him at 3:45 PM  Candelaria Rolon LPN

## 2022-11-04 NOTE — TELEPHONE ENCOUNTER
[facility] Call Center    Phone Message    May a detailed message be left on voicemail: yes     Reason for Call:  Pt calling regarding on how he can get new delivered to him when he runs low. Please call pt

## 2022-11-04 NOTE — TELEPHONE ENCOUNTER
[facility] Call [facility]    Phone Message    May a detailed message be left on voicemail: yes     Reason for Call: Other: Pt called and stated when he runs low on his cath supplies how does he get more and have them be delivered to his house? Please call pt to further discuss, thanks!     Action Taken: Message routed to:  Other: Uro    Travel Screening: Not Applicable

## 2022-11-04 NOTE — PROCEDURES
History     Chief Complaint   Patient presents with     Foreign Body in Vagina     HPI  Chaya Paris is a 20 year old female who presents to  for concerns of a tampon still in her vagina. She put in a tampon yesterday, got drunk and doesn't remember taking it out. Menstrual cycle started 2 days ago and usually lasts 3-4 days. She has not noticed any blood or vaginal discharge today. Denies abdominal pain, fever, nausea, vomiting or diarrhea.     Allergies:  No Known Allergies    Problem List:    Patient Active Problem List    Diagnosis Date Noted     Contraceptive management 11/21/2018     Priority: Medium     Family history of gene mutation 05/16/2017     Priority: Medium     Overview:   MEN1 Mutation [id number] NEGATIVE reported by [facility] drawn 5-16-17.  See LAB tab for full report.       Family history of genetic disease carrier 05/16/2017     Priority: Medium        Past Medical History:    Past Medical History:   Diagnosis Date     Unspecified otitis media 04/21/2002       Past Surgical History:    No past surgical history on file.    Family History:    Family History   Problem Relation Age of Onset     Heart Disease Maternal Grandfather      Heart Disease Paternal Grandmother      Cerebrovascular Disease Paternal Grandmother      Other - See Comments Mother         MEN1     Thyroid Disease Mother         Hypoparathyroidism     Cancer Other         lymphoma       Social History:  Marital Status:  Single [1]  Social History     Tobacco Use     Smoking status: Never Smoker     Smokeless tobacco: Never Used   Substance Use Topics     Alcohol use: No     Alcohol/week: 0.0 standard drinks     Drug use: No        Medications:    AVIANE 0.1-20 MG-MCG tablet          Review of Systems   Genitourinary: Negative for vaginal discharge and vaginal pain.        Possible tampon still in vagina.    All other systems reviewed and are negative.      Physical Exam   BP: 123/76  Heart Rate: 79  Temp: 98.8  CYSTOSCOPY PROCEDURE NOTE:    Suresh Powers is a 62 year old male  who presents with acute urinary retention for cystoscopy.    Pt ID verified with patient: Yes     Procedure verified with patient: Yes     Procedure confirmed with physician and support staff: Yes     Consent form confirmed with physician and support staff.    Sign In  History and Physical Exam reviewed .  Informed Consent Discussed: Yes   Sign in Communication: Yes   Time Out:  Team Confirms the Correct Patient, Correct Procedure; Yes , Correct Site and Site Marking, Correct Position (if applicable).    Affirmation of Time Out: Yes   Sign Out:  Sign Out Discussion: Yes   Physician: Fernando Steiner MD    A urinalysis was performed revealing no evidence of infection.    The benefits, risks, alternatives of the cystoscopy procedure and personnel were discussed with the patient. The verbal consent was obtained and the patient agrees to proceed.      Description of procedure:   After fully informed, voluntary consent was obtained, the patient was brought into the procedure room, identified and placed in a supine position on the cystoscopy table.  The groin/scrotum were prepped with betadine and draped in a sterile fashion.  Urojet lidocaine gel was introduced.  A 15F flexible cystoscope was inserted into the urethra, and the bladder and urethra wereexamined in a systematic manner.  The patient tolerated the procedure well and there were no complications.      Cystoscopic findings:  The urethra was normal without strictures.  The prostate was 3cm long and demonstrated mild bilobar hypertrophy.  There was no median lobe.  The external sphincter coapted normally and the bladder neck was normal. The bladder was  entered and careful pan endoscopy was carried out. The posterior, superior and lateral walls and dome of the bladder were all well visualized and the scope was retroflexed upon itself..  There was mild trabeculation.  There were no neoplasms, stones,   F (37.1  C)  Resp: 12  SpO2: 97 %      Physical Exam  Vitals signs and nursing note reviewed. Exam conducted with a chaperone present.   Constitutional:       Appearance: Normal appearance. She is not ill-appearing or toxic-appearing.   HENT:      Head: Normocephalic.   Eyes:      Pupils: Pupils are equal, round, and reactive to light.   Neck:      Musculoskeletal: Neck supple.   Cardiovascular:      Rate and Rhythm: Normal rate and regular rhythm.   Pulmonary:      Effort: Pulmonary effort is normal. No respiratory distress.      Breath sounds: Normal breath sounds. No wheezing.   Abdominal:      General: Bowel sounds are normal. There is no distension.      Palpations: Abdomen is soft.      Tenderness: There is no abdominal tenderness. There is no right CVA tenderness, left CVA tenderness, guarding or rebound.   Genitourinary:     General: Normal vulva.      Exam position: Supine.      Pubic Area: No rash.       Labia:         Right: No tenderness or injury.         Left: No tenderness or injury.       Vagina: No foreign body. No erythema, tenderness or bleeding.      Cervix: Discharge (brownish) present. No erythema or cervical bleeding.      Adnexa: Right adnexa normal and left adnexa normal.   Skin:     General: Skin is warm and dry.      Capillary Refill: Capillary refill takes less than 2 seconds.   Neurological:      Mental Status: She is alert and oriented to person, place, and time.         ED Course        Procedures           Results for orders placed or performed during the hospital encounter of 06/21/20 (from the past 24 hour(s))   Wet prep    Specimen: Vagina   Result Value Ref Range    Specimen Description Vagina     Wet Prep No yeast seen     Wet Prep No clue cells seen     Wet Prep No Trichomonas seen     Wet Prep Many  WBC'S seen      US Pelvic Limited    Narrative    US PELVIC LIMITED    HISTORY: 20 years Female possible tampon in vagina; negative pelvic  exam    TECHNIQUE: Transabdominal  or diverticula identifed.  The ureteric orifices were normal in position and number and effluxing clear urine.  There was bullous edema from his prior Prieto catheterization in the midline of the urinary bladder    Assessment/Plan:   Suresh Powers is a 62 year old male with a history of urinary retention following hip fracture.     -See clinic note      Fernando Steiner MD     grayscale and color duplex ultrasonography  of the pelvis was performed.    COMPARISON: None    FINDINGS:    The uterus measures 7.6 x 3.2 x 4.1 cm.    Endometrial thickness is 5.7 mm.    The right ovary is not visualized     The left ovary is not visualized.    The vaginal canal appears empty.    No adnexal masses or abnormal fluid collection is present      Impression    IMPRESSION: Negative study.    NORBERTO RENAE MD       Medications - No data to display    Assessments & Plan (with Medical Decision Making)   No problem, feared complaint unfounded:  Patient presented ambulatory to urgent care with concerns for a retained tampon. Patient has been on her menstrual cycle for 2 days and inserted a tampon into her vagina yesterday. She admits to drinking alcohol and got drunk with no recollection of removing tampon or events from last night. She had not noticed any vaginal bleeding related to her menstrual cycle today. She did state she normally has light bleeding for 4- 5 days. Pelvic exam done with no foreign object visualized in vagina. She did have brownish discharge but no blood in vaginal canal. Ultrasound done was also negative for a foreign object in vagina.  Discussed findings with patient. Patient must have removed her tampon at some point last night. Follow up with PCP as needed or return to emergency department for concerning symptoms.     I have reviewed the nursing notes.    I have reviewed the findings, diagnosis, plan and need for follow up with the patient.      Final diagnoses:   No problem, feared complaint unfounded       6/21/2020   [facility]     Andreia Crump, CNP  06/24/20 [id number]

## 2022-11-04 NOTE — TELEPHONE ENCOUNTER
[facility] Call Center    Phone Message    May a detailed message be left on voicemail: yes     Reason for Call: Other: Patient called stating he was just seen today and is requesting a call back from one of the nurses. He would like the nurse to go over how to straight cath himself again. Please call to advise.      Action Taken: Urology    Travel Screening: Not Applicable

## 2022-11-04 NOTE — PROGRESS NOTES
[facility]  [facility]   Name: Suresh Powers    MRN: [id number]   YOB: 1960       We were asked to see Suresh Powers at the request of Dr. Thurston for evaluation and treatment of acute urinary retention .        Chief Complaint:   Acute urinary retention     History is obtained from the patient            History of Present Illness:   Seen at ER consult on 10/10/2022:  Suresh Powers is a 62 year old male with a history of recent hip fracture in September 2022 and post-op urinary retention (vasquez catheter removed 5 days ago) who presented to the ER today with difficulty urinating, nausea, and leg pain.      Per chart review, his catheter was placed following surgery for a hip fracture in early September. He had post-op urinary retention and was discharged with the catheter in place. He was offered several voiding trials through the month of September, but declined until his appointment on 10/5. He states he was started on Flomax after the catheter was removed.      He presented to the ER today with urinary dribbling since his catheter was removed. A catheter was replaced with 1200 mL urine return. Vitals were notable for tachycardia (110s), UA consistent with infection, and WBC 13. He was started on Ceftriaxone and admitted tu the hospital for further cares.     In speaking with the patient today, he states he has struggled with urinary retention for many years- especially in the setting of narcotic use and in the post-op period. He has never seen a urologist before. There is no cross sectional imaging available to evaluate prostate size. His last PSA was 1.4 in 2019.     TODAY 11/4/2022:  He was ultimately discharged on 10/17/2022  He presents today for cystoscopy and trial of void  He is continued on tamsulosin 0.4 mg daily           Past Medical History:     Past Medical History:   Diagnosis Date     Anxiety     sees psych for trazodone     Clostridium difficile diarrhea  3/9/2016     Foot pain      Hypertension      Mold exposure      Shingles 1984            Past Surgical History:     Past Surgical History:   Procedure Laterality Date     foot crush injury       kidney donation       OPEN REDUCTION INTERNAL FIXATION CLAVICLE Left 2/4/2016    Procedure: OPEN REDUCTION INTERNAL FIXATION CLAVICLE;  Surgeon: Rakesh Hui MD;  Location:  OR     OPEN REDUCTION INTERNAL FIXATION HIP NAILING Left 9/4/2022    Procedure: Open reduction internal fixation of left hip fracture;  Surgeon: Huang Cochran MD;  Location:  OR     OPEN REDUCTION INTERNAL FIXATION TIBIAL PLATEAU Left 2/4/2016    Procedure: OPEN REDUCTION INTERNAL FIXATION TIBIAL PLATEAU;  Surgeon: Rakehs Hui MD;  Location:  OR            Social History:     Social History     Tobacco Use     Smoking status: Former     Packs/day: 1.00     Years: 0.00     Pack years: 0.00     Types: Cigarettes     Smokeless tobacco: Former     Quit date: 2/2/2016   Substance Use Topics     Alcohol use: Yes     Comment: states 8 beerrs all day over past 8 hours       History   Smoking Status     Former     Packs/day: 1.00     Years: 0.00     Types: Cigarettes   Smokeless Tobacco     Former     Quit date: 2/2/2016            Family History:     Family History   Problem Relation Age of Onset     Breast Cancer Mother      Diabetes Type 2  Mother      Skin Cancer Father      Cerebrovascular Disease Father               Allergies:     Allergies   Allergen Reactions     Lisinopril Other (See Comments)     Elevated potassium     Sertraline Diarrhea            Medications:     Current Outpatient Medications   Medication Sig     acetaminophen (TYLENOL) 325 MG tablet Take 2 tablets (650 mg) by mouth every 6 hours as needed for mild pain or other (and adjunct with moderate or severe pain or per patient request)     acetaminophen (TYLENOL) 325 MG tablet Take 2 tablets (650 mg) by mouth every 8 hours as needed     amLODIPine (NORVASC) 10 MG  tablet Take 1 tablet (10 mg) by mouth daily     busPIRone (BUSPAR) 15 MG tablet Take 1 tablet (15 mg) by mouth 2 times daily     celecoxib (CELEBREX) 200 MG capsule Take 1 capsule (200 mg) by mouth daily     escitalopram (LEXAPRO) 20 MG tablet Take 20 mg by mouth daily     fluticasone (FLONASE) 50 MCG/ACT nasal spray use 2 sprays in each nostril daily.     lactobacillus rhamnosus, GG, (CULTURELL) capsule Take 1 capsule by mouth 2 times daily     lamoTRIgine (LAMICTAL) 200 MG tablet Take 200 mg by mouth At Bedtime     Multiple Vitamins-Minerals (MULTIVITAMIN ADULT PO) Take 1 tablet by mouth daily     polyethylene glycol (MIRALAX) 17 g packet Take 1 packet by mouth daily as needed for constipation     polyethylene glycol-propylene glycol (SYSTANE ULTRA) 0.4-0.3 % SOLN ophthalmic solution Place 1 drop into both eyes 4 times daily as needed     tamsulosin (FLOMAX) 0.4 MG capsule Take 1 capsule (0.4 mg) by mouth every evening     traZODone (DESYREL) 50 MG tablet Take 2 tablets (100 mg) by mouth At Bedtime     vitamin C (ASCORBIC ACID) 500 MG tablet Take 500 mg by mouth daily     zinc 50 MG TABS Take 100 mg by mouth daily     Current Facility-Administered Medications   Medication     lidocaine (XYLOCAINE) 2 % external gel             Review of Systems:     Skin: negative  Eyes: negative  Ears/Nose/Throat: negative  Respiratory: No shortness of breath, dyspnea on exertion, cough, or hemoptysis  Cardiovascular: negative  Gastrointestinal: negative  Genitourinary: as above  Musculoskeletal: as above  Neurologic: negative  Psychiatric: negative  Hematologic/Lymphatic/Immunologic: negative  Endocrine: negative          Physical Exam:   No data found.  There is no height or weight on file to calculate BMI.     General: age-appropriate appearing male in NAD  HEENT: Head AT/NC, EOMI, CN Grossly intact  Lungs: no respiratory distress, or pursed lip breathing  Heart: No obvious jugular venous distension present  Back: no bony  midline tenderness, no CVAT bilaterally.  Abdomen: soft, non-distended, non-tender. No organomegaly  : normal male external genitalia.   Lymph: no palpable inguinal lymphadenopathy.  LE: no edema.   Musculoskeltal: s/p left hip fracture and surgery  Skin: no suspicious lesions or rashes  Neuro: Alert, oriented, speech and mentation normal;   moving all 4 extremities equally.  Psych: affect and mood normal          Data:   All laboratory data reviewed:    UA RESULTS:  Recent Labs   Lab Test 10/10/22  1156   COLOR Light Yellow   APPEARANCE Slightly Cloudy*   URINEGLC Negative   URINEBILI Negative   URINEKETONE 60*   SG 1.010   UBLD Negative   URINEPH 6.0   PROTEIN 20*   NITRITE Positive*   LEUKEST Large*   RBCU 1   WBCU 115*     Component PSA   Latest Ref Rng & Units 0 - 4 ug/L   12/19/2015 1.08   9/13/2019 1.38     Lab Results   Component Value Date    CR 0.59 11/01/2022    CR 0.81 06/15/2021               Impression and Plan:   Impression:   62-year-old man status post a left hip fracture which required surgery in September 22 with post operative urinary retention and history of prior episodes of urinary retention and LUTS      Plan:   LUTS  - We discussed the pathophysiology of the bladder and the prostate and the normal changes associated with the development of BPH and LUTS  - He has not been on tamsulosin 0.4 mg daily for several weeks  - Cystoscopy today with no evidence of very slight by lobar prostatic hypertrophy without clear bladder outlet obstruction  - Trial of void today unsuccessful with a bladder scan of 240 cc  - We will continue on tamsulosin 0.4 mg daily new prescription sent to the pharmacy  - We discussed recommendation for instruction with intermittent self-catheterization and this was completed with my RN team today  - Would like him to ensure that he is emptying at least every 6 hours  - Hopefully he will have spontaneous voiding and not require prolonged intermittent self-catheterization  -  Follow-up with me in about 2 months to review his progress  - Prescription for Bactrim DS twice daily for 3 days sent to the pharmacy     Thank you for the kind consultation.    Time spent: 30 minutes spent on the date of the encounter doing chart review, history and exam, documentation and further activities as noted above.  This was in addition to cystoscopy time    Fernando Steiner MD   Urology  [facility]  [facility] Phone: [phone]  [facility] Phone: [phone]

## 2022-11-06 VITALS
DIASTOLIC BLOOD PRESSURE: 77 MMHG | HEART RATE: 96 BPM | WEIGHT: 187.9 LBS | TEMPERATURE: 98.2 F | SYSTOLIC BLOOD PRESSURE: 144 MMHG | OXYGEN SATURATION: 98 % | BODY MASS INDEX: 29.43 KG/M2

## 2022-11-07 NOTE — TELEPHONE ENCOUNTER
Returned phone call on Friday and explained to patient that he will need to let us know how often he is cathing so we can state that is his chart notes for insurance to cover. Patient stated that he will call back on Monday, unless he doesn't need to self-cath over the weekend.     Astrid Pompa LPN

## 2022-11-15 ENCOUNTER — MEDICAL CORRESPONDENCE (OUTPATIENT)
Dept: HEALTH INFORMATION MANAGEMENT | Facility: CLINIC | Age: 62
End: 2022-11-15

## 2022-11-16 ENCOUNTER — PATIENT OUTREACH (OUTPATIENT)
Dept: CARE COORDINATION | Facility: CLINIC | Age: 62
End: 2022-11-16

## 2022-11-16 NOTE — PROGRESS NOTES
Clinic Care Coordination Contact    Follow Up Progress Note      Assessment: CC spoke with pt. He stated that he is doing well right now. He stated that he is getting around his apartment on his own, but struggles with stairs. He stated that he is no longer needing to straight cath himself. Pt stated that he feels he has no needs right now and would like another follow up in 2 weeks.     Care Gaps:    Health Maintenance Due   Topic Date Due     HIV SCREENING  Never done     ZOSTER IMMUNIZATION (1 of 2) Never done     LUNG CANCER SCREENING  Never done     MEDICARE ANNUAL WELLNESS VISIT  03/21/2018     COLORECTAL CANCER SCREENING  08/20/2018     PHQ-9  12/15/2021       [facility] did not discuss HM Due during this encounter.    Care Plans  Care Plan: Social Support     Problem: I will have in home supprt within the next 9 months.     Note:             Goal: I Will have in home support within the next  months.     Start Date: 10/19/2022 Expected End Date: 7/19/2023    This Visit's Progress: 0%    Note:     Barriers: [place] of [place] process  Strengths: pt is a good advocate for self.  Patient expressed understanding of goal: yes  Action steps to achieve this goal:  1. I will contact [facility] and request a MNChoices assessment.   2. I will complete the MNChoices assessment.   3. I will contact my [facility] with any questions or needs.                         Intervention/Education provided during outreach: provided validation      Outreach Frequency: 2 weeks    Plan:   Care Coordinator will follow up in 2 weeks.     TIFF Hernandes  [facility] Care Coordination   [facility]  Social Work Care Coordinator  [phone]

## 2022-11-29 ENCOUNTER — APPOINTMENT (OUTPATIENT)
Dept: ULTRASOUND IMAGING | Facility: CLINIC | Age: 62
DRG: 377 | End: 2022-11-29
Attending: EMERGENCY MEDICINE
Payer: COMMERCIAL

## 2022-11-29 ENCOUNTER — APPOINTMENT (OUTPATIENT)
Dept: CT IMAGING | Facility: CLINIC | Age: 62
DRG: 377 | End: 2022-11-29
Attending: EMERGENCY MEDICINE
Payer: COMMERCIAL

## 2022-11-29 ENCOUNTER — TELEPHONE (OUTPATIENT)
Dept: INTERNAL MEDICINE | Facility: CLINIC | Age: 62
End: 2022-11-29

## 2022-11-29 ENCOUNTER — HOSPITAL ENCOUNTER (INPATIENT)
Facility: CLINIC | Age: 62
LOS: 9 days | Discharge: NURSING, REHAB OR LONG-TERM CARE | DRG: 377 | End: 2022-12-08
Attending: EMERGENCY MEDICINE | Admitting: HOSPITALIST
Payer: COMMERCIAL

## 2022-11-29 DIAGNOSIS — K92.2 GASTROINTESTINAL HEMORRHAGE, UNSPECIFIED GASTROINTESTINAL HEMORRHAGE TYPE: ICD-10-CM

## 2022-11-29 DIAGNOSIS — I26.94 MULTIPLE SUBSEGMENTAL PULMONARY EMBOLI WITHOUT ACUTE COR PULMONALE (H): ICD-10-CM

## 2022-11-29 DIAGNOSIS — D64.9 ANEMIA, UNSPECIFIED TYPE: ICD-10-CM

## 2022-11-29 DIAGNOSIS — E87.1 HYPONATREMIA: ICD-10-CM

## 2022-11-29 LAB
ABO/RH(D): NORMAL
ALBUMIN SERPL-MCNC: 2.6 G/DL (ref 3.4–5)
ALBUMIN UR-MCNC: NEGATIVE MG/DL
ALP SERPL-CCNC: 99 U/L (ref 40–150)
ALT SERPL W P-5'-P-CCNC: 19 U/L (ref 0–70)
ANION GAP SERPL CALCULATED.3IONS-SCNC: 10 MMOL/L (ref 3–14)
ANTIBODY SCREEN: NEGATIVE
APPEARANCE UR: CLEAR
APTT PPP: 25 SECONDS (ref 22–38)
AST SERPL W P-5'-P-CCNC: 12 U/L (ref 0–45)
ATRIAL RATE - MUSE: 101 BPM
BASOPHILS # BLD AUTO: 0 10E3/UL (ref 0–0.2)
BASOPHILS NFR BLD AUTO: 1 %
BILIRUB SERPL-MCNC: 0.4 MG/DL (ref 0.2–1.3)
BILIRUB UR QL STRIP: NEGATIVE
BLD PROD TYP BPU: NORMAL
BLD PROD TYP BPU: NORMAL
BLOOD COMPONENT TYPE: NORMAL
BLOOD COMPONENT TYPE: NORMAL
BUN SERPL-MCNC: 5 MG/DL (ref 7–30)
CALCIUM SERPL-MCNC: 8.5 MG/DL (ref 8.5–10.1)
CHLORIDE BLD-SCNC: 95 MMOL/L (ref 94–109)
CO2 SERPL-SCNC: 25 MMOL/L (ref 20–32)
CODING SYSTEM: NORMAL
CODING SYSTEM: NORMAL
COLOR UR AUTO: ABNORMAL
CREAT SERPL-MCNC: 0.67 MG/DL (ref 0.66–1.25)
CROSSMATCH: NORMAL
CROSSMATCH: NORMAL
DIASTOLIC BLOOD PRESSURE - MUSE: NORMAL MMHG
EOSINOPHIL # BLD AUTO: 0 10E3/UL (ref 0–0.7)
EOSINOPHIL NFR BLD AUTO: 0 %
ERYTHROCYTE [DISTWIDTH] IN BLOOD BY AUTOMATED COUNT: 19.3 % (ref 10–15)
GFR SERPL CREATININE-BSD FRML MDRD: >90 ML/MIN/1.73M2
GLUCOSE BLD-MCNC: 94 MG/DL (ref 70–99)
GLUCOSE UR STRIP-MCNC: NEGATIVE MG/DL
HCT VFR BLD AUTO: 12.8 % (ref 40–53)
HGB BLD-MCNC: 3.8 G/DL (ref 13.3–17.7)
HGB UR QL STRIP: NEGATIVE
HOLD SPECIMEN: NORMAL
IMM GRANULOCYTES # BLD: 0 10E3/UL
IMM GRANULOCYTES NFR BLD: 1 %
INR PPP: 1.02 (ref 0.85–1.15)
INTERPRETATION ECG - MUSE: NORMAL
ISSUE DATE AND TIME: NORMAL
ISSUE DATE AND TIME: NORMAL
KETONES UR STRIP-MCNC: 20 MG/DL
LEUKOCYTE ESTERASE UR QL STRIP: NEGATIVE
LYMPHOCYTES # BLD AUTO: 1 10E3/UL (ref 0.8–5.3)
LYMPHOCYTES NFR BLD AUTO: 18 %
MCH RBC QN AUTO: 23.8 PG (ref 26.5–33)
MCHC RBC AUTO-ENTMCNC: 29.7 G/DL (ref 31.5–36.5)
MCV RBC AUTO: 80 FL (ref 78–100)
MONOCYTES # BLD AUTO: 0.6 10E3/UL (ref 0–1.3)
MONOCYTES NFR BLD AUTO: 12 %
NEUTROPHILS # BLD AUTO: 3.9 10E3/UL (ref 1.6–8.3)
NEUTROPHILS NFR BLD AUTO: 68 %
NITRATE UR QL: NEGATIVE
NRBC # BLD AUTO: 0 10E3/UL
NRBC BLD AUTO-RTO: 0 /100
NT-PROBNP SERPL-MCNC: 579 PG/ML (ref 0–900)
P AXIS - MUSE: 68 DEGREES
PH UR STRIP: 6.5 [PH] (ref 5–7)
PLATELET # BLD AUTO: 762 10E3/UL (ref 150–450)
POTASSIUM BLD-SCNC: 3.6 MMOL/L (ref 3.4–5.3)
PR INTERVAL - MUSE: 174 MS
PROT SERPL-MCNC: 6.6 G/DL (ref 6.8–8.8)
QRS DURATION - MUSE: 88 MS
QT - MUSE: 334 MS
QTC - MUSE: 433 MS
R AXIS - MUSE: 67 DEGREES
RADIOLOGIST FLAGS: ABNORMAL
RBC # BLD AUTO: 1.6 10E6/UL (ref 4.4–5.9)
RBC URINE: <1 /HPF
SARS-COV-2 RNA RESP QL NAA+PROBE: NEGATIVE
SODIUM SERPL-SCNC: 130 MMOL/L (ref 133–144)
SP GR UR STRIP: 1.03 (ref 1–1.03)
SPECIMEN EXPIRATION DATE: NORMAL
SYSTOLIC BLOOD PRESSURE - MUSE: NORMAL MMHG
T AXIS - MUSE: 62 DEGREES
UNIT ABO/RH: NORMAL
UNIT ABO/RH: NORMAL
UNIT NUMBER: NORMAL
UNIT NUMBER: NORMAL
UNIT STATUS: NORMAL
UNIT STATUS: NORMAL
UNIT TYPE ISBT: 5100
UNIT TYPE ISBT: 5100
UROBILINOGEN UR STRIP-MCNC: NORMAL MG/DL
VENTRICULAR RATE- MUSE: 101 BPM
WBC # BLD AUTO: 5.6 10E3/UL (ref 4–11)
WBC URINE: 1 /HPF

## 2022-11-29 PROCEDURE — 96374 THER/PROPH/DIAG INJ IV PUSH: CPT

## 2022-11-29 PROCEDURE — 80053 COMPREHEN METABOLIC PANEL: CPT | Performed by: EMERGENCY MEDICINE

## 2022-11-29 PROCEDURE — 70450 CT HEAD/BRAIN W/O DYE: CPT

## 2022-11-29 PROCEDURE — P9016 RBC LEUKOCYTES REDUCED: HCPCS | Performed by: EMERGENCY MEDICINE

## 2022-11-29 PROCEDURE — 250N000011 HC RX IP 250 OP 636: Performed by: EMERGENCY MEDICINE

## 2022-11-29 PROCEDURE — 250N000009 HC RX 250: Performed by: EMERGENCY MEDICINE

## 2022-11-29 PROCEDURE — C9113 INJ PANTOPRAZOLE SODIUM, VIA: HCPCS | Performed by: HOSPITALIST

## 2022-11-29 PROCEDURE — 86901 BLOOD TYPING SEROLOGIC RH(D): CPT | Performed by: EMERGENCY MEDICINE

## 2022-11-29 PROCEDURE — U0003 INFECTIOUS AGENT DETECTION BY NUCLEIC ACID (DNA OR RNA); SEVERE ACUTE RESPIRATORY SYNDROME CORONAVIRUS 2 (SARS-COV-2) (CORONAVIRUS DISEASE [COVID-19]), AMPLIFIED PROBE TECHNIQUE, MAKING USE OF HIGH THROUGHPUT TECHNOLOGIES AS DESCRIBED BY CMS-2020-01-R: HCPCS | Performed by: EMERGENCY MEDICINE

## 2022-11-29 PROCEDURE — 81001 URINALYSIS AUTO W/SCOPE: CPT | Performed by: EMERGENCY MEDICINE

## 2022-11-29 PROCEDURE — P9016 RBC LEUKOCYTES REDUCED: HCPCS

## 2022-11-29 PROCEDURE — 36415 COLL VENOUS BLD VENIPUNCTURE: CPT | Performed by: EMERGENCY MEDICINE

## 2022-11-29 PROCEDURE — 120N000013 HC R&B IMCU

## 2022-11-29 PROCEDURE — 85730 THROMBOPLASTIN TIME PARTIAL: CPT | Performed by: EMERGENCY MEDICINE

## 2022-11-29 PROCEDURE — 99291 CRITICAL CARE FIRST HOUR: CPT | Mod: 25

## 2022-11-29 PROCEDURE — 85610 PROTHROMBIN TIME: CPT | Performed by: EMERGENCY MEDICINE

## 2022-11-29 PROCEDURE — C9113 INJ PANTOPRAZOLE SODIUM, VIA: HCPCS | Performed by: EMERGENCY MEDICINE

## 2022-11-29 PROCEDURE — C9803 HOPD COVID-19 SPEC COLLECT: HCPCS

## 2022-11-29 PROCEDURE — 93970 EXTREMITY STUDY: CPT

## 2022-11-29 PROCEDURE — 258N000003 HC RX IP 258 OP 636: Performed by: HOSPITALIST

## 2022-11-29 PROCEDURE — 83880 ASSAY OF NATRIURETIC PEPTIDE: CPT | Performed by: EMERGENCY MEDICINE

## 2022-11-29 PROCEDURE — 93005 ELECTROCARDIOGRAM TRACING: CPT

## 2022-11-29 PROCEDURE — 86923 COMPATIBILITY TEST ELECTRIC: CPT | Performed by: HOSPITALIST

## 2022-11-29 PROCEDURE — 36430 TRANSFUSION BLD/BLD COMPNT: CPT

## 2022-11-29 PROCEDURE — 71275 CT ANGIOGRAPHY CHEST: CPT

## 2022-11-29 PROCEDURE — 250N000013 HC RX MED GY IP 250 OP 250 PS 637: Performed by: HOSPITALIST

## 2022-11-29 PROCEDURE — 86850 RBC ANTIBODY SCREEN: CPT | Performed by: EMERGENCY MEDICINE

## 2022-11-29 PROCEDURE — 99223 1ST HOSP IP/OBS HIGH 75: CPT | Performed by: HOSPITALIST

## 2022-11-29 PROCEDURE — 86923 COMPATIBILITY TEST ELECTRIC: CPT | Performed by: EMERGENCY MEDICINE

## 2022-11-29 PROCEDURE — 85025 COMPLETE CBC W/AUTO DIFF WBC: CPT | Performed by: EMERGENCY MEDICINE

## 2022-11-29 PROCEDURE — 250N000011 HC RX IP 250 OP 636: Performed by: HOSPITALIST

## 2022-11-29 RX ORDER — NITROGLYCERIN 0.4 MG/1
0.4 TABLET SUBLINGUAL EVERY 5 MIN PRN
Status: DISCONTINUED | OUTPATIENT
Start: 2022-11-29 | End: 2022-12-08 | Stop reason: HOSPADM

## 2022-11-29 RX ORDER — AMLODIPINE BESYLATE 10 MG/1
10 TABLET ORAL DAILY
Status: DISCONTINUED | OUTPATIENT
Start: 2022-11-30 | End: 2022-11-30

## 2022-11-29 RX ORDER — LIDOCAINE 40 MG/G
CREAM TOPICAL
Status: DISCONTINUED | OUTPATIENT
Start: 2022-11-29 | End: 2022-12-08 | Stop reason: HOSPADM

## 2022-11-29 RX ORDER — SODIUM CHLORIDE 9 MG/ML
INJECTION, SOLUTION INTRAVENOUS CONTINUOUS
Status: DISCONTINUED | OUTPATIENT
Start: 2022-11-29 | End: 2022-11-30

## 2022-11-29 RX ORDER — LAMOTRIGINE 100 MG/1
200 TABLET ORAL AT BEDTIME
Status: DISCONTINUED | OUTPATIENT
Start: 2022-11-29 | End: 2022-12-08 | Stop reason: HOSPADM

## 2022-11-29 RX ORDER — TAMSULOSIN HYDROCHLORIDE 0.4 MG/1
0.4 CAPSULE ORAL EVERY EVENING
Status: DISCONTINUED | OUTPATIENT
Start: 2022-11-29 | End: 2022-12-08 | Stop reason: HOSPADM

## 2022-11-29 RX ORDER — BUSPIRONE HYDROCHLORIDE 15 MG/1
15 TABLET ORAL 2 TIMES DAILY
Status: DISCONTINUED | OUTPATIENT
Start: 2022-11-29 | End: 2022-12-08 | Stop reason: HOSPADM

## 2022-11-29 RX ORDER — ONDANSETRON 4 MG/1
4 TABLET, ORALLY DISINTEGRATING ORAL EVERY 6 HOURS PRN
Status: DISCONTINUED | OUTPATIENT
Start: 2022-11-29 | End: 2022-12-08 | Stop reason: HOSPADM

## 2022-11-29 RX ORDER — HYDRALAZINE HYDROCHLORIDE 20 MG/ML
10 INJECTION INTRAMUSCULAR; INTRAVENOUS EVERY 4 HOURS PRN
Status: DISCONTINUED | OUTPATIENT
Start: 2022-11-29 | End: 2022-12-08 | Stop reason: HOSPADM

## 2022-11-29 RX ORDER — LIDOCAINE 40 MG/G
CREAM TOPICAL
Status: DISCONTINUED | OUTPATIENT
Start: 2022-11-29 | End: 2022-11-29

## 2022-11-29 RX ORDER — TRAZODONE HYDROCHLORIDE 100 MG/1
100 TABLET ORAL AT BEDTIME
Status: DISCONTINUED | OUTPATIENT
Start: 2022-11-29 | End: 2022-12-08 | Stop reason: HOSPADM

## 2022-11-29 RX ORDER — ESCITALOPRAM OXALATE 10 MG/1
20 TABLET ORAL DAILY
Status: DISCONTINUED | OUTPATIENT
Start: 2022-11-30 | End: 2022-12-08 | Stop reason: HOSPADM

## 2022-11-29 RX ORDER — ONDANSETRON 2 MG/ML
4 INJECTION INTRAMUSCULAR; INTRAVENOUS EVERY 6 HOURS PRN
Status: DISCONTINUED | OUTPATIENT
Start: 2022-11-29 | End: 2022-12-08 | Stop reason: HOSPADM

## 2022-11-29 RX ORDER — IOPAMIDOL 755 MG/ML
69 INJECTION, SOLUTION INTRAVASCULAR ONCE
Status: COMPLETED | OUTPATIENT
Start: 2022-11-29 | End: 2022-11-29

## 2022-11-29 RX ADMIN — LAMOTRIGINE 200 MG: 100 TABLET ORAL at 23:07

## 2022-11-29 RX ADMIN — TRAZODONE HYDROCHLORIDE 100 MG: 100 TABLET ORAL at 23:06

## 2022-11-29 RX ADMIN — PANTOPRAZOLE SODIUM 40 MG: 40 INJECTION, POWDER, FOR SOLUTION INTRAVENOUS at 23:05

## 2022-11-29 RX ADMIN — PANTOPRAZOLE SODIUM 80 MG: 40 INJECTION, POWDER, FOR SOLUTION INTRAVENOUS at 15:43

## 2022-11-29 RX ADMIN — IOPAMIDOL 69 ML: 755 INJECTION, SOLUTION INTRAVENOUS at 14:37

## 2022-11-29 RX ADMIN — SODIUM CHLORIDE 93 ML: 9 INJECTION, SOLUTION INTRAVENOUS at 14:37

## 2022-11-29 RX ADMIN — TAMSULOSIN HYDROCHLORIDE 0.4 MG: 0.4 CAPSULE ORAL at 23:07

## 2022-11-29 RX ADMIN — SODIUM CHLORIDE: 9 INJECTION, SOLUTION INTRAVENOUS at 23:08

## 2022-11-29 ASSESSMENT — ACTIVITIES OF DAILY LIVING (ADL)
ADLS_ACUITY_SCORE: 35

## 2022-11-29 ASSESSMENT — ENCOUNTER SYMPTOMS
DIFFICULTY URINATING: 1
FEVER: 1
DIARRHEA: 1
BLOOD IN STOOL: 1

## 2022-11-29 NOTE — ED PROVIDER NOTES
History   Chief Complaint:  Fatigue       HPI   Suresh Powers is a 62 year old male, s/p hip surgery September 2022, with a history of hypertension, depression, anxiety, and OCD who presents with lighheadedness, dizziness, fatigue, weakness, and shortness of breath for 11 days. Patient notes when he sits and stands up he feels room spinning dizziness. He notes this started 11 days ago when he had a bout of syncope following difficulty urinating. He denies hitting his head. He reports rectal bleeding at that time and black diarrhea, which has since subsided. He reports this is the first time this has happened. Patient recently broke his hip and was in TCU. Patient had home healthcare come in. Patient denies chest pain or pressure. Patient is not on blood thinners. Patient has leg swelling at baseline. Patient notes when he is speaking he has trouble with concentration and feels out of breath, and states his friends has noticed a stutter. He reports he has previously received a blood transfusion when donating a kidney to his sister. His hemoglobin was 10.1 on 11/01.    Review of Systems   Constitutional: Positive for fever.   Cardiovascular: Negative for chest pain.   Gastrointestinal: Positive for blood in stool (resolved) and diarrhea (resolved).   Genitourinary: Positive for difficulty urinating (resolved).   All other systems reviewed and are negative.        Allergies:  Lisinopril  Sertraline    Medications:  Norvasc  Buspar  Celebrex  Lexapro  Lamictal  Systane ultra  Flomax  Desyrel  Tamulosin  Aspirin 325 mg  Amlodipine Besylate  Trazodone  Lamotrigine  Polyethylene glycol    Past Medical History:     Hypertension   Unilateral inguinal hernia without obstruction or gangrene  OCD  SPIKE  MDD  Hyponatremia  Cataract or both eyes  Closed fracture of left hip  Contracture of joint of ankle and foot  UTI without hematuria  Sepsis     Past Surgical History:    Kidney donation  ORIF clavicle, hip nailing, tibial  "plateau     Family History:    Mother- breast cancer, diabetes mellitus   Father- skin cancer, cerebrovascular disease    Social History:  The patient presents to the ED alone via private vehicle.  Patient lives alone in an efficiency apartment with home care.  PCP: Brad Thurston     Physical Exam     Patient Vitals for the past 24 hrs:   BP Temp Temp src Pulse Resp SpO2 Height Weight   11/29/22 1600 125/81 98.3  F (36.8  C) -- 101 16 100 % -- --   11/29/22 1545 115/70 -- -- 95 18 99 % -- --   11/29/22 1540 115/70 98  F (36.7  C) -- 74 16 -- -- --   11/29/22 1517 -- -- -- 105 16 -- -- --   11/29/22 1514 117/71 -- -- 103 -- -- -- --   11/29/22 1328 107/57 98  F (36.7  C) Temporal 98 18 99 % 1.702 m (5' 7\") 83.9 kg (185 lb)       Physical Exam  Nursing note and vitals reviewed.    Constitutional:  Appears comfortable.  Pale.  HENT:                Nose normal.  No discharge.      Oral mucosa is moist.  Eyes:    Conjunctivae are normal without injection.  Pupils are equal.  Cardiovascular:  Mildly tachycardic. Regular rhythm with normal S1 and S2.      Normal heart sounds and peripheral pulses 2+ and equal.       No murmur or kari.  Pulmonary:  Effort normal and breath sounds clear to auscultation bilaterally. No respiratory distress.  No stridor.     No wheezes. No rales.     GI:    Soft. No distension and no mass. No tenderness.      No flank pain.    Musculoskeletal:  Normal range of motion. No extremity deformity.     No tenderness.  1+-2+ edema in lower extremities bilaterally. Right leg greater than left.  Neurological:   Stutter when he speaks. Alert and oriented. No focal weakness.  Skin:    Skin is warm and dry. No rash noted.  Pale  Psychiatric:   Behavior is normal. Appropriate mood and affect.     Judgment and thought content normal.       Emergency Department Course   ECG  ECG results from 11/29/22   EKG 12 lead     Value    Systolic Blood Pressure     Diastolic Blood Pressure     Ventricular Rate 101    " Atrial Rate 101    NJ Interval 174    QRS Duration 88        QTc 433    P Axis 68    R AXIS 67    T Axis 62    Interpretation ECG      Sinus tachycardia  Otherwise normal ECG  When compared with ECG of 10-OCT-2022 11:49,  No significant change was found  Confirmed by GENERATED REPORT, COMPUTER ([id number]),  Madai Leo ([id number]) on 11/29/2022 1:40:51 PM         Imaging:  CT Chest Pulmonary Embolism w Contrast   Final Result   Abnormal   IMPRESSION:   1.  Segmental and subsegmental pulmonary emboli in the right lower   lobe without evidence of right heart strain.   2. Suggestion of mild bronchitis without focal airspace consolidation.      [Critical Result: Pulmonary embolism]      Finding was identified on 11/29/2022 2:55 PM.       Dr. Vincent was contacted by me on 11/29/2022 3:08 PM and verbalized   understanding of the critical result.       NADINE LUNA MD            SYSTEM ID:  [id number]      Head CT w/o contrast   Final Result   IMPRESSION:      1. No evidence of acute intracranial hemorrhage, mass, or herniation.   2. Mild diffuse parenchymal volume loss and white matter changes most   likely due to chronic microvascular ischemic disease.   3. Probable chronic cortical infarct in the left parietal lobe.      DAREN BRUNSON MD            SYSTEM ID:  [id number]       Lower Extremity Venous Duplex Bilateral    (Results Pending)     Report per radiology    Laboratory:  Labs Ordered and Resulted from Time of ED Arrival to Time of ED Departure   COMPREHENSIVE METABOLIC PANEL - Abnormal       Result Value    Sodium 130 (*)     Potassium 3.6      Chloride 95      Carbon Dioxide (CO2) 25      Anion Gap 10      Urea Nitrogen 5 (*)     Creatinine 0.67      Calcium 8.5      Glucose 94      Alkaline Phosphatase 99      AST 12      ALT 19      Protein Total 6.6 (*)     Albumin 2.6 (*)     Bilirubin Total 0.4      GFR Estimate >90     CBC WITH PLATELETS AND DIFFERENTIAL - Abnormal    WBC Count 5.6      RBC Count  1.60 (*)     Hemoglobin 3.8 (*)     Hematocrit 12.8 (*)     MCV 80      MCH 23.8 (*)     MCHC 29.7 (*)     RDW 19.3 (*)     Platelet Count 762 (*)     % Neutrophils 68      % Lymphocytes 18      % Monocytes 12      % Eosinophils 0      % Basophils 1      % Immature Granulocytes 1      NRBCs per 100 WBC 0      Absolute Neutrophils 3.9      Absolute Lymphocytes 1.0      Absolute Monocytes 0.6      Absolute Eosinophils 0.0      Absolute Basophils 0.0      Absolute Immature Granulocytes 0.0      Absolute NRBCs 0.0     NT PROBNP INPATIENT - Normal    N terminal Pro BNP Inpatient 579     COVID-19 VIRUS (CORONAVIRUS) BY PCR - Normal    SARS CoV2 PCR Negative     INR - Normal    INR 1.02     ROUTINE UA WITH MICROSCOPIC   PARTIAL THROMBOPLASTIN TIME   TYPE AND SCREEN, ADULT    ABO/RH(D) O POS      Antibody Screen Negative      SPECIMEN EXPIRATION DATE [id number]     PREPARE RED BLOOD CELLS (UNIT)    Blood Component Type Red Blood Cells      Product Code Q9958B14      Unit Status Ready for issue      Unit Number [id number]      CROSSMATCH Compatible      CODING SYSTEM [id number]     PREPARE RED BLOOD CELLS (UNIT)    Blood Component Type Red Blood Cells      Product Code V0490V34      Unit Status Transfused      Unit Number [id number]      CROSSMATCH Compatible      CODING SYSTEM [id number]      ISSUE DATE AND TIME [id number]      UNIT ABO/RH O+      UNIT TYPE ISBT 5100     PREPARE RED BLOOD CELLS (UNIT)   TRANSFUSE RED BLOOD CELLS (UNIT)   ABO/RH TYPE AND SCREEN        Emergency Department Course:  Mental Health Risk Assessment      PSS-3    Date and Time Over the past 2 weeks have you felt down, depressed, or hopeless? Over the past 2 weeks have you had thoughts of killing yourself? Have you ever attempted to kill yourself? When did this last happen? User   11/29/22 1344 no no yes more than 6 months ago RJS              Suicide assessment completed by mental health (D.E.C., LCSW, etc.)    Reviewed:  I reviewed  nursing notes, vitals, past medical history and Care Everywhere    Assessments:  1505 I obtained history and examined the patient as noted above.   1522 I rechecked the patient and explained findings. He agreeable to plan for admission.    Consults:  1531 I consulted with Dr. Alaniz, GI, regarding the patient.  1549 I consulted with Dr. Rosales, vascular medicine, regarding the patient.  1558 I consulted with Dr. Ponce, hospitalist, regarding the patient, and they accepted the patient.    Interventions:  1543 Protonix 80 mg IV    Disposition:  The patient was admitted to the hospital under the care of .     Impression & Plan      Medical Decision Making:  Patient comes in with weakness fatigue and dyspnea on exertion.  His hemoglobin is 3.8 and he has a history of dark stools.  Hemodynamically was mildly tachycardic but his blood pressure has been good.  He feels okay at rest but when he tries to do anything he gets very fatigued.  His sodium was low at 130, the rest of his labs are fairly unremarkable including a normal BNP.  Because the leg edema and the shortness of breath, a CT of his chest was obtained and he has a segmental and subsegmental right lower lobe blood clots in his lungs.  His Pesi score is 72.  I talked with Dr. Rosales from vascular surgery who will be consulting and helping with the decision about a IVC filter through IR.  Dr. Alaniz is here seeing the patient and will be scoping him tomorrow.  I have ordered 2 units of blood to be given over the next 8 hours or so and bilateral leg ultrasounds.  I consented him for the blood.  Saturnino Ponce MD is the hospitalist and will be admitting the patient as well.  Protonix 80 mg IV was given.  He remains hemodynamically stable but because of the situation with the blood clots and the fact I cannot anticoagulate him because of the GI bleed and anemia, he will be sent [place] the [facility] [place] with a monitor.    Diagnosis:    ICD-10-CM    1.  Gastrointestinal hemorrhage, unspecified gastrointestinal hemorrhage type  K92.2       2. Anemia, unspecified type  D64.9       3. Multiple subsegmental pulmonary emboli without acute cor pulmonale (H)  I26.94     RLL      4. Hyponatremia  E87.1           Scribe Disclosure:  I, Heather Call, am serving as a scribe at 2:35 PM on 11/29/2022 to document services personally performed by Dedra Brizuela MD based on my observations and the provider's statements to me.            Dedra Brizuela MD  11/29/22 7420

## 2022-11-29 NOTE — PHARMACY-ADMISSION MEDICATION HISTORY
Pharmacy Medication History  Admission medication history interview status for the 11/29/2022  admission is complete. See EPIC admission navigator for prior to admission medications     Location of Interview: Patient room  Medication history sources: Patient and Surescripts    Significant changes made to the medication list:      In the past week, patient estimated taking medication this percent of the time: greater than 90%    Additional medication history information:   ---  Pt hasn't taken Culturell for the past days b/c he hasn't been feeling well.  ---  Pt generally uses Systane Ultra BID everyday due to dry eyes.  ---  Pt is unsure if he took buspirone or escitalopram this morning or not.    Medication reconciliation completed by provider prior to medication history? No    Time spent in this activity: 15 minutes    Prior to Admission medications    Medication Sig Last Dose Taking? Auth Provider Long Term End Date   amLODIPine (NORVASC) 10 MG tablet Take 1 tablet (10 mg) by mouth daily 11/28/2022 at hs Yes Brad Thurston MD Yes    busPIRone (BUSPAR) 15 MG tablet Take 1 tablet (15 mg) by mouth 2 times daily 11/29/2022? Yes Unknown, Entered By History No    celecoxib (CELEBREX) 200 MG capsule Take 1 capsule (200 mg) by mouth daily  Patient taking differently: Take 200 mg by mouth daily as needed for moderate pain (4-6) has it but hasn't taken any Yes Brad Thurston MD Yes    escitalopram (LEXAPRO) 20 MG tablet Take 20 mg by mouth daily 11/29/2022? Yes Unknown, Entered By History No    fluticasone (FLONASE) 50 MCG/ACT nasal spray use 2 sprays in each nostril daily.  Patient taking differently: Spray 2 sprays into both nostrils daily as needed use 2 sprays in each nostril daily. prn Yes Brad Thurston MD     lactobacillus rhamnosus, GG, (CULTURELL) capsule Take 1 capsule by mouth 2 times daily Past Week Yes Briana García MD     lamoTRIgine (LAMICTAL) 200 MG tablet Take 200 mg by mouth At Bedtime 11/28/2022 at hs Yes  Brad Thurston MD Yes    Multiple Vitamins-Minerals (MULTIVITAMIN ADULT PO) Take 1 tablet by mouth daily 11/28/2022 Yes Reported, Patient     polyethylene glycol-propylene glycol (SYSTANE ULTRA) 0.4-0.3 % SOLN ophthalmic solution Place 2 drops into both eyes 4 times daily as needed 11/29/2022 at am Yes Unknown, Entered By History     tamsulosin (FLOMAX) 0.4 MG capsule Take 1 capsule (0.4 mg) by mouth every evening 11/28/2022 at hs Yes Fernando Steiner MD  11/4/23   traZODone (DESYREL) 50 MG tablet Take 2 tablets (100 mg) by mouth At Bedtime 11/28/2022 at hs Yes Kadie Ospina MD Yes    vitamin C (ASCORBIC ACID) 500 MG tablet Take 500 mg by mouth daily 11/28/2022 Yes Unknown, Entered By History     zinc 50 MG TABS Take 100 mg by mouth daily 11/28/2022 Yes Reported, Patient         The information provided in this note is only as accurate as the sources available at the time of update(s)

## 2022-11-29 NOTE — ED TRIAGE NOTES
Pt presents with gen weakness from home. On going 2 weeks, now unable to care for self.      Triage Assessment     Row Name 11/29/22 1323       Triage Assessment (Adult)    Airway WDL WDL       Respiratory WDL    Respiratory WDL WDL       Skin Circulation/Temperature WDL    Skin Circulation/Temperature WDL WDL       Cardiac WDL    Cardiac WDL WDL       Peripheral/Neurovascular WDL    Peripheral Neurovascular WDL WDL       Cognitive/Neuro/Behavioral WDL    Cognitive/Neuro/Behavioral WDL WDL

## 2022-11-29 NOTE — ED NOTES
I was asked to find this patient a TCU from the ER.  I spoke with Chani and she assessed and accepted him.  Then we found that his Hbg is 3.8 and will need to be admitted.  Chani will follow this patient and will provide a TCU at discharge.  Please reach out to Chani when patient discharges---again she will take him.  I updated the ER provider.

## 2022-11-29 NOTE — ED NOTES
"[facility]  ED Nurse Handoff Report    ED Chief complaint: Fatigue      ED Diagnosis:   Final diagnoses:   None       Code Status: Full Code    Allergies:   Allergies   Allergen Reactions    Lisinopril Other (See Comments)     Elevated potassium    Sertraline Diarrhea       Patient Story: fatigue  Focused Assessment:  Patient presents to ED with 11 days of fatigue and pale skin. Had hemoglobin level of 3.8 in Triage with CT chest showed PE. Will be admitted for further evaluation and treatment    Treatments and/or interventions provided: 2 uints of PRBC pending  Patient's response to treatments and/or interventions: pending evaluation    To be done/followed up on inpatient unit:  Close monitor    Does this patient have any cognitive concerns?:  na    Activity level - Baseline/Home:  Independent  Activity Level - Current:   Stand with Assist    Patient's Preferred language: English   Needed?: No    Isolation: None  Infection: Not Applicable  Patient tested for COVID 19 prior to admission: YES  Bariatric?: No    Vital Signs:   Vitals:    11/29/22 1328 11/29/22 1514 11/29/22 1517   BP: 107/57 117/71    Pulse: 98 103 105   Resp: 18  16   Temp: 98  F (36.7  C)     TempSrc: Temporal     SpO2: 99%     Weight: 83.9 kg (185 lb)     Height: 1.702 m (5' 7\")         Cardiac Rhythm:     Was the PSS-3 completed:   Yes  What interventions are required if any?               Family Comments: na  OBS brochure/video discussed/provided to patient/family: N/A              Name of person given brochure if not patient: na              Relationship to patient: na    For the majority of the shift this patient's behavior was Green.   Behavioral interventions performed were none.    ED NURSE PHONE NUMBER: *[id number]         "

## 2022-11-29 NOTE — H&P
[facility]  History and Physical   Hospitalist  Saturnino Ponce MD       Suresh Powers MRN# [id number]   YOB: 1960 Age: 62 year old      Date of Admission:  11/29/2022         Assessment and Plan:   Suresh Powers is a 62 year old male with PMH significant for hypertension, h/o urinary retention, anxiety/depression/OCD, single kidney (donated to sister 1990), mild chronic hyponatremia who presented to ED with melena, generalized weakness, noted with pulmonary embolism and likely upper G.I. bleed, admitted 11/29/22.    Likely upper G.I. bleed  acute blood loss anemia on chronic anemia secondary to above  -admit as inpatient to IM for close monitoring  -his recent baseline hemoglobin is around 8 to 9; last hemoglobin from 11/1/22 was 10.1  -on admission hemoglobin 3.8, MCV 80  -ordered for 2 units PRBC in ED; monitor hemoglobin Q6 hours and transfuse PRN for Hb<7  -got IV Protonix in ED; continue Protonix IV 40 mg BID  - Dr Alaniz from GI consulted, plan for EGD 11/30/22; will keep NPO after midnight    Right lower lobe pulmonary embolism  -CT chest noted with segmental and subsegmental PE in RLL without right heart strain  -currently hemodynamically stable  -unable to start anticoagulation due to concern for acute G.I. bleed  -will monitor closely in IMC on telemetry and continuous pulse oximetry  -vascular surgery was contacted from ED for consideration of IVC filter  -will obtain lower extremity ultrasound bilateral to look for DVT  -will plan to start anticoagulation once workup for G.I. bleed completed    Hypertension  -continue PTA amlodipine with hold parameters  -hydralazine IV PRN for SBP>180    H/o urinary retention  -continue PTA Flomax    Anxiety  Depression  OCD  -will resume PTA BuSpar, Lexapro, trazodone, Lamictal when verified by pharmacy    Mild chronic hyponatremia  - Na 130; has been noted in 120s--130s in past  - will start cautious IV hydration NS at 75 ml/hr  "while NPO for EGD; monitor BMP    Deconditioning  generalized weakness  H/o left hip surgery 9/2022  -generalized weakness, presyncope is likely secondary to acute blood loss anemia  - will get PT/OT eval after workup for GI bleed and PE complete  -might need TCU    Clinically Significant Risk Factors Present on Admission         # Hyponatremia: Lowest Na = 130 mmol/L (Ref range: 136-145) in last 2 days, will monitor as appropriate      # Hypoalbuminemia: Lowest albumin = 2.6 g/dL (Ref range: 3.5-5.2) at 11/29/2022  1:51 PM, will monitor as appropriate         # Overweight: Estimated body mass index is 28.98 kg/m  as calculated from the following:    Height as of this encounter: 1.702 m (5' 7\").    Weight as of this encounter: 83.9 kg (185 lb).              COVID status: asymptomatic COVID screening on admission 11/29 negative    # DVT prophylaxis: mechanical with PCD boots; plan to start anticoagulation for PE after completion of workup for G.I. bleed    # Code status: full code    Care plan discussed with ED provider and patient.           Primary Care Physician:   Brad Thurston [phone]         Chief Complaint:     Melena, dyspnea on exertion, generalized weakness    History is obtained from the patient         History of Present Illness:     Suresh Powers is a 62 year old male with PMH significant for hypertension, h/o urinary retention, anxiety/depression/OCD, single kidney (donated to sister 1990), mild chronic hyponatremia who presented to ED with melena, generalized weakness, noted with pulmonary embolism and likely upper G.I. bleed, admitted 11/29/22.    He states about 10-12 days ago he felt lightheaded while shaving. He then noticed large black stool. He syncopized and fell to the ground. He did not seek any immediate help. However since then he has been feeling very weak, lightheaded along with dyspnea on exertion and continues to have black stool. He presented to ED today for further evaluation. In " the ER he was seen by Dr. Brizuela. Initial workup was noted with markedly low hemoglobin of 3.8. Also CT chest was done which was noted with pulmonary embolism in right lower lobe. He was ordered for 2 units PRBC in ED; got IV Protonix in ED; Dr Alaniz from GI consulted. Also vascular surgery was contacted from ED. Not started on anticoagulation due to concern for acute upper G.I. bleed    Hospitalist was requested admission for further evaluation.    The patient denies any fever, chills, rigors or chest pain or shortness of breath.  Denies pain abdomen.  No bladder disturbances.           Past Medical History:     Hypertension  h/o urinary retention  Anxiety/depression/OCD  single kidney (donated to sister 1990)  mild chronic hyponatremia  H/o left hip surgery 9/2022          Past Surgical History:     Past Surgical History:   Procedure Laterality Date     foot crush injury       kidney donation       OPEN REDUCTION INTERNAL FIXATION CLAVICLE Left 2/4/2016    Procedure: OPEN REDUCTION INTERNAL FIXATION CLAVICLE;  Surgeon: Rakesh Hui MD;  Location:  OR     OPEN REDUCTION INTERNAL FIXATION HIP NAILING Left 9/4/2022    Procedure: Open reduction internal fixation of left hip fracture;  Surgeon: Huang Cochran MD;  Location:  OR     OPEN REDUCTION INTERNAL FIXATION TIBIAL PLATEAU Left 2/4/2016    Procedure: OPEN REDUCTION INTERNAL FIXATION TIBIAL PLATEAU;  Surgeon: Rakesh Hui MD;  Location:  OR              Home Medications:     Prior to Admission Medications   Prescriptions Last Dose Informant Patient Reported? Taking?   Multiple Vitamins-Minerals (MULTIVITAMIN ADULT PO)   Yes No   Sig: Take 1 tablet by mouth daily   amLODIPine (NORVASC) 10 MG tablet   No No   Sig: Take 1 tablet (10 mg) by mouth daily   busPIRone (BUSPAR) 15 MG tablet   Yes No   Sig: Take 1 tablet (15 mg) by mouth 2 times daily   celecoxib (CELEBREX) 200 MG capsule   No No   Sig: Take 1 capsule (200 mg) by mouth daily    escitalopram (LEXAPRO) 20 MG tablet   Yes No   Sig: Take 20 mg by mouth daily   fluticasone (FLONASE) 50 MCG/ACT nasal spray   No No   Sig: use 2 sprays in each nostril daily.   lactobacillus rhamnosus, GG, (CULTURELL) capsule   No No   Sig: Take 1 capsule by mouth 2 times daily   lamoTRIgine (LAMICTAL) 200 MG tablet   Yes No   Sig: Take 200 mg by mouth At Bedtime   polyethylene glycol (MIRALAX) 17 g packet   Yes No   Sig: Take 1 packet by mouth daily as needed for constipation   polyethylene glycol-propylene glycol (SYSTANE ULTRA) 0.4-0.3 % SOLN ophthalmic solution   Yes No   Sig: Place 1 drop into both eyes 4 times daily as needed   tamsulosin (FLOMAX) 0.4 MG capsule   No No   Sig: Take 1 capsule (0.4 mg) by mouth every evening   traZODone (DESYREL) 50 MG tablet   No No   Sig: Take 2 tablets (100 mg) by mouth At Bedtime   vitamin C (ASCORBIC ACID) 500 MG tablet   Yes No   Sig: Take 500 mg by mouth daily   zinc 50 MG TABS   Yes No   Sig: Take 100 mg by mouth daily      Facility-Administered Medications: None            Allergies:     Allergies   Allergen Reactions     Lisinopril Other (See Comments)     Elevated potassium     Sertraline Diarrhea            Social History:   Suresh Powers  reports that he has quit smoking. His smoking use included cigarettes. He smoked an average of 1.00 packs per day. He quit smokeless tobacco use about 6 years ago. He reports current alcohol use. He reports that he does not use drugs.              Family History:   Suresh Powers family history includes Breast Cancer in his mother; Cerebrovascular Disease in his father; Diabetes Type 2  in his mother; Skin Cancer in his father.    Family history was reviewed by myself and not pertinent to current presentation.           Review of Systems:   A10 point Review of Systems was done and were negative other than noted in the HPI.             Physical Exam:   Blood pressure 125/81, pulse 101, temperature 98.3  F (36.8  C), resp. rate 16,  "height 1.702 m (5' 7\"), weight 83.9 kg (185 lb), SpO2 100 %.  185 lbs 0 oz        Constitutional: Alert, awake and oriented ; lying comfortably in bed in no apparent distress   HEENT: Pupils equal and reactive to light and accomodation, EOMI intact; neck supple no raised JVD or rigidity    Oral cavity: Moist mucosa   Cardiovascular: Normal s1 s2, regular rate and rhythm, no murmur   Lungs: B/l clear to auscultation, no wheezes or crepitations   Abdomen: Soft, nt, nd, no guarding, rigidity or rebound; BS +   LE : Mild b/l edema +; right>left   Musculoskeletal: Power 5/5 in all extremities   Neuro: No focal neurological deficits noted, CN II to XII grossly intact   Psychiatry: normal mood and affect  Skin: No obvious skin rashes or ulcers             Data:   All new lab and imaging data was reviewed in Epic.   Significant labs and imagings include:    BMP notable for sodium 130, normal LFTs  normal BNP  CBC with WBC 5.6, hemoglobin 3.8, platelet 762  CT head without contrast was unremarkable  EKG sinus tachycardia  CT chest PE protocol:  IMPRESSION:  1.  Segmental and subsegmental pulmonary emboli in the right lower  lobe without evidence of right heart strain.  2. Suggestion of mild bronchitis without focal airspace consolidation.             Saturnino Ponce MD  Hospitalist  "

## 2022-11-29 NOTE — ED NOTES
DATE:  11/29/2022   TIME OF RECEIPT FROM LAB:  7926  LAB TEST:  hgb  LAB VALUE:  3.8  RESULTS GIVEN WITH READ-BACK TO (PROVIDER):  Dedra Brizuela MD  TIME LAB VALUE REPORTED TO PROVIDER:   7116

## 2022-11-29 NOTE — TELEPHONE ENCOUNTER
Reason for call:  Patient reporting a symptom    Symptom or request: symptom    Duration (how long have symptoms been present): not sure    Have you been treated for this before? Yes    Additional comments: Matt from [facility] is calling to give an update about Suresh. He has decided to go to [facility] because he's not able to take care of himself at home. He called 911 to get picked up by ambulance today 11/29 at around 12-12:30pm and states he would kill himself if he didn't go to the hospital.    Phone Number patient can be reached at:  Other phone number:  Matt from [facility] - [phone]    Best Time:  anytime    Can we leave a detailed message on this number:  YES    Call taken on 11/29/2022 at 3:40 PM by Estefania Georges

## 2022-11-29 NOTE — ED NOTES
Rapid Assessment Note    History:   Suresh Powers is a 62 year old male who presents with lighheadedness, dizziness, and shortness of breath for 11 days. Patient notes when he sits and stands up he feels room spinning dizziness. He notes this started 11 days ago when he fell. He denies hitting his head. Patient recently broke his hip and was in TCU. Patient had home healthcare come in. Patient denies chest pain or pressure. Patient is not on blood thinners. Patient has leg swelling at baseline. PAtient notes when he is speaking he has trouble with concentration    Exam:   Constitutional: Alert, attentive, GCS 15  HENT:    Nose: Nose normal.    Mouth/Throat: Oropharynx is clear, mucous membranes are dry  Eyes: EOM are normal, anicteric, conjugate gaze  CV: regular rate and rhythm; no murmurs  Chest: Effort normal and breath sounds clear without wheezing or rales, symmetric bilaterally   GI:  non tender. No distension. No guarding or rebound.    MSK: No LE edema, no tenderness to palpation of BLE.  Neurological: Alert, attentive, moving all extremities equally.   Skin: Skin is warm and dry.    Plan of Care:   I evaluated the patient and developed an initial plan of care. I discussed this plan and explained that I, or one of my partners, would be returning to complete the evaluation. I spoke to Bea.     I, Freya Murphy, am serving as a scribe to document services personally performed by Estuardo Jeong MD, based on my observations and the provider's statements to me.    11/5/2018  [employer]    Portions of this medical record were completed by a scribe. UPON MY REVIEW AND AUTHENTICATION BY ELECTRONIC SIGNATURE, this confirms (a) I performed the applicable clinical services, and (b) the record is accurate.

## 2022-11-30 ENCOUNTER — APPOINTMENT (OUTPATIENT)
Dept: PHYSICAL THERAPY | Facility: CLINIC | Age: 62
DRG: 377 | End: 2022-11-30
Attending: HOSPITALIST
Payer: COMMERCIAL

## 2022-11-30 ENCOUNTER — PATIENT OUTREACH (OUTPATIENT)
Dept: CARE COORDINATION | Facility: CLINIC | Age: 62
End: 2022-11-30

## 2022-11-30 LAB
ANION GAP SERPL CALCULATED.3IONS-SCNC: 5 MMOL/L (ref 3–14)
BASOPHILS # BLD AUTO: 0.1 10E3/UL (ref 0–0.2)
BASOPHILS NFR BLD AUTO: 1 %
BLD PROD TYP BPU: NORMAL
BLD PROD TYP BPU: NORMAL
BLOOD COMPONENT TYPE: NORMAL
BLOOD COMPONENT TYPE: NORMAL
BUN SERPL-MCNC: 6 MG/DL (ref 7–30)
CALCIUM SERPL-MCNC: 8 MG/DL (ref 8.5–10.1)
CHLORIDE BLD-SCNC: 101 MMOL/L (ref 94–109)
CO2 SERPL-SCNC: 27 MMOL/L (ref 20–32)
CODING SYSTEM: NORMAL
CODING SYSTEM: NORMAL
CREAT SERPL-MCNC: 0.65 MG/DL (ref 0.66–1.25)
CROSSMATCH: NORMAL
CROSSMATCH: NORMAL
EOSINOPHIL # BLD AUTO: 0.2 10E3/UL (ref 0–0.7)
EOSINOPHIL NFR BLD AUTO: 2 %
ERYTHROCYTE [DISTWIDTH] IN BLOOD BY AUTOMATED COUNT: 17.7 % (ref 10–15)
FACTOR 2 INTERPRETATION: NORMAL
FACTOR V INTERPRETATION: NORMAL
GFR SERPL CREATININE-BSD FRML MDRD: >90 ML/MIN/1.73M2
GLUCOSE BLD-MCNC: 110 MG/DL (ref 70–99)
HCT VFR BLD AUTO: 19.5 % (ref 40–53)
HGB BLD-MCNC: 5.7 G/DL (ref 13.3–17.7)
HGB BLD-MCNC: 6.4 G/DL (ref 13.3–17.7)
HGB BLD-MCNC: 6.8 G/DL (ref 13.3–17.7)
HGB BLD-MCNC: 8 G/DL (ref 13.3–17.7)
HGB BLD-MCNC: 8.3 G/DL (ref 13.3–17.7)
IMM GRANULOCYTES # BLD: 0 10E3/UL
IMM GRANULOCYTES NFR BLD: 0 %
ISSUE DATE AND TIME: NORMAL
ISSUE DATE AND TIME: NORMAL
LAB DIRECTOR COMMENTS: NORMAL
LAB DIRECTOR DISCLAIMER: NORMAL
LAB DIRECTOR INTERPRETATION: NORMAL
LAB DIRECTOR METHODOLOGY: NORMAL
LAB DIRECTOR RESULTS: NORMAL
LYMPHOCYTES # BLD AUTO: 1.4 10E3/UL (ref 0.8–5.3)
LYMPHOCYTES NFR BLD AUTO: 19 %
MCH RBC QN AUTO: 27.1 PG (ref 26.5–33)
MCHC RBC AUTO-ENTMCNC: 32.8 G/DL (ref 31.5–36.5)
MCV RBC AUTO: 83 FL (ref 78–100)
MONOCYTES # BLD AUTO: 1.1 10E3/UL (ref 0–1.3)
MONOCYTES NFR BLD AUTO: 15 %
NEUTROPHILS # BLD AUTO: 4.7 10E3/UL (ref 1.6–8.3)
NEUTROPHILS NFR BLD AUTO: 63 %
NRBC # BLD AUTO: 0 10E3/UL
NRBC BLD AUTO-RTO: 0 /100
PLATELET # BLD AUTO: 557 10E3/UL (ref 150–450)
POTASSIUM BLD-SCNC: 3.5 MMOL/L (ref 3.4–5.3)
RBC # BLD AUTO: 2.36 10E6/UL (ref 4.4–5.9)
SODIUM SERPL-SCNC: 133 MMOL/L (ref 133–144)
SPECIMEN DESCRIPTION: NORMAL
UNIT ABO/RH: NORMAL
UNIT ABO/RH: NORMAL
UNIT NUMBER: NORMAL
UNIT NUMBER: NORMAL
UNIT STATUS: NORMAL
UNIT STATUS: NORMAL
UNIT TYPE ISBT: 5100
UNIT TYPE ISBT: 5100
UPPER GI ENDOSCOPY: NORMAL
WBC # BLD AUTO: 7.5 10E3/UL (ref 4–11)

## 2022-11-30 PROCEDURE — 36415 COLL VENOUS BLD VENIPUNCTURE: CPT | Performed by: PHYSICIAN ASSISTANT

## 2022-11-30 PROCEDURE — 120N000013 HC R&B IMCU

## 2022-11-30 PROCEDURE — 81240 F2 GENE: CPT | Performed by: PHYSICIAN ASSISTANT

## 2022-11-30 PROCEDURE — P9016 RBC LEUKOCYTES REDUCED: HCPCS | Performed by: HOSPITALIST

## 2022-11-30 PROCEDURE — 43235 EGD DIAGNOSTIC BRUSH WASH: CPT | Performed by: INTERNAL MEDICINE

## 2022-11-30 PROCEDURE — 250N000013 HC RX MED GY IP 250 OP 250 PS 637: Performed by: HOSPITALIST

## 2022-11-30 PROCEDURE — C9113 INJ PANTOPRAZOLE SODIUM, VIA: HCPCS | Performed by: HOSPITALIST

## 2022-11-30 PROCEDURE — 97530 THERAPEUTIC ACTIVITIES: CPT | Mod: GP | Performed by: PHYSICAL THERAPIST

## 2022-11-30 PROCEDURE — 258N000003 HC RX IP 258 OP 636: Performed by: HOSPITALIST

## 2022-11-30 PROCEDURE — 85730 THROMBOPLASTIN TIME PARTIAL: CPT | Performed by: PHYSICIAN ASSISTANT

## 2022-11-30 PROCEDURE — 86146 BETA-2 GLYCOPROTEIN ANTIBODY: CPT | Performed by: PHYSICIAN ASSISTANT

## 2022-11-30 PROCEDURE — 85018 HEMOGLOBIN: CPT | Performed by: HOSPITALIST

## 2022-11-30 PROCEDURE — 250N000011 HC RX IP 250 OP 636: Performed by: INTERNAL MEDICINE

## 2022-11-30 PROCEDURE — 86147 CARDIOLIPIN ANTIBODY EA IG: CPT | Performed by: PHYSICIAN ASSISTANT

## 2022-11-30 PROCEDURE — 97161 PT EVAL LOW COMPLEX 20 MIN: CPT | Mod: GP | Performed by: PHYSICAL THERAPIST

## 2022-11-30 PROCEDURE — 82310 ASSAY OF CALCIUM: CPT | Performed by: HOSPITALIST

## 2022-11-30 PROCEDURE — 250N000011 HC RX IP 250 OP 636: Performed by: HOSPITALIST

## 2022-11-30 PROCEDURE — 250N000009 HC RX 250: Performed by: INTERNAL MEDICINE

## 2022-11-30 PROCEDURE — 120N000001 HC R&B MED SURG/OB

## 2022-11-30 PROCEDURE — 0DJ08ZZ INSPECTION OF UPPER INTESTINAL TRACT, VIA NATURAL OR ARTIFICIAL OPENING ENDOSCOPIC: ICD-10-PCS | Performed by: INTERNAL MEDICINE

## 2022-11-30 PROCEDURE — 999N000099 HC STATISTIC MODERATE SEDATION < 10 MIN: Performed by: INTERNAL MEDICINE

## 2022-11-30 PROCEDURE — 99232 SBSQ HOSP IP/OBS MODERATE 35: CPT | Performed by: HOSPITALIST

## 2022-11-30 PROCEDURE — 36415 COLL VENOUS BLD VENIPUNCTURE: CPT | Performed by: HOSPITALIST

## 2022-11-30 PROCEDURE — P9040 RBC LEUKOREDUCED IRRADIATED: HCPCS | Performed by: HOSPITALIST

## 2022-11-30 PROCEDURE — 99221 1ST HOSP IP/OBS SF/LOW 40: CPT | Performed by: SURGERY

## 2022-11-30 RX ORDER — NALOXONE HYDROCHLORIDE 0.4 MG/ML
0.2 INJECTION, SOLUTION INTRAMUSCULAR; INTRAVENOUS; SUBCUTANEOUS
Status: DISCONTINUED | OUTPATIENT
Start: 2022-11-30 | End: 2022-12-08 | Stop reason: HOSPADM

## 2022-11-30 RX ORDER — FLUMAZENIL 0.1 MG/ML
0.2 INJECTION, SOLUTION INTRAVENOUS
Status: ACTIVE | OUTPATIENT
Start: 2022-11-30 | End: 2022-12-01

## 2022-11-30 RX ORDER — FENTANYL CITRATE 50 UG/ML
INJECTION, SOLUTION INTRAMUSCULAR; INTRAVENOUS PRN
Status: DISCONTINUED | OUTPATIENT
Start: 2022-11-30 | End: 2022-11-30 | Stop reason: HOSPADM

## 2022-11-30 RX ORDER — NALOXONE HYDROCHLORIDE 0.4 MG/ML
0.4 INJECTION, SOLUTION INTRAMUSCULAR; INTRAVENOUS; SUBCUTANEOUS
Status: DISCONTINUED | OUTPATIENT
Start: 2022-11-30 | End: 2022-12-08 | Stop reason: HOSPADM

## 2022-11-30 RX ORDER — AMLODIPINE BESYLATE 5 MG/1
5 TABLET ORAL DAILY
Status: DISCONTINUED | OUTPATIENT
Start: 2022-12-01 | End: 2022-12-07

## 2022-11-30 RX ADMIN — LAMOTRIGINE 200 MG: 100 TABLET ORAL at 22:34

## 2022-11-30 RX ADMIN — PANTOPRAZOLE SODIUM 40 MG: 40 INJECTION, POWDER, FOR SOLUTION INTRAVENOUS at 20:24

## 2022-11-30 RX ADMIN — POLYETHYLENE GLYCOL 400 AND PROPYLENE GLYCOL 2 DROP: 4; 3 SOLUTION/ DROPS OPHTHALMIC at 22:33

## 2022-11-30 RX ADMIN — TAMSULOSIN HYDROCHLORIDE 0.4 MG: 0.4 CAPSULE ORAL at 20:25

## 2022-11-30 RX ADMIN — BUSPIRONE HYDROCHLORIDE 15 MG: 15 TABLET ORAL at 09:09

## 2022-11-30 RX ADMIN — PANTOPRAZOLE SODIUM 40 MG: 40 INJECTION, POWDER, FOR SOLUTION INTRAVENOUS at 09:09

## 2022-11-30 RX ADMIN — ESCITALOPRAM OXALATE 20 MG: 10 TABLET ORAL at 09:09

## 2022-11-30 RX ADMIN — AMLODIPINE BESYLATE 10 MG: 10 TABLET ORAL at 10:19

## 2022-11-30 RX ADMIN — TRAZODONE HYDROCHLORIDE 100 MG: 100 TABLET ORAL at 22:33

## 2022-11-30 RX ADMIN — BUSPIRONE HYDROCHLORIDE 15 MG: 15 TABLET ORAL at 20:25

## 2022-11-30 RX ADMIN — SODIUM CHLORIDE: 9 INJECTION, SOLUTION INTRAVENOUS at 16:32

## 2022-11-30 ASSESSMENT — ACTIVITIES OF DAILY LIVING (ADL)
ADLS_ACUITY_SCORE: 43

## 2022-11-30 NOTE — CONSULTS
[facility]  Gastroenterology Consultation         Suresh Powers  [address]  [place] [facility] [address]  62 year old male    Admission Date/Time: 11/29/2022  Primary Care Provider: Brad Thurston  Referring / Attending Physician:  Dr. Diaz     We were asked to see the patient in consultation by Dr. Diaz for evaluation of symptomatic anemia, GI bleed      CC: GI bleed, melena, symptomatic anemia    HPI:  Suresh Powers is a 62 year old male who presented to ER with significant weakness patient has history of anxiety depression OCD patient has single kidney chronic hyponatremia who presented to ER with 1 week history of black tarry stool patient has been complaining of significant weakness headache lack of energy shortness of breath patient is not able to move and do activity during his evaluation patient was found to have hemoglobin of 3.8 patient's baseline hemoglobin is around 10.  Patient has been complaining of chest discomfort due to those findings patient was evaluated with CT scan patient was found to have right lower lobe pulmonary embolism.  Patient is overall feeling weak patient denies use of nonsteroidals patient denies any history of hematemesis patient continues to have black tarry stool rectal exam was consistent with melena per ER.  Patient is laying comfortably patient has history of significant bruising on the left upper extremity which is due to fall and syncopal episode.  Patient is currently denying any active GI complaint rest of review of system is negative.    ROS: A comprehensive ten point review of systems was negative aside from those in mentioned in the HPI.      PAST MED HX:  I have reviewed this patient's medical history and updated it with pertinent information if needed.   Past Medical History:   Diagnosis Date     Anxiety     sees psych for trazodone     Clostridium difficile diarrhea 3/9/2016     Foot pain      Hypertension      Mold exposure       Wing 1984       MEDICATIONS:   Prior to Admission Medications   Prescriptions Last Dose Informant Patient Reported? Taking?   Multiple Vitamins-Minerals (MULTIVITAMIN ADULT PO) 11/28/2022  Yes Yes   Sig: Take 1 tablet by mouth daily   amLODIPine (NORVASC) 10 MG tablet 11/28/2022 at hs  No Yes   Sig: Take 1 tablet (10 mg) by mouth daily   busPIRone (BUSPAR) 15 MG tablet 11/29/2022?  Yes Yes   Sig: Take 1 tablet (15 mg) by mouth 2 times daily   celecoxib (CELEBREX) 200 MG capsule has it but hasn't taken any  No Yes   Sig: Take 1 capsule (200 mg) by mouth daily   Patient taking differently: Take 200 mg by mouth daily as needed for moderate pain (4-6)   escitalopram (LEXAPRO) 20 MG tablet 11/29/2022?  Yes Yes   Sig: Take 20 mg by mouth daily   fluticasone (FLONASE) 50 MCG/ACT nasal spray prn  No Yes   Sig: use 2 sprays in each nostril daily.   Patient taking differently: Spray 2 sprays into both nostrils daily as needed use 2 sprays in each nostril daily.   lactobacillus rhamnosus, GG, (CULTURELL) capsule Past Week  No Yes   Sig: Take 1 capsule by mouth 2 times daily   lamoTRIgine (LAMICTAL) 200 MG tablet 11/28/2022 at hs  Yes Yes   Sig: Take 200 mg by mouth At Bedtime   polyethylene glycol-propylene glycol (SYSTANE ULTRA) 0.4-0.3 % SOLN ophthalmic solution 11/29/2022 at am  Yes Yes   Sig: Place 2 drops into both eyes 4 times daily as needed   tamsulosin (FLOMAX) 0.4 MG capsule 11/28/2022 at hs  No Yes   Sig: Take 1 capsule (0.4 mg) by mouth every evening   traZODone (DESYREL) 50 MG tablet 11/28/2022 at hs  No Yes   Sig: Take 2 tablets (100 mg) by mouth At Bedtime   vitamin C (ASCORBIC ACID) 500 MG tablet 11/28/2022  Yes Yes   Sig: Take 500 mg by mouth daily   zinc 50 MG TABS 11/28/2022  Yes Yes   Sig: Take 100 mg by mouth daily      Facility-Administered Medications: None       ALLERGIES:   Allergies   Allergen Reactions     Lisinopril Other (See Comments)     Elevated potassium     Sertraline Diarrhea        SOCIAL HISTORY:  Social History     Tobacco Use     Smoking status: Former     Packs/day: 1.00     Years: 0.00     Pack years: 0.00     Types: Cigarettes     Smokeless tobacco: Former     Quit date: 2/2/2016   Substance Use Topics     Alcohol use: Yes     Comment: states 8 beerrs all day over past 8 hours     Drug use: No       FAMILY HISTORY:  Family History   Problem Relation Age of Onset     Breast Cancer Mother      Diabetes Type 2  Mother      Skin Cancer Father      Cerebrovascular Disease Father        PHYSICAL EXAM:   General awake alert oriented comfortable  Vital Signs with Ranges  Temp: 98.3  F (36.8  C) Temp src: Oral BP: 120/74 Pulse: 92   Resp: 19 SpO2: 99 %      No intake/output data recorded.    Constitutional: healthy, alert and no distress   Cardiovascular: negative, PMI normal. No lifts, heaves, or thrills. RRR. No murmurs, clicks gallops or rub  Respiratory: negative, Percussion normal. Good diaphragmatic excursion. Lungs clear  Neck: Neck supple. No adenopathy. Thyroid symmetric, normal size,, Carotids without bruits.  Abdomen: Abdomen soft, non-tender. BS normal. No masses, organomegaly  NEURO: Gait normal. Reflexes normal and symmetric. Sensation grossly WNL.          ADDITIONAL COMMENTS:   I reviewed the patient's new clinical lab test results.   Recent Labs   Lab Test 11/29/22  1352 11/29/22  1351 11/01/22  1110 10/13/22  0941 09/03/22  2305 06/15/21  1131   WBC  --  5.6 8.7 9.7   < > 6.6   HGB  --  3.8* 10.1* 8.2*   < > 14.2   MCV  --  80 78 86   < > 93   PLT  --  762* 572* 445   < > 327   INR 1.02  --   --   --   --  0.87    < > = values in this interval not displayed.     Recent Labs   Lab Test 11/29/22  1351 11/01/22  1110 10/16/22  0803   POTASSIUM 3.6 4.7 4.4   CHLORIDE 95 93* 98   CO2 25 22 29   BUN 5* 8 16   ANIONGAP 10 11 3     Recent Labs   Lab Test 11/29/22  1648 11/29/22  1351 11/01/22  1110 10/16/22  0803 10/15/22  0748 10/10/22  1156   ALBUMIN  --  2.6* 3.6 2.9*   < >  --     BILITOTAL  --  0.4 0.6 0.3   < >  --    ALT  --  19 29 23   < >  --    AST  --  12 31 15   < >  --    PROTEIN Negative  --   --   --   --  20*    < > = values in this interval not displayed.       I reviewed the patient's new imaging results.        CONSULTATION ASSESSMENT AND PLAN:    Principal Problem:    Multiple subsegmental pulmonary emboli without acute cor pulmonale (H)    Assessment: Very pleasant 62-year-old gentleman with history of significant GI bleed symptomatic anemia syncope fall left upper extremity injury with hemoglobin of 3.8 with baseline hemoglobin in the range of 10 with history of multiple chronic electrolyte problem psychiatric problems.  Patient is laying fairly comfortably in the bed patient has new diagnosis of right lower lobe pulmonary embolism patient is in need for anticoagulation.  Patient finding discussed in detail with the hospitalist team I will recommend further evaluation in vascular surgery for possible [place] filter placement in the meantime continue on supportive care IV Protonix serial hemoglobin transfuse and stabilize once patient hemoglobin is stable above 7 we will consider further evaluation with upper GI endoscopy if negative then proceed further with colonoscopy risk-benefit plan discussed in detail with ER team and hospitalist team and also with the patient.  Thank you very much for letting me participate in his care.             Joe Alaniz MD, FACP  [facility].  Office: [phone]  Cell : [phone]      [facility]  Ph: [phone] Fax: [phone]

## 2022-11-30 NOTE — PLAN OF CARE
Goal Outcome Evaluation:      Plan of Care Reviewed With: patient          Outcome Evaluation: Hgb increased from 3.8 to 5.7    A&O x 4. Tachy to low 100s, otherwise VSS on RA, did experience some dips in SpO2 while falling asleep, possible MARIVEL. Tele: NSR. Assistx1 GB/ walker, home walker in the room. Regular diet, tolerating well, NPO at 0000 for egd 11/30. L PIV infusing NS @ 75, R PIV SL. Admit from ED for GI bleed, CO generalized weakness and light headedness, recent hx of L hip fx which is painful when moving or weight bearing, otherwise denies pain. Hgb of 5.7 as of 0030, 1 unit PRBCs administered. Prieto in place. No BM this shift. Continue plan of care.

## 2022-11-30 NOTE — PROGRESS NOTES
[facility]  Gastroenterology Progress Note     Suresh Powers MRN# [id number]   YOB: 1960 Age: 62 year old          Assessment and Plan:   Suresh Powers is a 62 year old male admitted on 11/29 with melena, weakness, pulmonary PE and concern for upper GI bleed    Multiple subsegmental pulmonary emboli without acute cor pulmonale (H)  Gastrointestinal hemorrhage, unspecified gastrointestinal hemorrhage type  Anemia, unspecified type  Hemoglobin 3.8 on admission and improved to 6.8 with multiple transfusions. Baseline 10  Diagnosed with right lower lobe pulmonary embolism and requires anticoagulation  Vascular surgery considering [place] filter- tentatively planned for tomorrow  Will recommend EGD today to evaluate for source of blood loss    -- EGD today and if negative will plan colonoscopy tomorrow  -- NPO  -- Serial hemoglobin and transfuse for 7 or less  -- IV pantoprazole 40 mg BID                Interval History:   doing well; no cp, sob, n/v/d, or abd pain.               Review of Systems:   C: NEGATIVE for fever, chills, change in weight  E/M: NEGATIVE for ear, mouth and throat problems  R: NEGATIVE for significant cough or SOB  CV: NEGATIVE for chest pain, palpitations or peripheral edema             Medications:   I have reviewed this patient's current medications    amLODIPine  10 mg Oral Daily     busPIRone  15 mg Oral BID     escitalopram  20 mg Oral Daily     lamoTRIgine  200 mg Oral At Bedtime     pantoprazole  40 mg Intravenous BID     sodium chloride (PF)  3 mL Intracatheter Q8H     tamsulosin  0.4 mg Oral QPM     traZODone  100 mg Oral At Bedtime                  Physical Exam:   Vitals were reviewed  Vital Signs with Ranges  Temp:  [97.8  F (36.6  C)-98.4  F (36.9  C)] 98.2  F (36.8  C)  Pulse:  [] 74  Resp:  [10-25] 11  BP: ()/(49-92) 112/78  SpO2:  [94 %-100 %] 99 %  I/O last 3 completed shifts:  In: 915   Out: 1100 [Urine:1100]  Constitutional:  healthy, alert and no distress   Cardiovascular: negative, PMI normal. No lifts, heaves, or thrills. RRR. No murmurs, clicks gallops or rub  Respiratory: negative, Percussion normal. Good diaphragmatic excursion. Lungs clear  Abdomen: Abdomen soft, non-tender. BS normal. No masses, organomegaly           Data:   I reviewed the patient's new clinical lab test results.   Recent Labs   Lab Test 11/30/22  0754 11/30/22  0539 11/30/22  0023 11/29/22  1352 11/29/22  1351 11/01/22  1110 09/03/22  2305 06/15/21  1131   WBC  --  7.5  --   --  5.6 8.7   < > 6.6   HGB 6.8* 6.4* 5.7*  --  3.8* 10.1*   < > 14.2   MCV  --  83  --   --  80 78   < > 93   PLT  --  557*  --   --  762* 572*   < > 327   INR  --   --   --  1.02  --   --   --  0.87    < > = values in this interval not displayed.     Recent Labs   Lab Test 11/30/22  0539 11/29/22  1351 11/01/22  1110   POTASSIUM 3.5 3.6 4.7   CHLORIDE 101 95 93*   CO2 27 25 22   BUN 6* 5* 8   ANIONGAP 5 10 11     Recent Labs   Lab Test 11/29/22  1648 11/29/22  1351 11/01/22  1110 10/16/22  0803 10/15/22  0748 10/10/22  1156   ALBUMIN  --  2.6* 3.6 2.9*   < >  --    BILITOTAL  --  0.4 0.6 0.3   < >  --    ALT  --  19 29 23   < >  --    AST  --  12 31 15   < >  --    PROTEIN Negative  --   --   --   --  20*    < > = values in this interval not displayed.       I reviewed the patient's new imaging results.    All laboratory data reviewed  All imaging studies reviewed by me.    Aurelia Holguin PA-C,  11/30/2022  [facility]  Office : [phone]  Cell: [phone] (Dr. Alaniz)  Cell: [phone] (Aurelia Holguin PA-C)

## 2022-11-30 NOTE — PROVIDER NOTIFICATION
"MD Notification  Notified Person: MD  Notified Person Name: FIDEL Ponce   Notification Date/Time: 11/30 at 1150   Notification Interaction: vocera message   Purpose of Notification: 1 unit transfused completed at 11 HgB now 8.0, do you want it checked before the timed 6pm one this evening? Also do you want to leave to vasquez or pull it? Pt had retention issues in the past and placed in the ED because he was too weak to use urinal at the time. If you want to keep it in can I have an order to continue it? Thanks!   MD response: HgB check at 6pm is okay, pull vasquez     Notified provider about indwelling vasquez catheter discussed removal or continued need.  Did provider choose to remove indwelling vasquez catheter? Yes  Provider's vasquez indication for keeping indwelling vasquez catheter: Retention.  Is there an order for indwelling vasquez catheter? Yes - order to remove vasquez     MD Notification: GIGI Real message sent \"Can I get void trail orders, if needing to straight cath a 3rd time can the vasquez be left in?\"   MD response: Yes on 3rd time leave vasquez in place     "

## 2022-11-30 NOTE — CONSULTS
"Vascular Surgery Consultation    Suresh Powers MRN# [id number]   Age: 62 year old YOB: 1960     Date of Admission:  11/29/2022    Date of Consult:   11/30/22    Reason for consult: \"Pulmonary embolism with concurrent GI bleed; please assist with anticoagulation/need for IVC filter\"       Requesting service: Hospital medicine; requesting provider: Dr. Saturnino Ponce                   Assessment and Plan:   62 y.o. male with alcoholism recent traumatic right hip fracture admitted with SOB and melena, found to have acute PE and GI bleed. Stable. Appropriate hgb response with multiple transfusions. GI planning for EGD today.    -If upper endoscopy is negative for bleeding will plan to start therapeutic low-dose heparin drip without bolus  -Will hold off on IVC filter for now given no DVT seen on venous duplex. If he has a recurrent bleed on heparin drip then would consult IR for temporary IVC filter placement  -Hip surgery 2 months ago otherwise no risk factors for DVT/PE, concerning for unprovoked thromboembolic event. Reviewed with PATRICIA Sandhu of vascular medicine and will start hypercoagulable work-up now as he is not currently anticoagulated.    See and discussed with staff, Dr. White.    Ramsey Hernandez MD            History of Present Illness:   Suresh Powers is a 62 y.o. male admitted with alcoholism, new PE and GI bleed after presenting yesterday with about 10 days of fatigue, lightheadedness, melena.  He had a syncopal event while shaving at home.  Initial hemoglobin was 3.8 in the ED yesterday has improved to 8.0 today after transfusion of 4 units of packed RBCs.  He feels short of breath and CT PE study in the ED demonstrated segmental PEs.  Venous duplex did not find any DVTs in either leg.  GI has been consulted and plans for EGD today with possible colonoscopy tomorrow.  He has not been anticoagulated for the PE due to the GI bleed with severe anemia.  2 months ago he suffered a traumatic " right hip fracture while intoxicated which was surgically repaired. He recovered at a TCU and has since discharged home. No prior history of thrombotic or bleeding events. No known family history of PE or DVT.               Past Medical History:     Past Medical History:   Diagnosis Date     Anxiety     sees psych for trazodone     Clostridium difficile diarrhea 3/9/2016     Foot pain      Hypertension      Mold exposure      Shingles 1984             Past Surgical History:     Past Surgical History:   Procedure Laterality Date     foot crush injury       kidney donation       OPEN REDUCTION INTERNAL FIXATION CLAVICLE Left 2/4/2016    Procedure: OPEN REDUCTION INTERNAL FIXATION CLAVICLE;  Surgeon: Rakesh Hui MD;  Location:  OR     OPEN REDUCTION INTERNAL FIXATION HIP NAILING Left 9/4/2022    Procedure: Open reduction internal fixation of left hip fracture;  Surgeon: Huang Cochran MD;  Location:  OR     OPEN REDUCTION INTERNAL FIXATION TIBIAL PLATEAU Left 2/4/2016    Procedure: OPEN REDUCTION INTERNAL FIXATION TIBIAL PLATEAU;  Surgeon: Rakesh Hui MD;  Location:  OR             Social History:     Social History     Tobacco Use     Smoking status: Former     Packs/day: 1.00     Years: 0.00     Pack years: 0.00     Types: Cigarettes     Smokeless tobacco: Former     Quit date: 2/2/2016   Substance Use Topics     Alcohol use: Yes     Comment: states 8 beerrs all day over past 8 hours             Family History:     Family History   Problem Relation Age of Onset     Breast Cancer Mother      Diabetes Type 2  Mother      Skin Cancer Father      Cerebrovascular Disease Father                 Allergies:     Allergies   Allergen Reactions     Lisinopril Other (See Comments)     Elevated potassium     Sertraline Diarrhea             Medications:     Current Facility-Administered Medications   Medication     amLODIPine (NORVASC) tablet 10 mg     busPIRone (BUSPAR) tablet 15 mg     escitalopram  "(LEXAPRO) tablet 20 mg     hydrALAZINE (APRESOLINE) injection 10 mg     lamoTRIgine (LaMICtal) tablet 200 mg     lidocaine (LMX4) cream     lidocaine 1 % 0.1-1 mL     melatonin tablet 1 mg     nitroGLYcerin (NITROSTAT) sublingual tablet 0.4 mg     ondansetron (ZOFRAN ODT) ODT tab 4 mg    Or     ondansetron (ZOFRAN) injection 4 mg     pantoprazole (PROTONIX) IV push injection 40 mg     polyethylene glycol-propylene glycol PF (SYSTANE ULTRA PF) opthalmic solution 2 drop     sodium chloride (PF) 0.9% PF flush 3 mL     sodium chloride (PF) 0.9% PF flush 3 mL     sodium chloride 0.9% infusion     tamsulosin (FLOMAX) capsule 0.4 mg     traZODone (DESYREL) tablet 100 mg               Review of Systems:   A 12 point review of systems was completed and found to be negative unless otherwise stated in the above HPI           Physical Exam:   /78 (BP Location: Right arm, Patient Position: Semi-Martinez's, Cuff Size: Adult Regular)   Pulse 74   Temp 98.2  F (36.8  C) (Oral)   Resp 11   Ht 1.702 m (5' 7\")   Wt 83.9 kg (185 lb)   SpO2 99%   BMI 28.98 kg/m        Intake/Output Summary (Last 24 hours) at 11/30/2022 1209  Last data filed at 11/30/2022 1120  Gross per 24 hour   Intake 1647.5 ml   Output 2350 ml   Net -702.5 ml       GEN: A&Ox3, no acute distress  NEURO: CN II-XII grossly intact. No gross neurologic deficits  NECK: trachea midline, no JVD  HEENT: No scleral icterus.  RESP: Nonlabored breathing on 2 L nasal cannula, no cough  CV: RRR by radial pulse, noncyanotic  ABD: soft, non-tender, nondistended. No guarding or rebound tenderness  MSK: Moves all extremities independently. Normal active range of motion.  PSYCH: cooperative   PULSES:Palpable radial and dorsalis pedis pulses bilaterally              Data:   All laboratory data reviewed    Results:  BMP  Recent Labs   Lab 11/30/22  0539 11/29/22  1351    130*   POTASSIUM 3.5 3.6   CHLORIDE 101 95   CO2 27 25   BUN 6* 5*   CR 0.65* 0.67   * 94 "     CBC  Recent Labs   Lab 11/30/22  1139 11/30/22  0754 11/30/22  0539 11/30/22  0023 11/29/22  1351   WBC  --   --  7.5  --  5.6   HGB 8.0* 6.8* 6.4* 5.7* 3.8*   PLT  --   --  557*  --  762*     LFT  Recent Labs   Lab 11/29/22  1352 11/29/22  1351   AST  --  12   ALT  --  19   ALKPHOS  --  99   BILITOTAL  --  0.4   ALBUMIN  --  2.6*   INR 1.02  --      Recent Labs   Lab 11/30/22  0539 11/29/22  1351   * 94       Imaging:  US Lower Extremity Venous Duplex Bilateral    Result Date: 11/29/2022  EXAM: US LOWER EXTREMITY VENOUS DUPLEX BILATERAL LOCATION: [facility] DATE/TIME: 11/29/2022 6:21 PM INDICATION: PE, history of hip surgery, leg swelling R>L.  Has GI bleed too, getting a filter COMPARISON: 10/10/2022 TECHNIQUE: Venous Duplex ultrasound of bilateral lower extremities with and without compression, augmentation and duplex. Color flow and spectral Doppler with waveform analysis performed. FINDINGS: Exam includes the common femoral, femoral, popliteal veins as well as segmentally visualized deep calf veins and greater saphenous vein. RIGHT: No deep vein thrombosis. No superficial thrombophlebitis. No popliteal cyst. LEFT: No deep vein thrombosis. No superficial thrombophlebitis. No popliteal cyst.     IMPRESSION: 1.  No deep venous thrombosis in the bilateral lower extremities.    CT Chest Pulmonary Embolism w Contrast    Result Date: 11/29/2022  CT CHEST PULMONARY EMBOLISM W CONTRAST 11/29/2022 2:54 PM CLINICAL HISTORY: chest pain, +dimer, lightheaded, recent surgery, orthostasis TECHNIQUE: CT angiogram chest during arterial phase injection IV contrast. 2D and 3D MIP reconstructions were performed by the CT technologist. Dose reduction techniques were used. CONTRAST:  69 mL isovue 370 COMPARISON: None. FINDINGS: ANGIOGRAM CHEST: Pulmonary arteries are normal caliber. There are segmental and subsegmental pulmonary emboli in the right lower lobe. Thoracic aorta is negative for  dissection. No CT evidence of right heart strain. LUNGS AND PLEURA: Mild upper lobe predominant centrilobular and paraseptal emphysema. No focal airspace consolidation or pleural effusion. Scattered lower lobe predominant bronchial wall thickening and mucous plugging. Minimal bibasilar atelectasis. MEDIASTINUM/AXILLAE: Normal. CORONARY ARTERY CALCIFICATION: Severe. UPPER ABDOMEN: Left kidney is likely absent. MUSCULOSKELETAL: No acute bony abnormality. Healed left clavicular fracture partially visualized with associated orthopedic hardware.     IMPRESSION: 1.  Segmental and subsegmental pulmonary emboli in the right lower lobe without evidence of right heart strain. 2. Suggestion of mild bronchitis without focal airspace consolidation. [Critical Result: Pulmonary embolism] Finding was identified on 11/29/2022 2:55 PM. Dr. Vincent was contacted by me on 11/29/2022 3:08 PM and verbalized understanding of the critical result. NADINE LUNA MD   SYSTEM ID:  [id number]    Head CT w/o contrast    Result Date: 11/29/2022  CT SCAN OF THE HEAD WITHOUT CONTRAST   11/29/2022 2:53 PM HISTORY: Fall, lightheaded, dizzy. TECHNIQUE:  Axial images of the head and coronal reformations without IV contrast material. Radiation dose for this scan was reduced using automated exposure control, adjustment of the mA and/or kV according to patient size, or iterative reconstruction technique. COMPARISON: Brain MR 3/24/2021. FINDINGS: No acute intracranial hemorrhage. No mass effect or midline shift. Mild diffuse parenchymal volume loss. Chronic cortical infarct in the left parietal lobe which is unchanged since prior MR. Patchy periventricular white matter hypodensities are nonspecific, but most likely related to chronic microvascular ischemic disease. No abnormal extra-axial fluid collection. Ventricular size is within normal limits without evidence of hydrocephalus. Scattered mucosal thickening in the paranasal sinuses. The bony calvarium and  bones of the skull base appear intact.     IMPRESSION:   1. No evidence of acute intracranial hemorrhage, mass, or herniation. 2. Mild diffuse parenchymal volume loss and white matter changes most likely due to chronic microvascular ischemic disease. 3. Probable chronic cortical infarct in the left parietal lobe. DAREN BRUNSON MD   SYSTEM ID:  [id number]    STAFF: Patient examined with Dr. Hernandez.  Pulmonary embolism with some mild shortness of breath that is improved.  No obvious etiology with negative leg venous duplex ultrasound.  Did have a hip surgery in September but this would be unlikely as an etiology.  No family history of clotting disorder or personal history of venous problems.    Patient with melanotic stools and then bloody stools prior to admission.  This is improving and is scheduled for endoscopy later today.    This will help determine whether he can be anticoagulated.  If not would consider IVC filter .  Since this may be a spontaneous pulmonary embolism would recommend coagulopathy work-up to be done today since he is not yet on anticoagulation and and this will be scheduled..    Over 45 minutes with patient today.    Huang White MD

## 2022-11-30 NOTE — PROGRESS NOTES
[facility]  Hospitalist Progress Note        Saturnino Ponce MD   11/30/2022        Interval History:        - has got 3 units PRBC, Hb 3.8--->6.8; getting 4th unit this morning---8.0  - US LE noted with no DVT in b/l LE  - transiently needed oxygen at night; respiratory status currently stable on room air  - awaiting EGD    - Evaluated by vascular surgery, rec to start therapeutic low dose heparin drip without bolus if endoscopy is negative for bleeding; suggest no need for IVC filter at this time as no DVT noted; ordered for hypercoagulopathy workup         Assessment and Plan:        Suresh Powers is a 62 year old male with PMH significant for hypertension, h/o urinary retention, anxiety/depression/OCD, single kidney (donated to sister 1990), mild chronic hyponatremia who presented to ED with melena, generalized weakness, noted with pulmonary embolism and likely upper G.I. bleed, admitted 11/29/22.     Likely upper G.I. bleed  acute blood loss anemia on chronic anemia secondary to above  - his recent baseline hemoglobin is around 8 to 9; last hemoglobin from 11/1/22 was 10.1  - on admission hemoglobin 3.8, MCV 80  - has got 4 units PRBC, Hb 3.8-----> 8.0  - evaluated by G.I., plan for EGD 11/30 , if negative to plan for colonoscopy  - will monitor hemoglobin and transfuse PRN for Hb<7  - currently hemodynamically stable  - continue Protonix IV 40 mg BID     Right lower lobe pulmonary embolism  - CT chest noted with segmental and subsegmental PE in RLL without right heart strain  - currently hemodynamically stable and on room air  - was not started on anticoagulation due to concern for acute G.I. bleed  - will monitor closely in [facility] on telemetry and continuous pulse oximetry  - US LE 11/29/22 noted with no DVT in b/l LE    - Evaluated by vascular surgery, rec to start therapeutic low dose heparin drip without bolus if endoscopy is negative for bleeding; suggest no need for IVC filter at this  "time as no DVT noted; ordered for hypercoagulopathy workup  - will plan to start anticoagulation once workup for G.I. bleed completed     Hypertension  - BP on softer side; will decrease PTA amlodipine to 5 mg daily with hold parameters  - hydralazine IV PRN for SBP>180     H/o urinary retention  - continue PTA Flomax     Anxiety  Depression  OCD  -will continue PTA BuSpar, Lexapro, trazodone, Lamictal      Mild chronic hyponatremia  - Na 130---133; has been noted in 120s--130s in past  - will continue with cautious IV hydration NS at 75 ml/hr while NPO for EGD; monitor BMP     Deconditioning  generalized weakness  H/o left hip surgery 9/2022  - generalized weakness, presyncope is likely secondary to acute blood loss anemia  - will get PT/OT eval ; SW for disposition planning    Clinically Significant Risk Factors Present on Admission         # Hyponatremia: Lowest Na = 130 mmol/L (Ref range: 136-145) in last 2 days, will monitor as appropriate      # Hypoalbuminemia: Lowest albumin = 2.6 g/dL (Ref range: 3.5-5.2) at 11/29/2022  1:51 PM, will monitor as appropriate         # Overweight: Estimated body mass index is 28.98 kg/m  as calculated from the following:    Height as of this encounter: 1.702 m (5' 7\").    Weight as of this encounter: 83.9 kg (185 lb).            COVID status: asymptomatic COVID screening on admission 11/29 negative     # DVT prophylaxis: mechanical with PCD boots; plan to start anticoagulation for PE after completion of workup for G.I. bleed     # Code status: full code    Disposition: likely 2-3 days pending GI workup and resumption of anticoagulation for PE    Page Me (7 am to 6 pm)              Physical Exam:      Blood pressure 108/74, pulse 97, temperature 98.2  F (36.8  C), temperature source Oral, resp. rate 22, height 1.702 m (5' 7\"), weight 83.9 kg (185 lb), SpO2 100 %.  Vitals:    11/29/22 1328   Weight: 83.9 kg (185 lb)     Vital Signs with Ranges  Temp:  [97.8  F (36.6  C)-98.4  F " (36.9  C)] 98.2  F (36.8  C)  Pulse:  [] 97  Resp:  [12-25] 22  BP: ()/(49-92) 108/74  SpO2:  [94 %-100 %] 100 %  I/O's Last 24 hours  I/O last 3 completed shifts:  In: 915   Out: 1100 [Urine:1100]    Constitutional: Alert, awake and oriented ; lying comfortably in bed in no apparent distress   HEENT: Pupils equal and reactive to light and accomodation, EOMI intact; neck supple no raised JVD or rigidity    Oral cavity: Moist mucosa   Cardiovascular: Normal s1 s2, regular rate and rhythm, no murmur   Lungs: B/l clear to auscultation, no wheezes or crepitations   Abdomen: Soft, nt, nd, no guarding, rigidity or rebound; BS +   LE : Mild b/l edema    Musculoskeletal: Power 5/5 in all extremities   Neuro: No focal neurological deficits noted, CN II to XII grossly intact   Psychiatry: normal mood and affect  Skin: No obvious skin rashes or ulcers                  Medications:          amLODIPine  10 mg Oral Daily     busPIRone  15 mg Oral BID     escitalopram  20 mg Oral Daily     lamoTRIgine  200 mg Oral At Bedtime     pantoprazole  40 mg Intravenous BID     sodium chloride (PF)  3 mL Intracatheter Q8H     tamsulosin  0.4 mg Oral QPM     traZODone  100 mg Oral At Bedtime     PRN Meds: hydrALAZINE, lidocaine 4%, lidocaine (buffered or not buffered), melatonin, nitroGLYcerin, ondansetron **OR** ondansetron, polyethylene glycol-propylene glycol PF, sodium chloride (PF)         Data:      All new lab and imaging data was reviewed.   Recent Labs   Lab Test 11/30/22  0754 11/30/22  0539 11/30/22  0023 11/29/22  1352 11/29/22  1351 11/01/22  1110 09/03/22  2305 06/15/21  1131   WBC  --  7.5  --   --  5.6 8.7   < > 6.6   HGB 6.8* 6.4* 5.7*  --  3.8* 10.1*   < > 14.2   MCV  --  83  --   --  80 78   < > 93   PLT  --  557*  --   --  762* 572*   < > 327   INR  --   --   --  1.02  --   --   --  0.87    < > = values in this interval not displayed.      Recent Labs   Lab Test 11/30/22  0539 11/29/22  1351 11/01/22  1110     130* 126*   POTASSIUM 3.5 3.6 4.7   CHLORIDE 101 95 93*   CO2 27 25 22   BUN 6* 5* 8   CR 0.65* 0.67 0.59*   ANIONGAP 5 10 11   NIMISHA 8.0* 8.5 8.9   * 94 105*     Recent Labs   Lab Test 03/24/21  1158   TROPI <0.015

## 2022-11-30 NOTE — PROGRESS NOTES
SPIRITUAL HEALTH SERVICES Progress Note  [facility] per admission request    Pt was visibly tired upon my visit and welcomed a visit at another time. Spiritual Health to follow up later this week for spiritual and emotional support.    Sarah Johanson  Chaplain Resident  Spiritual Health Services  Pager: [phone]

## 2022-11-30 NOTE — PROGRESS NOTES
Clinic Care Coordination Contact  Ambulatory Care Coordination to Inpatient Care Management   Hand-In Communication    Date:  November 30, 2022  Name: Suresh Powers is enrolled in Ambulatory Care Coordination program and I am the Lead Care Coordinator.  CC Contact Information: Epic InPipewisesket + phone  Payor Source: Payor: [facility] / Plan: BCBS MEDICARE ADVANTAGE / Product Type: Medicare /   Current services in place:     Please see the CC Snaphot and Care Management Flowsheets for specific  details of this Suresh Powers care plan.   Additional details/specific concerns r/t this admission:    Home Safety Pt could use assistance in the home, but is unable to afford care.    I will follow this admission in Epic. Please feel free to contact me with questions or for further collaboration in discharge planning.    TIFF Hernandes  [facility] Care Coordination   [facility]  Social Work Care Coordinator  [phone]

## 2022-11-30 NOTE — PROGRESS NOTES
"   11/30/22 1500   Appointment Info   Signing Clinician's Name / Credentials (PT) Tho Pompa DPT       Present no   Living Environment   People in Home alone   Current Living Arrangements apartment   Home Accessibility stairs to enter home;stairs within home   Number of Stairs, Main Entrance 3   Stair Railings, Main Entrance railing on left side (ascending)   Number of Stairs, Within Home, Primary ten   Stair Railings, Within Home, Primary railings on both sides of stairs   Transportation Anticipated   (Pt did not state)   Living Environment Comments Pt lives alone in an apartment. Stairs to enter and stairs within apartment. Pt reports he is unsure of how he will transport upon discharge. Pt reports he does not have help at home and is unable to care for himself.   Self-Care   Usual Activity Tolerance moderate   Current Activity Tolerance fair   Regular Exercise No   Equipment Currently Used at Home walker, standard;shower chair;raised toilet seat   Fall history within last six months yes   Number of times patient has fallen within last six months 6   Activity/Exercise/Self-Care Comment Pt reports being IND at baseline with all ADLs but reports he is not able to take care of himself. Pt reports ambulating w/ a FWW at baseline but can only ambulate ~15'. Pt reports he currently is receiving home therapies and nursing 1x/week but reports he has one appointment with them before they are discontinued. Pt does not drive.   General Information   Onset of Illness/Injury or Date of Surgery 11/30/22   Referring Physician Saturnino Ponce MD   Patient/Family Therapy Goals Statement (PT) \"To get better\"   Pertinent History of Current Problem (include personal factors and/or comorbidities that impact the POC) Per Chart: Suresh Powers is a 62 year old male with PMH significant for hypertension, h/o urinary retention, anxiety/depression/OCD, single kidney (donated to sister 1990), mild chronic " hyponatremia who presented to ED with melena, generalized weakness, noted with pulmonary embolism and likely upper G.I. bleed, admitted 11/29/22.   Existing Precautions/Restrictions fall   Weight-Bearing Status - LLE full weight-bearing   Weight-Bearing Status - RLE full weight-bearing   Cognition   Orientation Status (Cognition) oriented x 3   Pain Assessment   Patient Currently in Pain Yes, see Vital Sign flowsheet  (Reports L hip pain from previous fracture)   Integumentary/Edema   Integumentary/Edema no deficits were identifed   Posture    Posture Forward head position;Protracted shoulders   Range of Motion (ROM)   Range of Motion ROM is WFL   Strength (Manual Muscle Testing)   Strength (Manual Muscle Testing) Deficits observed during functional mobility   Strength Comments RLE Hip Flexion: 3+/5; LLE Hip Flexion: 3/5   Bed Mobility   Comment, (Bed Mobility) Supine>sit w/ min A x 1   Transfers   Comment, (Transfers) Sit>stand w/ FWW and min A x 1   Gait/Stairs (Locomotion)   Comment, (Gait/Stairs) Unable to initiate   Balance   Balance Comments Pt able to sit at EOB unsupported without LOB. Pt unsteady in standing using a FWW.   Sensory Examination   Sensory Perception patient reports no sensory changes   Clinical Impression   Criteria for Skilled Therapeutic Intervention Yes, treatment indicated   PT Diagnosis (PT) Impaired gait   Influenced by the following impairments Decreased activity tolerance; decreased balance; decreased strength   Functional limitations due to impairments Impaired functional mobility   Clinical Presentation (PT Evaluation Complexity) Stable/Uncomplicated   Clinical Presentation Rationale Clinical judgement   Clinical Decision Making (Complexity) low complexity   Planned Therapy Interventions (PT) balance training;bed mobility training;gait training;home exercise program;patient/family education;stair training;strengthening;transfer training   Risk & Benefits of therapy have been  explained care plan/treatment goals reviewed;evaluation/treatment results reviewed;risks/benefits reviewed;current/potential barriers reviewed;participants voiced agreement with care plan;participants included;patient   PT Total Evaluation Time   PT Eval, Low Complexity Minutes (34554) 10   Physical Therapy Goals   PT Frequency 3x/week   PT Predicted Duration/Target Date for Goal Attainment 12/07/22   PT Goals Bed Mobility;Transfers;Gait;Stairs   PT: Bed Mobility Independent;Supine to/from sit   PT: Transfers Modified independent;Sit to/from stand;Assistive device   PT: Gait Modified independent;100 feet;Assistive device   PT: Stairs Modified independent;10 stairs;Rail on right   Interventions   Interventions Quick Adds Therapeutic Activity   Therapeutic Activity   Therapeutic Activities: dynamic activities to improve functional performance Minutes (09259) 25   Symptoms Noted During/After Treatment Fatigue;Dizziness   Treatment Detail/Skilled Intervention Greeted pt supine in bed, agreed to PT. VSS on RA throughout session. Pt is tangential and needs cues to stay on task. BP taken to assess for orthostatic hypotension, pt negative. Pt needs frequent rest breaks with all mobility due to fatigue and ongoing dizziness. Pt performed supine>sit w/ min A x 1, needing assist for trunk control. Once in sitting, pt able to scoot self to EOB and sit unsupported without LOB. Pt performd sit>stand x 4 w/ FWW and min A x 1, needing assist to stand fully upright. Verbal cues for hand placement. On second stand, pt able to perform standing marches x 5 reps bilaterally before needing to sit down due to fatigue and dizziness. Limited ROM on LLE which pt attributes to previous hip fracture on LLE. On third and fourth stands, pt able to take 3 steps forward/backward w/ FWW and min A x 1. Pt very unsteady throughout, having LOB posteriorly needing assist from PT to regain balance. Pt asked to cease session. Pt returned to supine w/  CGA, demonstrating ability to safely lift BLEs back into bed and reposition. Discussed discharge recommendations with pt, in agreement for TCU. Pt ended session supine in bed, with all needs met and call light within reach.   PT Discharge Planning   PT Plan Bed mobility; repeat sit>stands; progress gait w/ FWW and WC follow; monitor dizziness   PT Discharge Recommendation (DC Rec) Transitional Care Facility;home with assist;home with home care physical therapy   PT Rationale for DC Rec Pt is below baseline. Pt currently requiring assist with all functional mobility. Pt presents with deficits in activity tolerance, balance, and strength. Pt also additionally limited due to ongoing dizziness. Due to these deficits and recent falls history, pt is a high falls risk and unsafe to return home alone at this time. Pt would benefit from continued skilled PT services via TCU to address deficits and improve IND with safety and functional mobility. If pt were to return home, pt would require 24/7 A x 1 with WC for all functional mobility and ADLs, along with stretcher ride to get into apartment, along with HHPT. Pt reports he is unable to take care of himself at home.   PT Brief overview of current status Supine>sit w/ min A x 1; sit>stand w/ FWW and min A x 1   Total Session Time   Timed Code Treatment Minutes 25   Total Session Time (sum of timed and untimed services) 35

## 2022-12-01 ENCOUNTER — APPOINTMENT (OUTPATIENT)
Dept: OCCUPATIONAL THERAPY | Facility: CLINIC | Age: 62
DRG: 377 | End: 2022-12-01
Attending: HOSPITALIST
Payer: COMMERCIAL

## 2022-12-01 LAB
B2 GLYCOPROT1 IGG SERPL IA-ACNC: 1 U/ML
B2 GLYCOPROT1 IGM SERPL IA-ACNC: <2.4 U/ML
BLD PROD TYP BPU: NORMAL
BLOOD COMPONENT TYPE: NORMAL
CARDIOLIPIN IGG SER IA-ACNC: <2 GPL-U/ML
CARDIOLIPIN IGG SER IA-ACNC: NEGATIVE
CARDIOLIPIN IGM SER IA-ACNC: <2 MPL-U/ML
CARDIOLIPIN IGM SER IA-ACNC: NEGATIVE
CODING SYSTEM: NORMAL
CROSSMATCH: NORMAL
DRVVT SCREEN RATIO: 0.82
HGB BLD-MCNC: 7.3 G/DL (ref 13.3–17.7)
HGB BLD-MCNC: 8.4 G/DL (ref 13.3–17.7)
HGB BLD-MCNC: 9.1 G/DL (ref 13.3–17.7)
HGB BLD-MCNC: 9.4 G/DL (ref 13.3–17.7)
INR PPP: 1.04 (ref 0.85–1.15)
ISSUE DATE AND TIME: NORMAL
LA PPP-IMP: NEGATIVE
LUPUS INTERPRETATION: NORMAL
PTT RATIO: 1.03
THROMBIN TIME: 15.3 SECONDS (ref 13–19)
UNIT ABO/RH: NORMAL
UNIT NUMBER: NORMAL
UNIT STATUS: NORMAL
UNIT TYPE ISBT: 5100

## 2022-12-01 PROCEDURE — 97535 SELF CARE MNGMENT TRAINING: CPT | Mod: GO

## 2022-12-01 PROCEDURE — 250N000013 HC RX MED GY IP 250 OP 250 PS 637: Performed by: HOSPITALIST

## 2022-12-01 PROCEDURE — 120N000001 HC R&B MED SURG/OB

## 2022-12-01 PROCEDURE — C9113 INJ PANTOPRAZOLE SODIUM, VIA: HCPCS | Performed by: HOSPITALIST

## 2022-12-01 PROCEDURE — P9016 RBC LEUKOCYTES REDUCED: HCPCS | Performed by: HOSPITALIST

## 2022-12-01 PROCEDURE — 85018 HEMOGLOBIN: CPT | Performed by: HOSPITALIST

## 2022-12-01 PROCEDURE — 97110 THERAPEUTIC EXERCISES: CPT | Mod: GO

## 2022-12-01 PROCEDURE — 85390 FIBRINOLYSINS SCREEN I&R: CPT | Mod: 26 | Performed by: PATHOLOGY

## 2022-12-01 PROCEDURE — 120N000013 HC R&B IMCU

## 2022-12-01 PROCEDURE — 99233 SBSQ HOSP IP/OBS HIGH 50: CPT | Performed by: HOSPITALIST

## 2022-12-01 PROCEDURE — 250N000011 HC RX IP 250 OP 636: Performed by: HOSPITALIST

## 2022-12-01 PROCEDURE — 36415 COLL VENOUS BLD VENIPUNCTURE: CPT | Performed by: HOSPITALIST

## 2022-12-01 PROCEDURE — 97165 OT EVAL LOW COMPLEX 30 MIN: CPT | Mod: GO

## 2022-12-01 RX ADMIN — BUSPIRONE HYDROCHLORIDE 15 MG: 15 TABLET ORAL at 20:13

## 2022-12-01 RX ADMIN — TRAZODONE HYDROCHLORIDE 100 MG: 100 TABLET ORAL at 22:37

## 2022-12-01 RX ADMIN — TAMSULOSIN HYDROCHLORIDE 0.4 MG: 0.4 CAPSULE ORAL at 20:12

## 2022-12-01 RX ADMIN — PANTOPRAZOLE SODIUM 40 MG: 40 INJECTION, POWDER, FOR SOLUTION INTRAVENOUS at 09:27

## 2022-12-01 RX ADMIN — LAMOTRIGINE 200 MG: 100 TABLET ORAL at 22:36

## 2022-12-01 RX ADMIN — BUSPIRONE HYDROCHLORIDE 15 MG: 15 TABLET ORAL at 09:27

## 2022-12-01 RX ADMIN — PANTOPRAZOLE SODIUM 40 MG: 40 INJECTION, POWDER, FOR SOLUTION INTRAVENOUS at 20:13

## 2022-12-01 RX ADMIN — ESCITALOPRAM OXALATE 20 MG: 10 TABLET ORAL at 09:26

## 2022-12-01 ASSESSMENT — ACTIVITIES OF DAILY LIVING (ADL)
ADLS_ACUITY_SCORE: 43

## 2022-12-01 NOTE — PROGRESS NOTES
"Vascular Surgery Progress Note    One gray stool yesterday. No stool today. Feels well. Hgb drop to 7.3 overnight, transfused and up to 9.4 this AM and now stable. EGD yesterday found one large nonbleeding ulcer in the duodenum.      /61 (BP Location: Right arm)   Pulse 82   Temp 97.6  F (36.4  C) (Oral)   Resp 16   Ht 1.702 m (5' 7\")   Wt 83.9 kg (185 lb)   SpO2 96%   BMI 28.98 kg/m      Resting in chair, no distress  Nonlabored breathing on room air  Abd soft, nondistended, nontender    A/P: PE and GI bleed, stable. Duodenal ulcer without bleeding on EGD yesterday. Dr. Alaniz prefers to hold off on anticoagulation until at least 12/4/22. In this case we will ask IR to place IVC filter.  Discussed with IR, Dr. Russell. Will be scheduled for IR filter tomorrow.    NPO at midnight.    D/w Dr. White.    Ramsey Hernandez MD     "

## 2022-12-01 NOTE — PLAN OF CARE
Orientation: A&Ox4   Vitals/Pain: VSS on RA sating >90%, requires 1-2L NC while sleeping. Pt denies pain   Tele: NSR   Lines/Drains: R PIV with NS running at 75 ml/hr. L PIV saline locked   Skin/Wounds: L side buttocks with pink/while wound, mepilex in place   LS: clear   GI/: BS +, pt passing flatus, x1 BM. Prieto removed at 1230, retaining, scanned for 455 - pt reporting discomfort - straight cathed at 1500, straight cathed again at 1830    Labs: Abnormal/Trends, Electrolyte Replacement- trending Hgb 6.8, 1 unit of blood, Hgb 8.0 -- 8.3  Ambulation/Assist: pivot to commode or using bedpan due to dizziness with movement   Plan: EGD completed, diet resumed, next step anticoagulation plan and labs pending     Goal Outcome Evaluation:  Plan of Care Reviewed With: patient  Overall Patient Progress: improving  Outcome Evaluation: HgB 8.3, EDG showed ulcer in small bowel that is not actively bleeding

## 2022-12-01 NOTE — CONSULTS
INTERVENTIONAL RADIOLOGY - [place] INTERVENTIONAL CONSULT MODEL NOTE    Procedure Requested: Retrieveable IVC filter  Requesting Provider: Ramsey Hernandez MD    Brief History/Plan of Care:  Patient with GI bleed and PE.  A retrievable IVC filter is requested for Friday (tomorrow).    We will plan on performing Friday 12/2, time TBD.  Please keep patient NPO after midnight.    I have placed an appropriate order for the procedure as requested by the ordering provider.    Benny Russell MD  Vascular and Interventional Radiology

## 2022-12-01 NOTE — PLAN OF CARE
Orientation: A&Ox4   Vitals/Pain: VSS on RA sating >90%. Pt denies pain throughout shift   Lines/Drains: L & R PIV saline locked   Skin/Wounds: L side of buttock red open wound, no drainage   LS: clear   GI/: BS +, x1 BM this shift. Prieto in place with adequate output   Labs: Abnormal/Trends, Electrolyte Replacement- HgB 9.4 to 9.1   Ambulation/Assist: x1 GB and walker   Plan: NPO @ midnight for IVC filter placement. See MD note regarding starting anticoagulation on 12/4     Goal Outcome Evaluation:  Plan of Care Reviewed With: patient  Overall Patient Progress: improving  Outcome Evaluation: HgB stable this shift, no blood transfusion required

## 2022-12-01 NOTE — PLAN OF CARE
Goal Outcome Evaluation:                 Outcome Evaluation: 1 unit of PRBCs givewn for Hgb 7.3 and low bp    A&O x 4. Tachy to low 100s, with some soft BPs otherwise VSS on RA, did experience some dips in SpO2 while falling asleep, resolved by 1.5L NC, possible MARIVEL. Tele: NSR. Assistx1 GB/ walker, home walker in the room. Regular diet, tolerating well. Bilat PIV, SL. CO generalized weakness and light headedness when standing, recent hx of L hip fx which is painful when moving or weight bearing, otherwise denies pain. Hgb of 7.3 as with dip in BP, 1 unit PRBCs administered. Prieto placed for retention, good UOP. No BM this shift. Continue plan of care.

## 2022-12-01 NOTE — PROGRESS NOTES
[facility]  Hospitalist Progress Note        Saturnino Ponce MD   12/01/2022        Interval History:        - melanotic stools improving; hemodynamically stable and no suggestion of active bleed  - EGD 11/30 was noted with large non-bleeding duodenal ulcer  - another unit PRBC transfused for Hb 7.3--->9.4 and hypotension with BP 78/43; BP 110s this morning; will hold off on PTA Amlodipine  - monitor Hb q 12 hrs   - vasquez was placed in ED for retention, failed voiding trial 11/30, replaced vasquez, can try voiding trial in 3-4 days  - PT rec TCU         Assessment and Plan:        Suresh Powers is a 62 year old male with PMH significant for hypertension, h/o urinary retention, anxiety/depression/OCD, single kidney (donated to sister 1990), mild chronic hyponatremia who presented to ED with melena, generalized weakness, noted with pulmonary embolism and likely upper G.I. bleed, admitted 11/29/22.     Upper G.I. bleed due to duodenal ulcer  Acute blood loss anemia on chronic anemia secondary to above  - his recent baseline hemoglobin is around 8 to 9; last hemoglobin from 11/1/22 was 10.1  - on admission hemoglobin 3.8, MCV 80  - has got 5 units PRBC so far; Hb 3.8-----> 9.4; - melanotic stools improving; hemodynamically stable and no suggestion of active bleed  - GI following ; EGD 11/30 was noted with large non-bleeding duodenal ulcer  - monitor Hb q 12 hrs and transfuse PRN for Hb<7  - hemodynamically stable although BP on softer side  - continue Protonix IV 40 mg BID     Right lower lobe pulmonary embolism  - CT chest noted with segmental and subsegmental PE in RLL without right heart strain  - currently hemodynamically stable and on room air  - was not started on anticoagulation due to concern for acute G.I. bleed  - US LE 11/29/22 noted with no DVT in b/l LE    - Evaluated by vascular surgery suggest no need for IVC filter at this time as no DVT noted; ordered for hypercoagulopathy workup  -  "d/w DR Alaniz, given severe anemia with large duodenal ulcer-- GI suggested holding off on initiating anticoagulation for at least 48 hours; will plan to start heparin drip without bolus on 12/4/22 and transition to DOAC if Hb stable and no recurrence of bleed  -will consult pharmacy liaison for DOAC coverage     Hypertension  - was hypotensive 12/1, responded to IVF; BP now stable, on softer side; will hold off on PTA Amlodipine (12/1)  - hydralazine IV PRN for SBP>180     H/o urinary retention  - continue PTA Flomax  - vasquez was placed in ED for retention, failed voiding trial 11/30, replaced vasquez, can try voiding trial in 3-4 days     Anxiety  Depression  OCD  -will continue PTA BuSpar, Lexapro, trazodone, Lamictal      Mild chronic hyponatremia  - Na 130---133; has been noted in 120s--130s in past  -IV fluids discontinued; monitor BMP intermittently     Deconditioning  generalized weakness  H/o left hip surgery 9/2022  - generalized weakness, presyncope is likely secondary to acute blood loss anemia  - PT rec TCU; SW for disposition planning    Clinically Significant Risk Factors         # Hyponatremia: Lowest Na = 130 mmol/L (Ref range: 136-145) in last 2 days, will monitor as appropriate      # Hypoalbuminemia: Lowest albumin = 2.6 g/dL (Ref range: 3.5-5.2) at 11/29/2022  1:51 PM, will monitor as appropriate           # Overweight: Estimated body mass index is 28.98 kg/m  as calculated from the following:    Height as of this encounter: 1.702 m (5' 7\").    Weight as of this encounter: 83.9 kg (185 lb)., PRESENT ON ADMISSION          COVID status: asymptomatic COVID screening on admission 11/29 negative     # DVT prophylaxis: mechanical with PCD boots; plan to start anticoagulation for PE as noted above     # Code status: full code    Disposition: likely 2-3 days pending clinical stability and resumption of anticoagulation for PE; will need TCU    Page Me (7 am to 6 pm)    Care plan discussed with patient, " "nursing and G.I.              Physical Exam:      Blood pressure 110/71, pulse 90, temperature 98.4  F (36.9  C), temperature source Oral, resp. rate 14, height 1.702 m (5' 7\"), weight 83.9 kg (185 lb), SpO2 99 %.  Vitals:    11/29/22 1328   Weight: 83.9 kg (185 lb)     Vital Signs with Ranges  Temp:  [97.7  F (36.5  C)-98.4  F (36.9  C)] 98.4  F (36.9  C)  Pulse:  [65-94] 90  Resp:  [10-28] 14  BP: ()/(43-96) 110/71  SpO2:  [93 %-100 %] 99 %  I/O's Last 24 hours  I/O last 3 completed shifts:  In: 1012.5 [I.V.:432.5]  Out: 3550 [Urine:3550]    Constitutional: Alert, awake and oriented ; lying comfortably in bed in no apparent distress   HEENT: Pupils equal and reactive to light and accomodation, EOMI intact; neck supple no raised JVD or rigidity    Oral cavity: Moist mucosa   Cardiovascular: Normal s1 s2, regular rate and rhythm, no murmur   Lungs: B/l clear to auscultation, no wheezes or crepitations   Abdomen: Soft, nt, nd, no guarding, rigidity or rebound; BS +   LE : Mild b/l edema    Musculoskeletal: Power 5/5 in all extremities   Neuro: No focal neurological deficits noted, CN II to XII grossly intact   Psychiatry: normal mood and affect  Skin: No obvious skin rashes or ulcers                  Medications:          amLODIPine  5 mg Oral Daily     busPIRone  15 mg Oral BID     escitalopram  20 mg Oral Daily     lamoTRIgine  200 mg Oral At Bedtime     pantoprazole  40 mg Intravenous BID     sodium chloride (PF)  3 mL Intracatheter Q8H     tamsulosin  0.4 mg Oral QPM     traZODone  100 mg Oral At Bedtime     PRN Meds: flumazenil, hydrALAZINE, lidocaine 4%, lidocaine (buffered or not buffered), melatonin, naloxone, naloxone, naloxone, naloxone, nitroGLYcerin, ondansetron **OR** ondansetron, polyethylene glycol-propylene glycol PF, sodium chloride (PF)         Data:      All new lab and imaging data was reviewed.   Recent Labs   Lab Test 12/01/22  0549 12/01/22  0030 11/30/22  1423 11/30/22  0754 " 11/30/22  0539 11/30/22  0023 11/29/22  1352 11/29/22  1351 11/01/22  1110 09/03/22  2305 06/15/21  1131   WBC  --   --   --   --  7.5  --   --  5.6 8.7   < > 6.6   HGB 9.4* 7.3* 8.3*   < > 6.4*   < >  --  3.8* 10.1*   < > 14.2   MCV  --   --   --   --  83  --   --  80 78   < > 93   PLT  --   --   --   --  557*  --   --  762* 572*   < > 327   INR  --   --   --   --   --   --  1.02  --   --   --  0.87    < > = values in this interval not displayed.      Recent Labs   Lab Test 11/30/22  0539 11/29/22  1351 11/01/22  1110    130* 126*   POTASSIUM 3.5 3.6 4.7   CHLORIDE 101 95 93*   CO2 27 25 22   BUN 6* 5* 8   CR 0.65* 0.67 0.59*   ANIONGAP 5 10 11   NIMISHA 8.0* 8.5 8.9   * 94 105*     Recent Labs   Lab Test 03/24/21  1158   TROPI <0.015

## 2022-12-01 NOTE — PROGRESS NOTES
12/01/22 1531   Appointment Info   Signing Clinician's Name / Credentials (OT) Candace Pompa OTR/L   Living Environment   People in Home alone   Current Living Arrangements apartment   Self-Care   Usual Activity Tolerance moderate   Current Activity Tolerance fair   Equipment Currently Used at Home shower chair;raised toilet seat;walker, rolling   Fall history within last six months yes   Number of times patient has fallen within last six months 6   Activity/Exercise/Self-Care Comment Pt reports being IND at baseline with all ADLs but reports he is not able to take care of himself. Pt reports ambulating w/ a FWW at baseline but can only ambulate ~15'. Pt reports he currently is receiving home therapies and nursing 1x/week but reports he has one appointment with them before they are discontinued. Pt does not drive.   Instrumental Activities of Daily Living (IADL)   IADL Comments Reports baseline significant difficulty doing cooking, cleaning, laundry   General Information   Onset of Illness/Injury or Date of Surgery 11/29/22   Referring Physician Saturnino Ponce MD   Patient/Family Therapy Goal Statement (OT) Improve independence   Additional Occupational Profile Info/Pertinent History of Current Problem Per Chart: Suresh Powers is a 62 year old male with PMH significant for hypertension, h/o urinary retention, anxiety/depression/OCD, single kidney (donated to sister 1990), mild chronic hyponatremia who presented to ED with melena, generalized weakness, noted with pulmonary embolism and likely upper G.I. bleed, admitted 11/29/22.   Existing Precautions/Restrictions fall   Cognitive Status Examination   Affect/Mental Status (Cognitive) WFL   Follows Commands follows two-step commands   Cognitive Status Comments Continue to monitor   Visual Perception   Visual Impairment/Limitations corrective lenses for reading;corrective lenses full-time   Impact of Vision Impairment on Function (Vision) pt denies  vision impairment   Sensory   Sensory Comments Pt denies sensory impairment   Pain Assessment   Patient Currently in Pain Yes, see Vital Sign flowsheet   Transfers   Transfers toilet transfer   Toilet Transfer   [place] Level (Toilet Transfer) minimum assist (75% patient effort)   Activities of Daily Living   BADL Assessment/Intervention toileting;lower body dressing;grooming   Lower Body Dressing Assessment/Training   [place] Level (Lower Body Dressing) maximum assist (25% patient effort)   Grooming Assessment/Training   [place] Level (Grooming) minimum assist (75% patient effort)   Comment, (Grooming) unable to complete standing   Toileting   [place] Level (Toileting) moderate assist (50% patient effort)   Clinical Impression   Criteria for Skilled Therapeutic Interventions Met (OT) Yes, treatment indicated   OT Diagnosis Decline function   OT Problem List-Impairments impacting ADL problems related to;activity tolerance impaired;balance;mobility;pain;strength  (dizziness)   Assessment of Occupational Performance 5 or more Performance Deficits   Identified Performance Deficits grooming, LB dressing, toileting, bathing, functional mobility, IADLs   Planned Therapy Interventions (OT) ADL retraining;IADL retraining;transfer training;home program guidelines;progressive activity/exercise   Clinical Decision Making Complexity (OT) low complexity   Anticipated Equipment Needs Upon Discharge (OT) commode chair;shower chair;reacher   Risk & Benefits of therapy have been explained evaluation/treatment results reviewed;care plan/treatment goals reviewed;risks/benefits reviewed;current/potential barriers reviewed;participants voiced agreement with care plan;participants included;patient   OT Total Evaluation Time   OT Eval, Low Complexity Minutes (41578) 9   OT Goals   Therapy Frequency (OT) 3 times/wk   OT Predicted Duration/Target Date for Goal Attainment 12/11/22   OT Goals Hygiene/Grooming;Lower Body  Dressing;Toilet Transfer/Toileting;OT Goal 1   OT: Hygiene/Grooming modified independent;while standing   OT: Lower Body Dressing Supervision/stand-by assist;including set-up/clothing retrieval   OT: Toilet Transfer/Toileting Supervision/stand-by assist;toilet transfer;cleaning and garment management;using adaptive equipment   OT: Goal 1 Pt will be independent in BUE HEP to progress his activity tolerance for future IADL performance   Interventions   Interventions Quick Adds Therapeutic Activity;Therapeutic Procedures/Exercise   Self-Care/Home Management   Self-Care/Home Mgmt/ADL, Compensatory, Meal Prep Minutes (55304) 12   Symptoms Noted During/After Treatment (Meal Preparation/Planning Training) fatigue;dizziness   Treatment Detail/Skilled Intervention Upon arrival pt very agreeable to therapy. Sit to stand from recliner with CGA and one cue for technique. Upon standing pt reported need to have BM. Pt ambulated 8 feet to over the toilet commode with Les and cues for posture. Pt dizzy. Stand to sit with CGA. Pt required CGA for standing ashu hygiene and one cue to not reach outside center of gravity. Pt too dizzy to stand at sink. Pt wheeled to sink on commode chair to wash hands with SBA.   Therapeutic Procedures/Exercise   Therapeutic Procedure: strength, endurance, ROM, flexibillity minutes (49479) 10   Symptoms Noted During/After Treatment fatigue   Treatment Detail/Skilled Intervention Pt ambulated 4 feet to recliner with FWW with CGA and one cue. Unable to ambulate farther due to dizziness. Pt engaged in 15 reps of 5 seated BUE AROM exercises to progress endurance for future self-cares. Pt left in recliner with alarm on.   OT Discharge Planning   OT Plan Monitor for dizziness. LB dressing. Toileting. Endurance ADLs in standing as able   OT Discharge Recommendation (DC Rec) Transitional Care Facility;home with assist;home with home care occupational therapy   OT Rationale for DC Rec Pt functioning below  baseline and will benefit from continued skilled OT while IP and in TCU to maximize safety and independence. Pt primarily limited in independence by dizziness, deconditioning. Pt lives alone and cannot have recommended level of assist. If pt declines TCU would need Ax1 for self-cares and short distance mobility, wheelchair, commode, home OT/PT/RN   OT Brief overview of current status Dizzy. Ax1 with self-cares   Total Session Time   Timed Code Treatment Minutes 22   Total Session Time (sum of timed and untimed services) 31

## 2022-12-01 NOTE — PROGRESS NOTES
"Paged for decreased HGB:    Hemoglobin   Date Value Ref Range Status   12/01/2022 7.3 (L) 13.3 - 17.7 g/dL Final   11/30/2022 8.3 (L) 13.3 - 17.7 g/dL Final   06/15/2021 14.2 13.3 - 17.7 g/dL Final   03/24/2021 13.7 13.3 - 17.7 g/dL Final     Blood pressure 90/60, pulse 81, temperature 97.7  F (36.5  C), resp. rate 17, height 1.702 m (5' 7\"), weight 83.9 kg (185 lb), SpO2 97 %.    Per nursing staff he is asymptomatic currently when at rest in the bed.    EGD results reviewed.    One unit packed red cell transfusion ordered as we continue to monitor him very closely overnight.  "

## 2022-12-02 LAB — HGB BLD-MCNC: 8.4 G/DL (ref 13.3–17.7)

## 2022-12-02 PROCEDURE — 120N000001 HC R&B MED SURG/OB

## 2022-12-02 PROCEDURE — 99232 SBSQ HOSP IP/OBS MODERATE 35: CPT | Performed by: HOSPITALIST

## 2022-12-02 PROCEDURE — 250N000013 HC RX MED GY IP 250 OP 250 PS 637

## 2022-12-02 PROCEDURE — 120N000013 HC R&B IMCU

## 2022-12-02 PROCEDURE — C9113 INJ PANTOPRAZOLE SODIUM, VIA: HCPCS | Performed by: HOSPITALIST

## 2022-12-02 PROCEDURE — 85018 HEMOGLOBIN: CPT | Performed by: HOSPITALIST

## 2022-12-02 PROCEDURE — 36415 COLL VENOUS BLD VENIPUNCTURE: CPT | Performed by: HOSPITALIST

## 2022-12-02 PROCEDURE — 250N000013 HC RX MED GY IP 250 OP 250 PS 637: Performed by: HOSPITALIST

## 2022-12-02 PROCEDURE — 250N000011 HC RX IP 250 OP 636: Performed by: HOSPITALIST

## 2022-12-02 RX ORDER — NALOXONE HYDROCHLORIDE 0.4 MG/ML
0.4 INJECTION, SOLUTION INTRAMUSCULAR; INTRAVENOUS; SUBCUTANEOUS
Status: CANCELLED | OUTPATIENT
Start: 2022-12-02

## 2022-12-02 RX ORDER — NALOXONE HYDROCHLORIDE 0.4 MG/ML
0.2 INJECTION, SOLUTION INTRAMUSCULAR; INTRAVENOUS; SUBCUTANEOUS
Status: CANCELLED | OUTPATIENT
Start: 2022-12-02

## 2022-12-02 RX ORDER — HEPARIN SODIUM 200 [USP'U]/100ML
1 INJECTION, SOLUTION INTRAVENOUS CONTINUOUS PRN
Status: DISCONTINUED | OUTPATIENT
Start: 2022-12-02 | End: 2022-12-02

## 2022-12-02 RX ORDER — FENTANYL CITRATE 50 UG/ML
25-50 INJECTION, SOLUTION INTRAMUSCULAR; INTRAVENOUS EVERY 5 MIN PRN
Status: CANCELLED | OUTPATIENT
Start: 2022-12-02

## 2022-12-02 RX ORDER — LIDOCAINE 40 MG/G
CREAM TOPICAL
Status: CANCELLED | OUTPATIENT
Start: 2022-12-02

## 2022-12-02 RX ORDER — FLUMAZENIL 0.1 MG/ML
0.2 INJECTION, SOLUTION INTRAVENOUS
Status: CANCELLED | OUTPATIENT
Start: 2022-12-02

## 2022-12-02 RX ORDER — PANTOPRAZOLE SODIUM 40 MG/1
40 TABLET, DELAYED RELEASE ORAL
Status: DISCONTINUED | OUTPATIENT
Start: 2022-12-02 | End: 2022-12-08 | Stop reason: HOSPADM

## 2022-12-02 RX ORDER — SODIUM CHLORIDE 9 MG/ML
INJECTION, SOLUTION INTRAVENOUS CONTINUOUS
Status: CANCELLED | OUTPATIENT
Start: 2022-12-02

## 2022-12-02 RX ADMIN — TRAZODONE HYDROCHLORIDE 100 MG: 100 TABLET ORAL at 21:37

## 2022-12-02 RX ADMIN — ESCITALOPRAM OXALATE 20 MG: 10 TABLET ORAL at 08:08

## 2022-12-02 RX ADMIN — BUSPIRONE HYDROCHLORIDE 15 MG: 15 TABLET ORAL at 21:37

## 2022-12-02 RX ADMIN — POLYETHYLENE GLYCOL 400 AND PROPYLENE GLYCOL 2 DROP: 4; 3 SOLUTION/ DROPS OPHTHALMIC at 02:21

## 2022-12-02 RX ADMIN — POLYETHYLENE GLYCOL 400 AND PROPYLENE GLYCOL 2 DROP: 4; 3 SOLUTION/ DROPS OPHTHALMIC at 21:38

## 2022-12-02 RX ADMIN — PANTOPRAZOLE SODIUM 40 MG: 40 INJECTION, POWDER, FOR SOLUTION INTRAVENOUS at 08:08

## 2022-12-02 RX ADMIN — TAMSULOSIN HYDROCHLORIDE 0.4 MG: 0.4 CAPSULE ORAL at 21:38

## 2022-12-02 RX ADMIN — PANTOPRAZOLE SODIUM 40 MG: 40 TABLET, DELAYED RELEASE ORAL at 16:06

## 2022-12-02 RX ADMIN — BUSPIRONE HYDROCHLORIDE 15 MG: 15 TABLET ORAL at 08:08

## 2022-12-02 RX ADMIN — LAMOTRIGINE 200 MG: 100 TABLET ORAL at 21:38

## 2022-12-02 ASSESSMENT — ACTIVITIES OF DAILY LIVING (ADL)
ADLS_ACUITY_SCORE: 43

## 2022-12-02 NOTE — CONSULTS
"  Interventional Radiology - Progress Note  Inpatient - [facility] - Interventional radiology service  12/2/2022    CC:     62 year old male with small peripheral emboli who is clinically stable.  Came in with duodenal ulcer bleeding with endoscopy showing the bleeding had stopped.  Currently not able to be ant-coagulated for the PE given his recent bleeding.  Bilateral lower extremity venous duplex shows no thrombus or any clot in the legs.      O:  /79 (BP Location: Right arm)   Pulse 80   Temp 97.8  F (36.6  C) (Oral)   Resp 16   Ht 1.702 m (5' 7\")   Wt 83.9 kg (185 lb)   SpO2 95%   BMI 28.98 kg/m      LABS:  Lab Results   Component Value Date    WBC 7.5 11/30/2022    HGB 8.4 (L) 12/02/2022    HCT 19.5 (L) 11/30/2022    MCV 83 11/30/2022     (H) 11/30/2022     No results found for: CREATININE      A/P:  62 year old male with small peripheral PE who came in with UGI bleed and inability to anticoagulate at this time. There is no evidence of DVT on venous duplex.  IR was consulted for IVC filter placement.  However patient does not need an IVC filter given he has absolutely no clot in his leg veins.  So the filter is not indicated at this time.    Total time spent on the date of the encounter is 30 minutes, including time spent counseling the patient, performing a medically appropriate evaluation, reviewing prior medical history, ordering medications and tests, documenting clinical information in the medical record, and communication of results.    Stevan Parker MD  Interventional Radiology  [phone]      "

## 2022-12-02 NOTE — PLAN OF CARE
Goal Outcome Evaluation:                 Outcome Evaluation: No blood needed this shift    A&O x 4. VSS on RA. Tele: NSR. Assistx1 GB/ walker, home walker in the room. Regular diet, tolerating well, NPO at midnight for procedure. L forearm PIV, SL. CO generalized weakness and light headedness when standing, recent hx of L hip fx which is painful when moving or weight bearing, otherwise denies pain. Hgb stabilized at 8.4. Prieto in for retention, good UOP. No BM this shift. IVC placement scheduled for day shift Continue plan of care.

## 2022-12-02 NOTE — CONSULTS
Patient has Medicare Advantage through [facility].    Eliquis/Xarelto  --Upon receipt of RX, Discharge Pharmacy can provide 1 mo free.  --Subsequent fills will be $47/mo ($94 for 3 months).  This price will continue into 2023 under this plan.  --When total 2023 drug costs exceed $4,660, price will increase to a 25% coinsurance, equivalent to $141/mo.    Aimee Lawrence  Pharmacy Technician/Liaison, Discharge Pharmacy   [phone] (voice or text)  [email or web address]

## 2022-12-02 NOTE — PLAN OF CARE
Orientation: A&Ox4   Vitals/Pain: VSS on RA sating >90%. Pt denies pain throughout shift   Lines/Drains: L PIV saline locked   Skin/Wounds: L side of buttock red open wound, no drainage   LS: clear   GI/: BS +, x2 brown BM this shift. Prieto in place with adequate output   Labs: Abnormal/Trends, Electrolyte Replacement- HgB 8.4   Ambulation/Assist: x1 GB and walker   Plan: Start heparin tomorrow per MD    Goal Outcome Evaluation:  Plan of Care Reviewed With: patient  Overall Patient Progress: no change  Outcome Evaluation: No IVC filter placed today. Will start on Heparin tomorrow. Pt continues to tolerate RA sating >90%. Pt does continue to have slight dizziness with ambulation

## 2022-12-02 NOTE — PROGRESS NOTES
[facility]  Hospitalist Progress Note        Saturnino Ponce MD   12/02/2022        Interval History:        - No further episodes of melena, having brown stool; BP improved and stable  - Vascular surgery considered IVC filter placement by IR but IR suggested no indication at this time as he has no DVT         Assessment and Plan:        Suresh Powers is a 62 year old male with PMH significant for hypertension, h/o urinary retention, anxiety/depression/OCD, single kidney (donated to sister 1990), mild chronic hyponatremia who presented to ED with melena, generalized weakness, noted with pulmonary embolism and likely upper G.I. bleed, admitted 11/29/22.     Upper G.I. bleed due to duodenal ulcer  Acute blood loss anemia on chronic anemia secondary to above  - his recent baseline hemoglobin is around 8 to 9; last hemoglobin from 11/1/22 was 10.1  - on admission hemoglobin 3.8, MCV 80  - has got 5 units PRBC so far; Hb 3.8-----> stabilizing around 8-9 (last 8.4 on 12/2)  - melanotic stools improved; hemodynamically stable and no suggestion of active bleed  - GI following ; EGD 11/30 was noted with large non-bleeding duodenal ulcer  - monitor Hb daily and transfuse PRN for Hb<7  - continue Protonix IV 40 mg BID     Right lower lobe pulmonary embolism  - CT chest noted with segmental and subsegmental PE in RLL without right heart strain  - currently hemodynamically stable and on room air  - was not started on anticoagulation due to concern for acute G.I. bleed  - US LE 11/29/22 noted with no DVT in b/l LE    - Evaluated by vascular surgery/IR and suggest no need for IVC filter at this time as no DVT noted; hypercoagulopathy workup mostly unremarkable with normal beta-2 glycoprotein, cardiolipin; factor 2 and five mutation analysis pending  - d/w DR Alaniz, given severe anemia with large duodenal ulcer-- GI suggested holding off on initiating anticoagulation for at least 48 hours; will plan to start  "heparin drip without bolus on 12/3/22 and transition to DOAC if Hb stable and no recurrence of bleed  - will consult pharmacy liaison for DOAC coverage     Hypertension  Hypotension-- resolved  - was hypotensive 12/1, responded to IVF; BP now improved and stable  - continue to hold off on PTA Amlodipine (12/1)  - hydralazine IV PRN for SBP>180     H/o urinary retention  - continue PTA Flomax  - vasquez was placed in ED for retention, failed voiding trial 11/30, replaced vasquez, can try voiding trial in 3-4 days     Anxiety  Depression  OCD  - will continue PTA BuSpar, Lexapro, trazodone, Lamictal      Mild chronic hyponatremia  - Na 130---133; has been noted in 120s--130s in past  -IV fluids discontinued; monitor BMP intermittently     Deconditioning  generalized weakness  H/o left hip surgery 9/2022  - generalized weakness, presyncope is likely secondary to acute blood loss anemia  - PT rec TCU; SW for disposition planning    Diet: Combination Diet Regular Diet      Clinically Significant Risk Factors              # Hypoalbuminemia: Lowest albumin = 2.6 g/dL (Ref range: 3.5-5.2) at 11/29/2022  1:51 PM, will monitor as appropriate           # Overweight: Estimated body mass index is 28.98 kg/m  as calculated from the following:    Height as of this encounter: 1.702 m (5' 7\").    Weight as of this encounter: 83.9 kg (185 lb)., PRESENT ON ADMISSION          COVID status: asymptomatic COVID screening on admission 11/29 negative     # DVT prophylaxis: mechanical with PCD boots; plan to start anticoagulation for PE as noted above     # Code status: full code    Disposition: likely 2-3 days pending clinical stability and resumption of anticoagulation for PE; will need TCU    Page Me (7 am to 6 pm)    Care plan discussed with patient and nursing              Physical Exam:      Blood pressure 128/79, pulse 80, temperature 97.8  F (36.6  C), temperature source Oral, resp. rate 16, height 1.702 m (5' 7\"), weight 83.9 kg (185 " lb), SpO2 95 %.  Vitals:    11/29/22 1328   Weight: 83.9 kg (185 lb)     Vital Signs with Ranges  Temp:  [97.6  F (36.4  C)-98.4  F (36.9  C)] 97.8  F (36.6  C)  Pulse:  [75-86] 80  Resp:  [14-16] 16  BP: (104-128)/(60-79) 128/79  SpO2:  [94 %-97 %] 95 %  I/O's Last 24 hours  I/O last 3 completed shifts:  In: 550 [P.O.:550]  Out: 1300 [Urine:1300]    Constitutional: Alert, awake and oriented ; lying comfortably in bed in no apparent distress   HEENT: Pupils equal and reactive to light and accomodation, EOMI intact; neck supple no raised JVD or rigidity    Oral cavity: Moist mucosa   Cardiovascular: Normal s1 s2, regular rate and rhythm, no murmur   Lungs: B/l clear to auscultation, no wheezes or crepitations   Abdomen: Soft, nt, nd, no guarding, rigidity or rebound; BS +   LE : Trace b/l edema    Musculoskeletal: Power 5/5 in all extremities   Neuro: No focal neurological deficits noted, CN II to XII grossly intact   Psychiatry: normal mood and affect  Skin: No obvious skin rashes or ulcers                  Medications:          [Held by provider] amLODIPine  5 mg Oral Daily     busPIRone  15 mg Oral BID     escitalopram  20 mg Oral Daily     lamoTRIgine  200 mg Oral At Bedtime     pantoprazole  40 mg Intravenous BID     sodium chloride (PF)  3 mL Intracatheter Q8H     tamsulosin  0.4 mg Oral QPM     traZODone  100 mg Oral At Bedtime     PRN Meds: hydrALAZINE, lidocaine 4%, lidocaine (buffered or not buffered), melatonin, naloxone, naloxone, naloxone, naloxone, nitroGLYcerin, ondansetron **OR** ondansetron, polyethylene glycol-propylene glycol PF, sodium chloride (PF)         Data:      All new lab and imaging data was reviewed.   Recent Labs   Lab Test 12/02/22  0603 12/01/22  2256 12/01/22  1423 11/30/22  1423 11/30/22  1221 11/30/22  0754 11/30/22  0539 11/30/22  0023 11/29/22  1352 11/29/22  1351 11/01/22  1110 09/03/22  2305 06/15/21  1131   WBC  --   --   --   --   --   --  7.5  --   --  5.6 8.7   < > 6.6    HGB 8.4* 8.4* 9.1*   < >  --    < > 6.4*   < >  --  3.8* 10.1*   < > 14.2   MCV  --   --   --   --   --   --  83  --   --  80 78   < > 93   PLT  --   --   --   --   --   --  557*  --   --  762* 572*   < > 327   INR  --   --   --   --  1.04  --   --   --  1.02  --   --   --  0.87    < > = values in this interval not displayed.      Recent Labs   Lab Test 11/30/22  0539 11/29/22  1351 11/01/22  1110    130* 126*   POTASSIUM 3.5 3.6 4.7   CHLORIDE 101 95 93*   CO2 27 25 22   BUN 6* 5* 8   CR 0.65* 0.67 0.59*   ANIONGAP 5 10 11   NIMISHA 8.0* 8.5 8.9   * 94 105*     Recent Labs   Lab Test 03/24/21  1158   TROPI <0.015

## 2022-12-02 NOTE — PROVIDER NOTIFICATION
Notified provider about indwelling vasquez catheter discussed removal or continued need.    Did provider choose to remove indwelling vasquez catheter? No    Provider's vasquez indication for keeping indwelling vasquez catheter: Retention.    Is there an order for indwelling vasquez catheter? Yes -- will re attempt void trail in 2-3 days per MD

## 2022-12-02 NOTE — CONSULTS
Care Management Initial Consult    General Information  Assessment completed with: Patient,    Type of CM/SW Visit: Initial Assessment    Primary Care Provider verified and updated as needed:     Readmission within the last 30 days:        Reason for Consult: discharge planning, facility placement  Advance Care Planning:            Communication Assessment  Patient's communication style: spoken language (English or Bilingual)    Hearing Difficulty or Deaf: no   Wear Glasses or Blind: yes    Cognitive  Cognitive/Neuro/Behavioral: WDL                      Living Environment:   People in home: alone     Current living Arrangements: apartment      Able to return to prior arrangements: no       Family/Social Support:  Care provided by: self  Provides care for: no one  Marital Status: Single  Children, Sibling(s)          Description of Support System: Involved    Support Assessment: Adequate family and caregiver support    Current Resources:   Patient receiving home care services: No     Community Resources: None  Equipment currently used at home: shower chair, raised toilet seat, walker, rolling  Supplies currently used at home:      Employment/Financial:  Employment Status: disabled        Financial Concerns: No concerns identified   Referral to Financial Worker: No       Lifestyle & Psychosocial Needs:  Social Determinants of Health     Tobacco Use: Medium Risk     Smoking Tobacco Use: Former     Smokeless Tobacco Use: Former     Passive Exposure: Not on file   Alcohol Use: Not on file   Financial Resource Strain: Not on file   Food Insecurity: Not on file   Transportation Needs: Not on file   Physical Activity: Not on file   Stress: Not on file   Social Connections: Not on file   Intimate Partner Violence: Not on file   Depression: Not at risk     PHQ-2 Score: 2   Housing Stability: Not on file       Functional Status:  Prior to admission patient needed assistance:              Mental Health Status:  Mental Health  Status: No Current Concerns       Chemical Dependency Status:  Chemical Dependency Status: No Current Concerns             Values/Beliefs:  Spiritual, Cultural Beliefs, Moravian Practices, Values that affect care: no  Description of Beliefs that Will Affect Care: Synagogue            Additional Information:  SW reviewed chart. PT/OT recommend TCU. Pt is in agreement. He lives alone in an apartment. Reviewed referral sent to [facility] when he was in the ED. Pt does not recall this. Provided him with a TCU list. He would like to go to [place] or [place]. Also agreed to a referral to [facility]. He does to want [facility]. Pt will require a prior-auth from [facility] prior to discharge. Referrals sent. SW following.     OSMEL Monae, LGSW  [phone] Desk phone  [phone] Cell/text (Preferred)  [facility]

## 2022-12-02 NOTE — PROGRESS NOTES
Notified provider about indwelling vasquez catheter discussed removal or continued need.  Did provider choose to remove indwelling vasquez catheter? No  Provider's vasquez indication for keeping indwelling vasquez catheter: Retention.  Is there an order for indwelling vasquez catheter? Yes - goal to reattempt in 3-4 days

## 2022-12-02 NOTE — PROGRESS NOTES
Reason for admission: 1. Upper G.I. bleed due to duodenal ulcer 2. Right lower lobe pulmonary embolism  Mental Status:x4  Activity: Ax1 GB walker  Diet: Regular  Pain: Controlled  Urination: FC to drain bag. Plan another voiding trial in 3-4 days per provider  Tele/Restraints/Iso: NA  LDA: PIV SL, FC in place  Expected D/C Date: Pending clinical improvement. Likely will need TCU per PT recommendation.  Other Info: EGD 11/30 was noted with large non-bleeding duodenal ulcer. Hgb this AM 8.4. Received total 5 PRBC since admitted. On room air, lungs clear, plan tomorrow to start hep gtt without bolus. No DVT found on BLE US. Per vascular surgery/IR, no need for IVC filter at this time as no DVT noted. PMH significant for hypertension, h/o urinary retention, anxiety/depression/OCD, single kidney (donated to sister 1990), mild chronic hyponatremia.

## 2022-12-02 NOTE — PROGRESS NOTES
"Vascular Surgery Progress Note    Feels well. Normal BM yesterday, no melena. No additional transfusions.    /86 (BP Location: Right arm)   Pulse 90   Temp 98.4  F (36.9  C) (Oral)   Resp 16   Ht 1.702 m (5' 7\")   Wt 83.9 kg (185 lb)   SpO2 94%   BMI 28.98 kg/m      Resting in bed, nonlabored breathing on room air  Abd soft, nontender  Bilateral calves soft, nontender, feet warm, mild edema    HGb 8.4, stable    A/P: A/P: PE and GI bleed, stable. Duodenal ulcer without bleeding on EGD yesterday. Dr. Alaniz prefers to hold off on anticoagulation until at least 12/4/22.   IR declined to place IVC filter, no DVT on U/S.   Planning to start heparin tomorrow.  Will need follow-up with vascular medicine as outpatient for hypercoagulable work up.    Vascular surgery will sign off. Please call with questions.    Patient seen and discussed with staff, Dr. White.    Ramsey Hernandez MD    "

## 2022-12-03 LAB
ANION GAP SERPL CALCULATED.3IONS-SCNC: 6 MMOL/L (ref 3–14)
APTT PPP: 34 SECONDS (ref 22–38)
APTT PPP: 62 SECONDS (ref 22–38)
BASOPHILS # BLD AUTO: 0.1 10E3/UL (ref 0–0.2)
BASOPHILS NFR BLD AUTO: 1 %
BUN SERPL-MCNC: 21 MG/DL (ref 7–30)
CALCIUM SERPL-MCNC: 7.9 MG/DL (ref 8.5–10.1)
CHLORIDE BLD-SCNC: 108 MMOL/L (ref 94–109)
CO2 SERPL-SCNC: 26 MMOL/L (ref 20–32)
CREAT SERPL-MCNC: 0.68 MG/DL (ref 0.66–1.25)
EOSINOPHIL # BLD AUTO: 0.3 10E3/UL (ref 0–0.7)
EOSINOPHIL NFR BLD AUTO: 4 %
ERYTHROCYTE [DISTWIDTH] IN BLOOD BY AUTOMATED COUNT: 18 % (ref 10–15)
ERYTHROCYTE [DISTWIDTH] IN BLOOD BY AUTOMATED COUNT: 18.2 % (ref 10–15)
GFR SERPL CREATININE-BSD FRML MDRD: >90 ML/MIN/1.73M2
GLUCOSE BLD-MCNC: 128 MG/DL (ref 70–99)
HCT VFR BLD AUTO: 24.9 % (ref 40–53)
HCT VFR BLD AUTO: 25.2 % (ref 40–53)
HGB BLD-MCNC: 7.7 G/DL (ref 13.3–17.7)
HGB BLD-MCNC: 7.9 G/DL (ref 13.3–17.7)
HGB BLD-MCNC: 8 G/DL (ref 13.3–17.7)
HGB BLD-MCNC: 8 G/DL (ref 13.3–17.7)
IMM GRANULOCYTES # BLD: 0 10E3/UL
IMM GRANULOCYTES NFR BLD: 0 %
LYMPHOCYTES # BLD AUTO: 1.5 10E3/UL (ref 0.8–5.3)
LYMPHOCYTES NFR BLD AUTO: 19 %
MCH RBC QN AUTO: 27.3 PG (ref 26.5–33)
MCH RBC QN AUTO: 28 PG (ref 26.5–33)
MCHC RBC AUTO-ENTMCNC: 31.7 G/DL (ref 31.5–36.5)
MCHC RBC AUTO-ENTMCNC: 32.1 G/DL (ref 31.5–36.5)
MCV RBC AUTO: 86 FL (ref 78–100)
MCV RBC AUTO: 87 FL (ref 78–100)
MONOCYTES # BLD AUTO: 1 10E3/UL (ref 0–1.3)
MONOCYTES NFR BLD AUTO: 12 %
NEUTROPHILS # BLD AUTO: 5.1 10E3/UL (ref 1.6–8.3)
NEUTROPHILS NFR BLD AUTO: 64 %
NRBC # BLD AUTO: 0 10E3/UL
NRBC BLD AUTO-RTO: 0 /100
PLATELET # BLD AUTO: 519 10E3/UL (ref 150–450)
PLATELET # BLD AUTO: 537 10E3/UL (ref 150–450)
POTASSIUM BLD-SCNC: 4.2 MMOL/L (ref 3.4–5.3)
RBC # BLD AUTO: 2.86 10E6/UL (ref 4.4–5.9)
RBC # BLD AUTO: 2.93 10E6/UL (ref 4.4–5.9)
SODIUM SERPL-SCNC: 140 MMOL/L (ref 133–144)
WBC # BLD AUTO: 8 10E3/UL (ref 4–11)
WBC # BLD AUTO: 8.4 10E3/UL (ref 4–11)

## 2022-12-03 PROCEDURE — 36415 COLL VENOUS BLD VENIPUNCTURE: CPT | Performed by: HOSPITALIST

## 2022-12-03 PROCEDURE — 85025 COMPLETE CBC W/AUTO DIFF WBC: CPT | Performed by: HOSPITALIST

## 2022-12-03 PROCEDURE — 250N000011 HC RX IP 250 OP 636: Performed by: HOSPITALIST

## 2022-12-03 PROCEDURE — 250N000013 HC RX MED GY IP 250 OP 250 PS 637: Performed by: HOSPITALIST

## 2022-12-03 PROCEDURE — 999N000040 HC STATISTIC CONSULT NO CHARGE VASC ACCESS

## 2022-12-03 PROCEDURE — 80048 BASIC METABOLIC PNL TOTAL CA: CPT | Performed by: HOSPITALIST

## 2022-12-03 PROCEDURE — 120N000001 HC R&B MED SURG/OB

## 2022-12-03 PROCEDURE — 120N000013 HC R&B IMCU

## 2022-12-03 PROCEDURE — 250N000013 HC RX MED GY IP 250 OP 250 PS 637

## 2022-12-03 PROCEDURE — 85014 HEMATOCRIT: CPT | Performed by: HOSPITALIST

## 2022-12-03 PROCEDURE — 85730 THROMBOPLASTIN TIME PARTIAL: CPT | Performed by: HOSPITALIST

## 2022-12-03 PROCEDURE — 85018 HEMOGLOBIN: CPT | Performed by: HOSPITALIST

## 2022-12-03 PROCEDURE — 99232 SBSQ HOSP IP/OBS MODERATE 35: CPT | Performed by: HOSPITALIST

## 2022-12-03 RX ORDER — HEPARIN SODIUM 10000 [USP'U]/100ML
0-5000 INJECTION, SOLUTION INTRAVENOUS CONTINUOUS
Status: DISCONTINUED | OUTPATIENT
Start: 2022-12-03 | End: 2022-12-06

## 2022-12-03 RX ADMIN — TRAZODONE HYDROCHLORIDE 100 MG: 100 TABLET ORAL at 22:00

## 2022-12-03 RX ADMIN — BUSPIRONE HYDROCHLORIDE 15 MG: 15 TABLET ORAL at 09:00

## 2022-12-03 RX ADMIN — ESCITALOPRAM OXALATE 20 MG: 10 TABLET ORAL at 09:00

## 2022-12-03 RX ADMIN — BUSPIRONE HYDROCHLORIDE 15 MG: 15 TABLET ORAL at 22:01

## 2022-12-03 RX ADMIN — PANTOPRAZOLE SODIUM 40 MG: 40 TABLET, DELAYED RELEASE ORAL at 06:33

## 2022-12-03 RX ADMIN — TAMSULOSIN HYDROCHLORIDE 0.4 MG: 0.4 CAPSULE ORAL at 19:33

## 2022-12-03 RX ADMIN — PANTOPRAZOLE SODIUM 40 MG: 40 TABLET, DELAYED RELEASE ORAL at 16:42

## 2022-12-03 RX ADMIN — LAMOTRIGINE 200 MG: 100 TABLET ORAL at 22:00

## 2022-12-03 RX ADMIN — HEPARIN SODIUM 1000 UNITS/HR: 10000 INJECTION, SOLUTION INTRAVENOUS at 09:01

## 2022-12-03 ASSESSMENT — ACTIVITIES OF DAILY LIVING (ADL)
ADLS_ACUITY_SCORE: 43
ADLS_ACUITY_SCORE: 37
ADLS_ACUITY_SCORE: 43
ADLS_ACUITY_SCORE: 43
ADLS_ACUITY_SCORE: 41
ADLS_ACUITY_SCORE: 43
ADLS_ACUITY_SCORE: 37
ADLS_ACUITY_SCORE: 43
ADLS_ACUITY_SCORE: 37
ADLS_ACUITY_SCORE: 43
ADLS_ACUITY_SCORE: 37
ADLS_ACUITY_SCORE: 37

## 2022-12-03 NOTE — PLAN OF CARE
Neuro: AO x4  Tele/cardiac: NA  Respiration: on RA  Activity: A1 GBW  Pain: denies  Drips: PIV SL  Drains/tubes: none  GI/: vasquez with good UOP, no BM this shift  Aggression color: green  Isolation: none

## 2022-12-03 NOTE — PROGRESS NOTES
Notified provider about indwelling vasquez catheter discussed removal or continued need.    Did provider choose to remove indwelling vasquez catheter? No    Provider's vasquez indication for keeping indwelling vasquez catheter: Retention.    Is there an order for indwelling vasquez catheter? Yes

## 2022-12-03 NOTE — PROGRESS NOTES
[facility]  Hospitalist Progress Note        Saturnino Ponce MD   12/03/2022        Interval History:        - Noted slight drift of hemoglobin but mostly around 8; remains hemodynamically stable with no suggestion of active bleed; having brown stool  -will start low intensity heparin 12/3; monitor hemoglobin Q8 hours; if remains stable on heparin, will plan to start Eliquis starting 12/4         Assessment and Plan:        Suresh Powers is a 62 year old male with PMH significant for hypertension, h/o urinary retention, anxiety/depression/OCD, single kidney (donated to sister 1990), mild chronic hyponatremia who presented to ED with melena, generalized weakness, noted with pulmonary embolism and likely upper G.I. bleed, admitted 11/29/22.     Upper G.I. bleed due to duodenal ulcer  Acute blood loss anemia on chronic anemia secondary to above  - his recent baseline hemoglobin is around 8 to 9; last hemoglobin from 11/1/22 was 10.1  - on admission hemoglobin 3.8, MCV 80  - has got 5 units PRBC so far; Hb 3.8-----> stabilizing around 8-9 (last 8.0 on 12/3)  - melanotic stools improved; hemodynamically stable and no suggestion of active bleed  - GI following ; EGD 11/30 was noted with large non-bleeding duodenal ulcer  - monitor Hb daily and transfuse PRN for Hb<7  - continue Protonix IV 40 mg BID     Right lower lobe pulmonary embolism  - CT chest noted with segmental and subsegmental PE in RLL without right heart strain  - currently hemodynamically stable and on room air  - was not started on anticoagulation due to concern for acute G.I. bleed  - US LE 11/29/22 noted with no DVT in b/l LE  - Evaluated by vascular surgery/IR and suggest no need for IVC filter at this time as no DVT noted; hypercoagulopathy workup mostly unremarkable with normal beta-2 glycoprotein, cardiolipin; factor 2 and five mutation analysis pending  - d/w DR Alaniz, given severe anemia with large duodenal ulcer-- GI  "suggested holding off on initiating anticoagulation for at least 48 hours    - Noted slight drift of hemoglobin but mostly around 8; remains hemodynamically stable with no suggestion of active bleed; having brown stool  - will start low intensity heparin 12/3; monitor hemoglobin Q8 hours; if remains stable on heparin, will plan to start Eliquis starting 12/4     Hypertension  Hypotension-- resolved  - was hypotensive 12/1, responded to IVF; BP now improved and stable  - continue to hold off on PTA Amlodipine (12/1)  - hydralazine IV PRN for SBP>180     H/o urinary retention  - continue PTA Flomax  - vasquez was placed in ED for retention, failed voiding trial 11/30, replaced vasquez, can try voiding trial in 3-4 days     Anxiety  Depression  OCD  - will continue PTA BuSpar, Lexapro, trazodone, Lamictal      Mild chronic hyponatremia  - Na 130---133--140; has been noted in 120s--130s in past  -IV fluids discontinued; monitor BMP intermittently     Deconditioning  generalized weakness  H/o left hip surgery 9/2022  - generalized weakness, presyncope is likely secondary to acute blood loss anemia  - PT rec TCU; SW for disposition planning    Diet: Combination Diet Regular Diet      Clinically Significant Risk Factors              # Hypoalbuminemia: Lowest albumin = 2.6 g/dL (Ref range: 3.5-5.2) at 11/29/2022  1:51 PM, will monitor as appropriate           # Overweight: Estimated body mass index is 28.98 kg/m  as calculated from the following:    Height as of this encounter: 1.702 m (5' 7\").    Weight as of this encounter: 83.9 kg (185 lb)., PRESENT ON ADMISSION          COVID status: asymptomatic COVID screening on admission 11/29 negative     # DVT prophylaxis:  plan for anticoagulation as above      # Code status: full code    Disposition: likely 2-3 days pending clinical stability with resumption of anticoagulation for PE; will need TCU  SW for disposition planning    Page Me (7 am to 6 pm)              Physical Exam:    " "  Blood pressure 115/74, pulse 89, temperature 98.2  F (36.8  C), temperature source Oral, resp. rate 16, height 1.702 m (5' 7\"), weight 83.9 kg (185 lb), SpO2 95 %.  Vitals:    11/29/22 1328   Weight: 83.9 kg (185 lb)     Vital Signs with Ranges  Temp:  [98.2  F (36.8  C)-99  F (37.2  C)] 98.2  F (36.8  C)  Pulse:  [83-90] 89  Resp:  [16-17] 16  BP: (100-124)/(61-86) 115/74  SpO2:  [94 %-95 %] 95 %  I/O's Last 24 hours  I/O last 3 completed shifts:  In: 820 [P.O.:820]  Out: 1200 [Urine:1200]    Constitutional: Alert, awake and oriented ; lying comfortably in bed in no apparent distress   HEENT: Pupils equal and reactive to light and accomodation, EOMI intact; neck supple no raised JVD or rigidity    Oral cavity: Moist mucosa   Cardiovascular: Normal s1 s2, regular rate and rhythm, no murmur   Lungs: B/l clear to auscultation, no wheezes or crepitations   Abdomen: Soft, nt, nd, no guarding, rigidity or rebound; BS +   LE : Trace b/l edema    Musculoskeletal: Power 5/5 in all extremities   Neuro: No focal neurological deficits noted, CN II to XII grossly intact   Psychiatry: normal mood and affect  Skin: No obvious skin rashes or ulcers                  Medications:          [Held by provider] amLODIPine  5 mg Oral Daily     busPIRone  15 mg Oral BID     escitalopram  20 mg Oral Daily     lamoTRIgine  200 mg Oral At Bedtime     pantoprazole  40 mg Oral BID AC     sodium chloride (PF)  3 mL Intracatheter Q8H     tamsulosin  0.4 mg Oral QPM     traZODone  100 mg Oral At Bedtime     PRN Meds: hydrALAZINE, lidocaine 4%, lidocaine (buffered or not buffered), melatonin, naloxone, naloxone, naloxone, naloxone, nitroGLYcerin, ondansetron **OR** ondansetron, polyethylene glycol-propylene glycol PF, sodium chloride (PF)         Data:      All new lab and imaging data was reviewed.   Recent Labs   Lab Test 12/03/22  0607 12/02/22  0603 12/01/22  2256 11/30/22  1423 11/30/22  1221 11/30/22  0754 11/30/22  0539 11/30/22  0023 " 11/29/22  1352 11/29/22  1351 09/03/22  2305 06/15/21  1131   WBC 8.0  --   --   --   --   --  7.5  --   --  5.6   < > 6.6   HGB 8.0* 8.4* 8.4*   < >  --    < > 6.4*   < >  --  3.8*   < > 14.2   MCV 87  --   --   --   --   --  83  --   --  80   < > 93   *  --   --   --   --   --  557*  --   --  762*   < > 327   INR  --   --   --   --  1.04  --   --   --  1.02  --   --  0.87    < > = values in this interval not displayed.      Recent Labs   Lab Test 12/03/22  0607 11/30/22  0539 11/29/22  1351    133 130*   POTASSIUM 4.2 3.5 3.6   CHLORIDE 108 101 95   CO2 26 27 25   BUN 21 6* 5*   CR 0.68 0.65* 0.67   ANIONGAP 6 5 10   NIMISHA 7.9* 8.0* 8.5   * 110* 94     Recent Labs   Lab Test 03/24/21  1158   TROPI <0.015

## 2022-12-04 LAB
APTT PPP: 73 SECONDS (ref 22–38)
HGB BLD-MCNC: 7.6 G/DL (ref 13.3–17.7)
HGB BLD-MCNC: 7.9 G/DL (ref 13.3–17.7)

## 2022-12-04 PROCEDURE — 120N000001 HC R&B MED SURG/OB

## 2022-12-04 PROCEDURE — 250N000011 HC RX IP 250 OP 636: Performed by: HOSPITALIST

## 2022-12-04 PROCEDURE — 120N000013 HC R&B IMCU

## 2022-12-04 PROCEDURE — 36415 COLL VENOUS BLD VENIPUNCTURE: CPT | Performed by: HOSPITALIST

## 2022-12-04 PROCEDURE — 250N000013 HC RX MED GY IP 250 OP 250 PS 637: Performed by: HOSPITALIST

## 2022-12-04 PROCEDURE — 85018 HEMOGLOBIN: CPT | Performed by: HOSPITALIST

## 2022-12-04 PROCEDURE — 85730 THROMBOPLASTIN TIME PARTIAL: CPT | Performed by: HOSPITALIST

## 2022-12-04 PROCEDURE — 250N000013 HC RX MED GY IP 250 OP 250 PS 637

## 2022-12-04 PROCEDURE — 99232 SBSQ HOSP IP/OBS MODERATE 35: CPT | Performed by: HOSPITALIST

## 2022-12-04 RX ORDER — CARBOXYMETHYLCELLULOSE SODIUM 5 MG/ML
2 SOLUTION/ DROPS OPHTHALMIC 4 TIMES DAILY PRN
Status: DISCONTINUED | OUTPATIENT
Start: 2022-12-04 | End: 2022-12-08 | Stop reason: HOSPADM

## 2022-12-04 RX ADMIN — Medication 2 DROP: at 22:24

## 2022-12-04 RX ADMIN — BUSPIRONE HYDROCHLORIDE 15 MG: 15 TABLET ORAL at 08:14

## 2022-12-04 RX ADMIN — TAMSULOSIN HYDROCHLORIDE 0.4 MG: 0.4 CAPSULE ORAL at 21:10

## 2022-12-04 RX ADMIN — ESCITALOPRAM OXALATE 20 MG: 10 TABLET ORAL at 08:14

## 2022-12-04 RX ADMIN — HEPARIN SODIUM 1300 UNITS/HR: 10000 INJECTION, SOLUTION INTRAVENOUS at 06:57

## 2022-12-04 RX ADMIN — PANTOPRAZOLE SODIUM 40 MG: 40 TABLET, DELAYED RELEASE ORAL at 06:57

## 2022-12-04 RX ADMIN — TRAZODONE HYDROCHLORIDE 100 MG: 100 TABLET ORAL at 21:10

## 2022-12-04 RX ADMIN — BUSPIRONE HYDROCHLORIDE 15 MG: 15 TABLET ORAL at 21:10

## 2022-12-04 RX ADMIN — PANTOPRAZOLE SODIUM 40 MG: 40 TABLET, DELAYED RELEASE ORAL at 15:57

## 2022-12-04 RX ADMIN — LAMOTRIGINE 200 MG: 100 TABLET ORAL at 21:10

## 2022-12-04 ASSESSMENT — ACTIVITIES OF DAILY LIVING (ADL)
ADLS_ACUITY_SCORE: 43

## 2022-12-04 NOTE — PLAN OF CARE
Goal Outcome Evaluation:    Pt. Alert and oriented x4. Vital signs stable on RA. Assist of 1 with GB and walker, did not get OOB this shift. Tolerating regular diet. Lung sounds clear. Bowel sounds +, + flatus, BM -. Prieto with adequate urine output. Dressing to coccyx CDI. Denies pain and nausea. Heparin gtt infusing at 1300 units/hr, next pTT check tomorrow AM. Q8H hgb checks. 7.7 @ 2200.

## 2022-12-04 NOTE — PROGRESS NOTES
[facility]  Hospitalist Progress Note        Satunrino Ponce MD   12/04/2022        Interval History:        - seems to be tolerating low intensity heparin (started 12/3); Hb had drifted down to 7.7 but then up to 7.9; hemoglobin pending from this morning; if further trending down will continue heparin drip for another 24 hours otherwise switch to Eliquis  - remains hemodynamically stable with no recurrence of melena         Assessment and Plan:        Suresh Powers is a 62 year old male with PMH significant for hypertension, h/o urinary retention, anxiety/depression/OCD, single kidney (donated to sister 1990), mild chronic hyponatremia who presented to ED with melena, generalized weakness, noted with pulmonary embolism and likely upper G.I. bleed, admitted 11/29/22.     Upper G.I. bleed due to duodenal ulcer  Severe acute blood loss anemia on chronic anemia secondary to above  - his recent baseline hemoglobin is around 8 to 9; last hemoglobin from 11/1/22 was 10.1  - on admission hemoglobin 3.8, MCV 80  - has got 5 units PRBC so far; Hb 3.8-----> stabilizing around 8-9 (last 7.9 on 12/3)  - melanotic stools improved; hemodynamically stable and no suggestion of active bleed  - GI following ; EGD 11/30 was noted with large non-bleeding duodenal ulcer  - monitor Hb and transfuse PRN for Hb<7  - continue Protonix IV 40 mg BID     Right lower lobe pulmonary embolism  - CT chest noted with segmental and subsegmental PE in RLL without right heart strain  - currently hemodynamically stable and on room air  - was not started on anticoagulation due to concern for acute G.I. bleed  - US LE 11/29/22 noted with no DVT in b/l LE  - Evaluated by vascular surgery/IR and suggest no need for IVC filter at this time as no DVT noted; hypercoagulopathy workup mostly unremarkable with normal beta-2 glycoprotein, cardiolipin; factor 2 and five mutation analysis pending  - d/w DR Alaniz, given severe anemia with  "large duodenal ulcer-- GI suggested holding off on initiating anticoagulation for at least 48 hours    - seems to be tolerating low intensity heparin (started 12/3); Hb had drifted down to 7.7 but then up to 7.9; hemoglobin pending from 12/4/22; if further trending down will continue heparin drip for another 24 hours otherwise switch to Eliquis  - remains hemodynamically stable with no recurrence of melena    Addendum:  - noted Hb slightly trended down to 7.6 although no active bleed  - will continue Heparin drip for 12/4; monitor Hb q 6 hrs  - plan for Eliquis when stable Hb     Hypertension  Hypotension-- resolved  - was hypotensive 12/1, responded to IVF; BP now improved and stable  - continue to hold off on PTA Amlodipine (12/1)  - hydralazine IV PRN for SBP>180     H/o urinary retention  - continue PTA Flomax  - vasquez was placed in ED for retention, failed voiding trial 11/30, replaced vasquez, can try voiding trial in 1-2 days     Anxiety  Depression  OCD  - will continue PTA BuSpar, Lexapro, trazodone, Lamictal      Mild chronic hyponatremia  - Na 130---133--140; has been noted in 120s--130s in past  -IV fluids discontinued; monitor BMP intermittently     Deconditioning  generalized weakness  H/o left hip surgery 9/2022  - generalized weakness, presyncope is likely secondary to acute blood loss anemia  - PT rec TCU; SW for disposition planning    Diet: Combination Diet Regular Diet      Clinically Significant Risk Factors              # Hypoalbuminemia: Lowest albumin = 2.6 g/dL (Ref range: 3.5-5.2) at 11/29/2022  1:51 PM, will monitor as appropriate           # Overweight: Estimated body mass index is 28.98 kg/m  as calculated from the following:    Height as of this encounter: 1.702 m (5' 7\").    Weight as of this encounter: 83.9 kg (185 lb).           COVID status: asymptomatic COVID screening on admission 11/29 negative     # DVT prophylaxis:  plan for anticoagulation as above      # Code status: full " "code    Disposition: likely 1-2 days pending clinical stability with resumption of anticoagulation for PE; will need TCU  SW for disposition planning    Page Me (7 am to 6 pm)              Physical Exam:      Blood pressure 125/75, pulse 104, temperature 98.7  F (37.1  C), temperature source Oral, resp. rate 20, height 1.702 m (5' 7\"), weight 83.9 kg (185 lb), SpO2 95 %.  Vitals:    11/29/22 1328   Weight: 83.9 kg (185 lb)     Vital Signs with Ranges  Temp:  [98.1  F (36.7  C)-98.7  F (37.1  C)] 98.7  F (37.1  C)  Pulse:  [] 104  Resp:  [16-21] 20  BP: (111-125)/(68-75) 125/75  SpO2:  [94 %-96 %] 95 %  I/O's Last 24 hours  I/O last 3 completed shifts:  In: 1220 [P.O.:1220]  Out: 2000 [Urine:2000]    Constitutional: Alert, awake and oriented ; lying comfortably in bed in no apparent distress   HEENT: Pupils equal and reactive to light and accomodation, EOMI intact; neck supple no raised JVD or rigidity    Oral cavity: Moist mucosa   Cardiovascular: Normal s1 s2, regular rate and rhythm, no murmur   Lungs: B/l clear to auscultation, no wheezes or crepitations   Abdomen: Soft, nt, nd, no guarding, rigidity or rebound; BS +   LE : Trace b/l edema    Musculoskeletal: Power 5/5 in all extremities   Neuro: No focal neurological deficits noted, CN II to XII grossly intact   Psychiatry: normal mood and affect  Skin: No obvious skin rashes or ulcers  Prieto present with clear urine in bag                  Medications:          [Held by provider] amLODIPine  5 mg Oral Daily     busPIRone  15 mg Oral BID     escitalopram  20 mg Oral Daily     lamoTRIgine  200 mg Oral At Bedtime     pantoprazole  40 mg Oral BID AC     sodium chloride (PF)  3 mL Intracatheter Q8H     tamsulosin  0.4 mg Oral QPM     traZODone  100 mg Oral At Bedtime     PRN Meds: hydrALAZINE, lidocaine 4%, lidocaine (buffered or not buffered), melatonin, naloxone, naloxone, naloxone, naloxone, nitroGLYcerin, ondansetron **OR** ondansetron, polyethylene " glycol-propylene glycol PF, sodium chloride (PF)         Data:      All new lab and imaging data was reviewed.   Recent Labs   Lab Test 12/03/22  2132 12/03/22  1413 12/03/22  0848 12/03/22  0607 11/30/22  1423 11/30/22  1221 11/30/22  0754 11/30/22  0539 11/30/22  0023 11/29/22  1352 09/03/22  2305 06/15/21  1131   WBC  --   --  8.4 8.0  --   --   --  7.5  --   --    < > 6.6   HGB 7.9* 7.7* 8.0* 8.0*   < >  --    < > 6.4*   < >  --    < > 14.2   MCV  --   --  86 87  --   --   --  83  --   --    < > 93   PLT  --   --  537* 519*  --   --   --  557*  --   --    < > 327   INR  --   --   --   --   --  1.04  --   --   --  1.02  --  0.87    < > = values in this interval not displayed.      Recent Labs   Lab Test 12/03/22  0607 11/30/22  0539 11/29/22  1351    133 130*   POTASSIUM 4.2 3.5 3.6   CHLORIDE 108 101 95   CO2 26 27 25   BUN 21 6* 5*   CR 0.68 0.65* 0.67   ANIONGAP 6 5 10   NIMISHA 7.9* 8.0* 8.5   * 110* 94     Recent Labs   Lab Test 03/24/21  1158   TROPI <0.015

## 2022-12-04 NOTE — PLAN OF CARE
[id number]    AO x4, VSS on RA. Denies pain, nausea, SOB. BM x 2 this shift, no blood. Prieto in place for retention. Non-weight bearing on L side d/t hip surgery in Sept. Assist x 1 + W +GB. Tolerating diet. Small wound on R inner buttock, barrier cream applied. HgB checks Q8H, 7.6 @ 1238, recheck tonight. Hep running at 1300u/hr, PTT protocol, 73 this AM. Recheck tomorrow AM. Continue plan of care.

## 2022-12-04 NOTE — PLAN OF CARE
Care plan note:      Recent Vitals:  Temp: 98.6  F (37  C) Temp src: Oral BP: 112/69 Pulse: 100   Resp: 21 SpO2: 96 % O2 Device: None (Room air)      Orientation/Neuro: Alert and Oriented x 4  Pain: Pain is controlled without any medications.   Tele: NA   IV medications: Heparin   Mobility: Assist of 1, Gait belt, and Walker   Skin: Wounds: Coccyx.   GI: WDL  : Prieto Catheter     Diet: Tolerating diet:   Well  Orders Placed This Encounter      Combination Diet Regular Diet      Safety/Concerns:  Fall Risk and Jayesh Risk  Aggression Color: Green    Plan: Heparin drip started, serial hemoglobin check, levels drifting down, currently at 7.7.    Continue to monitor.      Padmini Ramsey RN

## 2022-12-05 ENCOUNTER — APPOINTMENT (OUTPATIENT)
Dept: OCCUPATIONAL THERAPY | Facility: CLINIC | Age: 62
DRG: 377 | End: 2022-12-05
Payer: COMMERCIAL

## 2022-12-05 LAB
APTT PPP: 58 SECONDS (ref 22–38)
APTT PPP: 66 SECONDS (ref 22–38)
APTT PPP: 94 SECONDS (ref 22–38)
HGB BLD-MCNC: 7.3 G/DL (ref 13.3–17.7)
HGB BLD-MCNC: 7.7 G/DL (ref 13.3–17.7)

## 2022-12-05 PROCEDURE — 85018 HEMOGLOBIN: CPT | Performed by: HOSPITALIST

## 2022-12-05 PROCEDURE — 250N000011 HC RX IP 250 OP 636: Performed by: HOSPITALIST

## 2022-12-05 PROCEDURE — 250N000013 HC RX MED GY IP 250 OP 250 PS 637

## 2022-12-05 PROCEDURE — 120N000001 HC R&B MED SURG/OB

## 2022-12-05 PROCEDURE — 120N000013 HC R&B IMCU

## 2022-12-05 PROCEDURE — 97110 THERAPEUTIC EXERCISES: CPT | Mod: GO | Performed by: OCCUPATIONAL THERAPIST

## 2022-12-05 PROCEDURE — 99233 SBSQ HOSP IP/OBS HIGH 50: CPT | Performed by: INTERNAL MEDICINE

## 2022-12-05 PROCEDURE — 36415 COLL VENOUS BLD VENIPUNCTURE: CPT | Performed by: INTERNAL MEDICINE

## 2022-12-05 PROCEDURE — 250N000013 HC RX MED GY IP 250 OP 250 PS 637: Performed by: HOSPITALIST

## 2022-12-05 PROCEDURE — 36415 COLL VENOUS BLD VENIPUNCTURE: CPT | Performed by: HOSPITALIST

## 2022-12-05 PROCEDURE — 85730 THROMBOPLASTIN TIME PARTIAL: CPT | Performed by: HOSPITALIST

## 2022-12-05 PROCEDURE — 85730 THROMBOPLASTIN TIME PARTIAL: CPT | Performed by: INTERNAL MEDICINE

## 2022-12-05 RX ADMIN — PANTOPRAZOLE SODIUM 40 MG: 40 TABLET, DELAYED RELEASE ORAL at 08:33

## 2022-12-05 RX ADMIN — BUSPIRONE HYDROCHLORIDE 15 MG: 15 TABLET ORAL at 19:49

## 2022-12-05 RX ADMIN — BUSPIRONE HYDROCHLORIDE 15 MG: 15 TABLET ORAL at 08:33

## 2022-12-05 RX ADMIN — TAMSULOSIN HYDROCHLORIDE 0.4 MG: 0.4 CAPSULE ORAL at 19:49

## 2022-12-05 RX ADMIN — HEPARIN SODIUM 1300 UNITS/HR: 10000 INJECTION, SOLUTION INTRAVENOUS at 01:15

## 2022-12-05 RX ADMIN — TRAZODONE HYDROCHLORIDE 100 MG: 100 TABLET ORAL at 22:15

## 2022-12-05 RX ADMIN — PANTOPRAZOLE SODIUM 40 MG: 40 TABLET, DELAYED RELEASE ORAL at 17:23

## 2022-12-05 RX ADMIN — ESCITALOPRAM OXALATE 20 MG: 10 TABLET ORAL at 08:33

## 2022-12-05 RX ADMIN — LAMOTRIGINE 200 MG: 100 TABLET ORAL at 22:15

## 2022-12-05 RX ADMIN — Medication 2 DROP: at 22:17

## 2022-12-05 ASSESSMENT — ACTIVITIES OF DAILY LIVING (ADL)
ADLS_ACUITY_SCORE: 43

## 2022-12-05 NOTE — PROGRESS NOTES
[facility]  Hospitalist Progress Note        Briana García MD   12/05/2022        Interval History:        - seems to be tolerating low intensity heparin (started 12/3); hemoglobin is stable at 7.7 from 7.7 yesterday.  Continue IV heparin drip today with his increased risk of bleeding with plan on switching him to Eliquis tomorrow.  No other acute issues since yesterday         Assessment and Plan:        Suresh Powers is a 62 year old male with PMH significant for hypertension, h/o urinary retention, anxiety/depression/OCD, single kidney (donated to sister 1990), mild chronic hyponatremia who presented to ED with melena, generalized weakness, noted with pulmonary embolism and likely upper G.I. bleed, admitted 11/29/22.     Upper G.I. bleed due to duodenal ulcer  Severe acute blood loss anemia on chronic anemia secondary to above  - his recent baseline hemoglobin is around 8 to 9; last hemoglobin from 11/1/22 was 10.1  - on admission hemoglobin 3.8, MCV 80  - has got 5 units PRBC so far; Hb 3.8-----> stabilizing around 8-9 (last 7.9 on 12/3)  - melanotic stools improved; hemodynamically stable and no suggestion of active bleed  - GI following ; EGD 11/30 was noted with large non-bleeding duodenal ulcer  - monitor Hb and transfuse PRN for Hb<7  Hemoglobin is stable at 7.7 from 7.7 hemoglobin yesterday  - continue Protonix IV 40 mg BID     Right lower lobe pulmonary embolism  - CT chest noted with segmental and subsegmental PE in RLL without right heart strain  - currently hemodynamically stable and on room air  - was not started on anticoagulation due to concern for acute G.I. bleed  - US LE 11/29/22 noted with no DVT in b/l LE  - Evaluated by vascular surgery/IR and suggest no need for IVC filter at this time as no DVT noted; hypercoagulopathy workup mostly unremarkable with normal beta-2 glycoprotein, cardiolipin; factor 2 and five mutation analysis pending  - d/w DR Alaniz, given severe anemia  "with large duodenal ulcer-- GI suggested holding off on initiating anticoagulation for at least 48 hours    - seems to be tolerating low intensity heparin (started 12/3); Hb had drifted down to 7.7 but then up to 7.7  Continue IV heparin drip today with plans on switching him to oral Eliquis in the next 24 hours if hemoglobin remains stable     Hypertension  Hypotension-- resolved  - was hypotensive 12/1, responded to IVF; BP now improved and stable  - continue to hold off on PTA Amlodipine (12/1)  - hydralazine IV PRN for SBP>180     H/o urinary retention  - continue PTA Flomax  - vasquez was placed in ED for retention, failed voiding trial 11/30, replaced vasquez, can try voiding trial at the TCU patient is more ambulatory     Anxiety  Depression  OCD  - will continue PTA BuSpar, Lexapro, trazodone, Lamictal      Mild chronic hyponatremia  - Na 130---133--140; has been noted in 120s--130s in past  -IV fluids discontinued; monitor BMP intermittently     Deconditioning  generalized weakness  H/o left hip surgery 9/2022  - generalized weakness, presyncope is likely secondary to acute blood loss anemia  - PT rec TCU; SW for disposition planning    Diet: Combination Diet Regular Diet      Clinically Significant Risk Factors              # Hypoalbuminemia: Lowest albumin = 2.6 g/dL (Ref range: 3.5-5.2) at 11/29/2022  1:51 PM, will monitor as appropriate           # Overweight: Estimated body mass index is 28.98 kg/m  as calculated from the following:    Height as of this encounter: 1.702 m (5' 7\").    Weight as of this encounter: 83.9 kg (185 lb).           COVID status: asymptomatic COVID screening on admission 11/29 negative     # DVT prophylaxis:  plan for anticoagulation as above      # Code status: full code    Disposition: likely 1-2 days pending clinical stability with resumption of anticoagulation for PE; will need TCU  SW for disposition planning    Briana García MD   Pager  [phone](7AM-6PM)                " "Physical Exam:      Blood pressure 114/67, pulse 94, temperature 98.3  F (36.8  C), temperature source Oral, resp. rate 16, height 1.702 m (5' 7\"), weight 83.9 kg (185 lb), SpO2 96 %.  Vitals:    11/29/22 1328   Weight: 83.9 kg (185 lb)     Vital Signs with Ranges  Temp:  [97.6  F (36.4  C)-98.4  F (36.9  C)] 98.3  F (36.8  C)  Pulse:  [82-94] 94  Resp:  [16-18] 16  BP: (106-121)/(62-77) 114/67  SpO2:  [95 %-97 %] 96 %  I/O's Last 24 hours  I/O last 3 completed shifts:  In: 240 [P.O.:240]  Out: 2275 [Urine:2275]    Constitutional: Alert, awake and oriented ; pleasant male sitting comfortably in bed.  Not in distress   HEENT: Pupils equal and reactive to light and accomodation, EOMI intact; neck supple no raised JVD or rigidity    Oral cavity: Moist mucosa   Cardiovascular: Normal s1 s2, regular rate and rhythm, no murmur   Lungs: B/l clear to auscultation, no wheezes or crepitations   Abdomen: Soft, nt, nd, no guarding, rigidity or rebound; BS +   LE : Trace b/l edema    Musculoskeletal: Power 5/5 in all extremities   Neuro: No focal neurological deficits noted, CN II to XII grossly intact   Psychiatry: normal mood and affect  Skin: No obvious skin rashes or ulcers  Prieto present with clear urine in bag                  Medications:          [Held by provider] amLODIPine  5 mg Oral Daily     busPIRone  15 mg Oral BID     escitalopram  20 mg Oral Daily     lamoTRIgine  200 mg Oral At Bedtime     pantoprazole  40 mg Oral BID AC     sodium chloride (PF)  3 mL Intracatheter Q8H     tamsulosin  0.4 mg Oral QPM     traZODone  100 mg Oral At Bedtime     PRN Meds: artificial tears, hydrALAZINE, lidocaine 4%, lidocaine (buffered or not buffered), melatonin, naloxone, naloxone, naloxone, naloxone, nitroGLYcerin, ondansetron **OR** ondansetron, sodium chloride (PF)         Data:      All new lab and imaging data was reviewed.   Recent Labs   Lab Test 12/05/22  0620 12/05/22  0008 12/04/22  1844 12/03/22  1413 12/03/22  0848 " 12/03/22  0607 11/30/22  1423 11/30/22  1221 11/30/22  0754 11/30/22  0539 11/30/22  0023 11/29/22  1352 09/03/22  2305 06/15/21  1131   WBC  --   --   --   --  8.4 8.0  --   --   --  7.5  --   --    < > 6.6   HGB 7.7* 7.3* 7.9*   < > 8.0* 8.0*   < >  --    < > 6.4*   < >  --    < > 14.2   MCV  --   --   --   --  86 87  --   --   --  83  --   --    < > 93   PLT  --   --   --   --  537* 519*  --   --   --  557*  --   --    < > 327   INR  --   --   --   --   --   --   --  1.04  --   --   --  1.02  --  0.87    < > = values in this interval not displayed.      Recent Labs   Lab Test 12/03/22  0607 11/30/22  0539 11/29/22  1351    133 130*   POTASSIUM 4.2 3.5 3.6   CHLORIDE 108 101 95   CO2 26 27 25   BUN 21 6* 5*   CR 0.68 0.65* 0.67   ANIONGAP 6 5 10   NIMISHA 7.9* 8.0* 8.5   * 110* 94     Recent Labs   Lab Test 03/24/21  1158   TROPI <0.015

## 2022-12-05 NOTE — PLAN OF CARE
Goal Outcome Evaluation:    A&O x4. VSS RA. Assist of 1 with GB and walker. Tolerating regular diet. Lung sounds clear. Bowel sounds +, + flatus, BM -. Prieto with adequate urine output. Denies pain and nausea. Heparin gtt infusing at 1300 units/hr. Q6H Hgb, 7.3 at 0000. AM labs pending.

## 2022-12-05 NOTE — PLAN OF CARE
VSS, on room air, Denies pain. Up with assist x1/GB/Walker. No signs of bleeding, last hemoglobin was 7.9. Pt on heparin drip at 1300 units/hr next recheck for PTT is in the AM.

## 2022-12-06 ENCOUNTER — APPOINTMENT (OUTPATIENT)
Dept: PHYSICAL THERAPY | Facility: CLINIC | Age: 62
DRG: 377 | End: 2022-12-06
Payer: COMMERCIAL

## 2022-12-06 LAB
APTT PPP: 66 SECONDS (ref 22–38)
ERYTHROCYTE [DISTWIDTH] IN BLOOD BY AUTOMATED COUNT: 19.1 % (ref 10–15)
HCT VFR BLD AUTO: 24.7 % (ref 40–53)
HGB BLD-MCNC: 7.5 G/DL (ref 13.3–17.7)
HGB BLD-MCNC: 7.9 G/DL (ref 13.3–17.7)
HGB BLD-MCNC: 7.9 G/DL (ref 13.3–17.7)
MCH RBC QN AUTO: 26.5 PG (ref 26.5–33)
MCHC RBC AUTO-ENTMCNC: 30.4 G/DL (ref 31.5–36.5)
MCV RBC AUTO: 87 FL (ref 78–100)
PLATELET # BLD AUTO: 430 10E3/UL (ref 150–450)
RBC # BLD AUTO: 2.83 10E6/UL (ref 4.4–5.9)
WBC # BLD AUTO: 7 10E3/UL (ref 4–11)

## 2022-12-06 PROCEDURE — 250N000013 HC RX MED GY IP 250 OP 250 PS 637: Performed by: INTERNAL MEDICINE

## 2022-12-06 PROCEDURE — 85018 HEMOGLOBIN: CPT | Performed by: INTERNAL MEDICINE

## 2022-12-06 PROCEDURE — 97530 THERAPEUTIC ACTIVITIES: CPT | Mod: GP

## 2022-12-06 PROCEDURE — 250N000011 HC RX IP 250 OP 636: Performed by: HOSPITALIST

## 2022-12-06 PROCEDURE — 250N000013 HC RX MED GY IP 250 OP 250 PS 637

## 2022-12-06 PROCEDURE — 85027 COMPLETE CBC AUTOMATED: CPT | Performed by: INTERNAL MEDICINE

## 2022-12-06 PROCEDURE — 86850 RBC ANTIBODY SCREEN: CPT | Performed by: INTERNAL MEDICINE

## 2022-12-06 PROCEDURE — 86901 BLOOD TYPING SEROLOGIC RH(D): CPT | Performed by: INTERNAL MEDICINE

## 2022-12-06 PROCEDURE — 99232 SBSQ HOSP IP/OBS MODERATE 35: CPT | Performed by: INTERNAL MEDICINE

## 2022-12-06 PROCEDURE — 86923 COMPATIBILITY TEST ELECTRIC: CPT | Performed by: HOSPITALIST

## 2022-12-06 PROCEDURE — 36415 COLL VENOUS BLD VENIPUNCTURE: CPT | Performed by: INTERNAL MEDICINE

## 2022-12-06 PROCEDURE — 85730 THROMBOPLASTIN TIME PARTIAL: CPT | Performed by: INTERNAL MEDICINE

## 2022-12-06 PROCEDURE — 250N000013 HC RX MED GY IP 250 OP 250 PS 637: Performed by: HOSPITALIST

## 2022-12-06 PROCEDURE — 120N000001 HC R&B MED SURG/OB

## 2022-12-06 RX ADMIN — TRAZODONE HYDROCHLORIDE 100 MG: 100 TABLET ORAL at 21:09

## 2022-12-06 RX ADMIN — ESCITALOPRAM OXALATE 20 MG: 10 TABLET ORAL at 08:19

## 2022-12-06 RX ADMIN — APIXABAN 5 MG: 5 TABLET, FILM COATED ORAL at 08:19

## 2022-12-06 RX ADMIN — PANTOPRAZOLE SODIUM 40 MG: 40 TABLET, DELAYED RELEASE ORAL at 16:01

## 2022-12-06 RX ADMIN — HEPARIN SODIUM 1100 UNITS/HR: 10000 INJECTION, SOLUTION INTRAVENOUS at 00:45

## 2022-12-06 RX ADMIN — TAMSULOSIN HYDROCHLORIDE 0.4 MG: 0.4 CAPSULE ORAL at 21:09

## 2022-12-06 RX ADMIN — BUSPIRONE HYDROCHLORIDE 15 MG: 15 TABLET ORAL at 08:19

## 2022-12-06 RX ADMIN — LAMOTRIGINE 200 MG: 100 TABLET ORAL at 21:09

## 2022-12-06 RX ADMIN — BUSPIRONE HYDROCHLORIDE 15 MG: 15 TABLET ORAL at 21:09

## 2022-12-06 RX ADMIN — PANTOPRAZOLE SODIUM 40 MG: 40 TABLET, DELAYED RELEASE ORAL at 08:19

## 2022-12-06 RX ADMIN — Medication 2 DROP: at 21:09

## 2022-12-06 RX ADMIN — APIXABAN 5 MG: 5 TABLET, FILM COATED ORAL at 21:09

## 2022-12-06 ASSESSMENT — ACTIVITIES OF DAILY LIVING (ADL)
ADLS_ACUITY_SCORE: 43

## 2022-12-06 NOTE — PROGRESS NOTES
Care Management Follow Up    Length of Stay (days): 6    Expected Discharge Date: 12/07/2022     Concerns to be Addressed: discharge planning     Patient plan of care discussed at interdisciplinary rounds: Yes    Anticipated Discharge Disposition: Transitional Care     Anticipated Discharge Services:  Therapy  Anticipated Discharge DME:  N/A    Patient/family educated on Medicare website which has current facility and service quality ratings: yes  Education Provided on the Discharge Plan:  no  Patient/Family in Agreement with the Plan: yes    Referrals Placed by CM/SW: Post Acute Facilities  Private pay costs discussed: Not applicable    Additional Information:  Received a message back from [facility].  They have a bed available for tomorrow.    Will continue to follow.      OSMEL Gomez, [facility]    [phone]  [facility]

## 2022-12-06 NOTE — PROGRESS NOTES
[facility]  Hospitalist Progress Note        Briana García MD   12/06/2022        Interval History:        Patient is resting comfortably in bed.  Denies any issues with bleeding.  Hemoglobin remained stable at 7.5 today from 7.5 yesterday.  Patient wants to keep Prieto in at the time of discharge with voiding trial at the TCU.  Transitioned off of the low intensity heparin drip to Eliquis 5 mg p.o. twice daily on 12/6/2022.  No other acute issues since yesterday         Assessment and Plan:        Suresh Powers is a 62 year old male with PMH significant for hypertension, h/o urinary retention, anxiety/depression/OCD, single kidney (donated to sister 1990), mild chronic hyponatremia who presented to ED with melena, generalized weakness, noted with pulmonary embolism and likely upper G.I. bleed, admitted 11/29/22.     Upper G.I. bleed due to duodenal ulcer  Severe acute blood loss anemia on chronic anemia secondary to above  - his recent baseline hemoglobin is around 8 to 9; last hemoglobin from 11/1/22 was 10.1  - on admission hemoglobin 3.8, MCV 80  - has got 5 units PRBC so far; Hb 3.8-----> stabilizing around 8-9 (last 7.9 on 12/3)  - melanotic stools improved; hemodynamically stable and no suggestion of active bleed  - GI following ; EGD 11/30 was noted with large non-bleeding duodenal ulcer  - monitor Hb and transfuse PRN for Hb<7  Hemoglobin is stable at 7.5 from 7.5 hemoglobin yesterday  - continue Protonix  40 mg BID   Transition the IV heparin drip low intensity dose to Eliquis 5 mg p.o. twice daily due to PE  Monitor hemoglobin closely with every 6 hours checks while on Eliquis     Right lower lobe pulmonary embolism  - CT chest noted with segmental and subsegmental PE in RLL without right heart strain  - currently hemodynamically stable and on room air  - was not started on anticoagulation due to concern for acute G.I. bleed  - US LE 11/29/22 noted with no DVT in b/l LE  - Evaluated by  "vascular surgery/IR and suggest no need for IVC filter at this time as no DVT noted; hypercoagulopathy workup mostly unremarkable with normal beta-2 glycoprotein, cardiolipin; factor 2 and five mutation analysis pending  - d/w DR Alaniz, given severe anemia with large duodenal ulcer-- GI suggested holding off on initiating anticoagulation for at least 48 hours    - seems to be tolerating low intensity heparin (started 12/3); Hb had drifted down to 7.3.  Currently stable at 7.5  Transitioned heparin drip to Eliquis 5 mg p.o. twice daily for anticoagulation     Hypertension  Hypotension-- resolved  - was hypotensive 12/1, responded to IVF; BP now improved and stable  - continue to hold off on PTA Amlodipine (12/1)  - hydralazine IV PRN for SBP>180     H/o urinary retention  - continue PTA Flomax  - vasquez was placed in ED for retention, failed voiding trial 11/30, replaced vasquez, can try voiding trial at the TCU patient is more ambulatory     Anxiety  Depression  OCD  - will continue PTA BuSpar, Lexapro, trazodone, Lamictal      Mild chronic hyponatremia  - Na 130---133--140; has been noted in 120s--130s in past  -IV fluids discontinued; monitor BMP intermittently     Deconditioning  generalized weakness  H/o left hip surgery 9/2022  - generalized weakness, presyncope is likely secondary to acute blood loss anemia  - PT rec TCU;  for disposition planning    Diet: Combination Diet Regular Diet      Clinically Significant Risk Factors              # Hypoalbuminemia: Lowest albumin = 2.6 g/dL (Ref range: 3.5-5.2) at 11/29/2022  1:51 PM, will monitor as appropriate           # Overweight: Estimated body mass index is 28.98 kg/m  as calculated from the following:    Height as of this encounter: 1.702 m (5' 7\").    Weight as of this encounter: 83.9 kg (185 lb).           COVID status: asymptomatic COVID screening on admission 11/29 negative     # DVT prophylaxis:  Eliquis     # Code status: full code    Disposition: likely " "1-2 days pending clinical stability with resumption of anticoagulation for PE;  SW for disposition planning    Briana García MD   Pager  [phone](7AM-6PM)                Physical Exam:      Blood pressure 124/73, pulse 91, temperature 98.3  F (36.8  C), temperature source Oral, resp. rate 18, height 1.702 m (5' 7\"), weight 83.9 kg (185 lb), SpO2 96 %.  Vitals:    11/29/22 1328   Weight: 83.9 kg (185 lb)     Vital Signs with Ranges  Temp:  [97.9  F (36.6  C)-98.8  F (37.1  C)] 98.3  F (36.8  C)  Pulse:  [82-97] 91  Resp:  [18] 18  BP: (117-140)/(66-87) 124/73  SpO2:  [95 %-96 %] 96 %  I/O's Last 24 hours  I/O last 3 completed shifts:  In: 360 [P.O.:360]  Out: 3275 [Urine:3275]    Constitutional: Alert, awake and oriented ; pleasant male sitting comfortably in bed.  Not in distress   HEENT: Pupils equal and reactive to light and accomodation, EOMI intact; neck supple no raised JVD or rigidity    Oral cavity: Moist mucosa   Cardiovascular: Normal s1 s2, regular rate and rhythm, no murmur   Lungs: B/l clear to auscultation, no wheezes or crepitations   Abdomen: Soft, nt, nd, no guarding, rigidity or rebound; BS +   LE : Trace b/l edema    Musculoskeletal: Power 5/5 in all extremities   Neuro: No focal neurological deficits noted, CN II to XII grossly intact   Psychiatry: normal mood and affect  Skin: No obvious skin rashes or ulcers  Prieto present with clear urine in bag                  Medications:          [Held by provider] amLODIPine  5 mg Oral Daily     apixaban ANTICOAGULANT  5 mg Oral BID     busPIRone  15 mg Oral BID     escitalopram  20 mg Oral Daily     lamoTRIgine  200 mg Oral At Bedtime     pantoprazole  40 mg Oral BID AC     sodium chloride (PF)  3 mL Intracatheter Q8H     tamsulosin  0.4 mg Oral QPM     traZODone  100 mg Oral At Bedtime     PRN Meds: artificial tears, hydrALAZINE, lidocaine 4%, lidocaine (buffered or not buffered), melatonin, naloxone, naloxone, naloxone, naloxone, nitroGLYcerin, " ondansetron **OR** ondansetron, sodium chloride (PF)         Data:      All new lab and imaging data was reviewed.   Recent Labs   Lab Test 12/06/22  0544 12/05/22  0620 12/05/22  0008 12/03/22  1413 12/03/22  0848 12/03/22  0607 11/30/22  1423 11/30/22  1221 11/30/22  0023 11/29/22  1352 09/03/22  2305 06/15/21  1131   WBC 7.0  --   --   --  8.4 8.0  --   --    < >  --    < > 6.6   HGB 7.5* 7.7* 7.3*   < > 8.0* 8.0*   < >  --    < >  --    < > 14.2   MCV 87  --   --   --  86 87  --   --    < >  --    < > 93     --   --   --  537* 519*  --   --    < >  --    < > 327   INR  --   --   --   --   --   --   --  1.04  --  1.02  --  0.87    < > = values in this interval not displayed.      Recent Labs   Lab Test 12/03/22  0607 11/30/22  0539 11/29/22  1351    133 130*   POTASSIUM 4.2 3.5 3.6   CHLORIDE 108 101 95   CO2 26 27 25   BUN 21 6* 5*   CR 0.68 0.65* 0.67   ANIONGAP 6 5 10   NIMISHA 7.9* 8.0* 8.5   * 110* 94     Recent Labs   Lab Test 03/24/21  1158   TROPI <0.015

## 2022-12-06 NOTE — PLAN OF CARE
Goal Outcome Evaluation:  [id number]    Orientations: AOx4  Vitals/Pain: VSS, RA, denies pain   Lines/Drains: Heparin gtt infusing at @1100 recheck this morning  Skin/Wounds: Small wound on R buttocks  GI/: Prieto UOA, no BM  Labs: Abnormal/Trends, Electrolyte Replacement- Hgb 7.5 this morning   Ambulation/Assist: Assist of 1  Plan: Continue to monitor

## 2022-12-06 NOTE — PLAN OF CARE
Orientations: AOx4  Vitals/Pain: VSS on RA. Denies pain and nausea this shift.   Lines/Drains: Heparin gtt infusing @1100 rechecks 2330.   Skin/Wounds: Superficial wound on buttocks  GI/: Prieto with adequate urine output. +BM this shift.   Labs: Hgb 7.7 @am. Rechecks tomorrow morning  Ambulation/Assist: x1 w/ GB

## 2022-12-06 NOTE — PLAN OF CARE
[id number]  Patient still feeling dizzy when standing so hesitant to get in the chair or walk much. Heparin IV stopped today  Orientations: 4  Vitals/Pain: VSS on RA except for elevated BP at times (140/72)  Provider contacted as pt was asking about restarting his home amlodipine  Tele: none  Lines/Drains: LPIV SL  Skin/Wounds: small wound to R buttocks   GI/: Prieto in place  Hgb 7.9  Ambulation/Assist: x1 GB walker  Sleep Quality: good  Plan: discharge to [place] tomorrow

## 2022-12-06 NOTE — PROGRESS NOTES
Care Management Follow Up    Length of Stay (days): 7    Expected Discharge Date: 12/07/2022     Concerns to be Addressed: discharge planning     Patient plan of care discussed at interdisciplinary rounds: Yes    Anticipated Discharge Disposition: Transitional Care     Anticipated Discharge Services:  therapy  Anticipated Discharge DME:  N/A    Patient/family educated on Medicare website which has current facility and service quality ratings: yes  Education Provided on the Discharge Plan:  no  Patient/Family in Agreement with the Plan: yes    Referrals Placed by CM/SW: Post Acute Facilities  Private pay costs discussed: Not applicable    Additional Information:  Updated [facility] as to patient's status.  Per Jose, they will have a bed available for patient tomorrow.  Transport arranged for patient, via the skyway, at 11:00 tomorrow.      Will continue to follow.      OSMEL Gomez, [facility]    [phone]  [facility]

## 2022-12-07 ENCOUNTER — APPOINTMENT (OUTPATIENT)
Dept: CT IMAGING | Facility: CLINIC | Age: 62
DRG: 377 | End: 2022-12-07
Attending: INTERNAL MEDICINE
Payer: COMMERCIAL

## 2022-12-07 ENCOUNTER — APPOINTMENT (OUTPATIENT)
Dept: OCCUPATIONAL THERAPY | Facility: CLINIC | Age: 62
DRG: 377 | End: 2022-12-07
Payer: COMMERCIAL

## 2022-12-07 LAB
ABO/RH(D): NORMAL
ANTIBODY SCREEN: NEGATIVE
BLD PROD TYP BPU: NORMAL
BLOOD COMPONENT TYPE: NORMAL
CODING SYSTEM: NORMAL
CROSSMATCH: NORMAL
HGB BLD-MCNC: 6.8 G/DL (ref 13.3–17.7)
HGB BLD-MCNC: 8.4 G/DL (ref 13.3–17.7)
HGB BLD-MCNC: 8.6 G/DL (ref 13.3–17.7)
HGB BLD-MCNC: 9 G/DL (ref 13.3–17.7)
ISSUE DATE AND TIME: NORMAL
SPECIMEN EXPIRATION DATE: NORMAL
UNIT ABO/RH: NORMAL
UNIT NUMBER: NORMAL
UNIT STATUS: NORMAL
UNIT TYPE ISBT: 5100

## 2022-12-07 PROCEDURE — 120N000001 HC R&B MED SURG/OB

## 2022-12-07 PROCEDURE — 99232 SBSQ HOSP IP/OBS MODERATE 35: CPT | Performed by: INTERNAL MEDICINE

## 2022-12-07 PROCEDURE — 250N000009 HC RX 250: Performed by: INTERNAL MEDICINE

## 2022-12-07 PROCEDURE — 250N000013 HC RX MED GY IP 250 OP 250 PS 637

## 2022-12-07 PROCEDURE — 85018 HEMOGLOBIN: CPT | Performed by: INTERNAL MEDICINE

## 2022-12-07 PROCEDURE — P9016 RBC LEUKOCYTES REDUCED: HCPCS | Performed by: HOSPITALIST

## 2022-12-07 PROCEDURE — 250N000011 HC RX IP 250 OP 636: Performed by: INTERNAL MEDICINE

## 2022-12-07 PROCEDURE — 74177 CT ABD & PELVIS W/CONTRAST: CPT

## 2022-12-07 PROCEDURE — 36415 COLL VENOUS BLD VENIPUNCTURE: CPT | Performed by: INTERNAL MEDICINE

## 2022-12-07 PROCEDURE — 250N000013 HC RX MED GY IP 250 OP 250 PS 637: Performed by: HOSPITALIST

## 2022-12-07 PROCEDURE — 97535 SELF CARE MNGMENT TRAINING: CPT | Mod: GO | Performed by: OCCUPATIONAL THERAPIST

## 2022-12-07 PROCEDURE — 99223 1ST HOSP IP/OBS HIGH 75: CPT | Performed by: INTERNAL MEDICINE

## 2022-12-07 RX ORDER — IOPAMIDOL 755 MG/ML
93 INJECTION, SOLUTION INTRAVASCULAR ONCE
Status: COMPLETED | OUTPATIENT
Start: 2022-12-07 | End: 2022-12-07

## 2022-12-07 RX ADMIN — TAMSULOSIN HYDROCHLORIDE 0.4 MG: 0.4 CAPSULE ORAL at 21:01

## 2022-12-07 RX ADMIN — PANTOPRAZOLE SODIUM 40 MG: 40 TABLET, DELAYED RELEASE ORAL at 06:31

## 2022-12-07 RX ADMIN — LAMOTRIGINE 200 MG: 100 TABLET ORAL at 21:00

## 2022-12-07 RX ADMIN — ESCITALOPRAM OXALATE 20 MG: 10 TABLET ORAL at 08:01

## 2022-12-07 RX ADMIN — Medication 2 DROP: at 21:01

## 2022-12-07 RX ADMIN — IOPAMIDOL 93 ML: 755 INJECTION, SOLUTION INTRAVENOUS at 17:50

## 2022-12-07 RX ADMIN — SODIUM CHLORIDE 67 ML: 900 INJECTION INTRAVENOUS at 17:50

## 2022-12-07 RX ADMIN — BUSPIRONE HYDROCHLORIDE 15 MG: 15 TABLET ORAL at 08:01

## 2022-12-07 RX ADMIN — TRAZODONE HYDROCHLORIDE 100 MG: 100 TABLET ORAL at 21:00

## 2022-12-07 RX ADMIN — BUSPIRONE HYDROCHLORIDE 15 MG: 15 TABLET ORAL at 21:01

## 2022-12-07 RX ADMIN — PANTOPRAZOLE SODIUM 40 MG: 40 TABLET, DELAYED RELEASE ORAL at 15:43

## 2022-12-07 ASSESSMENT — ACTIVITIES OF DAILY LIVING (ADL)
ADLS_ACUITY_SCORE: 43

## 2022-12-07 NOTE — PROGRESS NOTES
[facility]  Hospitalist Progress Note        Briana García MD   12/07/2022        Interval History:        Patient is resting comfortably in bed.  Denies any issues with bleeding.  Hemoglobin dropped to 6.8 overnight on oral Eliquis.  Given 1 unit PRBCs as per conditional order.  Eliquis discontinued this morning.  Follow hemoglobin closely with every 8 hour checks.  Complains of feeling dizzy with walking.  No other acute issues since yesterday         Assessment and Plan:        Suresh Powers is a 62 year old male with PMH significant for hypertension, h/o urinary retention, anxiety/depression/OCD, single kidney (donated to sister 1990), mild chronic hyponatremia who presented to ED with melena, generalized weakness, noted with pulmonary embolism and likely upper G.I. bleed, admitted 11/29/22.     Upper G.I. bleed due to duodenal ulcer  Severe acute blood loss anemia on chronic anemia secondary to above  - his recent baseline hemoglobin is around 8 to 9; last hemoglobin from 11/1/22 was 10.1  - on admission hemoglobin 3.8, MCV 80  - has got 6 units PRBC so far; Hb 3.8-----> stabilizing around 8-9 (last 7.9 on 12/3)  - melanotic stools improved; hemodynamically stable and no suggestion of active bleed  - GI following ; EGD 11/30 was noted with large non-bleeding duodenal ulcer  - monitor Hb and transfuse PRN for Hb<7  - continue Protonix  40 mg BID   Transition the IV heparin drip low intensity dose to Eliquis 5 mg p.o. twice daily on 12/6/22    Patient's hemoglobin dropped to 6.8 overnight needing 1 unit PRBCs  Discontinued Eliquis  Hemoglobin every 8 hours ordered       Right lower lobe pulmonary embolism  - CT chest noted with segmental and subsegmental PE in RLL without right heart strain  - currently hemodynamically stable and on room air  - was not started on anticoagulation due to concern for acute G.I. bleed  - US LE 11/29/22 noted with no DVT in b/l LE  - Evaluated by vascular surgery/IR  and suggest no need for IVC filter at this time as no DVT noted; hypercoagulopathy workup mostly unremarkable with normal beta-2 glycoprotein, cardiolipin; factor 2 and five mutation analysis pending  - d/w DR Alaniz, given severe anemia with large duodenal ulcer-- GI suggested holding off on initiating anticoagulation for at least 48 hours    - seems to be tolerating low intensity heparin (started 12/3); Hb had drifted down to 7.3.    Transitioned heparin drip to Eliquis 5 mg p.o. twice daily for anticoagulation  On Eliquis patient's hemoglobin dropped drifted down to 6.8 and needing 1 unit PRBCs  Eliquis discontinued  [facility] hematology consultation requested  We might need to wait for several weeks for the duodenal ulcer to heal prior to trialing Eliquis again for patient with increased risk of bleeding     Hypertension  Hypotension-- resolved  - was hypotensive 12/1, responded to IVF; BP now improved and stable  - continue to hold off on PTA Amlodipine (12/1)  Complains of dizziness and ongoing GI bleed continue to hold amlodipine discussed with the patient on 12/7/22 the reason for holding the amlodipine  - hydralazine IV PRN for SBP>180     H/o urinary retention  - continue PTA Flomax  - vasquez was placed in ED for retention, failed voiding trial 11/30, replaced vasquez, can try voiding trial at the TCU patient is more ambulatory     Anxiety  Depression  OCD  - will continue PTA BuSpar, Lexapro, trazodone, Lamictal      Mild chronic hyponatremia  - Na 130---133--140; has been noted in 120s--130s in past  -IV fluids discontinued; monitor BMP intermittently     Deconditioning  generalized weakness  H/o left hip surgery 9/2022  - generalized weakness, presyncope is likely secondary to acute blood loss anemia  - PT rec TCU; SW for disposition planning    Diet: Combination Diet Regular Diet      Clinically Significant Risk Factors              # Hypoalbuminemia: Lowest albumin = 2.6 g/dL (Ref range: 3.5-5.2) at  "11/29/2022  1:51 PM, will monitor as appropriate           # Overweight: Estimated body mass index is 28.98 kg/m  as calculated from the following:    Height as of this encounter: 1.702 m (5' 7\").    Weight as of this encounter: 83.9 kg (185 lb).           COVID status: asymptomatic COVID screening on admission 11/29 negative     # DVT prophylaxis:  Eliquis     # Code status: full code    Disposition: discharge in the next 2days if remains stable and hgb remains stable     Briana García MD   Pager  [phone](7AM-6PM)                Physical Exam:      Blood pressure (P) 134/80, pulse (P) 83, temperature (P) 98  F (36.7  C), temperature source (P) Axillary, resp. rate (P) 16, height 1.702 m (5' 7\"), weight 83.9 kg (185 lb), SpO2 (P) 97 %.  Vitals:    11/29/22 1328   Weight: 83.9 kg (185 lb)     Vital Signs with Ranges  Temp:  [97.9  F (36.6  C)-98.6  F (37  C)] (P) 98  F (36.7  C)  Pulse:  [80-94] (P) 83  Resp:  [16-20] (P) 16  BP: (117-140)/(72-91) (P) 134/80  SpO2:  [94 %-97 %] (P) 97 %  I/O's Last 24 hours  I/O last 3 completed shifts:  In: 483 [P.O.:480; I.V.:3]  Out: 3975 [Urine:3975]    Constitutional: Alert, awake and oriented ; pleasant male sitting comfortably in bed.  Not in distress   HEENT: Pupils equal and reactive to light and accomodation, EOMI intact; neck supple no raised JVD or rigidity    Oral cavity: Moist mucosa   Cardiovascular: Normal s1 s2, regular rate and rhythm, no murmur   Lungs: B/l clear to auscultation, no wheezes or crepitations   Abdomen: Soft, nt, nd, no guarding, rigidity or rebound; BS +   LE : Trace b/l edema    Musculoskeletal: Power 5/5 in all extremities   Neuro: No focal neurological deficits noted, CN II to XII grossly intact                     Medications:          busPIRone  15 mg Oral BID     escitalopram  20 mg Oral Daily     lamoTRIgine  200 mg Oral At Bedtime     pantoprazole  40 mg Oral BID AC     sodium chloride (PF)  3 mL Intracatheter Q8H     tamsulosin  0.4 mg " Oral QPM     traZODone  100 mg Oral At Bedtime     PRN Meds: artificial tears, hydrALAZINE, lidocaine 4%, lidocaine (buffered or not buffered), melatonin, naloxone, naloxone, naloxone, naloxone, nitroGLYcerin, ondansetron **OR** ondansetron, sodium chloride (PF)         Data:      All new lab and imaging data was reviewed.   Recent Labs   Lab Test 12/06/22  2357 12/06/22  1810 12/06/22  1238 12/06/22  0544 12/03/22  1413 12/03/22  0848 12/03/22  0607 11/30/22  1423 11/30/22  1221 11/30/22  0023 11/29/22  1352 09/03/22  2305 06/15/21  1131   WBC  --   --   --  7.0  --  8.4 8.0  --   --    < >  --    < > 6.6   HGB 6.8* 7.9* 7.9* 7.5*   < > 8.0* 8.0*   < >  --    < >  --    < > 14.2   MCV  --   --   --  87  --  86 87  --   --    < >  --    < > 93   PLT  --   --   --  430  --  537* 519*  --   --    < >  --    < > 327   INR  --   --   --   --   --   --   --   --  1.04  --  1.02  --  0.87    < > = values in this interval not displayed.      Recent Labs   Lab Test 12/03/22  0607 11/30/22  0539 11/29/22  1351    133 130*   POTASSIUM 4.2 3.5 3.6   CHLORIDE 108 101 95   CO2 26 27 25   BUN 21 6* 5*   CR 0.68 0.65* 0.67   ANIONGAP 6 5 10   NIMISHA 7.9* 8.0* 8.5   * 110* 94     Recent Labs   Lab Test 03/24/21  1158   TROPI <0.015

## 2022-12-07 NOTE — PROVIDER NOTIFICATION
"Paged on-call hospitalist MD Jordan at 0051, \"FYI, pt MS rm 303 has critical hgb of 6.8. Conditional orders to transfuse for hgb under 7 present, will give 1 unit per orders. Thanks! -GIGI Garcia *[id number]\". Will continue to follow plan of care.  "

## 2022-12-07 NOTE — PLAN OF CARE
[id number]  Summary: Hgb drop this morning - I Unit of blood given this morning and Hgb back at 8.6. CT of abdomen/pelvis w contrast  Orientations: 4  Vitals/Pain: VSS on RA - no pain -dizzy with activity  Tele: none  Lines/Drains: PIV in R forearm SL  Skin/Wounds: skin tear to R buttocks - mepi in place  GI/: Prieto in place - plan to discharge with to TCU  Labs: Abnormal/Trends Hgb 6.8 in morning and 8.6 in afternoon  Ambulation/Assist: SBA with GB and walker  Sleep Quality: good  Plan:   discharge to [facility]

## 2022-12-07 NOTE — PROVIDER NOTIFICATION
Notified provider about indwelling vasquez catheter discussed removal or continued need.    Did provider choose to remove indwelling vasquez catheter? No    Provider's vasquez indication for keeping indwelling vasquez catheter: Retention.    Is there an order for indwelling vasquez catheter? Yes    *If there is a plan to keep vasquez catheter in place at discharge daily notification with provider is not necessary.       Plan to discharge with vasquez catheter to TCU

## 2022-12-07 NOTE — PROGRESS NOTES
Care Management Follow Up    Length of Stay (days): 8    Expected Discharge Date: 12/10/2022     Concerns to be Addressed: discharge planning     Patient plan of care discussed at interdisciplinary rounds: Yes    Anticipated Discharge Disposition: Transitional Care     Anticipated Discharge Services:    Anticipated Discharge DME:      Patient/family educated on Medicare website which has current facility and service quality ratings: yes  Education Provided on the Discharge Plan:    Patient/Family in Agreement with the Plan: yes    Referrals Placed by CM/SW: Post Acute Facilities  Private pay costs discussed: Not applicable    Additional Information:  Pt not ready for discharge today. Ride canceled to [facility]. [employer] prior-auth received. Auth#[id number], good 12/7-12/13/22. SW following for discharge planning.       OSMEL Monae, LGSW  [phone] Desk phone  [phone] Cell/text (Preferred)  [facility]

## 2022-12-07 NOTE — CONSULTS
[facility] Cancer Care Consultation      Suresh Powers MRN# [id number]   YOB: 1960 Age: 62 year old   Date of Admission: 11/29/2022  Requesting physician: Briana García MD  Reason for consult:  GI bleeding and pulmonary embolism.           Assessment and Plan:    62 year old male with history of anxiety/depression, single kidney, hypertension, status post fall and resulting left hip fracture status post ORIF of left hip on 9/4/2022 with prolonged recovery at TCU over the subsequent month, now admitted for lightheadedness/dizziness for approximately 1 week prior to admission.     1.  Acute right-sided pulmonary embolism  2.  Large duodenal ulcer with acute GI bleeding  3.  Severe acute blood loss anemia  4.  History of fall resulting in left hip fracture status post ORIF 9/2022  -Discussed with Suresh that he has a provoked pulmonary embolism due to relatively recent fall that resulted in left hip fracture and prolonged recovery at TCU in the subsequent month after ORIF surgery on 9/4/2022.  -He was evaluated by vascular/IR with no need for IVC filter given absence of any residual DVT in the lower extremities.  -Discussed that standard of care recommendations for provoked blood clot would be 3 months of anticoagulation provided that he does not have ongoing VTE risk factors (such as prolonged immobolization).  - Due to recurrent acute blood loss anemia related to the large duodenal ulcer with acute GI bleeding, he will need to remain off blood thinners for the time being until hemoglobin is stabilized.  - He has had thrombophilia work-up that has come back negative.  Specifically, he has no evidence of lupus anticoagulant or antiphospholipid antibodies from 11/30/2022 labs.  He has had factor V Leiden mutation and prothrombin gene mutation studies drawn from 11/30/2022.  However, such results would not impact anticoagulation management.    -For completeness sake, will rule out any occult malignancy  with CT abdomen/pelvis.  -Follow serial hemoglobin monitoring as you are doing.  --He could restart on Eliquis when hemoglobin has stabilized for at least 24-48 hours with no evidence of recurrent GI bleeding.    Thank you for the consult.  Please call if questions.    Deidra Marte MD  Hematology/Oncology  [facility]    Total time spent: 60 minutes in patient evaluation, counseling, documentation, and coordination of care.             Chief Complaint:   Fatigue           History of Present Illness:   This patient is a 62 year old male with history of anxiety/depression, single kidney, hypertension, status post fall and resulting left hip fracture status post ORIF of left hip on 9/4/2022 with prolonged recovery at TCU over the subsequent month, now admitted for lightheadedness/dizziness for approximately 1 week prior to admission.  He also noticed melena.  He fell due to weakness and lightheadedness.  He has had dyspnea on exertion.  He was evaluated in the ED on 11/29/2022.    11/29/2022 CT head without contrast showed no acute intracranial hemorrhage or mass.  D-dimer was positive so CT chest obtained on 11/29/2022 and demonstrated segmental and subsegmental pulmonary emboli in the right lower lobe without evidence of right heart strain.  No masses or lymphadenopathy.  No bony abnormalities.  11/29/2022 bilateral duplex venous ultrasound of lower extremities showed no DVT.    11/29/2022 hemoglobin was very low at 3.8 with MCV 80, WBC 5.6, platelets 762,000.  He had a prior CBC from 11/1 that showed hemoglobin 10.1, platelets 572,000.  He has been transfused multiple units of blood.  He was initially unable to start on heparin until a couple days ago with attempt at transitioning to Eliquis.  He received 2 doses of Eliquis which has now been held due to drop in hemoglobin from 7.9 down to 6.8 from last evening.  He has been given 1 unit of packed RBCs with hemoglobin improved to 8.6  "today.    2022 EGD showed normal esophagus, stomach, nonbleeding cratered duodenal ulcer with no stigmata of bleeding in the first portion of the duodenum with lesion measuring 2.5 cm in largest dimension.  Diffusely moderately congested mucosa without active bleeding and with no stigmata of bleeding in the duodenal bulb.  He was subsequently started on Protonix.  He denies any aspirin or daily NSAID use.  His med profile includes Celebrex but he has not been taking that.    2022 INR, PTT, thrombin time all normal, lupus anticoagulant negative.  2022 beta-2 glycoprotein antibodies and cardiolipin antibodies all negative.  Factor V Leiden and prothrombin gene mutation studies from 2022 results pending.    Suresh reports intermittent lightheadedness.  He is ambulating with the use of a walker.  He denies any worsening or shortness of breath.  He denies chest pain.         Physical Exam:   Vitals were reviewed  Blood pressure 137/87, pulse 81, temperature 98.8  F (37.1  C), temperature source Oral, resp. rate 16, height 1.702 m (5' 7\"), weight 83.9 kg (185 lb), SpO2 95 %.  Temperatures:  Current - Temp: 98.8  F (37.1  C); Max - Temp  Av.3  F (36.8  C)  Min: 97.9  F (36.6  C)  Max: 98.8  F (37.1  C)  Respiration range: Resp  Av.3  Min: 16  Max: 20  Pulse range: Pulse  Av.7  Min: 80  Max: 94  Blood pressure range: Systolic (24hrs), Av , Min:117 , Max:137   ; Diastolic (24hrs), Av, Min:74, Max:91    Pulse oximetry range: SpO2  Av.5 %  Min: 94 %  Max: 97 %    Intake/Output Summary (Last 24 hours) at 2022 1526  Last data filed at 2022 1426  Gross per 24 hour   Intake 786 ml   Output 3875 ml   Net -3089 ml       GENERAL: No acute distress.  SKIN: No rashes or jaundice.  HEENT: Normocephalic, atraumatic. Eyes anicteric. Oropharynx is grossly clear.  LYMPH: No palpable lymphadenopathy in the cervical, supraclavicular regions.  HEART: No PMI displacement.  LUNGS: No " audible cough or wheezing.  ABDOMEN: Soft, nontender, nondistended with no palpable hepatosplenomegaly.  EXTREMITIES: No clubbing, cyanosis, or edema.  MENTAL: Alert and oriented to person, place, and time.  NEURO: No focal motor deficits but with slight limp.              Past Medical History:   I have reviewed this patient's past medical history  Past Medical History:   Diagnosis Date     Anxiety     sees psych for trazodone     Clostridium difficile diarrhea 3/9/2016     Foot pain      Hypertension      Mold exposure      Shingles 1984             Past Surgical History:   I have reviewed this patient's past surgical history  Past Surgical History:   Procedure Laterality Date     ESOPHAGOSCOPY, GASTROSCOPY, DUODENOSCOPY (EGD), COMBINED N/A 11/30/2022    Procedure: ESOPHAGOGASTRODUODENOSCOPY (EGD);  Surgeon: Joe Alaniz MD;  Location:  GI     foot crush injury       kidney donation       OPEN REDUCTION INTERNAL FIXATION CLAVICLE Left 2/4/2016    Procedure: OPEN REDUCTION INTERNAL FIXATION CLAVICLE;  Surgeon: Rakesh Hui MD;  Location:  OR     OPEN REDUCTION INTERNAL FIXATION HIP NAILING Left 9/4/2022    Procedure: Open reduction internal fixation of left hip fracture;  Surgeon: Huang Cochran MD;  Location:  OR     OPEN REDUCTION INTERNAL FIXATION TIBIAL PLATEAU Left 2/4/2016    Procedure: OPEN REDUCTION INTERNAL FIXATION TIBIAL PLATEAU;  Surgeon: Rakesh Hui MD;  Location:  OR               Social History:   I have reviewed this patient's social history  Social History     Tobacco Use     Smoking status: Former     Packs/day: 1.00     Years: 0.00     Pack years: 0.00     Types: Cigarettes     Smokeless tobacco: Former     Quit date: 2/2/2016   Substance Use Topics     Alcohol use: Yes     Comment: states 8 beerrs all day over past 8 hours             Family History:   I have reviewed this patient's family history  Family History   Problem Relation Age of Onset     Breast Cancer  Mother      Diabetes Type 2  Mother      Skin Cancer Father      Cerebrovascular Disease Father              Allergies:     Allergies   Allergen Reactions     Lisinopril Other (See Comments)     Elevated potassium     Sertraline Diarrhea             Medications:   I have reviewed this patient's current medications  Medications Prior to Admission   Medication Sig Dispense Refill Last Dose     amLODIPine (NORVASC) 10 MG tablet Take 1 tablet (10 mg) by mouth daily 90 tablet 3 11/28/2022 at hs     busPIRone (BUSPAR) 15 MG tablet Take 1 tablet (15 mg) by mouth 2 times daily   11/29/2022?     celecoxib (CELEBREX) 200 MG capsule Take 1 capsule (200 mg) by mouth daily (Patient taking differently: Take 200 mg by mouth daily as needed for moderate pain (4-6)) 30 capsule 1 has it but hasn't taken any     escitalopram (LEXAPRO) 20 MG tablet Take 20 mg by mouth daily   11/29/2022?     fluticasone (FLONASE) 50 MCG/ACT nasal spray use 2 sprays in each nostril daily. (Patient taking differently: Spray 2 sprays into both nostrils daily as needed use 2 sprays in each nostril daily.) 48 g 3 prn     lactobacillus rhamnosus, GG, (CULTURELL) capsule Take 1 capsule by mouth 2 times daily 60 capsule 0 Past Week     lamoTRIgine (LAMICTAL) 200 MG tablet Take 200 mg by mouth At Bedtime   11/28/2022 at hs     Multiple Vitamins-Minerals (MULTIVITAMIN ADULT PO) Take 1 tablet by mouth daily   11/28/2022     polyethylene glycol-propylene glycol (SYSTANE ULTRA) 0.4-0.3 % SOLN ophthalmic solution Place 2 drops into both eyes 4 times daily as needed   11/29/2022 at am     tamsulosin (FLOMAX) 0.4 MG capsule Take 1 capsule (0.4 mg) by mouth every evening 90 capsule 3 11/28/2022 at hs     traZODone (DESYREL) 50 MG tablet Take 2 tablets (100 mg) by mouth At Bedtime   11/28/2022 at hs     vitamin C (ASCORBIC ACID) 500 MG tablet Take 500 mg by mouth daily   11/28/2022     zinc 50 MG TABS Take 100 mg by mouth daily   11/28/2022     Current  Facility-Administered Medications Ordered in Epic   Medication Dose Route Frequency Last Rate Last Admin     busPIRone (BUSPAR) tablet 15 mg  15 mg Oral BID   15 mg at 12/07/22 0801     carboxymethylcellulose PF (REFRESH PLUS) 0.5 % ophthalmic solution 2 drop  2 drop Both Eyes 4x Daily PRN   2 drop at 12/06/22 2109     escitalopram (LEXAPRO) tablet 20 mg  20 mg Oral Daily   20 mg at 12/07/22 0801     hydrALAZINE (APRESOLINE) injection 10 mg  10 mg Intravenous Q4H PRN         lamoTRIgine (LaMICtal) tablet 200 mg  200 mg Oral At Bedtime   200 mg at 12/06/22 2109     lidocaine (LMX4) cream   Topical Q1H PRN         lidocaine 1 % 0.1-1 mL  0.1-1 mL Other Q1H PRN         melatonin tablet 1 mg  1 mg Oral At Bedtime PRN         naloxone (NARCAN) injection 0.2 mg  0.2 mg Intravenous Q2 Min PRN         naloxone (NARCAN) injection 0.2 mg  0.2 mg Intramuscular Q2 Min PRN         naloxone (NARCAN) injection 0.4 mg  0.4 mg Intravenous Q2 Min PRN         naloxone (NARCAN) injection 0.4 mg  0.4 mg Intramuscular Q2 Min PRN         nitroGLYcerin (NITROSTAT) sublingual tablet 0.4 mg  0.4 mg Sublingual Q5 Min PRN         ondansetron (ZOFRAN ODT) ODT tab 4 mg  4 mg Oral Q6H PRN        Or     ondansetron (ZOFRAN) injection 4 mg  4 mg Intravenous Q6H PRN         pantoprazole (PROTONIX) EC tablet 40 mg  40 mg Oral BID AC   40 mg at 12/07/22 0631     sodium chloride (PF) 0.9% PF flush 3 mL  3 mL Intracatheter Q8H   3 mL at 12/07/22 1426     sodium chloride (PF) 0.9% PF flush 3 mL  3 mL Intracatheter q1 min prn         tamsulosin (FLOMAX) capsule 0.4 mg  0.4 mg Oral QPM   0.4 mg at 12/06/22 2109     traZODone (DESYREL) tablet 100 mg  100 mg Oral At Bedtime   100 mg at 12/06/22 2109     No current [facility]-ordered outpatient medications on file.             Review of Systems:     The 14 point Review of Systems is negative other than noted in the HPI.            Data:   All laboratory data reviewed  Results for orders placed or performed during  the hospital encounter of 11/29/22 (from the past 24 hour(s))   Hemoglobin   Result Value Ref Range    Hemoglobin 7.9 (L) 13.3 - 17.7 g/dL   Hemoglobin   Result Value Ref Range    Hemoglobin 6.8 (LL) 13.3 - 17.7 g/dL   ABO/Rh type and screen    Narrative    The following orders were created for panel order ABO/Rh type and screen.  Procedure                               Abnormality         Status                     ---------                               -----------         ------                     Adult Type and Screen[[id number]]                            Final result                 Please view results for these tests on the individual orders.   Adult Type and Screen   Result Value Ref Range    ABO/RH(D) O POS     Antibody Screen Negative Negative    SPECIMEN EXPIRATION DATE [id number]    CONDITIONAL Prepare red blood cells (unit)   Result Value Ref Range    Blood Component Type Red Blood Cells     Product Code [id number]     Unit Status Transfused     Unit Number [id number]     CROSSMATCH Compatible     CODING SYSTEM [id number]     ISSUE DATE AND TIME [id number]     UNIT ABO/RH O+     UNIT TYPE ISBT 5100    Hemoglobin   Result Value Ref Range    Hemoglobin 8.6 (L) 13.3 - 17.7 g/dL

## 2022-12-07 NOTE — PLAN OF CARE
Pt is A&Ox4, VSS on RA. Lung sounds clear. Up w/1, gait belt, and walker, voiding via Prieto catheter. Regular diet, LBM 12/7. CMS intact, foam dressing to R buttock skin tear CDI. No PRN pain medications given this shift. IV SL. 0000 Hgb check 6.8, 1 unit(s) PRBC currently being transfused per conditional orders. 0600 Hgb check retimed. Will continue to follow plan of care.

## 2022-12-07 NOTE — PROGRESS NOTES
[facility]  Gastroenterology Progress Note     Suresh Powers MRN# [id number]   YOB: 1960 Age: 62 year old          Assessment and Plan:   Suresh Powers is a 62 year old male admitted on 11/29 with melena, weakness, pulmonary PE and concern for upper GI bleed    Multiple subsegmental pulmonary emboli without acute cor pulmonale (H)  GI bleed due to duodenal ucler   11/30 EGD revealed non bleeding cratered duodenal ulcer with no stigmata of bleedingHemoglobin 3.8 on admission and had been stable in mid 7 range with heparin trial. Had Eliquis started yesterday and hemoglobin decreased to 6.8. Transfused and waiting for blood draw. Has had no blood in stools or any other source of blood loss.  Diagnosed with right lower lobe pulmonary embolism and requires anticoagulation    -- Serial hemoglobin and transfuse for 7 or less  -- IV pantoprazole 40 mg BID  -- Has had Eliquis started yesterday and now held. May need to wait an additional few days to restart. Also if has any signs of GI bleed may need to consider a colonoscopy.  -- GI will follow along                Interval History:   doing well; no cp, sob, n/v/d, or abd pain. Stools brown and bristol stool scale 5-6.              Review of Systems:   C: NEGATIVE for fever, chills, change in weight  E/M: NEGATIVE for ear, mouth and throat problems  R: NEGATIVE for significant cough or SOB  CV: NEGATIVE for chest pain, palpitations or peripheral edema             Medications:   I have reviewed this patient's current medications    [Held by provider] amLODIPine  5 mg Oral Daily     busPIRone  15 mg Oral BID     escitalopram  20 mg Oral Daily     lamoTRIgine  200 mg Oral At Bedtime     pantoprazole  40 mg Oral BID AC     sodium chloride (PF)  3 mL Intracatheter Q8H     tamsulosin  0.4 mg Oral QPM     traZODone  100 mg Oral At Bedtime                  Physical Exam:   Vitals were reviewed  Vital Signs with Ranges  Temp:  [97.9  F (36.6   C)-98.6  F (37  C)] 98.1  F (36.7  C)  Pulse:  [80-94] 80  Resp:  [16-20] 16  BP: (117-140)/(72-91) 129/83  SpO2:  [94 %-97 %] 97 %  I/O last 3 completed shifts:  In: 483 [P.O.:480; I.V.:3]  Out: 3975 [Urine:3975]  Constitutional: healthy, alert and no distress   Cardiovascular: negative, PMI normal. No lifts, heaves, or thrills. RRR. No murmurs, clicks gallops or rub  Respiratory: negative, Percussion normal. Good diaphragmatic excursion. Lungs clear  Abdomen: Abdomen soft, non-tender. BS normal. No masses, organomegaly           Data:   I reviewed the patient's new clinical lab test results.   Recent Labs   Lab Test 12/06/22  2357 12/06/22  1810 12/06/22  1238 12/06/22  0544 12/03/22  1413 12/03/22  0848 12/03/22  0607 11/30/22  1423 11/30/22  1221 11/30/22  0023 11/29/22  1352 09/03/22  2305 06/15/21  1131   WBC  --   --   --  7.0  --  8.4 8.0  --   --    < >  --    < > 6.6   HGB 6.8* 7.9* 7.9* 7.5*   < > 8.0* 8.0*   < >  --    < >  --    < > 14.2   MCV  --   --   --  87  --  86 87  --   --    < >  --    < > 93   PLT  --   --   --  430  --  537* 519*  --   --    < >  --    < > 327   INR  --   --   --   --   --   --   --   --  1.04  --  1.02  --  0.87    < > = values in this interval not displayed.     Recent Labs   Lab Test 12/03/22  0607 11/30/22  0539 11/29/22  1351   POTASSIUM 4.2 3.5 3.6   CHLORIDE 108 101 95   CO2 26 27 25   BUN 21 6* 5*   ANIONGAP 6 5 10     Recent Labs   Lab Test 11/29/22  1648 11/29/22  1351 11/01/22  1110 10/16/22  0803 10/15/22  0748 10/10/22  1156   ALBUMIN  --  2.6* 3.6 2.9*   < >  --    BILITOTAL  --  0.4 0.6 0.3   < >  --    ALT  --  19 29 23   < >  --    AST  --  12 31 15   < >  --    PROTEIN Negative  --   --   --   --  20*    < > = values in this interval not displayed.       I reviewed the patient's new imaging results.    All laboratory data reviewed  All imaging studies reviewed by me.    Aurelia Holguin PA-C,  11/30/2022  [facility]  Office : [phone]  Cell: [phone] (Dr. Alaniz)  Cell: [phone] (Aurelia Holguin PA-C)

## 2022-12-08 ENCOUNTER — LAB REQUISITION (OUTPATIENT)
Dept: LAB | Facility: CLINIC | Age: 62
End: 2022-12-08

## 2022-12-08 ENCOUNTER — PATIENT OUTREACH (OUTPATIENT)
Dept: CARE COORDINATION | Facility: CLINIC | Age: 62
End: 2022-12-08

## 2022-12-08 VITALS
RESPIRATION RATE: 18 BRPM | OXYGEN SATURATION: 96 % | HEIGHT: 67 IN | TEMPERATURE: 98.1 F | DIASTOLIC BLOOD PRESSURE: 85 MMHG | WEIGHT: 188.9 LBS | BODY MASS INDEX: 29.65 KG/M2 | HEART RATE: 86 BPM | SYSTOLIC BLOOD PRESSURE: 137 MMHG

## 2022-12-08 VITALS
DIASTOLIC BLOOD PRESSURE: 84 MMHG | TEMPERATURE: 98.5 F | SYSTOLIC BLOOD PRESSURE: 148 MMHG | RESPIRATION RATE: 18 BRPM | OXYGEN SATURATION: 94 % | HEART RATE: 78 BPM

## 2022-12-08 DIAGNOSIS — Z00.01 ENCOUNTER FOR GENERAL ADULT MEDICAL EXAMINATION WITH ABNORMAL FINDINGS: ICD-10-CM

## 2022-12-08 LAB
HGB BLD-MCNC: 8 G/DL (ref 13.3–17.7)
HGB BLD-MCNC: 8.6 G/DL (ref 13.3–17.7)

## 2022-12-08 PROCEDURE — 85018 HEMOGLOBIN: CPT | Performed by: INTERNAL MEDICINE

## 2022-12-08 PROCEDURE — 250N000013 HC RX MED GY IP 250 OP 250 PS 637

## 2022-12-08 PROCEDURE — 36415 COLL VENOUS BLD VENIPUNCTURE: CPT | Performed by: INTERNAL MEDICINE

## 2022-12-08 PROCEDURE — 250N000013 HC RX MED GY IP 250 OP 250 PS 637: Performed by: HOSPITALIST

## 2022-12-08 PROCEDURE — 99239 HOSP IP/OBS DSCHRG MGMT >30: CPT | Performed by: INTERNAL MEDICINE

## 2022-12-08 PROCEDURE — G0452 MOLECULAR PATHOLOGY INTERPR: HCPCS | Mod: 26 | Performed by: PATHOLOGY

## 2022-12-08 RX ORDER — PANTOPRAZOLE SODIUM 40 MG/1
40 TABLET, DELAYED RELEASE ORAL
Qty: 60 TABLET | Refills: 1 | DISCHARGE
Start: 2022-12-08 | End: 2023-02-22

## 2022-12-08 RX ADMIN — PANTOPRAZOLE SODIUM 40 MG: 40 TABLET, DELAYED RELEASE ORAL at 08:33

## 2022-12-08 RX ADMIN — ESCITALOPRAM OXALATE 20 MG: 10 TABLET ORAL at 08:33

## 2022-12-08 RX ADMIN — BUSPIRONE HYDROCHLORIDE 15 MG: 15 TABLET ORAL at 08:33

## 2022-12-08 ASSESSMENT — ACTIVITIES OF DAILY LIVING (ADL)
ADLS_ACUITY_SCORE: 39
ADLS_ACUITY_SCORE: 37
ADLS_ACUITY_SCORE: 43
ADLS_ACUITY_SCORE: 39
ADLS_ACUITY_SCORE: 39
ADLS_ACUITY_SCORE: 43

## 2022-12-08 NOTE — PLAN OF CARE
Occupational Therapy Discharge Summary    Reason for therapy discharge:    Discharged to transitional care facility.    Progress towards therapy goal(s). See goals on Care Plan in [facility] electronic health record for goal details.  Goals partially met.  Barriers to achieving goals:   discharge from facility.    Therapy recommendation(s):    Continued therapy is recommended.  Rationale/Recommendations:  Pt functioning below baseline and will benefit from continued skilled OT to maximize safety and independence.

## 2022-12-08 NOTE — PROGRESS NOTES
Care Management Discharge Note    Discharge Date: 12/08/2022       Discharge Disposition: Transitional Care    Discharge Services:      Discharge DME:      Discharge Transportation:      Private pay costs discussed: Not applicable    PAS Confirmation Code: [id number]  Patient/family educated on Medicare website which has current facility and service quality ratings: yes    Education Provided on the Discharge Plan:  yes  Persons Notified of Discharge Plans: Pt, RN, MD, facility  Patient/Family in Agreement with the Plan: yes    Handoff Referral Completed: No    Additional Information:  Pt will discharge today to [facility] via transport aide at 1100. Orders faxed and facility updated. Pt updated and in agreement. Nursing aware.       OSMEL Monae, LGSW  [phone] Desk phone  [phone] Cell/text (Preferred)  [facility]      PAS-RR    D: Per DHS regulation, SW completed and submitted PAS-RR to [place] [employer] Direct Connect via the Senior LinkAge Line.  PAS-RR confirmation # is : [id number]      P: Further questions may be directed to [facility] LinkAge Line at #[phone], option #4 for PAS-RR staff.

## 2022-12-08 NOTE — DISCHARGE SUMMARY
[facility]  Discharge Summary        Suresh Powers MRN# [id number]   YOB: 1960 Age: 62 year old     Date of Admission:  11/29/2022  Date of Discharge:  12/8/2022  Admitting Physician:  Saturnino Ponce MD  Discharge Physician: Briana García MD  Discharging Service: Hospitalist     Primary Provider: Brad Thurston  Primary Care Physician Phone Number: [phone]         Discharge Diagnoses/Problem Oriented Hospital Course (Providers):    Suresh Powers was admitted on 11/29/2022 by Saturnino Ponce MD and I would refer you to their history and physical.  The following problems were addressed during his hospitalization:      Suresh Powers is a 62 year old male with PMH significant for hypertension, h/o urinary retention, anxiety/depression/OCD, single kidney (donated to sister 1990), mild chronic hyponatremia who presented to ED with melena, generalized weakness, noted with pulmonary embolism and likely upper G.I. bleed, admitted 11/29/22.     Upper G.I. bleed due to duodenal ulcer  Severe acute blood loss anemia on chronic anemia secondary to above  - his recent baseline hemoglobin is around 8 to 9; last hemoglobin from 11/1/22 was 10.1  - on admission hemoglobin 3.8, MCV 80  - has got 6 units PRBC so far; Hb 3.8-----> stabilizing around 8-9 (last 7.9 on 12/3)  - melanotic stools improved; hemodynamically stable and no suggestion of active bleed  - GI following ; EGD 11/30 was noted with large non-bleeding duodenal ulcer  - monitor Hb and transfuse PRN for Hb<7  - continue Protonix  40 mg BID   Transition the IV heparin drip low intensity dose to Eliquis 5 mg p.o. twice daily on 12/6/22     Patient's hemoglobin dropped to 6.8 overnight needing 1 unit PRBCs  Discontinued Eliquis  Hemoglobin every 8 hours ordered.  Hemoglobin remained stable at 8  [place] hematology consultation requested  They recommended restarting Eliquis in 1 week and following hemoglobin closely        Right  lower lobe pulmonary embolism  - CT chest noted with segmental and subsegmental PE in RLL without right heart strain  - currently hemodynamically stable and on room air  - was not started on anticoagulation due to concern for acute G.I. bleed  - US LE 11/29/22 noted with no DVT in b/l LE  - Evaluated by vascular surgery/IR and suggest no need for IVC filter at this time as no DVT noted; hypercoagulopathy workup mostly unremarkable with normal beta-2 glycoprotein, cardiolipin; factor 2 and five mutation analysis pending  - d/w DR Alaniz, given severe anemia with large duodenal ulcer-- GI suggested holding off on initiating anticoagulation for at least 48 hours     - seems to be tolerating low intensity heparin (started 12/3); Hb had drifted down to 7.3.    Transitioned heparin drip to Eliquis 5 mg p.o. twice daily for anticoagulation  On Eliquis patient's hemoglobin dropped drifted down to 6.8 and needing 1 unit PRBCs  Eliquis discontinued  [place] hematology consultation requested  Hypercoagulability work-up pending  Patient's hemoglobin remained stable at 8 off of Eliquis  Please retrial Eliquis restarting in 1 week at 5 mg p.o. twice daily and monitor hemoglobin closely     Hypertension  Hypotension-- resolved  - was hypotensive 12/1, responded to IVF; BP now improved and stable  - continue to hold off on PTA Amlodipine (12/1)  Complains of dizziness and ongoing GI bleed continue to hold amlodipine discussed with the patient on 12/7/22 the reason for holding the amlodipine  - hydralazine IV PRN for SBP>180     H/o urinary retention  - continue PTA Flomax  - vasquez was placed in ED for retention, failed voiding trial 11/30, replaced vasquez, can try voiding trial at the TCU patient is more ambulatory in 1 to 2 weeks     Anxiety  Depression  OCD  - will continue PTA BuSpar, Lexapro, trazodone, Lamictal      Mild chronic hyponatremia  - Na 130---133--140; has been noted in 120s--130s in past  -IV fluids discontinued;  "monitor BMP intermittently     Deconditioning  generalized weakness  H/o left hip surgery 9/2022  - generalized weakness, presyncope is likely secondary to acute blood loss anemia  - PT rec TCU;  for disposition planning     Diet: Combination Diet Regular Diet       Clinically Significant Risk Factors               # Hypoalbuminemia: Lowest albumin = 2.6 g/dL (Ref range: 3.5-5.2) at 11/29/2022  1:51 PM, will monitor as appropriate           # Overweight: Estimated body mass index is 28.98 kg/m  as calculated from the following:    Height as of this encounter: 1.702 m (5' 7\").    Weight as of this encounter: 83.9 kg (185 lb).        COVID status: asymptomatic COVID screening on admission 11/29 negative     # DVT prophylaxis:   Ambulation     # Code status: full code     Disposition: To TCU today in stable condition     Briana García MD   Pager  [phone](7AM-6PM)            Code Status:      Full Code         Important Results:      Please see below         Pending Results:        Unresulted Labs Ordered in the Past 30 Days of this Admission     No orders found from 10/30/2022 to 11/30/2022.               Discharge Instructions and Follow-Up:      Follow-up Appointments     Follow Up and recommended labs and tests      Follow up with alf physician.  The following labs/tests are   recommended: CBC, BMp in 1week .                  Discharge Disposition:      Discharged to short-term care facility         Discharge Medications:        Discharge Medication List as of 12/8/2022 11:04 AM      START taking these medications    Details   pantoprazole (PROTONIX) 40 MG EC tablet Take 1 tablet (40 mg) by mouth 2 times daily (before meals) Take twice a day for 8weeks then reduce to once  a day, Disp-60 tablet, R-1, Transitional         CONTINUE these medications which have NOT CHANGED    Details   busPIRone (BUSPAR) 15 MG tablet Take 1 tablet (15 mg) by mouth 2 times daily, Historical      escitalopram (LEXAPRO) 20 " "MG tablet Take 20 mg by mouth daily, Historical      fluticasone (FLONASE) 50 MCG/ACT nasal spray use 2 sprays in each nostril daily., Disp-48 g, R-3, E-Prescribe      lamoTRIgine (LAMICTAL) 200 MG tablet Take 200 mg by mouth At Bedtime, HistoricalPrescribed by psychiatry      Multiple Vitamins-Minerals (MULTIVITAMIN ADULT PO) Take 1 tablet by mouth daily, Historical      polyethylene glycol-propylene glycol (SYSTANE ULTRA) 0.4-0.3 % SOLN ophthalmic solution Place 2 drops into both eyes 4 times daily as needed, Historical      tamsulosin (FLOMAX) 0.4 MG capsule Take 1 capsule (0.4 mg) by mouth every evening, Disp-90 capsule, R-3, E-Prescribe      traZODone (DESYREL) 50 MG tablet Take 2 tablets (100 mg) by mouth At Bedtime, Transitional      vitamin C (ASCORBIC ACID) 500 MG tablet Take 500 mg by mouth daily, Historical      zinc 50 MG TABS Take 100 mg by mouth daily, Historical         STOP taking these medications       amLODIPine (NORVASC) 10 MG tablet Comments:   Reason for Stopping:         celecoxib (CELEBREX) 200 MG capsule Comments:   Reason for Stopping:         lactobacillus rhamnosus, GG, (CULTURELL) capsule Comments:   Reason for Stopping:                    Allergies:         Allergies   Allergen Reactions     Lisinopril Other (See Comments)     Elevated potassium     Sertraline Diarrhea            Consultations This Hospital Stay:      Consultation during this admission received from gastroenterology and [place] hematology         Condition and Physical Exam on Discharge:      Discharge condition: Stable   Discharge vitals: Blood pressure 137/85, pulse 86, temperature 98.1  F (36.7  C), temperature source Oral, resp. rate 18, height 1.702 m (5' 7\"), weight 85.7 kg (188 lb 14.4 oz), SpO2 96 %.     Constitutional:  Alert awake, not in acute distress pleasant male lying comfortably in bed   Lungs:  Clear to auscultation bilaterally   Cardiovascular:  Normal rate rhythm regular   Abdomen:  Soft, nontender, " nondistended, no paraspinal megaly   Skin:  Warm and dry   Other:            Discharge Orders for Skilled Facility (from Discharge Orders):        After Care Instructions     Activity - Up with nursing assistance      Diet      Follow this diet upon discharge: Orders Placed This Encounter      Combination Diet Regular Diet         General info for SNF      Length of Stay Estimate: Short Term Care: Estimated # of Days 31-90  Condition at Discharge: Stable  Level of care:skilled   Rehabilitation Potential: Good  Admission H&P remains valid and up-to-date: Yes  Recent Chemotherapy: N/A  Use Nursing Home Standing Orders: Yes         Mantoux instructions      Give two-step Mantoux (PPD) Per Facility Policy No. Pt was at TCU before                    Rehab orders for Skilled Facility (from Discharge Orders):      Referrals     Future Labs/Procedures    Occupational Therapy Adult Consult     Comments:    Evaluate and treat as clinically indicated.    Reason: GI bleed, PE, anemia    Physical Therapy Adult Consult     Comments:    Evaluate and treat as clinically indicated.    Reason:  GI bleed, PE , anemia             Discharge Time:      Greater than 30 minutes.        Image Results From This Hospital Stay (For Non-EPIC Providers):        Results for orders placed or performed during the hospital encounter of 11/29/22   CT Chest Pulmonary Embolism w Contrast     Value    Radiologist flags Pulmonary embolism (AA)    Narrative    CT CHEST PULMONARY EMBOLISM W CONTRAST 11/29/2022 2:54 PM    CLINICAL HISTORY: chest pain, +dimer, lightheaded, recent surgery,  orthostasis  TECHNIQUE: CT angiogram chest during arterial phase injection IV  contrast. 2D and 3D MIP reconstructions were performed by the CT  technologist. Dose reduction techniques were used.     CONTRAST:  69 mL isovue 370    COMPARISON: None.    FINDINGS:  ANGIOGRAM CHEST: Pulmonary arteries are normal caliber. There are  segmental and subsegmental pulmonary emboli  in the right lower lobe.  Thoracic aorta is negative for dissection. No CT evidence of right  heart strain.    LUNGS AND PLEURA: Mild upper lobe predominant centrilobular and  paraseptal emphysema. No focal airspace consolidation or pleural  effusion. Scattered lower lobe predominant bronchial wall thickening  and mucous plugging. Minimal bibasilar atelectasis.    MEDIASTINUM/AXILLAE: Normal.    CORONARY ARTERY CALCIFICATION: Severe.    UPPER ABDOMEN: Left kidney is likely absent.     MUSCULOSKELETAL: No acute bony abnormality. Healed left clavicular  fracture partially visualized with associated orthopedic hardware.      Impression    IMPRESSION:  1.  Segmental and subsegmental pulmonary emboli in the right lower  lobe without evidence of right heart strain.  2. Suggestion of mild bronchitis without focal airspace consolidation.    [Critical Result: Pulmonary embolism]    Finding was identified on 11/29/2022 2:55 PM.     Dr. Vincent was contacted by me on 11/29/2022 3:08 PM and verbalized  understanding of the critical result.     NADINE LUNA MD         SYSTEM ID:  [id number]   Head CT w/o contrast    Narrative    CT SCAN OF THE HEAD WITHOUT CONTRAST   11/29/2022 2:53 PM     HISTORY: Fall, lightheaded, dizzy.    TECHNIQUE:  Axial images of the head and coronal reformations without  IV contrast material. Radiation dose for this scan was reduced using  automated exposure control, adjustment of the mA and/or kV according  to patient size, or iterative reconstruction technique.    COMPARISON: Brain MR 3/24/2021.    FINDINGS: No acute intracranial hemorrhage. No mass effect or midline  shift. Mild diffuse parenchymal volume loss. Chronic cortical infarct  in the left parietal lobe which is unchanged since prior MR. Patchy  periventricular white matter hypodensities are nonspecific, but most  likely related to chronic microvascular ischemic disease. No abnormal  extra-axial fluid collection. Ventricular size is within  normal limits  without evidence of hydrocephalus.    Scattered mucosal thickening in the paranasal sinuses. The bony  calvarium and bones of the skull base appear intact.       Impression    IMPRESSION:     1. No evidence of acute intracranial hemorrhage, mass, or herniation.  2. Mild diffuse parenchymal volume loss and white matter changes most  likely due to chronic microvascular ischemic disease.  3. Probable chronic cortical infarct in the left parietal lobe.    DAREN BRUNSON MD         SYSTEM ID:  [id number]   US Lower Extremity Venous Duplex Bilateral    Narrative    EXAM: US LOWER EXTREMITY VENOUS DUPLEX BILATERAL  LOCATION: [facility]  DATE/TIME: 11/29/2022 6:21 PM    INDICATION: PE, history of hip surgery, leg swelling R>L.  Has GI bleed too, getting a filter  COMPARISON: 10/10/2022  TECHNIQUE: Venous Duplex ultrasound of bilateral lower extremities with and without compression, augmentation and duplex. Color flow and spectral Doppler with waveform analysis performed.    FINDINGS: Exam includes the common femoral, femoral, popliteal veins as well as segmentally visualized deep calf veins and greater saphenous vein.     RIGHT: No deep vein thrombosis. No superficial thrombophlebitis. No popliteal cyst.    LEFT: No deep vein thrombosis. No superficial thrombophlebitis. No popliteal cyst.      Impression    IMPRESSION:  1.  No deep venous thrombosis in the bilateral lower extremities.   CT Abdomen Pelvis w Contrast    Narrative    EXAM: CT ABDOMEN PELVIS W CONTRAST  LOCATION: [facility]  DATE/TIME: 12/7/2022 6:13 PM    INDICATION: Pulmonary embolism; rule out malignancy.  COMPARISON: None.  TECHNIQUE: CT scan of the abdomen and pelvis was performed following injection of IV contrast. Multiplanar reformats were obtained. Dose reduction techniques were used.  CONTRAST: 93 mL Isovue 370.    FINDINGS:   LOWER CHEST: No definite infiltrate or  effusion.    HEPATOBILIARY: Normal contour with no significant mass. No bile duct dilatation. Calcified gallstones.    PANCREAS: No significant mass, duct dilatation, or inflammatory change.    SPLEEN: Normal size.    ADRENAL GLANDS: Minimal adrenal thickening, no discrete mass.    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 Right kidney unremarkable. No hydronephrosis. Bladder unremarkable.    BOWEL: No obstruction or inflammatory change.    LYMPH NODES: A few minimally prominent retroperitoneal lymph nodes are noted measuring up to 1.1 cm short axis. These are nonspecific.    VASCULATURE: There are mild atherosclerotic changes of the visualized aorta and its branches. There is no evidence of aortic dissection or aneurysm.    PELVIC ORGANS: No pelvic masses.    MUSCULOSKELETAL: Unremarkable.      Impression    IMPRESSION:   1.  Minimal retroperitoneal adenopathy by size criteria which is nonspecific.  2.  Otherwise no occult malignancy demonstrated.             Most Recent Lab Results In EPIC (For Non-EPIC Providers):    Most Recent 3 CBC's:  Recent Labs   Lab Test 12/08/22  1059 12/08/22  0536 12/07/22  2252 12/06/22  1238 12/06/22  0544 12/03/22  1413 12/03/22  0848 12/03/22  0607   WBC  --   --   --   --  7.0  --  8.4 8.0   HGB 8.6* 8.0* 8.4*   < > 7.5*   < > 8.0* 8.0*   MCV  --   --   --   --  87  --  86 87   PLT  --   --   --   --  430  --  537* 519*    < > = values in this interval not displayed.      Most Recent 3 BMP's:  Recent Labs   Lab Test 12/03/22  0607 11/30/22  0539 11/29/22  1351    133 130*   POTASSIUM 4.2 3.5 3.6   CHLORIDE 108 101 95   CO2 26 27 25   BUN 21 6* 5*   CR 0.68 0.65* 0.67   ANIONGAP 6 5 10   NIMISHA 7.9* 8.0* 8.5   * 110* 94     Most Recent 3 Troponin's:  Recent Labs   Lab Test 03/24/21  1158   TROPI <0.015     Most Recent 3 INR's:  Recent Labs   Lab Test 11/30/22  1221 11/29/22  1352 06/15/21  1131   INR 1.04 1.02 0.87     Most Recent 2  LFT's:  Recent Labs   Lab Test 11/29/22  1351 11/01/22  1110   AST 12 31   ALT 19 29   ALKPHOS 99 178*   BILITOTAL 0.4 0.6     Most Recent Cholesterol Panel:  Recent Labs   Lab Test 06/15/21  1131   CHOL 224*   LDL 43      TRIG 31     Most Recent 6 Bacteria Isolates From Any Culture (See EPIC Reports for Culture Details):No lab results found.  Most Recent TSH, T4 and HgbA1c:  Recent Labs   Lab Test 10/11/22  1135 06/15/21  1131 10/13/20  1033   TSH 1.50   < >  --    A1C  --   --  5.0    < > = values in this interval not displayed.

## 2022-12-08 NOTE — PLAN OF CARE
Goal Outcome Evaluation:      Plan of Care Reviewed With: patient    Overall Patient Progress: improvingOverall Patient Progress: improving    Orientations: 4  Vitals/Pain: VSS no complaints of pain   Tele: NA  Lines/Drains: [place] - draining pale/clear/yellow urine adequately  Skin/Wounds: BLE trace edema   GI/: [place] - will discharge with it.  Labs: Abnormal/Trends, Electrolyte Replacement- Monitoring Hgb - stable over night   Ambulation/Assist: x1 gait belt and walker  Sleep Quality: OK - refused repositioning  Plan: discharge to

## 2022-12-08 NOTE — PLAN OF CARE
Orientations: A&Ox4   Vitals/Pain: VSS, RA, denies pain at rest, increased pain w/ activity at hip fracture   Lines/Drains: PIV right lower arm removed, minimal bleeding and wrapped. Educated to keep wrapped for an hour and rewrap if bleeding more. Pt sent with extra gauze and wrap.   Skin/Wounds: Bruise BUE, Redness/skin tear right buttocks   GI/: Vasquez in place and will be discharged w/ vasquez, No BM today.   Labs: Abnormal/Trends: Hgb (8.0), repeat drawn at 11:00, (8.6)  Electrolyte Replacement- N/A  Ambulation/Assist: Standby assist   Sleep Quality: Slept well overnight, excited to leave!  Plan: Pt discharging and transferring to [place] [facility] via transport aid, per IDA note. Pt left at 11:05 w/ volunteer transport

## 2022-12-09 ENCOUNTER — TRANSITIONAL CARE UNIT VISIT (OUTPATIENT)
Dept: GERIATRICS | Facility: CLINIC | Age: 62
End: 2022-12-09
Payer: COMMERCIAL

## 2022-12-09 DIAGNOSIS — I26.99 ACUTE PULMONARY EMBOLISM, UNSPECIFIED PULMONARY EMBOLISM TYPE, UNSPECIFIED WHETHER ACUTE COR PULMONALE PRESENT (H): ICD-10-CM

## 2022-12-09 DIAGNOSIS — R33.9 URINARY RETENTION: ICD-10-CM

## 2022-12-09 DIAGNOSIS — F33.1 MODERATE EPISODE OF RECURRENT MAJOR DEPRESSIVE DISORDER (H): ICD-10-CM

## 2022-12-09 DIAGNOSIS — E87.1 HYPONATREMIA: ICD-10-CM

## 2022-12-09 DIAGNOSIS — R19.5 LOOSE STOOLS: ICD-10-CM

## 2022-12-09 DIAGNOSIS — F42.9 OBSESSIVE-COMPULSIVE DISORDER, UNSPECIFIED TYPE: ICD-10-CM

## 2022-12-09 DIAGNOSIS — Z71.89 ADVANCED DIRECTIVES, COUNSELING/DISCUSSION: ICD-10-CM

## 2022-12-09 DIAGNOSIS — Z90.5 SOLITARY KIDNEY, ACQUIRED: ICD-10-CM

## 2022-12-09 DIAGNOSIS — I10 BENIGN ESSENTIAL HYPERTENSION: ICD-10-CM

## 2022-12-09 DIAGNOSIS — D62 ANEMIA DUE TO BLOOD LOSS, ACUTE: ICD-10-CM

## 2022-12-09 DIAGNOSIS — F41.9 ANXIETY: ICD-10-CM

## 2022-12-09 DIAGNOSIS — K92.2 GASTROINTESTINAL HEMORRHAGE, UNSPECIFIED GASTROINTESTINAL HEMORRHAGE TYPE: Primary | ICD-10-CM

## 2022-12-09 DIAGNOSIS — R53.81 PHYSICAL DECONDITIONING: ICD-10-CM

## 2022-12-09 PROCEDURE — P9604 ONE-WAY ALLOW PRORATED TRIP: HCPCS | Performed by: NURSE PRACTITIONER

## 2022-12-09 PROCEDURE — 86481 TB AG RESPONSE T-CELL SUSP: CPT | Performed by: NURSE PRACTITIONER

## 2022-12-09 PROCEDURE — 36415 COLL VENOUS BLD VENIPUNCTURE: CPT | Performed by: NURSE PRACTITIONER

## 2022-12-09 PROCEDURE — 99310 SBSQ NF CARE HIGH MDM 45: CPT | Performed by: NURSE PRACTITIONER

## 2022-12-09 RX ORDER — LOPERAMIDE HYDROCHLORIDE 2 MG/1
2 TABLET ORAL 4 TIMES DAILY PRN
COMMUNITY
End: 2023-01-20

## 2022-12-09 NOTE — PROGRESS NOTES
[facility] GERIATRICS    PRIMARY CARE PROVIDER AND CLINIC:  Brad Thurston MD, [address] [place] [address] / [facility] [id number]  Chief Complaint   Patient presents with     Hospital F/U      [facility] Medical Record Number:  [id number]  Place of Service where encounter took place:  [facility] (TCU) [[id number]]    Suresh Powers  is a 62 year old  (1960), admitted to the above facility from  [facility]. Hospital stay 11/29/22 through 12/8/22.  HPI:    62 year old male with PMH significant for HTN, h/o urinary retention, anxiety/depression/OCD, single kidney (donated to sister 1990), mild chronic hyponatremia hospitalized with melena, generalized weakness, noted with PE and likely upper G.I. bleed with severe anemia. Hgb 3.8 on admission, received 6 units blood and Hgb 7.9. GI: EGD 11/30 with large non-bleeding duodenal ulcer, on Protonix 40 mg BID. Started Eliquis but discontinued due to Hgb dropped to 6.8,received one more unit of blood and hematology recommend hold Eliquis x week then attempt to restart at 5 mg BID. HTN with hypotension (resolved) holding Norvasc. Urinary retention on Flomax, vasquez in place. Anxiety, depression, right eye: on PTA BuSpar, Lexapro, trazodone, Lamictal. Chronic hyponatremia currently 130-140. Weakness to TCU for rehab.      Patient seen for initial TCU visit. Reports loose stools, will add imodium. No headaches, chest pain, dyspnea. Does get dizzy when first up. Continues with Vasquez catheter, will attempt voiding trial when stronger. Asks to be Full Code. BP range 123-149/68-86 and sats 95% room air.     CODE STATUS/ADVANCE DIRECTIVES DISCUSSION:  Full Code  CPR/Full code   ALLERGIES:   Allergies   Allergen Reactions     Lisinopril Other (See Comments)     Elevated potassium     Sertraline Diarrhea      PAST MEDICAL HISTORY:   Past Medical History:   Diagnosis Date     Anxiety     sees psych for trazodone     Clostridium difficile diarrhea 3/9/2016     Foot  pain      Hypertension      Mold exposure      Shingles 1984      PAST SURGICAL HISTORY:   has a past surgical history that includes foot crush injury; kidney donation; Open reduction internal fixation tibial plateau (Left, 2/4/2016); Open reduction internal fixation clavicle (Left, 2/4/2016); Open reduction internal fixation hip nailing (Left, 9/4/2022); and Esophagoscopy, gastroscopy, duodenoscopy (EGD), combined (N/A, 11/30/2022).  FAMILY HISTORY: family history includes Breast Cancer in his mother; Cerebrovascular Disease in his father; Diabetes Type 2  in his mother; Skin Cancer in his father.  SOCIAL HISTORY:   reports that he has quit smoking. His smoking use included cigarettes. He smoked an average of 1.00 packs per day. He quit smokeless tobacco use about 6 years ago. He reports current alcohol use. He reports that he does not use drugs.  Patient's living condition: lives alone    Post Discharge Medication Reconciliation Status:   MED REC REQUIRED  Post Medication Reconciliation Status:  Discharge medications reconciled and changed, see notes/orders         Current Outpatient Medications   Medication Sig     busPIRone (BUSPAR) 15 MG tablet Take 1 tablet (15 mg) by mouth 2 times daily     escitalopram (LEXAPRO) 20 MG tablet Take 20 mg by mouth daily     fluticasone (FLONASE) 50 MCG/ACT nasal spray use 2 sprays in each nostril daily. (Patient taking differently: Spray 2 sprays into both nostrils daily as needed use 2 sprays in each nostril daily.)     lamoTRIgine (LAMICTAL) 200 MG tablet Take 200 mg by mouth At Bedtime     loperamide (IMODIUM A-D) 2 MG tablet Take 2 mg by mouth 4 times daily as needed for diarrhea     Multiple Vitamins-Minerals (MULTIVITAMIN ADULT PO) Take 1 tablet by mouth daily     pantoprazole (PROTONIX) 40 MG EC tablet Take 1 tablet (40 mg) by mouth 2 times daily (before meals) Take twice a day for 8weeks then reduce to once  a day     polyethylene glycol-propylene glycol (SYSTANE ULTRA)  0.4-0.3 % SOLN ophthalmic solution Place 2 drops into both eyes 4 times daily as needed     tamsulosin (FLOMAX) 0.4 MG capsule Take 1 capsule (0.4 mg) by mouth every evening     traZODone (DESYREL) 50 MG tablet Take 2 tablets (100 mg) by mouth At Bedtime     vitamin C (ASCORBIC ACID) 500 MG tablet Take 500 mg by mouth daily     zinc 50 MG TABS Take 100 mg by mouth daily     amLODIPine (NORVASC) 5 MG tablet Take 5 mg by mouth daily     No current facility-administered medications for this visit.       ROS:  10 point ROS of systems including Constitutional, Eyes, Respiratory, Cardiovascular, Gastroenterology, Genitourinary, Integumentary, Musculoskeletal, Psychiatric were all negative except for pertinent positives noted in my HPI.    Vitals:  BP (!) 148/84   Pulse 78   Temp 98.5  F (36.9  C)   Resp 18   SpO2 94%   Exam:  GENERAL APPEARANCE:  Alert, in no distress, pleasant, cooperative, oriented x 4  EYES:  EOM, lids, pupils and irises normal, sclera clear and conjunctiva normal, no discharge or mattering on lids or lashes noted  ENT:  Mouth normal, moist mucous membranes, nose normal without drainage or crusting, external ears without lesions, hearing acuity intact  NECK: supple, symmetrical, trachea midline  RESP:  respiratory effort normal, no chest wall tenderness, no respiratory distress, Lung sounds clear, patient is on room air  CV:  Auscultation of heart done, rate and rhythm controlled and regular, no murmur, no rub or gallop. Edema none bilateral lower extremities.   ABDOMEN:  normal bowel sounds, soft, nontender, no palpable masses. : vasquez in place with clear yellow urine  M/S:   Gait and station unsafe without assistance, no tenderness or swelling of the joints; able to move all extremities, digits normal  NEURO: cranial nerves 2-12 grossly intact, no facial asymmetry, no speech deficits and able to follow directions, moves all extremities symmetrically  PSYCH:  insight and judgement and memory  intact, affect and mood normal     Lab/Diagnostic data:  Most Recent 3 CBC's:Recent Labs   Lab Test 12/08/22  1059 12/08/22  0536 12/07/22  2252 12/06/22  1238 12/06/22  0544 12/03/22  1413 12/03/22  0848 12/03/22  0607   WBC  --   --   --   --  7.0  --  8.4 8.0   HGB 8.6* 8.0* 8.4*   < > 7.5*   < > 8.0* 8.0*   MCV  --   --   --   --  87  --  86 87   PLT  --   --   --   --  430  --  537* 519*    < > = values in this interval not displayed.     Most Recent 3 BMP's:Recent Labs   Lab Test 12/03/22  0607 11/30/22  0539 11/29/22  1351    133 130*   POTASSIUM 4.2 3.5 3.6   CHLORIDE 108 101 95   CO2 26 27 25   BUN 21 6* 5*   CR 0.68 0.65* 0.67   ANIONGAP 6 5 10   NIMISHA 7.9* 8.0* 8.5   * 110* 94       ASSESSMENT/PLAN:  Upper GI bleed  Acute blood loss anemia  Acute, improved with transfusion. Last Hgb 8.6. Check CBC and BMP on 12/12 and then check Hgb daily M-F per patient request. Continue pantoprazole 40 mg twice daily. Follow-up with GI as directed     Right lower lobe pulmonary embolism  New onset. If Hgb remains stable will resume apixaban in one week.      Essential hypertension  Chronic, on amlodipine 5 mg daily. Monitor blood pressure. BMP 12/12     Hyponatremia  Resolved. BMP 12/12.      BPH with postop urinary retention  Solitary right kidney  Acute on chronic. Prieto in place, voiding trial next week. Continue tamsulosin 0.4 mg daily    Depression  SPIKE  OCD  Chronic, well managed with PTA buspirone 50 mg twice daily, escitalopram 20 mg daily, lamotrigine 200 mg at bedtime, and trazodone 100 mg at bedtime. Monitor symptoms     Physical deconditioning  Acute on chronic. Continue PT/OT evaluation and therapy    Loose stools  Reports loose stools, asks for PRN imodium. Monitor.     Orders:  1. Full Code  2. CBC and BMP on 12/12, then starting 12/13 check Hgb daily M-F diagnosis anemia  3. Imodium 2 mg QID PRN loose stools    Total unit/floor time of 36 minutes spent consisted of the following: examination  of patient, reviewing the record including pertinent labs and imaging. More than 50% of this time was spent in coordination of care with the patient, nursing staff, and other healthcare providers. This time was spent on discussing the care plan including labs, discharge/safe placement, and specialty follow up. Additional specific discussion included review of code status, management of loose stools, monitoring of Hgb and renal function, expected TCU course/routine/discharge planning and clarification of meds/orders with nursing for a total of over 19 min.     Electronically signed by:  LUIS Unger CNP

## 2022-12-11 ENCOUNTER — LAB REQUISITION (OUTPATIENT)
Dept: LAB | Facility: CLINIC | Age: 62
End: 2022-12-11

## 2022-12-11 DIAGNOSIS — R53.1 WEAKNESS: ICD-10-CM

## 2022-12-12 ENCOUNTER — LAB REQUISITION (OUTPATIENT)
Dept: LAB | Facility: CLINIC | Age: 62
End: 2022-12-12

## 2022-12-12 DIAGNOSIS — D64.9 ANEMIA, UNSPECIFIED: ICD-10-CM

## 2022-12-12 LAB
ANION GAP SERPL CALCULATED.3IONS-SCNC: 6 MMOL/L (ref 3–14)
BUN SERPL-MCNC: 28 MG/DL (ref 7–30)
CALCIUM SERPL-MCNC: 8.7 MG/DL (ref 8.5–10.1)
CHLORIDE BLD-SCNC: 102 MMOL/L (ref 94–109)
CO2 SERPL-SCNC: 26 MMOL/L (ref 20–32)
CREAT SERPL-MCNC: 0.66 MG/DL (ref 0.66–1.25)
ERYTHROCYTE [DISTWIDTH] IN BLOOD BY AUTOMATED COUNT: 18.6 % (ref 10–15)
GAMMA INTERFERON BACKGROUND BLD IA-ACNC: 0.06 IU/ML
GFR SERPL CREATININE-BSD FRML MDRD: >90 ML/MIN/1.73M2
GLUCOSE BLD-MCNC: 106 MG/DL (ref 70–99)
HCT VFR BLD AUTO: 27.2 % (ref 40–53)
HGB BLD-MCNC: 8.4 G/DL (ref 13.3–17.7)
M TB IFN-G BLD-IMP: NEGATIVE
M TB IFN-G CD4+ BCKGRND COR BLD-ACNC: 5.39 IU/ML
MCH RBC QN AUTO: 26.4 PG (ref 26.5–33)
MCHC RBC AUTO-ENTMCNC: 30.9 G/DL (ref 31.5–36.5)
MCV RBC AUTO: 86 FL (ref 78–100)
MITOGEN IGNF BCKGRD COR BLD-ACNC: 0.02 IU/ML
MITOGEN IGNF BCKGRD COR BLD-ACNC: 0.02 IU/ML
PLATELET # BLD AUTO: 422 10E3/UL (ref 150–450)
POTASSIUM BLD-SCNC: 4.1 MMOL/L (ref 3.4–5.3)
QUANTIFERON MITOGEN: 5.45 IU/ML
QUANTIFERON NIL TUBE: 0.06 IU/ML
QUANTIFERON TB1 TUBE: 0.08 IU/ML
QUANTIFERON TB2 TUBE: 0.08
RBC # BLD AUTO: 3.18 10E6/UL (ref 4.4–5.9)
SODIUM SERPL-SCNC: 134 MMOL/L (ref 133–144)
WBC # BLD AUTO: 7.8 10E3/UL (ref 4–11)

## 2022-12-12 PROCEDURE — 85027 COMPLETE CBC AUTOMATED: CPT | Performed by: NURSE PRACTITIONER

## 2022-12-12 PROCEDURE — 36415 COLL VENOUS BLD VENIPUNCTURE: CPT | Performed by: NURSE PRACTITIONER

## 2022-12-12 PROCEDURE — P9604 ONE-WAY ALLOW PRORATED TRIP: HCPCS | Performed by: NURSE PRACTITIONER

## 2022-12-12 PROCEDURE — 82310 ASSAY OF CALCIUM: CPT | Performed by: NURSE PRACTITIONER

## 2022-12-13 ENCOUNTER — TRANSITIONAL CARE UNIT VISIT (OUTPATIENT)
Dept: GERIATRICS | Facility: CLINIC | Age: 62
End: 2022-12-13
Payer: COMMERCIAL

## 2022-12-13 VITALS
SYSTOLIC BLOOD PRESSURE: 144 MMHG | HEART RATE: 92 BPM | BODY MASS INDEX: 28.6 KG/M2 | DIASTOLIC BLOOD PRESSURE: 84 MMHG | RESPIRATION RATE: 18 BRPM | TEMPERATURE: 98.2 F | WEIGHT: 182.6 LBS | OXYGEN SATURATION: 96 %

## 2022-12-13 DIAGNOSIS — K92.2 GASTROINTESTINAL HEMORRHAGE, UNSPECIFIED GASTROINTESTINAL HEMORRHAGE TYPE: Primary | ICD-10-CM

## 2022-12-13 DIAGNOSIS — F33.1 MODERATE EPISODE OF RECURRENT MAJOR DEPRESSIVE DISORDER (H): ICD-10-CM

## 2022-12-13 DIAGNOSIS — R53.81 PHYSICAL DECONDITIONING: ICD-10-CM

## 2022-12-13 DIAGNOSIS — R33.9 URINARY RETENTION: ICD-10-CM

## 2022-12-13 DIAGNOSIS — D62 ANEMIA DUE TO BLOOD LOSS, ACUTE: ICD-10-CM

## 2022-12-13 DIAGNOSIS — F42.9 OBSESSIVE-COMPULSIVE DISORDER, UNSPECIFIED TYPE: ICD-10-CM

## 2022-12-13 DIAGNOSIS — I10 BENIGN ESSENTIAL HYPERTENSION: ICD-10-CM

## 2022-12-13 DIAGNOSIS — F41.9 ANXIETY: ICD-10-CM

## 2022-12-13 DIAGNOSIS — E87.1 HYPONATREMIA: ICD-10-CM

## 2022-12-13 DIAGNOSIS — Z90.5 SOLITARY KIDNEY, ACQUIRED: ICD-10-CM

## 2022-12-13 DIAGNOSIS — R19.5 LOOSE STOOLS: ICD-10-CM

## 2022-12-13 DIAGNOSIS — I26.99 ACUTE PULMONARY EMBOLISM, UNSPECIFIED PULMONARY EMBOLISM TYPE, UNSPECIFIED WHETHER ACUTE COR PULMONALE PRESENT (H): ICD-10-CM

## 2022-12-13 LAB — HGB BLD-MCNC: 8.9 G/DL (ref 13.3–17.7)

## 2022-12-13 PROCEDURE — 36415 COLL VENOUS BLD VENIPUNCTURE: CPT | Performed by: NURSE PRACTITIONER

## 2022-12-13 PROCEDURE — P9604 ONE-WAY ALLOW PRORATED TRIP: HCPCS | Performed by: NURSE PRACTITIONER

## 2022-12-13 PROCEDURE — 85018 HEMOGLOBIN: CPT | Performed by: NURSE PRACTITIONER

## 2022-12-13 PROCEDURE — 99309 SBSQ NF CARE MODERATE MDM 30: CPT | Performed by: NURSE PRACTITIONER

## 2022-12-13 RX ORDER — AMLODIPINE BESYLATE 5 MG/1
5 TABLET ORAL DAILY
COMMUNITY
End: 2023-02-22

## 2022-12-13 NOTE — PROGRESS NOTES
[facility] GERIATRICS    Chief Complaint   Patient presents with     RECHECK     HPI:  Suresh Powers is a 62 year old  (1960), who is being seen today for an episodic care visit at: [facility] ([facility]) [[id number]].     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 bleed with severe anemia. Hgb 3.8 on admission, received 6 units blood and Hgb 7.9. GI: EGD 11/30 with large non-bleeding duodenal ulcer, on Protonix 40 mg BID. Started Eliquis but discontinued due to Hgb dropped to 6.8,received one more unit of blood and hematology recommend hold Eliquis x week then attempt to restart at 5 mg BID. HTN with hypotension (resolved) holding Norvasc. Urinary retention on Flomax, vasquez in place. Anxiety, depression, right eye: on PTA BuSpar, Lexapro, trazodone, Lamictal. Chronic hyponatremia currently 130-140. Weakness to TCU for rehab.     Today's concern is: seen for f/u mobility, labs, BPs. Reports BP elevated 139-156/73-76 discussed resuming Norvasc. Sats 96% room air. Walks 42 ft with SBA. Some dizziness when first up. Continues with vasquez catheter. No headaches, chest pain, dyspnea, bowel issues.     Allergies, and PMH/PSH reviewed in EPIC today.  REVIEW OF SYSTEMS:  4 point ROS including Respiratory, CV, GI and , other than that noted in the HPI,  is negative    Objective:   BP (!) 144/84   Pulse 92   Temp 98.2  F (36.8  C)   Resp 18   Wt 82.8 kg (182 lb 9.6 oz)   SpO2 96%   BMI 28.60 kg/m    GENERAL APPEARANCE:  Alert, in no distress, pleasant, cooperative, oriented x 4  EYES:  EOM, lids, pupils and irises normal, sclera clear and conjunctiva normal, no discharge or mattering on lids or lashes noted  ENT:  Mouth normal, moist mucous membranes, nose normal without  drainage or crusting, external ears without lesions, hearing acuity intact  RESP:  respiratory effort normal, no chest wall tenderness, no respiratory distress, Lung sounds clear, patient is on room air  CV:  Auscultation of heart done, rate and rhythm controlled and regular, no murmur, no rub or gallop. Edema none bilateral lower extremities.   ABDOMEN:  normal bowel sounds, soft, nontender, no palpable masses. : vasquez in place with clear yellow urine  M/S:   Gait and station unsafe without assistance, no tenderness or swelling of the joints; able to move all extremities, digits normal  NEURO: cranial nerves 2-12 grossly intact, no facial asymmetry, no speech deficits and able to follow directions, moves all extremities symmetrically  PSYCH:  insight and judgement and memory intact, affect and mood normal     12/12 WBC 7.8, Hgb 8.4,   Most Recent 3 CBC's:Recent Labs   Lab Test 12/08/22  1059 12/08/22  0536 12/07/22  2252 12/06/22  1238 12/06/22  0544 12/03/22  1413 12/03/22  0848 12/03/22  0607   WBC  --   --   --   --  7.0  --  8.4 8.0   HGB 8.6* 8.0* 8.4*   < > 7.5*   < > 8.0* 8.0*   MCV  --   --   --   --  87  --  86 87   PLT  --   --   --   --  430  --  537* 519*    < > = values in this interval not displayed.     12/12 Na 134, K 4.1, BUN 28, Cr 0.66  Most Recent 3 BMP's:Recent Labs   Lab Test 12/03/22  0607 11/30/22  0539 11/29/22  1351    133 130*   POTASSIUM 4.2 3.5 3.6   CHLORIDE 108 101 95   CO2 26 27 25   BUN 21 6* 5*   CR 0.68 0.65* 0.67   ANIONGAP 6 5 10   NIMISHA 7.9* 8.0* 8.5   * 110* 94       Assessment/Plan:  Upper GI bleed  Acute blood loss anemia  Acute, improved with transfusion. Last Hgb 8.4. Check Hgb daily M-F per patient request. Continue pantoprazole 40 mg twice daily. Follow-up with GI as directed     Right lower lobe pulmonary embolism  New onset. If Hgb remains stable will resume apixaban in this week.      Essential hypertension  Chronic, now on amlodipine 5 mg daily.  Monitor blood pressure.      Hyponatremia  Resolved. Monitor PRN     BPH with postop urinary retention  Solitary right kidney  Acute on chronic. Prieto in place, voiding trial this week if stronger. Continue tamsulosin 0.4 mg daily    Depression  SPIKE  OCD  Chronic, well managed with PTA buspirone 50 mg twice daily, escitalopram 20 mg daily, lamotrigine 200 mg at bedtime, and trazodone 100 mg at bedtime. Monitor symptoms     Physical deconditioning  Acute on chronic. Continue PT/OT evaluation and therapy    Loose stools  Resolved with PRN imodium. Monitor.     MED REC REQUIRED  Post Medication Reconciliation Status:  Discharge medications reconciled and changed, see notes/orders    Orders:  Norvasc 5 mg PO daily diagnosis HTN    Electronically signed by: LUIS Unger CNP

## 2022-12-14 ENCOUNTER — LAB REQUISITION (OUTPATIENT)
Dept: LAB | Facility: CLINIC | Age: 62
End: 2022-12-14

## 2022-12-14 DIAGNOSIS — D64.9 ANEMIA, UNSPECIFIED: ICD-10-CM

## 2022-12-15 ENCOUNTER — LAB REQUISITION (OUTPATIENT)
Dept: LAB | Facility: CLINIC | Age: 62
End: 2022-12-15

## 2022-12-15 VITALS
RESPIRATION RATE: 16 BRPM | TEMPERATURE: 98.3 F | DIASTOLIC BLOOD PRESSURE: 75 MMHG | OXYGEN SATURATION: 96 % | SYSTOLIC BLOOD PRESSURE: 144 MMHG | HEART RATE: 98 BPM | BODY MASS INDEX: 28.66 KG/M2 | HEIGHT: 67 IN | WEIGHT: 182.6 LBS

## 2022-12-15 DIAGNOSIS — D64.9 ANEMIA, UNSPECIFIED: ICD-10-CM

## 2022-12-15 LAB — HGB BLD-MCNC: 9.4 G/DL (ref 13.3–17.7)

## 2022-12-15 PROCEDURE — P9604 ONE-WAY ALLOW PRORATED TRIP: HCPCS | Performed by: NURSE PRACTITIONER

## 2022-12-15 PROCEDURE — 85018 HEMOGLOBIN: CPT | Performed by: NURSE PRACTITIONER

## 2022-12-15 PROCEDURE — 36415 COLL VENOUS BLD VENIPUNCTURE: CPT | Performed by: NURSE PRACTITIONER

## 2022-12-15 NOTE — PROGRESS NOTES
[facility]  INITIAL VISIT NOTE  December 16, 2022    PRIMARY CARE PROVIDER AND CLINIC:  Brad Thurston [address] [place] [address] / [facility] [address]    CHIEF COMPLAINT:  Hospital follow-up/Initial visit    HPI:    Suresh Powers is a 62 year old  (1960) male who was seen at [facility] T[facility] on December 16, 2022 for an initial visit.     Medical history is notable for hypertension, UTI, solitary kidney, BPH, shingles, depression, SPIKE, OCD, and recent left peritrochanteric femur fracture for which he underwent cephalomedullary nailing on September 4, 2022.    Summary of hospital course:  Patient was hospitalized at [facility] from November 29 through December 8, 2022 for profound anemia due to upper GI bleed.  He originally presented with fatigue.  EKG showed sinus tachycardia.  Blood work was significant for hemoglobin of 3.8.  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  CT chest with contrast revealed segmental and subsegmental pulmonary emboli in the right lower lobe without evidence of right heart strain.  Doppler study of lower extremities was negative for DVT.  CT abdomen/pelvis revealed minimal retroperitoneal adenopathy but no occult malignancy.  EGD showed nonbleeding duodenal ulcer (25 mm) with no stigmata of bleeding, congested duodenal mucosa, and normal esophagus and stomach.  Patient was transfused with several units of PRBC and initiated on pantoprazole.  He was evaluated by hematology service and work-up for thrombophilia was negative (negative factor V Leiden mutation, negative factor 2 mutation, and negative cardiolipin antibodies).  He was started on IV heparin for pulmonary embolism. GI suggested holding off on initiating oral anticoagulation for at least 48 hours due to further drop in hemoglobin.  TCU was recommended per therapies. Hgb was 8.6 upon discharge from hospital.    Patient is admitted to this facility for medical  management, nursing care, and rehab.     Of note, history was obtained from patient, facility RN, and extensive review of the chart.    Today's visit:  Patient was seen in his room, while lying in bed.  He appears comfortable and cheerful.  He had a bowel movement this morning and reports no melena or hematochezia.  He has a Prieto catheter.  He denies fever, chills, chest pain, palpitation, dyspnea, nausea, vomiting, or urinary symptoms.      CODE STATUS:   CPR/Full code     PAST MEDICAL HISTORY:   Upper GI bleed in November 2022  Nonbleeding duodenal ulcer (25 mm), per EGD on November 30, 2022  Right lower lobe pulmonary embolism in November 2022  Hypertension  UTI  Solitary right kidney (left kidney donated to sister in 1990)  BPH with postop urinary retention  Shingles  Allergic rhinitis  Depression  SPIKE  OCD  Fall resulting in left peritrochanteric femur fracture, s/p cephalomedullary nailing on September 4, 2022      Past Medical History:   Diagnosis Date     Anxiety     sees psych for trazodone     Clostridium difficile diarrhea 3/9/2016     Foot pain      Hypertension      Mold exposure      Shingles 1984       PAST SURGICAL HISTORY:   Past Surgical History:   Procedure Laterality Date     ESOPHAGOSCOPY, GASTROSCOPY, DUODENOSCOPY (EGD), COMBINED N/A 11/30/2022    Procedure: ESOPHAGOGASTRODUODENOSCOPY (EGD);  Surgeon: Joe Alaniz MD;  Location:  GI     foot crush injury       kidney donation       OPEN REDUCTION INTERNAL FIXATION CLAVICLE Left 2/4/2016    Procedure: OPEN REDUCTION INTERNAL FIXATION CLAVICLE;  Surgeon: Rakesh Hui MD;  Location:  OR     OPEN REDUCTION INTERNAL FIXATION HIP NAILING Left 9/4/2022    Procedure: Open reduction internal fixation of left hip fracture;  Surgeon: Huang Cochran MD;  Location:  OR     OPEN REDUCTION INTERNAL FIXATION TIBIAL PLATEAU Left 2/4/2016    Procedure: OPEN REDUCTION INTERNAL FIXATION TIBIAL PLATEAU;  Surgeon: Rakesh Hui MD;   Location:  OR       FAMILY HISTORY:   Family History   Problem Relation Age of Onset     Breast Cancer Mother      Diabetes Type 2  Mother      Skin Cancer Father      Cerebrovascular Disease Father        SOCIAL HISTORY:  Social History     Tobacco Use     Smoking status: Former     Packs/day: 1.00     Years: 0.00     Pack years: 0.00     Types: Cigarettes     Smokeless tobacco: Former     Quit date: 2/2/2016   Substance Use Topics     Alcohol use: Yes     Comment: states 8 beerrs all day over past 8 hours       MEDICATIONS:  Current Outpatient Medications   Medication Sig Dispense Refill     amLODIPine (NORVASC) 5 MG tablet Take 5 mg by mouth daily       busPIRone (BUSPAR) 15 MG tablet Take 1 tablet (15 mg) by mouth 2 times daily       escitalopram (LEXAPRO) 20 MG tablet Take 20 mg by mouth daily       fluticasone (FLONASE) 50 MCG/ACT nasal spray use 2 sprays in each nostril daily. (Patient taking differently: Spray 2 sprays into both nostrils daily as needed use 2 sprays in each nostril daily.) 48 g 3     lamoTRIgine (LAMICTAL) 200 MG tablet Take 200 mg by mouth At Bedtime       loperamide (IMODIUM A-D) 2 MG tablet Take 2 mg by mouth 4 times daily as needed for diarrhea       Multiple Vitamins-Minerals (MULTIVITAMIN ADULT PO) Take 1 tablet by mouth daily       pantoprazole (PROTONIX) 40 MG EC tablet Take 1 tablet (40 mg) by mouth 2 times daily (before meals) Take twice a day for 8weeks then reduce to once  a day 60 tablet 1     polyethylene glycol-propylene glycol (SYSTANE ULTRA) 0.4-0.3 % SOLN ophthalmic solution Place 2 drops into both eyes 4 times daily as needed       tamsulosin (FLOMAX) 0.4 MG capsule Take 1 capsule (0.4 mg) by mouth every evening 90 capsule 3     traZODone (DESYREL) 50 MG tablet Take 2 tablets (100 mg) by mouth At Bedtime       vitamin C (ASCORBIC ACID) 500 MG tablet Take 500 mg by mouth daily       zinc 50 MG TABS Take 100 mg by mouth daily         MED REC REQUIRED  Post Medication  "Reconciliation Status: medication reconcilation previously completed during another office visit         ALLERGIES:  Allergies   Allergen Reactions     Lisinopril Other (See Comments)     Elevated potassium     Sertraline Diarrhea       ROS:  10 point ROS were negative other than the symptoms noted above in the HPI.    PHYSICAL EXAM:  Vital signs were reviewed in the chart.  Vital Signs: BP (!) 144/75   Pulse 98   Temp 98.3  F (36.8  C)   Resp 16   Ht 1.702 m (5' 7\")   Wt 82.8 kg (182 lb 9.6 oz)   SpO2 96%   BMI 28.60 kg/m    General: Comfortable, cheerful, and in no acute distress  HEENT: Conjunctival pallor, no scleral icterus or injection, moist oral mucosa  Cardiovascular: Normal S1, S2, RRR  Respiratory: Lungs clear to auscultation bilaterally  GI: Abdomen soft, non-tender, non-distended, +BS  Extremities: No significant LE edema  Neuro: CX II-XII grossly intact; ROM in all four extremities grossly intact  Psych: Alert and oriented x3; normal affect  Skin: Left hip surgical wound is healed.    LABORATORY/IMAGING DATA:  All relevant labs and imaging data in [facility] and/or Care Everywhere were personally reviewed today.      Most Recent 3 CBC's:Recent Labs   Lab Test 12/08/22  1059 12/08/22  0536 12/07/22  2252 12/06/22  1238 12/06/22  0544 12/03/22  1413 12/03/22  0848 12/03/22  0607   WBC  --   --   --   --  7.0  --  8.4 8.0   HGB 8.6* 8.0* 8.4*   < > 7.5*   < > 8.0* 8.0*   MCV  --   --   --   --  87  --  86 87   PLT  --   --   --   --  430  --  537* 519*    < > = values in this interval not displayed.     Most Recent 3 BMP's:Recent Labs   Lab Test 12/03/22  0607 11/30/22  0539 11/29/22  1351    133 130*   POTASSIUM 4.2 3.5 3.6   CHLORIDE 108 101 95   CO2 26 27 25   BUN 21 6* 5*   CR 0.68 0.65* 0.67   ANIONGAP 6 5 10   NIMISHA 7.9* 8.0* 8.5   * 110* 94         ASSESSMENT/PLAN:  Upper GI bleed,  Duodenal ulcer (25 mm),   Acute blood loss anemia.  Normal hemoglobin of 14.2 back in June 2021.  He had " hemoglobin of 10.6 in September 2022 which dropped to 8.1 post hip surgery.  He presented with hemoglobin of 3.8 on November 29, 2022 due to upper GI bleed and he was transfused with several units of PRBC.  EGD showed a 25 mm duodenal ulcer.  Last hemoglobin of 8.9 on December 13.  Patient reports no melena or hematochezia.  Plan:  Continue pantoprazole 40 mg twice daily  Avoid NSAIDs  Recheck CBC on December 19  Transfuse PRN for Hgb less than 7   Monitor for signs or symptoms of GI bleed  Follow-up with GI as directed    Right lower lobe pulmonary embolism.  Venous Doppler negative for DVT.  Plan:  Start apixaban 5 mg p.o. twice daily but cautiously and watch for recurrence of GI bleed    Essential hypertension.  Placed back on PTA amlodipine but at lower dose of 5 mg daily.  Blood pressure is fairly controlled.  Plan:  Continue amlodipine 5 mg daily  Monitor blood pressure    Hyponatremia.  Resolved.  Plan:  Monitor Na level periodically    BPH with postop urinary retention,  Solitary right kidney (left kidney was donated to sister in 1990).  Patient currently has a Prieto catheter.  Last creatinine of 0.66 on December 12.  Plan:  Voiding trial today  Continue tamsulosin 0.4 mg daily  Avoid NSAIDs and nephrotoxins  Monitor renal function periodically  Follow-up with urology as directed    Depression,  SPIKE,  OCD.  Symptoms are fairly controlled.  Plan:  Continue PTA buspirone 15 mg twice daily, escitalopram 20 mg daily, lamotrigine 200 mg at bedtime, and trazodone 100 mg at bedtime  Monitor symptoms  Follow-up with psychiatry as directed    Recent history of left peritrochanteric femur fracture (sustained after fall), s/p cephalomedullary nailing on September 4, 2022,  Physical deconditioning.  Surgical wound has healed.    Plan:  Continue PT/OT evaluation and therapy  Follow-up with Ortho as directed      Orders written by provider at facility:  CBC on December 19, DX: Anemia  BMP on December 19, DX:  Hyponatremia  Apixaban 5 mg p.o. twice daily, DX: Pulmonary embolism        Disclaimer: This note may contain text created using speech-recognition software and may contain unintended word substitutions.      Electronically signed by:  Wale Duarte MD

## 2022-12-16 ENCOUNTER — TRANSITIONAL CARE UNIT VISIT (OUTPATIENT)
Dept: GERIATRICS | Facility: CLINIC | Age: 62
End: 2022-12-16
Payer: COMMERCIAL

## 2022-12-16 ENCOUNTER — PATIENT OUTREACH (OUTPATIENT)
Dept: CARE COORDINATION | Facility: CLINIC | Age: 62
End: 2022-12-16

## 2022-12-16 DIAGNOSIS — F42.9 OBSESSIVE-COMPULSIVE DISORDER, UNSPECIFIED TYPE: ICD-10-CM

## 2022-12-16 DIAGNOSIS — F41.1 GAD (GENERALIZED ANXIETY DISORDER): ICD-10-CM

## 2022-12-16 DIAGNOSIS — E87.1 HYPONATREMIA: ICD-10-CM

## 2022-12-16 DIAGNOSIS — D62 ACUTE BLOOD LOSS ANEMIA: ICD-10-CM

## 2022-12-16 DIAGNOSIS — N40.1 BENIGN PROSTATIC HYPERPLASIA WITH URINARY RETENTION: ICD-10-CM

## 2022-12-16 DIAGNOSIS — R53.81 PHYSICAL DECONDITIONING: ICD-10-CM

## 2022-12-16 DIAGNOSIS — Z98.890 S/P ORIF (OPEN REDUCTION INTERNAL FIXATION) FRACTURE: ICD-10-CM

## 2022-12-16 DIAGNOSIS — Z90.5 SOLITARY KIDNEY, ACQUIRED: ICD-10-CM

## 2022-12-16 DIAGNOSIS — Z87.81 S/P ORIF (OPEN REDUCTION INTERNAL FIXATION) FRACTURE: ICD-10-CM

## 2022-12-16 DIAGNOSIS — R33.8 BENIGN PROSTATIC HYPERPLASIA WITH URINARY RETENTION: ICD-10-CM

## 2022-12-16 DIAGNOSIS — F32.A DEPRESSION, UNSPECIFIED DEPRESSION TYPE: ICD-10-CM

## 2022-12-16 DIAGNOSIS — I26.99 OTHER ACUTE PULMONARY EMBOLISM WITHOUT ACUTE COR PULMONALE (H): ICD-10-CM

## 2022-12-16 DIAGNOSIS — I10 ESSENTIAL HYPERTENSION: ICD-10-CM

## 2022-12-16 DIAGNOSIS — K26.4 GASTROINTESTINAL HEMORRHAGE ASSOCIATED WITH DUODENAL ULCER: Primary | ICD-10-CM

## 2022-12-16 LAB — HGB BLD-MCNC: 9 G/DL (ref 13.3–17.7)

## 2022-12-16 PROCEDURE — P9604 ONE-WAY ALLOW PRORATED TRIP: HCPCS | Performed by: NURSE PRACTITIONER

## 2022-12-16 PROCEDURE — 85018 HEMOGLOBIN: CPT | Performed by: NURSE PRACTITIONER

## 2022-12-16 PROCEDURE — 36415 COLL VENOUS BLD VENIPUNCTURE: CPT | Performed by: NURSE PRACTITIONER

## 2022-12-16 PROCEDURE — 99306 1ST NF CARE HIGH MDM 50: CPT | Performed by: INTERNAL MEDICINE

## 2022-12-16 NOTE — LETTER
12/16/2022        RE: Suresh Powers  [address]  [place] [address]        [facility]  INITIAL VISIT NOTE  December 16, 2022    PRIMARY CARE PROVIDER AND CLINIC:  Brad Thurston [address] [place] [address] / [facility] [id number]    CHIEF COMPLAINT:  Hospital follow-up/Initial visit    HPI:    Suresh Powers is a 62 year old  (1960) male who was seen at [facility] on December 16, 2022 for an initial visit.     Medical history is notable for hypertension, UTI, solitary kidney, BPH, shingles, depression, SPIKE, OCD, and recent left peritrochanteric femur fracture for which he underwent cephalomedullary nailing on September 4, 2022.    Summary of hospital course:  Patient was hospitalized at [facility] from November 29 through December 8, 2022 for profound anemia due to upper GI bleed.  He originally presented with fatigue.  EKG showed sinus tachycardia.  Blood work was significant for hemoglobin of 3.8.  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  CT chest with contrast revealed segmental and subsegmental pulmonary emboli in the right lower lobe without evidence of right heart strain.  Doppler study of lower extremities was negative for DVT.  CT abdomen/pelvis revealed minimal retroperitoneal adenopathy but no occult malignancy.  EGD showed nonbleeding duodenal ulcer (25 mm) with no stigmata of bleeding, congested duodenal mucosa, and normal esophagus and stomach.  Patient was transfused with several units of PRBC and initiated on pantoprazole.  He was evaluated by hematology service and work-up for thrombophilia was negative (negative factor V Leiden mutation, negative factor 2 mutation, and negative cardiolipin antibodies).  He was started on IV heparin for pulmonary embolism. GI suggested holding off on initiating oral anticoagulation for at least 48 hours due to further drop in hemoglobin.  TCU was recommended per  therapies. Hgb was 8.6 upon discharge from hospital.    Patient is admitted to this facility for medical management, nursing care, and rehab.     Of note, history was obtained from patient, facility RN, and extensive review of the chart.    Today's visit:  Patient was seen in his room, while lying in bed.  He appears comfortable and cheerful.  He had a bowel movement this morning and reports no melena or hematochezia.  He has a Prieto catheter.  He denies fever, chills, chest pain, palpitation, dyspnea, nausea, vomiting, or urinary symptoms.      CODE STATUS:   CPR/Full code     PAST MEDICAL HISTORY:   Upper GI bleed in November 2022  Nonbleeding duodenal ulcer (25 mm), per EGD on November 30, 2022  Right lower lobe pulmonary embolism in November 2022  Hypertension  UTI  Solitary right kidney (left kidney donated to sister in 1990)  BPH with postop urinary retention  Shingles  Allergic rhinitis  Depression  SPIKE  OCD  Fall resulting in left peritrochanteric femur fracture, s/p cephalomedullary nailing on September 4, 2022      Past Medical History:   Diagnosis Date     Anxiety     sees psych for trazodone     Clostridium difficile diarrhea 3/9/2016     Foot pain      Hypertension      Mold exposure      Shingles 1984       PAST SURGICAL HISTORY:   Past Surgical History:   Procedure Laterality Date     ESOPHAGOSCOPY, GASTROSCOPY, DUODENOSCOPY (EGD), COMBINED N/A 11/30/2022    Procedure: ESOPHAGOGASTRODUODENOSCOPY (EGD);  Surgeon: Joe Alaniz MD;  Location:  GI     foot crush injury       kidney donation       OPEN REDUCTION INTERNAL FIXATION CLAVICLE Left 2/4/2016    Procedure: OPEN REDUCTION INTERNAL FIXATION CLAVICLE;  Surgeon: Rakesh Hui MD;  Location:  OR     OPEN REDUCTION INTERNAL FIXATION HIP NAILING Left 9/4/2022    Procedure: Open reduction internal fixation of left hip fracture;  Surgeon: Huang Cochran MD;  Location:  OR     OPEN REDUCTION INTERNAL FIXATION TIBIAL PLATEAU Left  2/4/2016    Procedure: OPEN REDUCTION INTERNAL FIXATION TIBIAL PLATEAU;  Surgeon: Rakesh Hui MD;  Location: [facility] OR       FAMILY HISTORY:   Family History   Problem Relation Age of Onset     Breast Cancer Mother      Diabetes Type 2  Mother      Skin Cancer Father      Cerebrovascular Disease Father        SOCIAL HISTORY:  Social History     Tobacco Use     Smoking status: Former     Packs/day: 1.00     Years: 0.00     Pack years: 0.00     Types: Cigarettes     Smokeless tobacco: Former     Quit date: 2/2/2016   Substance Use Topics     Alcohol use: Yes     Comment: states 8 beerrs all day over past 8 hours       MEDICATIONS:  Current Outpatient Medications   Medication Sig Dispense Refill     amLODIPine (NORVASC) 5 MG tablet Take 5 mg by mouth daily       busPIRone (BUSPAR) 15 MG tablet Take 1 tablet (15 mg) by mouth 2 times daily       escitalopram (LEXAPRO) 20 MG tablet Take 20 mg by mouth daily       fluticasone (FLONASE) 50 MCG/ACT nasal spray use 2 sprays in each nostril daily. (Patient taking differently: Spray 2 sprays into both nostrils daily as needed use 2 sprays in each nostril daily.) 48 g 3     lamoTRIgine (LAMICTAL) 200 MG tablet Take 200 mg by mouth At Bedtime       loperamide (IMODIUM A-D) 2 MG tablet Take 2 mg by mouth 4 times daily as needed for diarrhea       Multiple Vitamins-Minerals (MULTIVITAMIN ADULT PO) Take 1 tablet by mouth daily       pantoprazole (PROTONIX) 40 MG EC tablet Take 1 tablet (40 mg) by mouth 2 times daily (before meals) Take twice a day for 8weeks then reduce to once  a day 60 tablet 1     polyethylene glycol-propylene glycol (SYSTANE ULTRA) 0.4-0.3 % SOLN ophthalmic solution Place 2 drops into both eyes 4 times daily as needed       tamsulosin (FLOMAX) 0.4 MG capsule Take 1 capsule (0.4 mg) by mouth every evening 90 capsule 3     traZODone (DESYREL) 50 MG tablet Take 2 tablets (100 mg) by mouth At Bedtime       vitamin C (ASCORBIC ACID) 500 MG tablet Take 500 mg by  "mouth daily       zinc 50 MG TABS Take 100 mg by mouth daily         MED REC REQUIRED  Post Medication Reconciliation Status: medication reconcilation previously completed during another office visit         ALLERGIES:  Allergies   Allergen Reactions     Lisinopril Other (See Comments)     Elevated potassium     Sertraline Diarrhea       ROS:  10 point ROS were negative other than the symptoms noted above in the HPI.    PHYSICAL EXAM:  Vital signs were reviewed in the chart.  Vital Signs: BP (!) 144/75   Pulse 98   Temp 98.3  F (36.8  C)   Resp 16   Ht 1.702 m (5' 7\")   Wt 82.8 kg (182 lb 9.6 oz)   SpO2 96%   BMI 28.60 kg/m    General: Comfortable, cheerful, and in no acute distress  HEENT: Conjunctival pallor, no scleral icterus or injection, moist oral mucosa  Cardiovascular: Normal S1, S2, RRR  Respiratory: Lungs clear to auscultation bilaterally  GI: Abdomen soft, non-tender, non-distended, +BS  Extremities: No significant LE edema  Neuro: CX II-XII grossly intact; ROM in all four extremities grossly intact  Psych: Alert and oriented x3; normal affect  Skin: Left hip surgical wound is healed.    LABORATORY/IMAGING DATA:  All relevant labs and imaging data in [facility] and/or Care Everywhere were personally reviewed today.      Most Recent 3 CBC's:Recent Labs   Lab Test 12/08/22  1059 12/08/22  0536 12/07/22  2252 12/06/22  1238 12/06/22  0544 12/03/22  1413 12/03/22  0848 12/03/22  0607   WBC  --   --   --   --  7.0  --  8.4 8.0   HGB 8.6* 8.0* 8.4*   < > 7.5*   < > 8.0* 8.0*   MCV  --   --   --   --  87  --  86 87   PLT  --   --   --   --  430  --  537* 519*    < > = values in this interval not displayed.     Most Recent 3 BMP's:Recent Labs   Lab Test 12/03/22  0607 11/30/22  0539 11/29/22  1351    133 130*   POTASSIUM 4.2 3.5 3.6   CHLORIDE 108 101 95   CO2 26 27 25   BUN 21 6* 5*   CR 0.68 0.65* 0.67   ANIONGAP 6 5 10   NIMISHA 7.9* 8.0* 8.5   * 110* 94         ASSESSMENT/PLAN:  Upper GI " bleed,  Duodenal ulcer (25 mm),   Acute blood loss anemia.  Normal hemoglobin of 14.2 back in June 2021.  He had hemoglobin of 10.6 in September 2022 which dropped to 8.1 post hip surgery.  He presented with hemoglobin of 3.8 on November 29, 2022 due to upper GI bleed and he was transfused with several units of PRBC.  EGD showed a 25 mm duodenal ulcer.  Last hemoglobin of 8.9 on December 13.  Patient reports no melena or hematochezia.  Plan:  Continue pantoprazole 40 mg twice daily  Avoid NSAIDs  Recheck CBC on December 19  Transfuse PRN for Hgb less than 7   Monitor for signs or symptoms of GI bleed  Follow-up with GI as directed    Right lower lobe pulmonary embolism.  Venous Doppler negative for DVT.  Plan:  Start apixaban 5 mg p.o. twice daily but cautiously and watch for recurrence of GI bleed    Essential hypertension.  Placed back on PTA amlodipine but at lower dose of 5 mg daily.  Blood pressure is fairly controlled.  Plan:  Continue amlodipine 5 mg daily  Monitor blood pressure    Hyponatremia.  Resolved.  Plan:  Monitor Na level periodically    BPH with postop urinary retention,  Solitary right kidney (left kidney was donated to sister in 1990).  Patient currently has a Prieto catheter.  Last creatinine of 0.66 on December 12.  Plan:  Voiding trial today  Continue tamsulosin 0.4 mg daily  Avoid NSAIDs and nephrotoxins  Monitor renal function periodically  Follow-up with urology as directed    Depression,  SPIKE,  OCD.  Symptoms are fairly controlled.  Plan:  Continue PTA buspirone 50 mg twice daily, escitalopram 20 mg daily, lamotrigine 200 mg at bedtime, and trazodone 100 mg at bedtime  Monitor symptoms  Follow-up with psychiatry as directed    Recent history of left peritrochanteric femur fracture (sustained after fall), s/p cephalomedullary nailing on September 4, 2022,  Physical deconditioning.  Surgical wound has healed.    Plan:  Continue PT/OT evaluation and therapy  Follow-up with Ortho as  directed      Orders written by provider at facility:  CBC on December 19, DX: Anemia  BMP on December 19, DX: Hyponatremia  Apixaban 5 mg p.o. twice daily, DX: Pulmonary embolism        Disclaimer: This note may contain text created using speech-recognition software and may contain unintended word substitutions.      Electronically signed by:  Wale Duarte MD                          Sincerely,        Wale Duarte MD

## 2022-12-16 NOTE — PROGRESS NOTES
Clinic Care Coordination Contact  Care Coordination Transition Communication         Clinical Data: Patient was hospitalized at [facility] from 11/29/22 to 12/8/22 with diagnosis of Upper G.I. bleed due to duodenal ulcer  Severe acute blood loss anemia on chronic anemia secondary to above.     Transition to Facility:              Facility Name: [facility]              Contact name and phone number/fax: [phone]    Plan: RN/SW Care Coordinator will await notification from facility staff informing RN/SW Care Coordinator of patient's discharge plans/needs. RN/SW Care Coordinator will review chart and outreach to facility staff every 4 weeks and as needed.     Charito Pineda [facility] Care Coordination   [place] [facility]  Social Work Care Coordinator  [phone]

## 2022-12-16 NOTE — LETTER
TCU Hand In    Patient name: Suresh Powers  PCP: Brad Thurston  PCP Care Coordinator's information (phone number/email): [phone]  Primary family contact: Adele Powers   Other MDs involved: Unknown    Patient Care Team:  Brad Thurston MD as PCP - General (Internal Medicine)  Arlene Yarbrough, RP as Pharmacist (Pharmacist)  Brad Thurston MD as Assigned PCP  Charito Pineda LSW as Lead Care Coordinator  Fernando Steiner MD as Assigned Surgical Provider  (Fgs), [facility] - Qi Ny APRN CNP as Nurse Practitioner (Family Medicine)    Resources in place previously (home care services, equipment needs, etc.): homecare    Advanced Directive: Y    Social History     Socioeconomic History     Marital status: Single     Spouse name: Not on file     Number of children: Not on file     Years of education: Not on file     Highest education level: Not on file   Occupational History     Not on file   Tobacco Use     Smoking status: Former     Packs/day: 1.00     Years: 0.00     Pack years: 0.00     Types: Cigarettes     Smokeless tobacco: Former     Quit date: 2/2/2016   Substance and Sexual Activity     Alcohol use: Yes     Comment: states 8 beerrs all day over past 8 hours     Drug use: No     Sexual activity: Not Currently   Other Topics Concern     Parent/sibling w/ CABG, MI or angioplasty before 65F 55M? No   Social History Narrative     Not on file     Social Determinants of Health     Financial Resource Strain: Not on file   Food Insecurity: Not on file   Transportation Needs: Not on file   Physical Activity: Not on file   Stress: Not on file   Social Connections: Not on file   Intimate Partner Violence: Not on file   Housing Stability: Not on file       Additional social history: Pt needs support at home    Known coverage issues: none    JESSICA score: Elevated    Please contact me with discharge planning info. I would like to attend any care conferences you have as well.    -Charito  BRANT Pineda, TIFF

## 2022-12-18 ENCOUNTER — LAB REQUISITION (OUTPATIENT)
Dept: LAB | Facility: CLINIC | Age: 62
End: 2022-12-18

## 2022-12-18 DIAGNOSIS — D64.9 ANEMIA, UNSPECIFIED: ICD-10-CM

## 2022-12-18 DIAGNOSIS — E87.1 HYPO-OSMOLALITY AND HYPONATREMIA: ICD-10-CM

## 2022-12-19 LAB
ANION GAP SERPL CALCULATED.3IONS-SCNC: 7 MMOL/L (ref 3–14)
BUN SERPL-MCNC: 24 MG/DL (ref 7–30)
CALCIUM SERPL-MCNC: 9.1 MG/DL (ref 8.5–10.1)
CHLORIDE BLD-SCNC: 102 MMOL/L (ref 94–109)
CO2 SERPL-SCNC: 25 MMOL/L (ref 20–32)
CREAT SERPL-MCNC: 0.65 MG/DL (ref 0.66–1.25)
ERYTHROCYTE [DISTWIDTH] IN BLOOD BY AUTOMATED COUNT: 19.1 % (ref 10–15)
GFR SERPL CREATININE-BSD FRML MDRD: >90 ML/MIN/1.73M2
GLUCOSE BLD-MCNC: 132 MG/DL (ref 70–99)
HCT VFR BLD AUTO: 29.9 % (ref 40–53)
HGB BLD-MCNC: 9.3 G/DL (ref 13.3–17.7)
MCH RBC QN AUTO: 26.6 PG (ref 26.5–33)
MCHC RBC AUTO-ENTMCNC: 31.1 G/DL (ref 31.5–36.5)
MCV RBC AUTO: 85 FL (ref 78–100)
PLATELET # BLD AUTO: 563 10E3/UL (ref 150–450)
POTASSIUM BLD-SCNC: 3.8 MMOL/L (ref 3.4–5.3)
RBC # BLD AUTO: 3.5 10E6/UL (ref 4.4–5.9)
SODIUM SERPL-SCNC: 134 MMOL/L (ref 133–144)
WBC # BLD AUTO: 7.4 10E3/UL (ref 4–11)

## 2022-12-19 PROCEDURE — 85027 COMPLETE CBC AUTOMATED: CPT | Performed by: NURSE PRACTITIONER

## 2022-12-19 PROCEDURE — 36415 COLL VENOUS BLD VENIPUNCTURE: CPT | Performed by: NURSE PRACTITIONER

## 2022-12-19 PROCEDURE — P9604 ONE-WAY ALLOW PRORATED TRIP: HCPCS | Performed by: NURSE PRACTITIONER

## 2022-12-19 PROCEDURE — 80048 BASIC METABOLIC PNL TOTAL CA: CPT | Performed by: NURSE PRACTITIONER

## 2022-12-21 VITALS
OXYGEN SATURATION: 96 % | DIASTOLIC BLOOD PRESSURE: 78 MMHG | RESPIRATION RATE: 16 BRPM | WEIGHT: 182.6 LBS | TEMPERATURE: 98.6 F | HEART RATE: 91 BPM | SYSTOLIC BLOOD PRESSURE: 149 MMHG | BODY MASS INDEX: 28.6 KG/M2

## 2022-12-22 ENCOUNTER — TRANSITIONAL CARE UNIT VISIT (OUTPATIENT)
Dept: GERIATRICS | Facility: CLINIC | Age: 62
End: 2022-12-22
Payer: COMMERCIAL

## 2022-12-22 DIAGNOSIS — F41.1 GAD (GENERALIZED ANXIETY DISORDER): ICD-10-CM

## 2022-12-22 DIAGNOSIS — I26.99 OTHER ACUTE PULMONARY EMBOLISM WITHOUT ACUTE COR PULMONALE (H): ICD-10-CM

## 2022-12-22 DIAGNOSIS — Z90.5 SOLITARY KIDNEY, ACQUIRED: ICD-10-CM

## 2022-12-22 DIAGNOSIS — R53.81 PHYSICAL DECONDITIONING: ICD-10-CM

## 2022-12-22 DIAGNOSIS — K26.4 GASTROINTESTINAL HEMORRHAGE ASSOCIATED WITH DUODENAL ULCER: Primary | ICD-10-CM

## 2022-12-22 DIAGNOSIS — E87.1 HYPONATREMIA: ICD-10-CM

## 2022-12-22 DIAGNOSIS — F32.A DEPRESSION, UNSPECIFIED DEPRESSION TYPE: ICD-10-CM

## 2022-12-22 DIAGNOSIS — F42.9 OBSESSIVE-COMPULSIVE DISORDER, UNSPECIFIED TYPE: ICD-10-CM

## 2022-12-22 DIAGNOSIS — I10 ESSENTIAL HYPERTENSION: ICD-10-CM

## 2022-12-22 DIAGNOSIS — R33.8 BENIGN PROSTATIC HYPERPLASIA WITH URINARY RETENTION: ICD-10-CM

## 2022-12-22 DIAGNOSIS — D62 ACUTE BLOOD LOSS ANEMIA: ICD-10-CM

## 2022-12-22 DIAGNOSIS — N40.1 BENIGN PROSTATIC HYPERPLASIA WITH URINARY RETENTION: ICD-10-CM

## 2022-12-22 PROCEDURE — 99309 SBSQ NF CARE MODERATE MDM 30: CPT | Performed by: NURSE PRACTITIONER

## 2022-12-22 NOTE — PROGRESS NOTES
[facility]    Chief Complaint   Patient presents with     RECHECK     HPI:  Suresh Powers is a 62 year old  (1960), who is being seen today for an episodic care visit at: [facility] ([facility]) [[id number]].     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 bleed with severe anemia. Hgb 3.8 on admission, received 6 units blood and Hgb 7.9. GI: EGD 11/30 with large non-bleeding duodenal ulcer, on Protonix 40 mg BID. Started Eliquis but discontinued due to Hgb dropped to 6.8,received one more unit of blood and hematology recommend hold Eliquis x week then attempt to restart at 5 mg BID. HTN with hypotension (resolved) holding Norvasc. Urinary retention on Flomax, vasquez in place. Anxiety, depression, right eye: on PTA BuSpar, Lexapro, trazodone, Lamictal. Chronic hyponatremia currently 130-140. Weakness to TCU for rehab.     Today's concern is: seen for f/u anemia, vs, mobility, urinary status. Reports doing well, Vasquez removed and voiding without problems. Loose stools resolved. Pain managed adequately. Occasionally dizzy when up. BP range 119-149/72-88 and sats 96% room air. SLUMS 30/30 and walks 140 ft with 2WW.     Allergies, and PMH/PSH reviewed in EPIC today.  REVIEW OF SYSTEMS:  4 point ROS including Respiratory, CV, GI and , other than that noted in the HPI,  is negative    Objective:   BP (!) 149/78   Pulse 91   Temp 98.6  F (37  C)   Resp 16   Wt 82.8 kg (182 lb 9.6 oz)   SpO2 96%   BMI 28.60 kg/m    GENERAL APPEARANCE:  Alert, in no distress, pleasant, cooperative, oriented x 4  EYES:  EOM, lids, pupils and irises normal, sclera clear and conjunctiva normal, no discharge or mattering on lids or lashes noted  ENT:  Mouth normal, moist mucous  membranes, nose normal without drainage or crusting, external ears without lesions, hearing acuity intact  RESP:  respiratory effort normal, no chest wall tenderness, no respiratory distress, Lung sounds clear, patient is on room air  CV:  Auscultation of heart done, rate and rhythm controlled and regular, no murmur, no rub or gallop. Edema none bilateral lower extremities.   ABDOMEN:  normal bowel sounds, soft, nontender, no palpable masses.   M/S:   Gait and station unsafe without assistance, no tenderness or swelling of the joints; able to move all extremities, digits normal  NEURO: cranial nerves 2-12 grossly intact, no facial asymmetry, no speech deficits and able to follow directions, moves all extremities symmetrically  PSYCH:  insight and judgement and memory intact, affect and mood normal       Most Recent 3 CBC's:   Hgb 9.3 on 12/29  Recent Labs   Lab Test 12/08/22  1059 12/08/22  0536 12/07/22  2252 12/06/22  1238 12/06/22  0544 12/03/22  1413 12/03/22  0848 12/03/22  0607   WBC  --   --   --   --  7.0  --  8.4 8.0   HGB 8.6* 8.0* 8.4*   < > 7.5*   < > 8.0* 8.0*   MCV  --   --   --   --  87  --  86 87   PLT  --   --   --   --  430  --  537* 519*    < > = values in this interval not displayed.     Most Recent 3 BMP's:  Recent Labs   Lab Test 12/03/22  0607 11/30/22  0539 11/29/22  1351    133 130*   POTASSIUM 4.2 3.5 3.6   CHLORIDE 108 101 95   CO2 26 27 25   BUN 21 6* 5*   CR 0.68 0.65* 0.67   ANIONGAP 6 5 10   NIMISHA 7.9* 8.0* 8.5   * 110* 94       Assessment/Plan:  Upper GI bleed  Acute blood loss anemia  Acute, improved with transfusion. Last Hgb 9.3 on 12/29. Check HGB 12/26. Continue pantoprazole 40 mg twice daily. Follow-up with GI as directed     Right lower lobe pulmonary embolism  New onset.Resumed apixaban. Monitor.      Essential hypertension  Chronic, on amlodipine 5 mg daily. Monitor blood pressure.      Hyponatremia  Resolved. Na 134 on 12/19.     BPH with postop urinary  retention  Solitary right kidney  Acute on chronic. Prieto in place, voiding trial next week. Continue tamsulosin 0.4 mg daily    Depression  SPIKE  OCD  Chronic, well managed with PTA buspirone 50 mg twice daily, escitalopram 20 mg daily, lamotrigine 200 mg at bedtime, and trazodone 100 mg at bedtime. Monitor symptoms.     Physical deconditioning  Acute on chronic. Continue PT/OT evaluation and therapy      MED REC REQUIRED  Post Medication Reconciliation Status:  Discharge medications reconciled, continue medications without change    Orders:  Started Eliquis 5 mg PO BID diagnosis PE on 12/16  Cancel urology consult  Hgb check 12/26 diagnosis anemia    Electronically signed by: LUIS Unger CNP

## 2022-12-25 ENCOUNTER — LAB REQUISITION (OUTPATIENT)
Dept: LAB | Facility: CLINIC | Age: 62
End: 2022-12-25

## 2022-12-25 DIAGNOSIS — D64.9 ANEMIA, UNSPECIFIED: ICD-10-CM

## 2022-12-26 LAB — HGB BLD-MCNC: 9.6 G/DL (ref 13.3–17.7)

## 2022-12-26 PROCEDURE — P9604 ONE-WAY ALLOW PRORATED TRIP: HCPCS | Performed by: NURSE PRACTITIONER

## 2022-12-26 PROCEDURE — 85018 HEMOGLOBIN: CPT | Performed by: NURSE PRACTITIONER

## 2022-12-26 PROCEDURE — 36415 COLL VENOUS BLD VENIPUNCTURE: CPT | Performed by: NURSE PRACTITIONER

## 2022-12-29 ENCOUNTER — TRANSITIONAL CARE UNIT VISIT (OUTPATIENT)
Dept: GERIATRICS | Facility: CLINIC | Age: 62
End: 2022-12-29
Payer: COMMERCIAL

## 2022-12-29 VITALS
RESPIRATION RATE: 16 BRPM | HEART RATE: 92 BPM | DIASTOLIC BLOOD PRESSURE: 79 MMHG | BODY MASS INDEX: 28.6 KG/M2 | SYSTOLIC BLOOD PRESSURE: 128 MMHG | WEIGHT: 182.6 LBS | OXYGEN SATURATION: 95 % | TEMPERATURE: 98.1 F

## 2022-12-29 DIAGNOSIS — E87.1 HYPONATREMIA: ICD-10-CM

## 2022-12-29 DIAGNOSIS — N40.1 BENIGN PROSTATIC HYPERPLASIA WITH URINARY RETENTION: ICD-10-CM

## 2022-12-29 DIAGNOSIS — K26.4 GASTROINTESTINAL HEMORRHAGE ASSOCIATED WITH DUODENAL ULCER: Primary | ICD-10-CM

## 2022-12-29 DIAGNOSIS — I10 ESSENTIAL HYPERTENSION: ICD-10-CM

## 2022-12-29 DIAGNOSIS — F41.1 GAD (GENERALIZED ANXIETY DISORDER): ICD-10-CM

## 2022-12-29 DIAGNOSIS — Z90.5 SOLITARY KIDNEY, ACQUIRED: ICD-10-CM

## 2022-12-29 DIAGNOSIS — R33.8 BENIGN PROSTATIC HYPERPLASIA WITH URINARY RETENTION: ICD-10-CM

## 2022-12-29 DIAGNOSIS — R53.81 PHYSICAL DECONDITIONING: ICD-10-CM

## 2022-12-29 DIAGNOSIS — D62 ACUTE BLOOD LOSS ANEMIA: ICD-10-CM

## 2022-12-29 DIAGNOSIS — F42.9 OBSESSIVE-COMPULSIVE DISORDER, UNSPECIFIED TYPE: ICD-10-CM

## 2022-12-29 DIAGNOSIS — I26.99 OTHER ACUTE PULMONARY EMBOLISM WITHOUT ACUTE COR PULMONALE (H): ICD-10-CM

## 2022-12-29 DIAGNOSIS — F32.A DEPRESSION, UNSPECIFIED DEPRESSION TYPE: ICD-10-CM

## 2022-12-29 PROCEDURE — 99309 SBSQ NF CARE MODERATE MDM 30: CPT | Performed by: NURSE PRACTITIONER

## 2022-12-29 NOTE — PROGRESS NOTES
[facility] GERIATRICS    Chief Complaint   Patient presents with     RECHECK     HPI:  Suresh Powers is a 62 year old  (1960), who is being seen today for an episodic care visit at: [facility] ([facility]) [[id number]].     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 bleed with severe anemia. Hgb 3.8 on admission, received 6 units blood and Hgb 7.9. GI: EGD 11/30 with large non-bleeding duodenal ulcer, on Protonix 40 mg BID. Started Eliquis but discontinued due to Hgb dropped to 6.8,received one more unit of blood and hematology recommend hold Eliquis x week then attempt to restart at 5 mg BID. HTN with hypotension (resolved) holding Norvasc. Urinary retention on Flomax, vasquez in place. Anxiety, depression, right eye: on PTA BuSpar, Lexapro, trazodone, Lamictal. Chronic hyponatremia currently 130-140. Weakness to TCU for rehab.     Today's concern is: seen for f/u anemia, vs, mobility. Continues to void without problems. Loose stools resolved. Pain managed adequately. Occasionally dizzy when up. BP range 126-139/73-89 and sats 95% room air. SLUMS 30/30 and walks independently with 2WW. Discussed checking Hgb weekly for now since back on anticoagulation.     Allergies, and PMH/PSH reviewed in [facility] today.    REVIEW OF SYSTEMS:  4 point ROS including Respiratory, CV, GI and , other than that noted in the HPI,  is negative    Objective:   /79   Pulse 92   Temp 98.1  F (36.7  C)   Resp 16   Wt 82.8 kg (182 lb 9.6 oz)   SpO2 95%   BMI 28.60 kg/m    GENERAL APPEARANCE:  Alert, in no distress, cooperative  ENT:  Mouth normal, moist mucous membranes, normal hearing acuity  EYES:  Conjunctiva and lids normal  RESP:  no respiratory distress, on room air  CV: no LE  edema  NEURO:   No facial asymmetry, speech clear  PSYCH:  oriented X 3, affect and mood normal     Most Recent 3 CBC's:   Hgb 9.3 on 12/29  Recent Labs   Lab Test 12/08/22  1059 12/08/22  0536 12/07/22  2252 12/06/22  1238 12/06/22  0544 12/03/22  1413 12/03/22  0848 12/03/22  0607   WBC  --   --   --   --  7.0  --  8.4 8.0   HGB 8.6* 8.0* 8.4*   < > 7.5*   < > 8.0* 8.0*   MCV  --   --   --   --  87  --  86 87   PLT  --   --   --   --  430  --  537* 519*    < > = values in this interval not displayed.     Most Recent 3 BMP's:  Recent Labs   Lab Test 12/03/22  0607 11/30/22  0539 11/29/22  1351    133 130*   POTASSIUM 4.2 3.5 3.6   CHLORIDE 108 101 95   CO2 26 27 25   BUN 21 6* 5*   CR 0.68 0.65* 0.67   ANIONGAP 6 5 10   NIMISHA 7.9* 8.0* 8.5   * 110* 94       Assessment/Plan:  Upper GI bleed  Acute blood loss anemia  Acute, improved with transfusion. Last Hgb 9.3 on 12/29. Continue pantoprazole 40 mg twice daily. Follow-up with GI as directed. Check Hgb every Monday for now while at TCU.      Right lower lobe pulmonary embolism  New onset. Resumed apixaban. Monitor.      Essential hypertension  Chronic, on amlodipine 5 mg daily. Monitor blood pressure.      Hyponatremia  Resolved. Na 134 on 12/19. Check PRN.      BPH with postop urinary retention  Solitary right kidney  Acute on chronic. Prieto out, voiding well. Continue tamsulosin 0.4 mg daily    Depression  SPKIE  OCD  Chronic, well managed with PTA buspirone 50 mg twice daily, escitalopram 20 mg daily, lamotrigine 200 mg at bedtime, and trazodone 100 mg at bedtime. Monitor symptoms.     Physical deconditioning  Acute on chronic. Now off therapies, awaiting community placement. Monitor.     MED REC REQUIRED  Post Medication Reconciliation Status:  Discharge medications reconciled, continue medications without change    Orders:  Check Hgb weekly every Monday diagnosis anemia    Electronically signed by: LUIS Unger CNP

## 2023-01-02 ENCOUNTER — LAB REQUISITION (OUTPATIENT)
Dept: LAB | Facility: CLINIC | Age: 63
End: 2023-01-02
Payer: COMMERCIAL

## 2023-01-02 DIAGNOSIS — D64.9 ANEMIA, UNSPECIFIED: ICD-10-CM

## 2023-01-03 LAB — HGB BLD-MCNC: 9.9 G/DL (ref 13.3–17.7)

## 2023-01-03 PROCEDURE — P9604 ONE-WAY ALLOW PRORATED TRIP: HCPCS | Mod: ORL | Performed by: NURSE PRACTITIONER

## 2023-01-03 PROCEDURE — 36415 COLL VENOUS BLD VENIPUNCTURE: CPT | Mod: ORL | Performed by: NURSE PRACTITIONER

## 2023-01-03 PROCEDURE — 85018 HEMOGLOBIN: CPT | Performed by: NURSE PRACTITIONER

## 2023-01-05 VITALS
HEART RATE: 95 BPM | WEIGHT: 191 LBS | OXYGEN SATURATION: 94 % | TEMPERATURE: 98.6 F | DIASTOLIC BLOOD PRESSURE: 72 MMHG | RESPIRATION RATE: 18 BRPM | SYSTOLIC BLOOD PRESSURE: 136 MMHG | BODY MASS INDEX: 29.91 KG/M2

## 2023-01-06 ENCOUNTER — TRANSITIONAL CARE UNIT VISIT (OUTPATIENT)
Dept: GERIATRICS | Facility: CLINIC | Age: 63
End: 2023-01-06
Payer: COMMERCIAL

## 2023-01-06 DIAGNOSIS — Z90.5 SOLITARY KIDNEY, ACQUIRED: ICD-10-CM

## 2023-01-06 DIAGNOSIS — E87.1 HYPONATREMIA: ICD-10-CM

## 2023-01-06 DIAGNOSIS — I26.99 OTHER ACUTE PULMONARY EMBOLISM WITHOUT ACUTE COR PULMONALE (H): ICD-10-CM

## 2023-01-06 DIAGNOSIS — F41.1 GAD (GENERALIZED ANXIETY DISORDER): ICD-10-CM

## 2023-01-06 DIAGNOSIS — R33.8 BENIGN PROSTATIC HYPERPLASIA WITH URINARY RETENTION: ICD-10-CM

## 2023-01-06 DIAGNOSIS — N40.1 BENIGN PROSTATIC HYPERPLASIA WITH URINARY RETENTION: ICD-10-CM

## 2023-01-06 DIAGNOSIS — K26.4 GASTROINTESTINAL HEMORRHAGE ASSOCIATED WITH DUODENAL ULCER: Primary | ICD-10-CM

## 2023-01-06 DIAGNOSIS — F32.A DEPRESSION, UNSPECIFIED DEPRESSION TYPE: ICD-10-CM

## 2023-01-06 DIAGNOSIS — R53.81 PHYSICAL DECONDITIONING: ICD-10-CM

## 2023-01-06 DIAGNOSIS — D62 ACUTE BLOOD LOSS ANEMIA: ICD-10-CM

## 2023-01-06 DIAGNOSIS — F42.9 OBSESSIVE-COMPULSIVE DISORDER, UNSPECIFIED TYPE: ICD-10-CM

## 2023-01-06 DIAGNOSIS — I10 ESSENTIAL HYPERTENSION: ICD-10-CM

## 2023-01-06 PROCEDURE — 99309 SBSQ NF CARE MODERATE MDM 30: CPT | Performed by: NURSE PRACTITIONER

## 2023-01-06 NOTE — PROGRESS NOTES
[facility] GERIATRICS    Chief Complaint   Patient presents with     RECHECK     HPI:  Suresh Powers is a 62 year old  (1960), who is being seen today for an episodic care visit at: [facility] (TC[facility]) [[id number]].     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 bleed with severe anemia. Hgb 3.8 on admission, received 6 units blood and Hgb 7.9. GI: EGD 11/30 with large non-bleeding duodenal ulcer, on Protonix 40 mg BID. Started Eliquis but discontinued due to Hgb dropped to 6.8,received one more unit of blood and hematology recommend hold Eliquis x week then attempt to restart at 5 mg BID. HTN with hypotension (resolved) holding Norvasc. Urinary retention on Flomax, vasquez in place. Anxiety, depression, right eye: on PTA BuSpar, Lexapro, trazodone, Lamictal. Chronic hyponatremia currently 130-140. Weakness to TCU for rehab.     Today's concern is: seen for f/u anemia, vs, mobility. Pain managed adequately. Up independently. BP range 118-148/72-84 and sats 95% room air. SLUMS 30/30 and walks independently with 2WW.     Allergies, and PMH/PSH reviewed in EPIC today.    REVIEW OF SYSTEMS:  4 point ROS including Respiratory, CV, GI and , other than that noted in the HPI,  is negative    Objective:   /72   Pulse 95   Temp 98.6  F (37  C)   Resp 18   Wt 86.6 kg (191 lb)   SpO2 94%   BMI 29.91 kg/m    GENERAL APPEARANCE:  Alert, in no distress, cooperative  ENT:  Mouth normal, moist mucous membranes, normal hearing acuity  EYES:  Conjunctiva and lids normal  RESP:  no respiratory distress, on room air  CV: no LE edema  NEURO:   No facial asymmetry, speech clear  PSYCH:  oriented X 3, affect and mood normal     Most Recent 3 CBC's:    Hgb 9.9 on 1/3/23  Hgb 9.3 on  12/29  Recent Labs   Lab Test 12/08/22  1059 12/08/22  0536 12/07/22  2252 12/06/22  1238 12/06/22  0544 12/03/22  1413 12/03/22  0848 12/03/22  0607   WBC  --   --   --   --  7.0  --  8.4 8.0   HGB 8.6* 8.0* 8.4*   < > 7.5*   < > 8.0* 8.0*   MCV  --   --   --   --  87  --  86 87   PLT  --   --   --   --  430  --  537* 519*    < > = values in this interval not displayed.     Most Recent 3 BMP's:  Recent Labs   Lab Test 12/03/22  0607 11/30/22  0539 11/29/22  1351    133 130*   POTASSIUM 4.2 3.5 3.6   CHLORIDE 108 101 95   CO2 26 27 25   BUN 21 6* 5*   CR 0.68 0.65* 0.67   ANIONGAP 6 5 10   NIMISHA 7.9* 8.0* 8.5   * 110* 94       Assessment/Plan:  Upper GI bleed  Acute blood loss anemia  Acute, improved with transfusion. Last Hgb 9.9 on 1/3. Continue pantoprazole 40 mg twice daily. Follow-up with GI as directed. Check Hgb every Monday for now while at TCU.      Right lower lobe pulmonary embolism  New onset. Resumed apixaban. Monitor.      Essential hypertension  Chronic, on amlodipine 5 mg daily, controlled. Monitor blood pressure.      Hyponatremia  Resolved. Na 134 on 12/19. Check PRN.      BPH with postop urinary retention  Solitary right kidney  Acute on chronic. Prieto out, voiding well. Continue tamsulosin 0.4 mg daily    Depression  SPIKE  OCD  Chronic, well managed with PTA buspirone 50 mg twice daily, escitalopram 20 mg daily, lamotrigine 200 mg at bedtime, and trazodone 100 mg at bedtime. Monitor symptoms.     Physical deconditioning  Acute on chronic. Now off therapies, awaiting community placement. Monitor.     MED REC REQUIRED  Post Medication Reconciliation Status:  Discharge medications reconciled, continue medications without change    Orders:  No new orders    Electronically signed by: LUIS Unger CNP

## 2023-01-08 ENCOUNTER — LAB REQUISITION (OUTPATIENT)
Dept: LAB | Facility: CLINIC | Age: 63
End: 2023-01-08
Payer: COMMERCIAL

## 2023-01-08 DIAGNOSIS — D64.9 ANEMIA, UNSPECIFIED: ICD-10-CM

## 2023-01-09 LAB — HGB BLD-MCNC: 9.8 G/DL (ref 13.3–17.7)

## 2023-01-09 PROCEDURE — P9604 ONE-WAY ALLOW PRORATED TRIP: HCPCS | Mod: ORL | Performed by: NURSE PRACTITIONER

## 2023-01-09 PROCEDURE — 85018 HEMOGLOBIN: CPT | Mod: ORL | Performed by: NURSE PRACTITIONER

## 2023-01-09 PROCEDURE — 36415 COLL VENOUS BLD VENIPUNCTURE: CPT | Mod: ORL | Performed by: NURSE PRACTITIONER

## 2023-01-11 ENCOUNTER — PATIENT OUTREACH (OUTPATIENT)
Dept: CARE COORDINATION | Facility: CLINIC | Age: 63
End: 2023-01-11

## 2023-01-11 NOTE — PROGRESS NOTES
Clinic Care Coordination Contact    Situation: Patient chart reviewed by care coordinator.    Background: Pt is enrolled in care coordination and was admitted to TCU.    Assessment: Pt is still admitted to TCU.     Plan/Recommendations: CC will chart review every 4 weeks and will be available as needed.     TIFF Hernandes  [facility] Care Coordination   [facility]  Social Work Care Coordinator  [phone]

## 2023-01-15 ENCOUNTER — LAB REQUISITION (OUTPATIENT)
Dept: LAB | Facility: CLINIC | Age: 63
End: 2023-01-15
Payer: COMMERCIAL

## 2023-01-15 DIAGNOSIS — D64.9 ANEMIA, UNSPECIFIED: ICD-10-CM

## 2023-01-16 LAB — HGB BLD-MCNC: 8.8 G/DL (ref 13.3–17.7)

## 2023-01-16 PROCEDURE — 36415 COLL VENOUS BLD VENIPUNCTURE: CPT | Mod: ORL | Performed by: NURSE PRACTITIONER

## 2023-01-16 PROCEDURE — 85018 HEMOGLOBIN: CPT | Mod: ORL | Performed by: NURSE PRACTITIONER

## 2023-01-19 VITALS
OXYGEN SATURATION: 94 % | DIASTOLIC BLOOD PRESSURE: 75 MMHG | TEMPERATURE: 97.1 F | HEART RATE: 86 BPM | RESPIRATION RATE: 18 BRPM | SYSTOLIC BLOOD PRESSURE: 139 MMHG | BODY MASS INDEX: 30.85 KG/M2 | WEIGHT: 197 LBS

## 2023-01-19 NOTE — PROGRESS NOTES
[facility] GERIATRICS    Chief Complaint   Patient presents with     RECHECK     HPI:  Suresh Powers is a 62 year old  (1960), who is being seen today for an episodic care visit at: [facility] ([facility]) [[id number]].     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 bleed with severe anemia. Hgb 3.8 on admission, received 6 units blood and Hgb 7.9. GI: EGD 11/30 with large non-bleeding duodenal ulcer, on Protonix 40 mg BID. Started Eliquis but discontinued due to Hgb dropped to 6.8,received one more unit of blood and hematology recommend hold Eliquis x week then attempt to restart at 5 mg BID. HTN with hypotension (resolved) holding Norvasc. Urinary retention on Flomax, vasquez in place. Anxiety, depression, right eye: on PTA BuSpar, Lexapro, trazodone, Lamictal. Chronic hyponatremia currently 130-140. Weakness to TCU for rehab.     Today's concern is: seen for f/u anemia, vs, mobility, bowels. No pain, off therapies and up independently. BP range 127-151/70-88 and sats 92% room air. Wt gain 6 lbs since admission and Body mass index is 30.85 kg/m .  No longer having loose stools will stop imodium. Continues to await community placement.     Allergies, and PMH/PSH reviewed in EPIC today.    REVIEW OF SYSTEMS:  4 point ROS including Respiratory, CV, GI and , other than that noted in the HPI,  is negative    Objective:   /75   Pulse 86   Temp 97.1  F (36.2  C)   Resp 18   Wt 89.4 kg (197 lb)   SpO2 94%   BMI 30.85 kg/m    GENERAL APPEARANCE:  Alert, in no distress, cooperative  ENT:  Mouth normal, moist mucous membranes, normal hearing acuity  EYES:  Conjunctiva and lids normal  RESP:  no respiratory distress, on room air  CV: no LE edema  MS: up with  walker  NEURO:   No facial asymmetry, speech clear  PSYCH:  oriented X 3, affect and mood normal     Most Recent 3 CBC's:     Recent Labs   Lab Test 01/16/23  0618 01/09/23  0940 01/03/23  0810 12/26/22  0541 12/19/22  1044 12/13/22  0620 12/12/22  0620 12/06/22  1238 12/06/22  0544   WBC  --   --   --   --  7.4  --  7.8  --  7.0   HGB 8.8* 9.8* 9.9*   < > 9.3*   < > 8.4*   < > 7.5*   MCV  --   --   --   --  85  --  86  --  87   PLT  --   --   --   --  563*  --  422  --  430    < > = values in this interval not displayed.     Most Recent 3 BMP's:  Recent Labs   Lab Test 12/19/22  1044 12/12/22  0620 12/03/22  0607    134 140   POTASSIUM 3.8 4.1 4.2   CHLORIDE 102 102 108   CO2 25 26 26   BUN 24 28 21   CR 0.65* 0.66 0.68   ANIONGAP 7 6 6   NIMISHA 9.1 8.7 7.9*   * 106* 128*       Assessment/Plan:  Upper GI bleed  Acute blood loss anemia  Acute, improved with transfusion. Last Hgb 9.9 on 1/3 and 8.8 on 1/16. Continue pantoprazole 40 mg twice daily. Follow-up with GI as directed. Check Hgb every Monday for now while at TCU.      Right lower lobe pulmonary embolism  New onset. Resumed apixaban. Monitor.      Essential hypertension  Chronic, on amlodipine 5 mg daily, controlled. Monitor blood pressure.      Hyponatremia  Resolved. Na 134 on 12/19. Check PRN.      BPH with postop urinary retention  Solitary right kidney  Acute on chronic. Prieto out, voiding well. Continue tamsulosin 0.4 mg daily    Depression  SPIKE  OCD  Chronic, well managed with PTA buspirone 50 mg twice daily, escitalopram 20 mg daily, lamotrigine 200 mg at bedtime, and trazodone 100 mg at bedtime. Monitor symptoms.     Physical deconditioning  Now off therapies, awaiting community placement. Monitor.     MED REC REQUIRED  Post Medication Reconciliation Status:  Discharge medications reconciled and changed, see notes/orders    Orders:  discontinue imodium, discontinue zinc  Weekly Hgb on Mondays diagnosis anemia    Electronically signed by:  LUIS Unger CNP     Addendum 23: patient may discharge to [facility] on 2/15 with all current orders and [facility] for PT, OT, RN and HHA.     Electronically signed by LUIS Unger GNP         MEDICAL NECESSITY STATEMENT FOR DME    Demographic Information on Suresh Powers:    Suresh Powers  Gender: male  : 1960  [address]  [facility] [place] [id number] [phone] (home)     Medical Record: [id number]  Social Security Number: [id number]  Primary Care Provider: Brad Thurston  Insurance: Payor: [facility] / Plan: [employer] MEDICARE ADVANTAGE / Product Type: Medicare /        Gastrointestinal hemorrhage associated with duodenal ulcer  Acute blood loss anemia  Other acute pulmonary embolism without acute cor pulmonale (H)  Essential hypertension  Hyponatremia  Benign prostatic hyperplasia with urinary retention  Solitary kidney, acquired  Depression, unspecified depression type  SPIKE (generalized anxiety disorder)  Obsessive-compulsive disorder, unspecified type  Physical deconditioning     DME:  WALKER: Patient requires a standard 2WW for all mobility secondary to recent L hip surgery which leads patient to be unsafe and unable to ambulate without use of a device. Patient requires a 2WW in order to maximize his overall participation, independence, and safety with self-cares.       VITAL SIGNS:  Vitals: /82   Pulse 76   Temp 98.1  F (36.7  C)   Resp 18   Wt 92.4 kg (203 lb 9.6 oz)   SpO2 96%   BMI 31.89 kg/m    BMI= Body mass index is 31.89 kg/m .   see above note for exam    MEDICAL NECESSITY STATEMENT 99 months     Gastrointestinal hemorrhage associated with duodenal ulcer  Acute blood loss anemia  Other acute pulmonary embolism without acute cor pulmonale (H)  Essential hypertension  Hyponatremia  Benign prostatic hyperplasia with urinary retention  Solitary kidney, acquired  Depression, unspecified depression type  SPIKE (generalized anxiety disorder)  Obsessive-compulsive disorder,  unspecified type  Physical deconditioning     ELECTRONICALLY SIGNED BY PECOS CERTIFIED PROVIDER:  LUIS Unger CNP   NPI [id number]   [place] [facility]  [facility], [address]  [place] [address]

## 2023-01-20 ENCOUNTER — TRANSITIONAL CARE UNIT VISIT (OUTPATIENT)
Dept: GERIATRICS | Facility: CLINIC | Age: 63
End: 2023-01-20
Payer: COMMERCIAL

## 2023-01-20 DIAGNOSIS — I10 ESSENTIAL HYPERTENSION: ICD-10-CM

## 2023-01-20 DIAGNOSIS — D62 ACUTE BLOOD LOSS ANEMIA: ICD-10-CM

## 2023-01-20 DIAGNOSIS — Z90.5 SOLITARY KIDNEY, ACQUIRED: ICD-10-CM

## 2023-01-20 DIAGNOSIS — F41.1 GAD (GENERALIZED ANXIETY DISORDER): ICD-10-CM

## 2023-01-20 DIAGNOSIS — K26.4 GASTROINTESTINAL HEMORRHAGE ASSOCIATED WITH DUODENAL ULCER: Primary | ICD-10-CM

## 2023-01-20 DIAGNOSIS — F32.A DEPRESSION, UNSPECIFIED DEPRESSION TYPE: ICD-10-CM

## 2023-01-20 DIAGNOSIS — R53.81 PHYSICAL DECONDITIONING: ICD-10-CM

## 2023-01-20 DIAGNOSIS — E87.1 HYPONATREMIA: ICD-10-CM

## 2023-01-20 DIAGNOSIS — F42.9 OBSESSIVE-COMPULSIVE DISORDER, UNSPECIFIED TYPE: ICD-10-CM

## 2023-01-20 DIAGNOSIS — I26.99 OTHER ACUTE PULMONARY EMBOLISM WITHOUT ACUTE COR PULMONALE (H): ICD-10-CM

## 2023-01-20 DIAGNOSIS — R33.8 BENIGN PROSTATIC HYPERPLASIA WITH URINARY RETENTION: ICD-10-CM

## 2023-01-20 DIAGNOSIS — N40.1 BENIGN PROSTATIC HYPERPLASIA WITH URINARY RETENTION: ICD-10-CM

## 2023-01-20 PROCEDURE — 99309 SBSQ NF CARE MODERATE MDM 30: CPT | Performed by: NURSE PRACTITIONER

## 2023-01-22 ENCOUNTER — LAB REQUISITION (OUTPATIENT)
Dept: LAB | Facility: CLINIC | Age: 63
End: 2023-01-22
Payer: COMMERCIAL

## 2023-01-22 DIAGNOSIS — D64.9 ANEMIA, UNSPECIFIED: ICD-10-CM

## 2023-01-23 ENCOUNTER — TELEPHONE (OUTPATIENT)
Dept: INTERNAL MEDICINE | Facility: CLINIC | Age: 63
End: 2023-01-23
Payer: COMMERCIAL

## 2023-01-23 LAB — HGB BLD-MCNC: 8.9 G/DL (ref 13.3–17.7)

## 2023-01-23 PROCEDURE — 36415 COLL VENOUS BLD VENIPUNCTURE: CPT | Mod: ORL | Performed by: NURSE PRACTITIONER

## 2023-01-23 PROCEDURE — P9604 ONE-WAY ALLOW PRORATED TRIP: HCPCS | Mod: ORL | Performed by: NURSE PRACTITIONER

## 2023-01-23 PROCEDURE — 85018 HEMOGLOBIN: CPT | Performed by: NURSE PRACTITIONER

## 2023-01-23 NOTE — TELEPHONE ENCOUNTER
Reason for Call:  Form, our goal is to have forms completed with 72 hours, however, some forms may require a visit or additional information.    Type of letter, form or note:  Move-in Orders    Who is the form from?: [employer] (if other please explain)    Where did the form come from: form was faxed in    What clinic location was the form placed at?: Internal Medicine    Where the form was placed: Pcp's folder    What number is listed as a contact on the form?:        Additional comments: Please fax completed form to [employer] at [phone].    Call taken on 1/23/2023 at 11:24 AM by Sohail Gonzáles

## 2023-01-24 ENCOUNTER — MEDICAL CORRESPONDENCE (OUTPATIENT)
Dept: HEALTH INFORMATION MANAGEMENT | Facility: CLINIC | Age: 63
End: 2023-01-24

## 2023-01-25 NOTE — TELEPHONE ENCOUNTER
Forms signed and in [facility] basket. Pt currently in TCU. Copy of most recent hospital discharge and TCU admit notes copied also and included with forms

## 2023-01-29 ENCOUNTER — LAB REQUISITION (OUTPATIENT)
Dept: LAB | Facility: CLINIC | Age: 63
End: 2023-01-29

## 2023-01-29 DIAGNOSIS — D64.9 ANEMIA, UNSPECIFIED: ICD-10-CM

## 2023-01-30 ENCOUNTER — MEDICAL CORRESPONDENCE (OUTPATIENT)
Dept: HEALTH INFORMATION MANAGEMENT | Facility: CLINIC | Age: 63
End: 2023-01-30

## 2023-01-30 LAB — HGB BLD-MCNC: 9.9 G/DL (ref 13.3–17.7)

## 2023-01-30 PROCEDURE — 85018 HEMOGLOBIN: CPT | Performed by: NURSE PRACTITIONER

## 2023-02-02 NOTE — PROGRESS NOTES
[place] [facility]  February 3, 2023      CHIEF COMPLAINT:  Episodic regulatory visit    HPI:    Suresh Powers is a 62 year old  (1960), who is being seen today for an episodic care visit at [facility].     Medical history is notable for hypertension, UTI, solitary kidney, BPH, shingles, depression, SPIKE, OCD, and left peritrochanteric femur fracture, s/p cephalomedullary nailing on September 4, 2022.     Summary of hospital course:  Patient was hospitalized at [facility] from November 29 through December 8, 2022 for profound anemia due to upper GI bleed.  He originally presented with fatigue.  EKG showed sinus tachycardia.  Blood work was significant for hemoglobin of 3.8. 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  CT chest with contrast revealed segmental and subsegmental pulmonary emboli in the right lower lobe without evidence of right heart strain.  Doppler study of lower extremities was negative for DVT.  CT abdomen/pelvis revealed minimal retroperitoneal adenopathy but no occult malignancy.  EGD showed nonbleeding duodenal ulcer (25 mm) with no stigmata of bleeding, congested duodenal mucosa, and normal esophagus and stomach.  Patient was transfused with several units of PRBC and initiated on pantoprazole.  He was evaluated by hematology service and work-up for thrombophilia was negative (negative factor V Leiden mutation, negative factor 2 mutation, and negative cardiolipin antibodies).  He was started on IV heparin for pulmonary embolism. GI suggested holding off on initiating oral anticoagulation for at least 48 hours due to further drop in hemoglobin.  TCU was recommended per therapies. Hgb was 8.6 upon discharge from hospital. He was then admitted to this facility for medical management, nursing care, and rehab.       Today's visit:  Patient was seen in his room, while lying in bed.  He appears comfortable.  He had a bowel  movement this morning and denies any melena or hematochezia.  He reports no fever, chills, chest pain, palpitation, dyspnea, nausea, vomiting, abdominal pain, or urinary symptoms.  His Prieto catheter has been removed and he is able to urinate.    CODE STATUS:   CPR/Full code     PAST MEDICAL HISTORY:   Upper GI bleed in November 2022  Nonbleeding duodenal ulcer (25 mm), per EGD on November 30, 2022  Right lower lobe pulmonary embolism in November 2022  Hypertension  UTI  Solitary right kidney (left kidney donated to sister in 1990)  BPH with postop urinary retention  Shingles  Allergic rhinitis  Depression  SPIKE  OCD  Fall resulting in left peritrochanteric femur fracture, s/p cephalomedullary nailing on September 4, 2022      Past Surgical, Family, and Social History were reviewed and updated in [facility].    Current Outpatient Medications   Medication Sig Dispense Refill     amLODIPine (NORVASC) 5 MG tablet Take 5 mg by mouth daily       apixaban ANTICOAGULANT (ELIQUIS) 5 MG tablet Take 5 mg by mouth 2 times daily       busPIRone (BUSPAR) 15 MG tablet Take 1 tablet (15 mg) by mouth 2 times daily       escitalopram (LEXAPRO) 20 MG tablet Take 20 mg by mouth daily       fluticasone (FLONASE) 50 MCG/ACT nasal spray use 2 sprays in each nostril daily. (Patient taking differently: Spray 2 sprays into both nostrils daily as needed use 2 sprays in each nostril daily.) 48 g 3     lamoTRIgine (LAMICTAL) 200 MG tablet Take 200 mg by mouth At Bedtime       Multiple Vitamins-Minerals (MULTIVITAMIN ADULT PO) Take 1 tablet by mouth daily       pantoprazole (PROTONIX) 40 MG EC tablet Take 1 tablet (40 mg) by mouth 2 times daily (before meals) Take twice a day for 8weeks then reduce to once  a day 60 tablet 1     polyethylene glycol-propylene glycol (SYSTANE ULTRA) 0.4-0.3 % SOLN ophthalmic solution Place 2 drops into both eyes 4 times daily as needed       tamsulosin (FLOMAX) 0.4 MG capsule Take 1 capsule (0.4 mg) by mouth every  "evening 90 capsule 3     traZODone (DESYREL) 50 MG tablet Take 2 tablets (100 mg) by mouth At Bedtime       vitamin C (ASCORBIC ACID) 500 MG tablet Take 500 mg by mouth daily         MED REC REQUIRED  Post Medication Reconciliation Status: medication reconcilation previously completed during another office visit      ALLERGIES: Lisinopril and Sertraline    REVIEW OF SYSTEMS:  10 point ROS were negative other than the symptoms noted above in the HPI.    PHYSICAL EXAM:  Vital signs were reviewed in the chart.  Vital Signs: /85   Pulse 81   Temp 98.2  F (36.8  C)   Resp 18   Ht 1.702 m (5' 7\")   Wt 91.7 kg (202 lb 3.2 oz)   SpO2 96%   BMI 31.67 kg/m    General: Comfortable and in no acute distress  HEENT: Conjunctival pallor, no scleral icterus or injection, moist oral mucosa  Cardiovascular: Normal S1, S2, RRR  Respiratory: Lungs clear to auscultation bilaterally  GI: Abdomen soft, non-tender, non-distended, +BS  Extremities: Trace bilateral LE edema  Neuro: CX II-XII grossly intact; ROM in all four extremities grossly intact  Psych: Alert and oriented x3; normal affect  Skin: No acute rash    LABORATORY/IMAGING DATA:  All relevant labs and imaging data in [facility] and/or [facility] Everywhere were personally reviewed today.      Most Recent 3 CBC's:Recent Labs   Lab Test 01/30/23  0600 01/23/23  0643 01/16/23  0618 12/26/22  0541 12/19/22  1044 12/13/22  0620 12/12/22  0620 12/06/22  1238 12/06/22  0544   WBC  --   --   --   --  7.4  --  7.8  --  7.0   HGB 9.9* 8.9* 8.8*   < > 9.3*   < > 8.4*   < > 7.5*   MCV  --   --   --   --  85  --  86  --  87   PLT  --   --   --   --  563*  --  422  --  430    < > = values in this interval not displayed.     Most Recent 3 BMP's:Recent Labs   Lab Test 12/19/22  1044 12/12/22  0620 12/03/22  0607    134 140   POTASSIUM 3.8 4.1 4.2   CHLORIDE 102 102 108   CO2 25 26 26   BUN 24 28 21   CR 0.65* 0.66 0.68   ANIONGAP 7 6 6   NIMISHA 9.1 8.7 7.9*   * 106* 128* "       ASSESSMENT/PLAN:  Upper GI bleed due to duodenal ulcer (25 mm),   Acute blood loss anemia.  Normal hemoglobin of 14.2 back in June 2021.  He had hemoglobin of 10.6 in September 2022 which dropped to 8.1 post hip surgery.  He presented with hemoglobin of 3.8 on November 29, 2022 due to upper GI bleed and he was transfused with several units of PRBC.  EGD showed a 25 mm duodenal ulcer.  Last hemoglobin of 9.9 on January 30, 2023.  No recurrence of bleeding.  Plan:  Continue pantoprazole 40 mg twice daily  Avoid NSAIDs  Monitor for recurrence of GI bleed  Monitor hemoglobin periodically  Follow-up with GI as directed     Right lower lobe pulmonary embolism.  Diagnosed November 2022.  Venous Doppler negative for DVT.  Plan:  Continue apixaban 5 mg p.o. twice daily   Continue to monitor     Essential hypertension.  Blood pressure is fairly controlled.  Plan:  Continue amlodipine 5 mg daily  Monitor blood pressure     BPH with postop urinary retention,  Solitary right kidney (left kidney was donated to sister in 1990).  Retention has resolved and patient no longer has a Prieto catheter.  Last creatinine of 0.65 on December 19, 2022.  Plan:  Continue tamsulosin 0.4 mg daily  Monitor for recurrence of retention  Avoid NSAIDs and nephrotoxins   Monitor renal function periodically  Follow-up with urology as directed     Depression,  SPIKE,  OCD.  Symptoms are fairly controlled.  Plan:  Continue PTA buspirone 15 mg twice daily, escitalopram 20 mg daily, lamotrigine 200 mg at bedtime, and trazodone 100 mg at bedtime  Monitor symptoms  Follow-up with psychiatry as directed     History of left peritrochanteric femur fracture (sustained after fall), s/p cephalomedullary nailing on September 4, 2022,  Physical deconditioning.  Surgical wound has healed.    Plan:  Continue PT/OT evaluation and therapy  Follow-up with Ortho as directed           Orders written by provider at facility:  None.        Disclaimer: This note may  contain text created using speech-recognition software and may contain unintended word substitutions.      Electronically signed by  Wale Duarte MD

## 2023-02-03 ENCOUNTER — TRANSITIONAL CARE UNIT VISIT (OUTPATIENT)
Dept: GERIATRICS | Facility: CLINIC | Age: 63
End: 2023-02-03
Payer: COMMERCIAL

## 2023-02-03 VITALS
HEIGHT: 67 IN | RESPIRATION RATE: 18 BRPM | DIASTOLIC BLOOD PRESSURE: 85 MMHG | WEIGHT: 202.2 LBS | OXYGEN SATURATION: 96 % | BODY MASS INDEX: 31.74 KG/M2 | HEART RATE: 81 BPM | SYSTOLIC BLOOD PRESSURE: 135 MMHG | TEMPERATURE: 98.2 F

## 2023-02-03 DIAGNOSIS — F41.1 GAD (GENERALIZED ANXIETY DISORDER): ICD-10-CM

## 2023-02-03 DIAGNOSIS — N40.1 BENIGN PROSTATIC HYPERPLASIA WITH URINARY RETENTION: ICD-10-CM

## 2023-02-03 DIAGNOSIS — Z90.5 SOLITARY KIDNEY, ACQUIRED: ICD-10-CM

## 2023-02-03 DIAGNOSIS — I10 ESSENTIAL HYPERTENSION: ICD-10-CM

## 2023-02-03 DIAGNOSIS — F42.9 OBSESSIVE-COMPULSIVE DISORDER, UNSPECIFIED TYPE: ICD-10-CM

## 2023-02-03 DIAGNOSIS — K26.4 GASTROINTESTINAL HEMORRHAGE ASSOCIATED WITH DUODENAL ULCER: Primary | ICD-10-CM

## 2023-02-03 DIAGNOSIS — R33.8 BENIGN PROSTATIC HYPERPLASIA WITH URINARY RETENTION: ICD-10-CM

## 2023-02-03 DIAGNOSIS — Z98.890 S/P ORIF (OPEN REDUCTION INTERNAL FIXATION) FRACTURE: ICD-10-CM

## 2023-02-03 DIAGNOSIS — D62 ACUTE BLOOD LOSS ANEMIA: ICD-10-CM

## 2023-02-03 DIAGNOSIS — I26.99 OTHER ACUTE PULMONARY EMBOLISM WITHOUT ACUTE COR PULMONALE (H): ICD-10-CM

## 2023-02-03 DIAGNOSIS — R53.81 PHYSICAL DECONDITIONING: ICD-10-CM

## 2023-02-03 DIAGNOSIS — F32.A DEPRESSION, UNSPECIFIED DEPRESSION TYPE: ICD-10-CM

## 2023-02-03 DIAGNOSIS — Z87.81 S/P ORIF (OPEN REDUCTION INTERNAL FIXATION) FRACTURE: ICD-10-CM

## 2023-02-03 PROCEDURE — 99309 SBSQ NF CARE MODERATE MDM 30: CPT | Performed by: INTERNAL MEDICINE

## 2023-02-03 NOTE — LETTER
2/3/2023        RE: Suresh Powers  [address]  [place] [address]        [facility]  February 3, 2023      CHIEF COMPLAINT:  Episodic regulatory visit    HPI:    Suresh Powers is a 62 year old  (1960), who is being seen today for an episodic care visit at [facility] [address] [facility].     Medical history is notable for hypertension, UTI, solitary kidney, BPH, shingles, depression, SPIKE, OCD, and left peritrochanteric femur fracture, s/p cephalomedullary nailing on September 4, 2022.     Summary of hospital course:  Patient was hospitalized at [facility] from November 29 through December 8, 2022 for profound anemia due to upper GI bleed.  He originally presented with fatigue.  EKG showed sinus tachycardia.  Blood work was significant for hemoglobin of 3.8. 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  CT chest with contrast revealed segmental and subsegmental pulmonary emboli in the right lower lobe without evidence of right heart strain.  Doppler study of lower extremities was negative for DVT.  CT abdomen/pelvis revealed minimal retroperitoneal adenopathy but no occult malignancy.  EGD showed nonbleeding duodenal ulcer (25 mm) with no stigmata of bleeding, congested duodenal mucosa, and normal esophagus and stomach.  Patient was transfused with several units of PRBC and initiated on pantoprazole.  He was evaluated by hematology service and work-up for thrombophilia was negative (negative factor V Leiden mutation, negative factor 2 mutation, and negative cardiolipin antibodies).  He was started on IV heparin for pulmonary embolism. GI suggested holding off on initiating oral anticoagulation for at least 48 hours due to further drop in hemoglobin.  TCU was recommended per therapies. Hgb was 8.6 upon discharge from hospital. He was then admitted to this facility for medical management, nursing care, and rehab.       Today's  visit:  Patient was seen in his room, while lying in bed.  He appears comfortable.  He had a bowel movement this morning and denies any melena or hematochezia.  He reports no fever, chills, chest pain, palpitation, dyspnea, nausea, vomiting, abdominal pain, or urinary symptoms.  His Prieto catheter has been removed and he is able to urinate.    CODE STATUS:   CPR/Full code     PAST MEDICAL HISTORY:   Upper GI bleed in November 2022  Nonbleeding duodenal ulcer (25 mm), per EGD on November 30, 2022  Right lower lobe pulmonary embolism in November 2022  Hypertension  UTI  Solitary right kidney (left kidney donated to sister in 1990)  BPH with postop urinary retention  Shingles  Allergic rhinitis  Depression  SPIKE  OCD  Fall resulting in left peritrochanteric femur fracture, s/p cephalomedullary nailing on September 4, 2022      Past Surgical, Family, and Social History were reviewed and updated in [facility].    Current Outpatient Medications   Medication Sig Dispense Refill     amLODIPine (NORVASC) 5 MG tablet Take 5 mg by mouth daily       apixaban ANTICOAGULANT (ELIQUIS) 5 MG tablet Take 5 mg by mouth 2 times daily       busPIRone (BUSPAR) 15 MG tablet Take 1 tablet (15 mg) by mouth 2 times daily       escitalopram (LEXAPRO) 20 MG tablet Take 20 mg by mouth daily       fluticasone (FLONASE) 50 MCG/ACT nasal spray use 2 sprays in each nostril daily. (Patient taking differently: Spray 2 sprays into both nostrils daily as needed use 2 sprays in each nostril daily.) 48 g 3     lamoTRIgine (LAMICTAL) 200 MG tablet Take 200 mg by mouth At Bedtime       Multiple Vitamins-Minerals (MULTIVITAMIN ADULT PO) Take 1 tablet by mouth daily       pantoprazole (PROTONIX) 40 MG EC tablet Take 1 tablet (40 mg) by mouth 2 times daily (before meals) Take twice a day for 8weeks then reduce to once  a day 60 tablet 1     polyethylene glycol-propylene glycol (SYSTANE ULTRA) 0.4-0.3 % SOLN ophthalmic solution Place 2 drops into both eyes 4 times  "daily as needed       tamsulosin (FLOMAX) 0.4 MG capsule Take 1 capsule (0.4 mg) by mouth every evening 90 capsule 3     traZODone (DESYREL) 50 MG tablet Take 2 tablets (100 mg) by mouth At Bedtime       vitamin C (ASCORBIC ACID) 500 MG tablet Take 500 mg by mouth daily         MED REC REQUIRED  Post Medication Reconciliation Status: medication reconcilation previously completed during another office visit      ALLERGIES: Lisinopril and Sertraline    REVIEW OF SYSTEMS:  10 point ROS were negative other than the symptoms noted above in the HPI.    PHYSICAL EXAM:  Vital signs were reviewed in the chart.  Vital Signs: /85   Pulse 81   Temp 98.2  F (36.8  C)   Resp 18   Ht 1.702 m (5' 7\")   Wt 91.7 kg (202 lb 3.2 oz)   SpO2 96%   BMI 31.67 kg/m    General: Comfortable and in no acute distress  HEENT: Conjunctival pallor, no scleral icterus or injection, moist oral mucosa  Cardiovascular: Normal S1, S2, RRR  Respiratory: Lungs clear to auscultation bilaterally  GI: Abdomen soft, non-tender, non-distended, +BS  Extremities: Trace bilateral LE edema  Neuro: CX II-XII grossly intact; ROM in all four extremities grossly intact  Psych: Alert and oriented x3; normal affect  Skin: No acute rash    LABORATORY/IMAGING DATA:  All relevant labs and imaging data in [facility] and/or Care Everywhere were personally reviewed today.      Most Recent 3 CBC's:Recent Labs   Lab Test 01/30/23  0600 01/23/23  0643 01/16/23  0618 12/26/22  0541 12/19/22  1044 12/13/22  0620 12/12/22  0620 12/06/22  1238 12/06/22  0544   WBC  --   --   --   --  7.4  --  7.8  --  7.0   HGB 9.9* 8.9* 8.8*   < > 9.3*   < > 8.4*   < > 7.5*   MCV  --   --   --   --  85  --  86  --  87   PLT  --   --   --   --  563*  --  422  --  430    < > = values in this interval not displayed.     Most Recent 3 BMP's:Recent Labs   Lab Test 12/19/22  1044 12/12/22  0620 12/03/22  0607    134 140   POTASSIUM 3.8 4.1 4.2   CHLORIDE 102 102 108   CO2 25 26 26   BUN 24 " 28 21   CR 0.65* 0.66 0.68   ANIONGAP 7 6 6   NIMISHA 9.1 8.7 7.9*   * 106* 128*       ASSESSMENT/PLAN:  Upper GI bleed due to duodenal ulcer (25 mm),   Acute blood loss anemia.  Normal hemoglobin of 14.2 back in June 2021.  He had hemoglobin of 10.6 in September 2022 which dropped to 8.1 post hip surgery.  He presented with hemoglobin of 3.8 on November 29, 2022 due to upper GI bleed and he was transfused with several units of PRBC.  EGD showed a 25 mm duodenal ulcer.  Last hemoglobin of 9.9 on January 30, 2023.  No recurrence of bleeding.  Plan:  Continue pantoprazole 40 mg twice daily  Avoid NSAIDs  Monitor for recurrence of GI bleed  Monitor hemoglobin periodically  Follow-up with GI as directed     Right lower lobe pulmonary embolism.  Diagnosed November 2022.  Venous Doppler negative for DVT.  Plan:  Continue apixaban 5 mg p.o. twice daily   Continue to monitor     Essential hypertension.  Blood pressure is fairly controlled.  Plan:  Continue amlodipine 5 mg daily  Monitor blood pressure     BPH with postop urinary retention,  Solitary right kidney (left kidney was donated to sister in 1990).  Retention has resolved and patient no longer has a Prieto catheter.  Last creatinine of 0.65 on December 19, 2022.  Plan:  Continue tamsulosin 0.4 mg daily  Monitor for recurrence of retention  Avoid NSAIDs and nephrotoxins   Monitor renal function periodically  Follow-up with urology as directed     Depression,  SPIKE,  OCD.  Symptoms are fairly controlled.  Plan:  Continue PTA buspirone 15 mg twice daily, escitalopram 20 mg daily, lamotrigine 200 mg at bedtime, and trazodone 100 mg at bedtime  Monitor symptoms  Follow-up with psychiatry as directed     History of left peritrochanteric femur fracture (sustained after fall), s/p cephalomedullary nailing on September 4, 2022,  Physical deconditioning.  Surgical wound has healed.    Plan:  Continue PT/OT evaluation and therapy  Follow-up with Ortho as  directed           Orders written by provider at facility:  None.        Disclaimer: This note may contain text created using speech-recognition software and may contain unintended word substitutions.      Electronically signed by  Wale Duarte MD                        Sincerely,        Wale Duarte MD

## 2023-02-05 ENCOUNTER — LAB REQUISITION (OUTPATIENT)
Dept: LAB | Facility: CLINIC | Age: 63
End: 2023-02-05

## 2023-02-05 DIAGNOSIS — D64.9 ANEMIA, UNSPECIFIED: ICD-10-CM

## 2023-02-06 LAB — HGB BLD-MCNC: 9.1 G/DL (ref 13.3–17.7)

## 2023-02-06 PROCEDURE — P9604 ONE-WAY ALLOW PRORATED TRIP: HCPCS | Performed by: NURSE PRACTITIONER

## 2023-02-06 PROCEDURE — 36415 COLL VENOUS BLD VENIPUNCTURE: CPT | Performed by: NURSE PRACTITIONER

## 2023-02-06 PROCEDURE — 85018 HEMOGLOBIN: CPT | Performed by: NURSE PRACTITIONER

## 2023-02-09 ENCOUNTER — PATIENT OUTREACH (OUTPATIENT)
Dept: CARE COORDINATION | Facility: CLINIC | Age: 63
End: 2023-02-09
Payer: COMMERCIAL

## 2023-02-09 NOTE — PROGRESS NOTES
Clinic Care Coordination Contact  Care Team Conversations    [facility] spoke with [facility] , [place], to check in on patients anticipated discharge plans/date. Per report, patient will discharge to [facility] in [place], [place] on 2/15/2023. [facility] will attempt to reach patient within 1-2 business days from discharge to check in and review plan of care.     OSMEL Chen/[facility]  Social Work Care Coordinator  [facility], [place], [facility], and [place]  Phone: [phone]

## 2023-02-13 VITALS
HEART RATE: 76 BPM | TEMPERATURE: 98.1 F | WEIGHT: 203.6 LBS | BODY MASS INDEX: 31.89 KG/M2 | DIASTOLIC BLOOD PRESSURE: 82 MMHG | RESPIRATION RATE: 18 BRPM | SYSTOLIC BLOOD PRESSURE: 138 MMHG | OXYGEN SATURATION: 96 %

## 2023-02-14 ENCOUNTER — TELEPHONE (OUTPATIENT)
Dept: GERIATRICS | Facility: CLINIC | Age: 63
End: 2023-02-14

## 2023-02-14 ENCOUNTER — DISCHARGE SUMMARY NURSING HOME (OUTPATIENT)
Dept: GERIATRICS | Facility: CLINIC | Age: 63
End: 2023-02-14
Payer: COMMERCIAL

## 2023-02-14 DIAGNOSIS — Z53.9 ERRONEOUS ENCOUNTER--DISREGARD: Primary | ICD-10-CM

## 2023-02-15 NOTE — TELEPHONE ENCOUNTER
[facility] TELEPHONE ENCOUNTER    Chief Complaint   Patient presents with     Patient Request       Suresh Powers is a 62 year old  (1960),Nurse called today to report: Patient was out on MARGOT for most of shift. He returned now intoxicated. Staff are wondering about HS medications. He is planning to discharge home tomorrow.     ASSESSMENT/PLAN      HOLD all HS medications given ETOH intake    Electronically signed by:   LUIS Corado CNP

## 2023-02-17 ENCOUNTER — TELEPHONE (OUTPATIENT)
Dept: INTERNAL MEDICINE | Facility: CLINIC | Age: 63
End: 2023-02-17
Payer: COMMERCIAL

## 2023-02-17 ENCOUNTER — DOCUMENTATION ONLY (OUTPATIENT)
Dept: OTHER | Facility: CLINIC | Age: 63
End: 2023-02-17
Payer: COMMERCIAL

## 2023-02-17 NOTE — TELEPHONE ENCOUNTER
Abad (RN [facility]) called requesting orders for delay of start of care until 2/17/23 due to pt request.     Routing to PCP to review and advise.     Please call Abad back with PCPs recommendations.     Abad- [phone] (can leave a detailed message)

## 2023-02-17 NOTE — TELEPHONE ENCOUNTER
Kenisha with [facility] called requesting approval of SN orders every other week for 9 wks. Pt to evaluate and treat.     Verbal approval given.

## 2023-02-20 ENCOUNTER — PATIENT OUTREACH (OUTPATIENT)
Dept: CARE COORDINATION | Facility: CLINIC | Age: 63
End: 2023-02-20
Payer: COMMERCIAL

## 2023-02-20 SDOH — ECONOMIC STABILITY: INCOME INSECURITY: IN THE LAST 12 MONTHS, WAS THERE A TIME WHEN YOU WERE NOT ABLE TO PAY THE MORTGAGE OR RENT ON TIME?: NO

## 2023-02-20 SDOH — ECONOMIC STABILITY: FOOD INSECURITY: WITHIN THE PAST 12 MONTHS, THE FOOD YOU BOUGHT JUST DIDN'T LAST AND YOU DIDN'T HAVE MONEY TO GET MORE.: NEVER TRUE

## 2023-02-20 SDOH — ECONOMIC STABILITY: FOOD INSECURITY: WITHIN THE PAST 12 MONTHS, YOU WORRIED THAT YOUR FOOD WOULD RUN OUT BEFORE YOU GOT MONEY TO BUY MORE.: NEVER TRUE

## 2023-02-20 ASSESSMENT — ACTIVITIES OF DAILY LIVING (ADL): DEPENDENT_IADLS:: TRANSPORTATION;CLEANING

## 2023-02-20 ASSESSMENT — SOCIAL DETERMINANTS OF HEALTH (SDOH): HOW HARD IS IT FOR YOU TO PAY FOR THE VERY BASICS LIKE FOOD, HOUSING, MEDICAL CARE, AND HEATING?: NOT VERY HARD

## 2023-02-20 NOTE — PROGRESS NOTES
"Clinic Care Coordination Contact    Follow Up Progress Note      Assessment: Initial enrollment to Care Coordination on 9/7/2022 for in-home support/resources. Follow-up call to patient today to check-in and review plan of care. Per chart review, patient recently discharge from TCU and is now residing in an [facility]. Patient reports [facility] is very nice and noted no concerns. Patient shared they are able to assist with his needs and he is also receiving homecare services (RN) for additional support. Due to above, patients \"establish in-home support\" care plan goal completed.     Care Gaps:  Health Maintenance Due   Topic Date Due     HIV SCREENING  Never done     ZOSTER IMMUNIZATION (1 of 2) Never done     MEDICARE ANNUAL WELLNESS VISIT  03/21/2018     COLORECTAL CANCER SCREENING  08/20/2018     PHQ-9  12/15/2021     Care Plans: None/completed.     Intervention/Education provided during outreach: Provided active listening and support to patient during contact. Allowed time and space for patient to voice any additional needs. Patient declined any further CC needs and assessment was negative for any new concerns. Patient CC program closed due to above, patient in agreement.     Plan: No further care coordination outreaches will be made at this time. If new needs arise a new Care Coordination referral may be placed.     [facility]MEL [facility]  Social Work Care Coordinator  [facility], [place], [facility], and [place]  Phone: [phone]    "

## 2023-02-21 ENCOUNTER — OFFICE VISIT (OUTPATIENT)
Dept: INTERNAL MEDICINE | Facility: CLINIC | Age: 63
End: 2023-02-21
Payer: COMMERCIAL

## 2023-02-21 DIAGNOSIS — R19.7 DIARRHEA, UNSPECIFIED TYPE: Primary | ICD-10-CM

## 2023-02-21 DIAGNOSIS — I10 HYPERTENSION, UNSPECIFIED TYPE: ICD-10-CM

## 2023-02-21 DIAGNOSIS — I26.99 OTHER ACUTE PULMONARY EMBOLISM WITHOUT ACUTE COR PULMONALE (H): Primary | ICD-10-CM

## 2023-02-21 DIAGNOSIS — R33.9 URINARY RETENTION: ICD-10-CM

## 2023-02-21 DIAGNOSIS — D64.9 ANEMIA, UNSPECIFIED TYPE: ICD-10-CM

## 2023-02-21 DIAGNOSIS — J30.89 NON-SEASONAL ALLERGIC RHINITIS, UNSPECIFIED TRIGGER: ICD-10-CM

## 2023-02-21 DIAGNOSIS — K92.2 GASTROINTESTINAL HEMORRHAGE, UNSPECIFIED GASTROINTESTINAL HEMORRHAGE TYPE: ICD-10-CM

## 2023-02-21 DIAGNOSIS — R10.819 ABDOMINAL TENDERNESS WITHOUT REBOUND TENDERNESS, UNSPECIFIED LOCATION: ICD-10-CM

## 2023-02-21 DIAGNOSIS — E54 VITAMIN C DEFICIENCY: ICD-10-CM

## 2023-02-21 DIAGNOSIS — I26.94 MULTIPLE SUBSEGMENTAL PULMONARY EMBOLI WITHOUT ACUTE COR PULMONALE (H): ICD-10-CM

## 2023-02-21 LAB
ALBUMIN SERPL BCG-MCNC: 4.3 G/DL (ref 3.5–5.2)
ALP SERPL-CCNC: 108 U/L (ref 40–129)
ALT SERPL W P-5'-P-CCNC: 13 U/L (ref 10–50)
ANION GAP SERPL CALCULATED.3IONS-SCNC: 13 MMOL/L (ref 7–15)
AST SERPL W P-5'-P-CCNC: 22 U/L (ref 10–50)
BILIRUB SERPL-MCNC: 0.3 MG/DL
BUN SERPL-MCNC: 27 MG/DL (ref 8–23)
CALCIUM SERPL-MCNC: 8.9 MG/DL (ref 8.8–10.2)
CHLORIDE SERPL-SCNC: 98 MMOL/L (ref 98–107)
CREAT SERPL-MCNC: 1.05 MG/DL (ref 0.67–1.17)
DEPRECATED HCO3 PLAS-SCNC: 19 MMOL/L (ref 22–29)
ERYTHROCYTE [DISTWIDTH] IN BLOOD BY AUTOMATED COUNT: 17.7 % (ref 10–15)
FERRITIN SERPL-MCNC: 23 NG/ML (ref 31–409)
GFR SERPL CREATININE-BSD FRML MDRD: 80 ML/MIN/1.73M2
GLUCOSE SERPL-MCNC: 107 MG/DL (ref 70–99)
HCT VFR BLD AUTO: 35.3 % (ref 40–53)
HGB BLD-MCNC: 11.1 G/DL (ref 13.3–17.7)
IRON BINDING CAPACITY (ROCHE): 369 UG/DL (ref 240–430)
IRON SATN MFR SERPL: 7 % (ref 15–46)
IRON SERPL-MCNC: 27 UG/DL (ref 61–157)
MCH RBC QN AUTO: 25.3 PG (ref 26.5–33)
MCHC RBC AUTO-ENTMCNC: 31.4 G/DL (ref 31.5–36.5)
MCV RBC AUTO: 80 FL (ref 78–100)
PLATELET # BLD AUTO: 407 10E3/UL (ref 150–450)
POTASSIUM SERPL-SCNC: 4.4 MMOL/L (ref 3.4–5.3)
PROT SERPL-MCNC: 8 G/DL (ref 6.4–8.3)
RBC # BLD AUTO: 4.39 10E6/UL (ref 4.4–5.9)
SODIUM SERPL-SCNC: 130 MMOL/L (ref 136–145)
TSH SERPL DL<=0.005 MIU/L-ACNC: 1.46 UIU/ML (ref 0.3–4.2)
WBC # BLD AUTO: 11.5 10E3/UL (ref 4–11)

## 2023-02-21 PROCEDURE — 83550 IRON BINDING TEST: CPT | Performed by: INTERNAL MEDICINE

## 2023-02-21 PROCEDURE — 80053 COMPREHEN METABOLIC PANEL: CPT | Performed by: INTERNAL MEDICINE

## 2023-02-21 PROCEDURE — 85027 COMPLETE CBC AUTOMATED: CPT | Performed by: INTERNAL MEDICINE

## 2023-02-21 PROCEDURE — 82728 ASSAY OF FERRITIN: CPT | Performed by: INTERNAL MEDICINE

## 2023-02-21 PROCEDURE — 84443 ASSAY THYROID STIM HORMONE: CPT | Performed by: INTERNAL MEDICINE

## 2023-02-21 PROCEDURE — 83540 ASSAY OF IRON: CPT | Performed by: INTERNAL MEDICINE

## 2023-02-21 PROCEDURE — 99214 OFFICE O/P EST MOD 30 MIN: CPT | Performed by: INTERNAL MEDICINE

## 2023-02-21 PROCEDURE — 36415 COLL VENOUS BLD VENIPUNCTURE: CPT | Performed by: INTERNAL MEDICINE

## 2023-02-21 NOTE — PROGRESS NOTES
ASSESSMENT:   1. Diarrhea, unspecified type  Patient mildly dehydrated.  Labs as ordered for cause of diarrhea.  Patient given 3 bottles of Gatorade by MD to drink in the clinic today.  If diarrhea persisting and feeling lightheaded/dehydrated despite this, patient will inform clinic and will refer to ADS for IV fluids  - Enteric Bacteria and Virus Panel by MIR[facility] Stool; Future  - Elastase Fecal; Future  - C. difficile Toxin B PCR with reflex to C. difficile Antigen and Toxins A/B EIA; Future  - Comprehensive metabolic panel; Future  - CBC with platelets; Future  - TSH with free T4 reflex; Future  - Enteric Bacteria and Virus Panel by MIRNA Stool  - Elastase Fecal  - C. difficile Toxin B PCR with reflex to C. difficile Antigen and Toxins A/B EIA  - Comprehensive metabolic panel  - CBC with platelets  - TSH with free T4 reflex    2. Anemia, unspecified type  Secondary to previous  GI blood loss with duodenal ulcer.  Last hemoglobin 9.9.  Needs lab follow-up. On PPI   - CBC with platelets; Future  - Ferritin; Future  - Iron & Iron Binding Capacity; Future  - CBC with platelets  - Ferritin  - Iron & Iron Binding Capacity    3. Abdominal tenderness without rebound tenderness, unspecified location  Minimal tenderness with palpation.  GI work-up for diarrhea as above and below  - Enteric Bacteria and Virus Panel by MIRNA Stool; Future  - Elastase Fecal; Future  - C. difficile Toxin B PCR with reflex to C. difficile Antigen and Toxins A/B EIA; Future  - Comprehensive metabolic panel; Future  - CBC with platelets; Future  - Enteric Bacteria and Virus Panel by MIRNA Stool  - Elastase Fecal  - C. difficile Toxin B PCR with reflex to C. difficile Antigen and Toxins A/B EIA  - Comprehensive metabolic panel  - CBC with platelets    4. Hypertension, unspecified type  Blood pressure low with clinical hydration.  Will hold amlodipine      5. Multiple subsegmental pulmonary emboli without acute cor pulmonale (H)  O2 sat stable and  "breathing comfortably.  Continue Eliquis      PLAN:   Stop  Amlodipine for now   Increase fluid intake with Gatorade.  Drink Gatorade 60 oz (3 bottles) today   After collect stool sample at assisted [facility], then may use some OTC Immodium 1 tab up  To 3 times a day to dry up stools until results of stool studies back  Labs as ordered today  Talk with Assisted living staff re:  getting stool sample brought back to [facility] lab  MD will speak with  [facility] about transferring longterm care to medical group that  comes to the assisted living per pt request      (Chart documentation was completed, in part, with [employer] voice-recognition software. Even though reviewed, some grammatical, spelling, and word errors may remain.)    Brad Thurston MD  Internal Medicine Department  [facility] CAREN Prince is a 62 year old, presenting for the following health issues:  Care Coordination Nursing Home (Going from transition care to assisted living)      HPI      Most recent lab results reviewed with pt.      Patient will be moving to [facility] in [place] (now in TCU at other facility and moving to assisted living. Pt stats that they will be managing his meds in future. Pt thinking about having medical staff that comes to that facility assume his care rather than having to drive down to  [place] for his care going forward in the future.  Currently in TCU. Last note available from 2/3/23 reviewed. Part of that note as below\":    Summary of hospital course:  Patient was hospitalized at [facility] from November 29 through December 8, 2022 for profound anemia due to upper GI bleed.  He originally presented with fatigue.  EKG showed sinus tachycardia.  Blood work was significant for hemoglobin of 3.8. 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  CT chest with contrast revealed segmental and " subsegmental pulmonary emboli in the right lower lobe without evidence of right heart strain.  Doppler study of lower extremities was negative for DVT.  CT abdomen/pelvis revealed minimal retroperitoneal adenopathy but no occult malignancy.  EGD showed nonbleeding duodenal ulcer (25 mm) with no stigmata of bleeding, congested duodenal mucosa, and normal esophagus and stomach.  Patient was transfused with several units of PRBC and initiated on pantoprazole.  He was evaluated by hematology service and work-up for thrombophilia was negative (negative factor V Leiden mutation, negative factor 2 mutation, and negative cardiolipin antibodies).  He was started on IV heparin for pulmonary embolism. GI suggested holding off on initiating oral anticoagulation for at least 48 hours due to further drop in hemoglobin.  TCU was recommended per therapies. Hgb was 8.6 upon discharge from hospital. He was then admitted to this facility for medical management, nursing care, and rehab.      ASSESSMENT/PLAN:  Upper GI bleed due to duodenal ulcer (25 mm),   Acute blood loss anemia.  Normal hemoglobin of 14.2 back in June 2021.  He had hemoglobin of 10.6 in September 2022 which dropped to 8.1 post hip surgery.  He presented with hemoglobin of 3.8 on November 29, 2022 due to upper GI bleed and he was transfused with several units of PRBC.  EGD showed a 25 mm duodenal ulcer.  Last hemoglobin of 9.9 on January 30, 2023.  No recurrence of bleeding.  Plan:  Continue pantoprazole 40 mg twice daily  Avoid NSAIDs  Monitor for recurrence of GI bleed  Monitor hemoglobin periodically  Follow-up with GI as directed     Right lower lobe pulmonary embolism.  Diagnosed November 2022.  Venous Doppler negative for DVT.  Plan:  Continue apixaban 5 mg p.o. twice daily   Continue to monitor     Essential hypertension.  Blood pressure is fairly controlled.  Plan:  Continue amlodipine 5 mg daily  Monitor blood pressure     BPH with postop urinary  "retention,  Solitary right kidney (left kidney was donated to sister in 1990).  Retention has resolved and patient no longer has a Prieto catheter.  Last creatinine of 0.65 on December 19, 2022.  Plan:  Continue tamsulosin 0.4 mg daily  Monitor for recurrence of retention  Avoid NSAIDs and nephrotoxins   Monitor renal function periodically  Follow-up with urology as directed     Depression,  SPIKE,  OCD.  Symptoms are fairly controlled.  Plan:  Continue PTA buspirone 15 mg twice daily, escitalopram 20 mg daily, lamotrigine 200 mg at bedtime, and trazodone 100 mg at bedtime  Monitor symptoms  Follow-up with psychiatry as directed     History of left peritrochanteric femur fracture (sustained after fall), s/p cephalomedullary nailing on September 4, 2022,  Physical deconditioning.  Surgical wound has healed.    Plan:  Continue PT/OT evaluation and therapy  Follow-up with Ortho as directed        Denies any blood in stools.  Most recent went up to 9.9.  On PPI twice daily.  Denies current abdominal pain though had  diarrhea yesterday and today.  3 bowel movements so far today   Managed by psychiatry for mental health.  Currently on Lamictal, buspirone and escitalopram.  Denies current suicidal ideation.  On Eliquis twice daily for pulmonary embolism.  Very mild shortness of breath today, improved from when he was in the hospital per patient.  No cough..  No chest pain  Taking amlodipine for hypertension but blood pressures quite low today.  Mild lightheadedness.  Urine flow okay with Flomax.    Additional ROS:   Constitutional, HEENT, Cardiovascular, Pulmonary, GI and , Neuro, MSK and Psych review of systems/symptoms are otherwise negative or unchanged from previous, except as noted above.      OBJECTIVE:  BP 94/58   Pulse 102   Wt 92.8 kg (204 lb 8 oz)   SpO2 96%   BMI 32.03 kg/m     Estimated body mass index is 32.03 kg/m  as calculated from the following:    Height as of 2/3/23: 1.702 m (5' 7\").    Weight as of " this encounter: 92.8 kg (204 lb 8 oz).     HENT: ear canals and TM's normal and nose and mouth without ulcers or lesions.  Oral mucosa mildly dry  Neck: no adenopathy. Thyroid normal to palpation. No bruits  Pulm: Lungs clear to auscultation   CV: Regular  Rhythm, slightly tachycardic  GI: Soft, obese, minimally tender to general palpation without rebound or guarding.  Normal active bowel sounds, No hepatosplenomegaly or masses palpable  Ext: Peripheral pulses intact.  Trace bilateral lower extremity edema.  Neuro: Normal strength and tone, sensory exam grossly normal  Gen: Slight flattened affect.  Normal eye contact and thought content

## 2023-02-21 NOTE — PATIENT INSTRUCTIONS
Stop  Amlodipine for now   Increase fluid intake with Gatorade.  Drink Gatorade 60 oz (3 bottles) today   After collect stool sample at assisted living, then may use some OTC Immodium 1 tab up  To 3 times a day to dry up stools until results of stool studies back  Labs as ordered today  Talk with Assisted living staff re:  getting stool sample brought back to nearest [facility] lab  MD will speak with  [facility] about transferring longterm care to medical group that  comes to the assisted living

## 2023-02-22 RX ORDER — AMLODIPINE BESYLATE 5 MG/1
TABLET ORAL
Qty: 30 TABLET | Refills: 11 | Status: SHIPPED | OUTPATIENT
Start: 2023-02-22

## 2023-02-22 RX ORDER — ASCORBIC ACID 500 MG
TABLET ORAL
Qty: 30 TABLET | Refills: 11 | Status: SHIPPED | OUTPATIENT
Start: 2023-02-22 | End: 2024-01-25

## 2023-02-22 RX ORDER — TRAZODONE HYDROCHLORIDE 100 MG/1
TABLET ORAL
Qty: 14 TABLET | OUTPATIENT
Start: 2023-02-22

## 2023-02-22 RX ORDER — FLUTICASONE PROPIONATE 50 MCG
SPRAY, SUSPENSION (ML) NASAL
Qty: 16 G | Refills: 11 | Status: SHIPPED | OUTPATIENT
Start: 2023-02-22 | End: 2023-08-23

## 2023-02-22 RX ORDER — TAMSULOSIN HYDROCHLORIDE 0.4 MG/1
CAPSULE ORAL
Qty: 30 CAPSULE | Refills: 11 | Status: SHIPPED | OUTPATIENT
Start: 2023-02-22 | End: 2024-01-25

## 2023-02-22 RX ORDER — LAMOTRIGINE 200 MG/1
TABLET ORAL
Qty: 14 TABLET | OUTPATIENT
Start: 2023-02-22

## 2023-02-22 RX ORDER — BUSPIRONE HYDROCHLORIDE 15 MG/1
TABLET ORAL
Qty: 28 TABLET | OUTPATIENT
Start: 2023-02-22

## 2023-02-22 RX ORDER — ESCITALOPRAM OXALATE 20 MG/1
TABLET ORAL
Qty: 14 TABLET | OUTPATIENT
Start: 2023-02-22

## 2023-02-22 RX ORDER — APIXABAN 5 MG/1
TABLET, FILM COATED ORAL
Qty: 60 TABLET | Refills: 0 | Status: SHIPPED | OUTPATIENT
Start: 2023-02-22 | End: 2024-01-25

## 2023-02-22 RX ORDER — PANTOPRAZOLE SODIUM 40 MG/1
TABLET, DELAYED RELEASE ORAL
Qty: 30 TABLET | Refills: 11 | Status: SHIPPED | OUTPATIENT
Start: 2023-02-22 | End: 2024-01-25

## 2023-02-22 NOTE — TELEPHONE ENCOUNTER
Buspirone, lamotrigine, escitalopram, trazodone prescribed outpatient psychiatrist.  I have therefore not refilled those medications.  Pharmacy will have to speak with patient's psychiatrist for ongoing prescription refills.    [facility] where prescriptions were sent to make him aware they will have to speak with patient's psychiatrist who prescribed those medications originally.  Pharmacy may contact patient for further information regarding patient's psychiatrist.  Other medications refilled

## 2023-02-23 ENCOUNTER — MEDICAL CORRESPONDENCE (OUTPATIENT)
Dept: HEALTH INFORMATION MANAGEMENT | Facility: CLINIC | Age: 63
End: 2023-02-23

## 2023-02-23 DIAGNOSIS — Z53.9 DIAGNOSIS NOT YET DEFINED: Primary | ICD-10-CM

## 2023-02-23 PROCEDURE — G0180 MD CERTIFICATION HHA PATIENT: HCPCS | Performed by: INTERNAL MEDICINE

## 2023-02-24 ENCOUNTER — TELEPHONE (OUTPATIENT)
Dept: INTERNAL MEDICINE | Facility: CLINIC | Age: 63
End: 2023-02-24
Payer: COMMERCIAL

## 2023-02-24 NOTE — TELEPHONE ENCOUNTER
Requesting PT orders:     1x/4 weeks every other week for 1 month     Verbal given on requested home care orders    Keesha Alexandra RN on 2/24/2023 at 12:53 PM

## 2023-02-27 ENCOUNTER — MEDICAL CORRESPONDENCE (OUTPATIENT)
Dept: HEALTH INFORMATION MANAGEMENT | Facility: CLINIC | Age: 63
End: 2023-02-27

## 2023-03-19 VITALS
OXYGEN SATURATION: 96 % | HEART RATE: 102 BPM | SYSTOLIC BLOOD PRESSURE: 94 MMHG | WEIGHT: 204.5 LBS | BODY MASS INDEX: 32.03 KG/M2 | DIASTOLIC BLOOD PRESSURE: 58 MMHG

## 2023-03-19 PROBLEM — F10.920 ALCOHOLIC INTOXICATION WITHOUT COMPLICATION (H): Status: RESOLVED | Noted: 2022-09-03 | Resolved: 2023-03-19

## 2023-03-19 PROBLEM — A41.9 SEPSIS, DUE TO UNSPECIFIED ORGANISM, UNSPECIFIED WHETHER ACUTE ORGAN DYSFUNCTION PRESENT (H): Status: RESOLVED | Noted: 2022-10-10 | Resolved: 2023-03-19

## 2023-05-22 LAB
BASOPHILS # BLD AUTO: 0.1 10E3/UL (ref 0–0.2)
BASOPHILS NFR BLD AUTO: 1 %
EOSINOPHIL # BLD AUTO: 0.2 10E3/UL (ref 0–0.7)
EOSINOPHIL NFR BLD AUTO: 2 %
ERYTHROCYTE [DISTWIDTH] IN BLOOD BY AUTOMATED COUNT: 18.5 % (ref 10–15)
HCT VFR BLD AUTO: 33.4 % (ref 40–53)
HGB BLD-MCNC: 10.9 G/DL (ref 13.3–17.7)
IMM GRANULOCYTES # BLD: 0 10E3/UL
IMM GRANULOCYTES NFR BLD: 0 %
LYMPHOCYTES # BLD AUTO: 1.8 10E3/UL (ref 0.8–5.3)
LYMPHOCYTES NFR BLD AUTO: 16 %
MCH RBC QN AUTO: 25 PG (ref 26.5–33)
MCHC RBC AUTO-ENTMCNC: 32.6 G/DL (ref 31.5–36.5)
MCV RBC AUTO: 77 FL (ref 78–100)
MONOCYTES # BLD AUTO: 1 10E3/UL (ref 0–1.3)
MONOCYTES NFR BLD AUTO: 9 %
NEUTROPHILS # BLD AUTO: 7.8 10E3/UL (ref 1.6–8.3)
NEUTROPHILS NFR BLD AUTO: 72 %
NRBC # BLD AUTO: 0 10E3/UL
NRBC BLD AUTO-RTO: 0 /100
PLATELET # BLD AUTO: 313 10E3/UL (ref 150–450)
RBC # BLD AUTO: 4.36 10E6/UL (ref 4.4–5.9)
WBC # BLD AUTO: 10.8 10E3/UL (ref 4–11)

## 2023-05-22 PROCEDURE — 36415 COLL VENOUS BLD VENIPUNCTURE: CPT | Performed by: EMERGENCY MEDICINE

## 2023-05-22 PROCEDURE — 82077 ASSAY SPEC XCP UR&BREATH IA: CPT | Performed by: EMERGENCY MEDICINE

## 2023-05-22 PROCEDURE — 99285 EMERGENCY DEPT VISIT HI MDM: CPT | Mod: 25

## 2023-05-22 PROCEDURE — 96374 THER/PROPH/DIAG INJ IV PUSH: CPT

## 2023-05-22 PROCEDURE — 85025 COMPLETE CBC W/AUTO DIFF WBC: CPT | Performed by: EMERGENCY MEDICINE

## 2023-05-22 PROCEDURE — 80053 COMPREHEN METABOLIC PANEL: CPT | Performed by: EMERGENCY MEDICINE

## 2023-05-22 PROCEDURE — 96361 HYDRATE IV INFUSION ADD-ON: CPT

## 2023-05-22 PROCEDURE — 93005 ELECTROCARDIOGRAM TRACING: CPT

## 2023-05-22 PROCEDURE — 84100 ASSAY OF PHOSPHORUS: CPT | Performed by: EMERGENCY MEDICINE

## 2023-05-22 PROCEDURE — 83735 ASSAY OF MAGNESIUM: CPT | Performed by: EMERGENCY MEDICINE

## 2023-05-23 ENCOUNTER — APPOINTMENT (OUTPATIENT)
Dept: CT IMAGING | Facility: CLINIC | Age: 63
DRG: 481 | End: 2023-05-23
Attending: STUDENT IN AN ORGANIZED HEALTH CARE EDUCATION/TRAINING PROGRAM
Payer: COMMERCIAL

## 2023-05-23 ENCOUNTER — APPOINTMENT (OUTPATIENT)
Dept: GENERAL RADIOLOGY | Facility: CLINIC | Age: 63
DRG: 481 | End: 2023-05-23
Attending: EMERGENCY MEDICINE
Payer: COMMERCIAL

## 2023-05-23 ENCOUNTER — APPOINTMENT (OUTPATIENT)
Dept: GENERAL RADIOLOGY | Facility: CLINIC | Age: 63
DRG: 481 | End: 2023-05-23
Attending: STUDENT IN AN ORGANIZED HEALTH CARE EDUCATION/TRAINING PROGRAM
Payer: COMMERCIAL

## 2023-05-23 ENCOUNTER — APPOINTMENT (OUTPATIENT)
Dept: CT IMAGING | Facility: CLINIC | Age: 63
DRG: 481 | End: 2023-05-23
Attending: EMERGENCY MEDICINE
Payer: COMMERCIAL

## 2023-05-23 ENCOUNTER — HOSPITAL ENCOUNTER (INPATIENT)
Facility: CLINIC | Age: 63
LOS: 6 days | Discharge: NURSING, REHAB OR LONG-TERM CARE | DRG: 481 | End: 2023-05-29
Attending: EMERGENCY MEDICINE | Admitting: INTERNAL MEDICINE
Payer: COMMERCIAL
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
## 2023-05-23 DIAGNOSIS — Z98.890 S/P ORIF (OPEN REDUCTION INTERNAL FIXATION) FRACTURE: Primary | ICD-10-CM

## 2023-05-23 DIAGNOSIS — Z87.81 S/P ORIF (OPEN REDUCTION INTERNAL FIXATION) FRACTURE: Primary | ICD-10-CM

## 2023-05-23 DIAGNOSIS — F10.920 ALCOHOLIC INTOXICATION WITHOUT COMPLICATION (H): ICD-10-CM

## 2023-05-23 DIAGNOSIS — E87.1 HYPONATREMIA: ICD-10-CM

## 2023-05-23 DIAGNOSIS — S72.402A CLOSED FRACTURE OF DISTAL END OF LEFT FEMUR, UNSPECIFIED FRACTURE MORPHOLOGY, INITIAL ENCOUNTER (H): ICD-10-CM

## 2023-05-23 LAB
ALBUMIN SERPL BCG-MCNC: 4.1 G/DL (ref 3.5–5.2)
ALP SERPL-CCNC: 118 U/L (ref 40–129)
ALT SERPL W P-5'-P-CCNC: 16 U/L (ref 10–50)
ANION GAP SERPL CALCULATED.3IONS-SCNC: 13 MMOL/L (ref 7–15)
ANION GAP SERPL CALCULATED.3IONS-SCNC: 15 MMOL/L (ref 7–15)
AST SERPL W P-5'-P-CCNC: 27 U/L (ref 10–50)
ATRIAL RATE - MUSE: 85 BPM
BILIRUB SERPL-MCNC: 0.3 MG/DL
BUN SERPL-MCNC: 7.6 MG/DL (ref 8–23)
BUN SERPL-MCNC: 8.3 MG/DL (ref 8–23)
CALCIUM SERPL-MCNC: 8.3 MG/DL (ref 8.8–10.2)
CALCIUM SERPL-MCNC: 8.3 MG/DL (ref 8.8–10.2)
CHLORIDE SERPL-SCNC: 79 MMOL/L (ref 98–107)
CHLORIDE SERPL-SCNC: 81 MMOL/L (ref 98–107)
CREAT SERPL-MCNC: 0.57 MG/DL (ref 0.67–1.17)
CREAT SERPL-MCNC: 0.62 MG/DL (ref 0.67–1.17)
DEPRECATED CALCIDIOL+CALCIFEROL SERPL-MC: 27 UG/L (ref 20–75)
DEPRECATED HCO3 PLAS-SCNC: 22 MMOL/L (ref 22–29)
DEPRECATED HCO3 PLAS-SCNC: 24 MMOL/L (ref 22–29)
DIASTOLIC BLOOD PRESSURE - MUSE: NORMAL MMHG
ETHANOL SERPL-MCNC: 0.23 G/DL
GFR SERPL CREATININE-BSD FRML MDRD: >90 ML/MIN/1.73M2
GFR SERPL CREATININE-BSD FRML MDRD: >90 ML/MIN/1.73M2
GLUCOSE SERPL-MCNC: 105 MG/DL (ref 70–99)
GLUCOSE SERPL-MCNC: 91 MG/DL (ref 70–99)
HOLD SPECIMEN: NORMAL
INTERPRETATION ECG - MUSE: NORMAL
MAGNESIUM SERPL-MCNC: 1.8 MG/DL (ref 1.7–2.3)
OSMOLALITY SERPL: 250 MMOL/KG (ref 280–301)
OSMOLALITY UR: 196 MMOL/KG (ref 100–1200)
P AXIS - MUSE: 26 DEGREES
PHOSPHATE SERPL-MCNC: 2.8 MG/DL (ref 2.5–4.5)
PHOSPHATE SERPL-MCNC: 2.8 MG/DL (ref 2.5–4.5)
POTASSIUM SERPL-SCNC: 4.5 MMOL/L (ref 3.4–5.3)
POTASSIUM SERPL-SCNC: 4.6 MMOL/L (ref 3.4–5.3)
PR INTERVAL - MUSE: 182 MS
PROT SERPL-MCNC: 7.7 G/DL (ref 6.4–8.3)
QRS DURATION - MUSE: 82 MS
QT - MUSE: 360 MS
QTC - MUSE: 428 MS
R AXIS - MUSE: 47 DEGREES
SODIUM SERPL-SCNC: 116 MMOL/L (ref 136–145)
SODIUM SERPL-SCNC: 117 MMOL/L (ref 136–145)
SODIUM SERPL-SCNC: 117 MMOL/L (ref 136–145)
SODIUM SERPL-SCNC: 118 MMOL/L (ref 136–145)
SODIUM SERPL-SCNC: 118 MMOL/L (ref 136–145)
SODIUM SERPL-SCNC: 120 MMOL/L (ref 136–145)
SODIUM SERPL-SCNC: 120 MMOL/L (ref 136–145)
SODIUM UR-SCNC: 42 MMOL/L
SYSTOLIC BLOOD PRESSURE - MUSE: NORMAL MMHG
T AXIS - MUSE: 37 DEGREES
URATE SERPL-MCNC: 3.7 MG/DL (ref 3.4–7)
VENTRICULAR RATE- MUSE: 85 BPM

## 2023-05-23 PROCEDURE — 82306 VITAMIN D 25 HYDROXY: CPT | Performed by: PHYSICIAN ASSISTANT

## 2023-05-23 PROCEDURE — 84295 ASSAY OF SERUM SODIUM: CPT | Performed by: INTERNAL MEDICINE

## 2023-05-23 PROCEDURE — 250N000013 HC RX MED GY IP 250 OP 250 PS 637: Performed by: INTERNAL MEDICINE

## 2023-05-23 PROCEDURE — 73590 X-RAY EXAM OF LOWER LEG: CPT | Mod: LT

## 2023-05-23 PROCEDURE — 250N000013 HC RX MED GY IP 250 OP 250 PS 637: Performed by: PHYSICIAN ASSISTANT

## 2023-05-23 PROCEDURE — 36415 COLL VENOUS BLD VENIPUNCTURE: CPT | Performed by: INTERNAL MEDICINE

## 2023-05-23 PROCEDURE — 73552 X-RAY EXAM OF FEMUR 2/>: CPT | Mod: LT

## 2023-05-23 PROCEDURE — 99222 1ST HOSP IP/OBS MODERATE 55: CPT | Performed by: INTERNAL MEDICINE

## 2023-05-23 PROCEDURE — 73502 X-RAY EXAM HIP UNI 2-3 VIEWS: CPT

## 2023-05-23 PROCEDURE — 73630 X-RAY EXAM OF FOOT: CPT | Mod: LT

## 2023-05-23 PROCEDURE — 99223 1ST HOSP IP/OBS HIGH 75: CPT | Performed by: INTERNAL MEDICINE

## 2023-05-23 PROCEDURE — 250N000009 HC RX 250: Performed by: EMERGENCY MEDICINE

## 2023-05-23 PROCEDURE — 83930 ASSAY OF BLOOD OSMOLALITY: CPT | Performed by: INTERNAL MEDICINE

## 2023-05-23 PROCEDURE — 83935 ASSAY OF URINE OSMOLALITY: CPT | Performed by: INTERNAL MEDICINE

## 2023-05-23 PROCEDURE — 36415 COLL VENOUS BLD VENIPUNCTURE: CPT | Performed by: EMERGENCY MEDICINE

## 2023-05-23 PROCEDURE — 70450 CT HEAD/BRAIN W/O DYE: CPT

## 2023-05-23 PROCEDURE — 84100 ASSAY OF PHOSPHORUS: CPT | Performed by: INTERNAL MEDICINE

## 2023-05-23 PROCEDURE — 258N000003 HC RX IP 258 OP 636: Performed by: INTERNAL MEDICINE

## 2023-05-23 PROCEDURE — 80048 BASIC METABOLIC PNL TOTAL CA: CPT | Performed by: EMERGENCY MEDICINE

## 2023-05-23 PROCEDURE — 84300 ASSAY OF URINE SODIUM: CPT | Performed by: INTERNAL MEDICINE

## 2023-05-23 PROCEDURE — 250N000011 HC RX IP 250 OP 636: Performed by: EMERGENCY MEDICINE

## 2023-05-23 PROCEDURE — 73560 X-RAY EXAM OF KNEE 1 OR 2: CPT | Mod: LT

## 2023-05-23 PROCEDURE — 250N000013 HC RX MED GY IP 250 OP 250 PS 637: Performed by: EMERGENCY MEDICINE

## 2023-05-23 PROCEDURE — 120N000001 HC R&B MED SURG/OB

## 2023-05-23 PROCEDURE — 258N000003 HC RX IP 258 OP 636: Performed by: EMERGENCY MEDICINE

## 2023-05-23 PROCEDURE — HZ2ZZZZ DETOXIFICATION SERVICES FOR SUBSTANCE ABUSE TREATMENT: ICD-10-PCS

## 2023-05-23 PROCEDURE — 84550 ASSAY OF BLOOD/URIC ACID: CPT | Performed by: INTERNAL MEDICINE

## 2023-05-23 PROCEDURE — 250N000011 HC RX IP 250 OP 636: Performed by: INTERNAL MEDICINE

## 2023-05-23 PROCEDURE — 73700 CT LOWER EXTREMITY W/O DYE: CPT | Mod: LT

## 2023-05-23 RX ORDER — METHOCARBAMOL 500 MG/1
500 TABLET, FILM COATED ORAL 4 TIMES DAILY PRN
Status: DISCONTINUED | OUTPATIENT
Start: 2023-05-23 | End: 2023-05-29 | Stop reason: HOSPADM

## 2023-05-23 RX ORDER — GUAIFENESIN 200 MG/10ML
200 LIQUID ORAL EVERY 4 HOURS PRN
Status: DISCONTINUED | OUTPATIENT
Start: 2023-05-23 | End: 2023-05-29 | Stop reason: HOSPADM

## 2023-05-23 RX ORDER — GUAIFENESIN 200 MG/10ML
200 LIQUID ORAL EVERY 4 HOURS PRN
COMMUNITY

## 2023-05-23 RX ORDER — TRAZODONE HYDROCHLORIDE 100 MG/1
100 TABLET ORAL ONCE
Status: DISCONTINUED | OUTPATIENT
Start: 2023-05-23 | End: 2023-05-23

## 2023-05-23 RX ORDER — SENNOSIDES 8.6 MG
1-2 TABLET ORAL 2 TIMES DAILY PRN
Status: DISCONTINUED | OUTPATIENT
Start: 2023-05-23 | End: 2023-05-23

## 2023-05-23 RX ORDER — AMOXICILLIN 250 MG
1 CAPSULE ORAL 2 TIMES DAILY PRN
Status: DISCONTINUED | OUTPATIENT
Start: 2023-05-23 | End: 2023-05-29 | Stop reason: HOSPADM

## 2023-05-23 RX ORDER — TRANEXAMIC ACID 10 MG/ML
1 INJECTION, SOLUTION INTRAVENOUS ONCE
Status: DISCONTINUED | OUTPATIENT
Start: 2023-05-23 | End: 2023-05-23

## 2023-05-23 RX ORDER — ESCITALOPRAM OXALATE 10 MG/1
20 TABLET ORAL DAILY
Status: DISCONTINUED | OUTPATIENT
Start: 2023-05-23 | End: 2023-05-29 | Stop reason: HOSPADM

## 2023-05-23 RX ORDER — LOPERAMIDE HYDROCHLORIDE 2 MG/1
4 TABLET ORAL 4 TIMES DAILY PRN
COMMUNITY

## 2023-05-23 RX ORDER — ZINC GLUCONATE 50 MG
100 TABLET ORAL DAILY
COMMUNITY

## 2023-05-23 RX ORDER — OXYCODONE HYDROCHLORIDE 5 MG/1
5 TABLET ORAL EVERY 4 HOURS PRN
Status: DISCONTINUED | OUTPATIENT
Start: 2023-05-23 | End: 2023-05-23

## 2023-05-23 RX ORDER — MORPHINE SULFATE 4 MG/ML
4 INJECTION, SOLUTION INTRAMUSCULAR; INTRAVENOUS
Status: COMPLETED | OUTPATIENT
Start: 2023-05-23 | End: 2023-05-23

## 2023-05-23 RX ORDER — PANTOPRAZOLE SODIUM 40 MG/1
40 TABLET, DELAYED RELEASE ORAL
Status: DISCONTINUED | OUTPATIENT
Start: 2023-05-23 | End: 2023-05-29 | Stop reason: HOSPADM

## 2023-05-23 RX ORDER — ACETAMINOPHEN 325 MG/1
650 TABLET ORAL EVERY 6 HOURS PRN
Status: DISCONTINUED | OUTPATIENT
Start: 2023-05-23 | End: 2023-05-25

## 2023-05-23 RX ORDER — NALOXONE HYDROCHLORIDE 0.4 MG/ML
0.4 INJECTION, SOLUTION INTRAMUSCULAR; INTRAVENOUS; SUBCUTANEOUS
Status: DISCONTINUED | OUTPATIENT
Start: 2023-05-23 | End: 2023-05-29 | Stop reason: HOSPADM

## 2023-05-23 RX ORDER — HYDROMORPHONE HYDROCHLORIDE 1 MG/ML
0.5 INJECTION, SOLUTION INTRAMUSCULAR; INTRAVENOUS; SUBCUTANEOUS ONCE
Status: COMPLETED | OUTPATIENT
Start: 2023-05-23 | End: 2023-05-23

## 2023-05-23 RX ORDER — CHOLECALCIFEROL (VITAMIN D3) 50 MCG
50 TABLET ORAL DAILY
Status: DISCONTINUED | OUTPATIENT
Start: 2023-05-24 | End: 2023-05-29 | Stop reason: HOSPADM

## 2023-05-23 RX ORDER — AMOXICILLIN 250 MG
2 CAPSULE ORAL 2 TIMES DAILY PRN
Status: DISCONTINUED | OUTPATIENT
Start: 2023-05-23 | End: 2023-05-29 | Stop reason: HOSPADM

## 2023-05-23 RX ORDER — FOLIC ACID 1 MG/1
1 TABLET ORAL ONCE
Status: COMPLETED | OUTPATIENT
Start: 2023-05-23 | End: 2023-05-23

## 2023-05-23 RX ORDER — MULTIVITAMIN,THERAPEUTIC
1 TABLET ORAL ONCE
Status: COMPLETED | OUTPATIENT
Start: 2023-05-23 | End: 2023-05-23

## 2023-05-23 RX ORDER — POLYETHYLENE GLYCOL 3350 17 G/17G
17 POWDER, FOR SOLUTION ORAL 2 TIMES DAILY PRN
Status: DISCONTINUED | OUTPATIENT
Start: 2023-05-23 | End: 2023-05-29 | Stop reason: HOSPADM

## 2023-05-23 RX ORDER — BUSPIRONE HYDROCHLORIDE 5 MG/1
15 TABLET ORAL 2 TIMES DAILY
Status: DISCONTINUED | OUTPATIENT
Start: 2023-05-23 | End: 2023-05-29 | Stop reason: HOSPADM

## 2023-05-23 RX ORDER — CEFAZOLIN SODIUM 2 G/100ML
2 INJECTION, SOLUTION INTRAVENOUS
Status: DISCONTINUED | OUTPATIENT
Start: 2023-05-23 | End: 2023-05-23

## 2023-05-23 RX ORDER — AMLODIPINE BESYLATE 5 MG/1
5 TABLET ORAL DAILY
Status: DISCONTINUED | OUTPATIENT
Start: 2023-05-23 | End: 2023-05-29 | Stop reason: HOSPADM

## 2023-05-23 RX ORDER — TRAZODONE HYDROCHLORIDE 150 MG/1
150 TABLET ORAL AT BEDTIME
COMMUNITY

## 2023-05-23 RX ORDER — TRANEXAMIC ACID 10 MG/ML
1 INJECTION, SOLUTION INTRAVENOUS ONCE
Status: COMPLETED | OUTPATIENT
Start: 2023-05-23 | End: 2023-05-23

## 2023-05-23 RX ORDER — CEFAZOLIN SODIUM 2 G/100ML
2 INJECTION, SOLUTION INTRAVENOUS SEE ADMIN INSTRUCTIONS
Status: DISCONTINUED | OUTPATIENT
Start: 2023-05-23 | End: 2023-05-23

## 2023-05-23 RX ORDER — ACETAMINOPHEN 325 MG/1
650 TABLET ORAL EVERY 4 HOURS PRN
Status: ON HOLD | COMMUNITY
End: 2023-05-26

## 2023-05-23 RX ORDER — TAMSULOSIN HYDROCHLORIDE 0.4 MG/1
0.4 CAPSULE ORAL DAILY
Status: DISCONTINUED | OUTPATIENT
Start: 2023-05-23 | End: 2023-05-29 | Stop reason: HOSPADM

## 2023-05-23 RX ORDER — HYDROXYZINE HYDROCHLORIDE 25 MG/1
50 TABLET, FILM COATED ORAL EVERY 6 HOURS PRN
Status: DISCONTINUED | OUTPATIENT
Start: 2023-05-23 | End: 2023-05-25

## 2023-05-23 RX ORDER — POLYETHYLENE GLYCOL 3350 17 G/17G
17 POWDER, FOR SOLUTION ORAL DAILY PRN
Status: DISCONTINUED | OUTPATIENT
Start: 2023-05-23 | End: 2023-05-23

## 2023-05-23 RX ORDER — NYSTATIN 100000 [USP'U]/G
POWDER TOPICAL 2 TIMES DAILY PRN
COMMUNITY

## 2023-05-23 RX ORDER — NALOXONE HYDROCHLORIDE 0.4 MG/ML
0.2 INJECTION, SOLUTION INTRAMUSCULAR; INTRAVENOUS; SUBCUTANEOUS
Status: DISCONTINUED | OUTPATIENT
Start: 2023-05-23 | End: 2023-05-29 | Stop reason: HOSPADM

## 2023-05-23 RX ORDER — HYDROXYZINE HYDROCHLORIDE 25 MG/1
25 TABLET, FILM COATED ORAL EVERY 6 HOURS PRN
Status: DISCONTINUED | OUTPATIENT
Start: 2023-05-23 | End: 2023-05-25

## 2023-05-23 RX ORDER — TRANEXAMIC ACID 10 MG/ML
1 INJECTION, SOLUTION INTRAVENOUS SEE ADMIN INSTRUCTIONS
Status: DISCONTINUED | OUTPATIENT
Start: 2023-05-24 | End: 2023-05-29

## 2023-05-23 RX ORDER — HYDROMORPHONE HYDROCHLORIDE 1 MG/ML
0.5 INJECTION, SOLUTION INTRAMUSCULAR; INTRAVENOUS; SUBCUTANEOUS
Status: DISCONTINUED | OUTPATIENT
Start: 2023-05-23 | End: 2023-05-25

## 2023-05-23 RX ORDER — LIDOCAINE 40 MG/G
CREAM TOPICAL
Status: DISCONTINUED | OUTPATIENT
Start: 2023-05-23 | End: 2023-05-25

## 2023-05-23 RX ORDER — LIDOCAINE HYDROCHLORIDE 20 MG/ML
10 JELLY TOPICAL ONCE
Status: COMPLETED | OUTPATIENT
Start: 2023-05-23 | End: 2023-05-23

## 2023-05-23 RX ORDER — ACETAMINOPHEN 650 MG/1
650 SUPPOSITORY RECTAL EVERY 6 HOURS PRN
Status: DISCONTINUED | OUTPATIENT
Start: 2023-05-23 | End: 2023-05-25

## 2023-05-23 RX ORDER — BISACODYL 10 MG
10 SUPPOSITORY, RECTAL RECTAL DAILY PRN
Status: ON HOLD | COMMUNITY
End: 2023-05-29

## 2023-05-23 RX ORDER — LAMOTRIGINE 100 MG/1
200 TABLET ORAL AT BEDTIME
Status: DISCONTINUED | OUTPATIENT
Start: 2023-05-23 | End: 2023-05-29 | Stop reason: HOSPADM

## 2023-05-23 RX ORDER — OXYCODONE HYDROCHLORIDE 5 MG/1
5-10 TABLET ORAL EVERY 4 HOURS PRN
Status: DISCONTINUED | OUTPATIENT
Start: 2023-05-23 | End: 2023-05-25

## 2023-05-23 RX ORDER — ONDANSETRON 2 MG/ML
4 INJECTION INTRAMUSCULAR; INTRAVENOUS EVERY 6 HOURS PRN
Status: DISCONTINUED | OUTPATIENT
Start: 2023-05-23 | End: 2023-05-25

## 2023-05-23 RX ORDER — ONDANSETRON 4 MG/1
4 TABLET, ORALLY DISINTEGRATING ORAL EVERY 6 HOURS PRN
Status: DISCONTINUED | OUTPATIENT
Start: 2023-05-23 | End: 2023-05-25

## 2023-05-23 RX ORDER — FLUTICASONE PROPIONATE 50 MCG
2 SPRAY, SUSPENSION (ML) NASAL DAILY
Status: DISCONTINUED | OUTPATIENT
Start: 2023-05-23 | End: 2023-05-29 | Stop reason: HOSPADM

## 2023-05-23 RX ORDER — MAGNESIUM OXIDE 400 MG/1
800 TABLET ORAL ONCE
Status: COMPLETED | OUTPATIENT
Start: 2023-05-23 | End: 2023-05-23

## 2023-05-23 RX ORDER — ZINC SULFATE 50(220)MG
220 CAPSULE ORAL DAILY
Status: DISCONTINUED | OUTPATIENT
Start: 2023-05-24 | End: 2023-05-29 | Stop reason: HOSPADM

## 2023-05-23 RX ORDER — CHOLECALCIFEROL (VITAMIN D3) 50 MCG
50 TABLET ORAL DAILY
Status: DISCONTINUED | OUTPATIENT
Start: 2023-05-23 | End: 2023-05-23

## 2023-05-23 RX ORDER — ASCORBIC ACID 500 MG
500 TABLET ORAL DAILY
Status: DISCONTINUED | OUTPATIENT
Start: 2023-05-24 | End: 2023-05-29 | Stop reason: HOSPADM

## 2023-05-23 RX ORDER — SODIUM CHLORIDE 9 MG/ML
INJECTION, SOLUTION INTRAVENOUS CONTINUOUS
Status: DISCONTINUED | OUTPATIENT
Start: 2023-05-23 | End: 2023-05-23

## 2023-05-23 RX ADMIN — HYDROMORPHONE HYDROCHLORIDE 0.5 MG: 1 INJECTION, SOLUTION INTRAMUSCULAR; INTRAVENOUS; SUBCUTANEOUS at 05:35

## 2023-05-23 RX ADMIN — MORPHINE SULFATE 4 MG: 4 INJECTION, SOLUTION INTRAMUSCULAR; INTRAVENOUS at 02:52

## 2023-05-23 RX ADMIN — SODIUM CHLORIDE: 9 INJECTION, SOLUTION INTRAVENOUS at 13:18

## 2023-05-23 RX ADMIN — PANTOPRAZOLE SODIUM 40 MG: 40 TABLET, DELAYED RELEASE ORAL at 16:09

## 2023-05-23 RX ADMIN — THIAMINE HCL TAB 100 MG 100 MG: 100 TAB at 01:10

## 2023-05-23 RX ADMIN — FOLIC ACID 1 MG: 1 TABLET ORAL at 01:10

## 2023-05-23 RX ADMIN — OXYCODONE HYDROCHLORIDE 10 MG: 5 TABLET ORAL at 16:09

## 2023-05-23 RX ADMIN — HYDROXYZINE HYDROCHLORIDE 25 MG: 25 TABLET, FILM COATED ORAL at 14:23

## 2023-05-23 RX ADMIN — LIDOCAINE HYDROCHLORIDE 6 ML: 20 JELLY TOPICAL at 01:21

## 2023-05-23 RX ADMIN — ESCITALOPRAM OXALATE 20 MG: 10 TABLET ORAL at 22:52

## 2023-05-23 RX ADMIN — OXYCODONE HYDROCHLORIDE 10 MG: 5 TABLET ORAL at 22:53

## 2023-05-23 RX ADMIN — TRAZODONE HYDROCHLORIDE 150 MG: 50 TABLET ORAL at 22:51

## 2023-05-23 RX ADMIN — HYDROMORPHONE HYDROCHLORIDE 0.5 MG: 1 INJECTION, SOLUTION INTRAMUSCULAR; INTRAVENOUS; SUBCUTANEOUS at 10:23

## 2023-05-23 RX ADMIN — HYDROMORPHONE HYDROCHLORIDE 0.5 MG: 1 INJECTION, SOLUTION INTRAMUSCULAR; INTRAVENOUS; SUBCUTANEOUS at 17:20

## 2023-05-23 RX ADMIN — SODIUM CHLORIDE 1000 ML: 9 INJECTION, SOLUTION INTRAVENOUS at 01:14

## 2023-05-23 RX ADMIN — HYDROXYZINE HYDROCHLORIDE 50 MG: 25 TABLET, FILM COATED ORAL at 22:53

## 2023-05-23 RX ADMIN — METHOCARBAMOL 500 MG: 500 TABLET ORAL at 11:51

## 2023-05-23 RX ADMIN — AMLODIPINE BESYLATE 5 MG: 5 TABLET ORAL at 22:52

## 2023-05-23 RX ADMIN — THERA TABS 1 TABLET: TAB at 01:11

## 2023-05-23 RX ADMIN — ACETAMINOPHEN 650 MG: 325 TABLET ORAL at 23:26

## 2023-05-23 RX ADMIN — HYDROMORPHONE HYDROCHLORIDE 0.5 MG: 1 INJECTION, SOLUTION INTRAMUSCULAR; INTRAVENOUS; SUBCUTANEOUS at 13:05

## 2023-05-23 RX ADMIN — LAMOTRIGINE 200 MG: 100 TABLET ORAL at 22:52

## 2023-05-23 RX ADMIN — Medication 800 MG: at 01:10

## 2023-05-23 RX ADMIN — BUSPIRONE HYDROCHLORIDE 15 MG: 5 TABLET ORAL at 22:52

## 2023-05-23 RX ADMIN — HYDROMORPHONE HYDROCHLORIDE 0.5 MG: 1 INJECTION, SOLUTION INTRAMUSCULAR; INTRAVENOUS; SUBCUTANEOUS at 07:54

## 2023-05-23 RX ADMIN — TAMSULOSIN HYDROCHLORIDE 0.4 MG: 0.4 CAPSULE ORAL at 14:22

## 2023-05-23 ASSESSMENT — ACTIVITIES OF DAILY LIVING (ADL)
ADLS_ACUITY_SCORE: 40
ADLS_ACUITY_SCORE: 35
ADLS_ACUITY_SCORE: 40
ADLS_ACUITY_SCORE: 35
ADLS_ACUITY_SCORE: 40
ADLS_ACUITY_SCORE: 40
ADLS_ACUITY_SCORE: 35
ADLS_ACUITY_SCORE: 40
ADLS_ACUITY_SCORE: 40

## 2023-05-23 NOTE — PLAN OF CARE
Goal Outcome Evaluation:  Summary: Hyponatremia. Fractured Femur  Date & Time: 5/23/23 [id number]  Orientation: A&Ox4   POD: N/A  Activity Level: Bedrest  Fall Risk: Yes  Behavior & Aggression: green  Pain Management: 0.5 mg IV PRN Dilaudid; 25 mg Atarax PRN; Robaxin 500 mg PRN (received in ED)   ABNL VS/O2: VSS/Room air  ABNL Lab/BG: Na+-117  Diet: Regular; NPO at midnight. Minimize amount of water patient drinks due to hyponatremia.   Bowel/Bladder: No BM; Prieto.  Drains/Devices: L  mL/hr NS.  Skin: Scattered Bruising; LLE is swollen and cyanotic.   CMS: Intact; LLE numbness, tenderness and tingling present. Pain radiating from LLE ankle to groin.   Tests/Procedures: Surgery scheduled for tomorrow.   Anticipated  DC Date: pending   Other Important Info: Was scheduled to have surgery today but surgery was held due to hyponatremia. Currently has an immobilizer on left leg.

## 2023-05-23 NOTE — TREATMENT PLAN
Left distal femur fracture    Plan:  Surgery postponed until tomorrow due to hyponatremia  NPO midnight, Okay to eat today  Knee immobilizer placed   NWB/Bedrest   Hold Eliquis

## 2023-05-23 NOTE — ED TRIAGE NOTES
EMS REPORT: pt was drinking with friends at [facility] uses walker fell landinng on L buttocks c/o L knee pain and has abrasions to L arm and elbow. Pt on eliquis. denies head, neck or back pain.

## 2023-05-23 NOTE — CONSULTS
[facility]    Orthopedic Consultation    Suresh Powers MRN# [id number]   Age: 62 year old YOB: 1960     Date of Admission: 5/23/2023    Reason for consult: Left distal femur fracture       Requesting provider: Evangelista Sierra       Level of consult: Consult, follow and place orders           Assessment and Plan:   Assessment:   Left distal femur fracture  Left foot swelling/ecchymosis      Plan:   Patient scheduled for a Left distal femur ORIF later today pending patient is optimized for surgery per hospitalist and nephrology.    Please contact ortho if unable to proceed with surgery given his hyponatremia.   Surgeon: Dr Antoine Pompa  NPO Status effective now   NWB Left LE   Vit D ordered   Hold anticoagulants if taken: Eliquis   Pain medication as needed, minimize narcotics as able    Following femoral fixation, will likely order a foot CT to eval for fracture given amount of swelling and ecchymosis.             Chief Complaint:   Left knee and foot pain          History of Present Illness:   Suresh Powers is a 62 year old male who presented with leg pain after a fall.  Patient has a history of pulmonary embolism on Eliquis.  Patient currently lives in an assisted living environment secondary to balance issues with recurrent falls.  He uses a walker for ambulation.  Patient reports that this evening he went out with a friend to have some drinks.  When he was getting up to use his four-wheel walker he fell landing on his left leg.  He had immediate pain.  He does not have a history of hyponatremia.  His alcohol level in the emergency department is 0.23.  Xrays revealed a left distal femur fracture.  Foot xrays negative for fracture.           Past Medical History:     Past Medical History:   Diagnosis Date     Anxiety     sees psych for trazodone     Clostridium difficile diarrhea 3/9/2016     Foot pain      Hypertension      Mold exposure      Shingles 1984              Past Surgical History:     Past Surgical History:   Procedure Laterality Date     ESOPHAGOSCOPY, GASTROSCOPY, DUODENOSCOPY (EGD), COMBINED N/A 11/30/2022    Procedure: ESOPHAGOGASTRODUODENOSCOPY (EGD);  Surgeon: Joe Alaniz MD;  Location:  GI     foot crush injury       kidney donation       OPEN REDUCTION INTERNAL FIXATION CLAVICLE Left 2/4/2016    Procedure: OPEN REDUCTION INTERNAL FIXATION CLAVICLE;  Surgeon: Rakehs Hui MD;  Location:  OR     OPEN REDUCTION INTERNAL FIXATION HIP NAILING Left 9/4/2022    Procedure: Open reduction internal fixation of left hip fracture;  Surgeon: Huang Cochran MD;  Location:  OR     OPEN REDUCTION INTERNAL FIXATION TIBIAL PLATEAU Left 2/4/2016    Procedure: OPEN REDUCTION INTERNAL FIXATION TIBIAL PLATEAU;  Surgeon: Rakesh Hui MD;  Location:  OR             Social History:     Social History     Tobacco Use     Smoking status: Former     Packs/day: 1.00     Years: 0.00     Pack years: 0.00     Types: Cigarettes     Smokeless tobacco: Never   Vaping Use     Vaping status: Never Used   Substance Use Topics     Alcohol use: Yes     Comment: states 8 beerrs all day over past 8 hours             Family History:     Family History   Problem Relation Age of Onset     Breast Cancer Mother      Diabetes Type 2  Mother      Skin Cancer Father      Cerebrovascular Disease Father              Immunizations:     VACCINE/DOSE   Diptheria   DPT   DTAP   HBIG   Hepatitis A   Hepatitis B   HIB   Influenza   Measles   Meningococcal   MMR   Mumps   Pneumococcal   Polio   Rubella   Small Pox   TDAP   Varicella   Zoster             Allergies:     Allergies   Allergen Reactions     Lisinopril Other (See Comments)     Elevated potassium     Sertraline Diarrhea             Medications:     Current Facility-Administered Medications   Medication     HYDROmorphone (PF) (DILAUDID) injection 0.5 mg     methocarbamol (ROBAXIN) tablet 500 mg     naloxone (NARCAN) injection  0.2 mg    Or     naloxone (NARCAN) injection 0.4 mg    Or     naloxone (NARCAN) injection 0.2 mg    Or     naloxone (NARCAN) injection 0.4 mg     sodium chloride 0.9% infusion     traZODone (DESYREL) tablet 100 mg     Current Outpatient Medications   Medication Sig     amLODIPine (NORVASC) 5 MG tablet TAKE 1 TABLET BY MOUTH ONCE DAILY     busPIRone (BUSPAR) 15 MG tablet Take 1 tablet (15 mg) by mouth 2 times daily     ELIQUIS ANTICOAGULANT 5 MG tablet TAKE 1 TABLET BY MOUTH TWICE DAILY     escitalopram (LEXAPRO) 20 MG tablet Take 20 mg by mouth daily     fluticasone (FLONASE) 50 MCG/ACT nasal spray INSTILL TWO SPRAYS IN EACH NOSTRIL ONCE DAILY (SHAKE WELL)     lamoTRIgine (LAMICTAL) 200 MG tablet Take 200 mg by mouth At Bedtime     Multiple Vitamins-Minerals (MULTIVITAMIN ADULT PO) Take 1 tablet by mouth daily     pantoprazole (PROTONIX) 40 MG EC tablet TAKE 1 TABLET BY MOUTH TWICE DAILY BEFORE MEALS * TAKE 30-60MIN BEFORE A MEAL *     polyethylene glycol-propylene glycol (SYSTANE ULTRA) 0.4-0.3 % SOLN ophthalmic solution Place 2 drops into both eyes 4 times daily as needed     tamsulosin (FLOMAX) 0.4 MG capsule TAKE 1 CAPSULE BY MOUTH AT BEDTIME TAKE 30MIN AFTER THE SAME MEAL.     traZODone (DESYREL) 50 MG tablet Take 2 tablets (100 mg) by mouth At Bedtime     vitamin C (ASCORBIC ACID) 500 MG tablet TAKE 1 TABLET BY MOUTH ONCE DAILY             Review of Systems:   ROS:  10 point ROS neg other than the symptoms noted above in the HPI.            Physical Exam:   All vitals have been reviewed  Patient Vitals for the past 24 hrs:   BP Temp Temp src Pulse Resp SpO2 Height Weight   05/23/23 1030 131/79 -- -- 80 16 98 % -- --   05/23/23 0700 -- -- -- -- -- 96 % -- --   05/23/23 0630 139/86 -- -- 98 -- 93 % -- --   05/23/23 0500 131/83 -- -- 98 -- 97 % -- --   05/23/23 0415 133/79 -- -- 94 -- 97 % -- --   05/23/23 0400 136/78 -- -- 97 17 96 % -- --   05/23/23 0345 126/83 -- -- 94 -- 94 % -- --   05/23/23 0330 126/75 --  "-- 87 -- 96 % -- --   05/23/23 0315 (!) 133/90 -- -- 100 16 -- -- --   05/23/23 0305 -- -- -- -- -- 97 % -- --   05/23/23 0300 133/87 -- -- 90 -- 95 % -- --   05/23/23 0259 (!) 141/83 -- -- 91 -- 95 % -- --   05/22/23 2302 (!) 158/92 98.1  F (36.7  C) Temporal 88 18 97 % 1.702 m (5' 7\") 90.7 kg (200 lb)     No intake or output data in the 24 hours ending 05/23/23 1105      Physical Exam   Temp: 98.1  F (36.7  C) Temp src: Temporal BP: 131/79 Pulse: 80   Resp: 16 SpO2: 98 % O2 Device: None (Room air)    Vital Signs with Ranges  Temp:  [98.1  F (36.7  C)] 98.1  F (36.7  C)  Pulse:  [] 80  Resp:  [16-18] 16  BP: (126-158)/(75-92) 131/79  SpO2:  [93 %-98 %] 98 %  200 lbs 0 oz    Constitutional: Pleasant, alert, appropriate, following commands.  HEENT: Head atraumatic normocephalic. Pupils equal round and reactive to light.  Respiratory: Unlabored breathing no audible wheeze  Cardiovascular: Regular rate and rhythm per pulses  GI: Abdomen non-distended.  Lymph/Hematologic: No lymphadenopathy in areas examined  Genitourinary:  No vasquez  Skin: No rashes, no cyanosis, no edema.  Musculoskeletal: On physical exam of the Left lower extremity, patient's leg is resting in a shortened and externally rotated position.  No skin abrasions seen along the left thigh.  There is rather significant swelling within the left foot with ecchymosis along the MCP joint and medial distal foot.  He has global tenderness to palpation.   Patient is able to flex/extend bilateral toes, limited ankle ROM due to foot.  Full sensation to light touch on the left versus right lower extremities.  Distal pulses are intact and equal bilaterally.    Neurologic: normal without focal findings, alert and oriented x iii  Neuropsychiatric: Stable             Data:   All laboratory data reviewed  Results for orders placed or performed during the hospital encounter of 05/23/23   Foot XR, G/E 3 views, left     Status: None    Narrative    EXAM: XR FOOT LEFT " G/E 3 VIEWS  LOCATION: [facility]  DATE/TIME: 5/23/2023 2:25 AM CDT    INDICATION: pain, fall  COMPARISON: 11/17/2021      Impression    IMPRESSION: Soft tissue thickening forefoot. Bones diffusely demineralized. Posttraumatic deformity calcaneus appears chronic. No free air or foreign body.   XR Tibia and Fibula Left 2 Views     Status: None    Narrative    EXAM: XR TIBIA AND FIBULA LEFT 2 VIEWS  LOCATION: [facility]  DATE/TIME: 5/23/2023 2:25 AM CDT    INDICATION: pain, fall  COMPARISON: 11/17/2021      Impression    IMPRESSION: Image osseous structures are diffusely osteopenic. Plate-screw fixation of proximal tibia redemonstrated. Tibia and fibula appear intact.     Note is made of a mildly displaced, comminuted fractures of the distal femur without definite extension to the knee joint.   XR Pelvis w Hip Left 1 View     Status: None    Narrative    EXAM: XR PELVIS AND HIP LEFT 1 VIEW  LOCATION: [facility]  DATE/TIME: 5/23/2023 2:26 AM CDT    INDICATION: pain, fall  COMPARISON: 09/03/2022; 12/07/2022      Impression    IMPRESSION: ORIF changes of prior left intratrochanteric fracture again noted with intramedullary lauren and stabilizing screws. No acute fracture lucency identified.   CT Head w/o Contrast     Status: None    Narrative    EXAM: CT HEAD W/O CONTRAST  LOCATION: [facility]  DATE/TIME: 5/23/2023 2:39 AM CDT    INDICATION: fall, alcohol intoxication  COMPARISON: 11/29/2022  TECHNIQUE: Routine CT Head without IV contrast. Multiplanar reformats. Dose reduction techniques were used.    FINDINGS:  INTRACRANIAL CONTENTS: No intracranial hemorrhage, extraaxial collection, or mass effect.  No CT evidence of acute infarct. Normal parenchymal attenuation. Normal ventricles and sulci.     VISUALIZED ORBITS/SINUSES/MASTOIDS: No intraorbital abnormality. No paranasal sinus mucosal disease. No middle ear or mastoid  effusion.    BONES/SOFT TISSUES: No acute abnormality.      Impression    IMPRESSION:  1.  No acute intracranial abnormality or significant change compared to the prior study.   XR Knee Left 1/2 Views     Status: None    Narrative    EXAM: XR KNEE LEFT 1/2 VIEWS  LOCATION: [facility]  DATE/TIME: 5/23/2023 3:19 AM CDT    INDICATION: Fall, pain.  COMPARISON: None.      Impression    IMPRESSION: Oblique, comminuted and mildly displaced fracture distal left femoral metadiaphysis. Fracture lines do not extend to the distal articular surface. Side plate and screw fixation proximal left tibia. Minimal degenerative changes left knee.   XR Femur Left 2 Views     Status: None    Narrative    EXAM: XR FEMUR LEFT 2 VIEWS  LOCATION: [facility]  DATE/TIME: 5/23/2023 6:56 AM CDT    INDICATION: Pre op planning.  COMPARISON: None.      Impression    IMPRESSION: Postoperative changes of IM lauren and screw fixation traversing an intertrochanteric fracture of the left hip. There is an acute appearing fracture of the distal femur which runs obliquely across the metaphyseal region approximately 7 cm above   the knee joint. Additional postoperative changes of prior plate and screw fixation of the proximal tibia. Severe and diffuse demineralization.   CT Knee Left w/o Contrast     Status: None (Preliminary result)    Narrative    CT KNEE LEFT WITHOUT CONTRAST May 23, 2023 10:22 AM    INDICATION: Distal femur fracture. Preoperative planning.     COMPARISON: Left knee radiographic exam 5/23/2023.    TECHNIQUE: Noncontrast. Axial, sagittal and coronal thin-section  reconstruction. Dose reduction techniques were used.   CONTRAST: None.    FINDINGS: CT images redemonstrate a comminuted mildly displaced distal  left femoral metaphyseal fracture. The fracture does not extent into  the articulating surfaces of the distal femur. Chronic healed medial  plate and screw fixed proximal tibial fracture.  No evidence for  hardware failure. Diffuse bone demineralization. No acute patella,  proximal tibia, or proximal fibula fracture.    Mild degenerative osteoarthritis left knee. Small knee joint effusion.  Knee soft tissue swelling. Blood products are seen at the site of the  distal femur fracture. Multiple muscle groups fatty infiltration and  mild fatty atrophy particularly involving the proximal medial head  gastrocnemius.      Impression    IMPRESSION:  1.  Comminuted mildly displaced fractured distal left femoral  metaphysis. No definitive intra-articular extension of the distal  femur fracture.  2.  Chronic healed plate and screw fixed proximal tibial fracture.  3.  Mild degenerative osteoarthritis left knee.  4.  Small knee joint effusion.     [place] Draw *Canceled*     Status: None ()    Narrative    The following orders were created for panel order [place] Draw.  Procedure                               Abnormality         Status                     ---------                               -----------         ------                       Please view results for these tests on the individual orders.   Comprehensive metabolic panel     Status: Abnormal   Result Value Ref Range    Sodium 116 (LL) 136 - 145 mmol/L    Potassium 4.5 3.4 - 5.3 mmol/L    Chloride 79 (L) 98 - 107 mmol/L    Carbon Dioxide (CO2) 24 22 - 29 mmol/L    Anion Gap 13 7 - 15 mmol/L    Urea Nitrogen 8.3 8.0 - 23.0 mg/dL    Creatinine 0.62 (L) 0.67 - 1.17 mg/dL    Calcium 8.3 (L) 8.8 - 10.2 mg/dL    Glucose 105 (H) 70 - 99 mg/dL    Alkaline Phosphatase 118 40 - 129 U/L    AST 27 10 - 50 U/L    ALT 16 10 - 50 U/L    Protein Total 7.7 6.4 - 8.3 g/dL    Albumin 4.1 3.5 - 5.2 g/dL    Bilirubin Total 0.3 <=1.2 mg/dL    GFR Estimate >90 >60 mL/min/1.73m2   Alcohol level blood     Status: Abnormal   Result Value Ref Range    Alcohol ethyl 0.23 (H) <=0.01 g/dL   [facility] Draw     Status: None    Narrative    The following orders were created for panel  order [place] Draw.  Procedure                               Abnormality         Status                     ---------                               -----------         ------                     Extra Blue Top Tube[[id number]]                              Final result                 Please view results for these tests on the individual orders.   CBC with platelets and differential     Status: Abnormal   Result Value Ref Range    WBC Count 10.8 4.0 - 11.0 10e3/uL    RBC Count 4.36 (L) 4.40 - 5.90 10e6/uL    Hemoglobin 10.9 (L) 13.3 - 17.7 g/dL    Hematocrit 33.4 (L) 40.0 - 53.0 %    MCV 77 (L) 78 - 100 fL    MCH 25.0 (L) 26.5 - 33.0 pg    MCHC 32.6 31.5 - 36.5 g/dL    RDW 18.5 (H) 10.0 - 15.0 %    Platelet Count 313 150 - 450 10e3/uL    % Neutrophils 72 %    % Lymphocytes 16 %    % Monocytes 9 %    % Eosinophils 2 %    % Basophils 1 %    % Immature Granulocytes 0 %    NRBCs per 100 WBC 0 <1 /100    Absolute Neutrophils 7.8 1.6 - 8.3 10e3/uL    Absolute Lymphocytes 1.8 0.8 - 5.3 10e3/uL    Absolute Monocytes 1.0 0.0 - 1.3 10e3/uL    Absolute Eosinophils 0.2 0.0 - 0.7 10e3/uL    Absolute Basophils 0.1 0.0 - 0.2 10e3/uL    Absolute Immature Granulocytes 0.0 <=0.4 10e3/uL    Absolute NRBCs 0.0 10e3/uL   Extra Blue Top Tube     Status: None   Result Value Ref Range    Hold Specimen [facility]    Magnesium     Status: Normal   Result Value Ref Range    Magnesium 1.8 1.7 - 2.3 mg/dL   Phosphorus     Status: Normal   Result Value Ref Range    Phosphorus 2.8 2.5 - 4.5 mg/dL   Basic metabolic panel     Status: Abnormal   Result Value Ref Range    Sodium 118 (LL) 136 - 145 mmol/L    Potassium 4.6 3.4 - 5.3 mmol/L    Chloride 81 (L) 98 - 107 mmol/L    Carbon Dioxide (CO2) 22 22 - 29 mmol/L    Anion Gap 15 7 - 15 mmol/L    Urea Nitrogen 7.6 (L) 8.0 - 23.0 mg/dL    Creatinine 0.57 (L) 0.67 - 1.17 mg/dL    Calcium 8.3 (L) 8.8 - 10.2 mg/dL    Glucose 91 70 - 99 mg/dL    GFR Estimate >90 >60 mL/min/1.73m2   Sodium     Status: Abnormal    Result Value Ref Range    Sodium 118 (LL) 136 - 145 mmol/L   Sodium     Status: Abnormal   Result Value Ref Range    Sodium 117 (LL) 136 - 145 mmol/L   Osmolality     Status: Abnormal   Result Value Ref Range    Osmolality Blood 250 (L) 280 - 301 mmol/kg    Narrative    Greater than 385 mmol/kg relates to stupor in hyperglycemia   Greater than 400 mmol/kg can relate to seizures   Greater than 420 mmol/kg can be lethal    Serum Osmalar Gap:   Normal <10   Larger suggest unmeasured substances present in serum (ethanol, methanol, isopropanol, mannitol, ethylene glycol).   EKG 12 lead     Status: None (Preliminary result)   Result Value Ref Range    Systolic Blood Pressure  mmHg    Diastolic Blood Pressure  mmHg    Ventricular Rate 85 BPM    Atrial Rate 85 BPM    FL Interval 182 ms    QRS Duration 82 ms     ms    QTc 428 ms    P Axis 26 degrees    R AXIS 47 degrees    T Axis 37 degrees    Interpretation ECG       Sinus rhythm  Normal ECG  When compared with ECG of 29-NOV-2022 13:31,  No significant change was found     CBC with platelets + differential     Status: Abnormal    Narrative    The following orders were created for panel order CBC with platelets + differential.  Procedure                               Abnormality         Status                     ---------                               -----------         ------                     CBC with platelets and d...[[id number]]  Abnormal            Final result                 Please view results for these tests on the individual orders.          Attestation:  I have reviewed today's vital signs, notes, medications, labs and imaging with Dr. Antoine Pompa.  Amount of time performed on this consult: 55 minutes.    Lola Melton PA-C

## 2023-05-23 NOTE — ED NOTES
DATE/TIME OF CALL RECEIVED FROM LAB:  05/23/23 at 5:06 AM   LAB TEST:  Sodium  LAB VALUE:  118  PROVIDER NOTIFIED?: Yes  PROVIDER NAME: Pily  DATE/TIME LAB VALUE REPORTED TO PROVIDER: 5/23 050  MECHANISM OF PROVIDER NOTIFICATION: Phone Call  PROVIDER RESPONSE: Acknowledge

## 2023-05-23 NOTE — PROVIDER NOTIFICATION
DATE/TIME OF CALL RECEIVED FROM LAB:  05/23/23 at 1440 PM   LAB TEST:  Na  LAB VALUE:  117  PROVIDER NOTIFIED?: Yes  PROVIDER NAME: Dr. Cristiane Edmonds  DATE/TIME LAB VALUE REPORTED TO PROVIDER: 5/23/2023 1445pm  MECHANISM OF PROVIDER NOTIFICATION: Phone Call  PROVIDER RESPONSE: Acknowledged

## 2023-05-23 NOTE — PROGRESS NOTES
Spoke with  to see if they could use urine sample that was just sent down and do add on for Potassium random urine.  said yes, they will.

## 2023-05-23 NOTE — PLAN OF CARE
Orthopedic Surgery    Suresh Powers is a 62-year-old male who presents with a left distal femur fracture after a fall.  Radiographs were reviewed.  CT scan of the knee pending.  Recommend ORIF of his distal femur.  We will plan for later today pending medical optimization.  He is currently hyponatremic.    CT scan left knee  ORIF femur later this afternoon pending medical optimization and improved hyponatremia  NPO for OR    ALBERTO HARE MD

## 2023-05-23 NOTE — H&P
[facility]    History and Physical - Hospitalist Service       Date of Admission:  5/23/2023    Assessment & Plan      Suresh Powers is a 62 year old male admitted on 5/23/2023. He presents after a fall    Severe hyponatremia  Lives in AL, poor balance at baseline. Went out to have drinks, was intoxicated. Tried to use walker and lost balance. Denies n/v, was feeling well prior to fall. In ED AFVSS. Na 116. CT head w/o acute process. Duration of hyponatremia not known.   - q4 Na checks  - urine osms, Na.   - Serum osms  - 0.9% NS for now  - avoid correction >122 by ~4 am 5/24  - nephrology consult    L distal femoral fracture  Hx L femur fx 9/2022. XR knee in ED with oblique, comminuted and mildly displaced fx of L femoral metadiaphysis, doesn't extent to distal articular surface.   - NPO  - orthopedic surgery consult  - prn analgesics  - bedrest for now    Alcohol intoxication  Etoh level in ED 0.23. states socially drinks but tonight was a special occasion, had 5-6 beers  - CIWA    Hypertension   - resume amlodipine with rec    Anemia  Hx GI bleed  GERD  2/2 duodenal ulcer. Admitted 11/2022 with hgb 3.8. Hgb on presentation 10.9. no signs/ sx currently of bleeding.   - resume pantoprazole 40 mg BID    Hx PE  RLL 11/2022  - hold apixaban for possible surgery, restart as soon as able    BPH  Solitary kidney  Donated L kidney to sister 1990  - resume flomax with rec    MDD  SPIKE  OCD  [needs rec- buspirone 15 mg BID, escitalopram 20 mg daily, lanotrigine 200 mg at HS, trazodone 100 mg at HS]  - resume meds with rec       Diet:   NPO  DVT Prophylaxis: DOAC  Prieto Catheter: PRESENT, indication:    Lines: None     Cardiac Monitoring: None  Code Status:   Full    Clinically Significant Risk Factors Present on Admission         # Hyponatremia: Lowest Na = 116 mmol/L in last 2 days, will monitor as appropriate  # Hypocalcemia: Lowest Ca = 8.3 mg/dL in last 2 days, will monitor and replace as  "appropriate      # Drug Induced Coagulation Defect: home medication list includes an anticoagulant medication    # Hypertension: Noted on problem list      # Obesity: Estimated body mass index is 31.32 kg/m  as calculated from the following:    Height as of this encounter: 1.702 m (5' 7\").    Weight as of this encounter: 90.7 kg (200 lb).            Disposition Plan      Expected Discharge Date: 05/25/2023                  Evangelista Sierra MD  Hospitalist Service  [facility]  Securely message with [employer] (more info)  Text page via [employer] Paging/Directory     ______________________________________________________________________    Chief Complaint   Fall with leg pain    History is obtained from the patient, electronic health record and emergency department physician    History of Present Illness   Suresh Powers is a 62 year old male who presents with leg pain after a fall.  Patient has a history of pretension, GI bleed, pulmonary embolism BPH among others.  Patient currently lives in an assisted living environment secondary to balance issues with recurrent falls.  Patient reports that this evening he went out with a friend to have some drinks to celebrate.  He states he had about 5-6 beers.  He did not drink hard alcohol.  When he was getting up to use his four-wheel walker he fell landing on his left leg.  He had immediate pain.  He did not hit his head.  Denies other trauma.  He has significant pain in his left leg in the emergency department.  He otherwise denies chest pain or shortness of breath.  Denies abdominal pain.  He has no nausea or vomiting.  He does not have a history of hyponatremia.  His alcohol level in the emergency department is 0.23.  He states that he only socially drinks but this evening was a special occasion so he had more beers than usual.      Past Medical History    Past Medical History:   Diagnosis Date     Anxiety     sees psych for trazodone     Clostridium " difficile diarrhea 3/9/2016     Foot pain      Hypertension      Mold exposure      Shingles 1984       Past Surgical History   Past Surgical History:   Procedure Laterality Date     ESOPHAGOSCOPY, GASTROSCOPY, DUODENOSCOPY (EGD), COMBINED N/A 11/30/2022    Procedure: ESOPHAGOGASTRODUODENOSCOPY (EGD);  Surgeon: Joe Alaniz MD;  Location:  GI     foot crush injury       kidney donation       OPEN REDUCTION INTERNAL FIXATION CLAVICLE Left 2/4/2016    Procedure: OPEN REDUCTION INTERNAL FIXATION CLAVICLE;  Surgeon: Rakesh Hui MD;  Location:  OR     OPEN REDUCTION INTERNAL FIXATION HIP NAILING Left 9/4/2022    Procedure: Open reduction internal fixation of left hip fracture;  Surgeon: Huang Cochran MD;  Location:  OR     OPEN REDUCTION INTERNAL FIXATION TIBIAL PLATEAU Left 2/4/2016    Procedure: OPEN REDUCTION INTERNAL FIXATION TIBIAL PLATEAU;  Surgeon: Rakesh Hui MD;  Location:  OR       Prior to Admission Medications   Prior to Admission Medications   Prescriptions Last Dose Informant Patient Reported? Taking?   ELIQUIS ANTICOAGULANT 5 MG tablet   No No   Sig: TAKE 1 TABLET BY MOUTH TWICE DAILY   Multiple Vitamins-Minerals (MULTIVITAMIN ADULT PO)   Yes No   Sig: Take 1 tablet by mouth daily   amLODIPine (NORVASC) 5 MG tablet   No No   Sig: TAKE 1 TABLET BY MOUTH ONCE DAILY   busPIRone (BUSPAR) 15 MG tablet   Yes No   Sig: Take 1 tablet (15 mg) by mouth 2 times daily   escitalopram (LEXAPRO) 20 MG tablet   Yes No   Sig: Take 20 mg by mouth daily   fluticasone (FLONASE) 50 MCG/ACT nasal spray   No No   Sig: INSTILL TWO SPRAYS IN EACH NOSTRIL ONCE DAILY (SHAKE WELL)   lamoTRIgine (LAMICTAL) 200 MG tablet   Yes No   Sig: Take 200 mg by mouth At Bedtime   pantoprazole (PROTONIX) 40 MG EC tablet   No No   Sig: TAKE 1 TABLET BY MOUTH TWICE DAILY BEFORE MEALS * TAKE 30-60MIN BEFORE A MEAL *   polyethylene glycol-propylene glycol (SYSTANE ULTRA) 0.4-0.3 % SOLN ophthalmic solution   Yes No    Sig: Place 2 drops into both eyes 4 times daily as needed   tamsulosin (FLOMAX) 0.4 MG capsule   No No   Sig: TAKE 1 CAPSULE BY MOUTH AT BEDTIME TAKE 30MIN AFTER THE SAME MEAL.   traZODone (DESYREL) 50 MG tablet   No No   Sig: Take 2 tablets (100 mg) by mouth At Bedtime   vitamin C (ASCORBIC ACID) 500 MG tablet   No No   Sig: TAKE 1 TABLET BY MOUTH ONCE DAILY      Facility-Administered Medications: None           Physical Exam   Vital Signs: Temp: 98.1  F (36.7  C) Temp src: Temporal BP: 133/79 Pulse: 94   Resp: 17 SpO2: 97 % O2 Device: None (Room air)    Weight: 200 lbs 0 oz    General Appearance: Alert, pleasant, no distress  Respiratory: CTA B  Cardiovascular: RRR, no murmur. 1+ edema  GI: obese, soft, nt/nd  Skin: no rashes or lesions grossly    Other: L knee swollen, tender     Medical Decision Making       60 MINUTES SPENT BY ME on the date of service doing chart review, history, exam, documentation & further activities per the note.      Data     I have personally reviewed the following data over the past 24 hrs:    10.8  \   10.9 (L)   / 313     118 (LL) 81 (L) 7.6 (L) /  91   4.6 22 0.57 (L) \       ALT: 16 AST: 27 AP: 118 TBILI: 0.3   ALB: 4.1 TOT PROTEIN: 7.7 LIPASE: N/A       Imaging results reviewed over the past 24 hrs:   Recent Results (from the past 24 hour(s))   Foot XR, G/E 3 views, left    Narrative    EXAM: XR FOOT LEFT G/E 3 VIEWS  LOCATION: [facility]  DATE/TIME: 5/23/2023 2:25 AM CDT    INDICATION: pain, fall  COMPARISON: 11/17/2021      Impression    IMPRESSION: Soft tissue thickening forefoot. Bones diffusely demineralized. Posttraumatic deformity calcaneus appears chronic. No free air or foreign body.   XR Tibia and Fibula Left 2 Views    Narrative    EXAM: XR TIBIA AND FIBULA LEFT 2 VIEWS  LOCATION: [facility]  DATE/TIME: 5/23/2023 2:25 AM CDT    INDICATION: pain, fall  COMPARISON: 11/17/2021      Impression    IMPRESSION: Image osseous  structures are diffusely osteopenic. Plate-screw fixation of proximal tibia redemonstrated. Tibia and fibula appear intact.     Note is made of a mildly displaced, comminuted fractures of the distal femur without definite extension to the knee joint.   XR Pelvis w Hip Left 1 View    Narrative    EXAM: XR PELVIS AND HIP LEFT 1 VIEW  LOCATION: [facility]  DATE/TIME: 5/23/2023 2:26 AM CDT    INDICATION: pain, fall  COMPARISON: 09/03/2022; 12/07/2022      Impression    IMPRESSION: ORIF changes of prior left intratrochanteric fracture again noted with intramedullary lauren and stabilizing screws. No acute fracture lucency identified.   CT Head w/o Contrast    Narrative    EXAM: CT HEAD W/O CONTRAST  LOCATION: [facility]  DATE/TIME: 5/23/2023 2:39 AM CDT    INDICATION: fall, alcohol intoxication  COMPARISON: 11/29/2022  TECHNIQUE: Routine CT Head without IV contrast. Multiplanar reformats. Dose reduction techniques were used.    FINDINGS:  INTRACRANIAL CONTENTS: No intracranial hemorrhage, extraaxial collection, or mass effect.  No CT evidence of acute infarct. Normal parenchymal attenuation. Normal ventricles and sulci.     VISUALIZED ORBITS/SINUSES/MASTOIDS: No intraorbital abnormality. No paranasal sinus mucosal disease. No middle ear or mastoid effusion.    BONES/SOFT TISSUES: No acute abnormality.      Impression    IMPRESSION:  1.  No acute intracranial abnormality or significant change compared to the prior study.   XR Knee Left 1/2 Views    Narrative    EXAM: XR KNEE LEFT 1/2 VIEWS  LOCATION: [facility]  DATE/TIME: 5/23/2023 3:19 AM CDT    INDICATION: Fall, pain.  COMPARISON: None.      Impression    IMPRESSION: Oblique, comminuted and mildly displaced fracture distal left femoral metadiaphysis. Fracture lines do not extend to the distal articular surface. Side plate and screw fixation proximal left tibia. Minimal degenerative changes left  knee.

## 2023-05-23 NOTE — ED NOTES
DATE/TIME OF CALL RECEIVED FROM LAB:  05/23/23 at 9:21 AM   LAB TEST:  Sodium: 118  PROVIDER NOTIFIED?: Yes  PROVIDER NAME: King BRIANA, Grey WARREN   DATE/TIME LAB VALUE REPORTED TO PROVIDER: 9:21 AM, 05/23/2023  MECHANISM OF PROVIDER NOTIFICATION: Page  PROVIDER RESPONSE: VWRB

## 2023-05-23 NOTE — ED NOTES
[facility]  ED Nurse Handoff Report    ED Chief complaint: Fall, Knee Injury, and Abrasion      ED Diagnosis:   Final diagnoses:   Closed fracture of distal end of left femur, unspecified fracture morphology, initial encounter (H)   Fall, initial encounter   Alcoholic intoxication without complication (H)   Hyponatremia       Code Status: Full Code    Allergies:   Allergies   Allergen Reactions    Lisinopril Other (See Comments)     Elevated potassium    Sertraline Diarrhea       Patient Story: Pt on Eliquis HX: hypertension and pulmonary emboli, fell this evening. He lives in assisted living, and has poor balance at baseline. He went out and drank beer this evening, and is intoxicated. After drinking alcohol he tried to use his walker and lost his balance and fell.  He is unable to bear weight or bend his left leg.  He scraped his arm when he fell. He does not drink everyday but he does drink alcohol often.   Focused Assessment: Unable to bear weight on L leg and has severe L leg pain.    Treatments and/or interventions provided: Morphine, pt states morphine makes him agitated and so switched to dilaudid.  IVF and oral vitamins.  Patient's response to treatments and/or interventions: Morphine did help pain  Results for orders placed or performed during the hospital encounter of 05/23/23   Foot XR, G/E 3 views, left     Status: None    Narrative    EXAM: XR FOOT LEFT G/E 3 VIEWS  LOCATION: [facility]  DATE/TIME: 5/23/2023 2:25 AM CDT    INDICATION: pain, fall  COMPARISON: 11/17/2021      Impression    IMPRESSION: Soft tissue thickening forefoot. Bones diffusely demineralized. Posttraumatic deformity calcaneus appears chronic. No free air or foreign body.   XR Tibia and Fibula Left 2 Views     Status: None    Narrative    EXAM: XR TIBIA AND FIBULA LEFT 2 VIEWS  LOCATION: [facility]  DATE/TIME: 5/23/2023 2:25 AM CDT    INDICATION: pain,  fall  COMPARISON: 11/17/2021      Impression    IMPRESSION: Image osseous structures are diffusely osteopenic. Plate-screw fixation of proximal tibia redemonstrated. Tibia and fibula appear intact.     Note is made of a mildly displaced, comminuted fractures of the distal femur without definite extension to the knee joint.   XR Pelvis w Hip Left 1 View     Status: None    Narrative    EXAM: XR PELVIS AND HIP LEFT 1 VIEW  LOCATION: [facility]  DATE/TIME: 5/23/2023 2:26 AM CDT    INDICATION: pain, fall  COMPARISON: 09/03/2022; 12/07/2022      Impression    IMPRESSION: ORIF changes of prior left intratrochanteric fracture again noted with intramedullary lauren and stabilizing screws. No acute fracture lucency identified.   CT Head w/o Contrast     Status: None    Narrative    EXAM: CT HEAD W/O CONTRAST  LOCATION: [facility]  DATE/TIME: 5/23/2023 2:39 AM CDT    INDICATION: fall, alcohol intoxication  COMPARISON: 11/29/2022  TECHNIQUE: Routine CT Head without IV contrast. Multiplanar reformats. Dose reduction techniques were used.    FINDINGS:  INTRACRANIAL CONTENTS: No intracranial hemorrhage, extraaxial collection, or mass effect.  No CT evidence of acute infarct. Normal parenchymal attenuation. Normal ventricles and sulci.     VISUALIZED ORBITS/SINUSES/MASTOIDS: No intraorbital abnormality. No paranasal sinus mucosal disease. No middle ear or mastoid effusion.    BONES/SOFT TISSUES: No acute abnormality.      Impression    IMPRESSION:  1.  No acute intracranial abnormality or significant change compared to the prior study.   XR Knee Left 1/2 Views     Status: None    Narrative    EXAM: XR KNEE LEFT 1/2 VIEWS  LOCATION: [facility]  DATE/TIME: 5/23/2023 3:19 AM CDT    INDICATION: Fall, pain.  COMPARISON: None.      Impression    IMPRESSION: Oblique, comminuted and mildly displaced fracture distal left femoral metadiaphysis. Fracture lines do not  extend to the distal articular surface. Side plate and screw fixation proximal left tibia. Minimal degenerative changes left knee.   [facility] [place] Draw *Canceled*     Status: None ()    Narrative    The following orders were created for panel order [facility] [place] Draw.  Procedure                               Abnormality         Status                     ---------                               -----------         ------                       Please view results for these tests on the individual orders.   Comprehensive metabolic panel     Status: Abnormal   Result Value Ref Range    Sodium 116 (LL) 136 - 145 mmol/L    Potassium 4.5 3.4 - 5.3 mmol/L    Chloride 79 (L) 98 - 107 mmol/L    Carbon Dioxide (CO2) 24 22 - 29 mmol/L    Anion Gap 13 7 - 15 mmol/L    Urea Nitrogen 8.3 8.0 - 23.0 mg/dL    Creatinine 0.62 (L) 0.67 - 1.17 mg/dL    Calcium 8.3 (L) 8.8 - 10.2 mg/dL    Glucose 105 (H) 70 - 99 mg/dL    Alkaline Phosphatase 118 40 - 129 U/L    AST 27 10 - 50 U/L    ALT 16 10 - 50 U/L    Protein Total 7.7 6.4 - 8.3 g/dL    Albumin 4.1 3.5 - 5.2 g/dL    Bilirubin Total 0.3 <=1.2 mg/dL    GFR Estimate >90 >60 mL/min/1.73m2   Alcohol level blood     Status: Abnormal   Result Value Ref Range    Alcohol ethyl 0.23 (H) <=0.01 g/dL   [facility] [place] Draw     Status: None    Narrative    The following orders were created for panel order [facility] [place] Draw.  Procedure                               Abnormality         Status                     ---------                               -----------         ------                     Extra Blue Top Tube[[id number]]                              Final result                 Please view results for these tests on the individual orders.   CBC with platelets and differential     Status: Abnormal   Result Value Ref Range    WBC Count 10.8 4.0 - 11.0 10e3/uL    RBC Count 4.36 (L) 4.40 - 5.90 10e6/uL    Hemoglobin 10.9 (L) 13.3 - 17.7 g/dL    Hematocrit 33.4 (L) 40.0 - 53.0 %    MCV 77 (L) 78 - 100 fL    MCH 25.0 (L)  26.5 - 33.0 pg    MCHC 32.6 31.5 - 36.5 g/dL    RDW 18.5 (H) 10.0 - 15.0 %    Platelet Count 313 150 - 450 10e3/uL    % Neutrophils 72 %    % Lymphocytes 16 %    % Monocytes 9 %    % Eosinophils 2 %    % Basophils 1 %    % Immature Granulocytes 0 %    NRBCs per 100 WBC 0 <1 /100    Absolute Neutrophils 7.8 1.6 - 8.3 10e3/uL    Absolute Lymphocytes 1.8 0.8 - 5.3 10e3/uL    Absolute Monocytes 1.0 0.0 - 1.3 10e3/uL    Absolute Eosinophils 0.2 0.0 - 0.7 10e3/uL    Absolute Basophils 0.1 0.0 - 0.2 10e3/uL    Absolute Immature Granulocytes 0.0 <=0.4 10e3/uL    Absolute NRBCs 0.0 10e3/uL   Extra Blue Top Tube     Status: None   Result Value Ref Range    Hold Specimen JIC    Magnesium     Status: Normal   Result Value Ref Range    Magnesium 1.8 1.7 - 2.3 mg/dL   Phosphorus     Status: Normal   Result Value Ref Range    Phosphorus 2.8 2.5 - 4.5 mg/dL   EKG 12 lead     Status: None (Preliminary result)   Result Value Ref Range    Systolic Blood Pressure  mmHg    Diastolic Blood Pressure  mmHg    Ventricular Rate 85 BPM    Atrial Rate 85 BPM    OK Interval 182 ms    QRS Duration 82 ms     ms    QTc 428 ms    P Axis 26 degrees    R AXIS 47 degrees    T Axis 37 degrees    Interpretation ECG       Sinus rhythm  Normal ECG  When compared with ECG of 29-NOV-2022 13:31,  No significant change was found     CBC with platelets + differential     Status: Abnormal    Narrative    The following orders were created for panel order CBC with platelets + differential.  Procedure                               Abnormality         Status                     ---------                               -----------         ------                     CBC with platelets and d...[[id number]]  Abnormal            Final result                 Please view results for these tests on the individual orders.       To be done/followed up on inpatient unit:      Does this patient have any cognitive concerns?:  none    Activity level - Baseline/Home:   "Independent  Activity Level - Current:   Total Care    Patient's Preferred language: English   Needed?: No    Isolation: None  Infection: Not Applicable  Patient tested for COVID 19 prior to admission: NO  Bariatric?: No    Vital Signs:   Vitals:    05/22/23 2302 05/23/23 0259 05/23/23 0300 05/23/23 0305   BP: (!) 158/92 (!) 141/83 133/87    Pulse: 88 91 90    Resp: 18      Temp: 98.1  F (36.7  C)      TempSrc: Temporal      SpO2: 97% 95% 95% 97%   Weight: 90.7 kg (200 lb)      Height: 1.702 m (5' 7\")          Cardiac Rhythm:     Was the PSS-3 completed:   Yes  What interventions are required if any?               Family Comments: none  OBS brochure/video discussed/provided to patient/family: N/A              Name of person given brochure if not patient:               Relationship to patient:     For the majority of the shift this patient's behavior was Green.   Behavioral interventions performed were none.    ED NURSE PHONE NUMBER: *[phone]         "

## 2023-05-23 NOTE — PROGRESS NOTES
[facility]    Hospitalist Progress Note    Date of Service (when I saw the patient): 05/23/2023  Admit date: 5/23/2023    Interval History   Full details of events over last 24 hours outlined below.   Chart review, admitted at ~ 5 AM by my partner.     Assessment & Plan   Suresh Powers is a 62 year old male admitted on 5/23/2023. He presents after a fall  [place]ives in [place], poor balance at baseline. Went out to have drinks, was intoxicated. Tried to use walker and lost balance. Denies n/v, was feeling well prior to fall. In ED AFVSS. Na 116. (130 in Feb)  CT head w/o acute process. Duration of hyponatremia not known.     Severe hyponatremia 116 on 05/23/23.   H/o recurring hyponatremia (120s during prior hospitalization in 10/22 (Uosm 273, Nick 24 at that time, and improved on IVF. Has had episodes prior to that.     Currently on normal saline at 125 cc with minimal improvement    Awaiting urine sodium and urine osmolality    Added on uric acid    avoid correction > 8 meq per day.     Nephrology consult appreciated > hold surgery until sodium ~ 125. At risk of overly rapid correction of sodium with IVF administration around surgery.      On multiple psychotropic medications, including selective serotonin reuptake inhibitor, which likely contributes. Consulted pharmacy to review.     Addendum: Urine studies c/w SIADH, Na at 42, urine osmolality 196, uric acid 3.8. 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  Greatly appreciate neurology consult.  IV fluids were stopped and he was placed on a 1200 ml fluid restriction. Goal rise is to 120-122 by AM. I see he is already to 120 tonight. Recheck in 2 hours. If continues trend up, would stop fluid restriction and consider D5W. Discussed with RN. Per order by nephrology, notify hospitalist provider re: all sodium levels overnight.     Note patient will be NPO starting tomorrow at 6 AM so sodium likely to  rise more rapidly.       L distal femoral fracture  Hx L femur fx 9/2022. XR knee in ED with oblique, comminuted and mildly displaced fx of L femoral metadiaphysis, doesn't extent to distal articular surface.     Discussed with ortho. Hold on surgery today.    - orthopedic surgery consult  - prn analgesics  - bedrest for now    Addendum: Goal sodium ~ 125 prior to surgery. Given sodium is 117 at 2 PM today and we want to avoid rapid rise > 8 meq / 24 hours. I have changed NPO order to start at 6 AM. Reassess timing of surgery tomorrow AM.      Alcohol intoxication  Etoh level in ED 0.23. states socially drinks but tonight was a special occasion, had 5-6 beers  - CIWA, but if above is true, low risk of withdrawal.     Hypertension   - resume amlodipine with rec     Anemia  Hx GI bleed  GERD  2/2 duodenal ulcer. Admitted 11/2022 with hgb 3.8. Hgb on presentation 10.9. no signs/ sx currently of bleeding.   - resume pantoprazole 40 mg BID     Hx PE  RLL 11/2022  - hold apixaban for possible surgery, restart as soon as able     BPH  Solitary kidney  Donated L kidney to sister 1990  - continue PTA flomax     MDD  SPIKE  OCD  [needs rec- buspirone 15 mg BID, escitalopram 20 mg daily, lanotrigine 200 mg at HS, trazodone 100 mg at HS]  - resume meds with rec  - On selective serotonin reuptake inhibitor,which I will hold Consulted pharmacy to review other mediations for risk of hyponatremia.  - Will consult psychiatry re: alternatives.     Addendum: Appreciate pharmacy consult.  Escitalopram is the only medication with significant risk of hyponatremia and given his urine studies I think there may be a bigger component of beer Potomania.  I also note that nephrology recommended continuing his psychotropic medications at this time. Therefore I have resumed all his psychotropic medications, and cancelled pscyhiatry consult.     Clinically Significant Risk Factors Present on Admission         # Hyponatremia: Lowest Na = 116 mmol/L  "in last 2 days, will monitor as appropriate  # Hypocalcemia: Lowest Ca = 8.3 mg/dL in last 2 days, will monitor and replace as appropriate      # Drug Induced Coagulation Defect: home medication list includes an anticoagulant medication    # Hypertension: Noted on problem list      # Obesity: Estimated body mass index is 31.32 kg/m  as calculated from the following:    Height as of this encounter: 1.702 m (5' 7\").    Weight as of this encounter: 90.7 kg (200 lb).              Diet: Orders Placed This Encounter      NPO for Medical/Clinical Reasons Except for: Meds, Ice Chips      Regular Diet Adult     IVF: NS @ 125 ml/h  Prieto Catheter: PRESENT, indication:       DVT Prophylaxis: holding DOAC, will need to resume post op given history of PE   Code Status: Full Code     Disposition: Expected discharge TBD p surgery and based on low sodium levels.   Communication: Discussed with RN, and nephrology  on 05/23/23    Cristiane Edmonds MD    Hospitalist Service  [facility]  Securely message with the Vocera Web Console (learn more here)  Text page via [employer] Paging/Directory      -Data reviewed today: I reviewed all new labs and imaging results over the last 24 hours. I personally reviewed no images or EKG's today.    Physical Exam   Temp: 99.1  F (37.3  C) Temp src: Oral BP: (!) 155/80 Pulse: 103   Resp: 16 SpO2: 96 % O2 Device: None (Room air)    Vitals:    05/22/23 2302   Weight: 90.7 kg (200 lb)     Vital Signs with Ranges  Temp:  [98.1  F (36.7  C)-99.1  F (37.3  C)] 99.1  F (37.3  C)  Pulse:  [] 103  Resp:  [16-18] 16  BP: (126-158)/(75-98) 155/80  SpO2:  [93 %-98 %] 96 %  No intake/output data recorded.    Today's Exam      Medications   All medications reviewed on 05/23/23     - MEDICATION INSTRUCTIONS -       sodium chloride 125 mL/hr at 05/23/23 1318       pantoprazole  40 mg Oral BID AC     sodium chloride (PF)  3 mL Intracatheter Q8H     tamsulosin  0.4 mg Oral Daily     [START ON " 5/24/2023] tranexamic acid  1 g Intravenous See Admin Instructions     traZODone  100 mg Oral Once     [START ON 5/24/2023] cholecalciferol  50 mcg Oral Daily     PRN Meds: acetaminophen **OR** acetaminophen, HYDROmorphone, hydrOXYzine **OR** hydrOXYzine, lidocaine 4%, lidocaine (buffered or not buffered), melatonin, methocarbamol, naloxone **OR** naloxone **OR** naloxone **OR** naloxone, ondansetron **OR** ondansetron, oxyCODONE, - MEDICATION INSTRUCTIONS -, polyethylene glycol, senna-docusate **OR** senna-docusate, sodium chloride (PF)    Data   Recent Labs   Lab 05/23/23  1355 05/23/23  0941 05/23/23  0826 05/23/23  0411 05/22/23  2328   WBC  --   --   --   --  10.8   HGB  --   --   --   --  10.9*   MCV  --   --   --   --  77*   PLT  --   --   --   --  313   * 117* 118* 118* 116*   POTASSIUM  --   --   --  4.6 4.5   CHLORIDE  --   --   --  81* 79*   CO2  --   --   --  22 24   BUN  --   --   --  7.6* 8.3   CR  --   --   --  0.57* 0.62*   ANIONGAP  --   --   --  15 13   NIMISHA  --   --   --  8.3* 8.3*   GLC  --   --   --  91 105*   ALBUMIN  --   --   --   --  4.1   PROTTOTAL  --   --   --   --  7.7   BILITOTAL  --   --   --   --  0.3   ALKPHOS  --   --   --   --  118   ALT  --   --   --   --  16   AST  --   --   --   --  27       Recent Results (from the past 24 hour(s))   Foot XR, G/E 3 views, left    Narrative    EXAM: XR FOOT LEFT G/E 3 VIEWS  LOCATION: [facility]  DATE/TIME: 5/23/2023 2:25 AM CDT    INDICATION: pain, fall  COMPARISON: 11/17/2021      Impression    IMPRESSION: Soft tissue thickening forefoot. Bones diffusely demineralized. Posttraumatic deformity calcaneus appears chronic. No free air or foreign body.   XR Tibia and Fibula Left 2 Views    Narrative    EXAM: XR TIBIA AND FIBULA LEFT 2 VIEWS  LOCATION: [facility]  DATE/TIME: 5/23/2023 2:25 AM CDT    INDICATION: pain, fall  COMPARISON: 11/17/2021      Impression    IMPRESSION: Image osseous  structures are diffusely osteopenic. Plate-screw fixation of proximal tibia redemonstrated. Tibia and fibula appear intact.     Note is made of a mildly displaced, comminuted fractures of the distal femur without definite extension to the knee joint.   XR Pelvis w Hip Left 1 View    Narrative    EXAM: XR PELVIS AND HIP LEFT 1 VIEW  LOCATION: [facility]  DATE/TIME: 5/23/2023 2:26 AM CDT    INDICATION: pain, fall  COMPARISON: 09/03/2022; 12/07/2022      Impression    IMPRESSION: ORIF changes of prior left intratrochanteric fracture again noted with intramedullary lauren and stabilizing screws. No acute fracture lucency identified.   CT Head w/o Contrast    Narrative    EXAM: CT HEAD W/O CONTRAST  LOCATION: [facility]  DATE/TIME: 5/23/2023 2:39 AM CDT    INDICATION: fall, alcohol intoxication  COMPARISON: 11/29/2022  TECHNIQUE: Routine CT Head without IV contrast. Multiplanar reformats. Dose reduction techniques were used.    FINDINGS:  INTRACRANIAL CONTENTS: No intracranial hemorrhage, extraaxial collection, or mass effect.  No CT evidence of acute infarct. Normal parenchymal attenuation. Normal ventricles and sulci.     VISUALIZED ORBITS/SINUSES/MASTOIDS: No intraorbital abnormality. No paranasal sinus mucosal disease. No middle ear or mastoid effusion.    BONES/SOFT TISSUES: No acute abnormality.      Impression    IMPRESSION:  1.  No acute intracranial abnormality or significant change compared to the prior study.   XR Knee Left 1/2 Views    Narrative    EXAM: XR KNEE LEFT 1/2 VIEWS  LOCATION: [facility]  DATE/TIME: 5/23/2023 3:19 AM CDT    INDICATION: Fall, pain.  COMPARISON: None.      Impression    IMPRESSION: Oblique, comminuted and mildly displaced fracture distal left femoral metadiaphysis. Fracture lines do not extend to the distal articular surface. Side plate and screw fixation proximal left tibia. Minimal degenerative changes left  knee.   XR Femur Left 2 Views    Narrative    EXAM: XR FEMUR LEFT 2 VIEWS  LOCATION: [facility]  DATE/TIME: 5/23/2023 6:56 AM CDT    INDICATION: Pre op planning.  COMPARISON: None.      Impression    IMPRESSION: Postoperative changes of IM lauren and screw fixation traversing an intertrochanteric fracture of the left hip. There is an acute appearing fracture of the distal femur which runs obliquely across the metaphyseal region approximately 7 cm above   the knee joint. Additional postoperative changes of prior plate and screw fixation of the proximal tibia. Severe and diffuse demineralization.   CT Knee Left w/o Contrast    Narrative    CT KNEE LEFT WITHOUT CONTRAST May 23, 2023 10:22 AM    INDICATION: Distal femur fracture. Preoperative planning.     COMPARISON: Left knee radiographic exam 5/23/2023.    TECHNIQUE: Noncontrast. Axial, sagittal and coronal thin-section  reconstruction. Dose reduction techniques were used.   CONTRAST: None.    FINDINGS: CT images redemonstrate a comminuted mildly displaced distal  left femoral metaphyseal fracture. The fracture does not extent into  the articulating surfaces of the distal femur. Chronic healed medial  plate and screw fixed proximal tibial fracture. No evidence for  hardware failure. Diffuse bone demineralization. No acute patella,  proximal tibia, or proximal fibula fracture.    Mild degenerative osteoarthritis left knee. Small knee joint effusion.  Knee soft tissue swelling. Blood products are seen at the site of the  distal femur fracture. Multiple muscle groups fatty infiltration and  mild fatty atrophy particularly involving the proximal medial head  gastrocnemius.      Impression    IMPRESSION:  1.  Comminuted mildly displaced fractured distal left femoral  metaphysis. No definitive intra-articular extension of the distal  femur fracture.  2.  Chronic healed plate and screw fixed proximal tibial fracture.  3.  Mild degenerative  osteoarthritis left knee.  4.  Small knee joint effusion.      ALFREDITO SPEARS MD         SYSTEM ID:  [id number]

## 2023-05-23 NOTE — ED PROVIDER NOTES
History   Chief Complaint:  Fall, Knee Injury, and Abrasion     The history is provided by the patient.      Suresh Powers is a 62 year old male on Eliquis with a history of hypertension and pulmonary emboli who presents after a fall. The patient reports that he lives in assisted living, and his balance is not good at baseline. He went out and drank beer this evening, and endorses that he is intoxicated. After drinking alcohol he tried to use his walker and lost his balance. He scraped his arm when he fell. He does not drink everyday but he does drink alcohol often. He does not drink water often. He denies any vomiting or diarrhea. He denies any neck pain or stool changes. He is unable to bear weight or bend his left leg.     Review of External Notes:   Home care note from 3/7/2023    Medications:    Eliquis   Ascorbic acid   Desyrel   Flomax   Protonix   Lamictal   Flonase   Lexapro   Buspar   Norvasc     Past Medical History:    Multiple subsegmental pulmonary emboli without acute cor pulmonale   Anemia   Gastrointestinal hemorrhage   UTI   Abnormal gait   Contracture of joint of ankle and foot   Closed fracture of left hip   Cataract of both eyes   Hyponatremia   Depression   OCD   Single kidney   Unilateral inguinal hernia without obstruction or gangrene   Sleep disorder   Benign essential hypertension   Shingles   Clostridium difficile diarrhea   Anxiety     Past Surgical History:    Open reduction internal fixation tibial plateau   Open reduction internal fixation hip nailing   Open reduction internal fixation clavicle   Kidney donation   Esophagoscopy, Gastroscopy, and Duodenoscopy combined      Physical Exam     Patient Vitals for the past 24 hrs:   BP Temp Temp src Pulse Resp SpO2 Height Weight   05/23/23 0305 -- -- -- -- -- 97 % -- --   05/23/23 0300 133/87 -- -- 90 -- 95 % -- --   05/23/23 0259 (!) 141/83 -- -- 91 -- 95 % -- --   05/22/23 2302 (!) 158/92 98.1  F (36.7  C) Temporal 88 18 97 % 1.702 m (5'  "7\") 90.7 kg (200 lb)      Physical Exam  General: Patient is alert and uncomfortable appearing.  HEENT: Head atraumatic    Eyes: pupils equal and reactive. Conjunctiva clear   Nares: patent   Oropharynx: no lesions, uvula midline, no palatal draping, normal voice, no trismus  Neck: Supple without lymphadenopathy, no meningismus  Chest: Heart regular rate and rhythm.   Lungs: Equal clear to auscultation with no wheeze or rales  Abdomen: Soft, non tender, nondistended, normal bowel sounds  Back: No costovertebral angle tenderness, no midline C, T or L spine tenderness  Neuro: Grossly nonfocal, normal speech, strength equal bilaterally, CN 2-12 intact  Extremities: Left knee with tenderness and inability to bend, left foot with ecchymosis but warm and well-perfused, compartments are soft, equal radial and DP pulses. No clubbing, cyanosis.  No edema  Skin: Warm and dry with no rash.       Emergency Department Course   ECG  ECG taken at 2334, ECG read at 0108  Normal sinus rhythm  Normal ECG   Rate 85 bpm. KS interval 182 ms. QRS duration 82 ms. QT/QTc 360/428 ms. P-R-T axes 26 47 37.     Imaging:  XR Knee Left 1/2 Views   Final Result   IMPRESSION: Oblique, comminuted and mildly displaced fracture distal left femoral metadiaphysis. Fracture lines do not extend to the distal articular surface. Side plate and screw fixation proximal left tibia. Minimal degenerative changes left knee.      CT Head w/o Contrast   Final Result   IMPRESSION:   1.  No acute intracranial abnormality or significant change compared to the prior study.      XR Pelvis w Hip Left 1 View   Final Result   IMPRESSION: ORIF changes of prior left intratrochanteric fracture again noted with intramedullary lauren and stabilizing screws. No acute fracture lucency identified.      Foot XR, G/E 3 views, left   Final Result   IMPRESSION: Soft tissue thickening forefoot. Bones diffusely demineralized. Posttraumatic deformity calcaneus appears chronic. No free air " or foreign body.      XR Tibia and Fibula Left 2 Views   Final Result   IMPRESSION: Image osseous structures are diffusely osteopenic. Plate-screw fixation of proximal tibia redemonstrated. Tibia and fibula appear intact.       Note is made of a mildly displaced, comminuted fractures of the distal femur without definite extension to the knee joint.         Report per radiology    Laboratory:  Labs Ordered and Resulted from Time of ED Arrival to Time of ED Departure   COMPREHENSIVE METABOLIC PANEL - Abnormal       Result Value    Sodium 116 (*)     Potassium 4.5      Chloride 79 (*)     Carbon Dioxide (CO2) 24      Anion Gap 13      Urea Nitrogen 8.3      Creatinine 0.62 (*)     Calcium 8.3 (*)     Glucose 105 (*)     Alkaline Phosphatase 118      AST 27      ALT 16      Protein Total 7.7      Albumin 4.1      Bilirubin Total 0.3      GFR Estimate >90     ETHYL ALCOHOL LEVEL - Abnormal    Alcohol ethyl 0.23 (*)    CBC WITH PLATELETS AND DIFFERENTIAL - Abnormal    WBC Count 10.8      RBC Count 4.36 (*)     Hemoglobin 10.9 (*)     Hematocrit 33.4 (*)     MCV 77 (*)     MCH 25.0 (*)     MCHC 32.6      RDW 18.5 (*)     Platelet Count 313      % Neutrophils 72      % Lymphocytes 16      % Monocytes 9      % Eosinophils 2      % Basophils 1      % Immature Granulocytes 0      NRBCs per 100 WBC 0      Absolute Neutrophils 7.8      Absolute Lymphocytes 1.8      Absolute Monocytes 1.0      Absolute Eosinophils 0.2      Absolute Basophils 0.1      Absolute Immature Granulocytes 0.0      Absolute NRBCs 0.0     BASIC METABOLIC PANEL - Abnormal    Sodium 118 (*)     Potassium 4.6      Chloride 81 (*)     Carbon Dioxide (CO2) 22      Anion Gap 15      Urea Nitrogen 7.6 (*)     Creatinine 0.57 (*)     Calcium 8.3 (*)     Glucose 91      GFR Estimate >90     MAGNESIUM - Normal    Magnesium 1.8     PHOSPHORUS - Normal    Phosphorus 2.8        Emergency Department Course & Assessments:  Interventions:  Medications   sodium chloride  0.9% infusion (1,000 mLs Intravenous $New Bag 5/23/23 0114)   HYDROmorphone (PF) (DILAUDID) injection 0.5 mg (has no administration in time range)   multivitamin, therapeutic (THERA-VIT) tablet 1 tablet (1 tablet Oral $Given 5/23/23 0111)   folic acid (FOLVITE) tablet 1 mg (1 mg Oral $Given 5/23/23 0110)   thiamine (B-1) tablet 100 mg (100 mg Oral $Given 5/23/23 0110)   magnesium oxide (MAG-OX) tablet 800 mg (800 mg Oral $Given 5/23/23 0110)   lidocaine (XYLOCAINE) 2 % external gel 10 mL (6 mLs Urethral $Given 5/23/23 0121)   morphine (PF) injection 4 mg (4 mg Intravenous $Given 5/23/23 0252)      Assessments:  0108 I obtained history and examined the patient as reported above.   0334 I rechecked the patient and discussed his admission.     Independent Interpretation (X-rays, CTs, rhythm strip):  Head CT with no acute intracranial hemorrhage.  Tib-fib x-ray with distal femur fracture, knee x-ray with distal femur fracture    Consultations/Discussion of Management or Tests:  0347 I spoke with Dr. Sierra of the hospitalist service from [facility] regarding patient's presentation, findings, and plan of care.    Disposition:  The patient was admitted to the hospital under the care of Dr. Sierra.     Impression & Plan    CMS Diagnoses: none    Medical Decision Making:  Suresh Powres is a 62 year old male who presents for evaluation of a fall.  This was clearly a mechanical fall, not syncope.  Patient states he was drinking beers tonight and was using a new walker and had a mechanical fall.  Patient is intoxicated by blood work.  There were no prodromal symptoms so I doubt stroke, cardiac arrhythmia or other serious etiology.  Detailed exam shows swelling, pain and inability to bend his left knee.  In addition he is ecchymosis of his left foot.  Patient found to have a distal femur fracture on the left.  No fracture in his foot is identified.  Compartments are soft.  Head CT without acute intracranial  hemorrhage.  Based on patient's complex medical status blood work was obtained and he was found to be significantly hyponatremic.  This is likely a result of chronic alcohol use as he denies significant water intake.  SIADH would remain in the differential as well. I would not do workup for syncope, stroke, ACS, PE at this point.  Neck was cleared clinically.  This time patient will be admitted to the hospital service for medical stabilization and continued repletion of his sodium in addition to orthopedic consultation for his femur fracture.  Patient is agreeable with this plan and all questions and concerns addressed      Diagnosis:    ICD-10-CM    1. Closed fracture of distal end of left femur, unspecified fracture morphology, initial encounter (H)  S72.402A       2. Fall, initial encounter  W19.XXXA       3. Alcoholic intoxication without complication (H)  F10.920       4. Hyponatremia  E87.1           Scribe Disclosure:  Austyn ARREOLA, am serving as a scribe at 12:51 AM on 5/23/2023 to document services personally performed by Heather Nascimento MD based on my observations and the provider's statements to me.      5/23/2023   Heather Nascimento MD Neuner, Maria Bea, MD  05/23/23 2443

## 2023-05-23 NOTE — PHARMACY-ADMISSION MEDICATION HISTORY
Pharmacist Admission Medication History    Admission medication history is complete. The information provided in this note is only as accurate as the sources available at the time of the update.    Medication reconciliation/reorder completed by provider prior to medication history? No    Information Source(s): Facility ([facility]) medication list/MAR via N/A    Pertinent Information:  Off bug spray, sunscreen, and triple antibiotic ointment were all on list as prn medications - did not add to med list.    Changes made to PTA medication list:    Added: acetaminophen, Oliver cream, bisacodyl, guaifenesin, loperamide, milk of magnesium, nystatin    Deleted: None    Changed: trazodone 100 mg -> 150 mg    Medication Affordability:  Not including over the counter (OTC) medications, was there a time in the past 3 months when you did not take your medications as prescribed because of cost?: Unable to Assess    Allergies reviewed with patient and updates made in EHR: no    Medication History Completed By: Symone Oakley [facility] 5/23/2023 11:19 AM    Prior to Admission medications    Medication Sig Last Dose Taking? Auth Provider Long Term End Date   acetaminophen (TYLENOL) 325 MG tablet Take 650 mg by mouth every 4 hours as needed for mild pain Max 3 grams per 24 hours prn Yes Unknown, Entered By History     amLODIPine (NORVASC) 5 MG tablet TAKE 1 TABLET BY MOUTH ONCE DAILY  Yes Brad Thurston MD Yes    bisacodyl (DULCOLAX) 10 MG suppository Place 10 mg rectally daily as needed for constipation On the 11th shift (if no BM for 3 days/9 shifts) prn Yes Unknown, Entered By History     busPIRone (BUSPAR) 15 MG tablet Take 1 tablet (15 mg) by mouth 2 times daily  Yes Unknown, Entered By History No    dimethicone-zinc oxide (OLIVER PROTECT) external cream Apply topically as needed (to intact stage 1 pressure area and/or irritated skin, rash, or excoriation) prn Yes Unknown, Entered By History     ELIQUIS  ANTICOAGULANT 5 MG tablet TAKE 1 TABLET BY MOUTH TWICE DAILY  Yes Brad Thurston MD     escitalopram (LEXAPRO) 20 MG tablet Take 20 mg by mouth daily  Yes Unknown, Entered By History No    fluticasone (FLONASE) 50 MCG/ACT nasal spray INSTILL TWO SPRAYS IN EACH NOSTRIL ONCE DAILY (SHAKE WELL)  Yes Brad Thurston MD     guaiFENesin (ROBITUSSIN) 20 mg/mL liquid Take 200 mg by mouth every 4 hours as needed for cough prn Yes Unknown, Entered By History     lamoTRIgine (LAMICTAL) 200 MG tablet Take 200 mg by mouth At Bedtime  Yes Brad Thurston MD Yes    loperamide (IMODIUM A-D) 2 MG tablet Take 4 mg by mouth 4 times daily as needed for diarrhea prn Yes Unknown, Entered By History     magnesium hydroxide (MILK OF MAGNESIA) 400 MG/5ML suspension Take 30 mLs by mouth daily as needed for constipation or heartburn If no BM for 3 days/9 shifts prn Yes Unknown, Entered By History     Multiple Vitamins-Minerals (MULTIVITAMIN ADULT PO) Take 1 tablet by mouth daily  Yes Reported, Patient     nystatin (MYCOSTATIN) 796378 UNIT/GM external powder Apply topically 2 times daily as needed (antifungal) prn Yes Unknown, Entered By History     pantoprazole (PROTONIX) 40 MG EC tablet TAKE 1 TABLET BY MOUTH TWICE DAILY BEFORE MEALS * TAKE 30-60MIN BEFORE A MEAL *  Yes Brad Thurston MD     polyethylene glycol-propylene glycol (SYSTANE ULTRA) 0.4-0.3 % SOLN ophthalmic solution Place 2 drops into both eyes 4 times daily as needed prn Yes Unknown, Entered By History     tamsulosin (FLOMAX) 0.4 MG capsule TAKE 1 CAPSULE BY MOUTH AT BEDTIME TAKE 30MIN AFTER THE SAME MEAL.  Yes Brad Thurston MD     traZODone (DESYREL) 150 MG tablet Take 150 mg by mouth At Bedtime  Yes Unknown, Entered By History No    vitamin C (ASCORBIC ACID) 500 MG tablet TAKE 1 TABLET BY MOUTH ONCE DAILY  Yes Brad Thurston MD     zinc gluconate 50 MG tablet Take 100 mg by mouth daily  Yes Unknown, Entered By History

## 2023-05-23 NOTE — PROGRESS NOTES
RECEIVING UNIT ED HANDOFF REVIEW    ED Nurse Handoff Report was reviewed by: Maggie Robison RN on May 23, 2023 at 12:12 PM

## 2023-05-23 NOTE — PHARMACY-CONSULT NOTE
Pharmacy Consult Note    Pharmacy consulted to review PTA medications that are likely to cause hyponatremia.    ---Escitalopram - SSRIs are associated with SIADH and/or hyponatremia, including severe cases, predominantly in the elderly. Is reversible with discontinuation of therapy.  ---Lamotrigine and pantoprazole have had postmarketing reports of hyponatremia, although frequency not defined.     Not a PTA medication, but oxycodone extended-release is associated with hyponatremia (1-5% frequency); however, inpatient order is for immediate-release product (which does not have associated hyponatremia frequency defined).     Thank you for the consult,   Michelle Figuerao, PharmD       [FreeTextEntry1] : Check cholesterol and sugar today. Recommend exercise.\par Cont f/u with liver specialist.\par RTO 1 year or PRN.

## 2023-05-23 NOTE — ED TRIAGE NOTES
"Pt resides at assisted living. States went out first time tonight with friends and has a new walker. Pt states tripped and landed on buttocks, denies any head/neck/back pain. C/o L leg \"all the way down leg\" Hx of L hip Fx last Sept. Pt on blood thinner has abrasions to L elbow and lower arm     Triage Assessment     Row Name 05/22/23 7128       Triage Assessment (Adult)    Airway WDL WDL       Respiratory WDL    Respiratory WDL WDL       Skin Circulation/Temperature WDL    Skin Circulation/Temperature WDL WDL       Cardiac WDL    Cardiac WDL WDL       Peripheral/Neurovascular WDL    Peripheral Neurovascular WDL WDL       Cognitive/Neuro/Behavioral WDL    Cognitive/Neuro/Behavioral WDL X  states had some \"beers tonight\"              "

## 2023-05-23 NOTE — CONSULTS
"[facility]    RENAL CONSULTATION NOTE    REFERRING MD:  Dr. Sierra    REASON FOR CONSULTATION:  Severe hyponatremia    DATE OF CONSULTATION: 05/23/23    SHORTHAND KEY FOR MY NOTES:  c = with, s = without, p = after, a = before, x = except, asx = asymptomatic, tx = transplant or treatment, sx = symptoms or symptomatic, cx = canceled or culture, rxn = reaction, yday = yesterday, nl = normal, abx = antibiotics, fxn = function, dx = diagnosis, dz = disease, m/h = melena/hematochezia, c/d/l/ha = cramping/dizziness/lightheadedness/headache, d/c = discharge or diarrhea/constipation, f/c/n/v = fevers/chills/nausea/vomiting, cp/sob = chest pain/shortness of breath, tbv = total body volume, rxn = reaction, tdc = tunneled dialysis catheter, pta = prior to admission, hd = hemodialysis, pd = peritoneal dialysis, hhd = home hemodialysis, edw = estimated dry wt    HPI: Suresh Powers is a 62 year old male c h/o solitary R kidney 2 kidney donation who was admitted on 5/23/2023 c a fall and found to have severe hyponatremia.  Notes from Dr. Nascimento (ER) and Dr. Sierra (Hosp) were reviewed.    Pt went to a bar c a friend yday and proceeded to drink a lot of beer.  \"I drank one of the really big glasses and then switched to regular glasses p that.\"  He didn't eat much food.  At some point, he tried to walk c his new walker, lost his balance and fell.  EMS was activated and he was brought here for further eval.    In the ER, he was found to have a L distal femur fx and has a lot of pain.  He has been seen by Orthopaedic Surgery and will need surgery.  He was also noted to have severe hyponatremia c a Na 116 on admission.  His BP was fine on admission and he was started on NS.  Overnight, his Na has remained stable ~117.  He is urinating well via a vasquez.      Currently, he is not eating/drinking much due to the pain.  No n/v/confusion.  Denies a h/o EtOH withdrawal.    ROS:  A complete review of systems " was performed and is x as noted above.    PMH:    Past Medical History:   Diagnosis Date     Anxiety     sees psych for trazodone     Clostridium difficile diarrhea 3/9/2016     Foot pain      Hypertension      Mold exposure      Shingles 1984     PSH:    Past Surgical History:   Procedure Laterality Date     ESOPHAGOSCOPY, GASTROSCOPY, DUODENOSCOPY (EGD), COMBINED N/A 11/30/2022    Procedure: ESOPHAGOGASTRODUODENOSCOPY (EGD);  Surgeon: Joe Alaniz MD;  Location:  GI     foot crush injury       kidney donation       OPEN REDUCTION INTERNAL FIXATION CLAVICLE Left 2/4/2016    Procedure: OPEN REDUCTION INTERNAL FIXATION CLAVICLE;  Surgeon: Rakesh Hui MD;  Location:  OR     OPEN REDUCTION INTERNAL FIXATION HIP NAILING Left 9/4/2022    Procedure: Open reduction internal fixation of left hip fracture;  Surgeon: Huang Cochran MD;  Location:  OR     OPEN REDUCTION INTERNAL FIXATION TIBIAL PLATEAU Left 2/4/2016    Procedure: OPEN REDUCTION INTERNAL FIXATION TIBIAL PLATEAU;  Surgeon: Rakesh Hui MD;  Location:  OR     MEDICATIONS:      pantoprazole  40 mg Oral BID AC     sodium chloride (PF)  3 mL Intracatheter Q8H     tamsulosin  0.4 mg Oral Daily     [START ON 5/24/2023] tranexamic acid  1 g Intravenous See Admin Instructions     traZODone  100 mg Oral Once     [START ON 5/24/2023] cholecalciferol  50 mcg Oral Daily     ALLERGIES:    Allergies as of 05/22/2023 - Reviewed 05/22/2023   Allergen Reaction Noted     Lisinopril Other (See Comments) 02/27/2018     Sertraline Diarrhea 05/02/2017     FH:    Family History   Problem Relation Age of Onset     Breast Cancer Mother      Diabetes Type 2  Mother      Skin Cancer Father      Cerebrovascular Disease Father      SH:    Social History     Socioeconomic History     Marital status: Single     Spouse name: Not on file     Number of children: Not on file     Years of education: Not on file     Highest education level: Not on file  "  Occupational History     Not on file   Tobacco Use     Smoking status: Former     Packs/day: 1.00     Years: 0.00     Pack years: 0.00     Types: Cigarettes     Smokeless tobacco: Never   Vaping Use     Vaping status: Never Used   Substance and Sexual Activity     Alcohol use: Yes     Comment: states 8 beerrs all day over past 8 hours     Drug use: No     Sexual activity: Not Currently   Other Topics Concern     Parent/sibling w/ CABG, MI or angioplasty before 65F 55M? No   Social History Narrative     Not on file     Social Determinants of Health     Financial Resource Strain: Low Risk  (2/20/2023)    Overall Financial Resource Strain (CARDIA)      Difficulty of Paying Living Expenses: Not very hard   Food Insecurity: No Food Insecurity (2/20/2023)    Hunger Vital Sign      Worried About Running Out of Food in the Last Year: Never true      Ran Out of Food in the Last Year: Never true   Transportation Needs: Not on file   Physical Activity: Not on file   Stress: Not on file   Social Connections: Not on file   Intimate Partner Violence: Not on file   Housing Stability: Low Risk  (2/20/2023)    Housing Stability Vital Sign      Unable to Pay for Housing in the Last Year: No      Number of Places Lived in the Last Year: 1      Unstable Housing in the Last Year: No     PHYSICAL EXAM:    BP (!) 165/90 (BP Location: Left arm)   Pulse 96   Temp 99.8  F (37.7  C) (Oral)   Resp 16   Ht 1.702 m (5' 7\")   Wt 90.7 kg (200 lb)   SpO2 96%   BMI 31.32 kg/m      GENERAL:  awake, alert, NAD  HEENT:  normocephalic  CV:  RRR, nl S1/S2; tr RLE edema; 1-2+ LLE edema  RESP:  CTA B ant/lat c good efforts  GI:  abd o/s/nt/nd  MUSCULOSKELETAL: + LLE brace  SKIN:  L foot bruised  NEURO:  strength normal and symmetric  PSYCH:  mood good, affect appropriate  LINES:  + PIV    LABS:      CBC RESULTS:     Recent Labs   Lab 05/22/23  2328   WBC 10.8   RBC 4.36*   HGB 10.9*   HCT 33.4*        BMP RESULTS:  Recent Labs   Lab " 05/23/23  1355 05/23/23  0941 05/23/23  0826 05/23/23  0411 05/22/23  2328   * 117* 118* 118* 116*   POTASSIUM  --   --   --  4.6 4.5   CHLORIDE  --   --   --  81* 79*   CO2  --   --   --  22 24   BUN  --   --   --  7.6* 8.3   CR  --   --   --  0.57* 0.62*   GLC  --   --   --  91 105*   NIMISHA  --   --   --  8.3* 8.3*     INRNo lab results found in last 7 days.     DIAGNOSTICS:  Personally reviewed    A/P:  Suresh Powers is a 62 year old male c solitary R kidney who has severe hyponatremia.    1.  Severe hyponatremia.  Pt's Na is stable in the high 110s.  He is euvolemic and urine studies are c/w SIADH.  The ADH release is largely driven by the pain.  In addition, beer potomania is playing a role given his excessive beer ingestion c minimal food intake.  He has chronically low Na (~130), which is prob related to beer.  He is currently still receiving a NS infusion.  He is on a number of psych meds and a PPI that can all contribute to a low Na, but those are chronic and aren't related to the acute issue.  A.  Stop NS.  B.  Fluid restriction 1200 ml every day.  C.  Check Na q 6h.  Goal by the AM is 120-122.  D.  Manage pain.  E.  Continue psych meds for now.    2.  L distal femur fx.  He has been seen by Ortho and the plan is for surgical correction.  A.  Goal Na ~125 before surgery.  B.  Pain mgmt prn.    3.  EtOH abuse.  Pt was drunk on arrival and had a similar MIKAYLA during a previous admission.  He drinks frequently but doesn't report a h/o withdrawal p his last hospitalization.  A.  Watch for signs of w/drawal.    4.  FEN.  Electrolytes are ok x low Na.  A.  Follow labs daily.  B.  Continue reg diet for now.  NPO p MN.    Thank you for this consultation. We will follow c you.  Please call if any questions.      Attestation:   I have reviewed today's relevant vital signs, notes, medications, labs and imaging.    Mono Khan MD  [facility] - Nephrology  [phone]

## 2023-05-24 ENCOUNTER — ANESTHESIA (OUTPATIENT)
Dept: SURGERY | Facility: CLINIC | Age: 63
DRG: 481 | End: 2023-05-24
Payer: COMMERCIAL

## 2023-05-24 ENCOUNTER — ANESTHESIA EVENT (OUTPATIENT)
Dept: SURGERY | Facility: CLINIC | Age: 63
DRG: 481 | End: 2023-05-24
Payer: COMMERCIAL

## 2023-05-24 ENCOUNTER — DOCUMENTATION ONLY (OUTPATIENT)
Dept: OTHER | Facility: CLINIC | Age: 63
End: 2023-05-24
Payer: COMMERCIAL

## 2023-05-24 LAB
ABO/RH(D): NORMAL
ALBUMIN SERPL BCG-MCNC: 3.1 G/DL (ref 3.5–5.2)
ANION GAP SERPL CALCULATED.3IONS-SCNC: 9 MMOL/L (ref 7–15)
ANTIBODY SCREEN: NEGATIVE
BUN SERPL-MCNC: 13.6 MG/DL (ref 8–23)
CALCIUM SERPL-MCNC: 8.4 MG/DL (ref 8.8–10.2)
CHLORIDE SERPL-SCNC: 90 MMOL/L (ref 98–107)
CREAT SERPL-MCNC: 0.98 MG/DL (ref 0.67–1.17)
DEPRECATED HCO3 PLAS-SCNC: 25 MMOL/L (ref 22–29)
ERYTHROCYTE [DISTWIDTH] IN BLOOD BY AUTOMATED COUNT: 18.6 % (ref 10–15)
GFR SERPL CREATININE-BSD FRML MDRD: 87 ML/MIN/1.73M2
GLUCOSE BLDC GLUCOMTR-MCNC: 119 MG/DL (ref 70–99)
GLUCOSE BLDC GLUCOMTR-MCNC: 133 MG/DL (ref 70–99)
GLUCOSE BLDC GLUCOMTR-MCNC: 149 MG/DL (ref 70–99)
GLUCOSE SERPL-MCNC: 121 MG/DL (ref 70–99)
HCT VFR BLD AUTO: 23.8 % (ref 40–53)
HGB BLD-MCNC: 7.5 G/DL (ref 13.3–17.7)
HGB BLD-MCNC: 7.8 G/DL (ref 13.3–17.7)
MCH RBC QN AUTO: 25.2 PG (ref 26.5–33)
MCHC RBC AUTO-ENTMCNC: 32.8 G/DL (ref 31.5–36.5)
MCV RBC AUTO: 77 FL (ref 78–100)
PHOSPHATE SERPL-MCNC: 3.5 MG/DL (ref 2.5–4.5)
PLATELET # BLD AUTO: 232 10E3/UL (ref 150–450)
POTASSIUM SERPL-SCNC: 4.7 MMOL/L (ref 3.4–5.3)
RBC # BLD AUTO: 3.1 10E6/UL (ref 4.4–5.9)
SODIUM SERPL-SCNC: 121 MMOL/L (ref 136–145)
SODIUM SERPL-SCNC: 124 MMOL/L (ref 136–145)
SODIUM SERPL-SCNC: 124 MMOL/L (ref 136–145)
SODIUM SERPL-SCNC: 125 MMOL/L (ref 136–145)
SODIUM SERPL-SCNC: 127 MMOL/L (ref 136–145)
SPECIMEN EXPIRATION DATE: NORMAL
WBC # BLD AUTO: 9.2 10E3/UL (ref 4–11)

## 2023-05-24 PROCEDURE — 86850 RBC ANTIBODY SCREEN: CPT | Performed by: ANESTHESIOLOGY

## 2023-05-24 PROCEDURE — 86923 COMPATIBILITY TEST ELECTRIC: CPT

## 2023-05-24 PROCEDURE — 99232 SBSQ HOSP IP/OBS MODERATE 35: CPT | Performed by: INTERNAL MEDICINE

## 2023-05-24 PROCEDURE — 250N000013 HC RX MED GY IP 250 OP 250 PS 637: Performed by: INTERNAL MEDICINE

## 2023-05-24 PROCEDURE — 36415 COLL VENOUS BLD VENIPUNCTURE: CPT | Performed by: ANESTHESIOLOGY

## 2023-05-24 PROCEDURE — 250N000009 HC RX 250: Performed by: INTERNAL MEDICINE

## 2023-05-24 PROCEDURE — 85018 HEMOGLOBIN: CPT | Performed by: INTERNAL MEDICINE

## 2023-05-24 PROCEDURE — 84295 ASSAY OF SERUM SODIUM: CPT | Performed by: INTERNAL MEDICINE

## 2023-05-24 PROCEDURE — 86923 COMPATIBILITY TEST ELECTRIC: CPT | Performed by: ANESTHESIOLOGY

## 2023-05-24 PROCEDURE — 250N000013 HC RX MED GY IP 250 OP 250 PS 637: Performed by: PHYSICIAN ASSISTANT

## 2023-05-24 PROCEDURE — 36415 COLL VENOUS BLD VENIPUNCTURE: CPT | Performed by: INTERNAL MEDICINE

## 2023-05-24 PROCEDURE — 99233 SBSQ HOSP IP/OBS HIGH 50: CPT | Performed by: STUDENT IN AN ORGANIZED HEALTH CARE EDUCATION/TRAINING PROGRAM

## 2023-05-24 PROCEDURE — 85014 HEMATOCRIT: CPT | Performed by: INTERNAL MEDICINE

## 2023-05-24 PROCEDURE — 120N000001 HC R&B MED SURG/OB

## 2023-05-24 PROCEDURE — 80069 RENAL FUNCTION PANEL: CPT | Performed by: INTERNAL MEDICINE

## 2023-05-24 PROCEDURE — 86901 BLOOD TYPING SEROLOGIC RH(D): CPT | Performed by: ANESTHESIOLOGY

## 2023-05-24 RX ADMIN — ESCITALOPRAM OXALATE 20 MG: 10 TABLET ORAL at 08:52

## 2023-05-24 RX ADMIN — TRAZODONE HYDROCHLORIDE 150 MG: 50 TABLET ORAL at 21:22

## 2023-05-24 RX ADMIN — METHOCARBAMOL 500 MG: 500 TABLET ORAL at 21:22

## 2023-05-24 RX ADMIN — BUSPIRONE HYDROCHLORIDE 15 MG: 5 TABLET ORAL at 08:53

## 2023-05-24 RX ADMIN — ACETAMINOPHEN 650 MG: 325 TABLET ORAL at 19:54

## 2023-05-24 RX ADMIN — OXYCODONE HYDROCHLORIDE AND ACETAMINOPHEN 500 MG: 500 TABLET ORAL at 08:52

## 2023-05-24 RX ADMIN — TAMSULOSIN HYDROCHLORIDE 0.4 MG: 0.4 CAPSULE ORAL at 09:07

## 2023-05-24 RX ADMIN — AMLODIPINE BESYLATE 5 MG: 5 TABLET ORAL at 08:53

## 2023-05-24 RX ADMIN — ZINC SULFATE 220 MG (50 MG) CAPSULE 220 MG: CAPSULE at 08:53

## 2023-05-24 RX ADMIN — OXYCODONE HYDROCHLORIDE 5 MG: 5 TABLET ORAL at 08:53

## 2023-05-24 RX ADMIN — LAMOTRIGINE 200 MG: 100 TABLET ORAL at 21:22

## 2023-05-24 RX ADMIN — OXYCODONE HYDROCHLORIDE 10 MG: 5 TABLET ORAL at 19:54

## 2023-05-24 RX ADMIN — METHOCARBAMOL 500 MG: 500 TABLET ORAL at 09:13

## 2023-05-24 RX ADMIN — TRANEXAMIC ACID 1 G: 10 INJECTION, SOLUTION INTRAVENOUS at 09:28

## 2023-05-24 RX ADMIN — METHOCARBAMOL 500 MG: 500 TABLET ORAL at 15:39

## 2023-05-24 RX ADMIN — Medication 50 MCG: at 08:53

## 2023-05-24 RX ADMIN — PANTOPRAZOLE SODIUM 40 MG: 40 TABLET, DELAYED RELEASE ORAL at 15:39

## 2023-05-24 RX ADMIN — PANTOPRAZOLE SODIUM 40 MG: 40 TABLET, DELAYED RELEASE ORAL at 06:39

## 2023-05-24 RX ADMIN — OXYCODONE HYDROCHLORIDE 5 MG: 5 TABLET ORAL at 15:39

## 2023-05-24 RX ADMIN — BUSPIRONE HYDROCHLORIDE 15 MG: 5 TABLET ORAL at 21:22

## 2023-05-24 ASSESSMENT — ACTIVITIES OF DAILY LIVING (ADL)
ADLS_ACUITY_SCORE: 40
ADLS_ACUITY_SCORE: 39
ADLS_ACUITY_SCORE: 40

## 2023-05-24 NOTE — PROVIDER NOTIFICATION
MD Notification    Notified Person: MD    Notified Person Name: Halle Lyon    Notification Date/Time: 5/23/23 1925    Notification Interaction: Pager    Purpose of Notification: FYI patient sodium level 120.     Orders Received:    Comments:

## 2023-05-24 NOTE — PROGRESS NOTES
Alert and oriented X4. Bedrest. Was running temp earlier on shift, managed with PRN tylenol. Left lag pain and sweelings managed with PRN oxy and atarax and elevation. Fluid restriction maintained took 240mL this shift. Patent pedro good out. Na lab still law slowly normaling, planned procedure moved to 2 o'clock, will be NPO from 6am. Some tremor on hand and sweaty was noted, denies any other symptom. Will cont to monitor.   None

## 2023-05-24 NOTE — PROGRESS NOTES
A&O x4. Tachy at times. Prieto w/ good output. Bedrest. Leg immobilizer in place. Pain managed with PRN oxycodone and IV dilaudid. NPO at midnight. Fluid restriction 1200mL.

## 2023-05-24 NOTE — PROGRESS NOTES
[facility]    Medicine Progress Note - Hospitalist Service    Date of Admission:  5/23/2023    Assessment & Plan   Suresh Powers is a 62 year old male admitted on 5/23/2023. He presents after a fall  Lives in [place], poor balance at baseline. Went out to have drinks, was intoxicated. Tried to use walker and lost balance. Denies n/v, was feeling well prior to fall. In ED AFVSS. Na 116. (130 in Feb)  CT head w/o acute process. Duration of hyponatremia not known.      Severe hyponatremia 116 on 05/23/23, due to SIADH  Chronic hyponatremia likely due to beer potomania    - Nephrology consult appreciated   - continue fluid restriction  - delay OR until 5/25      L distal femur fracture  Hx L femur fx 9/2022. XR knee in ED with oblique, comminuted and mildly displaced fx of L femoral metadiaphysis, doesn't extent to distal articular surface.   - orthopedic surgery consult  - prn analgesics  - bedrest for now           Alcohol intoxication  Etoh level in ED 0.23. states socially drinks but tonight was a special occasion, had 5-6 beers  - CIWA, but if above is true, low risk of withdrawal.     Hypertension   - resume amlodipine with rec     Anemia, chronic  Acute blood loss anemia  Hx GI bleed  GERD  2/2 duodenal ulcer. Admitted 11/2022 with hgb 3.8. Hgb on presentation 10.9.  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        Diet: Fluid restriction 1200 ML  "FLUID  Regular Diet Adult  NPO per Anesthesia Guidelines for Procedure/Surgery Except for: Meds    DVT Prophylaxis: Pneumatic Compression Devices  Prieto Catheter: PRESENT, indication: Strict 1-2 Hour I&O  Lines: None     Cardiac Monitoring: None  Code Status: Full Code      Clinically Significant Risk Factors         # Hyponatremia: Lowest Na = 116 mmol/L in last 2 days, will monitor as appropriate      # Hypoalbuminemia: Lowest albumin = 3.1 g/dL at 5/24/2023  6:03 AM, will monitor as appropriate     # Hypertension: Noted on problem list        # Obesity: Estimated body mass index is 31.32 kg/m  as calculated from the following:    Height as of this encounter: 1.702 m (5' 7\").    Weight as of this encounter: 90.7 kg (200 lb)., PRESENT ON ADMISSION          Disposition Plan      Expected Discharge Date: 05/25/2023                  Pietro Jefferson MD  Hospitalist Service  [facility]  Securely message with Sample[employer] (more info)  Text page via [employer] Paging/Directory   ______________________________________________________________________    Interval History   Feels well. Pain tolerable. On bed rest. No withdrawal. Significant bruising and tenderness of left leg.     Physical Exam   Vital Signs: Temp: 99.9  F (37.7  C) Temp src: Oral BP: (!) 136/95 Pulse: 113   Resp: 18 SpO2: 93 % O2 Device: None (Room air)    Weight: 200 lbs 0 oz    Constitutional: Awake, alert, cooperative, no apparent distress  Respiratory: Clear to auscultation bilaterally, no crackles or wheezing  Cardiovascular: Regular rate and rhythm, normal S1 and S2, and no murmur noted  GI: Normal bowel sounds, soft, non-distended, non-tender  Skin/Integumen: No rashes, no cyanosis, no edema  Other:       Medical Decision Making       MANAGEMENT DISCUSSED with the following over the past 24 hours: RN, Nephrology, ortho   NOTE(S)/MEDICAL RECORDS REVIEWED over the past 24 hours: H&P, ortho and neph consults, prior admissions  Tests " ORDERED & REVIEWED in the past 24 hours:  - BMP  - CBC  - urine labs  Tests personally interpreted in the past 24 hours:  - femur xr showing femur fx  Medical complexity over the past 24 hours:  - Parenteral (IV) CONTROLLED SUBSTANCES ordered      Data   ------------------------- PAST 24 HR DATA REVIEWED -----------------------------------------------    I have personally reviewed the following data over the past 24 hrs:    9.2  \   7.5 (L)   / 232     125 (L) 90 (L) 13.6 /  121 (H)   4.7 25 0.98 \       ALT: N/A AST: N/A AP: N/A TBILI: N/A   ALB: 3.1 (L) TOT PROTEIN: N/A LIPASE: N/A       Imaging results reviewed over the past 24 hrs:   No results found for this or any previous visit (from the past 24 hour(s)).

## 2023-05-24 NOTE — SIGNIFICANT EVENT
Significant Event Note      Description of event:  Na: 120    Plan:  Sodium is within goal. Continue to monitor every 6 hours.     Halle Lyon MD    Addendum:    Na: 120 @ 8PM.    Continue with the same plan as above.

## 2023-05-24 NOTE — PROGRESS NOTES
[facility]  Patient is currently receiving home care services from [facility]. The patient is currently receiving PT services.  and home health team have been notified of patient admission. [facility] liaison will continue to follow patient during stay. If appropriate provide orders to resume home care at time of discharge.

## 2023-05-24 NOTE — UTILIZATION REVIEW
Admission Status; Secondary Review Determination       Under the authority of the Utilization Management Committee, the utilization review process indicated a secondary review on the above patient. The review outcome is based on review of the medical records, discussions with staff, and applying clinical experience noted on the date of the review.     (x) Inpatient Status Appropriate - This patient's medical care is consistent with medical management for inpatient care and reasonable inpatient medical practice.     RATIONALE FOR DETERMINATION     Patient requires inpatient admission versus short stay observation or outpatient treatment for the following reasons:  62 year old male on eliquis for pulmonary emboli who was admitted on 5/22 after a fall while intoxicated. He had a sodium of 117. He has a distal femur fracture, for which he is awaiting surrgery, but it has been delayed due to his hyponatremia. Today, hospital day 3, he still needs to have operative intervention on his femur, and his Na is increased to 126. Nephrology is following and urine studies are c/w SIADH with background of possible beer potomania. Given high risk hyponatremia at risk for rapid correction, with osmotic demyelination complicated by femur fracture needing operative repair continue inpatient.     The expected length of stay at the time of admission was more than 2 nights because of the severity of illness, intensity of service provided, and risk for adverse outcome. Inpatient admission is appropriate.         This document was produced using voice recognition software       The information on this document is developed by the utilization review team in order for the business office to ensure compliance. This only denotes the appropriateness of proper admission status and does not reflect the quality of care rendered.   The definitions of Inpatient Status and Observation Status used in making the determination above are those provided  in the CMS Coverage Manual, Chapter 1 and Chapter 6, section 70.4.   Sincerely,   Evangelista Rouse DO  Physician Advisor  [facility].

## 2023-05-24 NOTE — PROGRESS NOTES
MD Notification    Notified Person: MD    Notified Person Name:Halle Lyon    Notification Date/Time:5/23/23 2131    Notification Interaction:Amicom    Purpose of Notification:Just FYI Na =120 from labs drawn at 8pm. Thanks    Orders Received:    Comments:

## 2023-05-24 NOTE — PROGRESS NOTES
Notified provider about indwelling vasquez catheter discussed removal or continued need.    Did provider choose to remove indwelling vasquez catheter? No  Provider's vasquez indication for keeping indwelling vasquez catheter:  Strict I&O there an order for indwelling vasquez catheter? Yes    *If there is a plan to keep vasquez catheter in place at discharge daily notification with provider is not necessary, but please add a notation in the treatment team sticky note that the patient will be discharging with the catheter.

## 2023-05-24 NOTE — PROGRESS NOTES
[facility]     Renal Progress Note       SHORTHAND KEY FOR MY NOTES:  c = with, s = without, p = after, a = before, x = except, asx = asymptomatic, tx = transplant or treatment, sx = symptoms or symptomatic, cx = canceled or culture, rxn = reaction, yday = yesterday, nl = normal, abx = antibiotics, fxn = function, dx = diagnosis, dz = disease, m/h = melena/hematochezia, c/d/l/ha = cramping/dizziness/lightheadedness/headache, d/c = discharge or diarrhea/constipation, f/c/n/v = fevers/chills/nausea/vomiting, cp/sob = chest pain/shortness of breath, tbv = total body volume, rxn = reaction, tdc = tunneled dialysis catheter, pta = prior to admission, hd = hemodialysis, pd = peritoneal dialysis, hhd = home hemodialysis, edw = estimated dry wt         Assessment/Plan:     1.  Severe hyponatremia.  Pt's Na is up to 125 p stopping the NS and tightening the fluid restriction.  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  He is currently asx.  A.  Continue same plan - 1200 ml fluid restriction.  B.  If Na stagnates, we will add UreNa.  C.  Manage pain.  D.  Continue psych meds for now.     2.  L distal femur fx.  Ortho has not seen the pt today.  Even though the Na is up to 125, it may be better if we wait another day for surgery to ensure that he does ok.    A.  Suggest deferring the surgery to tmrw to allow Na to be stable/better.  D/w Lola Melton.  B.  Pain mgmt prn.     3.  Anemia.  Pt's hb is lower today, likely from the fx.  Bruising in the foot looks stable.  A.  Repeat hb now.  B.  Transfuse prn.    4.  EtOH abuse.  Pt was drunk on arrival and had a similar MIKAYLA during a previous admission.  He drinks frequently but doesn't report a h/o withdrawal p his last hospitalization.  A.  Watch for signs of w/drawal.     5.  FEN.  Electrolytes are ok x low Na.  A.  Follow labs daily.  B.  Reg diet for now.   C.  NPO p MN.        Interval History:     Pt still has a lot of pain in his LLE.  He is not  "having any n/v/twitching/confusion.  He is hungry.  Good uo noted via vasquez.          Medications and Allergies:       amLODIPine  5 mg Oral Daily     busPIRone  15 mg Oral BID     escitalopram  20 mg Oral Daily     fluticasone  2 spray Both Nostrils Daily     lamoTRIgine  200 mg Oral At Bedtime     pantoprazole  40 mg Oral BID AC     sodium chloride (PF)  3 mL Intracatheter Q8H     tamsulosin  0.4 mg Oral Daily     tranexamic acid  1 g Intravenous See Admin Instructions     traZODone  150 mg Oral At Bedtime     vitamin C  500 mg Oral Daily     cholecalciferol  50 mcg Oral Daily     zinc sulfate  220 mg Oral Daily     Allergies   Allergen Reactions     Lisinopril Other (See Comments)     Elevated potassium     Sertraline Diarrhea          Physical Exam:     Vitals were reviewed     , Blood pressure (!) 136/95, pulse 113, temperature 99.9  F (37.7  C), temperature source Oral, resp. rate 18, height 1.702 m (5' 7\"), weight 90.7 kg (200 lb), SpO2 93 %.  Wt Readings from Last 3 Encounters:   05/22/23 90.7 kg (200 lb)   02/21/23 92.8 kg (204 lb 8 oz)   02/13/23 92.4 kg (203 lb 9.6 oz)     Intake/Output Summary (Last 24 hours) at 5/24/2023 1324  Last data filed at 5/23/2023 2156  Gross per 24 hour   Intake 120 ml   Output 1700 ml   Net -1580 ml     GENERAL APPEARANCE: pleasant, NAD, alert  RESP: CTA B c good efforts  CV: RRR, nl S1/S2   ABDOMEN: o/s/nt/nd  EXTREMITIES/SKIN: tender LLE, 1-2+ edema, bruised L foot  OTHER:  + vasquez c concentrated appearing urine         Data:     CBC RESULTS:     Recent Labs   Lab 05/24/23  0603 05/22/23  2328   WBC 9.2 10.8   RBC 3.10* 4.36*   HGB 7.8* 10.9*   HCT 23.8* 33.4*    313     Basic Metabolic Panel:  Recent Labs   Lab 05/24/23  1104 05/24/23  0603 05/24/23  0153 05/23/23  2342 05/23/23  2045 05/23/23  1758 05/23/23  1355 05/23/23  0826 05/23/23  0411 05/22/23  2328   * 124*  124*  --  121* 120* 120* 117*   < > 118* 116*   POTASSIUM  --  4.7  --   --   --   --   --   " --  4.6 4.5   CHLORIDE  --  90*  --   --   --   --   --   --  81* 79*   CO2  --  25  --   --   --   --   --   --  22 24   BUN  --  13.6  --   --   --   --   --   --  7.6* 8.3   CR  --  0.98  --   --   --   --   --   --  0.57* 0.62*   GLC  --  121* 149*  --   --   --   --   --  91 105*   NIMISHA  --  8.4*  --   --   --   --   --   --  8.3* 8.3*    < > = values in this interval not displayed.     INRNo lab results found in last 7 days.   Attestation:   I have reviewed today's relevant vital signs, notes, medications, labs and imaging.    Mono Khan MD  [facility] - Nephrology  [phone]

## 2023-05-24 NOTE — TREATMENT PLAN
Left distal femur fracture     Plan:  Surgery postponed until tomorrow per Dr Khan recommendation.   NPO midnight, Okay to eat today  Keep knee immobilizer in place  NWB/Bedrest   Hold Eliquis   Recheck Hgb today and tomorrow am.  If continues to decrease, will transfuse.

## 2023-05-25 ENCOUNTER — APPOINTMENT (OUTPATIENT)
Dept: GENERAL RADIOLOGY | Facility: CLINIC | Age: 63
DRG: 481 | End: 2023-05-25
Payer: COMMERCIAL

## 2023-05-25 ENCOUNTER — APPOINTMENT (OUTPATIENT)
Dept: GENERAL RADIOLOGY | Facility: CLINIC | Age: 63
DRG: 481 | End: 2023-05-25
Attending: STUDENT IN AN ORGANIZED HEALTH CARE EDUCATION/TRAINING PROGRAM
Payer: COMMERCIAL

## 2023-05-25 LAB
ANION GAP SERPL CALCULATED.3IONS-SCNC: 10 MMOL/L (ref 7–15)
BLD PROD TYP BPU: NORMAL
BLOOD COMPONENT TYPE: NORMAL
BUN SERPL-MCNC: 22 MG/DL (ref 8–23)
CALCIUM SERPL-MCNC: 8.7 MG/DL (ref 8.8–10.2)
CHLORIDE SERPL-SCNC: 97 MMOL/L (ref 98–107)
CODING SYSTEM: NORMAL
CREAT SERPL-MCNC: 1.06 MG/DL (ref 0.67–1.17)
CROSSMATCH: NORMAL
DEPRECATED HCO3 PLAS-SCNC: 24 MMOL/L (ref 22–29)
ERYTHROCYTE [DISTWIDTH] IN BLOOD BY AUTOMATED COUNT: 19.5 % (ref 10–15)
GFR SERPL CREATININE-BSD FRML MDRD: 79 ML/MIN/1.73M2
GLUCOSE BLDC GLUCOMTR-MCNC: 165 MG/DL (ref 70–99)
GLUCOSE SERPL-MCNC: 152 MG/DL (ref 70–99)
HCT VFR BLD AUTO: 22.7 % (ref 40–53)
HGB BLD-MCNC: 7.3 G/DL (ref 13.3–17.7)
ISSUE DATE AND TIME: NORMAL
MCH RBC QN AUTO: 25.6 PG (ref 26.5–33)
MCHC RBC AUTO-ENTMCNC: 32.2 G/DL (ref 31.5–36.5)
MCV RBC AUTO: 80 FL (ref 78–100)
PLATELET # BLD AUTO: 250 10E3/UL (ref 150–450)
POTASSIUM SERPL-SCNC: 5 MMOL/L (ref 3.4–5.3)
RBC # BLD AUTO: 2.85 10E6/UL (ref 4.4–5.9)
SODIUM SERPL-SCNC: 128 MMOL/L (ref 136–145)
SODIUM SERPL-SCNC: 131 MMOL/L (ref 136–145)
SODIUM SERPL-SCNC: 131 MMOL/L (ref 136–145)
UNIT ABO/RH: NORMAL
UNIT NUMBER: NORMAL
UNIT STATUS: NORMAL
UNIT TYPE ISBT: 5100
WBC # BLD AUTO: 10.9 10E3/UL (ref 4–11)

## 2023-05-25 PROCEDURE — 85027 COMPLETE CBC AUTOMATED: CPT | Performed by: STUDENT IN AN ORGANIZED HEALTH CARE EDUCATION/TRAINING PROGRAM

## 2023-05-25 PROCEDURE — 250N000011 HC RX IP 250 OP 636: Performed by: ANESTHESIOLOGY

## 2023-05-25 PROCEDURE — P9016 RBC LEUKOCYTES REDUCED: HCPCS | Performed by: ANESTHESIOLOGY

## 2023-05-25 PROCEDURE — 999N000065 XR FEMUR PORT LEFT 2 VIEWS

## 2023-05-25 PROCEDURE — 250N000009 HC RX 250: Performed by: PHYSICIAN ASSISTANT

## 2023-05-25 PROCEDURE — 80051 ELECTROLYTE PANEL: CPT | Performed by: INTERNAL MEDICINE

## 2023-05-25 PROCEDURE — 250N000013 HC RX MED GY IP 250 OP 250 PS 637: Performed by: INTERNAL MEDICINE

## 2023-05-25 PROCEDURE — 999N000179 XR SURGERY CARM FLUORO LESS THAN 5 MIN W STILLS

## 2023-05-25 PROCEDURE — 0QS904Z REPOSITION LEFT FEMORAL SHAFT WITH INTERNAL FIXATION DEVICE, OPEN APPROACH: ICD-10-PCS | Performed by: STUDENT IN AN ORGANIZED HEALTH CARE EDUCATION/TRAINING PROGRAM

## 2023-05-25 PROCEDURE — 272N000001 HC OR GENERAL SUPPLY STERILE: Performed by: STUDENT IN AN ORGANIZED HEALTH CARE EDUCATION/TRAINING PROGRAM

## 2023-05-25 PROCEDURE — C1769 GUIDE WIRE: HCPCS | Performed by: STUDENT IN AN ORGANIZED HEALTH CARE EDUCATION/TRAINING PROGRAM

## 2023-05-25 PROCEDURE — 250N000025 HC SEVOFLURANE, PER MIN: Performed by: STUDENT IN AN ORGANIZED HEALTH CARE EDUCATION/TRAINING PROGRAM

## 2023-05-25 PROCEDURE — 370N000017 HC ANESTHESIA TECHNICAL FEE, PER MIN: Performed by: STUDENT IN AN ORGANIZED HEALTH CARE EDUCATION/TRAINING PROGRAM

## 2023-05-25 PROCEDURE — 250N000011 HC RX IP 250 OP 636: Performed by: NURSE ANESTHETIST, CERTIFIED REGISTERED

## 2023-05-25 PROCEDURE — 250N000009 HC RX 250: Performed by: STUDENT IN AN ORGANIZED HEALTH CARE EDUCATION/TRAINING PROGRAM

## 2023-05-25 PROCEDURE — 250N000013 HC RX MED GY IP 250 OP 250 PS 637: Performed by: PHYSICIAN ASSISTANT

## 2023-05-25 PROCEDURE — 36415 COLL VENOUS BLD VENIPUNCTURE: CPT | Performed by: STUDENT IN AN ORGANIZED HEALTH CARE EDUCATION/TRAINING PROGRAM

## 2023-05-25 PROCEDURE — 250N000009 HC RX 250: Performed by: NURSE ANESTHETIST, CERTIFIED REGISTERED

## 2023-05-25 PROCEDURE — 258N000003 HC RX IP 258 OP 636: Performed by: ANESTHESIOLOGY

## 2023-05-25 PROCEDURE — 999N000141 HC STATISTIC PRE-PROCEDURE NURSING ASSESSMENT: Performed by: STUDENT IN AN ORGANIZED HEALTH CARE EDUCATION/TRAINING PROGRAM

## 2023-05-25 PROCEDURE — 87077 CULTURE AEROBIC IDENTIFY: CPT | Performed by: STUDENT IN AN ORGANIZED HEALTH CARE EDUCATION/TRAINING PROGRAM

## 2023-05-25 PROCEDURE — 710N000009 HC RECOVERY PHASE 1, LEVEL 1, PER MIN: Performed by: STUDENT IN AN ORGANIZED HEALTH CARE EDUCATION/TRAINING PROGRAM

## 2023-05-25 PROCEDURE — 87149 DNA/RNA DIRECT PROBE: CPT | Performed by: STUDENT IN AN ORGANIZED HEALTH CARE EDUCATION/TRAINING PROGRAM

## 2023-05-25 PROCEDURE — 250N000011 HC RX IP 250 OP 636

## 2023-05-25 PROCEDURE — 36415 COLL VENOUS BLD VENIPUNCTURE: CPT | Performed by: INTERNAL MEDICINE

## 2023-05-25 PROCEDURE — C1713 ANCHOR/SCREW BN/BN,TIS/BN: HCPCS | Performed by: STUDENT IN AN ORGANIZED HEALTH CARE EDUCATION/TRAINING PROGRAM

## 2023-05-25 PROCEDURE — 120N000001 HC R&B MED SURG/OB

## 2023-05-25 PROCEDURE — 73552 X-RAY EXAM OF FEMUR 2/>: CPT | Mod: LT

## 2023-05-25 PROCEDURE — 250N000009 HC RX 250: Performed by: ANESTHESIOLOGY

## 2023-05-25 PROCEDURE — 258N000003 HC RX IP 258 OP 636: Performed by: NURSE ANESTHETIST, CERTIFIED REGISTERED

## 2023-05-25 PROCEDURE — 250N000011 HC RX IP 250 OP 636: Performed by: PHYSICIAN ASSISTANT

## 2023-05-25 PROCEDURE — 250N000013 HC RX MED GY IP 250 OP 250 PS 637

## 2023-05-25 PROCEDURE — 360N000084 HC SURGERY LEVEL 4 W/ FLUORO, PER MIN: Performed by: STUDENT IN AN ORGANIZED HEALTH CARE EDUCATION/TRAINING PROGRAM

## 2023-05-25 DEVICE — IMP SCR SYN OPTILINK VA ST T25 5.0X80MM 42.231.280: Type: IMPLANTABLE DEVICE | Site: LEG | Status: FUNCTIONAL

## 2023-05-25 DEVICE — IMP SCR SYN OPTILINK LOK VA ST 5.0X42MM 42.231.242: Type: IMPLANTABLE DEVICE | Site: LEG | Status: FUNCTIONAL

## 2023-05-25 DEVICE — IMP SCR SYN OPTILINK VA ST T25 5.0X85MM 42.231.285: Type: IMPLANTABLE DEVICE | Site: LEG | Status: FUNCTIONAL

## 2023-05-25 DEVICE — 4.5MM TI VA-LCP CRVD CONDYLAR PLATE/6H/159MM/LT-STER: Type: IMPLANTABLE DEVICE | Site: LEG | Status: FUNCTIONAL

## 2023-05-25 DEVICE — IMP SCR SYN CORTEX 4.5X38MM ST TI 414.838: Type: IMPLANTABLE DEVICE | Site: LEG | Status: FUNCTIONAL

## 2023-05-25 DEVICE — IMP SCR SYN OPTILINK LOK VA ST 5.0X40MM 42.231.240: Type: IMPLANTABLE DEVICE | Site: LEG | Status: FUNCTIONAL

## 2023-05-25 RX ORDER — DEXAMETHASONE SODIUM PHOSPHATE 4 MG/ML
INJECTION, SOLUTION INTRA-ARTICULAR; INTRALESIONAL; INTRAMUSCULAR; INTRAVENOUS; SOFT TISSUE PRN
Status: DISCONTINUED | OUTPATIENT
Start: 2023-05-25 | End: 2023-05-25

## 2023-05-25 RX ORDER — HYDROMORPHONE HCL IN WATER/PF 6 MG/30 ML
0.2 PATIENT CONTROLLED ANALGESIA SYRINGE INTRAVENOUS EVERY 5 MIN PRN
Status: DISCONTINUED | OUTPATIENT
Start: 2023-05-25 | End: 2023-05-25 | Stop reason: HOSPADM

## 2023-05-25 RX ORDER — SODIUM CHLORIDE 9 MG/ML
INJECTION, SOLUTION INTRAVENOUS CONTINUOUS PRN
Status: DISCONTINUED | OUTPATIENT
Start: 2023-05-25 | End: 2023-05-25

## 2023-05-25 RX ORDER — BISACODYL 10 MG
10 SUPPOSITORY, RECTAL RECTAL DAILY PRN
Status: DISCONTINUED | OUTPATIENT
Start: 2023-05-25 | End: 2023-05-29 | Stop reason: HOSPADM

## 2023-05-25 RX ORDER — FENTANYL CITRATE 0.05 MG/ML
25 INJECTION, SOLUTION INTRAMUSCULAR; INTRAVENOUS EVERY 5 MIN PRN
Status: DISCONTINUED | OUTPATIENT
Start: 2023-05-25 | End: 2023-05-25 | Stop reason: HOSPADM

## 2023-05-25 RX ORDER — POLYETHYLENE GLYCOL 3350 17 G/17G
17 POWDER, FOR SOLUTION ORAL DAILY
Status: DISCONTINUED | OUTPATIENT
Start: 2023-05-26 | End: 2023-05-29 | Stop reason: HOSPADM

## 2023-05-25 RX ORDER — PROPOFOL 10 MG/ML
INJECTION, EMULSION INTRAVENOUS PRN
Status: DISCONTINUED | OUTPATIENT
Start: 2023-05-25 | End: 2023-05-25

## 2023-05-25 RX ORDER — ACETAMINOPHEN 325 MG/1
975 TABLET ORAL EVERY 8 HOURS
Status: COMPLETED | OUTPATIENT
Start: 2023-05-25 | End: 2023-05-28

## 2023-05-25 RX ORDER — LIDOCAINE 40 MG/G
CREAM TOPICAL
Status: DISCONTINUED | OUTPATIENT
Start: 2023-05-25 | End: 2023-05-29 | Stop reason: HOSPADM

## 2023-05-25 RX ORDER — MAGNESIUM HYDROXIDE 1200 MG/15ML
LIQUID ORAL PRN
Status: DISCONTINUED | OUTPATIENT
Start: 2023-05-25 | End: 2023-05-25 | Stop reason: HOSPADM

## 2023-05-25 RX ORDER — OXYCODONE HYDROCHLORIDE 5 MG/1
10 TABLET ORAL EVERY 4 HOURS PRN
Status: DISCONTINUED | OUTPATIENT
Start: 2023-05-25 | End: 2023-05-29 | Stop reason: HOSPADM

## 2023-05-25 RX ORDER — CEFAZOLIN SODIUM/WATER 2 G/20 ML
2 SYRINGE (ML) INTRAVENOUS SEE ADMIN INSTRUCTIONS
Status: DISCONTINUED | OUTPATIENT
Start: 2023-05-25 | End: 2023-05-25 | Stop reason: HOSPADM

## 2023-05-25 RX ORDER — LIDOCAINE HYDROCHLORIDE 20 MG/ML
INJECTION, SOLUTION INFILTRATION; PERINEURAL PRN
Status: DISCONTINUED | OUTPATIENT
Start: 2023-05-25 | End: 2023-05-25

## 2023-05-25 RX ORDER — HYDROMORPHONE HCL IN WATER/PF 6 MG/30 ML
0.2 PATIENT CONTROLLED ANALGESIA SYRINGE INTRAVENOUS
Status: DISCONTINUED | OUTPATIENT
Start: 2023-05-25 | End: 2023-05-29 | Stop reason: HOSPADM

## 2023-05-25 RX ORDER — HYDROXYZINE HYDROCHLORIDE 25 MG/1
25 TABLET, FILM COATED ORAL EVERY 6 HOURS PRN
Status: DISCONTINUED | OUTPATIENT
Start: 2023-05-25 | End: 2023-05-29 | Stop reason: HOSPADM

## 2023-05-25 RX ORDER — CEFAZOLIN SODIUM 1 G/3ML
1 INJECTION, POWDER, FOR SOLUTION INTRAMUSCULAR; INTRAVENOUS EVERY 8 HOURS
Status: DISCONTINUED | OUTPATIENT
Start: 2023-05-25 | End: 2023-05-26

## 2023-05-25 RX ORDER — SODIUM CHLORIDE, SODIUM LACTATE, POTASSIUM CHLORIDE, CALCIUM CHLORIDE 600; 310; 30; 20 MG/100ML; MG/100ML; MG/100ML; MG/100ML
INJECTION, SOLUTION INTRAVENOUS CONTINUOUS
Status: DISCONTINUED | OUTPATIENT
Start: 2023-05-25 | End: 2023-05-29 | Stop reason: ALTCHOICE

## 2023-05-25 RX ORDER — HYDROMORPHONE HCL IN WATER/PF 6 MG/30 ML
0.4 PATIENT CONTROLLED ANALGESIA SYRINGE INTRAVENOUS EVERY 5 MIN PRN
Status: DISCONTINUED | OUTPATIENT
Start: 2023-05-25 | End: 2023-05-25 | Stop reason: HOSPADM

## 2023-05-25 RX ORDER — TRANEXAMIC ACID 10 MG/ML
1 INJECTION, SOLUTION INTRAVENOUS ONCE
Status: COMPLETED | OUTPATIENT
Start: 2023-05-25 | End: 2023-05-25

## 2023-05-25 RX ORDER — ONDANSETRON 4 MG/1
4 TABLET, ORALLY DISINTEGRATING ORAL EVERY 30 MIN PRN
Status: DISCONTINUED | OUTPATIENT
Start: 2023-05-25 | End: 2023-05-25 | Stop reason: HOSPADM

## 2023-05-25 RX ORDER — CEFAZOLIN SODIUM 2 G/100ML
2 INJECTION, SOLUTION INTRAVENOUS EVERY 8 HOURS
Status: DISCONTINUED | OUTPATIENT
Start: 2023-05-25 | End: 2023-05-25 | Stop reason: ALTCHOICE

## 2023-05-25 RX ORDER — HYDROMORPHONE HCL IN WATER/PF 6 MG/30 ML
0.4 PATIENT CONTROLLED ANALGESIA SYRINGE INTRAVENOUS
Status: DISCONTINUED | OUTPATIENT
Start: 2023-05-25 | End: 2023-05-29 | Stop reason: HOSPADM

## 2023-05-25 RX ORDER — OXYCODONE HYDROCHLORIDE 5 MG/1
5 TABLET ORAL EVERY 4 HOURS PRN
Status: DISCONTINUED | OUTPATIENT
Start: 2023-05-25 | End: 2023-05-29 | Stop reason: HOSPADM

## 2023-05-25 RX ORDER — SODIUM CHLORIDE, SODIUM LACTATE, POTASSIUM CHLORIDE, CALCIUM CHLORIDE 600; 310; 30; 20 MG/100ML; MG/100ML; MG/100ML; MG/100ML
INJECTION, SOLUTION INTRAVENOUS CONTINUOUS
Status: DISCONTINUED | OUTPATIENT
Start: 2023-05-25 | End: 2023-05-25 | Stop reason: HOSPADM

## 2023-05-25 RX ORDER — HALOPERIDOL 5 MG/ML
1 INJECTION INTRAMUSCULAR
Status: DISCONTINUED | OUTPATIENT
Start: 2023-05-25 | End: 2023-05-25 | Stop reason: HOSPADM

## 2023-05-25 RX ORDER — FENTANYL CITRATE 50 UG/ML
INJECTION, SOLUTION INTRAMUSCULAR; INTRAVENOUS PRN
Status: DISCONTINUED | OUTPATIENT
Start: 2023-05-25 | End: 2023-05-25

## 2023-05-25 RX ORDER — ONDANSETRON 2 MG/ML
4 INJECTION INTRAMUSCULAR; INTRAVENOUS EVERY 30 MIN PRN
Status: DISCONTINUED | OUTPATIENT
Start: 2023-05-25 | End: 2023-05-25 | Stop reason: HOSPADM

## 2023-05-25 RX ORDER — VASOPRESSIN IN 0.9 % NACL 2 UNIT/2ML
SYRINGE (ML) INTRAVENOUS PRN
Status: DISCONTINUED | OUTPATIENT
Start: 2023-05-25 | End: 2023-05-25

## 2023-05-25 RX ORDER — CEFAZOLIN SODIUM/WATER 2 G/20 ML
2 SYRINGE (ML) INTRAVENOUS
Status: COMPLETED | OUTPATIENT
Start: 2023-05-25 | End: 2023-05-25

## 2023-05-25 RX ORDER — ONDANSETRON 4 MG/1
4 TABLET, ORALLY DISINTEGRATING ORAL EVERY 6 HOURS PRN
Status: DISCONTINUED | OUTPATIENT
Start: 2023-05-25 | End: 2023-05-29 | Stop reason: HOSPADM

## 2023-05-25 RX ORDER — AMOXICILLIN 250 MG
1 CAPSULE ORAL 2 TIMES DAILY
Status: DISCONTINUED | OUTPATIENT
Start: 2023-05-25 | End: 2023-05-29 | Stop reason: HOSPADM

## 2023-05-25 RX ORDER — ONDANSETRON 2 MG/ML
4 INJECTION INTRAMUSCULAR; INTRAVENOUS EVERY 6 HOURS PRN
Status: DISCONTINUED | OUTPATIENT
Start: 2023-05-25 | End: 2023-05-29 | Stop reason: HOSPADM

## 2023-05-25 RX ORDER — ONDANSETRON 2 MG/ML
INJECTION INTRAMUSCULAR; INTRAVENOUS PRN
Status: DISCONTINUED | OUTPATIENT
Start: 2023-05-25 | End: 2023-05-25

## 2023-05-25 RX ORDER — PROCHLORPERAZINE MALEATE 5 MG
10 TABLET ORAL EVERY 6 HOURS PRN
Status: DISCONTINUED | OUTPATIENT
Start: 2023-05-25 | End: 2023-05-29 | Stop reason: HOSPADM

## 2023-05-25 RX ORDER — ACETAMINOPHEN 325 MG/1
650 TABLET ORAL EVERY 4 HOURS PRN
Status: DISCONTINUED | OUTPATIENT
Start: 2023-05-28 | End: 2023-05-29 | Stop reason: HOSPADM

## 2023-05-25 RX ORDER — FENTANYL CITRATE 0.05 MG/ML
50 INJECTION, SOLUTION INTRAMUSCULAR; INTRAVENOUS EVERY 5 MIN PRN
Status: DISCONTINUED | OUTPATIENT
Start: 2023-05-25 | End: 2023-05-25 | Stop reason: HOSPADM

## 2023-05-25 RX ADMIN — Medication 50 MCG: at 08:08

## 2023-05-25 RX ADMIN — ESCITALOPRAM OXALATE 20 MG: 10 TABLET ORAL at 08:06

## 2023-05-25 RX ADMIN — ACETAMINOPHEN 650 MG: 325 TABLET ORAL at 08:14

## 2023-05-25 RX ADMIN — ONDANSETRON 4 MG: 2 INJECTION INTRAMUSCULAR; INTRAVENOUS at 15:33

## 2023-05-25 RX ADMIN — OXYCODONE HYDROCHLORIDE AND ACETAMINOPHEN 500 MG: 500 TABLET ORAL at 08:07

## 2023-05-25 RX ADMIN — PHENYLEPHRINE HYDROCHLORIDE 150 MCG: 10 INJECTION INTRAVENOUS at 14:08

## 2023-05-25 RX ADMIN — Medication 2 G: at 13:52

## 2023-05-25 RX ADMIN — Medication 1 UNITS: at 15:19

## 2023-05-25 RX ADMIN — Medication 20 ML: at 13:11

## 2023-05-25 RX ADMIN — LIDOCAINE HYDROCHLORIDE 100 MG: 20 INJECTION, SOLUTION INFILTRATION; PERINEURAL at 14:00

## 2023-05-25 RX ADMIN — SENNOSIDES AND DOCUSATE SODIUM 1 TABLET: 50; 8.6 TABLET ORAL at 22:59

## 2023-05-25 RX ADMIN — ROCURONIUM BROMIDE 50 MG: 50 INJECTION, SOLUTION INTRAVENOUS at 14:00

## 2023-05-25 RX ADMIN — BUSPIRONE HYDROCHLORIDE 15 MG: 5 TABLET ORAL at 08:09

## 2023-05-25 RX ADMIN — TRAZODONE HYDROCHLORIDE 150 MG: 50 TABLET ORAL at 22:59

## 2023-05-25 RX ADMIN — SODIUM CHLORIDE, POTASSIUM CHLORIDE, SODIUM LACTATE AND CALCIUM CHLORIDE: 600; 310; 30; 20 INJECTION, SOLUTION INTRAVENOUS at 17:14

## 2023-05-25 RX ADMIN — LAMOTRIGINE 200 MG: 100 TABLET ORAL at 23:00

## 2023-05-25 RX ADMIN — FENTANYL CITRATE 25 MCG: 50 INJECTION, SOLUTION INTRAMUSCULAR; INTRAVENOUS at 16:50

## 2023-05-25 RX ADMIN — ACETAMINOPHEN 975 MG: 325 TABLET ORAL at 22:59

## 2023-05-25 RX ADMIN — AMLODIPINE BESYLATE 5 MG: 5 TABLET ORAL at 08:06

## 2023-05-25 RX ADMIN — PHENYLEPHRINE HYDROCHLORIDE 200 MCG: 10 INJECTION INTRAVENOUS at 15:01

## 2023-05-25 RX ADMIN — OXYCODONE HYDROCHLORIDE 10 MG: 5 TABLET ORAL at 08:09

## 2023-05-25 RX ADMIN — TRANEXAMIC ACID 1 G: 10 INJECTION, SOLUTION INTRAVENOUS at 14:22

## 2023-05-25 RX ADMIN — PANTOPRAZOLE SODIUM 40 MG: 40 TABLET, DELAYED RELEASE ORAL at 08:08

## 2023-05-25 RX ADMIN — TAMSULOSIN HYDROCHLORIDE 0.4 MG: 0.4 CAPSULE ORAL at 08:07

## 2023-05-25 RX ADMIN — OXYCODONE HYDROCHLORIDE 10 MG: 5 TABLET ORAL at 23:46

## 2023-05-25 RX ADMIN — SODIUM CHLORIDE, POTASSIUM CHLORIDE, SODIUM LACTATE AND CALCIUM CHLORIDE: 600; 310; 30; 20 INJECTION, SOLUTION INTRAVENOUS at 11:42

## 2023-05-25 RX ADMIN — FENTANYL CITRATE 50 MCG: 50 INJECTION, SOLUTION INTRAMUSCULAR; INTRAVENOUS at 14:00

## 2023-05-25 RX ADMIN — PHENYLEPHRINE HYDROCHLORIDE 150 MCG: 10 INJECTION INTRAVENOUS at 14:07

## 2023-05-25 RX ADMIN — METHOCARBAMOL 500 MG: 500 TABLET ORAL at 10:36

## 2023-05-25 RX ADMIN — DEXAMETHASONE SODIUM PHOSPHATE 8 MG: 4 INJECTION, SOLUTION INTRA-ARTICULAR; INTRALESIONAL; INTRAMUSCULAR; INTRAVENOUS; SOFT TISSUE at 14:29

## 2023-05-25 RX ADMIN — BUSPIRONE HYDROCHLORIDE 15 MG: 5 TABLET ORAL at 23:00

## 2023-05-25 RX ADMIN — ZINC SULFATE 220 MG (50 MG) CAPSULE 220 MG: CAPSULE at 08:08

## 2023-05-25 RX ADMIN — Medication 1 UNITS: at 15:30

## 2023-05-25 RX ADMIN — FENTANYL CITRATE 50 MCG: 50 INJECTION, SOLUTION INTRAMUSCULAR; INTRAVENOUS at 15:13

## 2023-05-25 RX ADMIN — PHENYLEPHRINE HYDROCHLORIDE 0.5 MCG/KG/MIN: 10 INJECTION INTRAVENOUS at 14:10

## 2023-05-25 RX ADMIN — MIDAZOLAM 1 MG: 1 INJECTION INTRAMUSCULAR; INTRAVENOUS at 13:52

## 2023-05-25 RX ADMIN — SODIUM CHLORIDE: 9 INJECTION, SOLUTION INTRAVENOUS at 14:18

## 2023-05-25 RX ADMIN — PROPOFOL 170 MG: 10 INJECTION, EMULSION INTRAVENOUS at 14:00

## 2023-05-25 RX ADMIN — SUGAMMADEX 200 MG: 100 INJECTION, SOLUTION INTRAVENOUS at 15:58

## 2023-05-25 RX ADMIN — CEFAZOLIN 1 G: 1 INJECTION, POWDER, FOR SOLUTION INTRAMUSCULAR; INTRAVENOUS at 23:01

## 2023-05-25 RX ADMIN — PHENYLEPHRINE HYDROCHLORIDE 200 MCG: 10 INJECTION INTRAVENOUS at 14:10

## 2023-05-25 RX ADMIN — TRANEXAMIC ACID 1 G: 10 INJECTION, SOLUTION INTRAVENOUS at 15:30

## 2023-05-25 RX ADMIN — MIDAZOLAM 2 MG: 1 INJECTION INTRAMUSCULAR; INTRAVENOUS at 13:06

## 2023-05-25 ASSESSMENT — ACTIVITIES OF DAILY LIVING (ADL)
ADLS_ACUITY_SCORE: 39

## 2023-05-25 ASSESSMENT — ENCOUNTER SYMPTOMS
DYSRHYTHMIAS: 0
SEIZURES: 0

## 2023-05-25 ASSESSMENT — LIFESTYLE VARIABLES: TOBACCO_USE: 0

## 2023-05-25 NOTE — PROVIDER NOTIFICATION
Notified provider about indwelling vasquez catheter discussed removal or continued need.    Did provider choose to remove indwelling vasquez catheter? No    Provider's vasquez indication for keeping indwelling vasquez catheter: Retention.    Is there an order for indwelling vasquez catheter? Yes    *Pt having surgery today. Plan to address post op.

## 2023-05-25 NOTE — PROVIDER NOTIFICATION
Unable to see patient today. Taken to OR before able to be seen. Hospitalist team will continue to manage and remain primary post-operatively.    Paged today that left AC IV site appeared infected. After removal of PIV, purulence was noted to drain from site. Will order ancef to continue post-operatively and our team will assess 5/26.     Will need to assess vasquez need post-operatively.    Pietro Jefferson MD

## 2023-05-25 NOTE — OP NOTE
ORTHOPEDIC SURGERY  OPERATIVE NOTE    Suresh Powers  [id number]  1960    May 25, 2023      PREOPERATIVE DIAGNOSIS:    1. Left distal femur fracture  2. Hx of Left proximal femur fracture s/p ORIF with IM nail    POSTOPERATIVE DIAGNOSIS:   Same    PROCEDURE:    1. Left distal femur fracture open reduction, internal fixation with locking distal femur plate    SURGEON:  Antoine Pompa MD    ASSISTANT: Kiley Beyer PA-C; A skilled first assistant was necessary for this procedure for assistance with patient positioning, prepping, draping, surgical visualization, wound closure, and application of the dressing.     SPECIMENS:  None     COMPLICATIONS:  None    ESTIMATED BLOOD LOSS: 150cc    IMPLANTS:   Implant Name Type Inv. Item Serial No.  Lot No. LRB No. Used Action   IMP PLATE SYN LCP CONDYLAR 4.5MM VA CVD TI 6H LT - [id number] Metallic Hardware/[place] IMP PLATE SYN LCP CONDYLAR 4.5MM VA CVD TI 6H LT  SYN[employer]ATEC [id number] Left 1 Implanted   IMP SCR SYN CORTEX 4.5X38MM ST .838 - [id number] Metallic Hardware/[place] IMP SCR SYN CORTEX 4.5X38MM ST .838  [employer]ATEC 42 05 [id number] Left 1 Implanted   IMP SCR SYN OPTILINK ERIK VA ST 5.0X42MM 42.231.242 - [id number] Metallic Hardware/[place] IMP SCR SYN OPTILINK ERIK VA ST 5.0X42MM 42.231.242  SY[employer] 42 05 [id number] Left 1 Implanted   IMP SCR SYN OPTILINK ERIK VA ST 5.0X40MM 42.231.240 - [id number] Metallic Hardware/[place] IMP SCR SYN OPTILINK ERIK VA ST 5.0X40MM 42.231.240  SYNTHES[employer]STRATEC 42 05 [id number] Left 2 Implanted   IMP SCR SYN OPTILINK VA ST T25 5.0X80MM 42.231.280 - [id number] Metallic Hardware/[place] IMP SCR SYN OPTILINK VA ST T25 5.0X80MM 42.231.280  SY[employer]EC 42 05 [id number] Left 2 Implanted   IMP SCR SYN OPTILINK VA ST T25 5.0X85MM 42.231.285 - [id number] Metallic Hardware/[place] IMP SCR SYN OPTILINK VA ST T25 5.0X85MM 42.231.285  SY[employer]ES[employer]ATEC 42 05 [id number] Left 3 Implanted       INDICATIONS:    Suresh D  Priya is a 62 year old with a history of alcohol intoxication, hypertension, GI bleed, PE on Eliquis, solitary kidney, who presents with a left distal femur fracture after a fall.  Patient was drinking when he got up from his chair to use a walker and fell.  He was brought to [facility] emergency department where radiographs revealed a distal femur fracture.  He was also noted to have an alcohol level of 0.23 and hyponatremic with a sodium of 118.    He previously underwent a left intertrochanteric femur fracture ORIF with a short cephalomedullary nail by Dr. Cochran on November 4, 2022.  He has been at an assisted living facility due to recurrent balance issues and falls.  He currently utilizes a walker for ambulation.  He denies any pain prior to his fall.     Radiographs and CT scan of the left femur were reviewed.  Imaging reveals a left distal femoral metaphyseal fracture.  No obvious intra-articular comminution.  There is a left short cephalomedullary nail in place.  No acute fractures noted on foot x-rays.  There appears to be an old chronic appearing calcaneus fracture.    His initial surgery was delayed due to hyponatremia which had to be slowly corrected. It was 131 this morning. He was also given 2 Units of blood pre-operatively for symptomatic anemia (hgb 7.3).      We discussed potential treatment options including nonoperative and operative intervention along with the risks and benefits and expected recovery.  I recommended ORIF of his left distal femur to allow for early mobilization and return to function as well as improve the alignment of his fracture.  Nonoperative options were discussed but not recommended.  After thorough discussion, he was in agreement elected to proceed with surgery.     We discussed risks related to surgery including infection, bleeding, neurovascular injury, fracture, malunion, nonunion, hardware failure, wound issues, need for further surgery, prolonged rehab, pain,  stiffness, anesthesia risk, blood clots, and mortality.      Alternatives including nonoperative management were discussed, but not recommended.      The patient  was in agreement with the plan to proceed.  The informed consent was signed and documented.  Met with the patient preoperatively to yoselin the operative extremity.    OPERATIVE COURSE:    The patient was brought into the operating room and placed on operating table.  The patient underwent general anesthesia.  The patient was positioned in a supine position on hip bump and radiolucent table.  All bony prominences were well-padded.  The operative extremity was cleansed with Hibiclens. The operative extremity was then prepped with ChloraPrep.  The surgical team scrubbed in.  A WHO timeout was conducted to verify correct patient, correct extremity, presence of the surgeon's initials on the operative extremity, and administration of IV antibiotics, in this case Ancef.      The patient's bony anatomy was outlined.  A longitudinal incision was made over the distal aspect of the femur towards the joint line.  We dissected through fascia sharply. Hemostasis was obtained with cautery.  The vastus lateralis was elevated off the femur and fascia minimally to avoid devitalizing the fracture site.       The fracture site was identified.  The fracture was noted to have a transverse fracture along the metaphysis.  The fracture was reduced with inline traction along with a bump placed along the fracture to prevent deformity.  Fluoroscopy was utilized to confirm good reduction of the fracture.     We then selected an appropriate length lateral distal femoral locking plate and slid the plate along the lateral aspect of the femur.  A Synthes 6 hole plate was utilized.  Fluoroscopy was then utilized to confirm good position of the plate at the knee and approximately.  Both AP and lateral views were obtained.  The plate was then held in place distally with a K wire.  The plate  was then locked proximally through the aiming arm.  A cortical screw was utilized which help reduce the fracture to the plate.      We then examined the fracture length and alignment and and it was noted to be in good position.  We then secured the fracture distally with multiple locking screws. A total of 3 bicortical locking screws and 1 nonlocking screw were utilized proximally.     Fluoroscopy was utilized to confirm good position of all hardware.  All other instruments were removed.  Final fluoroscopic imaging demonstrated appropriate reduction of the fracture and appropriate length position of all screws and hardware.       The wound was thoroughly irrigated with copious muscle normal saline and closed in layers with #1 Vicryl in the fascia, 0 Vicryl in the subcutaneous tissue, 2-0 Monocryl along the superficial subcutaneous tissue and a running nylon suture.  Sterile dressings were applied including xeroform, gauze, tegaderm and an ACE wrap. The patient was placed in a knee immobilizer.   Patient was transferred to the recovery room in stable condition, sustaining no complications.    All instruments were accounted for at the end of the case.     POST OP PLAN:    1.  Ancef x 24 hours.   2.  Eliquis for DVT prophylaxis.   3.  PACU x-rays.   4.   Partial weightbearing 25%, Hinged knee brace ordered. Locked in extension with ambulation/sleep. Unlocked at rest  5.  Physical therapy/occupational therapy.   6.  Keep dressing on for 2 weeks.  OK to shower.  No submerging wound.  7.  Vitamin D 2000U  8.  Discharge:  Case management social work consult for placement        FOLLOWUP:     Please call as soon as possible to make an appointment to be seen in Dr. Antoine Pompa's clinic in 2 weeks.     Dr. Pompa's care coordinator is Heavenly Garcia. Please contact her at [phone] to schedule an appointment.      Dr. Pompa sees patient's at 2 clinic locations:    [facility] [address]  [place] [address]    [facility]   o [address], [address], [place] [address]      Please call the on-call phone number [phone] during evenings, nights and weekends for any urgent needs. Prescription refills must be done during business hours by calling [phone]      Antoine Pompa MD  [facility]

## 2023-05-25 NOTE — PROGRESS NOTES
Pt AxOx4, vasquez draining adequately. Pt pain managed through pain medication. Pt surgery was rescheduled due to low sodium. A blood order was placed because hemoglobin low, however not given because has not met the parameters. Pt not experiencing any alcohol withdrawal symptoms.

## 2023-05-25 NOTE — ANESTHESIA PROCEDURE NOTES
"Femoral Procedure Note    Pre-Procedure   Staff -        Anesthesiologist:  Heather Haynes MD       Performed By: anesthesiologist       Location: pre-op       Pre-Anesthestic Checklist: patient identified, IV checked, site marked, risks and benefits discussed, informed consent, monitors and equipment checked, pre-op evaluation, at physician/surgeon's request and post-op pain management  Timeout:       Correct Patient: Yes        Correct Procedure: Yes        Correct Site: Yes        Correct Position: Yes        Correct Laterality: Yes        Site Marked: Yes  Procedure Documentation  Procedure: Femoral       Diagnosis: FEMUR FRACTURE       Laterality: left       Patient Position: supine       Patient Prep/Sterile Barriers: mask       Skin prep: Chloraprep       Local skin infiltrated with 2 mL of 1% lidocaine.        Needle Type: insulated       Needle Gauge: 21.        Needle Length (Inches): 3.13        Ultrasound guided       1. Ultrasound was used to identify targeted nerve, plexus, vascular marker, or fascial plane and place a needle adjacent to it in real-time.       2. Ultrasound was used to visualize the spread of anesthetic in close proximity to the above referenced structure.       3. A permanent image is entered into the patient's record.       4. The visualized anatomic structures appeared normal.       5. There were no apparent abnormal pathologic findings.    Assessment/Narrative         The placement was negative for: blood aspirated, painful injection and site bleeding       Paresthesias: No.       Insertion/Infusion Method: Single Shot       Complications: none    Medication(s) Administered   Bupivacaine 0.5% w/ 1:400K Epi (Injection) - Injection   20 mL - 5/25/2023 1:11:00 PM    FOR [facility] ([facility]) ONLY:   Pain Team Contact information: please page the Pain Team Via [employer]. Search \"Pain\". During daytime hours, please page the attending first. At night please page the resident " first.

## 2023-05-25 NOTE — ANESTHESIA PREPROCEDURE EVALUATION
Anesthesia Pre-Procedure Evaluation    Patient: Suresh Powers   MRN: [id number] : 1960        Procedure : Procedure(s):  OPEN REDUCTION INTERNAL FIXATION, FRACTURE, FEMUR, DISTAL          Past Medical History:   Diagnosis Date     Anxiety     sees psych for trazodone     Clostridium difficile diarrhea 3/9/2016     Foot pain      Hypertension      Mold exposure      Shingles       Past Surgical History:   Procedure Laterality Date     ESOPHAGOSCOPY, GASTROSCOPY, DUODENOSCOPY (EGD), COMBINED N/A 2022    Procedure: ESOPHAGOGASTRODUODENOSCOPY (EGD);  Surgeon: Joe Alaniz MD;  Location:  GI     foot crush injury       kidney donation       OPEN REDUCTION INTERNAL FIXATION CLAVICLE Left 2016    Procedure: OPEN REDUCTION INTERNAL FIXATION CLAVICLE;  Surgeon: Rakesh Hui MD;  Location:  OR     OPEN REDUCTION INTERNAL FIXATION HIP NAILING Left 2022    Procedure: Open reduction internal fixation of left hip fracture;  Surgeon: Huang Cochran MD;  Location:  OR     OPEN REDUCTION INTERNAL FIXATION TIBIAL PLATEAU Left 2016    Procedure: OPEN REDUCTION INTERNAL FIXATION TIBIAL PLATEAU;  Surgeon: Rakesh Hui MD;  Location:  OR      Allergies   Allergen Reactions     Lisinopril Other (See Comments)     Elevated potassium     Sertraline Diarrhea      Social History     Tobacco Use     Smoking status: Former     Packs/day: 1.00     Years: 0.00     Pack years: 0.00     Types: Cigarettes     Smokeless tobacco: Never   Vaping Use     Vaping status: Never Used   Substance Use Topics     Alcohol use: Yes     Comment: states 8 beerrs all day over past 8 hours      Wt Readings from Last 1 Encounters:   23 90.7 kg (200 lb)        Anesthesia Evaluation   Pt has had prior anesthetic.     No history of anesthetic complications       ROS/MED HX  ENT/Pulmonary:    (-) tobacco use, asthma, sleep apnea and recent URI   Neurologic:    (-) no seizures, no CVA and no TIA    Cardiovascular:     (+) hypertension----- (-) pacemaker, arrhythmias and pacemaker   METS/Exercise Tolerance: 3 - Able to walk 1-2 blocks without stopping    Hematologic:     (+) History of blood clots (PE), pt is anticoagulated, anemia (Hgb 7.3),     Musculoskeletal: Comment: L distal femur fracture  Hx L femur fx 9/2022- oblique, comminuted and mildly displaced fx of L femoral metadiaphysis  (+) fracture,     GI/Hepatic: Comment: H/o GIB    (+) GERD, Asymptomatic on medication,  (-) liver disease   Renal/Genitourinary: Comment: Solitary kidney    (+) BPH,  (-) renal disease   Endo: Comment: Severe hyponatremia 116 on 05/23/23, due to SIADH. Na 131 today.  Chronic hyponatremia likely due to beer potomania    (+) Obesity (BMI 31),  (-) Type II DM and thyroid disease   Psychiatric/Substance Use: Comment: etoh    (+) psychiatric history (MDD, OCD, anxiety) alcohol abuse     Infectious Disease:       Malignancy:       Other:            Physical Exam    Airway        Mallampati: I   TM distance: > 3 FB   Neck ROM: full   Mouth opening: > 3 cm    Respiratory Devices and Support         Dental       (+) Modest Abnormalities - crowns, retainers, 1 or 2 missing teeth      Cardiovascular          Rhythm and rate: regular and tachycardia     Pulmonary           breath sounds clear to auscultation           OUTSIDE LABS:  CBC:   Lab Results   Component Value Date    WBC 10.9 05/25/2023    WBC 9.2 05/24/2023    HGB 7.3 (L) 05/25/2023    HGB 7.5 (L) 05/24/2023    HCT 22.7 (L) 05/25/2023    HCT 23.8 (L) 05/24/2023     05/25/2023     05/24/2023     BMP:   Lab Results   Component Value Date     (L) 05/25/2023     (L) 05/25/2023    POTASSIUM 5.0 05/25/2023    POTASSIUM 4.7 05/24/2023    CHLORIDE 97 (L) 05/25/2023    CHLORIDE 90 (L) 05/24/2023    CO2 24 05/25/2023    CO2 25 05/24/2023    BUN 22.0 05/25/2023    BUN 13.6 05/24/2023    CR 1.06 05/25/2023    CR 0.98 05/24/2023     (H) 05/25/2023    GLC  133 (H) 05/24/2023     COAGS:   Lab Results   Component Value Date    PTT 66 (H) 12/06/2022    INR 1.04 11/30/2022     POC: No results found for: BGM, HCG, HCGS  HEPATIC:   Lab Results   Component Value Date    ALBUMIN 3.1 (L) 05/24/2023    PROTTOTAL 7.7 05/22/2023    ALT 16 05/22/2023    AST 27 05/22/2023    ALKPHOS 118 05/22/2023    BILITOTAL 0.3 05/22/2023     OTHER:   Lab Results   Component Value Date    LACT 0.6 (L) 10/10/2022    A1C 5.0 10/13/2020    NIMISHA 8.7 (L) 05/25/2023    PHOS 3.5 05/24/2023    MAG 1.8 05/22/2023    TSH 1.46 02/21/2023    CRP 3.7 03/24/2021    SED 17 03/24/2021       Anesthesia Plan    ASA Status:  3   NPO Status:  NPO Appropriate    Anesthesia Type: General.     - Airway: ETT   Induction: Intravenous, Propofol.   Maintenance: Balanced.        Consents    Anesthesia Plan(s) and associated risks, benefits, and realistic alternatives discussed. Questions answered and patient/representative(s) expressed understanding.     - Discussed: Risks, Benefits and Alternatives for BOTH SEDATION and the PROCEDURE were discussed     - Discussed with:  Patient         Postoperative Care    Pain management: Peripheral nerve block (Single Shot), IV analgesics, Multi-modal analgesia.   PONV prophylaxis: Ondansetron (or other 5HT-3), Dexamethasone or Solumedrol, Background Propofol Infusion     Comments:                Heather Haynes MD

## 2023-05-25 NOTE — ANESTHESIA CARE TRANSFER NOTE
Patient: Suresh Powers    Procedure: Procedure(s):  OPEN REDUCTION INTERNAL FIXATION, FRACTURE, FEMUR, DISTAL       Diagnosis: Closed fracture of distal end of left femur, unspecified fracture morphology, initial encounter (H) [S7.377L]  Diagnosis Additional Information: No value filed.    Anesthesia Type:   General     Note:    Oropharynx: oropharynx clear of all foreign objects and spontaneously breathing  Level of Consciousness: drowsy  Oxygen Supplementation: face mask  Level of Supplemental Oxygen (L/min / FiO2): 6  Independent Airway: airway patency satisfactory and stable  Dentition: dentition unchanged  Vital Signs Stable: post-procedure vital signs reviewed and stable  Report to RN Given: handoff report given  Patient transferred to: PACU  Comments: Neuromuscular blockade reversed with sugammadex, spontaneous respirations, adequate tidal volumes, followed commands to voice, oropharynx suctioned with soft flexible catheter, extubated atraumatically, extubated with suction, airway patent after extubation.  Oxygen via facemask at 6 liters per minute to PACU. Oxygen tubing connected to wall O2 in PACU, SpO2, NiBP, and EKG monitors and alarms on and functioning, report on patient's clinical status given to PACU RN, RN questions answered.     Handoff Report: Identifed the Patient, Identified the Reponsible Provider, Reviewed the pertinent medical history, Discussed the surgical course, Reviewed Intra-OP anesthesia mangement and issues during anesthesia, Set expectations for post-procedure period and Allowed opportunity for questions and acknowledgement of understanding      Vitals:  Vitals Value Taken Time   /78 05/25/23 1609   Temp     Pulse 110 05/25/23 1612   Resp 8 05/25/23 1612   SpO2 99 % 05/25/23 1612   Vitals shown include unvalidated device data.    Electronically Signed By: LUIS Bravo CRNA  May 25, 2023  4:13 PM

## 2023-05-25 NOTE — OR NURSING
Pt arrived in preop. left AC IV site red and firm to touch pt stated it was tender. IV was DC'd purulent drainage from puncture site redness borders marked site was cleaned with chlorhexadine left open to air. MD notified

## 2023-05-25 NOTE — ANESTHESIA PROCEDURE NOTES
Airway       Patient location during procedure: OR       Procedure Start/Stop Times: 5/25/2023 2:04 PM  Staff -        Anesthesiologist:  Heather Haynes MD       CRNA: Aster Martinez APRN CRNA       Other Anesthesia Staff: Ramsey Ace       Performed By: PIERRE and with CRNAs       Procedure performed by resident/fellow/CRNA in presence of a teaching physician.    Consent for Airway        Urgency: elective  Indications and Patient Condition       Indications for airway management: ashu-procedural and airway protection       Induction type:intravenous       Mask difficulty assessment: 1 - vent by mask    Final Airway Details       Final airway type: endotracheal airway       Successful airway: ETT - single  Endotracheal Airway Details        ETT size (mm): 8.0       Cuffed: yes       Successful intubation technique: video laryngoscopy       VL Blade Size: Glidescope 4       Grade View of Cords: 2       Adjucts: stylet       Position: Right       Measured from: gums/teeth       Secured at (cm): 22       Bite block used: None    Post intubation assessment        Placement verified by: capnometry, equal breath sounds and chest rise        Number of attempts at approach: 1       Number of other approaches attempted: 0       Secured with: pink tape       Ease of procedure: easy       Dentition: Intact and Unchanged    Medication(s) Administered   Medication Administration Time: 5/25/2023 2:04 PM

## 2023-05-25 NOTE — PLAN OF CARE
Goal Outcome Evaluation:  Summary: Hyponatremia. Fractured Femur  Date & Time: 5/25/23 9847-0804  Orientation: A&Ox4   POD: N/A  Activity Level: Bedrest  Fall Risk: Yes  Behavior & Aggression: green  Pain Management: Pain was managed today with Tylenol, Robaxin, and Oxycodone.   ABNL VS/O2: VSS/Room air  ABNL Lab/BG:   Diet: NPO for surgery.   Bowel/Bladder: No BM; Vasquez.  Drains/Devices: L PIV SL  Skin: Scattered Bruising; LLE is swollen and cyanotic.   CMS: Intact; LLE numbness, tenderness and tingling present. Pain radiating from LLE ankle to groin.   Tests/Procedures: Surgery was today at 1200  Anticipated  DC Date: pending   Other Important Info: Went down for surgery at 1045/11 this morning. CHG bath was provided and vasquez catheter care was provided.

## 2023-05-25 NOTE — PLAN OF CARE
Goal Outcome Evaluation:         Vitally stable, a/o x4. Denies any numbness or tingling. Pain well managed with PRN and scheduled medications. Prieto cath to remain in place for strict bedrest. Possibly going to the OR today at 1230 if medically stable.

## 2023-05-25 NOTE — BRIEF OP NOTE
[facility]    Brief Operative Note    Pre-operative diagnosis: Closed fracture of distal end of left femur, unspecified fracture morphology, initial encounter (H) [N38.471Z]  Post-operative diagnosis Same as pre-operative diagnosis    Procedure: Procedure(s):  OPEN REDUCTION INTERNAL FIXATION, FRACTURE, FEMUR, DISTAL  Surgeon: Surgeon(s) and Role:     * Antoine Pompa MD - Primary     * Kiley Beyer PA-C - Assisting  Anesthesia: General   Estimated Blood Loss: 150 mL from 5/25/2023  1:51 PM to 5/25/2023  4:08 PM      Drains: None  Specimens: * No specimens in log *  Findings:   None.  Complications: None.  Implants:   Implant Name Type Inv. Item Serial No.  Lot No. LRB No. Used Action   IMP PLATE SYN LCP CONDYLAR 4.5MM VA CVD TI 6H LT - [id number] Metallic Hardware/[employer] [place] IMP PLATE SYN LCP CONDYLAR 4.5MM VA CVD TI 6H LT  SY[employer]ATEC [id number] Left 1 Implanted   IMP SCR SYN CORTEX 4.5X38MM ST .838 - [id number] Metallic Hardware/[place] IMP SCR SYN CORTEX 4.5X38MM ST .838  SY[employer]ATEC 42 05 [id number] Left 1 Implanted   IMP SCR SYN CORTEX 4.5X46MM ST .846 - [id number] Metallic Hardware/[place] IMP SCR SYN CORTEX 4.5X46MM ST .846  SY[employer]STRATEC  Left 1 Wasted   IMP SCR SYN OPTILINK ERIK VA ST 5.0X42MM 42.231.242 - [id number] Metallic Hardware/[place] IMP SCR SYN OPTILINK ERIK VA ST 5.0X42MM 42.231.242  SY[employer]EC 42 05 [id number] Left 1 Implanted   IMP SCR SYN OPTILINK ERIK VA ST 5.0X40MM 42.231.240 - [id number] Metallic Hardware/[place] IMP SCR SYN OPTILINK ERIK VA ST 5.0X40MM 42.231.240  SY[employer]EC 42 05 [id number] Left 2 Implanted   IMP SCR SYN OPTILINK VA ST T25 5.0X80MM 42.231.280 - [id number] Metallic Hardware/[place] IMP SCR SYN OPTILINK VA ST T25 5.0X80MM 42.231.280  SY[employer]EC 42 05 [id number] Left 2 Implanted   IMP SCR SYN OPTILINK VA ST T25 5.0X85MM 42.231.285 - [id number] Metallic Hardware/[place] IMP SCR SYN OPTILINK VA  ST T25 5.0X85MM 42.231.285  SY[employer]ATEC 42 05 [id number] Left 3 Implanted       PLAN:  DVT Prophylaxis: Resume PTA eliquis POD1.  ABX: Ancef x 24 hrs  PACU Xrays  Activity: TTWB with walker. Hinged knee brace ordered. 0-90. Locked when ambulating and sleeping. Knee immobilizer until hinged knee brace arrives.   PT. Social work for discharge planning.      Please call as soon as possible to make an appointment to be seen in Dr. Antoine Pompa's clinic in 2 weeks.     Dr. Pompa's care coordinator is Heavenly Garcia. Please contact her at [phone] to schedule an appointment.    Dr. Popma sees patients at 2 clinic locations:    [facility]  o [address] [place] [address]    [facility]   o [address], [address], [place] [address]      Please call the on-call phone number [phone] during evenings, nights and weekends for any urgent needs. Prescription refills must be done during business hours by calling [phone]      Kiley Beyer PA-C  [facility]

## 2023-05-26 ENCOUNTER — APPOINTMENT (OUTPATIENT)
Dept: PHYSICAL THERAPY | Facility: CLINIC | Age: 63
DRG: 481 | End: 2023-05-26
Payer: COMMERCIAL

## 2023-05-26 LAB
ANION GAP SERPL CALCULATED.3IONS-SCNC: 9 MMOL/L (ref 7–15)
BUN SERPL-MCNC: 15.8 MG/DL (ref 8–23)
CALCIUM SERPL-MCNC: 8.3 MG/DL (ref 8.8–10.2)
CHLORIDE SERPL-SCNC: 99 MMOL/L (ref 98–107)
CREAT SERPL-MCNC: 0.71 MG/DL (ref 0.67–1.17)
DEPRECATED HCO3 PLAS-SCNC: 25 MMOL/L (ref 22–29)
ENTEROCOCCUS FAECALIS: NOT DETECTED
ENTEROCOCCUS FAECIUM: NOT DETECTED
GFR SERPL CREATININE-BSD FRML MDRD: >90 ML/MIN/1.73M2
GLUCOSE SERPL-MCNC: 196 MG/DL (ref 70–99)
GLUCOSE SERPL-MCNC: 196 MG/DL (ref 70–99)
HGB BLD-MCNC: 7.3 G/DL (ref 13.3–17.7)
HGB BLD-MCNC: 7.6 G/DL (ref 13.3–17.7)
LISTERIA SPECIES (DETECTED/NOT DETECTED): NOT DETECTED
POTASSIUM SERPL-SCNC: 4.9 MMOL/L (ref 3.4–5.3)
SODIUM SERPL-SCNC: 133 MMOL/L (ref 136–145)
STAPHYLOCOCCUS AUREUS: NOT DETECTED
STAPHYLOCOCCUS EPIDERMIDIS: DETECTED
STAPHYLOCOCCUS LUGDUNENSIS: NOT DETECTED
STREPTOCOCCUS AGALACTIAE: NOT DETECTED
STREPTOCOCCUS ANGINOSUS GROUP: NOT DETECTED
STREPTOCOCCUS PNEUMONIAE: NOT DETECTED
STREPTOCOCCUS PYOGENES: NOT DETECTED
STREPTOCOCCUS SPECIES: NOT DETECTED

## 2023-05-26 PROCEDURE — 250N000013 HC RX MED GY IP 250 OP 250 PS 637: Performed by: INTERNAL MEDICINE

## 2023-05-26 PROCEDURE — 258N000003 HC RX IP 258 OP 636

## 2023-05-26 PROCEDURE — 250N000013 HC RX MED GY IP 250 OP 250 PS 637

## 2023-05-26 PROCEDURE — 258N000003 HC RX IP 258 OP 636: Performed by: INTERNAL MEDICINE

## 2023-05-26 PROCEDURE — 85018 HEMOGLOBIN: CPT

## 2023-05-26 PROCEDURE — 36415 COLL VENOUS BLD VENIPUNCTURE: CPT

## 2023-05-26 PROCEDURE — 97161 PT EVAL LOW COMPLEX 20 MIN: CPT | Mod: GP | Performed by: PHYSICAL THERAPIST

## 2023-05-26 PROCEDURE — L1833 KO ADJ JNT POS R SUP PRE OTS: HCPCS

## 2023-05-26 PROCEDURE — 120N000001 HC R&B MED SURG/OB

## 2023-05-26 PROCEDURE — 250N000011 HC RX IP 250 OP 636

## 2023-05-26 PROCEDURE — 80048 BASIC METABOLIC PNL TOTAL CA: CPT

## 2023-05-26 PROCEDURE — 250N000011 HC RX IP 250 OP 636: Performed by: INTERNAL MEDICINE

## 2023-05-26 PROCEDURE — 85018 HEMOGLOBIN: CPT | Performed by: INTERNAL MEDICINE

## 2023-05-26 PROCEDURE — 36415 COLL VENOUS BLD VENIPUNCTURE: CPT | Performed by: INTERNAL MEDICINE

## 2023-05-26 PROCEDURE — 97530 THERAPEUTIC ACTIVITIES: CPT | Mod: GP | Performed by: PHYSICAL THERAPIST

## 2023-05-26 PROCEDURE — 99233 SBSQ HOSP IP/OBS HIGH 50: CPT | Performed by: INTERNAL MEDICINE

## 2023-05-26 PROCEDURE — 99232 SBSQ HOSP IP/OBS MODERATE 35: CPT | Performed by: INTERNAL MEDICINE

## 2023-05-26 PROCEDURE — 999N000128 HC STATISTIC PERIPHERAL IV START W/O US GUIDANCE

## 2023-05-26 RX ORDER — SODIUM CHLORIDE 9 MG/ML
INJECTION, SOLUTION INTRAVENOUS CONTINUOUS
Status: DISCONTINUED | OUTPATIENT
Start: 2023-05-26 | End: 2023-05-29 | Stop reason: HOSPADM

## 2023-05-26 RX ORDER — OXYCODONE HYDROCHLORIDE 5 MG/1
5 TABLET ORAL EVERY 4 HOURS PRN
Qty: 25 TABLET | Refills: 0 | Status: SHIPPED | OUTPATIENT
Start: 2023-05-26 | End: 2023-06-06

## 2023-05-26 RX ORDER — CHOLECALCIFEROL (VITAMIN D3) 50 MCG
50 TABLET ORAL DAILY
Qty: 30 TABLET | Refills: 0 | DISCHARGE
Start: 2023-05-27 | End: 2023-05-31

## 2023-05-26 RX ORDER — HYDROXYZINE HYDROCHLORIDE 25 MG/1
25 TABLET, FILM COATED ORAL EVERY 6 HOURS PRN
Qty: 20 TABLET | Refills: 0 | DISCHARGE
Start: 2023-05-26 | End: 2023-05-31

## 2023-05-26 RX ORDER — ACETAMINOPHEN 325 MG/1
650 TABLET ORAL EVERY 6 HOURS PRN
Qty: 100 TABLET | Refills: 0 | DISCHARGE
Start: 2023-05-26 | End: 2023-05-31

## 2023-05-26 RX ORDER — POLYETHYLENE GLYCOL 3350 17 G/17G
17 POWDER, FOR SOLUTION ORAL DAILY
Qty: 510 G | Refills: 0 | DISCHARGE
Start: 2023-05-26

## 2023-05-26 RX ORDER — AMOXICILLIN 250 MG
1 CAPSULE ORAL 2 TIMES DAILY PRN
Qty: 21 TABLET | Refills: 0 | DISCHARGE
Start: 2023-05-26

## 2023-05-26 RX ADMIN — PANTOPRAZOLE SODIUM 40 MG: 40 TABLET, DELAYED RELEASE ORAL at 18:31

## 2023-05-26 RX ADMIN — VANCOMYCIN HYDROCHLORIDE 1250 MG: 10 INJECTION, POWDER, LYOPHILIZED, FOR SOLUTION INTRAVENOUS at 14:32

## 2023-05-26 RX ADMIN — SODIUM CHLORIDE: 9 INJECTION, SOLUTION INTRAVENOUS at 23:32

## 2023-05-26 RX ADMIN — ACETAMINOPHEN 975 MG: 325 TABLET ORAL at 14:31

## 2023-05-26 RX ADMIN — POLYETHYLENE GLYCOL 3350 17 G: 17 POWDER, FOR SOLUTION ORAL at 09:58

## 2023-05-26 RX ADMIN — SENNOSIDES AND DOCUSATE SODIUM 1 TABLET: 50; 8.6 TABLET ORAL at 22:29

## 2023-05-26 RX ADMIN — Medication 50 MCG: at 10:00

## 2023-05-26 RX ADMIN — ACETAMINOPHEN 975 MG: 325 TABLET ORAL at 22:29

## 2023-05-26 RX ADMIN — OXYCODONE HYDROCHLORIDE 10 MG: 5 TABLET ORAL at 14:31

## 2023-05-26 RX ADMIN — ACETAMINOPHEN 975 MG: 325 TABLET ORAL at 05:12

## 2023-05-26 RX ADMIN — AMLODIPINE BESYLATE 5 MG: 5 TABLET ORAL at 10:01

## 2023-05-26 RX ADMIN — BUSPIRONE HYDROCHLORIDE 15 MG: 5 TABLET ORAL at 22:28

## 2023-05-26 RX ADMIN — POLYETHYLENE GLYCOL 400 AND PROPYLENE GLYCOL 2 DROP: 4; 3 SOLUTION/ DROPS OPHTHALMIC at 23:01

## 2023-05-26 RX ADMIN — PANTOPRAZOLE SODIUM 40 MG: 40 TABLET, DELAYED RELEASE ORAL at 09:59

## 2023-05-26 RX ADMIN — LAMOTRIGINE 200 MG: 100 TABLET ORAL at 22:33

## 2023-05-26 RX ADMIN — BUSPIRONE HYDROCHLORIDE 15 MG: 5 TABLET ORAL at 09:59

## 2023-05-26 RX ADMIN — POLYETHYLENE GLYCOL 400 AND PROPYLENE GLYCOL 2 DROP: 4; 3 SOLUTION/ DROPS OPHTHALMIC at 09:59

## 2023-05-26 RX ADMIN — OXYCODONE HYDROCHLORIDE AND ACETAMINOPHEN 500 MG: 500 TABLET ORAL at 10:01

## 2023-05-26 RX ADMIN — SENNOSIDES AND DOCUSATE SODIUM 1 TABLET: 50; 8.6 TABLET ORAL at 09:59

## 2023-05-26 RX ADMIN — ZINC SULFATE 220 MG (50 MG) CAPSULE 220 MG: CAPSULE at 10:01

## 2023-05-26 RX ADMIN — TRAZODONE HYDROCHLORIDE 150 MG: 50 TABLET ORAL at 22:25

## 2023-05-26 RX ADMIN — OXYCODONE HYDROCHLORIDE 10 MG: 5 TABLET ORAL at 09:59

## 2023-05-26 RX ADMIN — CEFAZOLIN 1 G: 1 INJECTION, POWDER, FOR SOLUTION INTRAMUSCULAR; INTRAVENOUS at 05:12

## 2023-05-26 RX ADMIN — VANCOMYCIN HYDROCHLORIDE 1250 MG: 10 INJECTION, POWDER, LYOPHILIZED, FOR SOLUTION INTRAVENOUS at 23:31

## 2023-05-26 RX ADMIN — METHOCARBAMOL 500 MG: 500 TABLET ORAL at 11:21

## 2023-05-26 RX ADMIN — TAMSULOSIN HYDROCHLORIDE 0.4 MG: 0.4 CAPSULE ORAL at 10:00

## 2023-05-26 RX ADMIN — OXYCODONE HYDROCHLORIDE 10 MG: 5 TABLET ORAL at 18:30

## 2023-05-26 RX ADMIN — ESCITALOPRAM OXALATE 20 MG: 10 TABLET ORAL at 09:59

## 2023-05-26 RX ADMIN — OXYCODONE HYDROCHLORIDE 10 MG: 5 TABLET ORAL at 22:39

## 2023-05-26 RX ADMIN — APIXABAN 5 MG: 5 TABLET, FILM COATED ORAL at 22:29

## 2023-05-26 RX ADMIN — SODIUM CHLORIDE, POTASSIUM CHLORIDE, SODIUM LACTATE AND CALCIUM CHLORIDE: 600; 310; 30; 20 INJECTION, SOLUTION INTRAVENOUS at 00:05

## 2023-05-26 ASSESSMENT — ACTIVITIES OF DAILY LIVING (ADL)
ADLS_ACUITY_SCORE: 40
ADLS_ACUITY_SCORE: 46
ADLS_ACUITY_SCORE: 40

## 2023-05-26 NOTE — CONSULTS
Care Management Follow Up    Length of Stay (days): 3    Expected Discharge Date: 05/28/2023      Additional Information:  Duplicate consult - please see consult for discharge planning case note from 05/24/23      TIFF Garcia

## 2023-05-26 NOTE — PROVIDER NOTIFICATION
Notified provider about indwelling vasquez catheter discussed removal or continued need.    Did provider choose to remove indwelling vasquez catheter? No    Provider's vasquez indication for keeping indwelling vasquez catheter: Retention.    Is there an order for indwelling vasquez catheter? Yes    *If there is a plan to keep vasquez catheter in place at discharge daily notification with provider is not necessary, but please add a notation in the treatment team sticky note that the patient will be discharging with the catheter.     To Dr Ospina at 1330      To Dr Ospina at 1745  Your note said to keep on CIWA protocol, pt is not on this protocol, do you want this? Alcohol level 0.23 on 5/22 evening. Thanks.

## 2023-05-26 NOTE — PROGRESS NOTES
Care Management Follow Up    Length of Stay (days): 3    Expected Discharge Date: 05/28/2023     Concerns to be Addressed:       Patient plan of care discussed at interdisciplinary rounds: Yes    Anticipated Discharge Disposition:       Anticipated Discharge Services:    Anticipated Discharge DME:      Patient/family educated on Medicare website which has current facility and service quality ratings:    Education Provided on the Discharge Plan:    Patient/Family in Agreement with the Plan:      Referrals Placed by CM/SW:    Private pay costs discussed: transportation costs    Additional Information:   Writer spoke with patient at bedside about the referral made to [facility]. Patient understands that transportation needs to be arranged and would like [facility] transport set up. Patient is aware of potential costs. Writer determining availability of acceptance over the holiday weekend.     TIFF Garcia

## 2023-05-26 NOTE — PLAN OF CARE
Goal Outcome Evaluation:         Patient vital signs are at baseline: Yes, denies any new numbness or tingling, 1L O2 NC, a/o x4  Patient able to ambulate as they were prior to admission or with assist devices provided by therapies during their stay:  No, not OOB yet, able to reposition self in bed  Patient MUST void prior to discharge:  Yes, vasquez cath in place, discuss with service when to discharge   Patient able to tolerate oral intake:  Yes  Pain has adequate pain control using Oral analgesics:  Yes, pain well controlled with PRN and scheduled medications  Does patient have an identified :  Yes  Has goal D/C date and time been discussed with patient:  Yes, likely TCU for discharge when medically ready.

## 2023-05-26 NOTE — PHARMACY-VANCOMYCIN DOSING SERVICE
Pharmacy Vancomycin Initial Note  Date of Service May 26, 2023  Patient's  1960  62 year old, male    Indication: Bacteremia    Current estimated CrCl = Estimated Creatinine Clearance: 115.8 mL/min (based on SCr of 0.71 mg/dL).    Creatinine for last 3 days  2023:  6:03 AM Creatinine 0.98 mg/dL  2023:  6:41 AM Creatinine 1.06 mg/dL  2023:  6:49 AM Creatinine 0.71 mg/dL    Recent Vancomycin Level(s) for last 3 days  No results found for requested labs within last 3 days.      Vancomycin IV Administrations (past 72 hours)      No vancomycin orders with administrations in past 72 hours.                Nephrotoxins and other renal medications (From now, onward)    Start     Dose/Rate Route Frequency Ordered Stop    23 1230  vancomycin (VANCOCIN) 1,250 mg in 0.9% NaCl 250 mL intermittent infusion         1,250 mg  over 90 Minutes Intravenous EVERY 12 HOURS 23 1222            Contrast Orders - past 72 hours (72h ago, onward)    None          [employer] Prediction of Planned Initial Vancomycin Regimen  Regimen: 1250 mg IV every 12 hours.  Start time: 12:30 on 2023  Exposure target: AUC24 (range)400-600 mg/L.hr   AUC24,ss: 490 mg/L.hr  Probability of AUC24 > 400: 71 %  Ctrough,ss: 14.9 mg/L  Probability of Ctrough,ss > 20: 27 %  Probability of nephrotoxicity (Lodise DARIO ): 10 %          Plan:  1. Start vancomycin  1250 mg IV q12h.   2. Vancomycin monitoring method: AUC  3. Vancomycin therapeutic monitoring goal: 400-600 mg*h/L  4. Pharmacy will check vancomycin levels as appropriate in 1-3 Days.    5. Serum creatinine levels will be ordered every 48 hours.      Michell Avilez

## 2023-05-26 NOTE — PLAN OF CARE
Goal Outcome Evaluation:       A&OX4. VSS on 2L of o2 ex hr which is elevated. Did not get oob today. Immobilizer on LLE. Pain managed with scheduled medications. Discharge pending.

## 2023-05-26 NOTE — PROVIDER NOTIFICATION
"To Dr Ospina at 1200 via [employer]cl[employer] messaging  Critical value- blood culture collected yesterday peripheral blood has Gram positive cocci in clusters. Please advise, thanks.         MD responded and put in orders    Author-Note from Dr Jefferson yesterday mentioned Left AC IV taken out appearing \"infected\" with \"purulence drainage from site\" pt on Ancef, please advise. Thanks.   Did you want anything specific for the Left AC where the old IV was taken out? Thanks.     MD- Nothing specific, just avoid IV at that site            "

## 2023-05-26 NOTE — PROGRESS NOTES
[facility]    Medicine Progress Note - Hospitalist Service    Date of Admission:  5/23/2023    Assessment & Plan      Suresh Powers is a 62 year old male with chronic alcohol use, s/p left intertrochanteric fracture in September 2022 due to fall while intoxicated, who presented on 5/23/2023 after a fall due to intoxication.  He lives in assisted living, has poor balance at baseline.  Went out to have drinks, alcohol intoxicated, tried using walker and lost his balance leading to fall.  Found to have left distal femur fracture, severe hyponatremia with sodium of 116.  Head CT showed no acute changes.        Severe hyponatremia, sodium 116 on 5/23/2023-due to SIADH  Chronic hyponatremia-likely due to beer potomania  -Nephrology consulted.  Managed with IV normal saline and fluid restriction.  -Sodium now improved to 133  -Nephrology signing off.  The fluid restrictions at 1800 mL per day  -Monitor sodium      Acute comminuted mildly displaced fracture of distal left femoral metaphysis, s/p ORIF with interlocking distal femur plate, 5/25/2023  -Management per orthopedics  -PT/OT  -Partial weightbearing, 25%, hinged knee brace-locked in extension with ambulation/sleep, unlocked at rest  -Continue pain control-Tylenol  -Follow-up with Dr. Antoine Pompa in 2 post    Gram-positive bacteremia, 5/26/2023  Left upper extremity cellulitis due to peripheral IV  -Peripheral IV was removed on 5/25.  Purulence noted at site.  Blood cultures were obtained and patient was started on IV cefazolin  -1 set of blood culture now growing gram-positive cocci in clusters on 5/26  -Pending identification and sensitivities, will switch to IV vancomycin.  Dose per pharmacy      Acute alcohol intoxication blood alcohol level 0.23 g/dL  Chronic alcohol use disorder  -Had 5-6 beers prior to admission  -Monitor for withdrawal.  Keep on CIWA protocol    Acute blood loss anemia on top of chronic normocytic  anemia  GERD  History of GI bleed due to duodenal ulcer 11/2022  -Was admitted 11/2022 with hemoglobin 3.8-due to upper GI bleed from duodenal ulcer  -Hemoglobin was 10.9 on 5/22, decreased to 7.8 on 5/24-likely due to significant hematoma/bruising related to distal femur fracture.  Postoperative hemoglobin now stable at 7.3-7.6  -Monitor hemoglobin.  Transfuse to keep over 7  -Continue Protonix 40 mg twice daily    S/p left hip cephalomedullary nailing, 9/4/2022 for left intertrochanteric femur fracture  -Noted         History of pulmonary embolism, 11/2022, right lower lobe  -Resume apixaban.  Was held for surgery     Essential hypertension  - Blood pressure currently in normal range  - continue amlodipine 10 mg daily.       Chronic depression  Generalized anxiety disorder  OCD  -Continue BuSpar, Lexapro, trazodone, lamotrigine  -Consult psychiatry regarding alternative        BPH  Solitary kidney-donated left kidney 2 sisters in 1990  Urinary retention-s/p Prieto placement  -Has longstanding history of urinary retention especially after surgeries and use of narcotics.    -Required Prieto during previous hospitalization for femur fracture.  Was discharged on Prieto at that time.  -Now has Prieto in place due to urinary retention.  Keep Prieto in for now  -Continue Flomax            Diet: Advance Diet as Tolerated: Regular Diet Adult  Fluid restriction 1800 ML FLUID    DVT Prophylaxis: DOAC  Prieto Catheter: PRESENT, indication: Retention;Anesthesia  Lines: None     Cardiac Monitoring: None  Code Status: Full Code      Clinically Significant Risk Factors         # Hyponatremia: Lowest Na = 127 mmol/L in last 2 days, will monitor as appropriate      # Hypoalbuminemia: Lowest albumin = 3.1 g/dL at 5/24/2023  6:03 AM, will monitor as appropriate     # Hypertension: Noted on problem list        # Obesity: Estimated body mass index is 31.32 kg/m  as calculated from the following:    Height as of this encounter: 1.702 m (5'  "7\").    Weight as of this encounter: 90.7 kg (200 lb)., PRESENT ON ADMISSION          Disposition Plan      Expected Discharge Date: 05/28/2023                  Kadie Ospina MD  Hospitalist Service  [facility]  Securely message with [employer] (more info)  Text page via [employer] Paging/Directory   ______________________________________________________________________    Interval History   Patient reports he is doing okay.  Pain is controlled.  No dizziness, no chest pain or shortness of breath    Physical Exam   Vital Signs: Temp: 98.4  F (36.9  C) Temp src: Oral BP: 126/68 Pulse: 90   Resp: 15 SpO2: 94 % O2 Device: None (Room air) Oxygen Delivery: 1 LPM  Weight: 200 lbs 0 oz    Constitutional - awake and alert, resting in bed, in no acute distress  Cardiovascular - regular rate and rhythm, no murmurs, no edema  Pulmonary - lungs are clear to auscultation bilaterally, no wheezing or rhonchi  GI - abdomen is soft, nontender, nondistended, no hepatosplenomegaly or masses  Integumentary - skin is warm and dry, no rashes or ulcers  Neurological - awake, alert and oriented x3.  Moving all 4 extremities, normal speech, no focal deficits    Medical Decision Making       60 MINUTES SPENT BY ME on the date of service doing chart review, history, exam, documentation & further activities per the note.      Data     I have personally reviewed the following data over the past 24 hrs:    N/A  \   7.6 (L)   / N/A     133 (L) 99 15.8 /  196 (H); 196 (H)   4.9 25 0.71 \       Imaging results reviewed over the past 24 hrs:   Recent Results (from the past 24 hour(s))   XR Femur Port Left 2 Views    Narrative    EXAM: XR FEMUR PORT LEFT 2 VIEWS  LOCATION: [facility]  DATE/TIME: 5/25/2023 4:59 PM CDT    INDICATION: Status post ORIF distal femur.  COMPARISON: 05/23/2023.      Impression    IMPRESSION: Postoperative changes plate and screw fixation of a fracture of the distal aspect of the " left femur. This is new since the previous examination. The fracture is still visualized. Incompletely visualized postoperative changes at the level of   the hip joint. Plate and screw fixation of the proximal tibia.

## 2023-05-26 NOTE — PROGRESS NOTES
"   05/26/23 0900   Appointment Info   Signing Clinician's Name / Credentials (PT) Tho Pompa DPT       Present no   Living Environment   People in Home alone   Current Living Arrangements assisted living   Home Accessibility no concerns   Transportation Anticipated   (Pt did not state)   Living Environment Comments Pt lives alone in an YAMIL. No stairs. Pt reports nursing staff is present to assist pt if needed.   Self-Care   Usual Activity Tolerance fair   Current Activity Tolerance poor   Regular Exercise No   Equipment Currently Used at Home walker, rolling   Fall history within last six months yes   Number of times patient has fallen within last six months   (\"Too many\" per pt)   Activity/Exercise/Self-Care Comment Pt reports needing assist at baseline with ADLs. Pt ambulates w/ a rolling walker at baseline. Able to tolerate being on feet for ~10 mins   General Information   Onset of Illness/Injury or Date of Surgery 05/26/23   Referring Physician Kiley Beyer PA-C   Patient/Family Therapy Goals Statement (PT) \"To go to rehab\"   Pertinent History of Current Problem (include personal factors and/or comorbidities that impact the POC) Per Chart: s/p ORIF for L femur fracture POD#1   Existing Precautions/Restrictions fall   Weight-Bearing Status - LLE toe touch weight-bearing   Weight-Bearing Status - RLE full weight-bearing   Cognition   Orientation Status (Cognition) oriented x 3   Pain Assessment   Patient Currently in Pain Yes, see Vital Sign flowsheet  (8/10 in LLE)   Integumentary/Edema   Integumentary/Edema Comments LLE in immobilizer   Posture    Posture Forward head position;Protracted shoulders   Range of Motion (ROM)   Range of Motion ROM deficits secondary to surgical procedure;ROM deficits secondary to pain;ROM deficits secondary to swelling;ROM deficits secondary to weakness   ROM Comment RLE ROM WFL; LLE limited due to being in immobilizer   Strength (Manual Muscle " Testing)   Strength (Manual Muscle Testing) Able to perform R SLR;Deficits observed during functional mobility   Strength Comments LLE Hip Flexion: 2/5   Bed Mobility   Comment, (Bed Mobility) Supine>sit w/ mod A x 1   Transfers   Comment, (Transfers) Sit>stand w/ FWW and mod A x 2   Gait/Stairs (Locomotion)   Comment, (Gait/Stairs) Unable to initiate   Balance   Balance Comments Good static sitting balance; Mild unsteadiness in standing   Sensory Examination   Sensory Perception patient reports no sensory changes   Clinical Impression   Criteria for Skilled Therapeutic Intervention Yes, treatment indicated   PT Diagnosis (PT) Impaired gait   Influenced by the following impairments Decreased activity tolerance; decreased balance; decreased strength; increased pain   Functional limitations due to impairments Impaired functional mobility   Clinical Presentation (PT Evaluation Complexity) Stable/Uncomplicated   Clinical Presentation Rationale Clinical judgement   Clinical Decision Making (Complexity) low complexity   Planned Therapy Interventions (PT) balance training;bed mobility training;gait training;patient/family education;strengthening;transfer training;progressive activity/exercise   Risk & Benefits of therapy have been explained evaluation/treatment results reviewed;care plan/treatment goals reviewed;risks/benefits reviewed;current/potential barriers reviewed;participants voiced agreement with care plan;participants included;patient   PT Total Evaluation Time   PT Eval, Low Complexity Minutes (75977) 10   Plan of Care Review   Plan of Care Reviewed With patient   Physical Therapy Goals   PT Frequency 3x/week   PT Predicted Duration/Target Date for Goal Attainment 05/31/23   PT Goals Bed Mobility;Transfers;Gait   PT: Bed Mobility Supervision/stand-by assist;Supine to/from sit;Within precautions   PT: Transfers Supervision/stand-by assist;Sit to/from stand;Assistive device;Within precautions   PT: Gait  Supervision/stand-by assist;Assistive device;Within precautions;50 feet   Interventions   Interventions Quick Adds Therapeutic Activity   Therapeutic Activity   Therapeutic Activities: dynamic activities to improve functional performance Minutes (89856) 40   Symptoms Noted During/After Treatment Fatigue;Increased pain   Treatment Detail/Skilled Intervention Greeted pt supine in bed, agreed to PT. VSS on RA throughout session. Pt in L knee immobilizer, assisted orthotist to change into knee brace. Pt needing assist to lift LLE due to weakness. PT educated pt on TTWBing status, pt voiced understanding. Pt very particular with cares and needs to perform movements slowly. Pt performed supine>sit w/ mod A x 1, needing assist to safely lift LLE off of bed. Additional assist needed to scoot to EOB. Pt able to sit at EOB unsupported without LOB. Pt performed sit>stand x 3 w/ FWW and mod A x 2 from elevated bed height, needing assist to stand fully upright. Verbal cues for hand placement. Pt unsteady in standing and fearful of being any weight on LLE. With enouragement, pt able to perform toe touch WBing, tolerated well. Pt able to stand for ~30 seconds before needing to sit due to fatigue. Unable to attempt to ambulate on this date. Pt returned to supine w/ mod A x 2, needing assist to safely lift LLE back into bed and trunk control. Discussed discharge recommendations with pt, in agreement for TCU. Pt ended session supine in bed, with all needs met and call light within reach.   PT Discharge Planning   PT Plan Bed mobility; repeat sit>stands; initiate gait w/ FWW and WC follow as able   PT Discharge Recommendation (DC Rec) Transitional Care Facility   PT Rationale for DC Rec Pt is below baseline. Pt currently requiring A x 2 with all functional mobility. Pt presents with deficits in activity tolerance, balance, and strength. Due to these deficits, pt is a high falls risk and unsafe to initiate ambulation at this time. Pt  would benefit from continued skilled PT services via TCU to address deficits and improve IND with safety and functional mobility.   PT Brief overview of current status Supine>sit w/ mod A x 1; sit>stand w/ FWW and mod A x 2   Total Session Time   Timed Code Treatment Minutes 40   Total Session Time (sum of timed and untimed services) 50

## 2023-05-26 NOTE — PLAN OF CARE
Occupational Therapy: Orders received. Chart reviewed and discussed with care team.? Occupational Therapy not indicated. Spoke with PT, pt with mobility needs. Pt resides in [facility] and receives I/ADL assist at baseline. Plan for TCU at this time.? Defer OT to next level of care. Defer discharge recommendations to Care Team.? Will complete orders.

## 2023-05-26 NOTE — PROGRESS NOTES
[facility]     Renal Progress Note       SHORTHAND KEY FOR MY NOTES:  c = with, s = without, p = after, a = before, x = except, asx = asymptomatic, tx = transplant or treatment, sx = symptoms or symptomatic, cx = canceled or culture, rxn = reaction, yday = yesterday, nl = normal, abx = antibiotics, fxn = function, dx = diagnosis, dz = disease, m/h = melena/hematochezia, c/d/l/ha = cramping/dizziness/lightheadedness/headache, d/c = discharge or diarrhea/constipation, f/c/n/v = fevers/chills/nausea/vomiting, cp/sob = chest pain/shortness of breath, tbv = total body volume, rxn = reaction, tdc = tunneled dialysis catheter, pta = prior to admission, hd = hemodialysis, pd = peritoneal dialysis, hhd = home hemodialysis, edw = estimated dry wt         Assessment/Plan:     1.  Severe hyponatremia.  Pt's Na has risen to 133 c the fluid restriction and better control of pain.  A.  Avoid drinking as much beer at home.  B.  Loosen fluid restriction to 1800 ml every day.  If the Na drops tmrw, then would tighten it to 1500 ml every day.  C.  Follow Na daily.  D.  Manage pain.     2.  L distal femur fx s/p surgical repair.  Pain is decently controlled.  A.  Per Ortho.      3.  Anemia.  Pt's hb is stable in the 7s.    A.  Follow hb, clinically.  B.  Transfuse prn.    4.  EtOH abuse.  Pt is stable.  No signs of withdrawal at this time.  A.  Follow clinically.    5.  FEN.  Electrolytes are ok x low Na.  A.  Follow labs daily.  B.  Reg diet for now.     Thank you for this consultation.  I will sign off.  Please call Neph if any questions.        Interval History:     Pt is in pain post-surgery.  He is eating/drinking ok.  No n/v/twitching/confusion.          Medications and Allergies:       acetaminophen  975 mg Oral Q8H     amLODIPine  5 mg Oral Daily     apixaban ANTICOAGULANT  5 mg Oral BID     busPIRone  15 mg Oral BID     escitalopram  20 mg Oral Daily     fluticasone  2 spray Both Nostrils Daily      "lamoTRIgine  200 mg Oral At Bedtime     pantoprazole  40 mg Oral BID AC     polyethylene glycol  17 g Oral Daily     senna-docusate  1 tablet Oral BID     sodium chloride (PF)  3 mL Intracatheter Q8H     sodium chloride (PF)  3 mL Intracatheter Q8H     tamsulosin  0.4 mg Oral Daily     tranexamic acid  1 g Intravenous See Admin Instructions     traZODone  150 mg Oral At Bedtime     vancomycin  1,250 mg Intravenous Q12H     vitamin C  500 mg Oral Daily     cholecalciferol  50 mcg Oral Daily     zinc sulfate  220 mg Oral Daily     Allergies   Allergen Reactions     Lisinopril Other (See Comments)     Elevated potassium     Sertraline Diarrhea          Physical Exam:     Vitals were reviewed     , Blood pressure 126/68, pulse 90, temperature 98.4  F (36.9  C), temperature source Oral, resp. rate 15, height 1.702 m (5' 7\"), weight 90.7 kg (200 lb), SpO2 94 %.  Wt Readings from Last 3 Encounters:   05/22/23 90.7 kg (200 lb)   02/21/23 92.8 kg (204 lb 8 oz)   02/13/23 92.4 kg (203 lb 9.6 oz)     Intake/Output Summary (Last 24 hours) at 5/26/2023 1509  Last data filed at 5/26/2023 1443  Gross per 24 hour   Intake 1840 ml   Output 3350 ml   Net -1510 ml     GENERAL APPEARANCE: pleasant, NAD, alert  RESP: breathing ok  ABDOMEN: o/nd  EXTREMITIES/SKIN: LLE: braced, bruised, swollen foot  OTHER:  + vasquez c concentrated appearing urine         Data:     CBC RESULTS:     Recent Labs   Lab 05/26/23  1140 05/26/23  0649 05/25/23  0641 05/24/23  1424 05/24/23  0603 05/22/23  2328   WBC  --   --  10.9  --  9.2 10.8   RBC  --   --  2.85*  --  3.10* 4.36*   HGB 7.6* 7.3* 7.3* 7.5* 7.8* 10.9*   HCT  --   --  22.7*  --  23.8* 33.4*   PLT  --   --  250  --  232 313     Basic Metabolic Panel:  Recent Labs   Lab 05/26/23  0649 05/25/23  0959 05/25/23  0641 05/24/23  2337 05/24/23  2228 05/24/23  1843 05/24/23  1631 05/24/23  1104 05/24/23  0603 05/23/23  0826 05/23/23  0411 05/22/23  2328   *  --  131*  131* 128*  --  127*  --  125* " 124*  124*   < > 118* 116*   POTASSIUM 4.9  --  5.0  --   --   --   --   --  4.7  --  4.6 4.5   CHLORIDE 99  --  97*  --   --   --   --   --  90*  --  81* 79*   CO2 25  --  24  --   --   --   --   --  25  --  22 24   BUN 15.8  --  22.0  --   --   --   --   --  13.6  --  7.6* 8.3   CR 0.71  --  1.06  --   --   --   --   --  0.98  --  0.57* 0.62*   *  196* 165* 152*  --  133*  --  119*  --  121*   < > 91 105*   NIMISHA 8.3*  --  8.7*  --   --   --   --   --  8.4*  --  8.3* 8.3*    < > = values in this interval not displayed.     INRNo lab results found in last 7 days.   Attestation:   I have reviewed today's relevant vital signs, notes, medications, labs and imaging.    Mono Khan MD  [facility] - Nephrology  [phone]

## 2023-05-26 NOTE — PLAN OF CARE
Goal Outcome Evaluation:  Denies CP, SOB. Ace wrap on and hinge brace locked. All pt needs met at this time. Pt refused repositioning in bed, RN gave verbal ed on preventing pressure ulcers, pt verbalized understanding and still refused.

## 2023-05-26 NOTE — PROGRESS NOTES
Orthopedic Surgery  Suresh Powers  05/26/2023     Admit Date:  5/23/2023    POD: 1 Day Post-Op   Procedure(s):  OPEN REDUCTION INTERNAL FIXATION, FRACTURE, FEMUR, DISTAL     Patient resting comfortably in bed.    Pain controlled.  Tolerating oral intake.    Denies nausea or vomiting  Denies chest pain or shortness of breath  Denies numbness or tingling     Temp:  [97.6  F (36.4  C)-98.8  F (37.1  C)] 98.4  F (36.9  C)  Pulse:  [] 90  Resp:  [1-17] 15  BP: (114-142)/(60-87) 126/68  SpO2:  [94 %-100 %] 94 %    Alert and oriented  Dressing is clean, dry, and intact.   Minimal erythema of the surrounding skin, distal and proximal to dressing   Bilateral calves are soft, non-tender.  Right lower extremity is NVI.  Sensation intact bilateral lower extremities  Patient able to resist dorsi and plantar flexion bilaterally  +Dp pulse    Labs:  Recent Labs   Lab Test 05/26/23  1140 05/26/23  0649 05/25/23  0641 05/24/23  1424 05/24/23  0603 05/22/23  2328   WBC  --   --  10.9  --  9.2 10.8   HGB 7.6* 7.3* 7.3*   < > 7.8* 10.9*   PLT  --   --  250  --  232 313    < > = values in this interval not displayed.     Recent Labs   Lab Test 11/30/22  1221 11/29/22  1352 06/15/21  1131   INR 1.04 1.02 0.87     No lab results found.      1. PLAN:   Continue Eliquis for DVT prophylaxis.     Mobilize with PT/OT    Partial weightbearing 25%, Hinged knee brace ordered. Locked in extension with ambulation/sleep. Unlocked at rest   Continue current pain regimen   Dressings: Keep intact.  Change if >60% saturated or peeling off.    Follow-up: 2 weeks post-op with Dr. Antoine Pompa    2. Disposition   Anticipate d/c to TCU when medically cleared and progressing in PT.    Rachelle Parker PA-C

## 2023-05-26 NOTE — PROGRESS NOTES
S: Pt. seen at the [facility]. Male. Kayleen CARSON. RX: Hinged knee brace. DX: S/P ORIF distal femur.  O:A: Today I F/D a Breg PO Kathleen KO left to support surgical site. Donning doffing wear and care instructions given verbally and hard copy.   P: Pt. to be seen as needed.    Robin ALEXANDER,LO

## 2023-05-27 LAB
ANION GAP SERPL CALCULATED.3IONS-SCNC: 7 MMOL/L (ref 7–15)
BLD PROD TYP BPU: NORMAL
BLOOD COMPONENT TYPE: NORMAL
BUN SERPL-MCNC: 15.9 MG/DL (ref 8–23)
CALCIUM SERPL-MCNC: 8.1 MG/DL (ref 8.8–10.2)
CHLORIDE SERPL-SCNC: 99 MMOL/L (ref 98–107)
CODING SYSTEM: NORMAL
CREAT SERPL-MCNC: 0.71 MG/DL (ref 0.67–1.17)
CREAT SERPL-MCNC: 0.71 MG/DL (ref 0.67–1.17)
CROSSMATCH: NORMAL
DEPRECATED HCO3 PLAS-SCNC: 29 MMOL/L (ref 22–29)
GFR SERPL CREATININE-BSD FRML MDRD: >90 ML/MIN/1.73M2
GFR SERPL CREATININE-BSD FRML MDRD: >90 ML/MIN/1.73M2
GLUCOSE BLDC GLUCOMTR-MCNC: 195 MG/DL (ref 70–99)
GLUCOSE SERPL-MCNC: 180 MG/DL (ref 70–99)
HGB BLD-MCNC: 6.3 G/DL (ref 13.3–17.7)
HGB BLD-MCNC: 8.5 G/DL (ref 13.3–17.7)
ISSUE DATE AND TIME: NORMAL
POTASSIUM SERPL-SCNC: 4.2 MMOL/L (ref 3.4–5.3)
SODIUM SERPL-SCNC: 135 MMOL/L (ref 136–145)
UNIT ABO/RH: NORMAL
UNIT NUMBER: NORMAL
UNIT STATUS: NORMAL
UNIT TYPE ISBT: 5100

## 2023-05-27 PROCEDURE — 36415 COLL VENOUS BLD VENIPUNCTURE: CPT

## 2023-05-27 PROCEDURE — 85018 HEMOGLOBIN: CPT | Performed by: HOSPITALIST

## 2023-05-27 PROCEDURE — 99232 SBSQ HOSP IP/OBS MODERATE 35: CPT | Performed by: HOSPITALIST

## 2023-05-27 PROCEDURE — 250N000013 HC RX MED GY IP 250 OP 250 PS 637

## 2023-05-27 PROCEDURE — 250N000011 HC RX IP 250 OP 636: Performed by: HOSPITALIST

## 2023-05-27 PROCEDURE — 999N000128 HC STATISTIC PERIPHERAL IV START W/O US GUIDANCE

## 2023-05-27 PROCEDURE — 36415 COLL VENOUS BLD VENIPUNCTURE: CPT | Performed by: HOSPITALIST

## 2023-05-27 PROCEDURE — 250N000013 HC RX MED GY IP 250 OP 250 PS 637: Performed by: INTERNAL MEDICINE

## 2023-05-27 PROCEDURE — 87040 BLOOD CULTURE FOR BACTERIA: CPT | Performed by: HOSPITALIST

## 2023-05-27 PROCEDURE — 85018 HEMOGLOBIN: CPT

## 2023-05-27 PROCEDURE — 120N000001 HC R&B MED SURG/OB

## 2023-05-27 PROCEDURE — P9016 RBC LEUKOCYTES REDUCED: HCPCS

## 2023-05-27 PROCEDURE — 99207 PR CDG-CUT & PASTE-POTENTIAL IMPACT ON LEVEL: CPT | Performed by: HOSPITALIST

## 2023-05-27 PROCEDURE — 80048 BASIC METABOLIC PNL TOTAL CA: CPT

## 2023-05-27 RX ORDER — CEFAZOLIN SODIUM 2 G/100ML
2 INJECTION, SOLUTION INTRAVENOUS EVERY 8 HOURS
Status: COMPLETED | OUTPATIENT
Start: 2023-05-27 | End: 2023-05-28

## 2023-05-27 RX ADMIN — APIXABAN 5 MG: 5 TABLET, FILM COATED ORAL at 09:31

## 2023-05-27 RX ADMIN — ESCITALOPRAM OXALATE 20 MG: 10 TABLET ORAL at 09:31

## 2023-05-27 RX ADMIN — LAMOTRIGINE 200 MG: 100 TABLET ORAL at 21:32

## 2023-05-27 RX ADMIN — PANTOPRAZOLE SODIUM 40 MG: 40 TABLET, DELAYED RELEASE ORAL at 15:53

## 2023-05-27 RX ADMIN — ZINC SULFATE 220 MG (50 MG) CAPSULE 220 MG: CAPSULE at 09:31

## 2023-05-27 RX ADMIN — AMLODIPINE BESYLATE 5 MG: 5 TABLET ORAL at 09:31

## 2023-05-27 RX ADMIN — Medication 50 MCG: at 09:31

## 2023-05-27 RX ADMIN — ACETAMINOPHEN 975 MG: 325 TABLET ORAL at 21:32

## 2023-05-27 RX ADMIN — SENNOSIDES AND DOCUSATE SODIUM 1 TABLET: 50; 8.6 TABLET ORAL at 09:30

## 2023-05-27 RX ADMIN — BUSPIRONE HYDROCHLORIDE 15 MG: 5 TABLET ORAL at 21:31

## 2023-05-27 RX ADMIN — TRAZODONE HYDROCHLORIDE 150 MG: 50 TABLET ORAL at 21:31

## 2023-05-27 RX ADMIN — OXYCODONE HYDROCHLORIDE 10 MG: 5 TABLET ORAL at 15:53

## 2023-05-27 RX ADMIN — OXYCODONE HYDROCHLORIDE AND ACETAMINOPHEN 500 MG: 500 TABLET ORAL at 09:31

## 2023-05-27 RX ADMIN — OXYCODONE HYDROCHLORIDE 10 MG: 5 TABLET ORAL at 09:31

## 2023-05-27 RX ADMIN — SENNOSIDES AND DOCUSATE SODIUM 1 TABLET: 50; 8.6 TABLET ORAL at 21:32

## 2023-05-27 RX ADMIN — CEFAZOLIN SODIUM 2 G: 2 INJECTION, SOLUTION INTRAVENOUS at 15:54

## 2023-05-27 RX ADMIN — PANTOPRAZOLE SODIUM 40 MG: 40 TABLET, DELAYED RELEASE ORAL at 05:03

## 2023-05-27 RX ADMIN — TAMSULOSIN HYDROCHLORIDE 0.4 MG: 0.4 CAPSULE ORAL at 09:30

## 2023-05-27 RX ADMIN — OXYCODONE HYDROCHLORIDE 10 MG: 5 TABLET ORAL at 21:32

## 2023-05-27 RX ADMIN — ACETAMINOPHEN 975 MG: 325 TABLET ORAL at 05:03

## 2023-05-27 RX ADMIN — ACETAMINOPHEN 975 MG: 325 TABLET ORAL at 15:14

## 2023-05-27 RX ADMIN — BUSPIRONE HYDROCHLORIDE 15 MG: 5 TABLET ORAL at 09:31

## 2023-05-27 RX ADMIN — APIXABAN 5 MG: 5 TABLET, FILM COATED ORAL at 21:32

## 2023-05-27 RX ADMIN — METHOCARBAMOL 500 MG: 500 TABLET ORAL at 20:04

## 2023-05-27 ASSESSMENT — ACTIVITIES OF DAILY LIVING (ADL)
ADLS_ACUITY_SCORE: 46

## 2023-05-27 NOTE — PROGRESS NOTES
Care Management Follow Up    Length of Stay (days): 4    Expected Discharge Date: 05/28/2023     Concerns to be Addressed:     Discharge Planning   Patient plan of care discussed at interdisciplinary rounds: Yes    Anticipated Discharge Disposition:  TCU Placement     Anticipated Discharge Services:  therapy  Anticipated Discharge DME:  N/A    Patient/family educated on Medicare website which has current facility and service quality ratings:  no  Education Provided on the Discharge Plan:  no  Patient/Family in Agreement with the Plan:  yes    Referrals Placed by CM/SW:  TCU referrals  Private pay costs discussed: Not applicable    Additional Information:  Call placed to [facility] to update them as to patient's status.  Per [facility], who is covering admissions today, they would be able to accept patient tomorrow.    Will continue to follow.        OSMEL Gomez, [facility]    [phone]  [facility]

## 2023-05-27 NOTE — PROGRESS NOTES
Care Management Follow Up    Length of Stay (days): 4    Expected Discharge Date: 05/28/2023     Concerns to be Addressed:       Patient plan of care discussed at interdisciplinary rounds: Yes    Anticipated Discharge Disposition:       Anticipated Discharge Services:    Anticipated Discharge DME:      Patient/family educated on Medicare website which has current facility and service quality ratings:    Education Provided on the Discharge Plan:    Patient/Family in Agreement with the Plan:      Referrals Placed by CM/SW:    Private pay costs discussed: Not applicable    Additional Information:  Writer spoke with the patient in their room. Writer stated their name, position, and reason for contact. Patient stated that he was updated that [facility] had accepted him by the previous SW. Patient stated that he was at [facility] at the beginning of the year. Patient stated that [facility] has only private rooms and that he didn't pay for one last time. Writer explained to the patient that usually insurance would cover for a shared room and not a private one. Writer explained to the patient that maybe he is confused because [facility] has shared rooms which are really semi private, with the only thing shared is the bathroom. Patient stated that he still wanted a private room. Patient stated that last time he was transported to [facility] via a wheelchair through the skyway with a [facility] person. Writer explained that the position doesn't exist anymore and explained transportation options to the patient. Patient decided to do the [employer] transport since he had no one to be able to transport him over. Patient stated he might need to transport over in a stretcher because he cannot bend his leg. Writer explained to the patient that he will need to do an [employer] auth and then continue on with the process. Patient confirmed understanding.    Writer completed [employer] auth. Case # : [id number]. Auth code is: [id number]. The Authorization is valid  between 05/28-06-05. Writer will talk with the RN to discuss trasnportation method for the patient. [facility] will update [facility] on Auth. SW will follow for discharge.    Addendum 1616:  [facility] spoke with the RN. RN stated that the patient would be appropriate to go in a stretcher due to his knee immobilizer and she hasn't seen the patient even more. Writer called [facility] [phone] and spoke with [facility] to update them on the Auth and anticipated discharge once medically ready. Writer completed PAS and placed it in front of the patient chart. Writer will follow the patient tomorrow.    [facility]  Social Work

## 2023-05-27 NOTE — PROVIDER NOTIFICATION
Provider Notification     Notified Person: Swapna    Notification Date/Time: 5/27/2023 - 7:58 AM    Notification Interaction: Vocera Web Paging    Purpose of Notification: Critical hemoglobin of 6.3. Continue with conditional orders to transfuse? Please acknowledge that you have received this message. Thank you    Orders Received: Continue with conditional orders to transfuse

## 2023-05-27 NOTE — PROGRESS NOTES
Orthopedic Surgery  Suresh Powers  05/27/2023     Admit Date:  5/23/2023    POD: 2 Days Post-Op   Procedure(s):  OPEN REDUCTION INTERNAL FIXATION, FRACTURE, FEMUR, DISTAL     Patient resting comfortably in bed. About to get blood.   Pain controlled.  Tolerating oral intake.    Denies nausea or vomiting  Denies chest pain or shortness of breath  Denies numbness or tingling     Temp:  [97.4  F (36.3  C)-98.5  F (36.9  C)] 98.5  F (36.9  C)  Pulse:  [] 99  Resp:  [16-18] 18  BP: (113-148)/(54-91) 144/91  SpO2:  [93 %-95 %] 94 %    Alert and oriented  Dressing is clean, dry, and intact. Hinged knee brace in place, well fitted over ACE  Minimal erythema of the surrounding skin, distal and proximal to dressing   Bilateral calves are soft, non-tender.  Right lower extremity is NVI.  Sensation intact bilateral lower extremities  Patient able to resist dorsi and plantar flexion bilaterally  +Dp pulse    Labs:  Recent Labs   Lab Test 05/27/23  0709 05/26/23  1140 05/26/23  0649 05/25/23  0641 05/24/23  1424 05/24/23  0603 05/22/23  2328   WBC  --   --   --  10.9  --  9.2 10.8   HGB 6.3* 7.6* 7.3* 7.3*   < > 7.8* 10.9*   PLT  --   --   --  250  --  232 313    < > = values in this interval not displayed.     Recent Labs   Lab Test 11/30/22  1221 11/29/22  1352 06/15/21  1131   INR 1.04 1.02 0.87     No lab results found.      1. S/p left distal femur ORIF  2. Anemia  - getting transfused    PLAN:   Continue Eliquis for DVT prophylaxis.     Mobilize with PT/OT    Partial weightbearing 25%, Hinged knee brace ordered. Locked in extension with ambulation/sleep. Unlocked at rest   Continue current pain regimen   Dressings: Keep intact.  Change if >60% saturated or peeling off.    Follow-up: 2 weeks post-op with Dr. Atnoine Pompa    2. Disposition   Anticipate d/c to TCU when medically cleared and progressing in PT.    Kjerstin L Foss, PA-C

## 2023-05-27 NOTE — PROVIDER NOTIFICATION
MD Notification    Notified Person: MD    Notified Person Name: Evagnelista Grimes    Notification Date/Time: 23:07 05/26/23    Notification Interaction: [place]    Purpose of Notification: Pt has a baby IV, needs TKO to keep the line open    Orders Received:    Comments:

## 2023-05-27 NOTE — PROGRESS NOTES
1500 - 2330  Patient vital signs are at baseline: Yes  Patient able to ambulate as they were prior to admission or with assist devices provided by therapies during their stay:  No, pt is on bedrest, not oob this shift  Patient MUST void prior to discharge:  Prieto cath in place  Patient able to tolerate oral intake: Yes, pt is on a regular diet  Pain has adequate pain control using Oral analgesics:  Yes  Does patient have an identified : Yes  Has goal D/C date and time been discussed with patient:  TBD     Pt is also toe touch weight bearing, VSS on RA and has a knee immobilizer. Pain was managed with oxycodone, Robaxin and Tylenol this shift.

## 2023-05-27 NOTE — PROGRESS NOTES
[facility]    Medicine Progress Note - Hospitalist Service    Date of Admission:  5/23/2023    Assessment & Plan      Suresh Powers is a 62 year old male with chronic alcohol use, s/p left intertrochanteric fracture in September 2022 due to fall while intoxicated, who presented on 5/23/2023 after a fall due to intoxication.  He lives in assisted living, has poor balance at baseline.  Went out to have drinks, alcohol intoxicated, tried using walker and lost his balance leading to fall.  Found to have left distal femur fracture, severe hyponatremia with sodium of 116.  Head CT showed no acute changes.        Severe hyponatremia, sodium 116 on 5/23/2023-due to SIADH  Chronic hyponatremia-likely due to beer potomania  -Nephrology consulted.  Managed with IV normal saline initially and fluid restriction.  -Sodium now improved to 134  -Nephrology signed off.  Continue fluid restriction at 1800 mL per day  -Monitor sodium      Acute comminuted mildly displaced fracture of distal left femoral metaphysis, s/p ORIF with interlocking distal femur plate, 5/25/2023  -Management per orthopedics  -PT/OT  -Partial weightbearing, 25%, hinged knee brace-locked in extension with ambulation/sleep, unlocked at rest  -Continue pain control-Tylenol  -Follow-up with Dr. Antoine Pompa in 2 post    Staph bacteremia, 5/26/2023, most likely contaminant  ?  Left upper extremity cellulitis due to peripheral IV  -Peripheral IV was removed on 5/25.  Reported purulence noted at site.  Blood cultures were obtained and patient was started on IV cefazolin, then switched to IV vancomycin with concern for GPC in clusters, possibly MRSA.  -Results showing variety of staph species in blood cultures.  This is most likely contaminant.  Repeat blood cultures on 5/27 with no growth so far.  Has finished 4 days of IV cefazolin.  Will discontinue antibiotics today.  Cellulitic changes on left upper extremity not impressive.  Noted  low-grade temp of 100 this morning, however patient also notes that he was having significant pain in left lower extremity, see below regarding diagnosis of new foot fractures.  This was likely driving the low-grade temp and not ongoing infection.    Left ankle/foot fractures  CT ankle left without contrast obtained on 5/28 per Ortho given persistent pain and recent injury.  This showed: 1.  Acute nondisplaced fracture of the lateral malleolus extending to the distal fibular metaphysis. 2.  Probable additional acute nondisplaced fracture of the fourth metatarsal base. Equivocal fracture of the second metatarsal base. MRI could assess further.  -Nonoperative management per orthopedics  -Consult to orthotics for CAM Walker, boot immobilization.  Weight-bear as tolerated.  -Follow-up with orthopedics as arranged/recommended by them.    Acute alcohol intoxication blood alcohol level 0.23 g/dL  Chronic alcohol use disorder  -Had 5-6 beers prior to admission.  States he drinks socially, few beers every night.  Denies any prior issues with alcohol withdrawal/seizures.  Counseled on cutting down/quitting alcohol use.  -Monitor for withdrawal.  Keep on CIWA protocol.  No evidence of withdrawal so far.    Acute blood loss anemia on top of chronic normocytic anemia  GERD  History of GI bleed due to duodenal ulcer 11/2022  -Was admitted 11/2022 with hemoglobin 3.8-due to upper GI bleed from duodenal ulcer  -Hemoglobin was 10.9 on 5/22, decreased to 6.3 on 5/27, most likely due to blood loss due to surgery, bruising.  Transfused 1 unit PRBC on 5/27.  -Monitor hemoglobin.  Transfuse to keep over 7  -Continue Protonix 40 mg twice daily    S/p left hip cephalomedullary nailing, 9/4/2022 for left intertrochanteric femur fracture  -Noted         History of pulmonary embolism, 11/2022, right lower lobe  -Resume apixaban.  Was held for surgery     Essential hypertension  - Blood pressure currently in normal range  - continue amlodipine  "10 mg daily.       Chronic depression  Generalized anxiety disorder  OCD  -Continue BuSpar, Lexapro, trazodone, lamotrigine  -Consult psychiatry regarding alternative        BPH  Solitary kidney-donated left kidney 2 sisters in 1990  Urinary retention-s/p Prieto placement  -Has longstanding history of urinary retention especially after surgeries and use of narcotics.    -Required Prieto during previous hospitalization for femur fracture.  Was discharged on Prieto at that time.  -Now has Prieto in place due to urinary retention.  Keep Prieto in for now, plan to continue at discharge.  -Continue Flomax            Diet: Advance Diet as Tolerated: Regular Diet Adult  Fluid restriction 1800 ML FLUID    DVT Prophylaxis: DOAC  Prieto Catheter: PRESENT, indication: Retention  Lines: None     Cardiac Monitoring: None  Code Status: Full Code      Clinically Significant Risk Factors              # Hypoalbuminemia: Lowest albumin = 3.1 g/dL at 5/24/2023  6:03 AM, will monitor as appropriate     # Hypertension: Noted on problem list        # Obesity: Estimated body mass index is 31.32 kg/m  as calculated from the following:    Height as of this encounter: 1.702 m (5' 7\").    Weight as of this encounter: 90.7 kg (200 lb).           Disposition Plan     Expected Discharge Date: 05/28/2023                  Tequila Barcenas MD  Hospitalist Service  [facility]  Securely message with [employer] (more info)  Text page via [employer] Paging/Directory   ______________________________________________________________________    Interval History   Patient apparently had significant pain in left lower extremity earlier this morning, also admits to feeling hot and cold at that time and had a low-grade temp of 100.  Suspect temp was related to his pain and him having hot flashes per his report.  Subsequently has been diagnosed with left foot fractures, see above.  Nonoperative management per Ortho.  Anticipate discharge to [facility]" tomorrow.    Physical Exam   Vital Signs: Temp: 98.5  F (36.9  C) Temp src: Oral BP: (!) 140/78 Pulse: 94   Resp: 18 SpO2: 97 % O2 Device: None (Room air)    Weight: 200 lbs 0 oz    Constitutional - awake and alert, resting in bed, in no acute distress, obese male  Cardiovascular - regular rate and rhythm, no murmurs, no edema  Pulmonary - lungs are clear to auscultation bilaterally, no wheezing or rhonchi  GI - abdomen is soft, nontender, nondistended, no hepatosplenomegaly or masses  Integumentary - skin is warm and dry, no rashes or ulcers  Neurological - awake, alert and oriented x3.  Moving all 4 extremities, normal speech, no focal deficits    Medical Decision Making       60 MINUTES SPENT BY ME on the date of service doing chart review, history, exam, documentation & further activities per the note.      Data     I have personally reviewed the following data over the past 24 hrs:    N/A  \   6.3 (LL)   / N/A     135 (L) 99 15.9 /  180 (H)   4.2 29 0.71; 0.71 \       Imaging results reviewed over the past 24 hrs:   No results found for this or any previous visit (from the past 24 hour(s)).

## 2023-05-27 NOTE — PLAN OF CARE
Shift: 0700 - 1530  Vitals: VSS except hypertensive at times on RA  Pain: LLE pain managed with PRN oxycodone (given x1) and ice packs  IV Access: PIV access lost, pending vascular consult  Cognitive/Behavioral: WDL, A&Ox4  Respiratory: WDL   Cardiac: WDL  Neurovascular: WDL, CMS intact  GI/: Prieto catheter, good output. BM x1 this shift  Skin: Blanchable redness on sacrum/coccyx, LLE surgical incision  Mobility: Assist 2 with lift  Diet: Regular, good appetite  D/C Plan: Pending  Other: Received 1 unit PRBC this AM due to critical hgb - no adverse reactions

## 2023-05-28 ENCOUNTER — ANESTHESIA EVENT (OUTPATIENT)
Dept: MEDSURG UNIT | Facility: CLINIC | Age: 63
DRG: 481 | End: 2023-05-28
Payer: COMMERCIAL

## 2023-05-28 ENCOUNTER — APPOINTMENT (OUTPATIENT)
Dept: CT IMAGING | Facility: CLINIC | Age: 63
DRG: 481 | End: 2023-05-28
Attending: PHYSICIAN ASSISTANT
Payer: COMMERCIAL

## 2023-05-28 ENCOUNTER — ANESTHESIA (OUTPATIENT)
Dept: MEDSURG UNIT | Facility: CLINIC | Age: 63
DRG: 481 | End: 2023-05-28
Payer: COMMERCIAL

## 2023-05-28 LAB
ANION GAP SERPL CALCULATED.3IONS-SCNC: 9 MMOL/L (ref 7–15)
BUN SERPL-MCNC: 14.6 MG/DL (ref 8–23)
CALCIUM SERPL-MCNC: 8.4 MG/DL (ref 8.8–10.2)
CHLORIDE SERPL-SCNC: 96 MMOL/L (ref 98–107)
CREAT SERPL-MCNC: 0.69 MG/DL (ref 0.67–1.17)
DEPRECATED HCO3 PLAS-SCNC: 29 MMOL/L (ref 22–29)
GFR SERPL CREATININE-BSD FRML MDRD: >90 ML/MIN/1.73M2
GLUCOSE SERPL-MCNC: 181 MG/DL (ref 70–99)
HGB BLD-MCNC: 8 G/DL (ref 13.3–17.7)
POTASSIUM SERPL-SCNC: 4.2 MMOL/L (ref 3.4–5.3)
SODIUM SERPL-SCNC: 134 MMOL/L (ref 136–145)

## 2023-05-28 PROCEDURE — 250N000013 HC RX MED GY IP 250 OP 250 PS 637

## 2023-05-28 PROCEDURE — 120N000001 HC R&B MED SURG/OB

## 2023-05-28 PROCEDURE — L4360 PNEUMAT WALKING BOOT PRE CST: HCPCS

## 2023-05-28 PROCEDURE — 36415 COLL VENOUS BLD VENIPUNCTURE: CPT

## 2023-05-28 PROCEDURE — 80048 BASIC METABOLIC PNL TOTAL CA: CPT

## 2023-05-28 PROCEDURE — 73700 CT LOWER EXTREMITY W/O DYE: CPT | Mod: LT

## 2023-05-28 PROCEDURE — 250N000011 HC RX IP 250 OP 636: Performed by: HOSPITALIST

## 2023-05-28 PROCEDURE — 99207 PR CDG-CUT & PASTE-POTENTIAL IMPACT ON LEVEL: CPT | Performed by: HOSPITALIST

## 2023-05-28 PROCEDURE — 99232 SBSQ HOSP IP/OBS MODERATE 35: CPT | Performed by: HOSPITALIST

## 2023-05-28 PROCEDURE — 85018 HEMOGLOBIN: CPT | Performed by: HOSPITALIST

## 2023-05-28 PROCEDURE — 250N000013 HC RX MED GY IP 250 OP 250 PS 637: Performed by: INTERNAL MEDICINE

## 2023-05-28 RX ADMIN — METHOCARBAMOL 500 MG: 500 TABLET ORAL at 01:54

## 2023-05-28 RX ADMIN — ESCITALOPRAM OXALATE 20 MG: 10 TABLET ORAL at 09:00

## 2023-05-28 RX ADMIN — SENNOSIDES AND DOCUSATE SODIUM 1 TABLET: 50; 8.6 TABLET ORAL at 21:11

## 2023-05-28 RX ADMIN — OXYCODONE HYDROCHLORIDE 10 MG: 5 TABLET ORAL at 01:54

## 2023-05-28 RX ADMIN — CEFAZOLIN SODIUM 2 G: 2 INJECTION, SOLUTION INTRAVENOUS at 17:35

## 2023-05-28 RX ADMIN — TRAZODONE HYDROCHLORIDE 150 MG: 50 TABLET ORAL at 21:11

## 2023-05-28 RX ADMIN — HYDROXYZINE HYDROCHLORIDE 25 MG: 25 TABLET, FILM COATED ORAL at 12:22

## 2023-05-28 RX ADMIN — LAMOTRIGINE 200 MG: 100 TABLET ORAL at 21:12

## 2023-05-28 RX ADMIN — CEFAZOLIN SODIUM 2 G: 2 INJECTION, SOLUTION INTRAVENOUS at 09:48

## 2023-05-28 RX ADMIN — APIXABAN 5 MG: 5 TABLET, FILM COATED ORAL at 21:11

## 2023-05-28 RX ADMIN — OXYCODONE HYDROCHLORIDE 10 MG: 5 TABLET ORAL at 05:36

## 2023-05-28 RX ADMIN — ACETAMINOPHEN 975 MG: 325 TABLET ORAL at 05:35

## 2023-05-28 RX ADMIN — ZINC SULFATE 220 MG (50 MG) CAPSULE 220 MG: CAPSULE at 09:00

## 2023-05-28 RX ADMIN — SENNOSIDES AND DOCUSATE SODIUM 1 TABLET: 50; 8.6 TABLET ORAL at 09:00

## 2023-05-28 RX ADMIN — ACETAMINOPHEN 975 MG: 325 TABLET ORAL at 13:58

## 2023-05-28 RX ADMIN — METHOCARBAMOL 500 MG: 500 TABLET ORAL at 21:12

## 2023-05-28 RX ADMIN — OXYCODONE HYDROCHLORIDE 10 MG: 5 TABLET ORAL at 13:59

## 2023-05-28 RX ADMIN — AMLODIPINE BESYLATE 5 MG: 5 TABLET ORAL at 09:00

## 2023-05-28 RX ADMIN — OXYCODONE HYDROCHLORIDE AND ACETAMINOPHEN 500 MG: 500 TABLET ORAL at 09:00

## 2023-05-28 RX ADMIN — BUSPIRONE HYDROCHLORIDE 15 MG: 5 TABLET ORAL at 09:00

## 2023-05-28 RX ADMIN — TAMSULOSIN HYDROCHLORIDE 0.4 MG: 0.4 CAPSULE ORAL at 09:00

## 2023-05-28 RX ADMIN — APIXABAN 5 MG: 5 TABLET, FILM COATED ORAL at 09:00

## 2023-05-28 RX ADMIN — OXYCODONE HYDROCHLORIDE 10 MG: 5 TABLET ORAL at 21:12

## 2023-05-28 RX ADMIN — POLYETHYLENE GLYCOL 400 AND PROPYLENE GLYCOL 2 DROP: 4; 3 SOLUTION/ DROPS OPHTHALMIC at 21:23

## 2023-05-28 RX ADMIN — BUSPIRONE HYDROCHLORIDE 15 MG: 5 TABLET ORAL at 21:12

## 2023-05-28 RX ADMIN — PANTOPRAZOLE SODIUM 40 MG: 40 TABLET, DELAYED RELEASE ORAL at 17:10

## 2023-05-28 RX ADMIN — PANTOPRAZOLE SODIUM 40 MG: 40 TABLET, DELAYED RELEASE ORAL at 05:36

## 2023-05-28 RX ADMIN — OXYCODONE HYDROCHLORIDE 10 MG: 5 TABLET ORAL at 09:47

## 2023-05-28 RX ADMIN — CEFAZOLIN SODIUM 2 G: 2 INJECTION, SOLUTION INTRAVENOUS at 02:34

## 2023-05-28 RX ADMIN — Medication 50 MCG: at 09:00

## 2023-05-28 RX ADMIN — POLYETHYLENE GLYCOL 400 AND PROPYLENE GLYCOL 2 DROP: 4; 3 SOLUTION/ DROPS OPHTHALMIC at 09:11

## 2023-05-28 ASSESSMENT — ACTIVITIES OF DAILY LIVING (ADL)
ADLS_ACUITY_SCORE: 46
ADLS_ACUITY_SCORE: 50
ADLS_ACUITY_SCORE: 46
ADLS_ACUITY_SCORE: 50
ADLS_ACUITY_SCORE: 46
ADLS_ACUITY_SCORE: 50

## 2023-05-28 NOTE — PROGRESS NOTES
Here for left distal femur fx and hyponatremia. Hx ETOH use- CIWA score of o. A&O x4. CT this AM showed ankle fx and likely 2nd and 4th metatarsal base fx. Plan for orthotics to follow up for CAM boot fitting. CMS intact. L foot edematous with bruising noted, weak dorsi/plantar flexion. VSS. Hgb 8.0, MD aware. Given oxycodone, atarax and scheduled tylenol for pain. Prieto in place for retention.Regular diet. Skipping lunch. Up with lift. Plan for discharge to [facility] tomorrow pending CAM boot fitting by orthotics.PIV Sled.Continue to monitor.

## 2023-05-28 NOTE — PROGRESS NOTES
S.  Patient was seen today at the [facility] for an evaluation for a cam walker for their left foot/ankle as ordered.  O/G. help stabilize foot and ankle.  A.  Patient was fit with a medium short left pneumatic Cam Walker.  Cam walker was assembled by removing the soft liner from the foot plate and uprights, the patients foot and leg was positioned into the soft liner with the heel firmly sealed.  The liner was closed tightly and secured with the Velcro closure. The heel and foot were positioned in the rocker bottom foot plate and the uprights were contoured to fall at mid-line of the lower leg and secured to the Velcro.  The foot plate Velcro straps were secured, proximal straps first, then the distal strap.  The lower leg straps were attached starting distal and working proximal. The ankle joints were set at 90 degrees of dorsi/plantar flexion. Donning and doffing of the Cam Walker was explained.  P.   Patient/Staff were advised to contact this office with any problems or questions.    Jack ALEXANDER/ERNA

## 2023-05-28 NOTE — PROGRESS NOTES
Care Management Follow Up    Length of Stay (days): 5    Expected Discharge Date: 05/28/2023     Concerns to be Addressed:       Patient plan of care discussed at interdisciplinary rounds: Yes    Anticipated Discharge Disposition:       Anticipated Discharge Services:    Anticipated Discharge DME:      Patient/family educated on Medicare website which has current facility and service quality ratings:    Education Provided on the Discharge Plan:    Patient/Family in Agreement with the Plan:      Referrals Placed by CM/SW:    Private pay costs discussed: Not applicable    Additional Information:  Post rounds discussion with Charge RN. Care team is awaiting for the patient to be medically ready. Writer called [facility] [phone] and spoke with [facility]. [facility] stated that they can take the patient tomorrow as well if the patient is not ready today. Writer will wait for the MD decision. Writer will follow.      TIFF Caba  [facility]  Social Work

## 2023-05-28 NOTE — PROGRESS NOTES
Notified provider about indwelling vasquez catheter discussed removal or continued need.    Did provider choose to remove indwelling vasquez catheter? No. Plan to continue at discharge.    Provider's vasquez indication for keeping indwelling vasquez catheter: Retention.    Is there an order for indwelling vasquez catheter? Yes

## 2023-05-28 NOTE — PROGRESS NOTES
Orthopedic Surgery  Suresh Powers  05/28/2023     Admit Date:  5/23/2023    POD: 3 Days Post-Op   Procedure(s):  OPEN REDUCTION INTERNAL FIXATION, FRACTURE, FEMUR, DISTAL     Patient resting comfortably in bed. Complaining of pain in the foot radiating up the leg.  Got blood yesterday.  Discussed importance of keeping barriers between his skin and the brace and to let staff know if he notes areas of chronic rub.  Pain controlled.  Tolerating oral intake.    Denies nausea or vomiting  Denies chest pain or shortness of breath  Denies numbness or tingling     Temp:  [97.8  F (36.6  C)-100  F (37.8  C)] 100  F (37.8  C)  Pulse:  [] 111  Resp:  [16-20] 16  BP: (124-146)/(69-91) 140/69  SpO2:  [92 %-97 %] 94 %    Alert and oriented  Dressing is clean, dry, and intact. Hinged knee brace in place, well fitted over ACE  Minimal erythema of the surrounding skin, distal and proximal to dressing   Bilateral calves are soft, non-tender.  leftt lower extremity is NVI but with SIGNIFICANT ecchymosis and 2+ pitting edema to foot and toes.  Sensation intact bilateral lower extremities  Patient able to resist dorsi and plantar flexion bilaterally  +Dp pulse    Labs:  Recent Labs   Lab Test 05/27/23  1851 05/27/23  0709 05/26/23  1140 05/26/23  0649 05/25/23  0641 05/24/23  1424 05/24/23  0603 05/22/23  2328   WBC  --   --   --   --  10.9  --  9.2 10.8   HGB 8.5* 6.3* 7.6*   < > 7.3*   < > 7.8* 10.9*   PLT  --   --   --   --  250  --  232 313    < > = values in this interval not displayed.     Recent Labs   Lab Test 11/30/22  1221 11/29/22  1352 06/15/21  1131   INR 1.04 1.02 0.87     No lab results found.      CT ankle and hindfoot reviewed with Dr. Mario who will elaborate in more detail. There is an acute non-displaced Lisfranc injury on the left. Mortise is stable. Generalized osteopenia    1. S/p left distal femur ORIF  2. Anemia  - S/P transfusion yesterday  3. Right foot pain - Acute non-displaced Lisfranc injury - This  is stable and non-surgical at this time.  We will continue with protected weight bearing and aggressive elevation.  Will need regular skin checks and a short/Long CAM boot depending on which will fit distal to the hinged knee brace, will ask orthotist to consult for this.        PLAN:   AGGRESSIVE ELEVATION, and regular skin inspections   Orthotist consult for CAM boot on the left, this is to be on when up and transferring and okay to have off for skin inspections regularly   Continue Eliquis for DVT prophylaxis.     Mobilize with PT/OT    Partial weightbearing 25%, Hinged knee brace ordered. Locked in extension with ambulation/sleep. Unlocked at rest   Continue current pain regimen   Will order left hindfoot/ankle CT   Dressings: Keep intact.  Change if >60% saturated or peeling off.    Follow-up: 2 weeks post-op with Dr. Antoine Pompa     2. Disposition   Anticipate d/c to TCU when is fitted for CAM boot, medically cleared and progressing in PT.    Kjerstin L Foss, PA-C

## 2023-05-28 NOTE — PLAN OF CARE
Goal Outcome Evaluation: Continue plan of care    Date/Time:5/27-5/28 9515-4185 AM  Diagnosis:L Femur ORIF/Hyponatremia  POD;3  Mental Status: A/Ox4  Activity/dangle:Bedrest  Diet: regular  Pain: Robaxin, and oxycodone  Prieto/Voiding: Incontinent,Prieto for retention with adequate output.No BM this shift  Skin:BRIE/Refused.visible scattered bruising,skin tear in L elbow;dressing place.+2-3 adema in LE  02/LDA:VSS on RA, PIV patent/SL  D/C Date: Possible TCU discharge today.  Other Info: Immobilizer in place on L knee

## 2023-05-28 NOTE — PROGRESS NOTES
[facility]     Medicine Progress Note - Hospitalist Service     Date of Admission:  5/23/2023        Assessment & Plan  Suresh Powers is a 62 year old male with chronic alcohol use, s/p left intertrochanteric fracture in September 2022 due to fall while intoxicated, who presented on 5/23/2023 after a fall due to intoxication.  He lives in assisted living, has poor balance at baseline.  Went out to have drinks, alcohol intoxicated, tried using walker and lost his balance leading to fall.  Found to have left distal femur fracture, severe hyponatremia with sodium of 116.  Head CT showed no acute changes.          Severe hyponatremia, sodium 116 on 5/23/2023-due to SIADH  Chronic hyponatremia-likely due to beer potomania  -Nephrology consulted.  Managed with IV normal saline initially and fluid restriction.  -Sodium now improved to 135  -Nephrology signed off.  Continue fluid restriction at 1800 mL per day  -Monitor sodium        Acute comminuted mildly displaced fracture of distal left femoral metaphysis, s/p ORIF with interlocking distal femur plate, 5/25/2023  -Management per orthopedics  -PT/OT  -Partial weightbearing, 25%, hinged knee brace-locked in extension with ambulation/sleep, unlocked at rest  -Continue pain control-Tylenol  -Follow-up with Dr. Antoine Pompa in 2 post     Staph epidermidis bacteremia, 5/26/2023  ?  Left upper extremity cellulitis due to peripheral IV  -Peripheral IV was removed on 5/25.  Reported purulence noted at site.  Blood cultures were obtained and patient was started on IV cefazolin, then switched to IV vancomycin with concern for GPC in clusters, possibly MRSA.  -Results showing Staph epidermidis in blood cultures.  Unclear significance, possible contaminant.  Switched back to cefazolin.  Repeat blood cultures on 5/27.  Left arm does not show any impressive cellulitic changes.  Patient has been afebrile last few days.  No leukocytosis.        Acute  alcohol intoxication blood alcohol level 0.23 g/dL  Chronic alcohol use disorder  -Had 5-6 beers prior to admission.  States he drinks socially, few beers every night.  Denies any prior issues with alcohol withdrawal/seizures.  Counseled on cutting down/quitting alcohol use.  -Monitor for withdrawal.  Keep on CIWA protocol.  No evidence of withdrawal so far.     Acute blood loss anemia on top of chronic normocytic anemia  GERD  History of GI bleed due to duodenal ulcer 11/2022  -Was admitted 11/2022 with hemoglobin 3.8-due to upper GI bleed from duodenal ulcer  -Hemoglobin was 10.9 on 5/22, decreased to 6.3 on 5/27, most likely due to blood loss due to surgery, bruising.  Transfused 1 unit PRBC on 5/27.  -Monitor hemoglobin.  Transfuse to keep over 7  -Continue Protonix 40 mg twice daily     S/p left hip cephalomedullary nailing, 9/4/2022 for left intertrochanteric femur fracture  -Noted          History of pulmonary embolism, 11/2022, right lower lobe  -Resume apixaban.  Was held for surgery     Essential hypertension  - Blood pressure currently in normal range  - continue amlodipine 10 mg daily.       Chronic depression  Generalized anxiety disorder  OCD  -Continue BuSpar, Lexapro, trazodone, lamotrigine  -Consult psychiatry regarding alternative        BPH  Solitary kidney-donated left kidney 2 sisters in 1990  Urinary retention-s/p Prieto placement  -Has longstanding history of urinary retention especially after surgeries and use of narcotics.    -Required Prieto during previous hospitalization for femur fracture.  Was discharged on Prieto at that time.  -Now has Prieto in place due to urinary retention.  Keep Prieto in for now, plan to continue at discharge.  -Continue Flomax                 Diet: Advance Diet as Tolerated: Regular Diet Adult  Fluid restriction 1800 ML FLUID    DVT Prophylaxis: DOAC  Prieto Catheter: PRESENT, indication: Retention  Lines: None     Cardiac Monitoring: None  Code Status: Full Code   "        Clinically Significant Risk Factors              # Hypoalbuminemia: Lowest albumin = 3.1 g/dL at 5/24/2023  6:03 AM, will monitor as appropriate     # Hypertension: Noted on problem list        # Obesity: Estimated body mass index is 31.32 kg/m  as calculated from the following:    Height as of this encounter: 1.702 m (5' 7\").    Weight as of this encounter: 90.7 kg (200 lb).                  Disposition Plan     Expected Discharge Date: 05/28/2023                      Tequila Barcenas MD  Hospitalist Service  [facility]  Securely message with [employer] (more info)  Text page via [employer] Paging/Directory   ______________________________________________________________________        Interval History  Patient reports he is doing okay.  Pain is controlled.  No dizziness, no chest pain or shortness of breath.  Receiving blood transfusion for hemoglobin 6.3.  Denies any melena/hematochezia.           Physical Exam  Vital Signs: Temp: 98.5  F (36.9  C) Temp src: Oral BP: (!) 140/78 Pulse: 94   Resp: 18 SpO2: 97 % O2 Device: None (Room air)    Weight: 200 lbs 0 oz     Constitutional - awake and alert, resting in bed, in no acute distress, obese male  Cardiovascular - regular rate and rhythm, no murmurs, no edema  Pulmonary - lungs are clear to auscultation bilaterally, no wheezing or rhonchi  GI - abdomen is soft, nontender, nondistended, no hepatosplenomegaly or masses  Integumentary - skin is warm and dry, no rashes or ulcers  Neurological - awake, alert and oriented x3.  Moving all 4 extremities, normal speech, no focal deficits           Medical Decision Making      60 MINUTES SPENT BY ME on the date of service doing chart review, history, exam, documentation & further activities per the note.             Data      I have personally reviewed the following data over the past 24 hrs:     N/A  \   6.3 (LL)   / N/A      135 (L) 99 15.9 /  180 (H)   4.2 29 0.71; 0.71 \       Imaging results " reviewed over the past 24 hrs:   No results found for this or any previous visit (from the past 24 hour(s)).

## 2023-05-28 NOTE — PROGRESS NOTES
Here for left distal femur fx and hyponatremia. Hx ETOH use- CIWA score of 1. A&O x4. CT this AM showed ankle fx and likely 2nd and 4th metatarsal base fx. Plan for orthotics to follow up for CAM shoe fitting. CMS intact. L foot edematous with bruising noted, weak dorsi/plantar flexion. VSS. Hgb 8.0, MD aware. Given oxycodone for pain. Prieto in place for retention. Plan for discharge to TCU tomorrow.

## 2023-05-28 NOTE — PROGRESS NOTES
[facility]    Medicine Progress Note - Hospitalist Service    Date of Admission:  5/23/2023    Assessment & Plan      Suresh Powers is a 62 year old male with chronic alcohol use, s/p left intertrochanteric fracture in September 2022 due to fall while intoxicated, who presented on 5/23/2023 after a fall due to intoxication.  He lives in assisted living, has poor balance at baseline.  Went out to have drinks, alcohol intoxicated, tried using walker and lost his balance leading to fall.  Found to have left distal femur fracture, severe hyponatremia with sodium of 116.  Head CT showed no acute changes.        Severe hyponatremia, sodium 116 on 5/23/2023-due to SIADH  Chronic hyponatremia-likely due to beer potomania  -Nephrology consulted.  Managed with IV normal saline initially and fluid restriction.  -Sodium now improved to 134  -Nephrology signed off.  Continue fluid restriction at 1800 mL per day  -Monitor sodium      Acute comminuted mildly displaced fracture of distal left femoral metaphysis, s/p ORIF with interlocking distal femur plate, 5/25/2023  -Management per orthopedics  -PT/OT  -Partial weightbearing, 25%, hinged knee brace-locked in extension with ambulation/sleep, unlocked at rest  -Continue pain control-Tylenol  -Follow-up with Dr. Antoine Pompa in 2 post    Staph bacteremia, 5/26/2023, most likely contaminant  ?  Left upper extremity cellulitis due to peripheral IV  -Peripheral IV was removed on 5/25.  Reported purulence noted at site.  Blood cultures were obtained and patient was started on IV cefazolin, then switched to IV vancomycin with concern for GPC in clusters, possibly MRSA.  -Results showing variety of staph species in blood cultures.  This is most likely contaminant.  Repeat blood cultures on 5/27 with no growth so far.  Has finished 4 days of IV cefazolin.  Will discontinue antibiotics today.  Cellulitic changes on left upper extremity not impressive.  Noted  low-grade temp of 100 this morning, however patient also notes that he was having significant pain in left lower extremity, see below regarding diagnosis of new foot fractures.  This was likely driving the low-grade temp and not ongoing infection.    Left ankle/foot fractures  CT ankle left without contrast obtained on 5/28 per Ortho given persistent pain and recent injury.  This showed: 1.  Acute nondisplaced fracture of the lateral malleolus extending to the distal fibular metaphysis. 2.  Probable additional acute nondisplaced fracture of the fourth metatarsal base. Equivocal fracture of the second metatarsal base. MRI could assess further.  -Nonoperative management per orthopedics  -Consult to orthotics for CAM Walker, boot immobilization.  Weight-bear as tolerated.  -Follow-up with orthopedics as arranged/recommended by them.    Acute alcohol intoxication blood alcohol level 0.23 g/dL  Chronic alcohol use disorder  -Had 5-6 beers prior to admission.  States he drinks socially, few beers every night.  Denies any prior issues with alcohol withdrawal/seizures.  Counseled on cutting down/quitting alcohol use.  -Monitor for withdrawal.  Keep on CIWA protocol.  No evidence of withdrawal so far.    Acute blood loss anemia on top of chronic normocytic anemia  GERD  History of GI bleed due to duodenal ulcer 11/2022  -Was admitted 11/2022 with hemoglobin 3.8-due to upper GI bleed from duodenal ulcer  -Hemoglobin was 10.9 on 5/22, decreased to 6.3 on 5/27, most likely due to blood loss due to surgery, bruising.  Transfused 1 unit PRBC on 5/27.  Hb currently stable at 8.  -Monitor hemoglobin.  Transfuse to keep over 7  -Continue Protonix 40 mg twice daily    S/p left hip cephalomedullary nailing, 9/4/2022 for left intertrochanteric femur fracture  -Noted         History of pulmonary embolism, 11/2022, right lower lobe  -Resume apixaban.  Was held for surgery     Essential hypertension  - Blood pressure currently in normal  "range  - continue amlodipine 10 mg daily.       Chronic depression  Generalized anxiety disorder  OCD  -Continue BuSpar, Lexapro, trazodone, lamotrigine  -Consult psychiatry regarding alternative        BPH  Solitary kidney-donated left kidney 2 sisters in 1990  Urinary retention-s/p Prieto placement  -Has longstanding history of urinary retention especially after surgeries and use of narcotics.    -Required Prieto during previous hospitalization for femur fracture.  Was discharged on Prieto at that time.  -Now has Prieto in place due to urinary retention.  Keep Prieto in for now, plan to continue at discharge.  -Continue Flomax            Diet: Advance Diet as Tolerated: Regular Diet Adult  Fluid restriction 1800 ML FLUID    DVT Prophylaxis: DOAC  Prieto Catheter: PRESENT, indication: Retention  Lines: None     Cardiac Monitoring: None  Code Status: Full Code      Clinically Significant Risk Factors              # Hypoalbuminemia: Lowest albumin = 3.1 g/dL at 5/24/2023  6:03 AM, will monitor as appropriate     # Hypertension: Noted on problem list        # Obesity: Estimated body mass index is 31.32 kg/m  as calculated from the following:    Height as of this encounter: 1.702 m (5' 7\").    Weight as of this encounter: 90.7 kg (200 lb).           Disposition Plan      Expected Discharge Date: 05/29/2023                  Tequila Barcenas MD  Hospitalist Service  [facility]  Securely message with [employer] (more info)  Text page via [facility] Paging/Directory   ______________________________________________________________________    Interval History   Patient apparently had significant pain in left lower extremity earlier this morning, also admits to feeling hot and cold at that time and had a low-grade temp of 100.  Suspect temp was related to his pain and him having hot flashes per his report.  Subsequently has been diagnosed with left foot fractures, see above.  Nonoperative management per Ortho.  " Anticipate discharge to [place] tomorrow.    Physical Exam   Vital Signs: Temp: 99  F (37.2  C) Temp src: Oral BP: (!) 160/80 Pulse: 100   Resp: 18 SpO2: 91 % O2 Device: None (Room air)    Weight: 200 lbs 0 oz    Constitutional - awake and alert, resting in bed, in no acute distress, obese male  Cardiovascular - regular rate and rhythm, no murmurs, no edema  Pulmonary - lungs are clear to auscultation bilaterally, no wheezing or rhonchi  GI - abdomen is soft, nontender, nondistended, no hepatosplenomegaly or masses  Integumentary - skin is warm and dry, no rashes or ulcers  Neurological - awake, alert and oriented x3.  Moving all 4 extremities, normal speech, no focal deficits    Medical Decision Making       60 MINUTES SPENT BY ME on the date of service doing chart review, history, exam, documentation & further activities per the note.      Data     I have personally reviewed the following data over the past 24 hrs:    N/A  \   8.0 (L)   / N/A     134 (L) 96 (L) 14.6 /  181 (H)   4.2 29 0.69 \       Imaging results reviewed over the past 24 hrs:   Recent Results (from the past 24 hour(s))   CT Ankle Left w/o Contrast    Narrative    EXAM: CT ANKLE LEFT W/O CONTRAST  LOCATION: [facility]  DATE/TIME: 5/28/2023 8:42 AM CDT    INDICATION: Recent injury, hindfoot pain, rule out occult fracture  COMPARISON: X-rays 05/23/2023  TECHNIQUE: Noncontrast. Axial, sagittal and coronal thin-section reconstruction. Dose reduction techniques were used.     FINDINGS:   Diffuse bony demineralization.    There is an acute nondisplaced oblique fracture of the lateral malleolus below the level of the tibiotalar joint and extending proximally above the level tibiotalar joint to the distal fibular metaphysis. No widening of ankle mortise.    There is a small focus of cortical lucency involving the fourth metatarsal base medially which could represent an acute fracture. Tiny bone fragment along the plantar and  medial aspect of the second metatarsal base is more likely chronic. Mild cortical   irregularity of the second metatarsal base laterally indeterminate for fracture.    There is an old healed fracture of the fifth metatarsal. Subtalar joint fusion, with likely old screw tracks.    There is subcutaneous soft tissue stranding over the lateral aspect of the ankle which could represent edema or interstitial hemorrhage. No focal hematoma. Additional subcutaneous soft tissue stranding over the medial aspect ankle and dorsum of the foot.   Generalized muscle atrophy.      Impression    IMPRESSION:  1.  Acute nondisplaced fracture of the lateral malleolus extending to the distal fibular metaphysis.  2.  Probable additional acute nondisplaced fracture of the fourth metatarsal base. Equivocal fracture of the second metatarsal base. MRI could assess further.

## 2023-05-29 VITALS
DIASTOLIC BLOOD PRESSURE: 73 MMHG | HEIGHT: 67 IN | TEMPERATURE: 99.4 F | OXYGEN SATURATION: 93 % | HEART RATE: 99 BPM | BODY MASS INDEX: 31.39 KG/M2 | RESPIRATION RATE: 16 BRPM | WEIGHT: 200 LBS | SYSTOLIC BLOOD PRESSURE: 136 MMHG

## 2023-05-29 LAB
ANION GAP SERPL CALCULATED.3IONS-SCNC: 7 MMOL/L (ref 7–15)
BUN SERPL-MCNC: 15.3 MG/DL (ref 8–23)
CALCIUM SERPL-MCNC: 8.8 MG/DL (ref 8.8–10.2)
CHLORIDE SERPL-SCNC: 100 MMOL/L (ref 98–107)
CREAT SERPL-MCNC: 0.65 MG/DL (ref 0.67–1.17)
CREAT SERPL-MCNC: 0.65 MG/DL (ref 0.67–1.17)
DEPRECATED HCO3 PLAS-SCNC: 30 MMOL/L (ref 22–29)
GFR SERPL CREATININE-BSD FRML MDRD: >90 ML/MIN/1.73M2
GFR SERPL CREATININE-BSD FRML MDRD: >90 ML/MIN/1.73M2
GLUCOSE SERPL-MCNC: 121 MG/DL (ref 70–99)
HGB BLD-MCNC: 7.5 G/DL (ref 13.3–17.7)
POTASSIUM SERPL-SCNC: 4.7 MMOL/L (ref 3.4–5.3)
SODIUM SERPL-SCNC: 137 MMOL/L (ref 136–145)

## 2023-05-29 PROCEDURE — 250N000013 HC RX MED GY IP 250 OP 250 PS 637

## 2023-05-29 PROCEDURE — 85018 HEMOGLOBIN: CPT | Performed by: HOSPITALIST

## 2023-05-29 PROCEDURE — 99239 HOSP IP/OBS DSCHRG MGMT >30: CPT | Performed by: HOSPITALIST

## 2023-05-29 PROCEDURE — 250N000013 HC RX MED GY IP 250 OP 250 PS 637: Performed by: INTERNAL MEDICINE

## 2023-05-29 PROCEDURE — 36415 COLL VENOUS BLD VENIPUNCTURE: CPT | Performed by: HOSPITALIST

## 2023-05-29 PROCEDURE — 80048 BASIC METABOLIC PNL TOTAL CA: CPT

## 2023-05-29 RX ADMIN — Medication 50 MCG: at 09:04

## 2023-05-29 RX ADMIN — ZINC SULFATE 220 MG (50 MG) CAPSULE 220 MG: CAPSULE at 09:04

## 2023-05-29 RX ADMIN — METHOCARBAMOL 500 MG: 500 TABLET ORAL at 06:24

## 2023-05-29 RX ADMIN — SENNOSIDES AND DOCUSATE SODIUM 1 TABLET: 50; 8.6 TABLET ORAL at 09:03

## 2023-05-29 RX ADMIN — OXYCODONE HYDROCHLORIDE 10 MG: 5 TABLET ORAL at 06:24

## 2023-05-29 RX ADMIN — APIXABAN 5 MG: 5 TABLET, FILM COATED ORAL at 09:04

## 2023-05-29 RX ADMIN — ESCITALOPRAM OXALATE 20 MG: 10 TABLET ORAL at 09:03

## 2023-05-29 RX ADMIN — OXYCODONE HYDROCHLORIDE 10 MG: 5 TABLET ORAL at 13:34

## 2023-05-29 RX ADMIN — BUSPIRONE HYDROCHLORIDE 15 MG: 5 TABLET ORAL at 09:03

## 2023-05-29 RX ADMIN — PANTOPRAZOLE SODIUM 40 MG: 40 TABLET, DELAYED RELEASE ORAL at 06:24

## 2023-05-29 RX ADMIN — HYDROXYZINE HYDROCHLORIDE 25 MG: 25 TABLET, FILM COATED ORAL at 02:10

## 2023-05-29 RX ADMIN — AMLODIPINE BESYLATE 5 MG: 5 TABLET ORAL at 09:04

## 2023-05-29 RX ADMIN — TAMSULOSIN HYDROCHLORIDE 0.4 MG: 0.4 CAPSULE ORAL at 09:04

## 2023-05-29 RX ADMIN — OXYCODONE HYDROCHLORIDE AND ACETAMINOPHEN 500 MG: 500 TABLET ORAL at 09:03

## 2023-05-29 RX ADMIN — OXYCODONE HYDROCHLORIDE 10 MG: 5 TABLET ORAL at 02:10

## 2023-05-29 ASSESSMENT — ACTIVITIES OF DAILY LIVING (ADL)
ADLS_ACUITY_SCORE: 50
ADLS_ACUITY_SCORE: 48
ADLS_ACUITY_SCORE: 48
ADLS_ACUITY_SCORE: 50

## 2023-05-29 NOTE — PLAN OF CARE
Goal Outcome Evaluation:    Denies CP,SOB. CMS intact. [facility] PAC ortho said okay to look at skin that wasn't covered by ace that the brace touches on upper thigh, these spots were WDL.  Pt refused to be repositioned, RN gave verbal ed about preventing pressure ulcers. Pt verbalized understanding and still refused.

## 2023-05-29 NOTE — PROGRESS NOTES
Date/Time:5/28-5/29 7433-6362 AM  Diagnosis:L Femur ORIF/Hyponatremia  POD;4  Mental Status: A/Ox4  Activity/dangle:not OOB yet  Diet: regular  Pain: Robaxin, Atarax,and Oxycodone  Prieto/Voiding: Incontinent,Prieto for retention with adequate output.  Skin:BRIE/Refused.visible scattered bruising,skin tear in L elbow;dressing place.+2-3 adema in LE  02/LDA:VSS on RA, PIV patent/SL  D/C Date: Possible TCU discharge today.  Other Info: Immobilizer in place on L knee

## 2023-05-29 NOTE — PROGRESS NOTES
Care Management Discharge Note    Discharge Date: 05/29/2023       Discharge Disposition:  Transitional Care ([facility])    Discharge Services:  Transportation    Discharge DME:      Discharge Transportation:  [facility]     Private pay costs discussed: Not applicable    Does the patient's insurance plan have a 3 day qualifying hospital stay waiver?  Yes   Will the waiver be used for post-acute placement? Yes    PAS Confirmation Code: [id number]  Patient/family educated on Medicare website which has current facility and service quality ratings:      Education Provided on the Discharge Plan:    Persons Notified of Discharge Plans: HUC, Charge RN, bedside RN, pt, MD  Patient/Family in Agreement with the Plan:  yes    Handoff Referral Completed: Yes    Additional Information:  Paged MD to see if pt would be medically ready to discharge today. MD confirmed and is completing discharge orders. Call to [facility] to confirm they can take pt today, left VM. Faxed discharge orders to [facility]. Call to [facility] again, no answer. Messaged Jose at [facility], can take pt today. Scheduled [facility] transport for [phone]. Completed PCS, faxed and on chart. Faxed script to [facility]. Provided [facility] with [employer] auth approval # and transport time. Jose informed pt is accepted for a private room, no fee, for medical reasons. Updated pt, who was very happy to discharge today. Pt asked why no attendant from [facility] could help transport, informed that position was eliminated about a month ago.     PAS-RR    D: Per DHS regulation, IDA completed and submitted PAS-RR to [place] [employer] Direct Connect via the Senior LinkAge Line.  PAS-RR confirmation # is : [id number]    I: IDA spoke with pt and they are aware a PAS-RR has been submitted.  IDA reviewed with pt that they may be contacted for a follow up appointment within 10 days of hospital discharge if their SNF stay is < 30 days.  Contact information for [facility] LinkAge Line was also  provided.    A: Pt verbalized understanding.    P: Further questions may be directed to Senior LinkAge Line at #[phone], option #4 for [facility]-RR staff.      TIFF Hong  Social Work  [facility]

## 2023-05-29 NOTE — PLAN OF CARE
Goal Outcome Evaluation:  New dressing on L elbow abrasion. PIV out. Pt left with all belonings. CAM boot on and hinge brace locked per order. All pt needs met at this time. Transport came to transport pt to TCU.

## 2023-05-29 NOTE — PROGRESS NOTES
Orthopedic Surgery  Suresh Powers  05/29/2023     Admit Date:  5/23/2023    POD: 4 Days Post-Op   Procedure(s):  OPEN REDUCTION INTERNAL FIXATION, FRACTURE, FEMUR, DISTAL     Patient resting comfortably in bed. Discussed expectations for recovery and bracing. Pain controlled.  Tolerating oral intake.    Denies nausea or vomiting  Denies chest pain or shortness of breath  Denies numbness or tingling     Temp:  [97.8  F (36.6  C)-99.4  F (37.4  C)] 99.4  F (37.4  C)  Pulse:  [] 99  Resp:  [16-18] 16  BP: (128-160)/(67-95) 136/73  SpO2:  [91 %-94 %] 93 %    Alert and oriented  Dressing is clean, dry, and intact. Hinged knee brace in place, well fitted over ACE  Minimal erythema of the surrounding skin, distal and proximal to dressing   Left foot CAM boot in place well fitted  Bilateral calves are soft, non-tender.  leftt lower extremity is NVI but with SIGNIFICANT ecchymosis and 2+ pitting edema to foot and toes.  Sensation intact bilateral lower extremities  Patient able to resist dorsi and plantar flexion bilaterally  +Dp pulse    Labs:  Recent Labs   Lab Test 05/29/23  0700 05/28/23  0743 05/27/23  1851 05/26/23  0649 05/25/23  0641 05/24/23  1424 05/24/23  0603 05/22/23  2328   WBC  --   --   --   --  10.9  --  9.2 10.8   HGB 7.5* 8.0* 8.5*   < > 7.3*   < > 7.8* 10.9*   PLT  --   --   --   --  250  --  232 313    < > = values in this interval not displayed.     Recent Labs   Lab Test 11/30/22  1221 11/29/22  1352 06/15/21  1131   INR 1.04 1.02 0.87     No lab results found.      CT ankle and hindfoot:  There is a nondisplaced metaphyseal distal fibular fracture, without any evidence of ankle mortise or syndesmosis instability.  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  There is also evidence of an acute midfoot injury, likely Lisfranc variant, with small avulsion fragment within the Lisfranc joint, a  nondisplaced fracture at the distal and plantar base of the medial cuneiform, and nondisplaced fractures at the bases of the second and fourth metatarsals.  There is no evidence of gross instability of the Lisfranc joint or TMT joints.  Diffuse osteopenia is noted throughout the entire visualized foot and ankle.      1. S/p left distal femur ORIF  2. Anemia  - S/P transfusion yesterday  3. Left foot pain - As above      PLAN:   AGGRESSIVE ELEVATION, and regular skin inspections   CAM boot on the left, this is to be on when up and transferring and okay to have off for skin inspections regularly   Continue Eliquis for DVT prophylaxis.     Mobilize with PT/OT    Partial weightbearing 25%, Hinged knee brace ordered. Locked in extension with ambulation/sleep. Unlocked at rest   Continue current pain regimen   Will order left hindfoot/ankle CT   Dressings: Keep intact.  Change if >60% saturated or peeling off.    Follow-up: 2 weeks post-op with Dr. Antoine Pompa     2. Disposition   Anticipate d/c to TCU when medically cleared and progressing in PT, discharge orders updated.    Kjerstin L Foss, PA-C

## 2023-05-29 NOTE — DISCHARGE SUMMARY
"[facility]  Hospitalist Discharge Summary      Date of Admission:  5/23/2023  Date of Discharge:  5/29/2023  Discharging Provider: Alexander Long MD  Discharge Service: Hospitalist Service    Discharge Diagnoses   Severe hyponatremia, sodium 116 on 5/23/2023-due to SIADH  Chronic hyponatremia-likely due to beer potomania  Acute comminuted mildly displaced fracture of distal left femoral metaphysis, s/p ORIF with interlocking distal femur plate, 5/25/2023  Likely contaminant - staph bacteremia  ?  Left upper extremity cellulitis due to peripheral IV  Left ankle/foot fractures  Acute alcohol intoxication  Acute blood loss anemia on top of chronic normocytic anemia  GERD  History of GI bleed due to duodenal ulcer 11/2022  S/p left hip cephalomedullary nailing, 9/4/2022 for left intertrochanteric femur fracture  History of pulmonary embolism, 11/2022, right lower lobe  HTN  Chronic depression  SPIKE  OCD  BPH  Solitary kidney-donated left kidney 2 sisters in 1990  Urinary retention-s/p Prieto placement      Clinically Significant Risk Factors     # Obesity: Estimated body mass index is 31.32 kg/m  as calculated from the following:    Height as of this encounter: 1.702 m (5' 7\").    Weight as of this encounter: 90.7 kg (200 lb).       Follow-ups Needed After Discharge   Follow-up Appointments     Follow Up and recommended labs and tests      Please call as soon as possible to make an appointment to be seen in Dr. Antoine Pompa's clinic at 2 weeks postop for a recheck of your surgical   site, possible repeat x-rays, and wound care. If you are at a TCU at this   time and they have x-ray capabilities, you may complete your wound care   and x-rays at your TCU and have them send your images to Dr. Pompa's   office.     Dr. Pompa's care coordinator is Heavenly Garcia. Please contact her at   [phone] to schedule an appointment.       Dr. Pompa sees patients at 2 clinic locations:  [facility] [place]  [address], [place] [address]  [facility] [place]   [address], [address], [place] [address]        Please call the on-call phone number [phone] during evenings, nights   and weekends for any urgent needs. Prescription refills must be done   during business hours by calling [phone].             Unresulted Labs Ordered in the Past 30 Days of this Admission     Date and Time Order Name Status Description    5/27/2023 11:54 AM Blood Culture Arm, Right Preliminary     5/27/2023 11:54 AM Blood Culture Hand, Right Preliminary     5/25/2023  5:27 PM Blood Culture Hand, Right Preliminary     5/25/2023  5:27 PM Blood Culture Wrist, Right Preliminary     5/25/2023  1:36 PM Blood Culture Peripheral Blood Preliminary       These results will be followed up by IM    Discharge Disposition   Discharged to short-term care facility  Condition at discharge: Stable    Hospital Course   Suresh Powers is a 62 year old male with chronic alcohol use, s/p left intertrochanteric fracture in September 2022 due to fall while intoxicated, who presented on 5/23/2023 after a fall due to intoxication.  He lives in assisted living, has poor balance at baseline.  Went out to have drinks, alcohol intoxicated, tried using walker and lost his balance leading to fall.  Found to have left distal femur fracture, severe hyponatremia with sodium of 116.  Head CT showed no acute changes.          Severe hyponatremia, sodium 116 on 5/23/2023-due to SIADH  Chronic hyponatremia-likely due to beer potomania  -Nephrology consulted.  Managed with IV normal saline initially and fluid restriction.  -Sodium now improved to 134  -Nephrology signed off.  Continue fluid restriction at 1800 mL per day  -Monitor sodium periodically      Acute comminuted mildly displaced fracture of distal left femoral metaphysis, s/p ORIF with interlocking distal femur plate, 5/25/2023  -Management per  orthopedics  -PT/OT  -Partial weightbearing, 25%, hinged knee brace-locked in extension with ambulation/sleep, unlocked at rest  -Continue pain control-Tylenol  -Follow-up with Dr. Antoine Pompa in 2 post     Staph bacteremia, 5/26/2023, most likely contaminant  ?  Left upper extremity cellulitis due to peripheral IV  -Peripheral IV was removed on 5/25.  Reported purulence noted at site.  Blood cultures were obtained and patient was started on IV cefazolin, then switched to IV vancomycin with concern for GPC in clusters, possibly MRSA.  -Results showing variety of staph species in blood cultures.  This is most likely contaminant.  Repeat blood cultures on 5/27 with no growth so far.  Has finished 4 days of IV cefazolin.  Will discontinue antibiotics today.  Cellulitic changes on left upper extremity not impressive.  Noted low-grade temp of 100 this morning, however patient also notes that he was having significant pain in left lower extremity, see below regarding diagnosis of new foot fractures.  This was likely driving the low-grade temp and not ongoing infection.     Left ankle/foot fractures  CT ankle left without contrast obtained on 5/28 per Ortho given persistent pain and recent injury.  This showed: 1.  Acute nondisplaced fracture of the lateral malleolus extending to the distal fibular metaphysis. 2.  Probable additional acute nondisplaced fracture of the fourth metatarsal base. Equivocal fracture of the second metatarsal base. MRI could assess further.  -Nonoperative management per orthopedics  -Consult to orthotics for CAM Walker, boot immobilization.  Weight-bear as tolerated.  -Follow-up with orthopedics as arranged/recommended by them.     Acute alcohol intoxication blood alcohol level 0.23 g/dL  Chronic alcohol use disorder  -Had 5-6 beers prior to admission.  States he drinks socially, few beers every night.  Denies any prior issues with alcohol withdrawal/seizures.  Counseled on cutting  down/quitting alcohol use.  -Monitor for withdrawal.  Keep on CIWA protocol.  No evidence of withdrawal so far.     Acute blood loss anemia on top of chronic normocytic anemia  GERD  History of GI bleed due to duodenal ulcer 11/2022  -Was admitted 11/2022 with hemoglobin 3.8-due to upper GI bleed from duodenal ulcer  -Hemoglobin was 10.9 on 5/22, decreased to 6.3 on 5/27, most likely due to blood loss due to surgery, bruising.  Transfused 1 unit PRBC on 5/27.  Hb currently stable at 8.  -Monitor hemoglobin.  Transfuse to keep over 7  -Continue Protonix 40 mg twice daily  - hgb 7.5, can recheck in a few days at TCU     S/p left hip cephalomedullary nailing, 9/4/2022 for left intertrochanteric femur fracture  -Noted          History of pulmonary embolism, 11/2022, right lower lobe  -Resume apixaban.  Was held for surgery     Essential hypertension  - Blood pressure currently in normal range  - continue amlodipine 10 mg daily.       Chronic depression  Generalized anxiety disorder  OCD  -Continue BuSpar, Lexapro, trazodone, lamotrigine  -Consult psychiatry regarding alternative  - follow up as outpatient      BPH  Solitary kidney-donated left kidney 2 sisters in 1990  Urinary retention-s/p Prieto placement  -Has longstanding history of urinary retention especially after surgeries and use of narcotics.    -Required Prieto during previous hospitalization for femur fracture.  Was discharged on Prieto at that time.  -Now has Prieto in place due to urinary retention.  Keep Prieto in for now, plan to continue at discharge.  -Continue Flomax      Consultations This Hospital Stay   NEPHROLOGY IP CONSULT  ORTHOPEDIC SURGERY IP CONSULT  CARE MANAGEMENT / SOCIAL WORK IP CONSULT  CARE MANAGEMENT / SOCIAL WORK IP CONSULT  PHARMACY IP CONSULT  PSYCHIATRY IP CONSULT  CARE MANAGEMENT / SOCIAL WORK IP CONSULT  PHYSICAL THERAPY ADULT IP CONSULT  OCCUPATIONAL THERAPY ADULT IP CONSULT  ORTHOSIS BRACE IP CONSULT  PHARMACY TO DOSE  VANCO  PHYSICAL THERAPY ADULT IP CONSULT  OCCUPATIONAL THERAPY ADULT IP CONSULT  VASCULAR ACCESS ADULT IP CONSULT  VASCULAR ACCESS ADULT IP CONSULT  VASCULAR ACCESS ADULT IP CONSULT  ORTHOSIS BRACE IP CONSULT    Code Status   Full Code    Time Spent on this Encounter   I, Alexander Long MD, personally saw the patient today and spent greater than 30 minutes discharging this patient.       Alexander Long MD  [facility]  [address] [place] [address]  Phone: [phone]  Fax: [phone]  ______________________________________________________________________    Physical Exam   Vital Signs: Temp: 99.4  F (37.4  C) Temp src: Oral BP: 136/73 Pulse: 99   Resp: 16 SpO2: 93 % O2 Device: None (Room air)    Weight: 200 lbs 0 oz    Gen: NAD, pleasant  HEENT: EOMI, MMM  Resp: no focal crackles,  no wheezes, no increased work of resp  CV: S1S2 heard, reg rhythm, reg rate  Abdo: soft, nontender, nondistended, bowel sounds present  Ext: calves nontender, well perfused  Neuro: aa, conversant, CN grossly intact, no facial asymmetry         Primary Care Physician   Brad Thurston    Discharge Orders      Primary Care - Care Coordination Referral      General info for SNF    Length of Stay Estimate: Short Term Care: Estimated # of Days <30  Condition at Discharge: Stable  Level of care:skilled   Rehabilitation Potential: Fair  Admission H&P remains valid and up-to-date: Yes  Recent Chemotherapy: N/A  Use Nursing Home Standing Orders: Yes     Mantoux instructions    Give two-step Mantoux (PPD) Per Facility Policy Yes     Follow Up and recommended labs and tests    Please call as soon as possible to make an appointment to be seen in Dr. Antoine Pompa's clinic at 2 weeks postop for a recheck of your surgical site, possible repeat x-rays, and wound care. If you are at a TCU at this time and they have x-ray capabilities, you may complete your wound care and x-rays at your TCU and have  them send your images to Dr. Pompa's office.     Dr. Pompa's care coordinator is Heavenly Garcia. Please contact her at [phone] to schedule an appointment.       Dr. Pompa sees patients at 2 clinic locations:  [facility] [place]  [address] [place] [address]  [facility] [place]   [address], [address], [place] [address]        Please call the on-call phone number [phone] during evenings, nights and weekends for any urgent needs. Prescription refills must be done during business hours by calling [phone].     Reason for your hospital stay    S/p right distal femur fracture open reduction, internal fixation with locking distal femur plate (DOS: 5/25/23)     Wound care    Ideally, surgical dressings should be left in place until 2 week postop appointment. Please leave dressing intact for 2 weeks postoperatively. Okay to change if saturated >50% or peeling. Okay to shower. No soaking or submersion. Please contact your orthopedic surgical team if persistent drainage or redness develops around the surgical site.     Additional Discharge Instructions    Pain after surgery is normal and expected.  You will have some amount of pain for several weeks after surgery.  Your pain will improve with time.  There are several things you can do to help reduce your pain including: rest, ice, elevation, and using pain medications as needed. Contact your Surgeon Team if you have pain that persists or worsens after surgery despite rest, ice, elevation, and taking your medication(s) as prescribed. Contact your Surgeon Team if you have new numbness, tingling, or weakness in your operative extremity.    Swelling and/or bruising of the surgical extremity is common and may persist for several months after surgery. In addition to frequent icing and elevation, gentle compressive support with an ACE wrap or tubigrip may help with swelling. Apply compression regularly, removing at  least twice daily to perform skin checks. Contact your Surgeon Team if your swelling increases and is NOT associated with an increase in your activity level, or if your swelling increases and is associated with redness and pain.    Ice can be used to control swelling and discomfort after surgery. Place a thin towel over your operative site and apply the ice pack overtop. Leave ice pack in place for 20 minutes, then remove for 20 minutes. Repeat this 20 minutes on/20 minutes off routine as often as tolerated.    Please contact your surgical team with any concerns for infection including increasing redness around your surgical site, pus-like drainage, fever, chills, or flu-like symptoms.     Activity - Up with assistive device     Activity - Up with nursing assistance     Additional Discharge Instructions    CBC and BMP in 3 days with TCU MD     Weight bearing status    25% WB LLE with walker while wearing knee brace locked in extension and CAM boot on. Lock in extension while sleeping. Okay to unlock brace at rest for ROM 0-90 degrees.    Regular skin checks under brace and boot, no need to remove ACE unless saturated.    AGREESSIVE lle ELEVATION     Physical Therapy Adult Consult    Evaluate and treat as clinically indicated.    Reason: S/p right distal femur fracture open reduction, internal fixation with locking distal femur plate (DOS: 5/25/23)     Occupational Therapy Adult Consult    Evaluate and treat as clinically indicated.    Reason: S/p right distal femur fracture open reduction, internal fixation with locking distal femur plate (DOS: 5/25/23)     Fall precautions     Crutches DME    DME Documentation: Describe the reason for need to support medical necessity: Impaired gait status post knee surgery. I, the undersigned, certify that the above prescribed supplies are medically necessary for this patient and is both reasonable and necessary in reference to accepted standards of medical practice in the  treatment of this patient's condition and is not prescribed as a convenience.     Cane DME    DME Documentation: Describe the reason for need to support medical necessity: Impaired gait status post knee surgery. I, the undersigned, certify that the above prescribed supplies are medically necessary for this patient and is both reasonable and necessary in reference to accepted standards of medical practice in the treatment of this patient's condition and is not prescribed as a convenience.     Walker DME    DME Documentation: Describe the reason for need to support medical necessity: Impaired gait status post knee surgery. I, the undersigned, certify that the above prescribed supplies are medically necessary for this patient and is both reasonable and necessary in reference to accepted standards of medical practice in the treatment of this patient's condition and is not prescribed as a convenience.     Diet    Follow this diet upon discharge: Orders Placed This Encounter      Fluid restriction 1800 ML FLUID      Advance Diet as Tolerated: Regular Diet Adult       Significant Results and Procedures   Most Recent 3 CBC's:Recent Labs   Lab Test 05/29/23  0700 05/28/23  0743 05/27/23  1851 05/26/23  0649 05/25/23  0641 05/24/23  1424 05/24/23  0603 05/22/23 2328   WBC  --   --   --   --  10.9  --  9.2 10.8   HGB 7.5* 8.0* 8.5*   < > 7.3*   < > 7.8* 10.9*   MCV  --   --   --   --  80  --  77* 77*   PLT  --   --   --   --  250  --  232 313    < > = values in this interval not displayed.     Most Recent 3 BMP's:Recent Labs   Lab Test 05/29/23  0700 05/28/23  0743 05/27/23  0709    134* 135*   POTASSIUM 4.7 4.2 4.2   CHLORIDE 100 96* 99   CO2 30* 29 29   BUN 15.3 14.6 15.9   CR 0.65*  0.65* 0.69 0.71  0.71   ANIONGAP 7 9 7   NIMISHA 8.8 8.4* 8.1*   * 181* 180*     Most Recent 2 LFT's:Recent Labs   Lab Test 05/22/23  2328 02/21/23  1014   AST 27 22   ALT 16 13   ALKPHOS 118 108   BILITOTAL 0.3 0.3     7-Day Micro  Results     Collected Updated Procedure Result Status      05/27/2023 1249 05/28/2023 1731 Blood Culture Arm, Right [[id number]]    Blood from Arm, Right    Preliminary result Component Value   Culture No growth after 1 day  [P]                05/27/2023 1238 05/28/2023 1731 Blood Culture Hand, Right [[id number]]    Blood from Hand, Right    Preliminary result Component Value   Culture No growth after 1 day  [P]                05/25/2023 1732 05/29/2023 0832 Blood Culture Wrist, Right [[id number]]   (Abnormal)   Blood from Wrist, Right    Preliminary result Component Value   Culture Positive on the 2nd day of incubation  [P]     Staphylococcus capitis  [P]     1 of 2 bottles    Staphylococcus hominis  [P]     1 of 2 bottles                 05/25/2023 1732 05/29/2023 0116 Blood Culture Hand, Right [[id number]]   Blood from Hand, Right    Preliminary result Component Value   Culture No growth after 3 days  [P]                05/25/2023 1350 05/29/2023 0839 Blood Culture Peripheral Blood [[id number]]    (Abnormal)   Peripheral Blood    Preliminary result Component Value   Culture Positive on the 1st day of incubation  [P]     Staphylococcus epidermidis  [P]     1 of 2 bottles    Staphylococcus cohnii  [P]     2 of 2 bottles        Susceptibility      Staphylococcus cohnii      JUDAH      Ciprofloxacin <=0.5 ug/mL Susceptible      Clindamycin  Resistant  [1]      Erythromycin >=8 ug/mL Resistant     Gentamicin <=0.5 ug/mL Susceptible      Levofloxacin 1 ug/mL Susceptible      Oxacillin  Resistant  [2]      Tetracycline <=1 ug/mL Susceptible      Vancomycin 1 ug/mL Susceptible                   [1]  This isolate is presumed to be clindamycin resistant based on detection of inducible clindamycin resistance. Erythromycin and clindamycin are resistant; therefore, they are not recommended for use.     [2]  Oxacillin susceptible isolates are susceptible to cephalosporins (example: cefazolin and cephalexin) and beta  "lactam combination agents. Oxacillin resistant isolates are resistant to these agents.          Susceptibility Comments     Staphylococcus cohnii    Antibiotics listed as \"No Interpretation\" have no regulatory guidelines for susceptibility/resistance available.             05/25/2023 1350 05/26/2023 1447 Verigene GP Panel [[id number]]    (Abnormal)   Peripheral Blood    Final result Component Value   Staphylococcus aureus Not Detected   Staphylococcus epidermidis Detected   Positive for Staphylococcus epidermidis and negative for the mecA gene (not resistant to methicillin) by [employer] multiplex nucleic acid test. Final identification and antimicrobial susceptibility testing will be verified by standard methods.   Staphylococcus lugdunensis Not Detected   Enterococcus faecalis Not Detected   Enterococcus faecium Not Detected   Streptococcus species Not Detected   Streptococcus agalactiae Not Detected   Streptococcus anginosus group Not Detected   Streptococcus pneumoniae Not Detected   Streptococcus pyogenes Not Detected   Listeria species Not Detected              ,   Results for orders placed or performed during the hospital encounter of 05/23/23   F  Most Recent Urinalysis:Recent Labs   Lab Test 11/29/22  1648   COLOR Light Yellow   APPEARANCE Clear   URINEGLC Negative   URINEBILI Negative   URINEKETONE 20*   SG 1.028   UBLD Negative   URINEPH 6.5   PROTEIN Negative   NITRITE Negative   LEUKEST Negative   RBCU <1   WBCU 1   oot XR, G/E 3 views, left    Narrative    EXAM: XR FOOT LEFT G/E 3 VIEWS  LOCATION: [facility]  DATE/TIME: 5/23/2023 2:25 AM CDT    INDICATION: pain, fall  COMPARISON: 11/17/2021      Impression    IMPRESSION: Soft tissue thickening forefoot. Bones diffusely demineralized. Posttraumatic deformity calcaneus appears chronic. No free air or foreign body.   XR Tibia and Fibula Left 2 Views    Narrative    EXAM: XR TIBIA AND FIBULA LEFT 2 VIEWS  LOCATION: [facility]  DATE/TIME: 5/23/2023 2:25 AM CDT    INDICATION: pain, fall  COMPARISON: 11/17/2021      Impression    IMPRESSION: Image osseous structures are diffusely osteopenic. Plate-screw fixation of proximal tibia redemonstrated. Tibia and fibula appear intact.     Note is made of a mildly displaced, comminuted fractures of the distal femur without definite extension to the knee joint.   XR Pelvis w Hip Left 1 View    Narrative    EXAM: XR PELVIS AND HIP LEFT 1 VIEW  LOCATION: [facility]  DATE/TIME: 5/23/2023 2:26 AM CDT    INDICATION: pain, fall  COMPARISON: 09/03/2022; 12/07/2022      Impression    IMPRESSION: ORIF changes of prior left intratrochanteric fracture again noted with intramedullary lauren and stabilizing screws. No acute fracture lucency identified.   CT Head w/o Contrast    Narrative    EXAM: CT HEAD W/O CONTRAST  LOCATION: [facility]  DATE/TIME: 5/23/2023 2:39 AM CDT    INDICATION: fall, alcohol intoxication  COMPARISON: 11/29/2022  TECHNIQUE: Routine CT Head without IV contrast. Multiplanar reformats. Dose reduction techniques were used.    FINDINGS:  INTRACRANIAL CONTENTS: No intracranial hemorrhage, extraaxial collection, or mass effect.  No CT evidence of acute infarct. Normal parenchymal attenuation. Normal ventricles and sulci.     VISUALIZED ORBITS/SINUSES/MASTOIDS: No intraorbital abnormality. No paranasal sinus mucosal disease. No middle ear or mastoid effusion.    BONES/SOFT TISSUES: No acute abnormality.      Impression    IMPRESSION:  1.  No acute intracranial abnormality or significant change compared to the prior study.   XR Knee Left 1/2 Views    Narrative    EXAM: XR KNEE LEFT 1/2 VIEWS  LOCATION: [facility]  DATE/TIME: 5/23/2023 3:19 AM CDT    INDICATION: Fall, pain.  COMPARISON: None.      Impression    IMPRESSION: Oblique, comminuted and mildly displaced fracture distal left femoral  metadiaphysis. Fracture lines do not extend to the distal articular surface. Side plate and screw fixation proximal left tibia. Minimal degenerative changes left knee.   XR Femur Left 2 Views    Narrative    EXAM: XR FEMUR LEFT 2 VIEWS  LOCATION: [facility]  DATE/TIME: 5/23/2023 6:56 AM CDT    INDICATION: Pre op planning.  COMPARISON: None.      Impression    IMPRESSION: Postoperative changes of IM lauren and screw fixation traversing an intertrochanteric fracture of the left hip. There is an acute appearing fracture of the distal femur which runs obliquely across the metaphyseal region approximately 7 cm above   the knee joint. Additional postoperative changes of prior plate and screw fixation of the proximal tibia. Severe and diffuse demineralization.   CT Knee Left w/o Contrast    Narrative    CT KNEE LEFT WITHOUT CONTRAST May 23, 2023 10:22 AM    INDICATION: Distal femur fracture. Preoperative planning.     COMPARISON: Left knee radiographic exam 5/23/2023.    TECHNIQUE: Noncontrast. Axial, sagittal and coronal thin-section  reconstruction. Dose reduction techniques were used.   CONTRAST: None.    FINDINGS: CT images redemonstrate a comminuted mildly displaced distal  left femoral metaphyseal fracture. The fracture does not extent into  the articulating surfaces of the distal femur. Chronic healed medial  plate and screw fixed proximal tibial fracture. No evidence for  hardware failure. Diffuse bone demineralization. No acute patella,  proximal tibia, or proximal fibula fracture.    Mild degenerative osteoarthritis left knee. Small knee joint effusion.  Knee soft tissue swelling. Blood products are seen at the site of the  distal femur fracture. Multiple muscle groups fatty infiltration and  mild fatty atrophy particularly involving the proximal medial head  gastrocnemius.      Impression    IMPRESSION:  1.  Comminuted mildly displaced fractured distal left femoral  metaphysis. No  definitive intra-articular extension of the distal  femur fracture.  2.  Chronic healed plate and screw fixed proximal tibial fracture.  3.  Mild degenerative osteoarthritis left knee.  4.  Small knee joint effusion.      ALFREDITO SPEARS MD         SYSTEM ID:  [id number]   XR Femur Port Left 2 Views    Narrative    EXAM: XR FEMUR PORT LEFT 2 VIEWS  LOCATION: [facility]  DATE/TIME: 5/25/2023 4:59 PM CDT    INDICATION: Status post ORIF distal femur.  COMPARISON: 05/23/2023.      Impression    IMPRESSION: Postoperative changes plate and screw fixation of a fracture of the distal aspect of the left femur. This is new since the previous examination. The fracture is still visualized. Incompletely visualized postoperative changes at the level of   the hip joint. Plate and screw fixation of the proximal tibia.   CT Ankle Left w/o Contrast    Narrative    EXAM: CT ANKLE LEFT W/O CONTRAST  LOCATION: [facility]  DATE/TIME: 5/28/2023 8:42 AM CDT    INDICATION: Recent injury, hindfoot pain, rule out occult fracture  COMPARISON: X-rays 05/23/2023  TECHNIQUE: Noncontrast. Axial, sagittal and coronal thin-section reconstruction. Dose reduction techniques were used.     FINDINGS:   Diffuse bony demineralization.    There is an acute nondisplaced oblique fracture of the lateral malleolus below the level of the tibiotalar joint and extending proximally above the level tibiotalar joint to the distal fibular metaphysis. No widening of ankle mortise.    There is a small focus of cortical lucency involving the fourth metatarsal base medially which could represent an acute fracture. Tiny bone fragment along the plantar and medial aspect of the second metatarsal base is more likely chronic. Mild cortical   irregularity of the second metatarsal base laterally indeterminate for fracture.    There is an old healed fracture of the fifth metatarsal. Subtalar joint fusion, with likely old screw  tracks.    There is subcutaneous soft tissue stranding over the lateral aspect of the ankle which could represent edema or interstitial hemorrhage. No focal hematoma. Additional subcutaneous soft tissue stranding over the medial aspect ankle and dorsum of the foot.   Generalized muscle atrophy.      Impression    IMPRESSION:  1.  Acute nondisplaced fracture of the lateral malleolus extending to the distal fibular metaphysis.  2.  Probable additional acute nondisplaced fracture of the fourth metatarsal base. Equivocal fracture of the second metatarsal base. MRI could assess further.           Discharge Medications   Current Discharge Medication List      START taking these medications    Details   hydrOXYzine (ATARAX) 25 MG tablet Take 1 tablet (25 mg) by mouth every 6 hours as needed for other (adjuvant pain)  Qty: 20 tablet, Refills: 0    Associated Diagnoses: S/P ORIF (open reduction internal fixation) fracture      oxyCODONE (ROXICODONE) 5 MG tablet Take 1 tablet (5 mg) by mouth every 4 hours as needed for moderate pain  Qty: 25 tablet, Refills: 0    Associated Diagnoses: S/P ORIF (open reduction internal fixation) fracture      polyethylene glycol (MIRALAX) 17 GM/Dose powder Take 17 g by mouth daily  Qty: 510 g, Refills: 0    Associated Diagnoses: S/P ORIF (open reduction internal fixation) fracture      senna-docusate (SENOKOT-S/PERICOLACE) 8.6-50 MG tablet Take 1 tablet by mouth 2 times daily as needed for constipation  Qty: 21 tablet, Refills: 0    Associated Diagnoses: S/P ORIF (open reduction internal fixation) fracture      vitamin D3 (CHOLECALCIFEROL) 50 mcg (2000 units) tablet Take 1 tablet (50 mcg) by mouth daily  Qty: 30 tablet, Refills: 0    Associated Diagnoses: S/P ORIF (open reduction internal fixation) fracture         CONTINUE these medications which have CHANGED    Details   acetaminophen (TYLENOL) 325 MG tablet Take 2 tablets (650 mg) by mouth every 6 hours as needed for other (For optimal  non-opioid multimodal pain management to improve pain control.)  Qty: 100 tablet, Refills: 0    Associated Diagnoses: S/P ORIF (open reduction internal fixation) fracture         CONTINUE these medications which have NOT CHANGED    Details   amLODIPine (NORVASC) 5 MG tablet TAKE 1 TABLET BY MOUTH ONCE DAILY  Qty: 30 tablet, Refills: 11    Comments: MMP will default to a 30 day supply on maintenance medications unless otherwise instructed. Thank you!  Associated Diagnoses: Hypertension, unspecified type      busPIRone (BUSPAR) 15 MG tablet Take 1 tablet (15 mg) by mouth 2 times daily      dimethicone-zinc oxide (JEANETTE PROTECT) external cream Apply topically as needed (to intact stage 1 pressure area and/or irritated skin, rash, or excoriation)      ELIQUIS ANTICOAGULANT 5 MG tablet TAKE 1 TABLET BY MOUTH TWICE DAILY  Qty: 60 tablet, Refills: 0    Comments: MMP will default to a 30 day supply on maintenance medications unless otherwise instructed. Thank you!  Associated Diagnoses: Other acute pulmonary embolism without acute cor pulmonale (H)      escitalopram (LEXAPRO) 20 MG tablet Take 20 mg by mouth daily      fluticasone (FLONASE) 50 MCG/ACT nasal spray INSTILL TWO SPRAYS IN EACH NOSTRIL ONCE DAILY (SHAKE WELL)  Qty: 16 g, Refills: 11    Comments: MMP will default to a 30 day supply on maintenance medications unless otherwise instructed. Thank you!  Associated Diagnoses: Non-seasonal allergic rhinitis, unspecified trigger      guaiFENesin (ROBITUSSIN) 20 mg/mL liquid Take 200 mg by mouth every 4 hours as needed for cough      lamoTRIgine (LAMICTAL) 200 MG tablet Take 200 mg by mouth At Bedtime    Comments: Prescribed by psychiatry      loperamide (IMODIUM A-D) 2 MG tablet Take 4 mg by mouth 4 times daily as needed for diarrhea      magnesium hydroxide (MILK OF MAGNESIA) 400 MG/5ML suspension Take 30 mLs by mouth daily as needed for constipation or heartburn If no BM for 3 days/9 shifts      Multiple  Vitamins-Minerals (MULTIVITAMIN ADULT PO) Take 1 tablet by mouth daily      nystatin (MYCOSTATIN) 436201 UNIT/GM external powder Apply topically 2 times daily as needed (antifungal)      pantoprazole (PROTONIX) 40 MG EC tablet TAKE 1 TABLET BY MOUTH TWICE DAILY BEFORE MEALS * TAKE 30-60MIN BEFORE A MEAL *  Qty: 30 tablet, Refills: 11    Comments: MMP will default to a 30 day supply on maintenance medications unless otherwise instructed. Thank you!  Associated Diagnoses: Gastrointestinal hemorrhage, unspecified gastrointestinal hemorrhage type      polyethylene glycol-propylene glycol (SYSTANE ULTRA) 0.4-0.3 % SOLN ophthalmic solution Place 2 drops into both eyes 4 times daily as needed      tamsulosin (FLOMAX) 0.4 MG capsule TAKE 1 CAPSULE BY MOUTH AT BEDTIME TAKE 30MIN AFTER THE SAME MEAL.  Qty: 30 capsule, Refills: 11    Comments: MMP will default to a 30 day supply on maintenance medications unless otherwise instructed. Thank you!  Associated Diagnoses: Urinary retention      traZODone (DESYREL) 150 MG tablet Take 150 mg by mouth At Bedtime      vitamin C (ASCORBIC ACID) 500 MG tablet TAKE 1 TABLET BY MOUTH ONCE DAILY  Qty: 30 tablet, Refills: 11    Comments: MMP will default to a 30 day supply on maintenance medications unless otherwise instructed. Thank you!  Associated Diagnoses: Vitamin C deficiency      zinc gluconate 50 MG tablet Take 100 mg by mouth daily         STOP taking these medications       bisacodyl (DULCOLAX) 10 MG suppository Comments:   Reason for Stopping:             Allergies   Allergies   Allergen Reactions     Lisinopril Other (See Comments)     Elevated potassium     Sertraline Diarrhea

## 2023-05-29 NOTE — PLAN OF CARE
Goal Outcome Evaluation:    Date/Time:5/28/23  [id number]  Summary: L Femur ORIF/Hyponatremia,Hx of HTN,  GERD,OCD,chronic depression, pulmonary emboli, anxiety, anemia, ETOH use  POD # 3  Mental Status: A/Ox4  Activity/dangle: A 2 with lift, 25% WB on left leg.  Behavior tools: Green   Diet: Regular   Pain: Robaxin, and oxycodone given at 2112   Labs: Hgb 8.0, Na 134, Ca 8.4  Prieto/Voiding: Incontinent, Prieto for retention with adequate output. BM this shift  Skin: scattered bruising ; dressing place on left leg,  L foot edema   VSS on RA  /80 , PIV SL   D/C Date: Possible discharge tomorrow to [facility]  Other Info: Immobilizer in place on L knee, cam walker ( boot)  on left foot/ankle for ankle fx

## 2023-05-30 ENCOUNTER — PATIENT OUTREACH (OUTPATIENT)
Dept: CARE COORDINATION | Facility: CLINIC | Age: 63
End: 2023-05-30
Payer: COMMERCIAL

## 2023-05-30 VITALS
OXYGEN SATURATION: 94 % | SYSTOLIC BLOOD PRESSURE: 154 MMHG | HEART RATE: 103 BPM | BODY MASS INDEX: 31.89 KG/M2 | DIASTOLIC BLOOD PRESSURE: 84 MMHG | WEIGHT: 203.6 LBS | TEMPERATURE: 98.2 F | RESPIRATION RATE: 18 BRPM

## 2023-05-30 NOTE — PROGRESS NOTES
Clinic Care Coordination Contact  Care Team Conversations    Situation: RN/SW CC following patient through TCU care progression.    Background: Patient was hospitalized at [facility] from 5/23/2023 to 5/29/2023 with diagnosis of Severe hyponatremia, sodium 116 on 5/23/2023-due to SIADH  Chronic hyponatremia-likely due to beer potomania.    Assessment: Patient was discharged to [place] [facility] on 5/29/2023.     Plan/Recommendations: [facility] will send TCU Care Team Care Management hand in communication and request involvement in discharge planning and coordination of care.      Atiya Cuenca, OMSEL/LICSW covering for Adele Camilo RNCC  Social Work Care Coordinator  [facility] - [place], [place], [facility], and [place]  Phone: [phone]

## 2023-05-30 NOTE — PLAN OF CARE
Physical Therapy Discharge Summary    Reason for therapy discharge:    Discharged to transitional care facility.    Progress towards therapy goal(s). See goals on Care Plan in [facility] electronic health record for goal details.  Goals partially met.  Barriers to achieving goals:   discharge from facility.    Therapy recommendation(s):    Continued therapy is recommended.  Rationale/Recommendations:  Pt is below baseline. Pt currently requiring A x 2 with all functional mobility. Pt presents with deficits in activity tolerance, balance, and strength. Due to these deficits, pt is a high falls risk and unsafe to initiate ambulation at this time. Pt would benefit from continued skilled PT services via TCU to address deficits and improve IND with safety and functional mobility.      *Discharging physical therapist did not work directly with patient. Therapy recommendation(s) taken from previous treating therapist's treatment note from last treatment session.

## 2023-05-31 ENCOUNTER — LAB REQUISITION (OUTPATIENT)
Dept: LAB | Facility: CLINIC | Age: 63
End: 2023-05-31

## 2023-05-31 ENCOUNTER — TRANSITIONAL CARE UNIT VISIT (OUTPATIENT)
Dept: GERIATRICS | Facility: CLINIC | Age: 63
End: 2023-05-31
Payer: COMMERCIAL

## 2023-05-31 DIAGNOSIS — G47.9 SLEEP DISORDER: ICD-10-CM

## 2023-05-31 DIAGNOSIS — D62 ABLA (ACUTE BLOOD LOSS ANEMIA): ICD-10-CM

## 2023-05-31 DIAGNOSIS — F41.1 GENERALIZED ANXIETY DISORDER: ICD-10-CM

## 2023-05-31 DIAGNOSIS — I26.94 MULTIPLE SUBSEGMENTAL PULMONARY EMBOLI WITHOUT ACUTE COR PULMONALE (H): ICD-10-CM

## 2023-05-31 DIAGNOSIS — D64.9 ANEMIA, UNSPECIFIED: ICD-10-CM

## 2023-05-31 DIAGNOSIS — F10.920 ALCOHOLIC INTOXICATION WITHOUT COMPLICATION (H): ICD-10-CM

## 2023-05-31 DIAGNOSIS — F33.1 MODERATE EPISODE OF RECURRENT MAJOR DEPRESSIVE DISORDER (H): ICD-10-CM

## 2023-05-31 DIAGNOSIS — R33.9 URINARY RETENTION: ICD-10-CM

## 2023-05-31 DIAGNOSIS — S72.402A CLOSED FRACTURE OF DISTAL END OF LEFT FEMUR, UNSPECIFIED FRACTURE MORPHOLOGY, INITIAL ENCOUNTER (H): ICD-10-CM

## 2023-05-31 DIAGNOSIS — K92.2 GASTROINTESTINAL HEMORRHAGE, UNSPECIFIED GASTROINTESTINAL HEMORRHAGE TYPE: Primary | ICD-10-CM

## 2023-05-31 DIAGNOSIS — E87.1 HYPONATREMIA: ICD-10-CM

## 2023-05-31 DIAGNOSIS — I10 BENIGN ESSENTIAL HYPERTENSION: ICD-10-CM

## 2023-05-31 LAB
BACTERIA BLD CULT: ABNORMAL
BACTERIA BLD CULT: NO GROWTH

## 2023-05-31 PROCEDURE — 99310 SBSQ NF CARE HIGH MDM 45: CPT | Performed by: NURSE PRACTITIONER

## 2023-05-31 RX ORDER — ACETAMINOPHEN 500 MG
1000 TABLET ORAL 3 TIMES DAILY
COMMUNITY

## 2023-05-31 RX ORDER — HYDROXYZINE PAMOATE 25 MG/1
25 CAPSULE ORAL 4 TIMES DAILY
COMMUNITY
End: 2023-06-16

## 2023-05-31 RX ORDER — LYSINE HCL 500 MG
2 TABLET ORAL DAILY
COMMUNITY

## 2023-05-31 RX ORDER — METHOCARBAMOL 500 MG/1
1000 TABLET, FILM COATED ORAL 3 TIMES DAILY
COMMUNITY

## 2023-05-31 RX ORDER — FERROUS SULFATE 325(65) MG
325 TABLET ORAL
COMMUNITY

## 2023-05-31 NOTE — PROGRESS NOTES
[facility] GERIATRICS    PRIMARY CARE PROVIDER AND CLINIC:  Brad Thurston MD, [address] [place] [address] / [facility] [id number]  Chief Complaint   Patient presents with     Hospital F/U      [facility] Medical Record Number:  [id number]  Place of Service where encounter took place:  [facility] (TCU) [[id number]]    Suresh Powers  is a 62 year old  (1960), admitted to the above facility from  [facility]. Hospital stay 5/23/23 through 5/29/23..   HPI:    This is a 63 yo male with a PMHx of chronic alcohol use, chronic hyponatremia, HTN, PE on apixaban, GI bleed due to duodenal ulcer in 11/2022, BPH, solitary kidney, chronic depression, generalized anxiety disorder, OCD, and recurrent falls s/p left hip cephalomedullary nailing on 9/4/2022 for left intertrochanteric femur fracture after a fall while intoxicated. He presented to the ED on 5/23/2023 after going out for drinks with a friend and falling. On admission he was found to have severe hyponatremia, sodium 116. Nephrology consulted, sodium improved with IV normal saline and 1800mL fluid restriction, discharge sodium 137. He was also found to have a mildly displaced fracture of the distal left femoral metaphysis, s/p ORIF with interlocking distal femur plate on 5/25/2023. CT ankle also found acute nondisplaced fracture of the lateral malleolus extending to the distal fibular metaphysis and probable additional fracture of the fourth metatarsal base. Per ortho, non operative management with boot immobilization and weight bearing as tolerated. On 5/27 his hemoglobin dropped to 6.3, transfused 1 unit PRBC, discharge hemoglobin 7.5, continue Protonix 40mg twice daily. He was discharged to [facility] for rehab.     CODE STATUS/ADVANCE DIRECTIVES DISCUSSION:  Prior  CPR/Full code   ALLERGIES:   Allergies   Allergen Reactions     Lisinopril Other (See Comments)     Elevated potassium     Sertraline Diarrhea      PAST MEDICAL HISTORY:   Past Medical  History:   Diagnosis Date     Anxiety     sees psych for trazodone     Clostridium difficile diarrhea 3/9/2016     Foot pain      Hypertension      Mold exposure      Shingles 1984      PAST SURGICAL HISTORY:   has a past surgical history that includes foot crush injury; kidney donation; Open reduction internal fixation tibial plateau (Left, 2/4/2016); Open reduction internal fixation clavicle (Left, 2/4/2016); Open reduction internal fixation hip nailing (Left, 9/4/2022); Esophagoscopy, gastroscopy, duodenoscopy (EGD), combined (N/A, 11/30/2022); and Open reduction internal fixation femur distal (Left, 5/25/2023).  FAMILY HISTORY: family history includes Breast Cancer in his mother; Cerebrovascular Disease in his father; Diabetes Type 2  in his mother; Skin Cancer in his father.  SOCIAL HISTORY:   reports that he has quit smoking. His smoking use included cigarettes. He smoked an average of 1.00 packs per day. He has never used smokeless tobacco. He reports current alcohol use. He reports that he does not use drugs.  Patient's living condition: lives in an assisted living facility    Post Discharge Medication Reconciliation Status:   MED REC REQUIRED  Post Medication Reconciliation Status:  Discharge medications reconciled and changed, see notes/orders         Current Outpatient Medications   Medication Sig     acetaminophen (TYLENOL) 500 MG tablet Take 1,000 mg by mouth 4 times daily     Calcium Carbonate-Vit D-Min (CALCIUM 600+D PLUS MINERALS) 600-400 MG-UNIT TABS Take 2 tablets by mouth daily     ferrous sulfate (FEROSUL) 325 (65 Fe) MG tablet Take 325 mg by mouth daily (with breakfast)     hydrOXYzine (VISTARIL) 25 MG capsule Take 25 mg by mouth 4 times daily     methocarbamol (ROBAXIN) 500 MG tablet Take 1,000 mg by mouth 3 times daily     amLODIPine (NORVASC) 5 MG tablet TAKE 1 TABLET BY MOUTH ONCE DAILY     busPIRone (BUSPAR) 15 MG tablet Take 1 tablet (15 mg) by mouth 2 times daily     dimethicone-zinc  oxide (JEANETTE PROTECT) external cream Apply topically as needed (to intact stage 1 pressure area and/or irritated skin, rash, or excoriation)     ELIQUIS ANTICOAGULANT 5 MG tablet TAKE 1 TABLET BY MOUTH TWICE DAILY     escitalopram (LEXAPRO) 20 MG tablet Take 20 mg by mouth daily     fluticasone (FLONASE) 50 MCG/ACT nasal spray INSTILL TWO SPRAYS IN EACH NOSTRIL ONCE DAILY (SHAKE WELL) (Patient not taking: Reported on 5/31/2023)     guaiFENesin (ROBITUSSIN) 20 mg/mL liquid Take 200 mg by mouth every 4 hours as needed for cough     lamoTRIgine (LAMICTAL) 200 MG tablet Take 200 mg by mouth At Bedtime     loperamide (IMODIUM A-D) 2 MG tablet Take 4 mg by mouth 4 times daily as needed for diarrhea     magnesium hydroxide (MILK OF MAGNESIA) 400 MG/5ML suspension Take 30 mLs by mouth daily as needed for constipation or heartburn If no BM for 3 days/9 shifts     Multiple Vitamins-Minerals (MULTIVITAMIN ADULT PO) Take 1 tablet by mouth daily     nystatin (MYCOSTATIN) 950322 UNIT/GM external powder Apply topically 2 times daily as needed (antifungal)     oxyCODONE (ROXICODONE) 5 MG tablet Take 1 tablet (5 mg) by mouth every 4 hours as needed for moderate pain     pantoprazole (PROTONIX) 40 MG EC tablet TAKE 1 TABLET BY MOUTH TWICE DAILY BEFORE MEALS * TAKE 30-60MIN BEFORE A MEAL *     polyethylene glycol (MIRALAX) 17 GM/Dose powder Take 17 g by mouth daily     polyethylene glycol-propylene glycol (SYSTANE ULTRA) 0.4-0.3 % SOLN ophthalmic solution Place 2 drops into both eyes 4 times daily as needed     senna-docusate (SENOKOT-S/PERICOLACE) 8.6-50 MG tablet Take 1 tablet by mouth 2 times daily as needed for constipation     tamsulosin (FLOMAX) 0.4 MG capsule TAKE 1 CAPSULE BY MOUTH AT BEDTIME TAKE 30MIN AFTER THE SAME MEAL.     traZODone (DESYREL) 150 MG tablet Take 150 mg by mouth At Bedtime     vitamin C (ASCORBIC ACID) 500 MG tablet TAKE 1 TABLET BY MOUTH ONCE DAILY     zinc gluconate 50 MG tablet Take 100 mg by mouth daily  (Patient not taking: Reported on 5/31/2023)     No current facility-administered medications for this visit.       ROS:  10 point ROS of systems including Constitutional, Eyes, Respiratory, Cardiovascular, Gastroenterology, Genitourinary, Integumentary, Musculoskeletal, Psychiatric were all negative except for pertinent positives noted in my HPI.    Vitals:  BP (!) 154/84   Pulse 103   Temp 98.2  F (36.8  C)   Resp 18   Wt 92.4 kg (203 lb 9.6 oz)   SpO2 94%   BMI 31.89 kg/m    Exam:  GENERAL APPEARANCE:  Alert, anxious, pain in left leg  ENT:  Mouth and posterior oropharynx normal, moist mucous membranes  NECK:  No adenopathy,masses or thyromegaly  RESP:  no respiratory distress, diminished breath sounds in bilateral bases, RA  CV:  regular rate and rhythm, no murmur, rub, or gallop  ABDOMEN:  normal bowel sounds, soft, nontender, no hepatosplenomegaly or other masses  M/S:   Left leg in hinged knee brace, left foot in boot, pain, able to move all extremities  : Prieto patent, draining yellow urine  SKIN:  bilateral arm bruising, fragile  NEURO:   no numbness or tingling, no focal deficits  PSYCH:  oriented X 3, anxious    Lab/Diagnostic data:    Most Recent 3 CBC's:  Recent Labs   Lab Test 05/29/23  0700 05/28/23  0743 05/27/23  1851 05/26/23  0649 05/25/23  0641 05/24/23  1424 05/24/23  0603 05/22/23  2328   WBC  --   --   --   --  10.9  --  9.2 10.8   HGB 7.5* 8.0* 8.5*   < > 7.3*   < > 7.8* 10.9*   MCV  --   --   --   --  80  --  77* 77*   PLT  --   --   --   --  250  --  232 313    < > = values in this interval not displayed.     Most Recent 3 BMP's:  Recent Labs   Lab Test 05/29/23  0700 05/28/23  0743 05/27/23  0709    134* 135*   POTASSIUM 4.7 4.2 4.2   CHLORIDE 100 96* 99   CO2 30* 29 29   BUN 15.3 14.6 15.9   CR 0.65*  0.65* 0.69 0.71  0.71   ANIONGAP 7 9 7   NIMISHA 8.8 8.4* 8.1*   * 181* 180*       ASSESSMENT/PLAN:    Mildly displaced fracture of the distal left femoral metaphysis, s/p  ORIF with interlocking distal femur plate on 5/25/2023   Acute nondisplaced fracture of the left lateral malleolus extending to the distal fibular metaphysis and probable additional fracture of the fourth metatarsal base  S/p left hip cephalomedullary nailing, 9/4/2022 for left intertrochanteric femur fracture  Given 25% weight bearing on LLE, hinged knee brace locked in extension, f/u with ortho on 6/9 per Dr. Pompa. Start Ca/vit supplementation    Right ankle/foot fractures  Weight bearing as tolerated    Pain  Increase Tylenol to 1000 mg 4 times daily, start Vistaril 25 mg 4 times daily, start methocarbamol 1000 mg 3 times daily, continue oxycodone 5 mg of 4 hours as needed and encourage icing    PE (H)  Continue Apixaban 5mg BID    HTN  Continue amlodipine 5 mg daily.  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 Continue tamsulosin 0.4mg daily at bedtime.  Daily care as ordered    Diminished lung sounds  Encourage incentive spirometry    Staph bacteremia, likely contaminant  Blood cultures were obtained and patient was started on IV cefazolin, then switched to IV vancomycin with concern for GPC in clusters, possibly MRSA.  Repeat BCs NGTD    Renal fx  Last Cr 0.65 with GFR >60, recheck BMP on 6/1    Hyponatremia  Beer potomania (H)  SIADH  BAC 0.23%  Continue 1800mL per day fluid restriction, last Na 136, recheck BMP on 6/1.  Plan to remove FR if NA >135    ABLA/GERD/Hx GI bleed  Previously received 1U PRBC, continue protonix 40mg BID.  Last Hgb 7.5, start FeSO4 325 daily.  Recheck CBC on 6/1    Chronic depression/SPIKE/OCD  Continue buspar 15mg BID, lexapro 20mg daily, trazadone 150mg daily at bedtime, and lamotrigine 200mg daily  at bedtime    Orders:  Increase blood pressure monitoring, increase Tylenol, start methocarbamol, increase Vistaril, encourage icing, start iron, encourage incentive spirometry, Prieto cares, calcium/vitamin D supplementation, BMP/CBC recheck    Electronically signed by:  Todd Morocho NP      I was present with the student who particpated in the serivce and documentation of the note. I have verified the history and personally peformed the physical exam and medical decision making. I agree with the assessment and plan of care as documented in the note.

## 2023-06-01 LAB
ANION GAP SERPL CALCULATED.3IONS-SCNC: 9 MMOL/L (ref 7–15)
BACTERIA BLD CULT: NO GROWTH
BACTERIA BLD CULT: NO GROWTH
BUN SERPL-MCNC: 15.2 MG/DL (ref 8–23)
CALCIUM SERPL-MCNC: 9 MG/DL (ref 8.8–10.2)
CHLORIDE SERPL-SCNC: 102 MMOL/L (ref 98–107)
CREAT SERPL-MCNC: 0.76 MG/DL (ref 0.67–1.17)
DEPRECATED HCO3 PLAS-SCNC: 26 MMOL/L (ref 22–29)
ERYTHROCYTE [DISTWIDTH] IN BLOOD BY AUTOMATED COUNT: 19.2 % (ref 10–15)
GFR SERPL CREATININE-BSD FRML MDRD: >90 ML/MIN/1.73M2
GLUCOSE SERPL-MCNC: 125 MG/DL (ref 70–99)
HCT VFR BLD AUTO: 25.5 % (ref 40–53)
HGB BLD-MCNC: 8 G/DL (ref 13.3–17.7)
MCH RBC QN AUTO: 26.6 PG (ref 26.5–33)
MCHC RBC AUTO-ENTMCNC: 31.4 G/DL (ref 31.5–36.5)
MCV RBC AUTO: 85 FL (ref 78–100)
PLATELET # BLD AUTO: 525 10E3/UL (ref 150–450)
POTASSIUM SERPL-SCNC: 4.5 MMOL/L (ref 3.4–5.3)
RBC # BLD AUTO: 3.01 10E6/UL (ref 4.4–5.9)
SODIUM SERPL-SCNC: 137 MMOL/L (ref 136–145)
WBC # BLD AUTO: 7.4 10E3/UL (ref 4–11)

## 2023-06-01 PROCEDURE — 85027 COMPLETE CBC AUTOMATED: CPT | Performed by: NURSE PRACTITIONER

## 2023-06-01 PROCEDURE — 82435 ASSAY OF BLOOD CHLORIDE: CPT | Performed by: NURSE PRACTITIONER

## 2023-06-01 PROCEDURE — 36415 COLL VENOUS BLD VENIPUNCTURE: CPT | Performed by: NURSE PRACTITIONER

## 2023-06-01 PROCEDURE — P9604 ONE-WAY ALLOW PRORATED TRIP: HCPCS | Performed by: NURSE PRACTITIONER

## 2023-06-01 NOTE — PROGRESS NOTES
[facility]  INITIAL VISIT NOTE  June 2, 2023    PRIMARY CARE PROVIDER AND CLINIC:  Brad Thurston [address] [place] [address] / [facility] [id number]    CHIEF COMPLAINT:  Hospital follow-up/Initial visit    HPI:    Suresh Powers is a 62 year old  (1960) male who was seen at [facility] on June 2, 2023 for an initial visit.     Medical history is notable for alcohol use disorder, hypertension, pulmonary embolism, duodenal ulcer, upper GI bleed, anemia, UTI, solitary kidney, BPH, shingles, depression, SPIKE, and OCD.    Summary of hospital course:  Patient was hospitalized at [facility] from May 23 through May 29, 2023 for severe hyponatremia, left ankle fracture, and left distal femur fracture sustained after mechanical fall due to alcohol intoxication.  CT head was negative for acute intracranial abnormality.  X-ray of the left knee/femur showed oblique, comminuted, and mildly displaced fracture of the distal left femoral metaphysis fracture.  EKG was remarkable for normal sinus rhythm.  Admission blood work was significant for sodium 116, urine osmolarity 196, plasma osmolarity 250, urine sodium 42, white count 10.8, and hemoglobin 10.9.  Ethanol level was 0.23.  Hyponatremia was attributed to SIADH and was initially managed with IV normal saline and then fluid restriction.  He was evaluated by orthopedic service and underwent left distal femur ORIF on May 25.  Postop hemoglobin dropped to 6.3 on May 27 and he was transfused with 1 unit of PRBC.  Due to ongoing pain in left ankle, CT was obtained on May 28 that showed acute nondisplaced fracture of the lateral malleolus extending to the distal fibular metaphysis and probable additional acute nondisplaced fracture of the fourth metatarsal base and equivocal fracture of the second metatarsal base.  Ortho recommended nonoperative management with CAM boot.  Notably, there was purulence at the site of left upper extremity peripheral  IV which was removed on May 25. He was treated with IV cefazolin initially then switched to vancomycin.  Blood cultures grew Staphylococcus capitis, Staphylococcus cochnii, and Staphylococcus coccus hominis.  A Prieto catheter was placed postop for retention.  TCU was recommended per therapies.    Patient is admitted to this facility for medical management, nursing care, and rehab.     Of note, history was obtained from patient, facility RN, and extensive review of the chart.    Today's visit:  Patient was seen in his room, while lying in bed.  He appears weak but comfortable.  He currently rates his pain at 5 out of 10, more in his left ankle than his thigh.  He has a Prieto catheter, that was inserted in the hospital post op.  He had a bowel movement yesterday.  He denies melena or hematochezia.  He reports no fever, chills, chest pain, palpitation, dyspnea, nausea, vomiting, abdominal pain, or urinary symptoms.      CODE STATUS:   CPR/Full code     PAST MEDICAL HISTORY:   Right lower lobe pulmonary embolism in November 2022  Hypertension  UTI  Solitary right kidney (left kidney donated to sister in 1990)  BPH with urinary retention  Upper GI bleed in November 2022  Nonbleeding duodenal ulcer (25 mm), per EGD on November 30, 2022  Chronic anemia, baseline hemoglobin around 11  Shingles  Allergic rhinitis  Alcohol use disorder  Depression  SPIKE  OCD  Fall resulting in left peritrochanteric femur fracture, s/p cephalomedullary nailing on September 4, 2022  Fall resulting in left ankle lateral malleolus fracture, probable left foot second and fourth metatarsal base fractures, and displaced distal left femoral metadiaphysis fracture, s/p ORIF on May 25, 2023  Obesity, BMI 32      Past Medical History:   Diagnosis Date     Anxiety     sees psych for trazodone     Clostridium difficile diarrhea 3/9/2016     Foot pain      Hypertension      Mold exposure      Shingles 1984       PAST SURGICAL HISTORY:   Past Surgical  History:   Procedure Laterality Date     ESOPHAGOSCOPY, GASTROSCOPY, DUODENOSCOPY (EGD), COMBINED N/A 11/30/2022    Procedure: ESOPHAGOGASTRODUODENOSCOPY (EGD);  Surgeon: Joe Alaniz MD;  Location:  GI     foot crush injury       kidney donation       OPEN REDUCTION INTERNAL FIXATION CLAVICLE Left 2/4/2016    Procedure: OPEN REDUCTION INTERNAL FIXATION CLAVICLE;  Surgeon: Rakesh Hui MD;  Location:  OR     OPEN REDUCTION INTERNAL FIXATION FEMUR DISTAL Left 5/25/2023    Procedure: OPEN REDUCTION INTERNAL FIXATION, FRACTURE, FEMUR, DISTAL;  Surgeon: Antoine Pompa MD;  Location:  OR     OPEN REDUCTION INTERNAL FIXATION HIP NAILING Left 9/4/2022    Procedure: Open reduction internal fixation of left hip fracture;  Surgeon: Huang Cochran MD;  Location:  OR     OPEN REDUCTION INTERNAL FIXATION TIBIAL PLATEAU Left 2/4/2016    Procedure: OPEN REDUCTION INTERNAL FIXATION TIBIAL PLATEAU;  Surgeon: Rakesh Hui MD;  Location:  OR       FAMILY HISTORY:   Family History   Problem Relation Age of Onset     Breast Cancer Mother      Diabetes Type 2  Mother      Skin Cancer Father      Cerebrovascular Disease Father        SOCIAL HISTORY:  Social History     Tobacco Use     Smoking status: Former     Packs/day: 1.00     Years: 0.00     Pack years: 0.00     Types: Cigarettes     Smokeless tobacco: Never   Vaping Use     Vaping status: Never Used   Substance Use Topics     Alcohol use: Yes     Comment: states 8 beerrs all day over past 8 hours       MEDICATIONS:  Current Outpatient Medications   Medication Sig Dispense Refill     acetaminophen (TYLENOL) 500 MG tablet Take 1,000 mg by mouth 4 times daily       amLODIPine (NORVASC) 5 MG tablet TAKE 1 TABLET BY MOUTH ONCE DAILY 30 tablet 11     busPIRone (BUSPAR) 15 MG tablet Take 1 tablet (15 mg) by mouth 2 times daily       Calcium Carbonate-Vit D-Min (CALCIUM 600+D PLUS MINERALS) 600-400 MG-UNIT TABS Take 2 tablets by mouth daily        dimethicone-zinc oxide (JEANETTE PROTECT) external cream Apply topically as needed (to intact stage 1 pressure area and/or irritated skin, rash, or excoriation)       ELIQUIS ANTICOAGULANT 5 MG tablet TAKE 1 TABLET BY MOUTH TWICE DAILY 60 tablet 0     escitalopram (LEXAPRO) 20 MG tablet Take 20 mg by mouth daily       ferrous sulfate (FEROSUL) 325 (65 Fe) MG tablet Take 325 mg by mouth daily (with breakfast)       fluticasone (FLONASE) 50 MCG/ACT nasal spray INSTILL TWO SPRAYS IN EACH NOSTRIL ONCE DAILY (SHAKE WELL) (Patient not taking: Reported on 5/31/2023) 16 g 11     guaiFENesin (ROBITUSSIN) 20 mg/mL liquid Take 200 mg by mouth every 4 hours as needed for cough       hydrOXYzine (VISTARIL) 25 MG capsule Take 25 mg by mouth 4 times daily       lamoTRIgine (LAMICTAL) 200 MG tablet Take 200 mg by mouth At Bedtime       loperamide (IMODIUM A-D) 2 MG tablet Take 4 mg by mouth 4 times daily as needed for diarrhea       magnesium hydroxide (MILK OF MAGNESIA) 400 MG/5ML suspension Take 30 mLs by mouth daily as needed for constipation or heartburn If no BM for 3 days/9 shifts       methocarbamol (ROBAXIN) 500 MG tablet Take 1,000 mg by mouth 3 times daily       Multiple Vitamins-Minerals (MULTIVITAMIN ADULT PO) Take 1 tablet by mouth daily       nystatin (MYCOSTATIN) 110972 UNIT/GM external powder Apply topically 2 times daily as needed (antifungal)       oxyCODONE (ROXICODONE) 5 MG tablet Take 1 tablet (5 mg) by mouth every 4 hours as needed for moderate pain 25 tablet 0     pantoprazole (PROTONIX) 40 MG EC tablet TAKE 1 TABLET BY MOUTH TWICE DAILY BEFORE MEALS * TAKE 30-60MIN BEFORE A MEAL * 30 tablet 11     polyethylene glycol (MIRALAX) 17 GM/Dose powder Take 17 g by mouth daily 510 g 0     polyethylene glycol-propylene glycol (SYSTANE ULTRA) 0.4-0.3 % SOLN ophthalmic solution Place 2 drops into both eyes 4 times daily as needed       senna-docusate (SENOKOT-S/PERICOLACE) 8.6-50 MG tablet Take 1 tablet by mouth 2 times  "daily as needed for constipation 21 tablet 0     tamsulosin (FLOMAX) 0.4 MG capsule TAKE 1 CAPSULE BY MOUTH AT BEDTIME TAKE 30MIN AFTER THE SAME MEAL. 30 capsule 11     traZODone (DESYREL) 150 MG tablet Take 150 mg by mouth At Bedtime       vitamin C (ASCORBIC ACID) 500 MG tablet TAKE 1 TABLET BY MOUTH ONCE DAILY 30 tablet 11     zinc gluconate 50 MG tablet Take 100 mg by mouth daily (Patient not taking: Reported on 5/31/2023)         MED REC REQUIRED  Post Medication Reconciliation Status: medication reconcilation previously completed during another office visit         ALLERGIES:  Allergies   Allergen Reactions     Lisinopril Other (See Comments)     Elevated potassium     Sertraline Diarrhea       ROS:  10 point ROS were negative other than the symptoms noted above in the HPI.    PHYSICAL EXAM:  Vital signs were reviewed in the chart.  Vital Signs: BP (!) 142/70   Pulse 87   Temp 97.5  F (36.4  C)   Resp 17   Ht 1.702 m (5' 7\")   Wt 92.4 kg (203 lb 9.6 oz)   SpO2 94%   BMI 31.89 kg/m    General: Comfortable and in no acute distress  HEENT: Conjunctival pallor, no scleral icterus or injection, moist oral mucosa  Cardiovascular: Normal S1, S2, RRR  Respiratory: Lungs clear to auscultation bilaterally  GI: Abdomen soft, non-tender, non-distended, +BS  Extremities: Left knee hinged brace is on, he is wearing CAM boot on the left, there is trace to 1+ right lower extremity edema  Neuro: CX II-XII grossly intact; ROM in all four extremities grossly intact  Psych: Alert and oriented x3; normal affect  Skin: Scattered upper extremity ecchymosis bilaterally; no evidence for cellulitis    LABORATORY/IMAGING DATA:  All relevant labs and imaging data in [facility] and/or Care Everywhere were personally reviewed today.      Most Recent 3 CBC's:Recent Labs   Lab Test 06/01/23  0521 05/29/23  0700 05/28/23  0743 05/26/23  0649 05/25/23  0641 05/24/23  1424 05/24/23  0603   WBC 7.4  --   --   --  10.9  --  9.2   HGB 8.0* 7.5* " 8.0*   < > 7.3*   < > 7.8*   MCV 85  --   --   --  80  --  77*   *  --   --   --  250  --  232    < > = values in this interval not displayed.     Most Recent 3 BMP's:Recent Labs   Lab Test 06/01/23  0521 05/29/23  0700 05/28/23  0743    137 134*   POTASSIUM 4.5 4.7 4.2   CHLORIDE 102 100 96*   CO2 26 30* 29   BUN 15.2 15.3 14.6   CR 0.76 0.65*  0.65* 0.69   ANIONGAP 9 7 9   NIMISHA 9.0 8.8 8.4*   * 121* 181*     Most Recent 2 LFT's:Recent Labs   Lab Test 05/22/23 2328 02/21/23  1014   AST 27 22   ALT 16 13   ALKPHOS 118 108   BILITOTAL 0.3 0.3         ASSESSMENT/PLAN:  Mechanical fall, subsequent encounter,  Displaced distal left femoral metadiaphysis fracture, s/p ORIF on May 25, 2023,  Left ankle lateral malleolus fracture, subsequent encounter,  Probable left foot second and fourth metatarsal base fractures,  Physical deconditioning.  Patient is hemodynamically stable.  Patient reports moderate surgical pain, ankle>thigh.  Plan:  Fall precautions  Continue pain management with acetaminophen, hydroxyzine, and oxycodone as ordered  Continue DVT prophylaxis with chronic apixaban  Continue left CAM boot  Continue hinged left knee brace  Partial weightbearing 25%  Continue PT/OT evaluation and therapy  Follow-up with Ortho as directed    Acute blood loss anemia,  Chronic anemia  Baseline hemoglobin around 11.  Postop hemoglobin dropped to 6.3 on May 27 and he was transfused with 1 unit of PRBC.  Last hemoglobin was 8 on June 1.  Plan:  Continue ferrous sulfate 325 mg daily  Monitor hemoglobin periodically  Transfuse PRN for hemoglobin less than 7    SIADH.  Hyponatremia has resolved.  Last sodium level was 137 on June 1.  Plan:  Continue fluid restriction of 1800 mL per 24 hours  Monitor sodium level periodically    LUE cellulitis.  Due to peripheral IV.  IV catheter was removed May 25 and he was treated with IV cefazolin and then vancomycin inpatient.    Notably, blood cultures from May 25 grew  Staphylococcus capitis, Staphylococcus cochnii, and Staphylococcus coccus hominis; felt to be contaminants.    Repeat blood cultures from May 27 have remained negative.  On today's examination, there is no residual cellulitis.  Plan:  Monitor clinically    Essential hypertension.  Blood pressure is fairly controlled.  Plan:  Continue amlodipine 5 mg daily  Monitor blood pressure    History of pulmonary embolism.  Plan:  Continue apixaban 5 mg twice daily     BPH with urinary retention,  Solitary right kidney (left kidney was donated to sister in 1990).  Patient has a Prieto catheter; inserted postop.  Plan:  Continue tamsulosin 0.4 mg daily  Continue routine care of Prieto catheter  Avoid NSAIDs and nephrotoxins   Voiding trial when patient more ambulatory  Follow-up with urology as outpatient    History of duodenal ulcer.  Plan:  Continue pantoprazole 40 mg twice daily  Monitor for signs or symptoms of GI bleed     Depression,  SPIKE,  OCD.  Stable.   Plan:  Continue buspirone 15 mg twice daily  Continue escitalopram 20 mg daily  Continue lamotrigine 200 mg at bedtime  Continue trazodone 150 mg at bedtime  Monitor symptoms  Follow-up with psychiatry as directed    Alcohol use disorder.  Plan:  Continue to encourage abstinence from alcohol     Slow transit constipation.  Plan:  Continue the bowel regimen        Orders written by provider at facility:  None.          Disclaimer: This note may contain text created using speech-recognition software and may contain unintended word substitutions.      Electronically signed by:  Wale Duarte MD

## 2023-06-02 ENCOUNTER — TRANSITIONAL CARE UNIT VISIT (OUTPATIENT)
Dept: GERIATRICS | Facility: CLINIC | Age: 63
End: 2023-06-02
Payer: COMMERCIAL

## 2023-06-02 VITALS
DIASTOLIC BLOOD PRESSURE: 70 MMHG | WEIGHT: 203.6 LBS | HEART RATE: 87 BPM | TEMPERATURE: 97.5 F | HEIGHT: 67 IN | OXYGEN SATURATION: 94 % | BODY MASS INDEX: 31.96 KG/M2 | SYSTOLIC BLOOD PRESSURE: 142 MMHG | RESPIRATION RATE: 17 BRPM

## 2023-06-02 DIAGNOSIS — I10 ESSENTIAL HYPERTENSION: ICD-10-CM

## 2023-06-02 DIAGNOSIS — D64.9 CHRONIC ANEMIA: ICD-10-CM

## 2023-06-02 DIAGNOSIS — Z86.711 HISTORY OF PULMONARY EMBOLISM: ICD-10-CM

## 2023-06-02 DIAGNOSIS — N40.1 BENIGN PROSTATIC HYPERPLASIA WITH URINARY RETENTION: ICD-10-CM

## 2023-06-02 DIAGNOSIS — R53.81 PHYSICAL DECONDITIONING: ICD-10-CM

## 2023-06-02 DIAGNOSIS — E22.2 SIADH (SYNDROME OF INAPPROPRIATE ADH PRODUCTION) (H): ICD-10-CM

## 2023-06-02 DIAGNOSIS — L03.114 CELLULITIS OF LEFT UPPER EXTREMITY: ICD-10-CM

## 2023-06-02 DIAGNOSIS — F32.A DEPRESSION, UNSPECIFIED DEPRESSION TYPE: ICD-10-CM

## 2023-06-02 DIAGNOSIS — Z87.19 HISTORY OF DUODENAL ULCER: ICD-10-CM
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
## 2023-06-02 DIAGNOSIS — S72.442D CLOSED DISPLACED FRACTURE OF DISTAL EPIPHYSIS OF LEFT FEMUR WITH ROUTINE HEALING, SUBSEQUENT ENCOUNTER: ICD-10-CM

## 2023-06-02 DIAGNOSIS — F10.90 ALCOHOL USE DISORDER: ICD-10-CM

## 2023-06-02 DIAGNOSIS — R33.8 BENIGN PROSTATIC HYPERPLASIA WITH URINARY RETENTION: ICD-10-CM

## 2023-06-02 DIAGNOSIS — F41.9 ANXIETY: ICD-10-CM

## 2023-06-02 DIAGNOSIS — F42.9 OBSESSIVE-COMPULSIVE DISORDER, UNSPECIFIED TYPE: ICD-10-CM

## 2023-06-02 DIAGNOSIS — S82.892D CLOSED FRACTURE OF LEFT ANKLE WITH ROUTINE HEALING, SUBSEQUENT ENCOUNTER: ICD-10-CM

## 2023-06-02 DIAGNOSIS — K59.01 SLOW TRANSIT CONSTIPATION: ICD-10-CM

## 2023-06-02 DIAGNOSIS — D62 ACUTE BLOOD LOSS ANEMIA: ICD-10-CM

## 2023-06-02 PROCEDURE — 99305 1ST NF CARE MODERATE MDM 35: CPT | Performed by: INTERNAL MEDICINE

## 2023-06-02 NOTE — LETTER
6/2/2023        RE: Suresh Powers  [address]  [facility] [id number]        [facility]  INITIAL VISIT NOTE  June 2, 2023    PRIMARY CARE PROVIDER AND CLINIC:  Brad Thurston [address] [place] [address] / [facility] [id number]    CHIEF COMPLAINT:  Hospital follow-up/Initial visit    HPI:    Suresh Powers is a 62 year old  (1960) male who was seen at [place] on [facility] on June 2, 2023 for an initial visit.     Medical history is notable for alcohol use disorder, hypertension, pulmonary embolism, duodenal ulcer, upper GI bleed, anemia, UTI, solitary kidney, BPH, shingles, depression, SPIKE, and OCD.    Summary of hospital course:  Patient was hospitalized at [facility] from May 23 through May 29, 2023 for severe hyponatremia, left ankle fracture, and left distal femur fracture sustained after mechanical fall due to alcohol intoxication.  CT head was negative for acute intracranial abnormality.  X-ray of the left knee/femur showed oblique, comminuted, and mildly displaced fracture of the distal left femoral metaphysis fracture.  EKG was remarkable for normal sinus rhythm.  Admission blood work was significant for sodium 116, urine osmolarity 196, plasma osmolarity 250, urine sodium 42, white count 10.8, and hemoglobin 10.9.  Ethanol level was 0.23.  Hyponatremia was attributed to SIADH and was initially managed with IV normal saline and then fluid restriction.  He was evaluated by orthopedic service and underwent left distal femur ORIF on May 25.  Postop hemoglobin dropped to 6.3 on May 27 and he was transfused with 1 unit of PRBC.  Due to ongoing pain in left ankle, CT was obtained on May 28 that showed acute nondisplaced fracture of the lateral malleolus extending to the distal fibular metaphysis and probable additional acute nondisplaced fracture of the fourth metatarsal base and equivocal fracture of the second metatarsal base.  Ortho recommended  nonoperative management with CAM boot.  Notably, there was purulence at the site of left upper extremity peripheral IV which was removed on May 25. He was treated with IV cefazolin initially then switched to vancomycin.  Blood cultures grew Staphylococcus capitis, Staphylococcus cochnii, and Staphylococcus coccus hominis.  A Prieto catheter was placed postop for retention.  TCU was recommended per therapies.    Patient is admitted to this facility for medical management, nursing care, and rehab.     Of note, history was obtained from patient, facility RN, and extensive review of the chart.    Today's visit:  Patient was seen in his room, while lying in bed.  He appears weak but comfortable.  He currently rates his pain at 5 out of 10, more in his left ankle than his thigh.  He has a Prieto catheter, that was inserted in the hospital post op.  He had a bowel movement yesterday.  He denies melena or hematochezia.  He reports no fever, chills, chest pain, palpitation, dyspnea, nausea, vomiting, abdominal pain, or urinary symptoms.      CODE STATUS:   CPR/Full code     PAST MEDICAL HISTORY:   Right lower lobe pulmonary embolism in November 2022  Hypertension  UTI  Solitary right kidney (left kidney donated to sister in 1990)  BPH with urinary retention  Upper GI bleed in November 2022  Nonbleeding duodenal ulcer (25 mm), per EGD on November 30, 2022  Chronic anemia, baseline hemoglobin around 11  Shingles  Allergic rhinitis  Alcohol use disorder  Depression  SPIKE  OCD  Fall resulting in left peritrochanteric femur fracture, s/p cephalomedullary nailing on September 4, 2022  Fall resulting in left ankle lateral malleolus fracture, probable left foot second and fourth metatarsal base fractures, and displaced distal left femoral metadiaphysis fracture, s/p ORIF on May 25, 2023  Obesity, BMI 32      Past Medical History:   Diagnosis Date     Anxiety     sees psych for trazodone     Clostridium difficile diarrhea 3/9/2016      Foot pain      Hypertension      Mold exposure      Shingles 1984       PAST SURGICAL HISTORY:   Past Surgical History:   Procedure Laterality Date     ESOPHAGOSCOPY, GASTROSCOPY, DUODENOSCOPY (EGD), COMBINED N/A 11/30/2022    Procedure: ESOPHAGOGASTRODUODENOSCOPY (EGD);  Surgeon: Joe Alaniz MD;  Location:  GI     foot crush injury       kidney donation       OPEN REDUCTION INTERNAL FIXATION CLAVICLE Left 2/4/2016    Procedure: OPEN REDUCTION INTERNAL FIXATION CLAVICLE;  Surgeon: Rakesh Hui MD;  Location:  OR     OPEN REDUCTION INTERNAL FIXATION FEMUR DISTAL Left 5/25/2023    Procedure: OPEN REDUCTION INTERNAL FIXATION, FRACTURE, FEMUR, DISTAL;  Surgeon: Antoine Pompa MD;  Location:  OR     OPEN REDUCTION INTERNAL FIXATION HIP NAILING Left 9/4/2022    Procedure: Open reduction internal fixation of left hip fracture;  Surgeon: Huang Cochran MD;  Location:  OR     OPEN REDUCTION INTERNAL FIXATION TIBIAL PLATEAU Left 2/4/2016    Procedure: OPEN REDUCTION INTERNAL FIXATION TIBIAL PLATEAU;  Surgeon: Rakesh Hui MD;  Location:  OR       FAMILY HISTORY:   Family History   Problem Relation Age of Onset     Breast Cancer Mother      Diabetes Type 2  Mother      Skin Cancer Father      Cerebrovascular Disease Father        SOCIAL HISTORY:  Social History     Tobacco Use     Smoking status: Former     Packs/day: 1.00     Years: 0.00     Pack years: 0.00     Types: Cigarettes     Smokeless tobacco: Never   Vaping Use     Vaping status: Never Used   Substance Use Topics     Alcohol use: Yes     Comment: states 8 beerrs all day over past 8 hours       MEDICATIONS:  Current Outpatient Medications   Medication Sig Dispense Refill     acetaminophen (TYLENOL) 500 MG tablet Take 1,000 mg by mouth 4 times daily       amLODIPine (NORVASC) 5 MG tablet TAKE 1 TABLET BY MOUTH ONCE DAILY 30 tablet 11     busPIRone (BUSPAR) 15 MG tablet Take 1 tablet (15 mg) by mouth 2 times daily        Calcium Carbonate-Vit D-Min (CALCIUM 600+D PLUS MINERALS) 600-400 MG-UNIT TABS Take 2 tablets by mouth daily       dimethicone-zinc oxide (JEANETTE PROTECT) external cream Apply topically as needed (to intact stage 1 pressure area and/or irritated skin, rash, or excoriation)       ELIQUIS ANTICOAGULANT 5 MG tablet TAKE 1 TABLET BY MOUTH TWICE DAILY 60 tablet 0     escitalopram (LEXAPRO) 20 MG tablet Take 20 mg by mouth daily       ferrous sulfate (FEROSUL) 325 (65 Fe) MG tablet Take 325 mg by mouth daily (with breakfast)       fluticasone (FLONASE) 50 MCG/ACT nasal spray INSTILL TWO SPRAYS IN EACH NOSTRIL ONCE DAILY (SHAKE WELL) (Patient not taking: Reported on 5/31/2023) 16 g 11     guaiFENesin (ROBITUSSIN) 20 mg/mL liquid Take 200 mg by mouth every 4 hours as needed for cough       hydrOXYzine (VISTARIL) 25 MG capsule Take 25 mg by mouth 4 times daily       lamoTRIgine (LAMICTAL) 200 MG tablet Take 200 mg by mouth At Bedtime       loperamide (IMODIUM A-D) 2 MG tablet Take 4 mg by mouth 4 times daily as needed for diarrhea       magnesium hydroxide (MILK OF MAGNESIA) 400 MG/5ML suspension Take 30 mLs by mouth daily as needed for constipation or heartburn If no BM for 3 days/9 shifts       methocarbamol (ROBAXIN) 500 MG tablet Take 1,000 mg by mouth 3 times daily       Multiple Vitamins-Minerals (MULTIVITAMIN ADULT PO) Take 1 tablet by mouth daily       nystatin (MYCOSTATIN) 517412 UNIT/GM external powder Apply topically 2 times daily as needed (antifungal)       oxyCODONE (ROXICODONE) 5 MG tablet Take 1 tablet (5 mg) by mouth every 4 hours as needed for moderate pain 25 tablet 0     pantoprazole (PROTONIX) 40 MG EC tablet TAKE 1 TABLET BY MOUTH TWICE DAILY BEFORE MEALS * TAKE 30-60MIN BEFORE A MEAL * 30 tablet 11     polyethylene glycol (MIRALAX) 17 GM/Dose powder Take 17 g by mouth daily 510 g 0     polyethylene glycol-propylene glycol (SYSTANE ULTRA) 0.4-0.3 % SOLN ophthalmic solution Place 2 drops into both eyes 4  "times daily as needed       senna-docusate (SENOKOT-S/PERICOLACE) 8.6-50 MG tablet Take 1 tablet by mouth 2 times daily as needed for constipation 21 tablet 0     tamsulosin (FLOMAX) 0.4 MG capsule TAKE 1 CAPSULE BY MOUTH AT BEDTIME TAKE 30MIN AFTER THE SAME MEAL. 30 capsule 11     traZODone (DESYREL) 150 MG tablet Take 150 mg by mouth At Bedtime       vitamin C (ASCORBIC ACID) 500 MG tablet TAKE 1 TABLET BY MOUTH ONCE DAILY 30 tablet 11     zinc gluconate 50 MG tablet Take 100 mg by mouth daily (Patient not taking: Reported on 5/31/2023)         MED REC REQUIRED  Post Medication Reconciliation Status: medication reconcilation previously completed during another office visit         ALLERGIES:  Allergies   Allergen Reactions     Lisinopril Other (See Comments)     Elevated potassium     Sertraline Diarrhea       ROS:  10 point ROS were negative other than the symptoms noted above in the HPI.    PHYSICAL EXAM:  Vital signs were reviewed in the chart.  Vital Signs: BP (!) 142/70   Pulse 87   Temp 97.5  F (36.4  C)   Resp 17   Ht 1.702 m (5' 7\")   Wt 92.4 kg (203 lb 9.6 oz)   SpO2 94%   BMI 31.89 kg/m    General: Comfortable and in no acute distress  HEENT: Conjunctival pallor, no scleral icterus or injection, moist oral mucosa  Cardiovascular: Normal S1, S2, RRR  Respiratory: Lungs clear to auscultation bilaterally  GI: Abdomen soft, non-tender, non-distended, +BS  Extremities: Left knee hinged brace is on, he is wearing CAM boot on the left, there is trace to 1+ right lower extremity edema  Neuro: CX II-XII grossly intact; ROM in all four extremities grossly intact  Psych: Alert and oriented x3; normal affect  Skin: Scattered upper extremity ecchymosis bilaterally; no evidence for cellulitis    LABORATORY/IMAGING DATA:  All relevant labs and imaging data in [facility] and/or Care Everywhere were personally reviewed today.      Most Recent 3 CBC's:Recent Labs   Lab Test 06/01/23  0521 05/29/23  0700 05/28/23  0743 " 05/26/23  0649 05/25/23  0641 05/24/23  1424 05/24/23  0603   WBC 7.4  --   --   --  10.9  --  9.2   HGB 8.0* 7.5* 8.0*   < > 7.3*   < > 7.8*   MCV 85  --   --   --  80  --  77*   *  --   --   --  250  --  232    < > = values in this interval not displayed.     Most Recent 3 BMP's:Recent Labs   Lab Test 06/01/23  0521 05/29/23  0700 05/28/23  0743    137 134*   POTASSIUM 4.5 4.7 4.2   CHLORIDE 102 100 96*   CO2 26 30* 29   BUN 15.2 15.3 14.6   CR 0.76 0.65*  0.65* 0.69   ANIONGAP 9 7 9   NIMISHA 9.0 8.8 8.4*   * 121* 181*     Most Recent 2 LFT's:Recent Labs   Lab Test 05/22/23 2328 02/21/23  1014   AST 27 22   ALT 16 13   ALKPHOS 118 108   BILITOTAL 0.3 0.3         ASSESSMENT/PLAN:  Mechanical fall, subsequent encounter,  Displaced distal left femoral metadiaphysis fracture, s/p ORIF on May 25, 2023,  Left ankle lateral malleolus fracture, subsequent encounter,  Probable left foot second and fourth metatarsal base fractures,  Physical deconditioning.  Patient is hemodynamically stable.  Patient reports moderate surgical pain, ankle>thigh.  Plan:  Fall precautions  Continue pain management with acetaminophen, hydroxyzine, and oxycodone as ordered  Continue DVT prophylaxis with chronic apixaban  Continue left CAM boot  Continue hinged left knee brace  Partial weightbearing 25%  Continue PT/OT evaluation and therapy  Follow-up with Ortho as directed    Acute blood loss anemia,  Chronic anemia  Baseline hemoglobin around 11.  Postop hemoglobin dropped to 6.3 on May 27 and he was transfused with 1 unit of PRBC.  Last hemoglobin was 8 on June 1.  Plan:  Continue ferrous sulfate 325 mg daily  Monitor hemoglobin periodically  Transfuse PRN for hemoglobin less than 7    SIADH.  Hyponatremia has resolved.  Last sodium level was 137 on June 1.  Plan:  Continue fluid restriction of 1800 mL per 24 hours  Monitor sodium level periodically    LUE cellulitis.  Due to peripheral IV.  IV catheter was removed May 25  and he was treated with IV cefazolin and then vancomycin inpatient.    Notably, blood cultures from May 25 grew Staphylococcus capitis, Staphylococcus cochnii, and Staphylococcus coccus hominis; felt to be contaminants.    Repeat blood cultures from May 27 have remained negative.  On today's examination, there is no residual cellulitis.  Plan:  Monitor clinically    Essential hypertension.  Blood pressure is fairly controlled.  Plan:  Continue amlodipine 5 mg daily  Monitor blood pressure    History of pulmonary embolism.  Plan:  Continue apixaban 5 mg twice daily     BPH with urinary retention,  Solitary right kidney (left kidney was donated to sister in 1990).  Patient has a Prieto catheter; inserted postop.  Plan:  Continue tamsulosin 0.4 mg daily  Continue routine care of Prieto catheter  Avoid NSAIDs and nephrotoxins   Voiding trial when patient more ambulatory  Follow-up with urology as outpatient    History of duodenal ulcer.  Plan:  Continue pantoprazole 40 mg twice daily  Monitor for signs or symptoms of GI bleed     Depression,  SPIKE,  OCD.  Stable.   Plan:  Continue buspirone 15 mg twice daily  Continue escitalopram 20 mg daily  Continue lamotrigine 200 mg at bedtime  Continue trazodone 150 mg at bedtime  Monitor symptoms  Follow-up with psychiatry as directed    Alcohol use disorder.  Plan:  Continue to encourage abstinence from alcohol     Slow transit constipation.  Plan:  Continue the bowel regimen        Orders written by provider at facility:  None.          Disclaimer: This note may contain text created using speech-recognition software and may contain unintended word substitutions.      Electronically signed by:  Wale Duarte MD                          Sincerely,        Wale Duarte MD

## 2023-06-04 VITALS
DIASTOLIC BLOOD PRESSURE: 81 MMHG | RESPIRATION RATE: 18 BRPM | WEIGHT: 203.6 LBS | HEART RATE: 80 BPM | TEMPERATURE: 97.8 F | SYSTOLIC BLOOD PRESSURE: 135 MMHG | OXYGEN SATURATION: 96 % | BODY MASS INDEX: 31.89 KG/M2

## 2023-06-05 ENCOUNTER — TRANSITIONAL CARE UNIT VISIT (OUTPATIENT)
Dept: GERIATRICS | Facility: CLINIC | Age: 63
End: 2023-06-05
Payer: COMMERCIAL

## 2023-06-05 DIAGNOSIS — S72.402A CLOSED FRACTURE OF DISTAL END OF LEFT FEMUR, UNSPECIFIED FRACTURE MORPHOLOGY, INITIAL ENCOUNTER (H): ICD-10-CM

## 2023-06-05 DIAGNOSIS — D62 ABLA (ACUTE BLOOD LOSS ANEMIA): ICD-10-CM

## 2023-06-05 DIAGNOSIS — E87.1 HYPONATREMIA: Primary | ICD-10-CM

## 2023-06-05 DIAGNOSIS — R33.9 URINARY RETENTION: ICD-10-CM

## 2023-06-05 DIAGNOSIS — R52 PAIN: ICD-10-CM

## 2023-06-05 DIAGNOSIS — I10 BENIGN ESSENTIAL HYPERTENSION: ICD-10-CM

## 2023-06-05 PROCEDURE — 99309 SBSQ NF CARE MODERATE MDM 30: CPT | Performed by: NURSE PRACTITIONER

## 2023-06-06 ENCOUNTER — LAB REQUISITION (OUTPATIENT)
Dept: LAB | Facility: CLINIC | Age: 63
End: 2023-06-06

## 2023-06-06 DIAGNOSIS — Z87.81 S/P ORIF (OPEN REDUCTION INTERNAL FIXATION) FRACTURE: ICD-10-CM

## 2023-06-06 DIAGNOSIS — Z98.890 S/P ORIF (OPEN REDUCTION INTERNAL FIXATION) FRACTURE: ICD-10-CM

## 2023-06-06 DIAGNOSIS — Z00.01 ENCOUNTER FOR GENERAL ADULT MEDICAL EXAMINATION WITH ABNORMAL FINDINGS: ICD-10-CM

## 2023-06-06 RX ORDER — OXYCODONE HYDROCHLORIDE 5 MG/1
5 TABLET ORAL EVERY 4 HOURS PRN
Qty: 25 TABLET | Refills: 0 | Status: SHIPPED | OUTPATIENT
Start: 2023-06-06 | End: 2023-06-15

## 2023-06-07 VITALS
TEMPERATURE: 98.3 F | OXYGEN SATURATION: 95 % | RESPIRATION RATE: 18 BRPM | DIASTOLIC BLOOD PRESSURE: 80 MMHG | BODY MASS INDEX: 34.64 KG/M2 | SYSTOLIC BLOOD PRESSURE: 128 MMHG | HEART RATE: 79 BPM | WEIGHT: 221.2 LBS

## 2023-06-07 PROCEDURE — 36415 COLL VENOUS BLD VENIPUNCTURE: CPT | Performed by: NURSE PRACTITIONER

## 2023-06-07 PROCEDURE — 86481 TB AG RESPONSE T-CELL SUSP: CPT | Performed by: NURSE PRACTITIONER

## 2023-06-07 PROCEDURE — P9604 ONE-WAY ALLOW PRORATED TRIP: HCPCS | Performed by: NURSE PRACTITIONER

## 2023-06-08 ENCOUNTER — LAB REQUISITION (OUTPATIENT)
Dept: LAB | Facility: CLINIC | Age: 63
End: 2023-06-08

## 2023-06-08 ENCOUNTER — TRANSITIONAL CARE UNIT VISIT (OUTPATIENT)
Dept: GERIATRICS | Facility: CLINIC | Age: 63
End: 2023-06-08
Payer: COMMERCIAL

## 2023-06-08 DIAGNOSIS — R52 PAIN: Primary | ICD-10-CM

## 2023-06-08 DIAGNOSIS — S72.402A CLOSED FRACTURE OF DISTAL END OF LEFT FEMUR, UNSPECIFIED FRACTURE MORPHOLOGY, INITIAL ENCOUNTER (H): ICD-10-CM

## 2023-06-08 DIAGNOSIS — K92.2 GASTROINTESTINAL HEMORRHAGE, UNSPECIFIED GASTROINTESTINAL HEMORRHAGE TYPE: ICD-10-CM

## 2023-06-08 DIAGNOSIS — D62 ABLA (ACUTE BLOOD LOSS ANEMIA): ICD-10-CM

## 2023-06-08 DIAGNOSIS — E87.1 HYPONATREMIA: ICD-10-CM

## 2023-06-08 DIAGNOSIS — D64.9 ANEMIA, UNSPECIFIED: ICD-10-CM

## 2023-06-08 DIAGNOSIS — R33.9 URINARY RETENTION: ICD-10-CM

## 2023-06-08 DIAGNOSIS — I10 BENIGN ESSENTIAL HYPERTENSION: ICD-10-CM

## 2023-06-08 PROCEDURE — 99309 SBSQ NF CARE MODERATE MDM 30: CPT | Performed by: NURSE PRACTITIONER

## 2023-06-08 NOTE — PROGRESS NOTES
[facility] GERIATRICS    Chief Complaint   Patient presents with     RECHECK     HPI:  Suresh Powers is a 62 year old  (1960), who is being seen today for an episodic care visit at: [facility] ([facility]) [[id number]].     This is a 61 yo male with a PMHx of chronic alcohol use, chronic hyponatremia, HTN, PE on apixaban, GI bleed due to duodenal ulcer in 11/2022, BPH, solitary kidney, chronic depression, generalized anxiety disorder, OCD, and recurrent falls s/p left hip cephalomedullary nailing on 9/4/2022 for left intertrochanteric femur fracture after a fall while intoxicated. He presented to the ED on 5/23/2023 after going out for drinks with a friend and falling. On admission he was found to have severe hyponatremia, sodium 116. Nephrology consulted, sodium improved with IV normal saline and 1800mL fluid restriction, discharge sodium 137. He was also found to have a mildly displaced fracture of the distal left femoral metaphysis, s/p ORIF with interlocking distal femur plate on 5/25/2023. CT ankle also found acute nondisplaced fracture of the lateral malleolus extending to the distal fibular metaphysis and probable additional fracture of the fourth metatarsal base. Per ortho, non operative management with boot immobilization and weight bearing as tolerated. On 5/27 his hemoglobin dropped to 6.3, transfused 1 unit PRBC, discharge hemoglobin 7.5, continue Protonix 40mg twice daily. He was discharged to [facility] for rehab.      Today's concern is:   pain control, urinary retention, anemia, therapy progress    Allergies, and PMH/PSH reviewed in [facility] today.  REVIEW OF SYSTEMS:  4 point ROS including Respiratory, CV, GI and , other than that noted in the HPI,  is negative    Objective:   /80   Pulse 79   Temp 98.3  F (36.8  C)   Resp 18   Wt 100.3 kg (221 lb 3.2 oz)   SpO2 95%   BMI 34.64 kg/m    GENERAL APPEARANCE:  Alert, in no distress, cooperative  RESP:  respiratory effort and palpation of  chest normal, lungs clear to auscultation   CV:  Palpation and auscultation of heart done , regular rate and rhythm, no murmur, rub, or gallop, no edema  PSYCH:  oriented X 3, normal insight, judgement and memory. SLUMS 26/30      Most Recent 3 CBC's:  Recent Labs   Lab Test 06/01/23  0521 05/29/23  0700 05/28/23  0743 05/26/23  0649 05/25/23  0641 05/24/23  1424 05/24/23  0603   WBC 7.4  --   --   --  10.9  --  9.2   HGB 8.0* 7.5* 8.0*   < > 7.3*   < > 7.8*   MCV 85  --   --   --  80  --  77*   *  --   --   --  250  --  232    < > = values in this interval not displayed.     Most Recent 3 BMP's:  Recent Labs   Lab Test 06/01/23  0521 05/29/23  0700 05/28/23  0743    137 134*   POTASSIUM 4.5 4.7 4.2   CHLORIDE 102 100 96*   CO2 26 30* 29   BUN 15.2 15.3 14.6   CR 0.76 0.65*  0.65* 0.69   ANIONGAP 9 7 9   NIMISHA 9.0 8.8 8.4*   * 121* 181*       Assessment/Plan:  Mildly displaced fracture of the distal left femoral metaphysis, s/p ORIF with interlocking distal femur plate on 5/25/2023   Acute nondisplaced fracture of the left lateral malleolus extending to the distal fibular metaphysis and probable additional fracture of the fourth metatarsal base  S/p left hip cephalomedullary nailing, 9/4/2022 for left intertrochanteric femur fracture  Given 25% weight bearing on LLE, hinged knee brace locked in extension, f/u with ortho on 6/9 per Dr. Pompa. Continue Ca/vit supplementation. (Pending)     Right ankle/foot fractures  Weight bearing as tolerated. Follow up with surgeon on 6/9/23      Pain  Continue Tylenol 1000 mg 3 times daily, continue Vistaril 25 mg 4 times daily, methocarbamol 1000 mg 3 times daily.   Today decrease oxycodone to 2.5mg every 6 hours as needed. Continue to encourage icing. Today reports pain is well controlled     PE (H)  Continue Apixaban 5mg BID, no change     HTN  Continue amlodipine 5 mg daily. Today, Bps noted to be -140, HR <70s. Continue to monitor, no  change     BPH/Solitary kidney/urinary retention  Donated left kidney to sister in 1990  History of urinary retention and vasquez catheter after previous surgeries, vasquez placed due to retention  Patient declined voiding trial at this time due to immobility and pain, will reassess when mobility improves. Continue tamsulosin 0.4mg daily at bedtime.  Daily care as ordered. Revisited removing vasquez today, patient continues to decline voiding trial until able to stand, will have f/u with surgeon on 6/9. Revisited vasquez removal today, patient refusing removal today. Will follow up after meeting with surgeon based on wt bearing ability/pt ability to stand to void.      Diminished lung sounds  Encourage incentive spirometry     Staph bacteremia, likely contaminant  Blood cultures were obtained and patient was started on IV cefazolin, then switched to IV vancomycin with concern for GPC in clusters, possibly MRSA.  Repeat BCs NGTD. Resolved     Renal fx  Last Cr 0.76 with GFR >60 on 6/1.  Recheck BMP on 6/9     Hyponatremia  Beer potomania (H)  SIADH  MIKAYLA 0.23%  Last Na 137 on 6/1. Discontinued fluid restriction on 6/5/23.  Recheck BMP on 6/9      ABLA/GERD/Hx GI bleed  Previously received 1U PRBC, continue protonix 40mg BID.  Last Hgb 8.0 on 6/1, continue FeSO4 325mg daily. Recheck CBC on 6/9     Chronic depression/SPIKE/OCD  Continue buspar 15mg BID, lexapro 20mg daily, trazadone 150mg daily at bedtime, and lamotrigine 200mg daily at bedtime. Today reports to be sleeping well     Therapy  Using WC per WB status, strengthening    Orders:  CBC/BMP, decreased oxycodone     Electronically signed by: Todd Morocho NP      I was present with the student who particpated in the serivce and documentation of the note. I have verified the history and personally peformed the physical exam and medical decision making. I agree with the assessment and plan of care as documented in the note.

## 2023-06-09 LAB
ANION GAP SERPL CALCULATED.3IONS-SCNC: 10 MMOL/L (ref 7–15)
BUN SERPL-MCNC: 18.4 MG/DL (ref 8–23)
CALCIUM SERPL-MCNC: 9.2 MG/DL (ref 8.8–10.2)
CHLORIDE SERPL-SCNC: 99 MMOL/L (ref 98–107)
CREAT SERPL-MCNC: 0.89 MG/DL (ref 0.67–1.17)
DEPRECATED HCO3 PLAS-SCNC: 26 MMOL/L (ref 22–29)
ERYTHROCYTE [DISTWIDTH] IN BLOOD BY AUTOMATED COUNT: 20.5 % (ref 10–15)
GAMMA INTERFERON BACKGROUND BLD IA-ACNC: 0.07 IU/ML
GFR SERPL CREATININE-BSD FRML MDRD: >90 ML/MIN/1.73M2
GLUCOSE SERPL-MCNC: 112 MG/DL (ref 70–99)
HCT VFR BLD AUTO: 31.5 % (ref 40–53)
HGB BLD-MCNC: 9.7 G/DL (ref 13.3–17.7)
M TB IFN-G BLD-IMP: NEGATIVE
M TB IFN-G CD4+ BCKGRND COR BLD-ACNC: 8.65 IU/ML
MCH RBC QN AUTO: 26.2 PG (ref 26.5–33)
MCHC RBC AUTO-ENTMCNC: 30.8 G/DL (ref 31.5–36.5)
MCV RBC AUTO: 85 FL (ref 78–100)
MITOGEN IGNF BCKGRD COR BLD-ACNC: -0.03 IU/ML
MITOGEN IGNF BCKGRD COR BLD-ACNC: -0.03 IU/ML
PLATELET # BLD AUTO: 801 10E3/UL (ref 150–450)
POTASSIUM SERPL-SCNC: 4.4 MMOL/L (ref 3.4–5.3)
QUANTIFERON MITOGEN: 8.72 IU/ML
QUANTIFERON NIL TUBE: 0.07 IU/ML
QUANTIFERON TB1 TUBE: 0.04 IU/ML
QUANTIFERON TB2 TUBE: 0.04
RBC # BLD AUTO: 3.7 10E6/UL (ref 4.4–5.9)
SODIUM SERPL-SCNC: 135 MMOL/L (ref 136–145)
WBC # BLD AUTO: 7.8 10E3/UL (ref 4–11)

## 2023-06-09 PROCEDURE — 36415 COLL VENOUS BLD VENIPUNCTURE: CPT | Performed by: NURSE PRACTITIONER

## 2023-06-09 PROCEDURE — P9604 ONE-WAY ALLOW PRORATED TRIP: HCPCS | Performed by: NURSE PRACTITIONER

## 2023-06-09 PROCEDURE — 80048 BASIC METABOLIC PNL TOTAL CA: CPT | Performed by: NURSE PRACTITIONER

## 2023-06-09 PROCEDURE — 85027 COMPLETE CBC AUTOMATED: CPT | Performed by: NURSE PRACTITIONER

## 2023-06-13 VITALS
TEMPERATURE: 98.3 F | HEART RATE: 81 BPM | RESPIRATION RATE: 18 BRPM | DIASTOLIC BLOOD PRESSURE: 77 MMHG | WEIGHT: 221.2 LBS | BODY MASS INDEX: 34.64 KG/M2 | SYSTOLIC BLOOD PRESSURE: 133 MMHG | OXYGEN SATURATION: 93 %

## 2023-06-14 ENCOUNTER — TRANSITIONAL CARE UNIT VISIT (OUTPATIENT)
Dept: GERIATRICS | Facility: CLINIC | Age: 63
End: 2023-06-14
Payer: COMMERCIAL

## 2023-06-14 DIAGNOSIS — R52 PAIN: Primary | ICD-10-CM

## 2023-06-14 DIAGNOSIS — S72.402A CLOSED FRACTURE OF DISTAL END OF LEFT FEMUR, UNSPECIFIED FRACTURE MORPHOLOGY, INITIAL ENCOUNTER (H): ICD-10-CM

## 2023-06-14 DIAGNOSIS — E87.1 HYPONATREMIA: ICD-10-CM

## 2023-06-14 DIAGNOSIS — K92.2 GASTROINTESTINAL HEMORRHAGE, UNSPECIFIED GASTROINTESTINAL HEMORRHAGE TYPE: ICD-10-CM

## 2023-06-14 DIAGNOSIS — R33.9 URINARY RETENTION: ICD-10-CM

## 2023-06-14 DIAGNOSIS — I10 BENIGN ESSENTIAL HYPERTENSION: ICD-10-CM

## 2023-06-14 PROCEDURE — 99309 SBSQ NF CARE MODERATE MDM 30: CPT | Performed by: NURSE PRACTITIONER

## 2023-06-14 NOTE — PROGRESS NOTES
[facility] GERIATRICS    Chief Complaint   Patient presents with     RECHECK     HPI:  Suresh Powers is a 62 year old  (1960), who is being seen today for an episodic care visit at: [facility] ([facility]) [[id number]].     This is a 61 yo male with a PMHx of chronic alcohol use, chronic hyponatremia, HTN, PE on apixaban, GI bleed due to duodenal ulcer in 11/2022, BPH, solitary kidney, chronic depression, generalized anxiety disorder, OCD, and recurrent falls s/p left hip cephalomedullary nailing on 9/4/2022 for left intertrochanteric femur fracture after a fall while intoxicated. He presented to the ED on 5/23/2023 after going out for drinks with a friend and falling. On admission he was found to have severe hyponatremia, sodium 116. Nephrology consulted, sodium improved with IV normal saline and 1800mL fluid restriction, discharge sodium 137. He was also found to have a mildly displaced fracture of the distal left femoral metaphysis, s/p ORIF with interlocking distal femur plate on 5/25/2023. CT ankle also found acute nondisplaced fracture of the lateral malleolus extending to the distal fibular metaphysis and probable additional fracture of the fourth metatarsal base. Per ortho, non operative management with boot immobilization and weight bearing as tolerated. On 5/27 his hemoglobin dropped to 6.3, transfused 1 unit PRBC, discharge hemoglobin 7.5, continue Protonix 40mg twice daily. He was discharged to [facility] for rehab.     Today's concern is:   Pain control, urinary retention, anemia, f/u with ortho, therapy progress    Allergies, and PMH/PSH reviewed in [facility] today.  REVIEW OF SYSTEMS:   4 point ROS including Respiratory, CV, GI and , other than that noted in the HPI,  is negative    Objective:   /77   Pulse 81   Temp 98.3  F (36.8  C)   Resp 18   Wt 100.3 kg (221 lb 3.2 oz)   SpO2 93%   BMI 34.64 kg/m    GENERAL APPEARANCE:  Alert, in no distress, cooperative  RESP:  respiratory effort  and palpation of chest normal, lungs clear to auscultation   CV:  Palpation and auscultation of heart done , regular rate and rhythm, no murmur, rub, or gallop, no edema  PSYCH:  oriented X 3, normal insight, judgement and memory. SLUMS 26/30      Most Recent 3 CBC's:  Recent Labs   Lab Test 06/09/23  0600 06/01/23  0521 05/29/23  0700 05/26/23  0649 05/25/23  0641   WBC 7.8 7.4  --   --  10.9   HGB 9.7* 8.0* 7.5*   < > 7.3*   MCV 85 85  --   --  80   * 525*  --   --  250    < > = values in this interval not displayed.     Most Recent 3 BMP's:  Recent Labs   Lab Test 06/09/23  0600 06/01/23  0521 05/29/23  0700   * 137 137   POTASSIUM 4.4 4.5 4.7   CHLORIDE 99 102 100   CO2 26 26 30*   BUN 18.4 15.2 15.3   CR 0.89 0.76 0.65*  0.65*   ANIONGAP 10 9 7   NIMISHA 9.2 9.0 8.8   * 125* 121*       Assessment/Plan:    Mildly displaced fracture of the distal left femoral metaphysis, s/p ORIF with interlocking distal femur plate on 5/25/2023   Acute nondisplaced fracture of the left lateral malleolus extending to the distal fibular metaphysis and probable additional fracture of the fourth metatarsal base  S/p left hip cephalomedullary nailing, 9/4/2022 for left intertrochanteric femur fracture  Recently advanced to 75% weight bearing on LLE, KI locked in extension in CAM boot, f/u with ortho (TBD). Continue Ca/vit supplementation     Right ankle/foot fractures  Weight bearing as tolerated     Pain  Continue Tylenol 1000 mg 3 times daily, continue Vistaril 25 mg 4 times daily, methocarbamol 1000 mg 3 times daily.   Continue oxycodone 2.5mg every 6 hours as needed (2-3x/day). Continue to encourage icing. Today reports pain is well controlled     PE (H)  Continue Apixaban 5mg BID, no change     HTN  Continue amlodipine 5 mg daily. -140, HR <70s. Stable     BPH/Solitary kidney/urinary retention  Donated left kidney to sister in 1990. History of urinary retention and vasquez catheter after previous surgeries,  vasquez placed due to retention. Today per increased WB, start PVRs      Diminished lung sounds  Encourage incentive spirometry     Staph bacteremia, likely contaminant  Blood cultures were obtained and patient was started on IV cefazolin, then switched to IV vancomycin with concern for GPC in clusters, possibly MRSA.  Repeat BCs NGTD. Resolved     Renal fx  Last Cr 0.89 with GFR >60 on 6/9     Hyponatremia  Beer potomania (H)  SIADH  MIKAYLA 0.23%  Last Na 135 on 6/9      ABLA/GERD/Hx GI bleed  Previously received 1U PRBC, continue protonix 40mg BID.  Last Hgb 9.7 on 6/9, continue FeSO4 325mg daily     Chronic depression/SPIKE/OCD  Continue buspar 15mg BID, lexapro 20mg daily, trazadone 150mg daily at bedtime, and lamotrigine 200mg daily at bedtime. Today reports to be sleeping well     Therapy  Ambulating 200ft with FWW/WB status, SBA    Orders:  PVRs    Electronically signed by: Todd Morocho NP

## 2023-06-15 VITALS
RESPIRATION RATE: 18 BRPM | BODY MASS INDEX: 34.64 KG/M2 | HEART RATE: 78 BPM | OXYGEN SATURATION: 93 % | WEIGHT: 221.2 LBS | TEMPERATURE: 98.1 F | DIASTOLIC BLOOD PRESSURE: 81 MMHG | SYSTOLIC BLOOD PRESSURE: 137 MMHG

## 2023-06-15 DIAGNOSIS — Z87.81 S/P ORIF (OPEN REDUCTION INTERNAL FIXATION) FRACTURE: ICD-10-CM

## 2023-06-15 DIAGNOSIS — Z98.890 S/P ORIF (OPEN REDUCTION INTERNAL FIXATION) FRACTURE: ICD-10-CM

## 2023-06-15 RX ORDER — OXYCODONE HYDROCHLORIDE 5 MG/1
2.5 TABLET ORAL EVERY 6 HOURS PRN
Qty: 20 TABLET | Refills: 0 | Status: SHIPPED | OUTPATIENT
Start: 2023-06-15

## 2023-06-16 ENCOUNTER — DISCHARGE SUMMARY NURSING HOME (OUTPATIENT)
Dept: GERIATRICS | Facility: CLINIC | Age: 63
End: 2023-06-16
Payer: COMMERCIAL

## 2023-06-16 DIAGNOSIS — Z87.81 S/P ORIF (OPEN REDUCTION INTERNAL FIXATION) FRACTURE: Primary | ICD-10-CM

## 2023-06-16 DIAGNOSIS — Z98.890 S/P ORIF (OPEN REDUCTION INTERNAL FIXATION) FRACTURE: Primary | ICD-10-CM

## 2023-06-16 PROCEDURE — 99315 NF DSCHRG MGMT 30 MIN/LESS: CPT | Performed by: NURSE PRACTITIONER

## 2023-06-16 NOTE — PROGRESS NOTES
[facility] GERIATRICS DISCHARGE SUMMARY  PATIENT'S NAME: Suresh Powers  YOB: 1960  MEDICAL RECORD NUMBER:  [id number]  Place of Service where encounter took place:  [facility] ([facility] [[id number]]    PRIMARY CARE PROVIDER AND CLINIC RESPONSIBLE AFTER TRANSFER:   Brad Thurston MD, [address] [place] [address] / [facility] [id number]    [facility] Provider     Transferring providers: Todd Morocho NP, Dr. Felicia MD  Recent Hospitalization/ED:  [facility] Hospital stay 5/23/23 to 5/29/23.  Date of SNF Admission: 5/29/23  Date of SNF (anticipated) Discharge: June 19, 2023  Discharged to: previous assisted living  Cognitive Scores: SLUMS: 26/30  Physical Function: Ambulating >200 ft with FWW  DME: No new DME needed    CODE STATUS/ADVANCE DIRECTIVES DISCUSSION:  Prior   ALLERGIES: Lisinopril and Sertraline    HPI  This is a 63 yo male with a PMHx of chronic alcohol use, chronic hyponatremia, HTN, PE on apixaban, GI bleed due to duodenal ulcer in 11/2022, BPH, solitary kidney, chronic depression, generalized anxiety disorder, OCD, and recurrent falls s/p left hip cephalomedullary nailing on 9/4/2022 for left intertrochanteric femur fracture after a fall while intoxicated. He presented to the ED on 5/23/2023 after going out for drinks with a friend and falling. On admission he was found to have severe hyponatremia, sodium 116. Nephrology consulted, sodium improved with IV normal saline and 1800mL fluid restriction, discharge sodium 137. He was also found to have a mildly displaced fracture of the distal left femoral metaphysis, s/p ORIF with interlocking distal femur plate on 5/25/2023. CT ankle also found acute nondisplaced fracture of the lateral malleolus extending to the distal fibular metaphysis and probable additional fracture of the fourth metatarsal base. Per ortho, non operative management with boot immobilization and weight bearing as tolerated. On 5/27 his hemoglobin dropped to 6.3,  transfused 1 unit PRBC, discharge hemoglobin 7.5, continue Protonix 40mg twice daily. [facility].     Summary of nursing facility stay:     Mildly displaced fracture of the distal left femoral metaphysis, s/p ORIF with interlocking distal femur plate on 5/25/2023   Acute nondisplaced fracture of the left lateral malleolus extending to the distal fibular metaphysis and probable additional fracture of the fourth metatarsal base  S/p left hip cephalomedullary nailing, 9/4/2022 for left intertrochanteric femur fracture  Recently advanced to 75% weight bearing on LLE, KI locked in extension in CAM boot, f/u with ortho (TBD). Continue Ca/vit supplementation     Right ankle/foot fractures  Weight bearing as tolerated     Pain  Continue Tylenol 1000 mg 3 times daily, methocarbamol 1000 mg 3 times daily, oxycodone 2.5mg every 6 hours as needed (2-3x/day). Continue to encourage icing as needed     PE (H)  Continue Apixaban 5mg BID     HTN  Continue amlodipine 5 mg daily. 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 History of urinary retention and vasquez catheter after previous surgeries, vasquez placed due to retention. Vasquez removed     Diminished lung sounds  Encourage incentive spirometry     Staph bacteremia, likely contaminant  Blood cultures were obtained and patient was started on IV cefazolin, then switched to IV vancomycin with concern for GPC in clusters, possibly MRSA.  Repeat BCs NGTD. Resolved     Renal fx  Last Cr 0.89 with GFR >60 on 6/9     Hyponatremia  Beer potomania (H)  SIADH  BAC 0.23%  Last Na 135 on 6/9      ABLA/GERD/Hx GI bleed  Previously received 1U PRBC, continue protonix 40mg BID.  Last Hgb 9.7 on 6/9, continue FeSO4 325mg daily     Chronic depression/SPIKE/OCD  Continue buspar 15mg BID, lexapro 20mg daily, trazadone 150mg daily at bedtime, and lamotrigine 200mg daily at bedtime    Discharge  Medications:    Current Outpatient Medications   Medication Sig Dispense Refill     acetaminophen (TYLENOL) 500 MG tablet Take 1,000 mg by mouth 3 times daily       amLODIPine (NORVASC) 5 MG tablet TAKE 1 TABLET BY MOUTH ONCE DAILY 30 tablet 11     busPIRone (BUSPAR) 15 MG tablet Take 1 tablet (15 mg) by mouth 2 times daily       Calcium Carbonate-Vit D-Min (CALCIUM 600+D PLUS MINERALS) 600-400 MG-UNIT TABS Take 2 tablets by mouth daily       dimethicone-zinc oxide (JEANETTE PROTECT) external cream Apply topically as needed (to intact stage 1 pressure area and/or irritated skin, rash, or excoriation)       ELIQUIS ANTICOAGULANT 5 MG tablet TAKE 1 TABLET BY MOUTH TWICE DAILY 60 tablet 0     escitalopram (LEXAPRO) 20 MG tablet Take 20 mg by mouth daily       ferrous sulfate (FEROSUL) 325 (65 Fe) MG tablet Take 325 mg by mouth daily (with breakfast)       fluticasone (FLONASE) 50 MCG/ACT nasal spray INSTILL TWO SPRAYS IN EACH NOSTRIL ONCE DAILY (SHAKE WELL) (Patient not taking: Reported on 5/31/2023) 16 g 11     guaiFENesin (ROBITUSSIN) 20 mg/mL liquid Take 200 mg by mouth every 4 hours as needed for cough       lamoTRIgine (LAMICTAL) 200 MG tablet Take 200 mg by mouth At Bedtime       loperamide (IMODIUM A-D) 2 MG tablet Take 4 mg by mouth 4 times daily as needed for diarrhea       magnesium hydroxide (MILK OF MAGNESIA) 400 MG/5ML suspension Take 30 mLs by mouth daily as needed for constipation or heartburn If no BM for 3 days/9 shifts       methocarbamol (ROBAXIN) 500 MG tablet Take 1,000 mg by mouth 3 times daily       Multiple Vitamins-Minerals (MULTIVITAMIN ADULT PO) Take 1 tablet by mouth daily       nystatin (MYCOSTATIN) 303826 UNIT/GM external powder Apply topically 2 times daily as needed (antifungal)       oxyCODONE (ROXICODONE) 5 MG tablet Take 0.5 tablets (2.5 mg) by mouth every 6 hours as needed for moderate pain 20 tablet 0     pantoprazole (PROTONIX) 40 MG EC tablet TAKE 1 TABLET BY MOUTH TWICE DAILY BEFORE  MEALS * TAKE 30-60MIN BEFORE A MEAL * 30 tablet 11     polyethylene glycol (MIRALAX) 17 GM/Dose powder Take 17 g by mouth daily 510 g 0     polyethylene glycol-propylene glycol (SYSTANE ULTRA) 0.4-0.3 % SOLN ophthalmic solution Place 2 drops into both eyes 4 times daily as needed       senna-docusate (SENOKOT-S/PERICOLACE) 8.6-50 MG tablet Take 1 tablet by mouth 2 times daily as needed for constipation 21 tablet 0     tamsulosin (FLOMAX) 0.4 MG capsule TAKE 1 CAPSULE BY MOUTH AT BEDTIME TAKE 30MIN AFTER THE SAME MEAL. 30 capsule 11     traZODone (DESYREL) 150 MG tablet Take 150 mg by mouth At Bedtime       vitamin C (ASCORBIC ACID) 500 MG tablet TAKE 1 TABLET BY MOUTH ONCE DAILY 30 tablet 11     zinc gluconate 50 MG tablet Take 100 mg by mouth daily (Patient not taking: Reported on 5/31/2023)       Controlled medications:   Medication: oxycodone 2.5mg, 10 tabs given to patient at the time of discharge to take home     Past Medical History:   Past Medical History:   Diagnosis Date     Anxiety     sees psych for trazodone     Clostridium difficile diarrhea 3/9/2016     Foot pain      Hypertension      Mold exposure      Shingles 1984     Physical Exam:   Vitals: /81   Pulse 78   Temp 98.1  F (36.7  C)   Resp 18   Wt 100.3 kg (221 lb 3.2 oz)   SpO2 93%   BMI 34.64 kg/m    BMI: Body mass index is 34.64 kg/m .  GENERAL APPEARANCE:  Alert, in no distress, cooperative  RESP:  respiratory effort and palpation of chest normal, lungs clear to auscultation   CV:  Palpation and auscultation of heart done , regular rate and rhythm, no murmur, rub, or gallop, no edema  PSYCH:  oriented X 3, normal insight, judgement and memory. [facility] 26/30    SNF labs:   Most Recent 3 CBC's:Recent Labs   Lab Test 06/09/23  0600 06/01/23  0521 05/29/23  0700 05/26/23  0649 05/25/23  0641   WBC 7.8 7.4  --   --  10.9   HGB 9.7* 8.0* 7.5*   < > 7.3*   MCV 85 85  --   --  80   * 525*  --   --  250    < > = values in this interval  not displayed.     Most Recent 3 BMP's:Recent Labs   Lab Test 06/09/23  0600 06/01/23  0521 05/29/23  0700   * 137 137   POTASSIUM 4.4 4.5 4.7   CHLORIDE 99 102 100   CO2 26 26 30*   BUN 18.4 15.2 15.3   CR 0.89 0.76 0.65*  0.65*   ANIONGAP 10 9 7   NIMISHA 9.2 9.0 8.8   * 125* 121*       DISCHARGE PLAN:    Follow up labs: No labs orders/due    Medical Follow Up:      Follow up with primary care provider in 1-2 weeks    Current [place] scheduled appointments:       Discharge Services: Home Care:  Occupational Therapy, Physical Therapy, Registered Nurse and Home Health Aide    Discharge Instructions Verbalized to Patient at Discharge:     Weight bearing restrictions:  Weight bearing as tolerated    75%.     TOTAL DISCHARGE TIME:   Less than or equal to 30 minutes  Electronically signed by:  Todd Morocho NP     Home care Face to Face documentation done in [facility] attached to Home care orders for [facility].

## 2023-06-17 ENCOUNTER — HEALTH MAINTENANCE LETTER (OUTPATIENT)
Age: 63
End: 2023-06-17

## 2023-06-23 ENCOUNTER — TELEPHONE (OUTPATIENT)
Dept: INTERNAL MEDICINE | Facility: CLINIC | Age: 63
End: 2023-06-23
Payer: COMMERCIAL

## 2023-06-23 NOTE — TELEPHONE ENCOUNTER
Called and left message for Abad with the provider's response. Advised Abad to call the clinic back with any additional questions.     Heydi Cardenas RN

## 2023-06-23 NOTE — TELEPHONE ENCOUNTER
Home Care is calling regarding an established patient with [facility].     Requesting orders from: Brad Thurston  Provider is following patient: Yes  Is this a 60-day recertification request?  Yes    Orders Requested    Skilled Nursing  Delay in care visit per patients request. HC plans to see pt 06/24/2023. RN needs call back with providers approval for visit delay.    Confirmed ok to leave a detailed message with call back.  Contact information confirmed and updated as needed.    Selvin Dinh RN

## 2023-06-23 NOTE — TELEPHONE ENCOUNTER
Last info I have  In chart is discharge summary from 1 week ago.  Request is to delay care one day and have not heard from pt since discharge re: new acute issues so OK for delay as requested to see pt tomorrow from my limited knowledge of pt currently

## 2023-06-24 ENCOUNTER — NURSE TRIAGE (OUTPATIENT)
Dept: NURSING | Facility: CLINIC | Age: 63
End: 2023-06-24
Payer: COMMERCIAL

## 2023-06-24 NOTE — TELEPHONE ENCOUNTER
RN opening him up for care. Wants orders for skilled RN one visit for two weeks, then every other week for 7 weeks.  PT/OT eval and treat.  Jovana Monteiro RN  [facility]       Home Care is calling regarding an established patient with [facility].         Requesting orders from: Brad Thurston  Provider is following patient: Yes  Is this a 60-day recertification request?  No    Orders Requested    Skilled Nursing  Request for initial certification (first set of orders)   Frequency: RN 1 visit x 2 weeks then every other week for 7 weeks.    Physical Therapy  Request for initial certification (first set of orders)   Frequency: PT and OT eval and treat.    Occupational Therapy  Request for initial certification (first set of orders) Frequency: OT Eval and Treat  As above    Confirmed ok to leave a detailed message with call back.  Contact information confirmed and updated as needed.  [phone].    Jovana Monteiro RN

## 2023-06-24 NOTE — TELEPHONE ENCOUNTER
Reason for Disposition    General information question, no triage required and triager able to answer question    Additional Information    Negative: [1] Caller is not with the adult (patient) AND [2] reporting urgent symptoms    Negative: Lab result questions    Negative: Medication questions    Negative: Caller can't be reached by phone    Negative: Caller has already spoken to PCP or another triager    Negative: RN needs further essential information from caller in order to complete triage    Negative: Requesting regular office appointment    Negative: [1] Caller requesting NON-URGENT health information AND [2] PCP's office is the best resource    Negative: Health Information question, no triage required and triager able to answer question    Protocols used: INFORMATION ONLY CALL - NO TRIAGE-A-

## 2023-06-26 NOTE — TELEPHONE ENCOUNTER
Called Selene and left a detailed message with Dr Smith message from below. Advised Selene to call us back if she has questions.

## 2023-06-27 ENCOUNTER — PATIENT OUTREACH (OUTPATIENT)
Dept: CARE COORDINATION | Facility: CLINIC | Age: 63
End: 2023-06-27
Payer: COMMERCIAL

## 2023-06-27 NOTE — PROGRESS NOTES
Clinic Care Coordination Contact    Situation: Patient chart reviewed by care coordinator.    Background:   Patient was admitted to [facility] from 5/23/2023 to 5/29/2023 with a diagnoses of Hyponatremia due to beer potomania, acute comminuted mildly displaced fracture of left metaphysis s/p ORIF with interlocking distal femure plate, ETOH abuse.    Assessment:   RNCC was not notified by Transitional Care Unit of patient discharge and no patient outreach was completed.  However, the patient has yet to schedule Primary Care Provider/TCU follow up appointment, yet has [facility] RN/PT/OT/HHA services and is following the plan of care as outlined by the care team.    Plan/Recommendations:   No further Care Coordination outreaches at this time.     Adele Camilo RN, BSN, PHN  Primary Care / Care Coordinator   [facility]  [email or web address]   [phone]

## 2023-06-28 DIAGNOSIS — Z98.890 S/P ORIF (OPEN REDUCTION INTERNAL FIXATION) FRACTURE: ICD-10-CM

## 2023-06-28 DIAGNOSIS — Z87.81 S/P ORIF (OPEN REDUCTION INTERNAL FIXATION) FRACTURE: ICD-10-CM

## 2023-06-28 RX ORDER — PSEUDOEPHED/ACETAMINOPH/DIPHEN 30MG-500MG
TABLET ORAL
Qty: 84 TABLET | OUTPATIENT
Start: 2023-06-28

## 2023-06-28 RX ORDER — POLYETHYLENE GLYCOL 3350 17 G/17G
POWDER, FOR SOLUTION ORAL
Qty: 510 G | OUTPATIENT
Start: 2023-06-28

## 2023-06-28 RX ORDER — METHOCARBAMOL 500 MG/1
TABLET, FILM COATED ORAL
Qty: 84 TABLET | OUTPATIENT
Start: 2023-06-28

## 2023-06-28 RX ORDER — CALCIUM CARBONATE/VITAMIN D3 600 MG-10
TABLET ORAL
Qty: 28 TABLET | OUTPATIENT
Start: 2023-06-28

## 2023-06-28 RX ORDER — FERROUS SULFATE 325(65) MG
TABLET ORAL
Qty: 14 TABLET | OUTPATIENT
Start: 2023-06-28

## 2023-06-28 RX ORDER — CHOLECALCIFEROL (VITAMIN D3) 50 MCG
TABLET ORAL
Qty: 14 TABLET | OUTPATIENT
Start: 2023-06-28

## 2023-07-11 NOTE — TELEPHONE ENCOUNTER
Home Care is calling regarding an established patient with [facility].       Requesting orders from: Brad Thurston  Provider is following patient: Yes  Is this a 60-day recertification request?  No    Orders Requested    Physical Therapy  Request for initial certification (first set of orders)   Frequency: 1x/wk for 2, 1 QOW X 4 Wks wks      Confirmed ok to leave a detailed message with call back.  Contact information confirmed and updated as needed.    Selene Hodges RN

## 2023-07-17 ENCOUNTER — TELEPHONE (OUTPATIENT)
Dept: INTERNAL MEDICINE | Facility: CLINIC | Age: 63
End: 2023-07-17
Payer: COMMERCIAL

## 2023-07-17 NOTE — TELEPHONE ENCOUNTER
Home Care is calling regarding an established patient with [facility].        7/11/2023   Home Care Information   Date of Home Care episode start 6/24/2023   Current following provider Dr. Thurston    Name/Phone Number Maribel rhodes, [phone]   Home Care agency [facility]     Requesting orders from: Brad Thurston  Provider is following patient: Yes  Is this a 60-day recertification request?  No    Orders Requested    Physical Therapy  Request for continuation of care with no increase or decrease in frequency  Frequency: 1x/week for 4 weeks        Verbal orders given.  Home Care will send orders for provider to sign.  Confirmed ok to leave a detailed message with call back.  Contact information confirmed and updated as needed.    MARIUSZ HART RN

## 2023-08-03 ENCOUNTER — TELEPHONE (OUTPATIENT)
Dept: INTERNAL MEDICINE | Facility: CLINIC | Age: 63
End: 2023-08-03
Payer: COMMERCIAL

## 2023-08-23 ENCOUNTER — TELEPHONE (OUTPATIENT)
Dept: INTERNAL MEDICINE | Facility: CLINIC | Age: 63
End: 2023-08-23
Payer: COMMERCIAL

## 2023-08-23 NOTE — TELEPHONE ENCOUNTER
Home Care is calling regarding an established patient with [facility].  {      7/11/2023     3:06 PM   Home Care Information   Date of Home Care episode start 6/24/2023   Current following provider Dr. Thurston    Name/Phone Number Maribel rhodes, [phone]   Home Care agency [facility]     Requesting orders from: Brad Thurston  Provider is following patient: Yes  Is this a 60-day recertification request?  Yes    Orders Requested    Occupational Therapy  Request for recertification   Frequency:  1 more visit in 3 wks  for wheel chair needs      Confirmed ok to leave a detailed message with call back.  Contact information confirmed and updated as needed.    Selvin Dinh RN

## 2023-08-23 NOTE — TELEPHONE ENCOUNTER
Home Care is calling regarding an established patient with [facility].  {      7/11/2023     3:06 PM   Home Care Information   Date of Home Care episode start 6/24/2023   Current following provider Dr. Thurston    Name/Phone Number Maribel rhodes, [phone]   Home Care agency [facility]     Requesting orders from: Brad Thurston  Provider is following patient: Yes  Is this a 60-day recertification request?  Yes    Orders Requested    Physical Therapy  Request for recertification   Frequency:  1 x a wk x 1 wk  1 visit every other wk x  4wks  1 visit  every 3 wks x 3 wks      Verbal orders given.  Home Care will send orders for provider to sign.  Verbal orders given, provider out of office, covering provider used.  Home Care will send orders for covering provider to sign.    Selvin Dinh RN

## 2023-08-23 NOTE — TELEPHONE ENCOUNTER
Left detailed message relaying providers approval for requested home care orders, and info to call clinic back at [phone] if any questions.

## 2023-09-08 ENCOUNTER — OFFICE VISIT (OUTPATIENT)
Dept: URGENT CARE | Facility: URGENT CARE | Age: 63
End: 2023-09-08
Payer: COMMERCIAL

## 2023-09-08 VITALS
SYSTOLIC BLOOD PRESSURE: 170 MMHG | HEART RATE: 83 BPM | TEMPERATURE: 98.2 F | RESPIRATION RATE: 16 BRPM | DIASTOLIC BLOOD PRESSURE: 96 MMHG | OXYGEN SATURATION: 95 %

## 2023-09-08 DIAGNOSIS — H60.393 INFECTIVE OTITIS EXTERNA, BILATERAL: ICD-10-CM

## 2023-09-08 DIAGNOSIS — H61.22 IMPACTED CERUMEN OF LEFT EAR: ICD-10-CM

## 2023-09-08 DIAGNOSIS — H90.0 CONDUCTIVE HEARING LOSS, BILATERAL: Primary | ICD-10-CM

## 2023-09-08 DIAGNOSIS — H61.21 IMPACTED CERUMEN OF RIGHT EAR: ICD-10-CM

## 2023-09-08 DIAGNOSIS — I10 BENIGN ESSENTIAL HYPERTENSION: ICD-10-CM

## 2023-09-08 PROCEDURE — 69209 REMOVE IMPACTED EAR WAX UNI: CPT | Mod: 50 | Performed by: PHYSICIAN ASSISTANT

## 2023-09-08 PROCEDURE — 99214 OFFICE O/P EST MOD 30 MIN: CPT | Mod: 25 | Performed by: PHYSICIAN ASSISTANT

## 2023-09-08 RX ORDER — NEOMYCIN SULFATE, POLYMYXIN B SULFATE AND HYDROCORTISONE 10; 3.5; 1 MG/ML; MG/ML; [USP'U]/ML
3 SUSPENSION/ DROPS AURICULAR (OTIC) 4 TIMES DAILY
Qty: 10 ML | Refills: 0 | Status: SHIPPED | OUTPATIENT
Start: 2023-09-08 | End: 2023-09-18

## 2023-09-08 NOTE — PROGRESS NOTES
Assessment & Plan     Conductive hearing loss, bilateral    Patient has bilateral cerumen impaction causing hearing loss in both ears    Impacted cerumen of left ear    PROCEDURE:    Ear was evaluated and found to have impacted wax  Ear irrigation was performed and was was removed  Patient tolerated procedure well  May use OTC debrox drops prn in the future    Impacted cerumen of right ear    External otitis (also called  swimmer s ear ) is an infection in the ear canal. It's often caused by bacteria or fungus. It can occur a few days after water gets trapped in the ear canal (from swimming or bathing). It can also occur after cleaning too deeply in the ear canal with a cotton swab or other object. Sometimes, hair care products get into the ear canal and cause this problem.   Symptoms can include pain, fever, itching, redness, drainage, or swelling of the ear canal. Temporary hearing loss may also occur.      Benign essential hypertension    Patient advised to follow up with PCP for recheck blood pressure   Information given to patient on diet modifications, including lowering salt in diet, decrease calories, weight loss and exercise.  Monitor blood pressure at home and if BP maintains over 140/90 then advise having a recheck with PCP in 2 weeks.     We have discussed having his physician from [facility] recheck his BP and talk about medications    Infective otitis externa, bilateral    External otitis (also called  swimmer s ear ) is an infection in the ear canal. It's often caused by bacteria or fungus. It can occur a few days after water gets trapped in the ear canal (from swimming or bathing). It can also occur after cleaning too deeply in the ear canal with a cotton swab or other object. Sometimes, hair care products get into the ear canal and cause this problem.   Symptoms can include pain, fever, itching, redness, drainage, or swelling of the ear canal. Temporary hearing loss may also occur.    Start  "on drops:  - neomycin-polymyxin-hydrocortisone (CORTISPORIN) 3.5-98712-9 otic suspension; Place 3 drops into both ears 4 times daily for 10 days    Review of external notes as documented elsewhere in note      BMI:   Estimated body mass index is 34.64 kg/m  as calculated from the following:    Height as of 6/2/23: 1.702 m (5' 7\").    Weight as of 6/15/23: 100.3 kg (221 lb 3.2 oz).     At today's visit with Suresh Powers , we discussed results, diagnosis, medications and formulated a plan.  We also discussed red flags for immediate return to clinic/ER, as well as indications for follow up with PCP if not improved in 3 days. Patient understood and agreed to plan. Suresh Powers was discharged with stable vitals and has no further questions.       No follow-ups on file.    Raghu Anders, [facility], [place]-[facility]    Alberto Prince is a 63 year old, presenting for the following health issues:  Ear Problem (Bilateral cerumen impaction)      HPI   Review of Systems   Constitutional, HEENT, cardiovascular, pulmonary, gi and gu systems are negative, except as otherwise noted.      Objective    BP (!) 170/96 (BP Location: Left arm, Patient Position: Sitting, Cuff Size: Adult Regular)   Pulse 83   Temp 98.2  F (36.8  C) (Oral)   Resp 16   SpO2 95%   There is no height or weight on file to calculate BMI.  Physical Exam   GENERAL: healthy, alert and no distress  EYES: Eyes grossly normal to inspection, PERRL and conjunctivae and sclerae normal  HENT: both ears: occluded with wax and seem to be irritated and bleeding after wax removed  NECK: no adenopathy, no asymmetry, masses, or scars and thyroid normal to palpation  RESP: lungs clear to auscultation - no rales, rhonchi or wheezes  CV: regular rate and rhythm, normal S1 S2, no S3 or S4, no murmur, click or rub, no peripheral edema and peripheral pulses strong  SKIN: no suspicious lesions or rashes  NEURO: Normal strength and tone, mentation intact " and speech normal  PSYCH: mentation appears normal, affect normal/bright

## 2023-09-19 DIAGNOSIS — Z53.9 DIAGNOSIS NOT YET DEFINED: Primary | ICD-10-CM

## 2023-09-19 PROCEDURE — G0179 MD RECERTIFICATION HHA PT: HCPCS | Performed by: INTERNAL MEDICINE

## 2023-10-02 NOTE — PLAN OF CARE
Goal Outcome Evaluation:    Summary:  Admitted from ED with urinary retention, UTI, hyponatremia, recent ORIF left hip in Sept.  Pt unable to care for himself at home currently.  Hx OCD/depression.   Primary Diagnosis: Urinary retention, UTI.  Orientation: A&OX4 - very pleasant  Aggression Stop Light: Green  Mobility: Up with SBA/GB/walker. Not OOB this shift  Pain Management: pain to hip - received systane x1  Diet: Regular  Bowel/Bladder: Prieto in place from ED.   Abnormal Lab/Assessments: Sodium 130.  Drain/Device/Wound: Sacral skin breakdown, rashes to BUE (cleft region) barrier applied per plan of care  Consults: PT/OT, Nutrition, SWS.  D/C Day/Goals/Place: per note pt discharge with homecare    Shift Note:   A&Ox4. VSS on RA. Denies pain. Regular diet tolerating well, fluid restriction 1800 mL. Prieto patent w/ good UOP. SBA. - approx 240 ml of intake remain                         Non Face-to-Face Non Face-to-Face

## 2023-11-20 ENCOUNTER — LAB REQUISITION (OUTPATIENT)
Dept: LAB | Facility: CLINIC | Age: 63
End: 2023-11-20
Payer: COMMERCIAL

## 2023-11-20 DIAGNOSIS — I10 ESSENTIAL (PRIMARY) HYPERTENSION: ICD-10-CM

## 2023-11-21 LAB
ANION GAP SERPL CALCULATED.3IONS-SCNC: 12 MMOL/L (ref 7–15)
BUN SERPL-MCNC: 8.8 MG/DL (ref 8–23)
CALCIUM SERPL-MCNC: 9 MG/DL (ref 8.8–10.2)
CHLORIDE SERPL-SCNC: 94 MMOL/L (ref 98–107)
CREAT SERPL-MCNC: 0.61 MG/DL (ref 0.67–1.17)
DEPRECATED HCO3 PLAS-SCNC: 24 MMOL/L (ref 22–29)
EGFRCR SERPLBLD CKD-EPI 2021: >90 ML/MIN/1.73M2
GLUCOSE SERPL-MCNC: 95 MG/DL (ref 70–99)
POTASSIUM SERPL-SCNC: 4.2 MMOL/L (ref 3.4–5.3)
SODIUM SERPL-SCNC: 130 MMOL/L (ref 135–145)

## 2023-11-21 PROCEDURE — 80048 BASIC METABOLIC PNL TOTAL CA: CPT | Mod: ORL | Performed by: NURSE PRACTITIONER

## 2023-11-21 PROCEDURE — P9604 ONE-WAY ALLOW PRORATED TRIP: HCPCS | Mod: ORL | Performed by: NURSE PRACTITIONER

## 2023-11-21 PROCEDURE — 36415 COLL VENOUS BLD VENIPUNCTURE: CPT | Mod: ORL | Performed by: NURSE PRACTITIONER

## 2024-01-24 DIAGNOSIS — R33.9 URINARY RETENTION: ICD-10-CM

## 2024-01-24 DIAGNOSIS — I26.99 OTHER ACUTE PULMONARY EMBOLISM WITHOUT ACUTE COR PULMONALE (H): ICD-10-CM

## 2024-01-24 DIAGNOSIS — K92.2 GASTROINTESTINAL HEMORRHAGE, UNSPECIFIED GASTROINTESTINAL HEMORRHAGE TYPE: ICD-10-CM

## 2024-01-24 DIAGNOSIS — E54 VITAMIN C DEFICIENCY: ICD-10-CM

## 2024-01-25 RX ORDER — APIXABAN 5 MG/1
TABLET, FILM COATED ORAL
Qty: 28 TABLET | Refills: 1 | Status: SHIPPED | OUTPATIENT
Start: 2024-01-25

## 2024-01-25 RX ORDER — PANTOPRAZOLE SODIUM 40 MG/1
TABLET, DELAYED RELEASE ORAL
Qty: 28 TABLET | Refills: 1 | Status: SHIPPED | OUTPATIENT
Start: 2024-01-25

## 2024-01-25 RX ORDER — ASCORBIC ACID 500 MG
TABLET ORAL
Qty: 28 TABLET | Refills: 1 | Status: SHIPPED | OUTPATIENT
Start: 2024-01-25

## 2024-01-25 RX ORDER — TAMSULOSIN HYDROCHLORIDE 0.4 MG/1
CAPSULE ORAL
Qty: 28 CAPSULE | Refills: 1 | Status: SHIPPED | OUTPATIENT
Start: 2024-01-25

## 2024-01-25 NOTE — TELEPHONE ENCOUNTER
Pt does have a provider through the assisting living/tcu facility. Her name is Daphnie Jimenez NP through [facility] phone number [phone] ext #[phone].

## 2024-01-25 NOTE — TELEPHONE ENCOUNTER
Patient last seen in February 2023.  No follow-up after discharged from TCU rehab in June 2023 following hospitalization.  Call patient check  if he is now being managed by a provider that comes to his assisted living or if he wishes to have all his care still through the [facility]. If he is now being managed by a provider who comes to his assisted living, then get name of assisted living pt at now and name of provider and route that info back to me so I can update chart and change PCP. If, however, pt noty being followed by anyone else, then  schedule a follow-up appointment with me in the next month to review multiple medical issues.  May use open available [facility], same-day, next day appointment slot for follow-up appointment with me in clinic.  Medications refilled for 2 months to cover until patient seen

## 2024-04-27 ENCOUNTER — LAB REQUISITION (OUTPATIENT)
Dept: LAB | Facility: CLINIC | Age: 64
End: 2024-04-27
Payer: COMMERCIAL

## 2024-04-27 DIAGNOSIS — D50.9 IRON DEFICIENCY ANEMIA, UNSPECIFIED: ICD-10-CM

## 2024-04-27 DIAGNOSIS — I10 ESSENTIAL (PRIMARY) HYPERTENSION: ICD-10-CM

## 2024-04-30 LAB
ANION GAP SERPL CALCULATED.3IONS-SCNC: 13 MMOL/L (ref 7–15)
BUN SERPL-MCNC: 8.1 MG/DL (ref 8–23)
CALCIUM SERPL-MCNC: 8.6 MG/DL (ref 8.8–10.2)
CHLORIDE SERPL-SCNC: 92 MMOL/L (ref 98–107)
CREAT SERPL-MCNC: 0.58 MG/DL (ref 0.67–1.17)
DEPRECATED HCO3 PLAS-SCNC: 25 MMOL/L (ref 22–29)
EGFRCR SERPLBLD CKD-EPI 2021: >90 ML/MIN/1.73M2
ERYTHROCYTE [DISTWIDTH] IN BLOOD BY AUTOMATED COUNT: 14.4 % (ref 10–15)
FERRITIN SERPL-MCNC: 152 NG/ML (ref 31–409)
GLUCOSE SERPL-MCNC: 87 MG/DL (ref 70–99)
HCT VFR BLD AUTO: 35.1 % (ref 40–53)
HGB BLD-MCNC: 12 G/DL (ref 13.3–17.7)
MCH RBC QN AUTO: 32.1 PG (ref 26.5–33)
MCHC RBC AUTO-ENTMCNC: 34.2 G/DL (ref 31.5–36.5)
MCV RBC AUTO: 94 FL (ref 78–100)
PLATELET # BLD AUTO: 282 10E3/UL (ref 150–450)
POTASSIUM SERPL-SCNC: 3.8 MMOL/L (ref 3.4–5.3)
RBC # BLD AUTO: 3.74 10E6/UL (ref 4.4–5.9)
SODIUM SERPL-SCNC: 130 MMOL/L (ref 135–145)
WBC # BLD AUTO: 7.7 10E3/UL (ref 4–11)

## 2024-04-30 PROCEDURE — 85027 COMPLETE CBC AUTOMATED: CPT | Mod: ORL | Performed by: NURSE PRACTITIONER

## 2024-04-30 PROCEDURE — 36415 COLL VENOUS BLD VENIPUNCTURE: CPT | Mod: ORL | Performed by: NURSE PRACTITIONER

## 2024-04-30 PROCEDURE — P9604 ONE-WAY ALLOW PRORATED TRIP: HCPCS | Mod: ORL | Performed by: NURSE PRACTITIONER

## 2024-04-30 PROCEDURE — 82728 ASSAY OF FERRITIN: CPT | Mod: ORL | Performed by: NURSE PRACTITIONER

## 2024-04-30 PROCEDURE — 80048 BASIC METABOLIC PNL TOTAL CA: CPT | Mod: ORL | Performed by: NURSE PRACTITIONER

## 2024-08-10 ENCOUNTER — HEALTH MAINTENANCE LETTER (OUTPATIENT)
Age: 64
End: 2024-08-10

## 2025-07-03 ENCOUNTER — LAB REQUISITION (OUTPATIENT)
Dept: LAB | Facility: CLINIC | Age: 65
End: 2025-07-03
Payer: COMMERCIAL

## 2025-07-03 DIAGNOSIS — I10 ESSENTIAL (PRIMARY) HYPERTENSION: ICD-10-CM

## 2025-07-08 LAB
BASOPHILS # BLD AUTO: 0.1 10E3/UL (ref 0–0.2)
BASOPHILS NFR BLD AUTO: 1 %
EOSINOPHIL # BLD AUTO: 0.7 10E3/UL (ref 0–0.7)
EOSINOPHIL NFR BLD AUTO: 6 %
ERYTHROCYTE [DISTWIDTH] IN BLOOD BY AUTOMATED COUNT: 13.1 % (ref 10–15)
HCT VFR BLD AUTO: 39 % (ref 40–53)
HGB BLD-MCNC: 13.4 G/DL (ref 13.3–17.7)
IMM GRANULOCYTES # BLD: 0.1 10E3/UL
IMM GRANULOCYTES NFR BLD: 1 %
LYMPHOCYTES # BLD AUTO: 2.2 10E3/UL (ref 0.8–5.3)
LYMPHOCYTES NFR BLD AUTO: 19 %
MCH RBC QN AUTO: 32.1 PG (ref 26.5–33)
MCHC RBC AUTO-ENTMCNC: 34.4 G/DL (ref 31.5–36.5)
MCV RBC AUTO: 94 FL (ref 78–100)
MONOCYTES # BLD AUTO: 0.9 10E3/UL (ref 0–1.3)
MONOCYTES NFR BLD AUTO: 8 %
NEUTROPHILS # BLD AUTO: 7.4 10E3/UL (ref 1.6–8.3)
NEUTROPHILS NFR BLD AUTO: 65 %
NRBC # BLD AUTO: 0 10E3/UL
NRBC BLD AUTO-RTO: 0 /100
PLATELET # BLD AUTO: 300 10E3/UL (ref 150–450)
RBC # BLD AUTO: 4.17 10E6/UL (ref 4.4–5.9)
RETICS # AUTO: 0.09 10E6/UL (ref 0.03–0.1)
RETICS/RBC NFR AUTO: 2.1 % (ref 0.5–2)
WBC # BLD AUTO: 11.4 10E3/UL (ref 4–11)

## 2025-07-09 LAB
PATH REPORT.COMMENTS IMP SPEC: NORMAL
PATH REPORT.COMMENTS IMP SPEC: NORMAL
PATH REPORT.FINAL DX SPEC: NORMAL
PATH REPORT.MICROSCOPIC SPEC OTHER STN: NORMAL
PATH REPORT.RELEVANT HX SPEC: NORMAL

## 2025-07-10 ENCOUNTER — LAB REQUISITION (OUTPATIENT)
Dept: LAB | Facility: CLINIC | Age: 65
End: 2025-07-10
Payer: COMMERCIAL

## 2025-07-10 DIAGNOSIS — I10 ESSENTIAL (PRIMARY) HYPERTENSION: ICD-10-CM

## 2025-07-15 LAB
ANION GAP SERPL CALCULATED.3IONS-SCNC: 13 MMOL/L (ref 7–15)
BUN SERPL-MCNC: 11.8 MG/DL (ref 8–23)
CALCIUM SERPL-MCNC: 9.2 MG/DL (ref 8.8–10.4)
CHLORIDE SERPL-SCNC: 85 MMOL/L (ref 98–107)
CREAT SERPL-MCNC: 0.71 MG/DL (ref 0.67–1.17)
EGFRCR SERPLBLD CKD-EPI 2021: >90 ML/MIN/1.73M2
GLUCOSE SERPL-MCNC: 82 MG/DL (ref 70–99)
HCO3 SERPL-SCNC: 21 MMOL/L (ref 22–29)
POTASSIUM SERPL-SCNC: 4.2 MMOL/L (ref 3.4–5.3)
SODIUM SERPL-SCNC: 119 MMOL/L (ref 135–145)

## 2025-08-04 ENCOUNTER — LAB REQUISITION (OUTPATIENT)
Dept: LAB | Facility: CLINIC | Age: 65
End: 2025-08-04
Payer: COMMERCIAL

## 2025-08-04 DIAGNOSIS — I10 ESSENTIAL (PRIMARY) HYPERTENSION: ICD-10-CM

## 2025-08-05 LAB
ANION GAP SERPL CALCULATED.3IONS-SCNC: 11 MMOL/L (ref 7–15)
BUN SERPL-MCNC: 12.3 MG/DL (ref 8–23)
CALCIUM SERPL-MCNC: 9.5 MG/DL (ref 8.8–10.4)
CHLORIDE SERPL-SCNC: 95 MMOL/L (ref 98–107)
CREAT SERPL-MCNC: 0.61 MG/DL (ref 0.67–1.17)
EGFRCR SERPLBLD CKD-EPI 2021: >90 ML/MIN/1.73M2
ERYTHROCYTE [DISTWIDTH] IN BLOOD BY AUTOMATED COUNT: 12.7 % (ref 10–15)
GLUCOSE SERPL-MCNC: 105 MG/DL (ref 70–99)
HCO3 SERPL-SCNC: 26 MMOL/L (ref 22–29)
HCT VFR BLD AUTO: 34.7 % (ref 40–53)
HGB BLD-MCNC: 11.3 G/DL (ref 13.3–17.7)
MCH RBC QN AUTO: 31.1 PG (ref 26.5–33)
MCHC RBC AUTO-ENTMCNC: 32.6 G/DL (ref 31.5–36.5)
MCV RBC AUTO: 96 FL (ref 78–100)
PLATELET # BLD AUTO: 481 10E3/UL (ref 150–450)
POTASSIUM SERPL-SCNC: 4.3 MMOL/L (ref 3.4–5.3)
RBC # BLD AUTO: 3.63 10E6/UL (ref 4.4–5.9)
SODIUM SERPL-SCNC: 132 MMOL/L (ref 135–145)
WBC # BLD AUTO: 9.5 10E3/UL (ref 4–11)

## (undated) DEVICE — SOL WATER IRRIG 1000ML BOTTLE 2F7114

## (undated) DEVICE — DRSG AQUACEL AG 3.5X6.0" HYDROFIBER 412010

## (undated) DEVICE — PACK HIP NAILING SOP15HNSB

## (undated) DEVICE — DRAPE SHEET REV FOLD 3/4 9349

## (undated) DEVICE — DRSG KERLIX FLUFFS X5

## (undated) DEVICE — DRILL BIT CANNULATED 16MM FLEX

## (undated) DEVICE — DRSG AQUACEL AG 3.5X9.75" HYDROFIBER 412011

## (undated) DEVICE — DRSG XEROFORM 1X8"

## (undated) DEVICE — SU MONOCRYL 2-0 CT-1 36" UND Y945H

## (undated) DEVICE — BLADE CLIPPER 4412A

## (undated) DEVICE — SU VICRYL 0 CT-1 36" J346H

## (undated) DEVICE — SU VICRYL 2-0 CT-2 27" UND J269H

## (undated) DEVICE — IMP SCR SYN CORTEX 4.5X46MM ST TI 414.846: Type: IMPLANTABLE DEVICE | Site: LEG | Status: NON-FUNCTIONAL

## (undated) DEVICE — SUCTION MANIFOLD NEPTUNE SGL

## (undated) DEVICE — GUIDE WIRE 2.5X200MM 310.243

## (undated) DEVICE — MANIFOLD NEPTUNE 4 PORT 700-20

## (undated) DEVICE — SUCTION IRR SYSTEM W/O TIP INTERPULSE HANDPIECE 0210-100-000

## (undated) DEVICE — CAST PADDING 6" UNSTERILE 9046

## (undated) DEVICE — SU MONOCRYL 3-0 PS-2 27" Y427H

## (undated) DEVICE — SU VICRYL 1 CT-1 27" J341H

## (undated) DEVICE — DRILL BIT 3.2X145MM  310.31

## (undated) DEVICE — LINEN TOWEL PACK X5 5464

## (undated) DEVICE — DRAPE C-ARMOR 5 SIDED 5523

## (undated) DEVICE — DRAPE C-ARM 60X42" 1013

## (undated) DEVICE — BLADE KNIFE SURG 15 371115

## (undated) DEVICE — WIRE GUIDE 3.2X400MM  357.399

## (undated) DEVICE — BONE CLEANING TIP INTERPULSE  0210-010-000

## (undated) DEVICE — GLOVE BIOGEL PI MICRO INDICATOR UNDERGLOVE SZ 7.5 48975

## (undated) DEVICE — ESU GROUND PAD UNIVERSAL W/O CORD

## (undated) DEVICE — SOL NACL 0.9% IRRIG 1000ML BOTTLE 2F7124

## (undated) DEVICE — KIT PATIENT CARE HANA TABLE PROFX SUPINE 6855

## (undated) DEVICE — GOWN IMPERVIOUS SPECIALTY XL/XLONG 39049

## (undated) DEVICE — DRILL BIT QUICK COUPLING 3 FLUTE 4.2MMX330/100MM CALIBRATE

## (undated) DEVICE — SPONGE LAP 18X18" X8435

## (undated) DEVICE — DRSG TEGADERM 6X8" 1628

## (undated) DEVICE — DRAIN JACKSON PRATT 07MM FLAT 3/4 PERF

## (undated) DEVICE — DRSG ADAPTIC 3X8" 6113

## (undated) DEVICE — DRSG TEGADERM 4X4 3/4" 1626

## (undated) DEVICE — DRILL BIT SYN 4.3X180MM  310.431

## (undated) DEVICE — DRAPE COVER C-ARM SEAMLESS SNAP-KAP 03-KP26 LATEX FREE

## (undated) DEVICE — GLOVE BIOGEL PI ULTRATOUCH SZ 7.5 41175

## (undated) DEVICE — BNDG ELASTIC 6" DBL LENGTH UNSTERILE 6611-16

## (undated) DEVICE — GLOVE PROTEXIS MICRO 8.5  2D73PM85

## (undated) DEVICE — PREP CHLORAPREP 26ML TINTED HI-LITE ORANGE 930815

## (undated) DEVICE — PACK TOTAL HIP W/U DRAPE SOP15HUFSC

## (undated) RX ORDER — FENTANYL CITRATE 50 UG/ML
INJECTION, SOLUTION INTRAMUSCULAR; INTRAVENOUS
Status: DISPENSED
Start: 2023-05-24

## (undated) RX ORDER — CEFAZOLIN SODIUM/WATER 2 G/20 ML
SYRINGE (ML) INTRAVENOUS
Status: DISPENSED
Start: 2023-05-25

## (undated) RX ORDER — FENTANYL CITRATE 50 UG/ML
INJECTION, SOLUTION INTRAMUSCULAR; INTRAVENOUS
Status: DISPENSED
Start: 2022-09-04

## (undated) RX ORDER — FENTANYL CITRATE 50 UG/ML
INJECTION, SOLUTION INTRAMUSCULAR; INTRAVENOUS
Status: DISPENSED
Start: 2022-11-30

## (undated) RX ORDER — FENTANYL CITRATE 50 UG/ML
INJECTION, SOLUTION INTRAMUSCULAR; INTRAVENOUS
Status: DISPENSED
Start: 2023-05-25

## (undated) RX ORDER — LIDOCAINE HYDROCHLORIDE 20 MG/ML
SOLUTION OROPHARYNGEAL
Status: DISPENSED
Start: 2023-05-24

## (undated) RX ORDER — GLYCOPYRROLATE 0.2 MG/ML
INJECTION, SOLUTION INTRAMUSCULAR; INTRAVENOUS
Status: DISPENSED
Start: 2022-09-04

## (undated) RX ORDER — NEOSTIGMINE METHYLSULFATE 1 MG/ML
VIAL (ML) INJECTION
Status: DISPENSED
Start: 2022-09-04

## (undated) RX ORDER — HEPARIN SODIUM 200 [USP'U]/100ML
INJECTION, SOLUTION INTRAVENOUS
Status: DISPENSED
Start: 2022-12-02

## (undated) RX ORDER — FENTANYL CITRATE 0.05 MG/ML
INJECTION, SOLUTION INTRAMUSCULAR; INTRAVENOUS
Status: DISPENSED
Start: 2023-05-25

## (undated) RX ORDER — ONDANSETRON 2 MG/ML
INJECTION INTRAMUSCULAR; INTRAVENOUS
Status: DISPENSED
Start: 2023-05-24

## (undated) RX ORDER — CEFAZOLIN SODIUM 1 G/3ML
INJECTION, POWDER, FOR SOLUTION INTRAMUSCULAR; INTRAVENOUS
Status: DISPENSED
Start: 2022-09-04

## (undated) RX ORDER — DEXAMETHASONE SODIUM PHOSPHATE 4 MG/ML
INJECTION, SOLUTION INTRA-ARTICULAR; INTRALESIONAL; INTRAMUSCULAR; INTRAVENOUS; SOFT TISSUE
Status: DISPENSED
Start: 2023-05-24

## (undated) RX ORDER — FENTANYL CITRATE 50 UG/ML
INJECTION, SOLUTION INTRAMUSCULAR; INTRAVENOUS
Status: DISPENSED
Start: 2022-12-02

## (undated) RX ORDER — PROPOFOL 10 MG/ML
INJECTION, EMULSION INTRAVENOUS
Status: DISPENSED
Start: 2023-05-24

## (undated) RX ORDER — FENTANYL CITRATE 0.05 MG/ML
INJECTION, SOLUTION INTRAMUSCULAR; INTRAVENOUS
Status: DISPENSED
Start: 2022-09-04

## (undated) RX ORDER — TRANEXAMIC ACID 10 MG/ML
INJECTION, SOLUTION INTRAVENOUS
Status: DISPENSED
Start: 2022-09-04

## (undated) RX ORDER — NEOSTIGMINE METHYLSULFATE 1 MG/ML
VIAL (ML) INJECTION
Status: DISPENSED
Start: 2023-05-24

## (undated) RX ORDER — BUPIVACAINE HYDROCHLORIDE AND EPINEPHRINE 5; 5 MG/ML; UG/ML
INJECTION, SOLUTION EPIDURAL; INTRACAUDAL; PERINEURAL
Status: DISPENSED
Start: 2022-09-04

## (undated) RX ORDER — PROPOFOL 10 MG/ML
INJECTION, EMULSION INTRAVENOUS
Status: DISPENSED
Start: 2023-05-25